# Patient Record
Sex: FEMALE | Race: WHITE | NOT HISPANIC OR LATINO | Employment: OTHER | URBAN - METROPOLITAN AREA
[De-identification: names, ages, dates, MRNs, and addresses within clinical notes are randomized per-mention and may not be internally consistent; named-entity substitution may affect disease eponyms.]

---

## 2017-06-23 ENCOUNTER — HOSPITAL ENCOUNTER (OUTPATIENT)
Facility: MEDICAL CENTER | Age: 57
End: 2017-06-23
Attending: INTERNAL MEDICINE | Admitting: INTERNAL MEDICINE
Payer: MEDICAID

## 2017-06-29 ENCOUNTER — APPOINTMENT (OUTPATIENT)
Dept: ADMISSIONS | Facility: MEDICAL CENTER | Age: 57
End: 2017-06-29
Payer: MEDICAID

## 2017-08-18 ENCOUNTER — TELEPHONE (OUTPATIENT)
Dept: INTERNAL MEDICINE | Facility: MEDICAL CENTER | Age: 57
End: 2017-08-18

## 2017-08-18 ENCOUNTER — APPOINTMENT (OUTPATIENT)
Dept: ADMISSIONS | Facility: MEDICAL CENTER | Age: 57
End: 2017-08-18
Attending: INTERNAL MEDICINE
Payer: MEDICAID

## 2017-08-18 DIAGNOSIS — Z79.4 TYPE 2 DIABETES MELLITUS WITH DIABETIC NEUROPATHY, WITH LONG-TERM CURRENT USE OF INSULIN (HCC): ICD-10-CM

## 2017-08-18 DIAGNOSIS — D64.9 ANEMIA, UNSPECIFIED TYPE: ICD-10-CM

## 2017-08-18 DIAGNOSIS — E55.9 VITAMIN D DEFICIENCY: ICD-10-CM

## 2017-08-18 DIAGNOSIS — E11.40 TYPE 2 DIABETES MELLITUS WITH DIABETIC NEUROPATHY, WITH LONG-TERM CURRENT USE OF INSULIN (HCC): ICD-10-CM

## 2017-08-18 NOTE — TELEPHONE ENCOUNTER
Pt called stating that she has an appt with Dr Rhoades on 9/6/17.  She would like to know if labs are needed before her appt and if so could we order them for her.

## 2017-08-21 ENCOUNTER — HOSPITAL ENCOUNTER (OUTPATIENT)
Facility: MEDICAL CENTER | Age: 57
End: 2017-08-21
Attending: INTERNAL MEDICINE | Admitting: INTERNAL MEDICINE
Payer: MEDICAID

## 2017-08-21 VITALS
WEIGHT: 164.9 LBS | BODY MASS INDEX: 27.47 KG/M2 | HEART RATE: 95 BPM | OXYGEN SATURATION: 92 % | HEIGHT: 65 IN | TEMPERATURE: 97.8 F | RESPIRATION RATE: 16 BRPM

## 2017-08-21 PROBLEM — I85.00 ESOPHAGEAL VARICES (HCC): Status: ACTIVE | Noted: 2017-08-21

## 2017-08-21 LAB — GLUCOSE BLD-MCNC: 283 MG/DL (ref 65–99)

## 2017-08-21 PROCEDURE — 502240 HCHG MISC OR SUPPLY RC 0272: Performed by: INTERNAL MEDICINE

## 2017-08-21 PROCEDURE — 700111 HCHG RX REV CODE 636 W/ 250 OVERRIDE (IP)

## 2017-08-21 PROCEDURE — 160203 HCHG ENDO MINUTES - 1ST 30 MINS LEVEL 4: Performed by: INTERNAL MEDICINE

## 2017-08-21 PROCEDURE — 160002 HCHG RECOVERY MINUTES (STAT): Performed by: INTERNAL MEDICINE

## 2017-08-21 PROCEDURE — 160009 HCHG ANES TIME/MIN: Performed by: INTERNAL MEDICINE

## 2017-08-21 PROCEDURE — 700102 HCHG RX REV CODE 250 W/ 637 OVERRIDE(OP)

## 2017-08-21 PROCEDURE — 160036 HCHG PACU - EA ADDL 30 MINS PHASE I: Performed by: INTERNAL MEDICINE

## 2017-08-21 PROCEDURE — 160048 HCHG OR STATISTICAL LEVEL 1-5: Performed by: INTERNAL MEDICINE

## 2017-08-21 PROCEDURE — 82962 GLUCOSE BLOOD TEST: CPT

## 2017-08-21 PROCEDURE — 500066 HCHG BITE BLOCK, ECT: Performed by: INTERNAL MEDICINE

## 2017-08-21 PROCEDURE — A9270 NON-COVERED ITEM OR SERVICE: HCPCS

## 2017-08-21 PROCEDURE — 160035 HCHG PACU - 1ST 60 MINS PHASE I: Performed by: INTERNAL MEDICINE

## 2017-08-21 RX ORDER — OXYCODONE HCL 5 MG/5 ML
SOLUTION, ORAL ORAL
Status: COMPLETED
Start: 2017-08-21 | End: 2017-08-21

## 2017-08-21 RX ORDER — SODIUM CHLORIDE, SODIUM LACTATE, POTASSIUM CHLORIDE, CALCIUM CHLORIDE 600; 310; 30; 20 MG/100ML; MG/100ML; MG/100ML; MG/100ML
INJECTION, SOLUTION INTRAVENOUS CONTINUOUS
Status: DISCONTINUED | OUTPATIENT
Start: 2017-08-21 | End: 2017-08-21 | Stop reason: HOSPADM

## 2017-08-21 RX ORDER — OMEPRAZOLE 20 MG/1
20 CAPSULE, DELAYED RELEASE ORAL 2 TIMES DAILY
Qty: 28 CAP | Refills: 0 | Status: SHIPPED | OUTPATIENT
Start: 2017-08-21 | End: 2017-12-16

## 2017-08-21 RX ORDER — ONDANSETRON 2 MG/ML
4 INJECTION INTRAMUSCULAR; INTRAVENOUS EVERY 4 HOURS PRN
Status: DISCONTINUED | OUTPATIENT
Start: 2017-08-21 | End: 2017-08-21 | Stop reason: HOSPADM

## 2017-08-21 RX ADMIN — OXYCODONE HYDROCHLORIDE 10 MG: 5 SOLUTION ORAL at 14:35

## 2017-08-21 RX ADMIN — SODIUM CHLORIDE, SODIUM LACTATE, POTASSIUM CHLORIDE, CALCIUM CHLORIDE: 600; 310; 30; 20 INJECTION, SOLUTION INTRAVENOUS at 13:00

## 2017-08-21 RX ADMIN — HEPARIN 500 UNITS: 100 SYRINGE at 16:45

## 2017-08-21 ASSESSMENT — PAIN SCALES - GENERAL
PAINLEVEL_OUTOF10: 3
PAINLEVEL_OUTOF10: 5
PAINLEVEL_OUTOF10: 3
PAINLEVEL_OUTOF10: 5
PAINLEVEL_OUTOF10: 3
PAINLEVEL_OUTOF10: 3
PAINLEVEL_OUTOF10: 4

## 2017-08-21 NOTE — PROCEDURES
DATE OF SERVICE:  08/21/2017    PROCEDURE:  Upper endoscopy with variceal band ligation.    :  Anthony Dent MD    INDICATION:  The patient is a 57-year-old with history of hepatitis C related   cirrhosis and esophageal varices with previous bleeding that presents for   surveillance of esophageal varices.    INFORMED CONSENT:  Written informed consent was obtained from the patient   after thorough explanation of indications, benefits and risks of the   procedure, which include but was not limited to bleeding, infection,   perforation, adverse reaction to medications, and missed lesions.    MEDICATIONS GIVEN:  Monitored anesthesia care with propofol.    Continuous telemetry, BP, pulse oximetry, and capnography were performed by   the anesthesiologist throughout the procedure.    A midsize upper endoscope was used for the procedure.    FINDINGS:  Patient was placed in the left lateral decubitus position.  After   sedation was achieved, the endoscope was passed into the posterior pharynx and   under direct visualization, advanced to second portion of the duodenum   without difficulty.  The patient tolerated the procedure well with following   findings:  1.  Esophagus:  She did have 3 columns of esophageal varices extending from   her GE junction up to her mid esophagus.  One column of medium size with red   garth sign in the distal esophagus.  There was also evidence of fibrosis from   prior banding.  The other 2 columns were small with significant fibrosis from   prior banding.  Two bands were applied on the larger varix with 1 band placed   over the red garth signs in the distal esophagus.  2.  Stomach:  She did have changes of mild portal hypertensive gastropathy.    No gastric varices.  3.  Duodenum:  Normal.    IMPRESSION:  1.  Esophageal varices, small-to-medium with red garth sign, status post band   ligation x2.  2.  Portal hypertensive gastropathy.    PLAN:  1.  Omeprazole 20 mg twice daily for 2  weeks to prevent post-banding ulcers.  2.  Repeat upper endoscopy in 6 months for surveillance of varices.  3.  Needs to follow up with primary care physician to resume her regular   medications, which she has not been on for quite sometime.       ____________________________________     MD DANIKA KHAN / JULI    DD:  08/21/2017 14:25:17  DT:  08/21/2017 15:32:41    D#:  [id number]  Job#:  [id number]

## 2017-08-21 NOTE — OR NURSING
1215: [id number], pt states isn't on any meds because she hasn't been to her pcp for treatment or check up.Will notify Dr. Medley, anesthesia  1220: no orders rec'd from Dr. Medley.

## 2017-08-21 NOTE — IP AVS SNAPSHOT
8/21/2017    Demetrice Jaime  [address]   [place] [address]    Dear Demetrice:    [facility] wants to ensure your discharge home is safe and you or your loved ones have had all of your questions answered regarding your care after you leave the hospital.    Below is a list of resources and contact information should you have any questions regarding your hospital stay, follow-up instructions, or active medical symptoms.    Questions or Concerns Regarding… Contact   Medical Questions Related to Your Discharge  (7 days a week, 8am-5pm) Contact a Nurse Care Coordinator   [phone]   Medical Questions Not Related to Your Discharge  (24 hours a day / 7 days a week)  Contact the Nurse Health Line   [phone]    Medications or Discharge Instructions Refer to your discharge packet   or contact your [facility] Primary Care Provider   [phone]   Follow-up Appointment(s) Schedule your appointment via [facility]   or contact Scheduling [phone]   Billing Review your statement via [facility]  or contact Billing [phone]   Medical Records Review your records via [facility]   or contact Medical Records [phone]     You may receive a telephone call within two days of discharge. This call is to make certain you understand your discharge instructions and have the opportunity to have any questions answered. You can also easily access your medical information, test results and upcoming appointments via the [facility] free online health management tool. You can learn more and sign up at [employer]/[facility]. For assistance setting up your [facility] account, please call [phone].    Once again, we want to ensure your discharge home is safe and that you have a clear understanding of any next steps in your care. If you have any questions or concerns, please do not hesitate to contact us, we are here for you. Thank you for choosing [facility] for your healthcare needs.    Sincerely,    Your [facility] Healthcare Team

## 2017-08-21 NOTE — OR SURGEON
Operative Report    PreOp Diagnosis: Esophageal varices    PostOp Diagnosis: Esophageal varices    Procedure(s):  GASTROSCOPY WITH BANDING   - Wound Class: Clean    Surgeon(s):  Anthony Dent M.D.    Anesthesiologist/Type of Anesthesia:  Anesthesiologist: Celestine Medley M.D./AZRA    Surgical Staff:  Circulator: Verónica Johns R.N.  Endoscopy Technician: Delbert Severino    Specimens:  None    Estimated Blood Loss: None    Findings:   1. 3 columns of esophageal varices.  One column was medium sized with red garth in distal esophagus.  Other 2 were small with fibrosis from prior banding.  2 bands applied to larger varix with red garth sign  2. Portal gastropathy  3. No gastric varices    Complications: None        8/21/2017 2:18 PM Anthony Dent

## 2017-08-21 NOTE — IP AVS SNAPSHOT
" Home Care Instructions                                                                                                                Name:Demetrice Jaime  Medical Record Number:[id number]  CSN: [id number]    YOB: 1960   Age: 57 y.o.  Sex: female  HT:1.651 m (5' 5\") WT: 74.8 kg (164 lb 14.5 oz)          Admit Date: 8/21/2017     Discharge Date:   Today's Date: 8/21/2017  Attending Doctor:  Anthony Dent M.D.                  Allergies:  Nkda                Discharge Instructions         ACTIVITY: Rest and take it easy for the first 24 hours.  A responsible adult is recommended to remain with you during that time.  It is normal to feel sleepy.  We encourage you to not do anything that requires balance, judgment or coordination.    MILD FLU-LIKE SYMPTOMS ARE NORMAL. YOU MAY EXPERIENCE GENERALIZED MUSCLE ACHES, THROAT IRRITATION, HEADACHE AND/OR SOME NAUSEA.    FOR 24 HOURS DO NOT:  Drive, operate machinery or run household appliances.  Drink beer or alcoholic beverages.   Make important decisions or sign legal documents.    SPECIAL INSTRUCTIONS: Gastroscopy, Care After  Refer to this sheet in the next few weeks. These instructions provide you with information on caring for yourself after your procedure. Your caregiver may also give you more specific instructions. Your treatment has been planned according to current medical practices, but problems sometimes occur. Call your caregiver if you have any problems or questions after your procedure.  HOME CARE INSTRUCTIONS  · Avoid hot and warm beverages for the first 24 hours after the procedure.  · You may return to your normal diet and activities on the day after your procedure, or as directed by your caregiver.  · Only take over-the-counter or prescription medicines for pain, discomfort, or fever as directed by your caregiver.  · If you were given medicine to help you relax (sedative), do not drive or operate machinery for 24 hours.  SEEK IMMEDIATE " MEDICAL CARE IF:  · You vomit blood or material that looks like coffee grounds.  · You have bloody, black, or tarry stools.  · You have shortness of breath.  · You have a fever.  · You have increasing abdominal pain that is not relieved with medicine.  · You have severe throat pain.  MAKE SURE YOU:  · Understand these instructions.  · Will watch your condition.  · Will get help right away if you are not doing well or get worse.     This information is not intended to replace advice given to you by your health care provider. Make sure you discuss any questions you have with your health care provider.     Document Released: 06/18/2013 Document Revised: 01/08/2016 Document Reviewed: 06/18/2013  [employer] Interactive Patient Education ©2016 [employer].      DIET: To avoid nausea, slowly advance diet as tolerated, avoiding spicy or greasy foods for the first day.  Add more substantial food to your diet according to your physician's instructions.  Babies can be fed formula or breast milk as soon as they are hungry.  INCREASE FLUIDS AND FIBER TO AVOID CONSTIPATION.        FOLLOW-UP APPOINTMENT:  A follow-up appointment should be arranged with your doctor; call to schedule.    You should CALL YOUR PHYSICIAN if you develop:  Fever greater than 101 degrees F.  Pain not relieved by medication, or persistent nausea or vomiting.  Excessive bleeding (blood soaking through dressing) or unexpected drainage from the wound.  Extreme redness or swelling around the incision site, drainage of pus or foul smelling drainage.  Inability to urinate or empty your bladder within 8 hours.  Problems with breathing or chest pain.    You should call 911 if you develop problems with breathing or chest pain.  If you are unable to contact your doctor or surgical center, you should go to the nearest emergency room or urgent care center.  Physician's telephone #: [phone]    If any questions arise, call your doctor.  If your doctor is not available,  please feel free to call the Surgical Center at [phone].  The Center is open Monday through Friday from 7AM to 7PM.  You can also call the HEALTH HOTLINE open 24 hours/day, 7 days/week and speak to a nurse at [phone], or toll free at [phone].    A registered nurse may call you a few days after your surgery to see how you are doing after your procedure.    MEDICATIONS: Resume taking daily medication.  Take prescribed pain medication with food.  If no medication is prescribed, you may take non-aspirin pain medication if needed.  PAIN MEDICATION CAN BE VERY CONSTIPATING.  Take a stool softener or laxative such as senokot, pericolace, or milk of magnesia if needed.    Prescription given for Prilosec.  Last pain medication given at 2:35pm.    Your physician has prescribed pain medication that includes Acetaminophen (Tylenol), do not take additional Acetaminophen (Tylenol) while taking the prescribed medication.    Depression / Suicide Risk    As you are discharged from this [facility] facility, it is important to learn how to keep safe from harming yourself.    Recognize the warning signs:  · Abrupt changes in personality, positive or negative- including increase in energy   · Giving away possessions  · Change in eating patterns- significant weight changes-  positive or negative  · Change in sleeping patterns- unable to sleep or sleeping all the time   · Unwillingness or inability to communicate  · Depression  · Unusual sadness, discouragement and loneliness  · Talk of wanting to die  · Neglect of personal appearance   · Rebelliousness- reckless behavior  · Withdrawal from people/activities they love  · Confusion- inability to concentrate     If you or a loved one observes any of these behaviors or has concerns about self-harm, here's what you can do:  · Talk about it- your feelings and reasons for harming yourself  · Remove any means that you might use to hurt yourself (examples: pills, rope,  extension cords, firearm)  · Get professional help from the community (Mental Health, Substance Abuse, psychological counseling)  · Do not be alone:Call your Safe Contact- someone whom you trust who will be there for you.  · Call your local CRISIS HOTLINE 110-3704 or 416-838-0193  · Call your local Children's Mobile Crisis Response Team [place] (133) 310-0958 or www.Collect.it  · Call the toll free National Suicide Prevention Hotlines   · National Suicide Prevention Lifeline 244-370-NYRP (7502)  · [facility] 800-SUICIDE (022-3254)       Medication List      START taking these medications        Instructions    Morning Afternoon Evening Bedtime    omeprazole 20 MG delayed-release capsule   Commonly known as:  PRILOSEC        Take 1 Cap by mouth 2 times a day. For 2 weeks   Dose:  20 mg                             Where to Get Your Medications      Information about where to get these medications is not yet available     ! Ask your nurse or doctor about these medications    - omeprazole 20 MG delayed-release capsule            Medication Information     Above and/or attached are the medications your physician expects you to take upon discharge. Review all of your home medications and newly ordered medications with your doctor and/or pharmacist. Follow medication instructions as directed by your doctor and/or pharmacist. Please keep your medication list with you and share with your physician. Update the information when medications are discontinued, doses are changed, or new medications (including over-the-counter products) are added; and carry medication information at all times in the event of emergency situations.        Resources     Quit Smoking / Tobacco Use:    I understand the use of any tobacco products increases my chance of suffering from future heart disease or stroke and could cause other illnesses which may shorten my life. Quitting the use of tobacco products is the single  most important thing I can do to improve my health. For further information on smoking / tobacco cessation call a Toll Free Quit Line at 1-547.566.3408 (*[employer]) or 1-343.808.5027 (American Lung Association) or you can access the web based program at www.lungusa.org.    [place] Tobacco Users Help Line:  (600) 828-8945       Toll Free: 1-956.915.4519  Quit Tobacco Program [facility] (763)311-8493    Crisis Hotline:    [place] Crisis Hotline:  9-577-XBMCNSW or 1-207.843.2268    [place] Crisis Hotline:    1-799.831.3222 or 378-393-0723    Discharge Survey:   Thank you for choosing [facility]. We hope we did everything we could to make your hospital stay a pleasant one. You may be receiving a survey and we would appreciate your time and participation in answering the questions. Your input is very valuable to us in our efforts to improve our service to our patients and their families.            Signatures     My signature on this form indicates that:    1. I acknowledge receipt and understanding of these Home Care Instruction.  2. My questions regarding this information have been answered to my satisfaction.  3. I have formulated a plan with my discharge nurse to obtain my prescribed medications for home.    __________________________________      __________________________________                   Patient Signature                                 Guardian/Responsible Adult Signature      __________________________________                 __________       ________                       Nurse Signature                                               Date                 Time

## 2017-08-21 NOTE — DISCHARGE INSTRUCTIONS
ACTIVITY: Rest and take it easy for the first 24 hours.  A responsible adult is recommended to remain with you during that time.  It is normal to feel sleepy.  We encourage you to not do anything that requires balance, judgment or coordination.    MILD FLU-LIKE SYMPTOMS ARE NORMAL. YOU MAY EXPERIENCE GENERALIZED MUSCLE ACHES, THROAT IRRITATION, HEADACHE AND/OR SOME NAUSEA.    FOR 24 HOURS DO NOT:  Drive, operate machinery or run household appliances.  Drink beer or alcoholic beverages.   Make important decisions or sign legal documents.    SPECIAL INSTRUCTIONS: Gastroscopy, Care After  Refer to this sheet in the next few weeks. These instructions provide you with information on caring for yourself after your procedure. Your caregiver may also give you more specific instructions. Your treatment has been planned according to current medical practices, but problems sometimes occur. Call your caregiver if you have any problems or questions after your procedure.  HOME CARE INSTRUCTIONS  · Avoid hot and warm beverages for the first 24 hours after the procedure.  · You may return to your normal diet and activities on the day after your procedure, or as directed by your caregiver.  · Only take over-the-counter or prescription medicines for pain, discomfort, or fever as directed by your caregiver.  · If you were given medicine to help you relax (sedative), do not drive or operate machinery for 24 hours.  SEEK IMMEDIATE MEDICAL CARE IF:  · You vomit blood or material that looks like coffee grounds.  · You have bloody, black, or tarry stools.  · You have shortness of breath.  · You have a fever.  · You have increasing abdominal pain that is not relieved with medicine.  · You have severe throat pain.  MAKE SURE YOU:  · Understand these instructions.  · Will watch your condition.  · Will get help right away if you are not doing well or get worse.     This information is not intended to replace advice given to you by your  health care provider. Make sure you discuss any questions you have with your health care provider.     Document Released: 06/18/2013 Document Revised: 01/08/2016 Document Reviewed: 06/18/2013  [employer] Interactive Patient Education ©2016 [employer]      DIET: To avoid nausea, slowly advance diet as tolerated, avoiding spicy or greasy foods for the first day.  Add more substantial food to your diet according to your physician's instructions.  Babies can be fed formula or breast milk as soon as they are hungry.  INCREASE FLUIDS AND FIBER TO AVOID CONSTIPATION.        FOLLOW-UP APPOINTMENT:  A follow-up appointment should be arranged with your doctor; call to schedule.    You should CALL YOUR PHYSICIAN if you develop:  Fever greater than 101 degrees F.  Pain not relieved by medication, or persistent nausea or vomiting.  Excessive bleeding (blood soaking through dressing) or unexpected drainage from the wound.  Extreme redness or swelling around the incision site, drainage of pus or foul smelling drainage.  Inability to urinate or empty your bladder within 8 hours.  Problems with breathing or chest pain.    You should call 911 if you develop problems with breathing or chest pain.  If you are unable to contact your doctor or surgical center, you should go to the nearest emergency room or urgent care center.  Physician's telephone #: [phone]    If any questions arise, call your doctor.  If your doctor is not available, please feel free to call the Surgical Center at [phone].  The Center is open Monday through Friday from 7AM to 7PM.  You can also call the HEALTH HOTLINE open 24 hours/day, 7 days/week and speak to a nurse at [phone], or toll free at [phone].    A registered nurse may call you a few days after your surgery to see how you are doing after your procedure.    MEDICATIONS: Resume taking daily medication.  Take prescribed pain medication with food.  If no medication is prescribed, you may  take non-aspirin pain medication if needed.  PAIN MEDICATION CAN BE VERY CONSTIPATING.  Take a stool softener or laxative such as senokot, pericolace, or milk of magnesia if needed.    Prescription given for Prilosec.  Last pain medication given at 2:35pm.    Your physician has prescribed pain medication that includes Acetaminophen (Tylenol), do not take additional Acetaminophen (Tylenol) while taking the prescribed medication.    Depression / Suicide Risk    As you are discharged from this [facility] facility, it is important to learn how to keep safe from harming yourself.    Recognize the warning signs:  · Abrupt changes in personality, positive or negative- including increase in energy   · Giving away possessions  · Change in eating patterns- significant weight changes-  positive or negative  · Change in sleeping patterns- unable to sleep or sleeping all the time   · Unwillingness or inability to communicate  · Depression  · Unusual sadness, discouragement and loneliness  · Talk of wanting to die  · Neglect of personal appearance   · Rebelliousness- reckless behavior  · Withdrawal from people/activities they love  · Confusion- inability to concentrate     If you or a loved one observes any of these behaviors or has concerns about self-harm, here's what you can do:  · Talk about it- your feelings and reasons for harming yourself  · Remove any means that you might use to hurt yourself (examples: pills, rope, extension cords, firearm)  · Get professional help from the community (Mental Health, Substance Abuse, psychological counseling)  · Do not be alone:Call your Safe Contact- someone whom you trust who will be there for you.  · Call your local CRISIS HOTLINE 320-3138 or 253-521-2707  · Call your local Children's Mobile Crisis Response Team [place] (325) 594-3113 or www.UpDown  · Call the toll free National Suicide Prevention Hotlines   · National Suicide Prevention Lifeline 852-452-IBBS  (9249)  · [facility] 800-SUICIDE (489-0342)

## 2017-08-21 NOTE — OR NURSING
1425 received pt from OR with Dr. Medley, VSS breathing even and unlabored.  1435 PO pain medication given for 5/10 pan - see MAR  1500 pt sleeping, VS remain stable  1645  at bedside, discharge instructions given- all questions and concerns addressed. Port de- accessed.  1700 pt discharged out to car in ,  at side.

## 2017-09-13 ENCOUNTER — OFFICE VISIT (OUTPATIENT)
Dept: INTERNAL MEDICINE | Facility: MEDICAL CENTER | Age: 57
End: 2017-09-13
Payer: MEDICAID

## 2017-09-13 VITALS
WEIGHT: 162 LBS | RESPIRATION RATE: 16 BRPM | TEMPERATURE: 98.4 F | SYSTOLIC BLOOD PRESSURE: 160 MMHG | BODY MASS INDEX: 26.99 KG/M2 | HEIGHT: 65 IN | DIASTOLIC BLOOD PRESSURE: 82 MMHG | OXYGEN SATURATION: 97 % | HEART RATE: 100 BPM

## 2017-09-13 DIAGNOSIS — E55.9 VITAMIN D DEFICIENCY: ICD-10-CM

## 2017-09-13 DIAGNOSIS — I85.10 SECONDARY ESOPHAGEAL VARICES WITHOUT BLEEDING (HCC): ICD-10-CM

## 2017-09-13 DIAGNOSIS — M54.40 CHRONIC MIDLINE LOW BACK PAIN WITH SCIATICA, SCIATICA LATERALITY UNSPECIFIED: ICD-10-CM

## 2017-09-13 DIAGNOSIS — K74.69 OTHER CIRRHOSIS OF LIVER (HCC): ICD-10-CM

## 2017-09-13 DIAGNOSIS — G47.33 OSA (OBSTRUCTIVE SLEEP APNEA): ICD-10-CM

## 2017-09-13 DIAGNOSIS — G89.29 CHRONIC MIDLINE LOW BACK PAIN WITH SCIATICA, SCIATICA LATERALITY UNSPECIFIED: ICD-10-CM

## 2017-09-13 DIAGNOSIS — G25.81 RESTLESS LEG: ICD-10-CM

## 2017-09-13 DIAGNOSIS — E11.40 TYPE 2 DIABETES MELLITUS WITH DIABETIC NEUROPATHY, WITH LONG-TERM CURRENT USE OF INSULIN (HCC): ICD-10-CM

## 2017-09-13 DIAGNOSIS — B18.2 CHRONIC HEPATITIS C WITHOUT HEPATIC COMA (HCC): ICD-10-CM

## 2017-09-13 DIAGNOSIS — Z79.4 TYPE 2 DIABETES MELLITUS WITH DIABETIC NEUROPATHY, WITH LONG-TERM CURRENT USE OF INSULIN (HCC): ICD-10-CM

## 2017-09-13 DIAGNOSIS — I10 ESSENTIAL HYPERTENSION: ICD-10-CM

## 2017-09-13 DIAGNOSIS — G62.9 NEUROPATHY: ICD-10-CM

## 2017-09-13 LAB
HBA1C MFR BLD: 13.7 % (ref ?–5.8)
INT CON NEG: NEGATIVE
INT CON POS: POSITIVE

## 2017-09-13 PROCEDURE — 99213 OFFICE O/P EST LOW 20 MIN: CPT | Mod: GE | Performed by: HOSPITALIST

## 2017-09-13 PROCEDURE — 83036 HEMOGLOBIN GLYCOSYLATED A1C: CPT | Performed by: HOSPITALIST

## 2017-09-13 RX ORDER — ENALAPRIL MALEATE 20 MG/1
20 TABLET ORAL DAILY
Qty: 30 TAB | Refills: 11 | Status: ON HOLD | OUTPATIENT
Start: 2017-09-13 | End: 2018-02-14

## 2017-09-13 RX ORDER — PROPRANOLOL HYDROCHLORIDE 20 MG/1
20 TABLET ORAL 3 TIMES DAILY
Qty: 90 TAB | Refills: 11 | Status: SHIPPED | OUTPATIENT
Start: 2017-09-13 | End: 2017-12-16

## 2017-09-13 RX ORDER — INSULIN GLARGINE 100 [IU]/ML
25 INJECTION, SOLUTION SUBCUTANEOUS EVERY EVENING
Qty: 10 ML | Refills: 5 | Status: ON HOLD | OUTPATIENT
Start: 2017-09-13 | End: 2017-12-20

## 2017-09-13 RX ORDER — BLOOD-GLUCOSE METER
1 EACH MISCELLANEOUS 3 TIMES DAILY
Qty: 1 KIT | Refills: 0 | Status: ON HOLD | OUTPATIENT
Start: 2017-09-13 | End: 2017-12-20

## 2017-09-13 RX ORDER — PRAMIPEXOLE DIHYDROCHLORIDE 0.12 MG/1
0.12 TABLET ORAL
Qty: 30 TAB | Refills: 11 | Status: SHIPPED | OUTPATIENT
Start: 2017-09-13 | End: 2017-12-16

## 2017-09-13 RX ORDER — SUCRALFATE 1 G/10ML
SUSPENSION ORAL
COMMUNITY
Start: 2017-08-23 | End: 2017-12-16

## 2017-09-13 RX ORDER — NAPROXEN SODIUM 220 MG
1 TABLET ORAL EVERY EVENING
Qty: 100 EACH | Refills: 5 | Status: ON HOLD | OUTPATIENT
Start: 2017-09-13 | End: 2017-12-20

## 2017-09-13 RX ORDER — NORTRIPTYLINE HYDROCHLORIDE 25 MG/1
25 CAPSULE ORAL 3 TIMES DAILY
Qty: 90 CAP | Refills: 11 | Status: SHIPPED | OUTPATIENT
Start: 2017-09-13 | End: 2017-12-16

## 2017-09-13 RX ORDER — GABAPENTIN 300 MG/1
300 CAPSULE ORAL 3 TIMES DAILY
Qty: 90 CAP | Refills: 11 | Status: ON HOLD | OUTPATIENT
Start: 2017-09-13 | End: 2017-12-20

## 2017-09-13 ASSESSMENT — PAIN SCALES - GENERAL: PAINLEVEL: 8=MODERATE-SEVERE PAIN

## 2017-09-13 ASSESSMENT — PATIENT HEALTH QUESTIONNAIRE - PHQ9
SUM OF ALL RESPONSES TO PHQ QUESTIONS 1-9: 24
5. POOR APPETITE OR OVEREATING: 3 - NEARLY EVERY DAY
CLINICAL INTERPRETATION OF PHQ2 SCORE: 6

## 2017-09-14 NOTE — PROGRESS NOTES
Established Patient    Demetrice presents today with the following:    CC: Management of chronic conditions    HPI: 56-year-old female with a past medical history of insulin-dependent diabetes type II, hypertension, chronic neck and lower back pain, liver cirrhosis due to hepatitis C, anxiety, depression, vitamin D deficiency, obstructive sleep apnea presents to the clinic for management of her medical conditions and medication refills. Patient was seen 1 year ago by Dr. Rhoades and left prior to receiving proper medical care because she was upset about losing her chronic narcotics and benzo's after violating the controlled medication drug policy here at [facility]. She has been completely out of all of her chronic medications for the past 8 months and would like to get back on them.    Chronic neck and back pain  Previously on oxycodone for this, but lost her privilege of receiving controlled substances from this clinic as she previously violated the drug policy. At today's appt she asked about re-initiating these medications and was informed that is not possible at this point. She tried a short course of physical therapy previously, but stopped going and states that it di dnot help. Gabapentin and nortriptyline will be prescribed at todays visit.     Depression/Anxiety  Patient states that she has been more depressed than usual because her son recently passed away and relapsed on alcohol and methamphetamines. Her last use was 1 month ago. Patient previously had an rx for xanax and as described previously we could not fill her controlled substances. She has been on multiple antidepressives and antipsychotics in the past and was previously managed by [facility]. She states that she would like to follow up with a new psychiatrist and would let us know at her next visit if she wants a new referral or would like to return to her old psychiatris. Denies SI/HI.     Essential hypertension  Previously on amlodipine 10  mg daily and enalapril 20 mg daily, but has been out of these meds for 8 months. BP at today's visit is 160/82.     Type 2 diabetes mellitus with complication  A POC HbA1c at today's visit was 13.7. She was previously on lantus 35-40 units qPM, but has not taken any insulin for 8 months. Her DMII is complicated with peripheral neuropathy. Patient wasalso previously on metformin, but she refused re-initiation of this medication at today's visit. She is also requesting a new rx for glucometer, test strips, lancets. She states that insurance will not cover the injector pens.     Chronic hepatitis C/liver cirrhosis  Secondary to hep C. s/p sofobuvir and ribavirin for hepatitis C treatment in 2015. Last Hep C viral titer not detectable. She underwent an EGD several weeks prior and was told that they banded esophageal varices. She was prescribed a short course of PPI, carafate and viscous lidocaine. Patient was previously on propranolol and this medications was re-initiated at todays visit.     Restless leg  Previously on pramipexole 0.125mg every bedtime. Re-initiated today.     Vitamin D deficiency  Previously on vitamin D replacement.     Obstructive sleep apnea    Previous polysomnogram indicated AHI 58.8 and minimum saturation 80%. The patient had 68% central apnea. Patient was previously using BiPAP 19/15 cm with small simplicity mask and heated humidification. She did not tolerate this and states that she has also been prescribed supplemental 02 by nasal cannula as well. She is interested in re-initiating 02 by nasal cannula is refusing bipap       Health care maintenance  Previous colonoscopy in 2011 with repeat in 5 year. She is due for this and states that she will call her GI doc to schedule this. She has not had a dilated diabetic eye exam in several years and has not had basic labs in a year.      Patient Active Problem List    Diagnosis Date Noted   • Pancytopenia (CMS-HCC) 01/01/2015     Priority: High   •  "Coagulopathy (CMS-HCC) 01/01/2015     Priority: Medium   • HTN (hypertension) 01/01/2015     Priority: Medium   • DM2 (diabetes mellitus, type 2) (CMS-HCC) 01/01/2015     Priority: Medium   • Hepatitis C 01/01/2015     Priority: Medium   • Cirrhosis (CMS-HCC) 01/01/2015     Priority: Medium   • Esophageal varices (CMS-HCC) 08/21/2017   • Anxiety 09/19/2016   • Restless leg 06/22/2016   • Insomnia 06/22/2016   • Hiatal hernia 06/22/2016   • Depression 06/22/2016   • Neuropathy (CMS-[facility]) 06/22/2016   • Back pain 06/22/2016   • Fibromyalgia 06/22/2016   • Bilateral cataracts 06/22/2016   • FLORINDA (obstructive sleep apnea) 06/22/2016   • Vein, varicose 06/22/2016   • Nicotine dependence 06/22/2016   • Colonic polyp 06/22/2016   • Muscle cramps 06/22/2016   • Microcytic anemia 06/22/2016   • Visit for preventive health examination 06/22/2016   • Chronic neck and back pain 06/22/2016   • Vertigo 06/22/2016   • Chronic hepatitis C virus infection (CMS-[facility]) 01/16/2015   • Disorder of liver 11/14/2014   • Chest pain 06/03/2014       Current Outpatient Prescriptions   Medication Sig Dispense Refill   • enalapril (VASOTEC) 20 MG tablet Take 1 Tab by mouth every day. 30 Tab 11   • gabapentin (NEURONTIN) 300 MG Cap Take 1 Cap by mouth 3 times a day. 90 Cap 11   • pramipexole (MIRAPEX) 0.125 MG Tab Take 1 Tab by mouth every bedtime. 30 Tab 11   • propranolol (INDERAL) 20 MG Tab Take 1 Tab by mouth 3 times a day. 90 Tab 11   • nortriptyline (PAMELOR) 25 MG Cap Take 1 Cap by mouth 3 times a day. 90 Cap 11   • insulin glargine (LANTUS) 100 UNIT/ML Solution Inject 25 Units as instructed every evening. 10 mL 5   • Insulin Syringe-Needle U-100 (INSULIN SYRINGE .5CC/31GX5/16\") 31G X 5/16\" 0.5 ML Misc 1 Syringe by Does not apply route every evening. 100 Each 5   • ONETOUCH DELICA LANCETS FINE Misc 1 Syringe by Does not apply route 3 times a day. 100 Each 5   • Blood Glucose Monitoring Suppl (ONE TOUCH ULTRA 2) w/Device Kit 1 Device by Does " "not apply route 3 times a day. 1 Kit 0   • glucose blood (ONE TOUCH TEST STRIPS) strip 1 Strip by Other route as needed. 100 Strip 5   • lidocaine viscous 2% (XYLOCAINE) 2 % Solution      • CARAFATE 1 GM/10ML Suspension      • omeprazole (PRILOSEC) 20 MG delayed-release capsule Take 1 Cap by mouth 2 times a day. For 2 weeks 28 Cap 0     No current facility-administered medications for this visit.        Social History     Social History   • Marital status:      Spouse name: N/A   • Number of children: N/A   • Years of education: N/A     Occupational History   • Not on file.     Social History Main Topics   • Smoking status: Current Every Day Smoker     Packs/day: 1.00     Years: 42.00     Types: Cigarettes   • Smokeless tobacco: Never Used   • Alcohol use 0.0 - 3.6 oz/week      Comment: drinks 0-6 during the week and drinks all weekend   • Drug use: No   • Sexual activity: Not on file     Other Topics Concern   • Not on file     Social History Narrative   • No narrative on file       Family History   Problem Relation Age of Onset   • Diabetes Mother    • Hypertension Mother        ROS: As per HPI. Additional pertinent symptoms as noted below.    Constitutional: Denies fevers or chills  Eyes: Denies changes in vision  Ears/Nose/Throat/Mouth: Denies nasal congestion or sore throat    Cardiovascular: Denies chest pain or palpitations    Respiratory: Denies shortness of breath , Denies cough  Gastrointestinal/Hepatic: Denies abd pain, nausea, vomiting    Genitourinary: Denies dysuria or frequency  Musculoskeletal/Rheum: Chronic neck and lower back pain  Neurological: Peripheral neuropathy. Denies headache  Psychiatric: Anxiety  Endocrine: History of type 2 diabetes  Heme/Oncology/Lymph Nodes: Denies weight changes or enlarged LNs.   Allergic/Immunologic: Allergies    /82   Pulse 100   Temp 36.9 °C (98.4 °F)   Resp 16   Ht 1.651 m (5' 5\")   Wt 73.5 kg (162 lb)   SpO2 97%   BMI 26.96 kg/m²     Physical " Exam  General:  Alert and oriented, No apparent distress.  Eyes: Pupils equal and reactive. No scleral icterus.  Throat: Clear no erythema or exudates noted.  Neck: Supple. No lymphadenopathy noted. Thyroid not enlarged.  Lungs: Clear to auscultation and percussion bilaterally.  Cardiovascular: Regular rate and rhythm. No murmurs, rubs or gallops.  Abdomen:  Benign. No rebound or guarding noted.  Extremities: No clubbing, cyanosis, edema.  Skin: Clear. No rash or suspicious skin lesions noted.          Assessment and Plan    Anxiety/depression  - Previously on xanax  - Previously seen by [facility] and is interested in a visit with psych. She will let us know at next visit if she wants a new referral or will follow up with her previous psych  - Check TSH/T4  - Prescribe nortriptyline for depression and neuropathy  - Follow up at next visit      Chronic neck and back pain  - Previously on oxycodone  - Re-initiated gabapentin today at 300mg TID. Can be titrated up based on response  - Nortriptyline will help with neuropathy as well  - Failed PT  - Tylenol PRN     Essential hypertension  - /82 today  - Restart enalapril today  - Hold on amlodipine for now, but will likely need to Rx in future  - Has BP cuff at home, asked to log pressures and bring log in     Type 2 diabetes mellitus with complication  - Hba1c 13.7% today  - Restart lantus qPM at 25 units   - Follow up at next visit  - Asked to log sugars and bring them in  - Opthal referral for diabetic eye exam  - Refused metformin  - Gabapentin/nortriptyline for neuropathy  - Urine microalbumin ordered     Chronic hepatitis C/liver cirrhosis  - 2/2 hep C s/p sofobuvir and ribavirin for hepatitis C treatment in 2015   history of hepatitis C. 3/14/2016 HCV RNA by PCR less than 30, HCV PCR qunt log <1.5. HCV RNA PCR Qnt int: not detected.  - Recent EGD with banding of esophageal varices  - Follows with GI  - On pantoprazole, carafate  - Re-initiate  propranolol     Restless leg  - Re-initiate pramipexole on 0.125 every bedtime     Vitamin D deficiency  - Recheck vitamin D  - Initiate therapy based on level    Obstructive sleep apnea    - Polysomnogram indicates AHI 58.8 and minimum saturation 80%. The patient had 68% central apnea.  - Did not tolerate BiPap  - Requesting 02 by NC  - Pulmonology referral for managment    Health care maintenance  - Refused mammogram  - Refused influenza vaccine  - Refused pnumovax  - Refused PAP  - Instructed patient to follow up with GI for repeat colonoscopy. Last one in 2011 with recommended re-study in 5 years       PLAN: CBC, CMP, lipid panel, Vit D, B12, urine microalbumin, referral to pulmonology and opthalmology         Signed by: Ignacio Friedman M.D.     At the time of the visit, I discussed that patient's medical history, physical examination, laboratory results/studies, and assessment and I agree with the plan documented.    Edin Granados M.D.

## 2017-10-11 ENCOUNTER — APPOINTMENT (OUTPATIENT)
Dept: INTERNAL MEDICINE | Facility: MEDICAL CENTER | Age: 57
End: 2017-10-11

## 2017-10-23 ENCOUNTER — OUTPATIENT INFUSION SERVICES (OUTPATIENT)
Dept: ONCOLOGY | Facility: MEDICAL CENTER | Age: 57
End: 2017-10-23
Attending: INTERNAL MEDICINE
Payer: MEDICAID

## 2017-10-23 VITALS
BODY MASS INDEX: 27.07 KG/M2 | OXYGEN SATURATION: 96 % | TEMPERATURE: 98 F | DIASTOLIC BLOOD PRESSURE: 75 MMHG | RESPIRATION RATE: 18 BRPM | HEART RATE: 108 BPM | WEIGHT: 162.48 LBS | SYSTOLIC BLOOD PRESSURE: 144 MMHG | HEIGHT: 65 IN

## 2017-10-23 PROCEDURE — 306780 HCHG STAT FOR TRANSFUSION PER CASE

## 2017-10-23 ASSESSMENT — PAIN SCALES - GENERAL: PAINLEVEL: NO PAIN

## 2017-10-24 ENCOUNTER — APPOINTMENT (OUTPATIENT)
Dept: INTERNAL MEDICINE | Facility: MEDICAL CENTER | Age: 57
End: 2017-10-24

## 2017-12-16 ENCOUNTER — HOSPITAL ENCOUNTER (EMERGENCY)
Facility: MEDICAL CENTER | Age: 57
End: 2017-12-16
Attending: EMERGENCY MEDICINE
Payer: MEDICAID

## 2017-12-16 ENCOUNTER — APPOINTMENT (OUTPATIENT)
Dept: RADIOLOGY | Facility: MEDICAL CENTER | Age: 57
End: 2017-12-16
Attending: EMERGENCY MEDICINE
Payer: MEDICAID

## 2017-12-16 VITALS
HEIGHT: 65 IN | TEMPERATURE: 98.7 F | OXYGEN SATURATION: 90 % | DIASTOLIC BLOOD PRESSURE: 97 MMHG | WEIGHT: 180.56 LBS | RESPIRATION RATE: 18 BRPM | HEART RATE: 108 BPM | SYSTOLIC BLOOD PRESSURE: 161 MMHG | BODY MASS INDEX: 30.08 KG/M2

## 2017-12-16 DIAGNOSIS — F19.10 IV DRUG ABUSE (HCC): ICD-10-CM

## 2017-12-16 LAB — GLUCOSE BLD-MCNC: 419 MG/DL (ref 65–99)

## 2017-12-16 PROCEDURE — 82962 GLUCOSE BLOOD TEST: CPT

## 2017-12-16 PROCEDURE — 99283 EMERGENCY DEPT VISIT LOW MDM: CPT

## 2017-12-16 PROCEDURE — 73090 X-RAY EXAM OF FOREARM: CPT | Mod: LT

## 2017-12-16 ASSESSMENT — PAIN SCALES - GENERAL
PAINLEVEL_OUTOF10: 0
PAINLEVEL_OUTOF10: 10

## 2017-12-16 NOTE — DISCHARGE INSTRUCTIONS
Please follow up with a primary physician for blood pressure management, diabetic screening, and all other preventive health concerns.      Drug Abuse, Frequently Asked Questions  Drug addiction is a complex brain disease. It is characterized by compulsive, at times uncontrollable, drug craving, seeking, and use that persists even in the face of extremely negative results. Drug seeking becomes compulsive, in large part as a result of the effects of prolonged drug use on brain functioning and, thus, on behavior. For many people, drug addiction becomes chronic, with relapses possible even after long periods of being off the drug.  HOW QUICKLY CAN I BECOME ADDICTED TO A DRUG?  There is no easy answer to this. If and how quickly you might become addicted to a drug depends on many factors including the biology of your body. All drugs are potentially harmful and may have life-threatening consequences associated with their use. There are also vast differences among individuals in sensitivity to various drugs. While one person may use a drug many times and suffer no ill effects, another person may be particularly vulnerable and overdose or developing a craving with the first use. There is no way of knowing in advance how someone may react.  HOW DO I KNOW IF SOMEONE IS ADDICTED TO DRUGS?  If a person is compulsively seeking and using a drug despite negative consequences (such as loss of job, debt, physical problems brought on by drug abuse, or family problems) then he or she is probably addicted. Those who screen for drug problems, such as physicians, have developed the CAGE questionnaire. These four simple questions can help detect substance abuse problems:  · Have you ever felt you ought to Cut down on your drinking/drug use?   · Have people ever Annoyed you by criticizing your drinking/drug use?   · Have you ever felt bad or Guilty about your drinking/drug use?   · Have you ever had a drink or taken a drug first thing in  "the morning to steady your nerves or get rid of a hangover (Eye-opener)?   WHAT ARE THE PHYSICAL SIGNS OF ABUSE OR ADDICTION?  The physical signs of abuse or addiction can vary depending on the person and the drug being abused. For example, someone who abuses marijuana may have a chronic cough or worsening of asthmatic conditions. THC, the chemical in marijuana responsible for producing its effects, is associated with weakening the immune system which makes the user more vulnerable to infections, such as pneumonia. Each drug has short-term and long-term physical effects. Stimulants like cocaine increase heart rate and blood pressure, whereas opioids like heroin may slow the heart rate and reduce breathing (respiration).   ARE THERE EFFECTIVE TREATMENTS FOR DRUG ADDICTION?  Drug addiction can be effectively treated with behavioral-based therapies and, for addiction to some drugs such as heroin or nicotine, medications may be used. Treatment may vary for each person depending on the type of drug(s) being used and multiple courses of treatment may be needed to achieve success. Research has revealed 13 basic principles that underlie effective drug addiction treatment. These are discussed in JS's Principles of Drug Addiction Treatment: A Research-Based Guide.  WHERE CAN I FIND INFORMATION ABOUT DRUG TREATMENT PROGRAMS?  · For referrals to treatment programs, visit the Substance Abuse and Mental Health Services Administration online at http://findtreatment.samhsa.gov/.   · JS publishes an expanding series of treatment manuals, the \"clinical toolbox,\" that gives drug treatment providers research-based information for creating effective treatment programs.   WHAT IS DETOXIFICATION, OR \"DETOX\"?  Detoxification is the process of allowing the body to rid itself of a drug while managing the symptoms of withdrawal. It is often the first step in a drug treatment program and should be followed by treatment with a " behavioral-based therapy and/or a medication, if available. Detox alone with no follow-up is not treatment.   WHAT IS WITHDRAWAL? HOW LONG DOES IT LAST?  Withdrawal is the variety of symptoms that occur after use of some addictive drugs is reduced or stopped. Length of withdrawal and symptoms vary with the type of drug. For example, physical symptoms of heroin withdrawal may include restlessness, muscle and bone pain, insomnia, diarrhea, vomiting, and cold flashes. These physical symptoms may last for several days, but the general depression, or dysphoria (opposite of euphoria), that often accompanies heroin withdrawal, may last for weeks. In many cases withdrawal can be easily treated with medications to ease the symptoms. But treating withdrawal is not the same as treating addiction.   WHAT ARE THE COSTS OF DRUG ABUSE TO SOCIETY?  Beyond the raw numbers are other costs to society:  · Spread of infectious diseases such as HIV/AIDS and hepatitis C either through sharing of drug paraphernalia or unprotected sex.   · Deaths due to overdose or other complications from drug use.   · Effects on unborn children of pregnant drug users.   · Other effects such as crime and homelessness.   IF A PREGNANT WOMAN ABUSES DRUGS, DOES IT AFFECT THE FETUS?  · Many substances including alcohol, nicotine, and drugs of abuse can have negative effects on the developing fetus because they are transferred to the fetus across the placenta. For example, nicotine has been connected with premature birth and low birth weight, as has the use of cocaine. Scientific studies have shown that babies born to marijuana users were shorter, weighed less, and had smaller head sizes than those born to mothers who did not use the drug. Smaller babies are more likely to develop health problems.   · Whether a baby's health problems, if caused by a drug, will continue as the child grows, is not always known. Research does show that children born to mothers who  used marijuana regularly during pregnancy may have trouble concentrating, when older. Our research continues to produce insights on the negative effects of drug use on the fetus.   Document Released: 12/20/2004 Document Revised: 03/11/2013 Document Reviewed: 03/19/2010  ExitCare® Patient Information ©2013 [employer].  Substance Abuse  Your exam indicates that you have a problem with substance abuse. Substance abuse is the misuse of alcohol or drugs that causes problems in family life, friendships, and work relationships. Substance abuse is the most important cause of premature illness, disability, and death in our society. It is also the greatest threat to a person's mental and spiritual well being.  Substance abuse can start out in an innocent way, such as social drinking or taking a little extra medication prescribed by your doctor. No one starts out with the intention of becoming an alcoholic or an addict. Substance abuse victims cannot control their use of alcohol or drugs. They may become intoxicated daily or go on weekend binges. Often there is a strong desire to quit, but attempts to stop using often fail. Encounters with law enforcement or conflicts with family members, friends, and work associates are signs of a potential problem.  Recovery is always possible, although the craving for some drugs makes it difficult to quit without assistance. Many treatment programs are available to help people stop abusing alcohol or drugs. The first step in treatment is to admit you have a problem. This is a major ronel because denial is a powerful force with substance abuse.  Alcoholics Anonymous, Narcotics Anonymous, Cocaine Anonymous, and other recovery groups and programs can be very useful in helping people to quit. If you do not feel okay about your drug or alcohol use and if it is causing you trouble, we want to encourage you to talk about it with your doctor or with someone from a recovery group who can help  "you. You could also call the National [employer] at 6-355-043-SALK. It is up to you to take the first step.  AL-ANON and ALA-TEEN are support groups for friends and family members of an alcohol or drug dependent person. The people who love and care for the alcoholic or addicted person often need help, too. For information about these organizations, check your phone directory or call a local alcohol or drug treatment center.  Document Released: 01/25/2006 Document Revised: 03/11/2013 Document Reviewed: 12/19/2009  ExitCare® Patient Information ©2014 [employer].  Foreign Body  When the skin is cut or punctured and some object is left in the tissue under the skin, that object is called a \"foreign body\". A foreign body could be a wood splinter, a thorn, a sliver of metal, a shard of glass, a cactus needle or the tip of a pencil. In most instances, your caregiver will recommend that the foreign body be removed. If it is not removed, infection, abscess formation, an allergic reaction, chronic pain and disability can occur over time.  Sometimes, foreign bodies (particularly very small ones) can be difficult to locate. Your caregiver may recommend x-rays or ultrasound imaging to help find them. If removal is not successful, there may be a need to see a surgeon who might suggest further exploration in the operating room. Occasionally, tiny bits of metallic foreign material (such as shrapnel) are not removed, if it is felt that there would be no harm in leaving them untouched.  HOME CARE INSTRUCTIONS  · Rest the injured area and keep it elevated until all the pain and swelling are gone.   · You will need a tetanus vaccination if you have not had one in the last 5 years.   · Return to this facility, see your caregiver or follow-up as instructed in 2 days.   SEEK IMMEDIATE MEDICAL CARE IF:   · You develop increasing redness or swelling of the skin near the wound.   · You develop drainage of pus from the wound. "   · You have persistent pain or loss of motion.   · You have red streaks extending above or below the wound location.   MAKE SURE YOU:   · Understand these instructions.   · Will watch your condition.   · Will get help right away if you are not doing well or get worse.   Document Released: 12/18/2006 Document Revised: 03/11/2013 Document Reviewed: 11/19/2010  [employer]® Patient Information ©2013 [employer].

## 2017-12-16 NOTE — ED NOTES
Pt ambulatory to triage, states she has been very depressed so she found some crank  & while she was using, the needle broke off in her left arm.  Pt tearful in triage.   Denies SI.

## 2017-12-16 NOTE — ED NOTES
Agree with triage note, pt ambulatory to room, on dawit RODO pain in right arm where she injected illicit drugs and says needle broke off in arm. Pt appears anxious.  Pt up for ERP eval.

## 2017-12-16 NOTE — ED NOTES
Pt given d/c instructions, f/u info.  VSS at discharge.  Pt ambulatory from the ED w/ steady gait.  All belongings in possession on discharge.  I escorted pt to lobby.

## 2017-12-16 NOTE — ED PROVIDER NOTES
ED Provider Note    Scribed for Rogelio Galeas M.D. by Sanjana Lancaster. 12/16/2017  10:27 AM    Primary Care Provider: Giovanny Rhoades M.D.  Means of arrival: Walk in  History limited by: None    CHIEF COMPLAINT  Chief Complaint   Patient presents with   • Foreign Body to Skin       HPI  Demetrice Jaime is a 57 y.o. female who presents to the ED complaining of a foreign body to the skin with an onset of today. The patient states she was injecting methamphetamines into her left arm approximately 30 minutes ago. She believes she may have broken the needle in her arm while injecting as she is experiencing constant soreness to left antecubital.  She reports drinking soda approximately 30 minutes ago.  Negative for fever, cough, chest pain, vomiting or rash.      REVIEW OF SYSTEMS  CONSTITUTIONAL:  Denies fever, chills,  or weakness.  CARDIOVASCULAR:  Denies chest pain, palpitations, or swelling.  RESPIRATORY:  Denies cough, shortness of breath, difficulty breathing.  GI:  Denies abdominal pain  MUSCULOSKELETAL:  Positive foreign body to skin with pain to left antecubital.  Denies weakness, joint swelling, or back pain.  SKIN:  No rash or bruising.There is tenderness in her antecubital area where she shot the needle.  NEUROLOGIC:  Denies headache, focal weakness, or numbness.  PSYCHIATRIC:  Denies depression.    See HPI for further details. E.      PAST MEDICAL HISTORY  Past Medical History:   Diagnosis Date   • Anemia    • Anesthesia     pt's mother has hard time waking up = states it took a week or two   • Arthritis     generalized   • Breath shortness    • Cirrhosis (CMS-HCC)    • Dental disorder     dentures   • Depression    • Depression 6/22/2016   • Diabetes     insulin   • Fibromyalgia    • Hepatitis C    • Hiatus hernia syndrome    • Hypertension    • Indigestion    • Obesity    • Other specified disorder of intestines     constipation/diarrhea   • Pain 12-19-14    generalized; 4/10   • Pneumonia     • Psychiatric problem     depression and anxiety   • Restless leg syndrome    • Sleep apnea     has had sleep study, no cpap   • Snoring    • Stroke (CMS-[facility]) 2011   • Unspecified hemorrhagic conditions     reports nose bleeds   • Urinary bladder disorder     frequency/urgency       FAMILY HISTORY  Family History   Problem Relation Age of Onset   • Diabetes Mother    • Hypertension Mother        SOCIAL HISTORY  Patient reports that she has been smoking Cigarettes.  She has a 42.00 pack-year smoking history. She has never used smokeless tobacco. She reports that she drinks alcohol. She reports that she does use drugs including methamphetamines.       SURGICAL HISTORY  Past Surgical History:   Procedure Laterality Date   • GASTROSCOPY N/A 8/21/2017    Procedure: GASTROSCOPY WITH BANDING  ;  Surgeon: Anthony Dent M.D.;  Location: SURGERY SAME DAY [facility];  Service:    • CATH PLACEMENT  12/22/2014    Performed by Helga Santiago M.D. at SURGERY SAME DAY [facility]   • CATH REMOVAL  11/14/2014    Performed by Helga Santiago M.D. at SURGERY [facility]   • GASTROSCOPY WITH BANDING  7/8/2014    Performed by Davonte Mauricio M.D. at [facility]OSCOPY [facility]   • GASTROSCOPY WITH CLIPPING  7/6/2014    Performed by Joshua Vigil M.D. at ENDOSCOPY [facility]   • ANTERIOR AND POSTERIOR REPAIR  12/6/2013    Performed by Isaías Lambert M.D. at SURGERY SAME DAY [facility]   • ENTEROCELE REPAIR  12/6/2013    Performed by Isaías Lambert M.D. at SURGERY SAME DAY [facility]   • BLADDER SLING FEMALE  12/6/2013    Performed by Isaías Lambert M.D. at SURGERY SAME DAY [facility]   • GYN SURGERY  2010    hysterectomy   • CARPAL TUNNEL RELEASE  2010    right hand   • SHOULDER SURGERY  2010    left shoulder   • OTHER  2010    port placement   • CATARACT EXTRACTION WITH IOL     • GYN SURGERY      tubal ligation   • OTHER      oral surgery   • TONSILLECTOMY         CURRENT MEDICATIONS  Home  "Medications     Reviewed by Robin Cote, Student (Student Nurse) on 12/16/17 at 1009  Med List Status: Partial   Medication Last Dose Status   Blood Glucose Monitoring Suppl (ONE TOUCH ULTRA 2) w/Device Kit  Active   enalapril (VASOTEC) 20 MG tablet  Active   gabapentin (NEURONTIN) 300 MG Cap  Active   glucose blood (ONE TOUCH TEST STRIPS) strip  Active   insulin glargine (LANTUS) 100 UNIT/ML Solution  Active   Insulin Syringe-Needle U-100 (INSULIN SYRINGE .5CC/31GX5/16\") 31G X 5/16\" 0.5 ML Misc  Active   ONETOUCH DELICA LANCETS FINE Misc  Active                ALLERGIES  Allergies   Allergen Reactions   • None        PHYSICAL EXAM  VITAL SIGNS: BP (!) 202/91   Pulse (!) 127   Temp 37.1 °C (98.7 °F) (Temporal)   Resp (!) 24   Ht 1.651 m (5' 5\")   Wt 81.9 kg (180 lb 8.9 oz)   SpO2 98%   BMI 30.05 kg/m²      Constitutional: Patient is awake and alert. No acute respiratory distress. Well developed, Well nourished, Non-toxic appearance.  HENT: Normocephalic, Atraumatic  Cardiovascular: Heart is regular rate and rhythm no murmur  Thorax & Lungs: Chest is symmetrical, with good breath sounds. No wheezing or crackles.  Skin: Warm, Dry, no petechia, purpura, or rash. IV track marks to left and right arm. Tenderness in the left antecubital area but no signs of abscess or bleeding or swelling. This is where she states she injected.  Musculoskeletal: Tender left antecubital but no foreign body palpated. Good range of motion to her hand and wrist. She is able to flex her arm.   Neurologic: Alert & oriented to person, time, and place.  Sensory is intact light touch her left arm. Pulses are 2+ radial and 1+ ulnar.   Psychiatric anxious.        LABS  Results for orders placed or performed during the hospital encounter of 12/16/17   ACCU-CHEK GLUCOSE   Result Value Ref Range    Glucose - Accu-Ck 419 (HH) 65 - 99 mg/dL      All labs reviewed by me.      RADIOLOGY/PROCEDURES  DX-FOREARM LEFT   Final Result      No " "evidence of fracture or dislocation.   Needle fragment noted in proximal forearm.        The radiologist's interpretations of all radiological studies have been reviewed by me.       COURSE & MEDICAL DECISION MAKING  Pertinent Labs & Imaging studies reviewed. (See chart for details)    10:20 AM Obtained and reviewed patient's electronic medical record which indicates an office visit with Dr. Friedman for management of diabetes type II.    10:28 AM Patient seen and examined at bedside. Patient presents for a foreign body to the skin.  Exam indicates no palpable foreign body to left antecubital.      Initial orders in the Emergency Department included XR left forearm and laboratory testing: accu-chek glucose.     11:45 AM On repeat evaluation, patient has her arm flexed at a 90 degree angle. She is asking when she will be able to go home.    1:02 PM On repeat evaluation, patient is ready to go home. ED testing reveals a needle fragment to proximal arm.      Discharge plan was discussed with the patient and includes following up with Dr. Contreras in 3 days.  Patient was asked to follow up with her PCP to discuss her glucose levels and management of her diabetes.    The patient will return for new or persisting symptoms including increased pain, erythema, swelling or any additional concerns.  The patient verbalizes understanding and will comply.  Patient is stable at the time of discharge.  Vital signs were reviewed: BP (!) 202/91   Pulse (!) 127   Temp 37.1 °C (98.7 °F) (Temporal)   Resp (!) 24   Ht 1.651 m (5' 5\")   Wt 81.9 kg (180 lb 8.9 oz)   SpO2 98%   BMI 30.05 kg/m²      Patient is an IV amphetamine abuser. She broke a needle off in the left antecubital area today. There is no signs of infection or abscess. I cannot feel the needle. X-ray does show some there. She is able to flex and extend her arm. I discussed the case with Dr. Contreras at this time he will follow her up as an outpatient. I certainly " do not believe that I can remove this in the emergency room today. She understands she will need close follow-up.  She is also diabetic and states that her sugars today although in my finger elevated or actually pretty good for her.    DISPOSITION  Patient will be discharged home in stable condition.      FOLLOW UP  Raji Contreras M.D.  [address] [place] [address]  [phone]    In 3 days        The patient is referred to a primary physician for blood pressure management, diabetic screening, and for all other preventative health concerns.      DIAGNOSIS  1. Needle stick injury    2. IV drug abuse     3. Diabetic    PLAN  1. Follow-up Dr. Contreras within 3 days  2. Follow up with her primary care doctor or the H&P S clinic within 3 days  3. Return to the emergency department for increased pains, fevers, vomiting or change in condition.        The note accurately reflects work and decisions made by me.  Rogelio Galeas  12/16/2017  3:58 PM     ISanjana (Scribe), am scribing for, and in the presence of, Rogelio Galeas M.D.    Electronically signed by: Sanjana Lancaster (Rashi), 12/16/2017    Rogelio KUMAR M.D. personally performed the services described in this documentation, as scribed by Sanjana Lancaster in my presence, and it is both accurate and complete.

## 2017-12-17 ENCOUNTER — PATIENT OUTREACH (OUTPATIENT)
Dept: HEALTH INFORMATION MANAGEMENT | Facility: OTHER | Age: 57
End: 2017-12-17

## 2017-12-17 NOTE — PROGRESS NOTES
Placed discharge outreach phone call to patient s/p ER discharge 12/16/17.  Attempted both phone numbers listed in Epic record, both voice mailboxes are full.  Discharge outreach letter mailed to patient.

## 2017-12-17 NOTE — LETTER
Demetrice Jaime  [address] [place] [address]    December 17, 2017      Dear Demetrice Jaime,    [facility] wants to ensure your discharge home is safe and you or your loved ones have had all of your questions answered regarding your care after you leave the hospital.    Our discharge team was unsuccessful in our attempts to contact you telephonically and we wanted to be sure that you had a list of resources and contact information should you have any questions regarding your hospital stay, follow-up instructions, or active medical symptoms.    Questions or Concerns Regarding… Contact   Medical Questions Related to Your Discharge  (7 days a week, 8am-5pm) Contact a Nurse Care Coordinator   [phone]   Medical Questions Not Related to Your Discharge  (24 hours a day / 7 days a week)  Contact the Nurse Health Line   [phone]    Medications or Discharge Instructions Refer to your discharge packet   or contact your -[phone]   Follow-up Appointment(s) Schedule your appointment via [employer]   or contact Scheduling [phone]   Billing Review your statement via [employer]  or contact Billing [phone]   Medical Records Review your records via [employer]   or contact Medical Records [phone]     You can also easily access your medical information, test results and upcoming appointments via the [employer] free online health management tool. You can learn more and sign up at [employer]. For assistance setting up your [employer] account, please call [phone].    Once again, we want to ensure your discharge home is safe and that you have a clear understanding of any next steps in your care. If you have any questions or concerns, please do not hesitate to contact us, we are here for you. Thank you for choosing [facility] for your healthcare needs.    Sincerely,      Your [facility] Healthcare Team

## 2017-12-19 LAB
ALBUMIN SERPL BCP-MCNC: 3.1 G/DL (ref 3.2–4.9)
ALBUMIN/GLOB SERPL: 0.9 G/DL
ALP SERPL-CCNC: 96 U/L (ref 30–99)
ALT SERPL-CCNC: 10 U/L (ref 2–50)
ANION GAP SERPL CALC-SCNC: 8 MMOL/L (ref 0–11.9)
APPEARANCE UR: CLEAR
AST SERPL-CCNC: 14 U/L (ref 12–45)
BASOPHILS # BLD AUTO: 1.2 % (ref 0–1.8)
BASOPHILS # BLD: 0.06 K/UL (ref 0–0.12)
BILIRUB SERPL-MCNC: 0.8 MG/DL (ref 0.1–1.5)
BNP SERPL-MCNC: 61 PG/ML (ref 0–100)
BUN SERPL-MCNC: 14 MG/DL (ref 8–22)
CALCIUM SERPL-MCNC: 8.3 MG/DL (ref 8.5–10.5)
CHLORIDE SERPL-SCNC: 101 MMOL/L (ref 96–112)
CO2 SERPL-SCNC: 24 MMOL/L (ref 20–33)
COLOR UR AUTO: YELLOW
CREAT SERPL-MCNC: 0.67 MG/DL (ref 0.5–1.4)
EOSINOPHIL # BLD AUTO: 0.08 K/UL (ref 0–0.51)
EOSINOPHIL NFR BLD: 1.5 % (ref 0–6.9)
ERYTHROCYTE [DISTWIDTH] IN BLOOD BY AUTOMATED COUNT: 38.6 FL (ref 35.9–50)
GFR SERPL CREATININE-BSD FRML MDRD: >60 ML/MIN/1.73 M 2
GLOBULIN SER CALC-MCNC: 3.6 G/DL (ref 1.9–3.5)
GLUCOSE SERPL-MCNC: 436 MG/DL (ref 65–99)
GLUCOSE UR QL STRIP.AUTO: 500 MG/DL
HCT VFR BLD AUTO: 33.5 % (ref 37–47)
HGB BLD-MCNC: 10.5 G/DL (ref 12–16)
IMM GRANULOCYTES # BLD AUTO: 0.01 K/UL (ref 0–0.11)
IMM GRANULOCYTES NFR BLD AUTO: 0.2 % (ref 0–0.9)
KETONES UR QL STRIP.AUTO: NEGATIVE MG/DL
LEUKOCYTE ESTERASE UR QL STRIP.AUTO: NEGATIVE
LIPASE SERPL-CCNC: 56 U/L (ref 11–82)
LYMPHOCYTES # BLD AUTO: 0.91 K/UL (ref 1–4.8)
LYMPHOCYTES NFR BLD: 17.5 % (ref 22–41)
MCH RBC QN AUTO: 23.9 PG (ref 27–33)
MCHC RBC AUTO-ENTMCNC: 31.3 G/DL (ref 33.6–35)
MCV RBC AUTO: 76.1 FL (ref 81.4–97.8)
MONOCYTES # BLD AUTO: 0.44 K/UL (ref 0–0.85)
MONOCYTES NFR BLD AUTO: 8.5 % (ref 0–13.4)
NEUTROPHILS # BLD AUTO: 3.69 K/UL (ref 2–7.15)
NEUTROPHILS NFR BLD: 71.1 % (ref 44–72)
NITRITE UR QL STRIP.AUTO: NEGATIVE
NRBC # BLD AUTO: 0 K/UL
NRBC BLD-RTO: 0 /100 WBC
PH UR STRIP.AUTO: 5.5 [PH]
PLATELET # BLD AUTO: 92 K/UL (ref 164–446)
PMV BLD AUTO: 11.5 FL (ref 9–12.9)
POTASSIUM SERPL-SCNC: 3.9 MMOL/L (ref 3.6–5.5)
PROT SERPL-MCNC: 6.7 G/DL (ref 6–8.2)
PROT UR QL STRIP: NEGATIVE MG/DL
RBC # BLD AUTO: 4.4 M/UL (ref 4.2–5.4)
RBC UR QL AUTO: NEGATIVE
SODIUM SERPL-SCNC: 133 MMOL/L (ref 135–145)
SP GR UR: <=1.005
WBC # BLD AUTO: 5.2 K/UL (ref 4.8–10.8)

## 2017-12-19 PROCEDURE — 87086 URINE CULTURE/COLONY COUNT: CPT

## 2017-12-19 PROCEDURE — 81002 URINALYSIS NONAUTO W/O SCOPE: CPT | Mod: 59

## 2017-12-19 PROCEDURE — 80053 COMPREHEN METABOLIC PANEL: CPT

## 2017-12-19 PROCEDURE — 81003 URINALYSIS AUTO W/O SCOPE: CPT

## 2017-12-19 PROCEDURE — 83690 ASSAY OF LIPASE: CPT

## 2017-12-19 PROCEDURE — 99285 EMERGENCY DEPT VISIT HI MDM: CPT

## 2017-12-19 PROCEDURE — 85025 COMPLETE CBC W/AUTO DIFF WBC: CPT

## 2017-12-19 PROCEDURE — 83880 ASSAY OF NATRIURETIC PEPTIDE: CPT

## 2017-12-19 ASSESSMENT — PAIN SCALES - GENERAL: PAINLEVEL_OUTOF10: 8

## 2017-12-20 ENCOUNTER — RESOLUTE PROFESSIONAL BILLING HOSPITAL PROF FEE (OUTPATIENT)
Dept: HOSPITALIST | Facility: MEDICAL CENTER | Age: 57
End: 2017-12-20
Payer: MEDICAID

## 2017-12-20 ENCOUNTER — PATIENT OUTREACH (OUTPATIENT)
Dept: HEALTH INFORMATION MANAGEMENT | Facility: OTHER | Age: 57
End: 2017-12-20

## 2017-12-20 ENCOUNTER — HOSPITAL ENCOUNTER (OUTPATIENT)
Facility: MEDICAL CENTER | Age: 57
End: 2017-12-20
Attending: EMERGENCY MEDICINE | Admitting: INTERNAL MEDICINE
Payer: MEDICAID

## 2017-12-20 VITALS
BODY MASS INDEX: 27.99 KG/M2 | DIASTOLIC BLOOD PRESSURE: 78 MMHG | HEART RATE: 107 BPM | HEIGHT: 65 IN | TEMPERATURE: 97.5 F | WEIGHT: 167.99 LBS | SYSTOLIC BLOOD PRESSURE: 136 MMHG | RESPIRATION RATE: 18 BRPM | OXYGEN SATURATION: 96 %

## 2017-12-20 PROBLEM — F17.200 TOBACCO DEPENDENCE: Status: ACTIVE | Noted: 2017-12-20

## 2017-12-20 PROBLEM — K92.2 GI BLEED: Status: RESOLVED | Noted: 2017-12-20 | Resolved: 2017-12-20

## 2017-12-20 PROBLEM — R18.8 ASCITES: Status: ACTIVE | Noted: 2017-12-20

## 2017-12-20 PROBLEM — K92.2 GI BLEED: Status: ACTIVE | Noted: 2017-12-20

## 2017-12-20 LAB
APPEARANCE FLD: NORMAL
APPEARANCE UR: CLEAR
BASOPHILS # BLD AUTO: 1.3 % (ref 0–1.8)
BASOPHILS # BLD: 0.04 K/UL (ref 0–0.12)
BILIRUB UR QL STRIP.AUTO: NEGATIVE
BODY FLD TYPE: NORMAL
BODY FLD TYPE: NORMAL
COLOR FLD: YELLOW
COLOR UR: YELLOW
EOSINOPHIL # BLD AUTO: 0.06 K/UL (ref 0–0.51)
EOSINOPHIL NFR BLD: 2 % (ref 0–6.9)
EOSINOPHIL NFR FLD: 1 %
ERYTHROCYTE [DISTWIDTH] IN BLOOD BY AUTOMATED COUNT: 39.7 FL (ref 35.9–50)
ERYTHROCYTE [DISTWIDTH] IN BLOOD BY AUTOMATED COUNT: 39.8 FL (ref 35.9–50)
GLUCOSE BLD-MCNC: 104 MG/DL (ref 65–99)
GLUCOSE BLD-MCNC: 345 MG/DL (ref 65–99)
GLUCOSE FLD-MCNC: 459 MG/DL
GLUCOSE UR STRIP.AUTO-MCNC: >=1000 MG/DL
GRAM STN SPEC: NORMAL
HCT VFR BLD AUTO: 30.2 % (ref 37–47)
HCT VFR BLD AUTO: 31.4 % (ref 37–47)
HGB BLD-MCNC: 9.1 G/DL (ref 12–16)
HGB BLD-MCNC: 9.8 G/DL (ref 12–16)
HISTIOCYTES NFR FLD: 9 %
IMM GRANULOCYTES # BLD AUTO: 0.01 K/UL (ref 0–0.11)
IMM GRANULOCYTES NFR BLD AUTO: 0.3 % (ref 0–0.9)
INR PPP: 1.26 (ref 0.87–1.13)
KETONES UR STRIP.AUTO-MCNC: NEGATIVE MG/DL
LDH FLD L TO P-CCNC: 48 U/L
LEUKOCYTE ESTERASE UR QL STRIP.AUTO: NEGATIVE
LYMPHOCYTES # BLD AUTO: 0.63 K/UL (ref 1–4.8)
LYMPHOCYTES NFR BLD: 20.7 % (ref 22–41)
LYMPHOCYTES NFR FLD: 20 %
MAGNESIUM SERPL-MCNC: 1.7 MG/DL (ref 1.5–2.5)
MCH RBC QN AUTO: 23.2 PG (ref 27–33)
MCH RBC QN AUTO: 24 PG (ref 27–33)
MCHC RBC AUTO-ENTMCNC: 30.1 G/DL (ref 33.6–35)
MCHC RBC AUTO-ENTMCNC: 31.2 G/DL (ref 33.6–35)
MCV RBC AUTO: 76.8 FL (ref 81.4–97.8)
MCV RBC AUTO: 77 FL (ref 81.4–97.8)
MICRO URNS: ABNORMAL
MONOCYTES # BLD AUTO: 0.28 K/UL (ref 0–0.85)
MONOCYTES NFR BLD AUTO: 9.2 % (ref 0–13.4)
MONONUC CELLS NFR FLD: 45 %
NEUTROPHILS # BLD AUTO: 2.03 K/UL (ref 2–7.15)
NEUTROPHILS NFR BLD: 66.5 % (ref 44–72)
NEUTROPHILS NFR FLD: 25 %
NITRITE UR QL STRIP.AUTO: NEGATIVE
NRBC # BLD AUTO: 0 K/UL
NRBC BLD-RTO: 0 /100 WBC
PH UR STRIP.AUTO: 5 [PH]
PLATELET # BLD AUTO: 73 K/UL (ref 164–446)
PLATELET # BLD AUTO: 78 K/UL (ref 164–446)
PMV BLD AUTO: 12.7 FL (ref 9–12.9)
PROT UR QL STRIP: NEGATIVE MG/DL
PROTHROMBIN TIME: 15.5 SEC (ref 12–14.6)
RBC # BLD AUTO: 3.92 M/UL (ref 4.2–5.4)
RBC # BLD AUTO: 4.09 M/UL (ref 4.2–5.4)
RBC # FLD: 301 CELLS/UL
RBC UR QL AUTO: NEGATIVE
SIGNIFICANT IND 70042: NORMAL
SITE SITE: NORMAL
SOURCE SOURCE: NORMAL
SP GR UR STRIP.AUTO: 1.02
UROBILINOGEN UR STRIP.AUTO-MCNC: 1 MG/DL
WBC # BLD AUTO: 3.1 K/UL (ref 4.8–10.8)
WBC # BLD AUTO: 3.2 K/UL (ref 4.8–10.8)
WBC # FLD: 168 CELLS/UL

## 2017-12-20 PROCEDURE — 303485 HCHG DRESSING MEDIUM

## 2017-12-20 PROCEDURE — 96376 TX/PRO/DX INJ SAME DRUG ADON: CPT | Mod: XU

## 2017-12-20 PROCEDURE — 700102 HCHG RX REV CODE 250 W/ 637 OVERRIDE(OP): Performed by: INTERNAL MEDICINE

## 2017-12-20 PROCEDURE — 700102 HCHG RX REV CODE 250 W/ 637 OVERRIDE(OP): Performed by: EMERGENCY MEDICINE

## 2017-12-20 PROCEDURE — G0378 HOSPITAL OBSERVATION PER HR: HCPCS

## 2017-12-20 PROCEDURE — 83735 ASSAY OF MAGNESIUM: CPT

## 2017-12-20 PROCEDURE — A9270 NON-COVERED ITEM OR SERVICE: HCPCS | Performed by: EMERGENCY MEDICINE

## 2017-12-20 PROCEDURE — 96374 THER/PROPH/DIAG INJ IV PUSH: CPT | Mod: XU

## 2017-12-20 PROCEDURE — 700111 HCHG RX REV CODE 636 W/ 250 OVERRIDE (IP): Performed by: INTERNAL MEDICINE

## 2017-12-20 PROCEDURE — 36415 COLL VENOUS BLD VENIPUNCTURE: CPT

## 2017-12-20 PROCEDURE — 87070 CULTURE OTHR SPECIMN AEROBIC: CPT

## 2017-12-20 PROCEDURE — 82272 OCCULT BLD FECES 1-3 TESTS: CPT

## 2017-12-20 PROCEDURE — 87040 BLOOD CULTURE FOR BACTERIA: CPT

## 2017-12-20 PROCEDURE — 83615 LACTATE (LD) (LDH) ENZYME: CPT

## 2017-12-20 PROCEDURE — 89051 BODY FLUID CELL COUNT: CPT

## 2017-12-20 PROCEDURE — A9270 NON-COVERED ITEM OR SERVICE: HCPCS | Performed by: NURSE PRACTITIONER

## 2017-12-20 PROCEDURE — C9113 INJ PANTOPRAZOLE SODIUM, VIA: HCPCS | Performed by: INTERNAL MEDICINE

## 2017-12-20 PROCEDURE — 82945 GLUCOSE OTHER FLUID: CPT

## 2017-12-20 PROCEDURE — 87205 SMEAR GRAM STAIN: CPT

## 2017-12-20 PROCEDURE — 85027 COMPLETE CBC AUTOMATED: CPT

## 2017-12-20 PROCEDURE — A9270 NON-COVERED ITEM OR SERVICE: HCPCS | Performed by: INTERNAL MEDICINE

## 2017-12-20 PROCEDURE — 99236 HOSP IP/OBS SAME DATE HI 85: CPT | Mod: GC | Performed by: INTERNAL MEDICINE

## 2017-12-20 PROCEDURE — 700102 HCHG RX REV CODE 250 W/ 637 OVERRIDE(OP): Performed by: NURSE PRACTITIONER

## 2017-12-20 PROCEDURE — 85025 COMPLETE CBC W/AUTO DIFF WBC: CPT

## 2017-12-20 PROCEDURE — 700111 HCHG RX REV CODE 636 W/ 250 OVERRIDE (IP): Performed by: EMERGENCY MEDICINE

## 2017-12-20 PROCEDURE — 85610 PROTHROMBIN TIME: CPT

## 2017-12-20 PROCEDURE — 96375 TX/PRO/DX INJ NEW DRUG ADDON: CPT | Mod: XU

## 2017-12-20 PROCEDURE — 82962 GLUCOSE BLOOD TEST: CPT

## 2017-12-20 PROCEDURE — 49082 ABD PARACENTESIS: CPT

## 2017-12-20 RX ORDER — ONDANSETRON 2 MG/ML
4 INJECTION INTRAMUSCULAR; INTRAVENOUS EVERY 4 HOURS PRN
Status: DISCONTINUED | OUTPATIENT
Start: 2017-12-20 | End: 2017-12-20 | Stop reason: HOSPADM

## 2017-12-20 RX ORDER — IBUPROFEN 600 MG/1
600 TABLET ORAL ONCE
Status: COMPLETED | OUTPATIENT
Start: 2017-12-20 | End: 2017-12-20

## 2017-12-20 RX ORDER — POLYETHYLENE GLYCOL 3350 17 G/17G
1 POWDER, FOR SOLUTION ORAL
Status: DISCONTINUED | OUTPATIENT
Start: 2017-12-20 | End: 2017-12-20 | Stop reason: HOSPADM

## 2017-12-20 RX ORDER — PROMETHAZINE HYDROCHLORIDE 12.5 MG/1
12.5-25 SUPPOSITORY RECTAL EVERY 4 HOURS PRN
Status: DISCONTINUED | OUTPATIENT
Start: 2017-12-20 | End: 2017-12-20 | Stop reason: HOSPADM

## 2017-12-20 RX ORDER — PROMETHAZINE HYDROCHLORIDE 25 MG/1
12.5-25 TABLET ORAL EVERY 4 HOURS PRN
Status: DISCONTINUED | OUTPATIENT
Start: 2017-12-20 | End: 2017-12-20 | Stop reason: HOSPADM

## 2017-12-20 RX ORDER — AMOXICILLIN 250 MG
2 CAPSULE ORAL 2 TIMES DAILY
Status: DISCONTINUED | OUTPATIENT
Start: 2017-12-20 | End: 2017-12-20 | Stop reason: HOSPADM

## 2017-12-20 RX ORDER — GABAPENTIN 300 MG/1
300 CAPSULE ORAL 3 TIMES DAILY
Status: DISCONTINUED | OUTPATIENT
Start: 2017-12-20 | End: 2017-12-20 | Stop reason: HOSPADM

## 2017-12-20 RX ORDER — BISACODYL 10 MG
10 SUPPOSITORY, RECTAL RECTAL
Status: DISCONTINUED | OUTPATIENT
Start: 2017-12-20 | End: 2017-12-20 | Stop reason: HOSPADM

## 2017-12-20 RX ORDER — INSULIN GLARGINE 100 [IU]/ML
25 INJECTION, SOLUTION SUBCUTANEOUS EVERY EVENING
Status: DISCONTINUED | OUTPATIENT
Start: 2017-12-20 | End: 2017-12-20

## 2017-12-20 RX ORDER — FUROSEMIDE 20 MG/1
20 TABLET ORAL DAILY
Qty: 60 TAB | Refills: 0 | Status: SHIPPED | OUTPATIENT
Start: 2017-12-21 | End: 2020-07-14

## 2017-12-20 RX ORDER — CEFTRIAXONE 1 G/1
1 INJECTION, POWDER, FOR SOLUTION INTRAMUSCULAR; INTRAVENOUS ONCE
Status: COMPLETED | OUTPATIENT
Start: 2017-12-20 | End: 2017-12-20

## 2017-12-20 RX ORDER — GABAPENTIN 300 MG/1
600 CAPSULE ORAL 2 TIMES DAILY
Status: ON HOLD | COMMUNITY
End: 2020-09-30

## 2017-12-20 RX ORDER — SPIRONOLACTONE 50 MG/1
50 TABLET, FILM COATED ORAL
Status: DISCONTINUED | OUTPATIENT
Start: 2017-12-20 | End: 2017-12-20 | Stop reason: HOSPADM

## 2017-12-20 RX ORDER — INSULIN GLARGINE 100 [IU]/ML
35 INJECTION, SOLUTION SUBCUTANEOUS EVERY EVENING
Status: DISCONTINUED | OUTPATIENT
Start: 2017-12-20 | End: 2017-12-20 | Stop reason: HOSPADM

## 2017-12-20 RX ORDER — ONDANSETRON 4 MG/1
4 TABLET, ORALLY DISINTEGRATING ORAL EVERY 4 HOURS PRN
Status: DISCONTINUED | OUTPATIENT
Start: 2017-12-20 | End: 2017-12-20 | Stop reason: HOSPADM

## 2017-12-20 RX ORDER — SPIRONOLACTONE 50 MG/1
50 TABLET, FILM COATED ORAL DAILY
Qty: 30 TAB | Refills: 3 | Status: SHIPPED | OUTPATIENT
Start: 2017-12-21 | End: 2020-09-25

## 2017-12-20 RX ORDER — FUROSEMIDE 20 MG/1
20 TABLET ORAL
Status: DISCONTINUED | OUTPATIENT
Start: 2017-12-20 | End: 2017-12-20 | Stop reason: HOSPADM

## 2017-12-20 RX ORDER — SODIUM CHLORIDE 9 MG/ML
INJECTION, SOLUTION INTRAVENOUS CONTINUOUS
Status: DISCONTINUED | OUTPATIENT
Start: 2017-12-20 | End: 2017-12-20

## 2017-12-20 RX ORDER — NICOTINE 21 MG/24HR
PATCH, TRANSDERMAL 24 HOURS TRANSDERMAL
Qty: 30 PATCH | COMMUNITY
Start: 2017-12-21 | End: 2018-02-07

## 2017-12-20 RX ORDER — NICOTINE 21 MG/24HR
14 PATCH, TRANSDERMAL 24 HOURS TRANSDERMAL
Status: DISCONTINUED | OUTPATIENT
Start: 2017-12-20 | End: 2017-12-20 | Stop reason: HOSPADM

## 2017-12-20 RX ORDER — DEXTROSE MONOHYDRATE 25 G/50ML
25 INJECTION, SOLUTION INTRAVENOUS
Status: DISCONTINUED | OUTPATIENT
Start: 2017-12-20 | End: 2017-12-20 | Stop reason: HOSPADM

## 2017-12-20 RX ORDER — ENALAPRIL MALEATE 10 MG/1
20 TABLET ORAL DAILY
Status: DISCONTINUED | OUTPATIENT
Start: 2017-12-20 | End: 2017-12-20

## 2017-12-20 RX ORDER — PANTOPRAZOLE SODIUM 40 MG/10ML
40 INJECTION, POWDER, LYOPHILIZED, FOR SOLUTION INTRAVENOUS DAILY
Status: DISCONTINUED | OUTPATIENT
Start: 2017-12-20 | End: 2017-12-20 | Stop reason: HOSPADM

## 2017-12-20 RX ADMIN — HEPARIN 300 UNITS: 100 SYRINGE at 18:12

## 2017-12-20 RX ADMIN — PANTOPRAZOLE SODIUM 40 MG: 40 INJECTION, POWDER, FOR SOLUTION INTRAVENOUS at 09:19

## 2017-12-20 RX ADMIN — MAGNESIUM CITRATE 296 ML: 1.75 LIQUID ORAL at 13:32

## 2017-12-20 RX ADMIN — CEFTRIAXONE 2 G: 2 INJECTION, POWDER, FOR SOLUTION INTRAMUSCULAR; INTRAVENOUS at 06:33

## 2017-12-20 RX ADMIN — NICOTINE 14 MG: 14 PATCH, EXTENDED RELEASE TRANSDERMAL at 06:02

## 2017-12-20 RX ADMIN — IBUPROFEN 600 MG: 600 TABLET, FILM COATED ORAL at 01:05

## 2017-12-20 RX ADMIN — CEFTRIAXONE SODIUM 1 G: 1 INJECTION, POWDER, FOR SOLUTION INTRAMUSCULAR; INTRAVENOUS at 02:56

## 2017-12-20 ASSESSMENT — PATIENT HEALTH QUESTIONNAIRE - PHQ9
2. FEELING DOWN, DEPRESSED, IRRITABLE, OR HOPELESS: NOT AT ALL
1. LITTLE INTEREST OR PLEASURE IN DOING THINGS: NOT AT ALL
SUM OF ALL RESPONSES TO PHQ QUESTIONS 1-9: 0
SUM OF ALL RESPONSES TO PHQ9 QUESTIONS 1 AND 2: 0

## 2017-12-20 ASSESSMENT — LIFESTYLE VARIABLES
ALCOHOL_USE: NO
EVER_SMOKED: YES

## 2017-12-20 ASSESSMENT — PAIN SCALES - GENERAL: PAINLEVEL_OUTOF10: 6

## 2017-12-20 NOTE — DISCHARGE PLANNING
Care Transition Team Assessment    Information Source  Orientation : Oriented x 4  Information Given By: Patient  Who is responsible for making decisions for patient? : Patient    Readmission Evaluation  Is this a readmission?: No    Elopement Risk  Legal Hold: No  Ambulatory or Self Mobile in Wheelchair: No-Not an Elopement Risk  Elopement Risk: Not at Risk for Elopement    Interdisciplinary Discharge Planning  Does Admitting Nurse Feel This Could be a Complex Discharge?: No  Primary Care Physician:  ([facility] clinic)  Lives with - Patient's Self Care Capacity: Alone and Able to Care For Self  Support Systems: Family Member(s)  Housing / Facility: [address]  Do You Take your Prescribed Medications Regularly: Yes  Able to Return to Previous ADL's: Yes  Mobility Issues: No  Patient Expects to be Discharged to::  (Home)  Assistance Needed: Unknown at this Time  Durable Medical Equipment: Not Applicable    Discharge Preparedness  What is your plan after discharge?:  (Home)  What are your discharge supports?: Parent  Prior Functional Level: Ambulatory  Difficulity with ADLs: None  Difficulity with IADLs: None    Functional Assesment  Prior Functional Level: Ambulatory    Finances  Financial Barriers to Discharge: No  Prescription Coverage: Yes    Vision / Hearing Impairment  Vision Impairment : No  Hearing Impairment : No         Advance Directive  Advance Directive?: None  Advance Directive offered?: AD Booklet refused    Domestic Abuse  Have you ever been the victim of abuse or violence?: No    Psychological Assessment  History of Substance Abuse: Alcohol, Methamphetamine  Date Last Used - Methamphetamine:  (12/18/2017)  History of Psychiatric Problems: Yes (Depression & Anxiety)  Non-compliant with Treatment: No  Newly Diagnosed Illness: No    Discharge Risks or Barriers  Discharge risks or barriers?: Substance abuse  Patient risk factors: Substance abuse    Anticipated Discharge Information  Anticipated discharge  disposition: Home  Discharge Address:  ([address] [place], [place] [address])  Discharge Contact Phone Number:  (Luisa Lucero  ( mom)  [phone])

## 2017-12-20 NOTE — PROGRESS NOTES
Med rec complete per pt and strengths were confirmed with [facility] pharmacy. Pharmacist aware. NKDA. per pt, she has not been using insulin for awhile.

## 2017-12-20 NOTE — ED NOTES
Patient to ED with complaints of significant abdominal swelling. Hx of criohsis but denies previous ascites. No swelling of feet or ankles. Abdomen firm and distended. Last BM today, making urine without issue. Mild nausea present.

## 2017-12-20 NOTE — ASSESSMENT & PLAN NOTE
Poorly controlled: A1C 13.7 in 9/17, questionable compliance   Continue home regimen and insulin sliding scale

## 2017-12-20 NOTE — ASSESSMENT & PLAN NOTE
hep C s/p sofobuvir and ribavirin for hepatitis C treatment in 2015   history of hepatitis C. 3/14/2016 HCV RNA by PCR less than 30, HCV PCR qunt log <1.5. HCV RNA PCR Qnt int: not detected.  - Recent EGD with banding of esophageal varices  - Follows with GI

## 2017-12-20 NOTE — ASSESSMENT & PLAN NOTE
new onset ascites, probably secondary to liver cirrhosis  Cell count is noninfectious  Will start on spironolactone and Lasix, salt restriction

## 2017-12-20 NOTE — DISCHARGE SUMMARY
Hospital Medicine Discharge Note     Patient ID:  Demetrice Jaime  [id number]  57 y.o.female  1960    Admit Date:  12/20/2017       Discharge Date:   12/20/2017    Primary Care Provider: Pcp Pt States None    Admitting Physician: Dheeraj Matos M.D.  Discharging Physician: Raj Meeks M.D.    Chief Complaint: abdominal pain    Discharge Diagnoses:   Active Problems:    GI bleed-resolved will follow-up with GI outpatient, has no further episodes of the hospital, hemoglobin remained stable    DM2 (diabetes mellitus, type 2) (CMS-HCC)- diabetic education ordered A1c is 13.7    Chronic hepatitis C virus infection (CMS-HCC)- history of sees outpatient GI Dr. Dent    FLORINDA (obstructive sleep apnea)- Will follow-up with PCP    Microcytic anemia- likely chronic secondary to comorbid conditions    Anxiety- continue outpatient medications and follow-up with PCP    Ascites- will be discharged on Aldactone and Lasix and follow-up with GI patient  Underwent paracentesis for 2 L removal of fluid    Tobacco dependence- counseled on cessation education given      Chronic Medical Problems:  Past Medical History:   Diagnosis Date   • Anemia    • Anesthesia     pt's mother has hard time waking up = states it took a week or two   • Arthritis     generalized   • Breath shortness    • Cirrhosis (CMS-HCC)    • Dental disorder     dentures   • Depression    • Depression 6/22/2016   • Diabetes     insulin   • Fibromyalgia    • Hepatitis C    • Hiatus hernia syndrome    • Hypertension    • Indigestion    • Obesity    • Other specified disorder of intestines     constipation/diarrhea   • Pain 12-19-14    generalized; 4/10   • Pneumonia    • Psychiatric problem     depression and anxiety   • Restless leg syndrome    • Sleep apnea     has had sleep study, no cpap   • Snoring    • Stroke (CMS-HCC) 2011   • Unspecified hemorrhagic conditions     reports nose bleeds   • Urinary bladder disorder     frequency/urgency        Code Status: Full Code    Hospital Summary:       Please refer to H&P by Dheeraj Matos M.D. on 12/20/2017 for full details.      In brief, Demetrice Jaime is a 57 y.o. female who was admitted 12/20/2017 for abdominal pain.  Patient has extensive history of hepatitis C, EtOH abuse, liver cirrhosis,  Diabetes insulin-dependent, depression, stroke, chronic pain, fibromyalgia, and varices.   Patient states her abdominal distention started about a week ago and has been worsening since. She noted moderate amount of dark blood in her stool 3 weeks ago that resolved approximately one week ago.  Patient presented to the ER due to her increased abdominal size and pain associated with it.   Patient underwent paracentesis in the ER with removal of 2 L of noninfectious fluid. FOBT test was positive in the emergency room. Patient was therefore admitted to CDU under observation status for further workup and monitoring.   Patient's lab work remained stable at this time, no significant drops in H&H. Patient's stomach remains slightly distended despite 2 L removal of fluid. Patient states she feels pressure in her abdomen. Hemoglobin A1c was checked which resulted in 13.7, diabetic education was ordered. Patient placed on Lasix and Aldactone for ascites, she will continue these medications on an outpatient basis and follow-up with Gi. Blood cultures ×2 remain negative to date. Urinalysis obtained results are negative. Paracentesis fluid sent for pathology and lab eval. Patient has had no stools during her stay.   On exam this am patient states no pain just some pressure in her abdomen from ascites. Discussed follow up with GI outpatient, and its importance. Lungs clear bilaterally on auscultation, RRR, slight distention to abdomen. Patient is able to ambulate around halls and adequately hydrate herself.      Therefore, she is discharged in good and stable condition with close outpatient follow-up.    Consultants:     "  none    Studies:    Imaging/ Testing:      No orders to display         Laboratory:   Recent Labs      12/19/17 2008   WBC  5.2   RBC  4.40   HEMOGLOBIN  10.5*   HEMATOCRIT  33.5*   MCV  76.1*   MCH  23.9*   MCHC  31.3*   RDW  38.6   PLATELETCT  92*   MPV  11.5       Recent Labs      12/19/17 2008   SODIUM  133*   POTASSIUM  3.9   CHLORIDE  101   CO2  24   GLUCOSE  436*   BUN  14   CREATININE  0.67   CALCIUM  8.3*       Recent Labs      12/19/17 2008   ALTSGPT  10   ASTSGOT  14   ALKPHOSPHAT  96   TBILIRUBIN  0.8   LIPASE  56   GLUCOSE  436*        Recent Labs      12/19/17 2008   BNPBTYPENAT  61         Results     Procedure Component Value Units Date/Time    GRAM STAIN [[id number]] Collected:  12/20/17 0125    Order Status:  Completed Specimen:  Body Fluid Updated:  12/20/17 0743     Significant Indicator .     Source BF     Site ASCITIES FLUID     Gram Stain Result --     Few WBCs.  No organisms seen.      BLOOD CULTURE x2 [[id number]] Collected:  12/20/17 0144    Order Status:  Completed Specimen:  Blood from Peripheral Updated:  12/20/17 0148    Narrative:       Per Hospital Policy: Only change Specimen Src: to \"Line\" if  specified by physician order.    BLOOD CULTURE x2 [[id number]] Collected:  12/20/17 0144    Order Status:  Completed Specimen:  Blood from Peripheral Updated:  12/20/17 0148    Narrative:       Per Hospital Policy: Only change Specimen Src: to \"Line\" if  specified by physician order.    FLUID CULTURE W/GRAM STAIN [[id number]] Collected:  12/20/17 0125    Order Status:  Completed Specimen:  Blood from Ascities Fluid Updated:  12/20/17 0141    URINALYSIS (UA) [[id number]]  (Abnormal) Collected:  12/19/17 2150    Order Status:  Completed Specimen:  Urine from Urine, Clean Catch Updated:  12/20/17 0055     Color Yellow     Character Clear     Specific Gravity 1.024     Ph 5.0     Glucose >=1000 (A) mg/dL      Ketones Negative mg/dL      Protein Negative mg/dL      Bilirubin Negative     " Urobilinogen, Urine 1.0     Nitrite Negative     Leukocyte Esterase Negative     Occult Blood Negative     Micro Urine Req see below     Comment: Microscopic examination not performed when specimen is clear  and chemically negative for protein, blood, leukocyte esterase  and nitrite.         Narrative:       Indication for culture:->Temp Equal to,or Greater Than 101    URINE CULTURE [[id number]] Collected:  12/19/17 2150    Order Status:  Completed Specimen:  Urine from Urine, Clean Catch Updated:  12/20/17 0052    Narrative:       Indication for culture:->Temp Equal to,or Greater Than 101            Procedures/Surgeries:        Paracentesis    Disposition:    Discharge home    Condition:  Stable    Instructions:   Activity: As tolerated.  Diet: Clear liquids advance as tolerated   Followup: With GI and PCP   Instructions:  -Please have your PCP refer you to Endocrinology- Stricter control of diabetes  -Given instructions to return to the ER if any new or worsening symptoms, worsening condition, or failure to improve  -Call PCP for followup  -No smoking, no alcohol, no caffeine  -Encourage risk factor reduction with tobacco and alcohol abstinence, diet changes, weight loss, and exercise.     Follow-Up  Daniella Martinez, P.A.  [address] [facility] [place] [address] [phone]    Go on 1/3/2018  please check in at 9am for your appointment at 930 am. Please bring photo ID, proof of income, proof of residence. thank you     [facility]  [place] [address] [phone]      The office will call patient to schedule follow up once the Doctor report is received    No future appointments.    Discharge Medications:           Medication List      START taking these medications      Instructions   furosemide 20 MG Tabs  Start taking on:  12/21/2017  Commonly known as:  LASIX   Take 1 Tab by mouth every day.  Dose:  20 mg     nicotine 14 MG/24HR Pt24  Start taking on:  12/21/2017  Commonly known as:  NICODERM   Apply   to skin as directed.     nicotine polacrilex 2 MG Gum  Commonly known as:  NICORETTE   Take 1 Each by mouth every 1 hour as needed for Smoking Cessation (For nicotine urge).  Dose:  2 mg     spironolactone 50 MG Tabs  Start taking on:  12/21/2017  Commonly known as:  ALDACTONE   Take 1 Tab by mouth every day.  Dose:  50 mg        CONTINUE taking these medications      Instructions   enalapril 20 MG tablet  Commonly known as:  VASOTEC   Take 1 Tab by mouth every day.  Dose:  20 mg     gabapentin 300 MG Caps  Commonly known as:  NEURONTIN   Take 300 mg by mouth 2 Times a Day.  Dose:  300 mg            Please CC the above physicians    DC Dill  12/20/2017  10:33 AM

## 2017-12-20 NOTE — PROGRESS NOTES
Report received, assumed patient care.  Pt A&OX4.  Assessment completed.  Call light within reach, personal belongings available, bed in lowest position, pt calling for assistance.  Pt reports no pain.  NPO pending GI consult.  Communication board updated, POC discussed.  VSS.  No additional needs at this time.

## 2017-12-20 NOTE — PROGRESS NOTES
A/o, assessment completed, complaints of  abdominal pain,per pt. Pressure pain 6/10, refused pain and n/v meds, vital signs stable, updated communication board,  poc discussed and understood, verbalized understanding, pt. Aware NPO diet,  all questions answered at this time , fall precautions in place, call button within reach, will continue to monitor

## 2017-12-20 NOTE — ASSESSMENT & PLAN NOTE
This is low-grade bleeding, unclear if upper or lower, and patient is a poor historian  We'll give IV Protonix in 1 time dose of ceftriaxone IV for an infection prophylaxis,  Consider re starting on propranolol, patient was on before  Gastroenterology to see the patient, but patient probably could be managed conservatively

## 2017-12-20 NOTE — ED PROVIDER NOTES
ED Provider Note    Scribed for Robyn Sol M.D. by Gregorio Villagran. 12/20/2017, 12:47 AM.    Primary care provider: Pcp Pt States None  Means of arrival: Walk in  History obtained from: Patient  History limited by: None    CHIEF COMPLAINT  Chief Complaint   Patient presents with   • Abdominal Pain       HPI  Demetrice Jaime is a 57 y.o. female who presents to the Emergency Department complaining of abdominal swelling onset 1 week ago with increased abdominal pain onset today. Patient has a history of cirrhosis from hepatitis and alcohol. She no longer consumes alcohol regularly. Patient reports intermittent throbbing to the abdominal pain. She denies any recent fevers prior to ED arrival but has a fever at the time of exam. Patient reports having an associated cough. She denies any dysuria or hematuria. Patient reports having blood in her stools two weeks ago which were initially dark red and then became black over a course of 5-6 days. This has resolved. Patient also mentions having a GI bleed two weeks ago for which she had emergency surgery. She mentions getting varices testing lately. The first test showed 12 varices. In August 2017, she was found to have two large varices. Patient denies having a paracentesis in the past. Patient has a history of IV drug use.      REVIEW OF SYSTEMS  See HPI for further details. All other systems are negative.   C    PAST MEDICAL HISTORY   has a past medical history of Anemia; Anesthesia; Arthritis; Breath shortness; Cirrhosis (CMS-HCC); Dental disorder; Depression; Depression (6/22/2016); Diabetes; Fibromyalgia; Hepatitis C; Hiatus hernia syndrome; Hypertension; Indigestion; Obesity; Other specified disorder of intestines; Pain (12-19-14); Pneumonia; Psychiatric problem; Restless leg syndrome; Sleep apnea; Snoring; Stroke (CMS-HCC) (2011); Unspecified hemorrhagic conditions; and Urinary bladder disorder.    SURGICAL HISTORY   has a past surgical history that  "includes gyn surgery; gyn surgery (2010); carpal tunnel release (2010); shoulder surgery (2010); other (2010); other; anterior and posterior repair (12/6/2013); enterocele repair (12/6/2013); bladder sling female (12/6/2013); gastroscopy with clipping (7/6/2014); gastroscopy with banding (7/8/2014); cath removal (11/14/2014); cath placement (12/22/2014); cataract extraction with iol; tonsillectomy; and gastroscopy (N/A, 8/21/2017).    SOCIAL HISTORY  Social History   Substance Use Topics   • Smoking status: Current Every Day Smoker     Packs/day: 1.00     Years: 42.00     Types: Cigarettes   • Smokeless tobacco: Never Used   • Alcohol use 0.0 - 3.6 oz/week      Comment: drinks 0-6 during the week and drinks all weekend      History   Drug Use No       FAMILY HISTORY  Family History   Problem Relation Age of Onset   • Diabetes Mother    • Hypertension Mother        CURRENT MEDICATIONS  Reviewed. See Encounter Summary.     ALLERGIES  Allergies   Allergen Reactions   • Nkda [No Known Drug Allergy]        PHYSICAL EXAM  VITAL SIGNS: /79   Pulse (!) 112   Temp (!) 38.3 °C (100.9 °F) (Temporal)   Resp 17   Ht 1.651 m (5' 5\")   Wt 50.1 kg (110 lb 7.2 oz)   SpO2 95%   BMI 18.38 kg/m²    Pulse ox interpretation: I interpret this pulse ox as normal.  Constitutional: Alert in no apparent distress.  HENT: No signs of trauma, Bilateral external ears normal, Nose normal.   Eyes: PERR, Conjunctiva normal, Non-icteric.   Neck: Normal range of motion, No tenderness, Supple, No stridor.   Lymphatic: No lymphadenopathy noted.   Cardiovascular: Regular rate and rhythm, no murmurs.   Thorax & Lungs: Normal breath sounds, No respiratory distress, No wheezing, No chest tenderness. Port in right chest, nontender.  Abdomen: Bowel sounds normal, Distended abdomen with fluid wave and tenderness, No pulsatile masses. No peritoneal signs.  Rectal: Brown stool, trace GUAIAC positive.  Skin: Warm, Dry, No erythema, No rash.   Back: " No bony tenderness, No CVA tenderness.   Extremities: Thin emaciated extremities. Intact distal pulses, No edema, No tenderness, No cyanosis  Musculoskeletal: Good range of motion in all major joints. No tenderness to palpation or major deformities noted.   Neurologic: Alert and oriented, No focal deficits noted.     DIFFERENTIAL DIAGNOSIS AND WORK UP PLAN    12:47 AM Patient seen and examined at bedside. The patient presents with abdominal swelling and the differential diagnosis includes but is not limited to UTI, viral syndrome, SBP from recent GI bleed. Discussed plan of care which includes paracentesis. Patient understands and agrees. Ordered for blood culture, fluid glucose, fluid cell count, fluid culture w/ gram stain, fluid LDH, urine culture, UA, PT/INR, POC UA, estimated GFR, CBC, CMP, lipase, BNP to evaluate. Patient will be treated with motrin 600 mg for her symptoms.         DIAGNOSTIC STUDIES / PROCEDURES     LABS  Labs Reviewed   CBC WITH DIFFERENTIAL - Abnormal; Notable for the following:        Result Value    Hemoglobin 10.5 (*)     Hematocrit 33.5 (*)     MCV 76.1 (*)     MCH 23.9 (*)     MCHC 31.3 (*)     Platelet Count 92 (*)     Lymphocytes 17.50 (*)     Lymphs (Absolute) 0.91 (*)     All other components within normal limits   COMP METABOLIC PANEL - Abnormal; Notable for the following:     Sodium 133 (*)     Glucose 436 (*)     Calcium 8.3 (*)     Albumin 3.1 (*)     Globulin 3.6 (*)     All other components within normal limits   URINALYSIS - Abnormal; Notable for the following:     Glucose >=1000 (*)     All other components within normal limits    Narrative:     Indication for culture:->Temp Equal to,or Greater Than 101   PROTHROMBIN TIME - Abnormal; Notable for the following:     PT 15.5 (*)     INR 1.26 (*)     All other components within normal limits    Narrative:     Indicate which anticoagulants the patient is on:->NONE   POC UA - Abnormal; Notable for the following:     POC Glucose  "500 (*)     POC Specific Gravity <=1.005 (*)     All other components within normal limits   ACCU-CHEK GLUCOSE - Abnormal; Notable for the following:     Glucose - Accu-Ck 345 (*)     All other components within normal limits   LIPASE   BTYPE NATRIURETIC PEPTIDE   ESTIMATED GFR   URINE CULTURE(NEW)    Narrative:     Indication for culture:->Temp Equal to,or Greater Than 101   FLUID CELL COUNT    Narrative:     Indication for culture:->Temp Equal to,or Greater Than 101   FLUID CULTURE W/GRAM STAIN   FLUID LDH   FLUID GLUCOSE   BLOOD CULTURE    Narrative:     Per Hospital Policy: Only change Specimen Src: to \"Line\" if  specified by physician order.   BLOOD CULTURE    Narrative:     Per Hospital Policy: Only change Specimen Src: to \"Line\" if  specified by physician order.   MAGNESIUM     Cell count 11 blood cells only 168 without evidence of SBP at this time patient's fluid was however sent for culture and Gram stain which is still pending with normal glucose and body LDH  All labs were reviewed by me.      Paracentesis Procedure Note    Indication: Ascites    Consent: The patient was counseled regarding the procedure, it's indications, risks, potential complications and alternatives and any questions were answered. Consent was obtained.    Procedure: The patient was placed in the left lateral decubitus position and the appropriate landmarks were identified. The skin over the puncture site in the left lower quadrant region was prepped with betadine and draped in a sterile fashion. Local anesthesia was obtained by infiltration using 1% Lidocaine without epinephrine. A paracentesis catheter was then advanced into the abdominal cavity over a needle and the needle was withdrawn. Fluid was returned which was serous.  A total volume of 2000 cc was withdrawn which was sent to the lab for cell count and differential, total protein, albumin, glucose, LDH and gram stain and culture. The catheter was then withdrawn and a sterile " dressing was placed over the site.     The patient tolerated the procedure well.    Complications: None         COURSE & MEDICAL DECISION MAKING  Pertinent Labs & Imaging studies reviewed. (See chart for details)    1:06 AM Patient reevaluated at bedside. Performed paracentesis procedure, as outlined below.     1:47 AM Patient reevaluated at bedside.  Completed paracentesis procedure. Performed rectal exam, see physical exam.    1:55 AM Obtained and reviewed past medical records which show patient previously had a rahul palacios tear as well as esophageal varices.     3:22 AM - I discussed the patient's case and the above findings with Dr. Briggs (hospitalist) who will admit the patient.     3:29 AM - I discussed the patient's case and the above findings with Dr. Gonzalez (GI) who advised no octreotide or protonix. Will call back for worsening signs or symptoms.    This is a 57 y.o. year old female who presents with fever tachycardia and no evidence of SBP at this time however she does have a GI bleed or at least had a worsening 1 last week she still guaiac positive setting of a fever and worsening and new onset ascites and the 1st time she's had a paracentesis she'll be admitted to the hospital for further evaluation and management. She was treated with 1 g of Rocephin here in the ED.    DISPOSITION:  Patient will be admitted to Dr. Briggs in guarded condition.     FINAL IMPRESSION  1. Abdominal pain  2. Abdominal distension   3. Fever  4. Cirrhosis   5. Guaiac positive    Paracentesis procedure, see above.     Gregorio KUMAR (Rashi), am scribing for, and in the presence of, Robyn Sol M.D..    Electronically signed by: Gregorio Villagran (Scribe), 12/20/2017    Robyn KUMAR M.D. personally performed the services described in this documentation, as scribed by Gregorio Villagran in my presence, and it is both accurate and complete.    The note accurately reflects work and decisions  made by me.  Robyn Sol  12/20/2017  6:25 AM    This dictation has been created using voice recognition software and/or scribes. The accuracy of the dictation is limited by the abilities of the software and the expertise of the scribes. I expect there may be some errors of grammar and possibly content. I made every attempt to manually correct the errors within my dictation. However, errors related to voice recognition software and/or scribes may still exist and should be interpreted within the appropriate context.

## 2017-12-20 NOTE — H&P
Hospital Medicine History and Physical    Date of Service  12/20/2017    Chief Complaint  Chief Complaint   Patient presents with   • Abdominal Pain       History of Presenting Illness  57 y.o. female With history of hepatitis C, etoh abuse,  liver cirrhosis, diabetes insulin-dependent, depression, stroke, chronic pain, followed fibromyalgia, , who presented 12/20/2017 with abdominal distention and blood in stool.   Abdominal distention started and was getting worse in the last 1 week.  She noted moderate amount of dark blood in the stool 3 weeks ago, that stopped one week ago or so, and her stool become black. She denies any nausea, vomiting, abdominal pain, except abdominal pressure and distention; no chills or fever. She was getting cirrhosis care from Dr Dent, including US q6 mo and EGD for varices. In August 2017, she was found to have two large varices. ERP performed paracentesis with removal of 2 L of non infectious fluid  FOBT test was positive in emergency room. ERP discussed this with gastroenterologist Dr Ahuja, who advised against PPI drip or octreotide IV        Primary Care Physician  Pcp Pt States None    Consultants  Gastroenterology    Code Status  Full code    Review of Systems  Review of Systems   All other systems reviewed and are negative.    12 systems has been reviewed . For pertinent positive and negative  symptoms, otherwise negative    Past Medical History  Past Medical History:   Diagnosis Date   • Depression 6/22/2016   • Pain 12-19-14    generalized; 4/10   • Stroke (CMS-HCC) 2011   • Anemia    • Anesthesia     pt's mother has hard time waking up = states it took a week or two   • Arthritis     generalized   • Breath shortness    • Cirrhosis (CMS-HCC)    • Dental disorder     dentures   • Depression    • Diabetes     insulin   • Fibromyalgia    • Hepatitis C    • Hiatus hernia syndrome    • Hypertension    • Indigestion    • Obesity    • Other specified disorder of intestines      constipation/diarrhea   • Pneumonia    • Psychiatric problem     depression and anxiety   • Restless leg syndrome    • Sleep apnea     has had sleep study, no cpap   • Snoring    • Unspecified hemorrhagic conditions     reports nose bleeds   • Urinary bladder disorder     frequency/urgency       Surgical History  Past Surgical History:   Procedure Laterality Date   • GASTROSCOPY N/A 8/21/2017    Procedure: GASTROSCOPY WITH BANDING  ;  Surgeon: Anthony Dent M.D.;  Location: SURGERY SAME DAY [facility];  Service:    • CATH PLACEMENT  12/22/2014    Performed by Helga Santiago M.D. at SURGERY SAME DAY [facility] ORS   • CATH REMOVAL  11/14/2014    Performed by Helga Santiago M.D. at SURGERY [facility]   • GASTROSCOPY WITH BANDING  7/8/2014    Performed by Davonte Mauricio M.D. at ENDOSCOPY [facility] ORS   • GASTROSCOPY WITH CLIPPING  7/6/2014    Performed by Joshua Vigil M.D. at ENDOSCOPY [facility] ORS   • ANTERIOR AND POSTERIOR REPAIR  12/6/2013    Performed by Isaías Lambert M.D. at SURGERY SAME DAY [facility] ORS   • ENTEROCELE REPAIR  12/6/2013    Performed by Isaías Lambert M.D. at SURGERY SAME DAY [facility] ORS   • BLADDER SLING FEMALE  12/6/2013    Performed by Isaías Lambert M.D. at SURGERY SAME DAY [facility] ORS   • GYN SURGERY  2010    hysterectomy   • CARPAL TUNNEL RELEASE  2010    right hand   • SHOULDER SURGERY  2010    left shoulder   • OTHER  2010    port placement   • CATARACT EXTRACTION WITH IOL     • GYN SURGERY      tubal ligation   • OTHER      oral surgery   • TONSILLECTOMY         Medications  No current facility-administered medications on file prior to encounter.      Current Outpatient Prescriptions on File Prior to Encounter   Medication Sig Dispense Refill   • enalapril (VASOTEC) 20 MG tablet Take 1 Tab by mouth every day. 30 Tab 11   • gabapentin (NEURONTIN) 300 MG Cap Take 1 Cap by mouth 3 times a day. 90 Cap 11   • insulin glargine (LANTUS) 100 UNIT/ML  "Solution Inject 25 Units as instructed every evening. 10 mL 5   • Insulin Syringe-Needle U-100 (INSULIN SYRINGE .5CC/31GX5/16\") 31G X 5/16\" 0.5 ML Misc 1 Syringe by Does not apply route every evening. 100 Each 5   • ONETOUCH DELICA LANCETS FINE Misc 1 Syringe by Does not apply route 3 times a day. 100 Each 5   • Blood Glucose Monitoring Suppl (ONE TOUCH ULTRA 2) w/Device Kit 1 Device by Does not apply route 3 times a day. 1 Kit 0   • glucose blood (ONE TOUCH TEST STRIPS) strip 1 Strip by Other route as needed. 100 Strip 5       Family History  Family History   Problem Relation Age of Onset   • Diabetes Mother    • Hypertension Mother        Social History  Social History   Substance Use Topics   • Smoking status: Current Every Day Smoker     Packs/day: 1.00     Years: 42.00     Types: Cigarettes   • Smokeless tobacco: Never Used   • Alcohol use 0.0 - 3.6 oz/week      Comment: drinks 0-6 during the week and drinks all weekend       Allergies  Allergies   Allergen Reactions   • Nkda [No Known Drug Allergy]         Physical Exam  Laboratory   Hemodynamics  Temp (24hrs), Av.1 °C (98.8 °F), Min:36.2 °C (97.1 °F), Max:38.3 °C (100.9 °F)   Temperature: 36.2 °C (97.1 °F)  Pulse  Av.4  Min: 103  Max: 112 Heart Rate (Monitored): (!) 103  Blood Pressure: 128/82, NIBP: 128/53      Respiratory      Respiration: 16, Pulse Oximetry: 95 %             Physical Exam   Constitutional: She is oriented to person, place, and time. Vital signs are normal. She appears ill.   HENT:   Head: Normocephalic and atraumatic.   Eyes: EOM are normal. Pupils are equal, round, and reactive to light.   Neck: Normal range of motion. Neck supple.   Cardiovascular: Tachycardia present.    Pulmonary/Chest: Effort normal and breath sounds normal. No respiratory distress.   Abdominal: Bowel sounds are normal. She exhibits distension. There is no tenderness. There is no rigidity and no guarding.   Musculoskeletal: Normal range of motion. "   Neurological: She is alert and oriented to person, place, and time.   Skin: Skin is warm and dry.   Psychiatric: Cognition and memory are impaired.   Nursing note and vitals reviewed.      Recent Labs      12/19/17 2008   WBC  5.2   RBC  4.40   HEMOGLOBIN  10.5*   HEMATOCRIT  33.5*   MCV  76.1*   MCH  23.9*   MCHC  31.3*   RDW  38.6   PLATELETCT  92*   MPV  11.5     Recent Labs      12/19/17 2008   SODIUM  133*   POTASSIUM  3.9   CHLORIDE  101   CO2  24   GLUCOSE  436*   BUN  14   CREATININE  0.67   CALCIUM  8.3*     Recent Labs      12/19/17 2008   ALTSGPT  10   ASTSGOT  14   ALKPHOSPHAT  96   TBILIRUBIN  0.8   LIPASE  56   GLUCOSE  436*     Recent Labs      12/20/17   0049   INR  1.26*     Recent Labs      12/19/17 2008   BNPBTYPENAT  61         Lab Results   Component Value Date    TROPONINI <0.01 12/31/2014     Urinalysis:    Lab Results  Component Value Date/Time   SPECGRAVITY 1.024 12/19/2017 2150   GLUCOSEUR >=1000 (A) 12/19/2017 2150   KETONES Negative 12/19/2017 2150   NITRITE Negative 12/19/2017 2150        Imaging  No orders to display        Assessment/Plan     I anticipate this patient is appropriate for observation status at this time.    GI bleed   Assessment & Plan    This is low-grade bleeding, unclear if upper or lower, and patient is a poor historian  We'll give IV Protonix in 1 time dose of ceftriaxone IV for an infection prophylaxis,  Consider re starting on propranolol, patient was on before  Gastroenterology to see the patient, but patient probably could be managed conservatively          DM2 (diabetes mellitus, type 2) (CMS-HCC)- (present on admission)   Assessment & Plan    Poorly controlled: A1C 13.7 in 9/17, questionable compliance   Continue home regimen and insulin sliding scale        Ascites   Assessment & Plan    new onset ascites, probably secondary to liver cirrhosis  Cell count is noninfectious  Will start on spironolactone and Lasix, salt restriction          Anxiety-  (present on admission)   Assessment & Plan    Stable. Continue home medication        FLORINDA (obstructive sleep apnea)- (present on admission)   Assessment & Plan    Continue home BiPAP at night        Chronic hepatitis C virus infection (CMS-HCC)- (present on admission)   Assessment & Plan     hep C s/p sofobuvir and ribavirin for hepatitis C treatment in 2015   history of hepatitis C. 3/14/2016 HCV RNA by PCR less than 30, HCV PCR qunt log <1.5. HCV RNA PCR Qnt int: not detected.  - Recent EGD with banding of esophageal varices  - Follows with GI              VTE prophylaxis: SCD .

## 2017-12-21 ENCOUNTER — PATIENT OUTREACH (OUTPATIENT)
Dept: HEALTH INFORMATION MANAGEMENT | Facility: OTHER | Age: 57
End: 2017-12-21

## 2017-12-21 NOTE — LETTER
Demetrice Jaime  [address] [place] [address]    December 21, 2017      Dear Demetrice Jaime,    [facility] wants to ensure your discharge home is safe and you or your loved ones have had all of your questions answered regarding your care after you leave the hospital.    Our discharge team was unsuccessful in our attempts to contact you telephonically and we wanted to be sure that you had a list of resources and contact information should you have any questions regarding your hospital stay, follow-up instructions, or active medical symptoms.    Questions or Concerns Regarding… Contact   Medical Questions Related to Your Discharge  (7 days a week, 8am-5pm) Contact a Nurse Care Coordinator   [phone]   Medical Questions Not Related to Your Discharge  (24 hours a day / 7 days a week)  Contact the Nurse Health Line   [phone]    Medications or Discharge Instructions Refer to your discharge packet   or contact your -[phone]   Follow-up Appointment(s) Schedule your appointment via [employer]   or contact Scheduling [phone]   Billing Review your statement via [employer]  or contact Billing [phone]   Medical Records Review your records via [employer]   or contact Medical Records [phone]     You can also easily access your medical information, test results and upcoming appointments via the [employer] free online health management tool. You can learn more and sign up at [employer]. For assistance setting up your [employer] account, please call [phone].    Once again, we want to ensure your discharge home is safe and that you have a clear understanding of any next steps in your care. If you have any questions or concerns, please do not hesitate to contact us, we are here for you. Thank you for choosing [facility] for your healthcare needs.    Sincerely,      Your [facility] Healthcare Team

## 2017-12-21 NOTE — CONSULTS
"Diabetes education: Met with Demetrice regarding diabetes management. Pt has a hx of diabetes on Lantus 25 to 30 units at HS. Pt states she was supposed to be on fast acting insulin but it was \"never ordered\". Pt states she a vial of Lantus at home (openend less than 30 days) and when she takes her insulin she uses her legs . Discussed site rotation and why.  Pt states she has not taken her insulin for a few days prior to admit. Pt states when she was testing her blood sugars  They were as high as \"700\". Pt came in with blood sugar of 436. Last HgA1c was 13.7 % done on  9/13/17.  Discussed going home and pt states she did want to go home as it is depressing.    Plan: Spoke with nursing (Tova BOLAÑOS) and Hospitalist regarding pt stating she was depressed as well as insulin orders. Pt may benefit in having some fast acting insulin ordered with meals only. Set dose may be easier for the patient to use. Please call [phone] if needs change.  "

## 2017-12-21 NOTE — DISCHARGE INSTRUCTIONS
Discharge Instructions    Discharged to home by car with relative. Discharged via walking, hospital escort: Refused.  Special equipment needed: Not Applicable    Be sure to schedule a follow-up appointment with your primary care doctor or any specialists as instructed.     Discharge Plan:   Diet Plan: Discussed (Renal, hepatic, fluid restriction)  Activity Level: Discussed (As tolerated)  Smoking Cessation Offered: Patient Refused  Confirmed Follow up Appointment: Patient to Call and Schedule Appointment  Confirmed Symptoms Management: Discussed  Influenza Vaccine Indication: Patient Refuses    I understand that a diet low in cholesterol, fat, and sodium is recommended for good health. Unless I have been given specific instructions below for another diet, I accept this instruction as my diet prescription.   Other diet: Renal, hepatic, fluid restriction    Special Instructions: None    · Is patient discharged on Warfarin / Coumadin?   No     · Is patient Post Blood Transfusion?  No    Depression / Suicide Risk    As you are discharged from this [facility] facility, it is important to learn how to keep safe from harming yourself.    Recognize the warning signs:  · Abrupt changes in personality, positive or negative- including increase in energy   · Giving away possessions  · Change in eating patterns- significant weight changes-  positive or negative  · Change in sleeping patterns- unable to sleep or sleeping all the time   · Unwillingness or inability to communicate  · Depression  · Unusual sadness, discouragement and loneliness  · Talk of wanting to die  · Neglect of personal appearance   · Rebelliousness- reckless behavior  · Withdrawal from people/activities they love  · Confusion- inability to concentrate     If you or a loved one observes any of these behaviors or has concerns about self-harm, here's what you can do:  · Talk about it- your feelings and reasons for harming yourself  · Remove any means that you  might use to hurt yourself (examples: pills, rope, extension cords, firearm)  · Get professional help from the community (Mental Health, Substance Abuse, psychological counseling)  · Do not be alone:Call your Safe Contact- someone whom you trust who will be there for you.  · Call your local CRISIS HOTLINE 313-5769 or 845-434-7047  · Call your local Children's Mobile Crisis Response Team [place] (882) 638-0352 or www.HelloFax  · Call the toll free National Suicide Prevention Hotlines   · National Suicide Prevention Lifeline 982-366-MLUL (7175)  · [employer] Line Network 800-SUICIDE (175-4981)    Instructions:   Activity: As tolerated.   Diet: Clear liquids advance as tolerated   Followup: With GI and PCP   Instructions:   -Please have your PCP refer you to Endocrinology- Stricter control of diabetes   -Given instructions to return to the ER if any new or worsening symptoms, worsening condition, or failure to improve   -Call PCP for followup   -No smoking, no alcohol, no caffeine   -Encourage risk factor reduction with tobacco and alcohol abstinence, diet changes, weight loss, and exercise.

## 2017-12-21 NOTE — PROGRESS NOTES
Pt discharged to home. IV d/c'd, pt armband removed. Pt and family understand written and verbal d/c instructions.  Prescriptions given and sent electronically to pharmacy.  Renal, hepatic, and low salt diet, activity as tolerated.  Pt to follow up with PCP (appt scheduled).   Pt verbalized understanding.

## 2017-12-21 NOTE — PROGRESS NOTES
Diabetes education: Met with patient this afternoon. Please see consult note.  Plan: Spoke with nursing (Tova BOLAÑOS) and Hospitalist regarding pt stating she was depressed as well as insulin orders. Pt may benefit in having some fast acting insulin ordered with meals only. Set dose may be easier for the patient to use. Please call [phone] if needs change.

## 2017-12-21 NOTE — ASSESSMENT & PLAN NOTE
Initiate sertraline 25mg po daily can increase dose gradually every month, do not exceed 100mg/day

## 2017-12-22 LAB
BACTERIA UR CULT: NORMAL
SIGNIFICANT IND 70042: NORMAL
SITE SITE: NORMAL
SOURCE SOURCE: NORMAL

## 2017-12-23 LAB
BACTERIA FLD AEROBE CULT: NORMAL
GRAM STN SPEC: NORMAL
SIGNIFICANT IND 70042: NORMAL
SITE SITE: NORMAL
SOURCE SOURCE: NORMAL

## 2017-12-25 LAB
BACTERIA BLD CULT: NORMAL
BACTERIA BLD CULT: NORMAL
SIGNIFICANT IND 70042: NORMAL
SIGNIFICANT IND 70042: NORMAL
SITE SITE: NORMAL
SITE SITE: NORMAL
SOURCE SOURCE: NORMAL
SOURCE SOURCE: NORMAL

## 2018-01-09 ENCOUNTER — HOSPITAL ENCOUNTER (OUTPATIENT)
Facility: MEDICAL CENTER | Age: 58
End: 2018-01-09
Attending: INTERNAL MEDICINE | Admitting: INTERNAL MEDICINE
Payer: MEDICAID

## 2018-01-11 ENCOUNTER — APPOINTMENT (OUTPATIENT)
Dept: RADIOLOGY | Facility: MEDICAL CENTER | Age: 58
End: 2018-01-11
Attending: INTERNAL MEDICINE
Payer: MEDICAID

## 2018-02-07 ENCOUNTER — RESOLUTE PROFESSIONAL BILLING HOSPITAL PROF FEE (OUTPATIENT)
Dept: HOSPITALIST | Facility: MEDICAL CENTER | Age: 58
End: 2018-02-07
Payer: MEDICAID

## 2018-02-07 ENCOUNTER — HOSPITAL ENCOUNTER (INPATIENT)
Facility: MEDICAL CENTER | Age: 58
LOS: 7 days | DRG: 368 | End: 2018-02-14
Attending: EMERGENCY MEDICINE | Admitting: HOSPITALIST
Payer: MEDICAID

## 2018-02-07 ENCOUNTER — APPOINTMENT (OUTPATIENT)
Dept: RADIOLOGY | Facility: MEDICAL CENTER | Age: 58
DRG: 368 | End: 2018-02-07
Attending: EMERGENCY MEDICINE
Payer: MEDICAID

## 2018-02-07 PROBLEM — D72.829 LEUKOCYTOSIS: Status: ACTIVE | Noted: 2018-02-07

## 2018-02-07 PROBLEM — Z87.19 HISTORY OF CIRRHOSIS OF LIVER: Status: ACTIVE | Noted: 2018-02-07

## 2018-02-07 PROBLEM — Z86.19 HISTORY OF HEPATITIS C: Status: ACTIVE | Noted: 2018-02-07

## 2018-02-07 PROBLEM — F15.10 METHAMPHETAMINE ABUSE (HCC): Status: ACTIVE | Noted: 2018-02-07

## 2018-02-07 PROBLEM — K92.0 HEMATEMESIS: Status: ACTIVE | Noted: 2018-02-07

## 2018-02-07 PROBLEM — K92.1 HEMATOCHEZIA: Status: ACTIVE | Noted: 2018-02-07

## 2018-02-07 PROBLEM — R74.8 ELEVATED LIVER ENZYMES: Status: ACTIVE | Noted: 2018-02-07

## 2018-02-07 PROBLEM — K92.2 ACUTE UPPER GI BLEEDING: Status: ACTIVE | Noted: 2018-02-07

## 2018-02-07 PROBLEM — K76.82 HEPATIC ENCEPHALOPATHY (HCC): Status: ACTIVE | Noted: 2018-02-07

## 2018-02-07 LAB
ABO GROUP BLD: NORMAL
ALBUMIN SERPL BCP-MCNC: 2.8 G/DL (ref 3.2–4.9)
ALBUMIN/GLOB SERPL: 1 G/DL
ALP SERPL-CCNC: 70 U/L (ref 30–99)
ALT SERPL-CCNC: 112 U/L (ref 2–50)
AMMONIA PLAS-SCNC: 103 UMOL/L (ref 11–45)
ANION GAP SERPL CALC-SCNC: 13 MMOL/L (ref 0–11.9)
ANISOCYTOSIS BLD QL SMEAR: ABNORMAL
APTT PPP: 35.5 SEC (ref 24.7–36)
AST SERPL-CCNC: 163 U/L (ref 12–45)
BARCODED ABORH UBTYP: 5100
BARCODED PRD CODE UBPRD: NORMAL
BARCODED UNIT NUM UBUNT: NORMAL
BASOPHILS # BLD AUTO: 0.8 % (ref 0–1.8)
BASOPHILS # BLD: 0.14 K/UL (ref 0–0.12)
BILIRUB SERPL-MCNC: 1.1 MG/DL (ref 0.1–1.5)
BLD GP AB SCN SERPL QL: NORMAL
BUN SERPL-MCNC: 35 MG/DL (ref 8–22)
CALCIUM SERPL-MCNC: 8.3 MG/DL (ref 8.5–10.5)
CHLORIDE SERPL-SCNC: 103 MMOL/L (ref 96–112)
CO2 SERPL-SCNC: 23 MMOL/L (ref 20–33)
COMMENT 1642: NORMAL
COMPONENT R 8504R: NORMAL
CREAT SERPL-MCNC: 0.92 MG/DL (ref 0.5–1.4)
EOSINOPHIL # BLD AUTO: 0.09 K/UL (ref 0–0.51)
EOSINOPHIL NFR BLD: 0.5 % (ref 0–6.9)
ERYTHROCYTE [DISTWIDTH] IN BLOOD BY AUTOMATED COUNT: 44.4 FL (ref 35.9–50)
ETHANOL BLD-MCNC: 0 G/DL
GLOBULIN SER CALC-MCNC: 2.8 G/DL (ref 1.9–3.5)
GLUCOSE SERPL-MCNC: 414 MG/DL (ref 65–99)
HCT VFR BLD AUTO: 28.7 % (ref 37–47)
HGB BLD-MCNC: 7.9 G/DL (ref 12–16)
HYPOCHROMIA BLD QL SMEAR: ABNORMAL
IMM GRANULOCYTES # BLD AUTO: 0.09 K/UL (ref 0–0.11)
IMM GRANULOCYTES NFR BLD AUTO: 0.5 % (ref 0–0.9)
INR PPP: 1.68 (ref 0.87–1.13)
LIPASE SERPL-CCNC: 30 U/L (ref 11–82)
LYMPHOCYTES # BLD AUTO: 1.54 K/UL (ref 1–4.8)
LYMPHOCYTES NFR BLD: 9.2 % (ref 22–41)
MAGNESIUM SERPL-MCNC: 1.8 MG/DL (ref 1.5–2.5)
MCH RBC QN AUTO: 20.2 PG (ref 27–33)
MCHC RBC AUTO-ENTMCNC: 27.5 G/DL (ref 33.6–35)
MCV RBC AUTO: 73.4 FL (ref 81.4–97.8)
MICROCYTES BLD QL SMEAR: ABNORMAL
MONOCYTES # BLD AUTO: 0.81 K/UL (ref 0–0.85)
MONOCYTES NFR BLD AUTO: 4.9 % (ref 0–13.4)
MORPHOLOGY BLD-IMP: NORMAL
NEUTROPHILS # BLD AUTO: 13.99 K/UL (ref 2–7.15)
NEUTROPHILS NFR BLD: 84.1 % (ref 44–72)
NRBC # BLD AUTO: 0 K/UL
NRBC BLD-RTO: 0 /100 WBC
OVALOCYTES BLD QL SMEAR: NORMAL
PLATELET # BLD AUTO: 180 K/UL (ref 164–446)
PLATELET BLD QL SMEAR: NORMAL
PMV BLD AUTO: 11.1 FL (ref 9–12.9)
POIKILOCYTOSIS BLD QL SMEAR: NORMAL
POTASSIUM SERPL-SCNC: 5.2 MMOL/L (ref 3.6–5.5)
PRODUCT TYPE UPROD: NORMAL
PROT SERPL-MCNC: 5.6 G/DL (ref 6–8.2)
PROTHROMBIN TIME: 19.5 SEC (ref 12–14.6)
RBC # BLD AUTO: 3.91 M/UL (ref 4.2–5.4)
RBC BLD AUTO: PRESENT
RH BLD: NORMAL
SODIUM SERPL-SCNC: 139 MMOL/L (ref 135–145)
UNIT STATUS USTAT: NORMAL
WBC # BLD AUTO: 16.7 K/UL (ref 4.8–10.8)

## 2018-02-07 PROCEDURE — 700102 HCHG RX REV CODE 250 W/ 637 OVERRIDE(OP): Performed by: HOSPITALIST

## 2018-02-07 PROCEDURE — 86901 BLOOD TYPING SEROLOGIC RH(D): CPT

## 2018-02-07 PROCEDURE — C9113 INJ PANTOPRAZOLE SODIUM, VIA: HCPCS | Performed by: EMERGENCY MEDICINE

## 2018-02-07 PROCEDURE — 36415 COLL VENOUS BLD VENIPUNCTURE: CPT

## 2018-02-07 PROCEDURE — 96375 TX/PRO/DX INJ NEW DRUG ADDON: CPT

## 2018-02-07 PROCEDURE — 85025 COMPLETE CBC W/AUTO DIFF WBC: CPT

## 2018-02-07 PROCEDURE — 85610 PROTHROMBIN TIME: CPT

## 2018-02-07 PROCEDURE — 700105 HCHG RX REV CODE 258: Performed by: EMERGENCY MEDICINE

## 2018-02-07 PROCEDURE — 86850 RBC ANTIBODY SCREEN: CPT

## 2018-02-07 PROCEDURE — 96366 THER/PROPH/DIAG IV INF ADDON: CPT

## 2018-02-07 PROCEDURE — 85730 THROMBOPLASTIN TIME PARTIAL: CPT

## 2018-02-07 PROCEDURE — 700111 HCHG RX REV CODE 636 W/ 250 OVERRIDE (IP): Performed by: HOSPITALIST

## 2018-02-07 PROCEDURE — C9113 INJ PANTOPRAZOLE SODIUM, VIA: HCPCS | Performed by: HOSPITALIST

## 2018-02-07 PROCEDURE — 82962 GLUCOSE BLOOD TEST: CPT

## 2018-02-07 PROCEDURE — 86900 BLOOD TYPING SEROLOGIC ABO: CPT

## 2018-02-07 PROCEDURE — 80053 COMPREHEN METABOLIC PANEL: CPT

## 2018-02-07 PROCEDURE — 99223 1ST HOSP IP/OBS HIGH 75: CPT | Performed by: HOSPITALIST

## 2018-02-07 PROCEDURE — 82140 ASSAY OF AMMONIA: CPT

## 2018-02-07 PROCEDURE — 94760 N-INVAS EAR/PLS OXIMETRY 1: CPT

## 2018-02-07 PROCEDURE — 80307 DRUG TEST PRSMV CHEM ANLYZR: CPT

## 2018-02-07 PROCEDURE — 99285 EMERGENCY DEPT VISIT HI MDM: CPT

## 2018-02-07 PROCEDURE — 96365 THER/PROPH/DIAG IV INF INIT: CPT

## 2018-02-07 PROCEDURE — 700111 HCHG RX REV CODE 636 W/ 250 OVERRIDE (IP): Performed by: EMERGENCY MEDICINE

## 2018-02-07 PROCEDURE — A9270 NON-COVERED ITEM OR SERVICE: HCPCS | Performed by: HOSPITALIST

## 2018-02-07 PROCEDURE — 700105 HCHG RX REV CODE 258: Performed by: HOSPITALIST

## 2018-02-07 PROCEDURE — 71045 X-RAY EXAM CHEST 1 VIEW: CPT

## 2018-02-07 PROCEDURE — 770020 HCHG ROOM/CARE - TELE (206)

## 2018-02-07 PROCEDURE — 83735 ASSAY OF MAGNESIUM: CPT

## 2018-02-07 PROCEDURE — 83690 ASSAY OF LIPASE: CPT

## 2018-02-07 RX ORDER — LACTULOSE 20 G/30ML
30 SOLUTION ORAL 3 TIMES DAILY
Status: DISCONTINUED | OUTPATIENT
Start: 2018-02-07 | End: 2018-02-14

## 2018-02-07 RX ORDER — ONDANSETRON 4 MG/1
4 TABLET, ORALLY DISINTEGRATING ORAL EVERY 4 HOURS PRN
Status: DISCONTINUED | OUTPATIENT
Start: 2018-02-07 | End: 2018-02-14 | Stop reason: HOSPADM

## 2018-02-07 RX ORDER — GABAPENTIN 300 MG/1
300 CAPSULE ORAL 2 TIMES DAILY
Status: DISCONTINUED | OUTPATIENT
Start: 2018-02-07 | End: 2018-02-14 | Stop reason: HOSPADM

## 2018-02-07 RX ORDER — PROMETHAZINE HYDROCHLORIDE 25 MG/1
12.5-25 SUPPOSITORY RECTAL EVERY 4 HOURS PRN
Status: DISCONTINUED | OUTPATIENT
Start: 2018-02-07 | End: 2018-02-14 | Stop reason: HOSPADM

## 2018-02-07 RX ORDER — SODIUM CHLORIDE 9 MG/ML
INJECTION, SOLUTION INTRAVENOUS CONTINUOUS
Status: DISCONTINUED | OUTPATIENT
Start: 2018-02-07 | End: 2018-02-12

## 2018-02-07 RX ORDER — PROMETHAZINE HYDROCHLORIDE 25 MG/1
12.5-25 TABLET ORAL EVERY 4 HOURS PRN
Status: DISCONTINUED | OUTPATIENT
Start: 2018-02-07 | End: 2018-02-14 | Stop reason: HOSPADM

## 2018-02-07 RX ORDER — ONDANSETRON 2 MG/ML
4 INJECTION INTRAMUSCULAR; INTRAVENOUS ONCE
Status: COMPLETED | OUTPATIENT
Start: 2018-02-07 | End: 2018-02-07

## 2018-02-07 RX ORDER — SODIUM CHLORIDE 9 MG/ML
1000 INJECTION, SOLUTION INTRAVENOUS ONCE
Status: COMPLETED | OUTPATIENT
Start: 2018-02-07 | End: 2018-02-07

## 2018-02-07 RX ORDER — SPIRONOLACTONE 25 MG/1
50 TABLET ORAL DAILY
Status: DISCONTINUED | OUTPATIENT
Start: 2018-02-08 | End: 2018-02-14 | Stop reason: HOSPADM

## 2018-02-07 RX ORDER — ONDANSETRON 2 MG/ML
4 INJECTION INTRAMUSCULAR; INTRAVENOUS EVERY 4 HOURS PRN
Status: DISCONTINUED | OUTPATIENT
Start: 2018-02-07 | End: 2018-02-14 | Stop reason: HOSPADM

## 2018-02-07 RX ORDER — DEXTROSE MONOHYDRATE 25 G/50ML
25 INJECTION, SOLUTION INTRAVENOUS
Status: DISCONTINUED | OUTPATIENT
Start: 2018-02-07 | End: 2018-02-14 | Stop reason: HOSPADM

## 2018-02-07 RX ORDER — OCTREOTIDE ACETATE 50 UG/ML
50 INJECTION, SOLUTION INTRAVENOUS; SUBCUTANEOUS ONCE
Status: DISCONTINUED | OUTPATIENT
Start: 2018-02-07 | End: 2018-02-07

## 2018-02-07 RX ORDER — OCTREOTIDE ACETATE 100 UG/ML
50 INJECTION, SOLUTION INTRAVENOUS; SUBCUTANEOUS ONCE
Status: COMPLETED | OUTPATIENT
Start: 2018-02-07 | End: 2018-02-07

## 2018-02-07 RX ADMIN — SODIUM CHLORIDE: 9 INJECTION, SOLUTION INTRAVENOUS at 22:11

## 2018-02-07 RX ADMIN — OCTREOTIDE ACETATE 50 MCG: 100 INJECTION, SOLUTION INTRAVENOUS; SUBCUTANEOUS at 18:44

## 2018-02-07 RX ADMIN — SODIUM CHLORIDE 80 MG: 9 INJECTION, SOLUTION INTRAVENOUS at 18:15

## 2018-02-07 RX ADMIN — RIFAXIMIN 550 MG: 550 TABLET ORAL at 22:11

## 2018-02-07 RX ADMIN — GABAPENTIN 300 MG: 300 CAPSULE ORAL at 22:09

## 2018-02-07 RX ADMIN — LACTULOSE 30 ML: 20 SOLUTION ORAL at 22:09

## 2018-02-07 RX ADMIN — CEFTRIAXONE 2 G: 2 INJECTION, POWDER, FOR SOLUTION INTRAMUSCULAR; INTRAVENOUS at 22:09

## 2018-02-07 RX ADMIN — ONDANSETRON 4 MG: 2 INJECTION INTRAMUSCULAR; INTRAVENOUS at 18:15

## 2018-02-07 RX ADMIN — SODIUM CHLORIDE 8 MG/HR: 9 INJECTION, SOLUTION INTRAVENOUS at 22:11

## 2018-02-07 RX ADMIN — SODIUM CHLORIDE 1000 ML: 9 INJECTION, SOLUTION INTRAVENOUS at 18:15

## 2018-02-07 RX ADMIN — OCTREOTIDE ACETATE 50 MCG/HR: 200 INJECTION, SOLUTION INTRAVENOUS; SUBCUTANEOUS at 22:11

## 2018-02-07 RX ADMIN — INSULIN HUMAN 5 UNITS: 100 INJECTION, SOLUTION PARENTERAL at 22:27

## 2018-02-07 RX ADMIN — SODIUM CHLORIDE 8 MG/HR: 9 INJECTION, SOLUTION INTRAVENOUS at 18:15

## 2018-02-07 ASSESSMENT — COGNITIVE AND FUNCTIONAL STATUS - GENERAL
DAILY ACTIVITIY SCORE: 24
SUGGESTED CMS G CODE MODIFIER MOBILITY: CH
MOBILITY SCORE: 24
DAILY ACTIVITIY SCORE: 24
SUGGESTED CMS G CODE MODIFIER DAILY ACTIVITY: CH
SUGGESTED CMS G CODE MODIFIER DAILY ACTIVITY: CH
SUGGESTED CMS G CODE MODIFIER MOBILITY: CH
MOBILITY SCORE: 24

## 2018-02-07 ASSESSMENT — ENCOUNTER SYMPTOMS
PALPITATIONS: 0
MEMORY LOSS: 0
TINGLING: 0
CHILLS: 0
EYE PAIN: 0
TREMORS: 0
COUGH: 0
WEAKNESS: 1
SENSORY CHANGE: 0
SPEECH CHANGE: 0
ORTHOPNEA: 0
PHOTOPHOBIA: 0
NAUSEA: 1
SPUTUM PRODUCTION: 0
CONSTIPATION: 0
SORE THROAT: 0
BACK PAIN: 0
STRIDOR: 0
VOMITING: 1
CLAUDICATION: 0
BLURRED VISION: 0
DOUBLE VISION: 0
DEPRESSION: 0
HEARTBURN: 0
SHORTNESS OF BREATH: 0
NECK PAIN: 0
PND: 0
HEADACHES: 0
FEVER: 0
MYALGIAS: 0
DIZZINESS: 0
BLOOD IN STOOL: 1
HEMOPTYSIS: 0
NERVOUS/ANXIOUS: 0

## 2018-02-07 ASSESSMENT — COPD QUESTIONNAIRES
COPD SCREENING SCORE: 3
HAVE YOU SMOKED AT LEAST 100 CIGARETTES IN YOUR ENTIRE LIFE: YES
DURING THE PAST 4 WEEKS HOW MUCH DID YOU FEEL SHORT OF BREATH: NONE/LITTLE OF THE TIME
HAVE YOU SMOKED AT LEAST 100 CIGARETTES IN YOUR ENTIRE LIFE: YES
DO YOU EVER COUGH UP ANY MUCUS OR PHLEGM?: NO/ONLY WITH OCCASIONAL COLDS OR INFECTIONS
DURING THE PAST 4 WEEKS HOW MUCH DID YOU FEEL SHORT OF BREATH: NONE/LITTLE OF THE TIME
COPD SCREENING SCORE: 3
DO YOU EVER COUGH UP ANY MUCUS OR PHLEGM?: NO/ONLY WITH OCCASIONAL COLDS OR INFECTIONS

## 2018-02-07 ASSESSMENT — LIFESTYLE VARIABLES
EVER_SMOKED: YES
TOTAL SCORE: 0
EVER_SMOKED: YES
TOTAL SCORE: 0
HAVE PEOPLE ANNOYED YOU BY CRITICIZING YOUR DRINKING: NO
SUBSTANCE_ABUSE: 1
TOTAL SCORE: 0
ON A TYPICAL DAY WHEN YOU DRINK ALCOHOL HOW MANY DRINKS DO YOU HAVE: 1
DO YOU DRINK ALCOHOL: YES
HOW MANY TIMES IN THE PAST YEAR HAVE YOU HAD 5 OR MORE DRINKS IN A DAY: 30
HOW MANY TIMES IN THE PAST YEAR HAVE YOU HAD 5 OR MORE DRINKS IN A DAY: 30
AVERAGE NUMBER OF DAYS PER WEEK YOU HAVE A DRINK CONTAINING ALCOHOL: 7
TOTAL SCORE: 0
CONSUMPTION TOTAL: POSITIVE
EVER HAD A DRINK FIRST THING IN THE MORNING TO STEADY YOUR NERVES TO GET RID OF A HANGOVER: NO
EVER_SMOKED: YES
AVERAGE NUMBER OF DAYS PER WEEK YOU HAVE A DRINK CONTAINING ALCOHOL: 7
EVER FELT BAD OR GUILTY ABOUT YOUR DRINKING: NO
HAVE YOU EVER FELT YOU SHOULD CUT DOWN ON YOUR DRINKING: NO
TOTAL SCORE: 0
HAVE PEOPLE ANNOYED YOU BY CRITICIZING YOUR DRINKING: NO
EVER FELT BAD OR GUILTY ABOUT YOUR DRINKING: NO
HAVE YOU EVER FELT YOU SHOULD CUT DOWN ON YOUR DRINKING: NO
TOTAL SCORE: 0
EVER HAD A DRINK FIRST THING IN THE MORNING TO STEADY YOUR NERVES TO GET RID OF A HANGOVER: NO
ON A TYPICAL DAY WHEN YOU DRINK ALCOHOL HOW MANY DRINKS DO YOU HAVE: 1
ALCOHOL_USE: YES
CONSUMPTION TOTAL: POSITIVE

## 2018-02-07 ASSESSMENT — PATIENT HEALTH QUESTIONNAIRE - PHQ9
1. LITTLE INTEREST OR PLEASURE IN DOING THINGS: NOT AT ALL
SUM OF ALL RESPONSES TO PHQ QUESTIONS 1-9: 0
SUM OF ALL RESPONSES TO PHQ9 QUESTIONS 1 AND 2: 0
1. LITTLE INTEREST OR PLEASURE IN DOING THINGS: NOT AT ALL
SUM OF ALL RESPONSES TO PHQ QUESTIONS 1-9: 0
SUM OF ALL RESPONSES TO PHQ9 QUESTIONS 1 AND 2: 0

## 2018-02-07 ASSESSMENT — PAIN SCALES - GENERAL: PAINLEVEL_OUTOF10: 3

## 2018-02-08 ENCOUNTER — APPOINTMENT (OUTPATIENT)
Dept: RADIOLOGY | Facility: MEDICAL CENTER | Age: 58
DRG: 368 | End: 2018-02-08
Attending: HOSPITALIST
Payer: MEDICAID

## 2018-02-08 PROBLEM — R00.0 TACHYCARDIA: Status: ACTIVE | Noted: 2018-02-08

## 2018-02-08 PROBLEM — D62 ACUTE BLOOD LOSS ANEMIA: Status: ACTIVE | Noted: 2018-02-08

## 2018-02-08 LAB
ALBUMIN SERPL BCP-MCNC: 2.6 G/DL (ref 3.2–4.9)
ALBUMIN/GLOB SERPL: 0.9 G/DL
ALP SERPL-CCNC: 65 U/L (ref 30–99)
ALT SERPL-CCNC: 731 U/L (ref 2–50)
AMPHET UR QL SCN: POSITIVE
ANION GAP SERPL CALC-SCNC: 4 MMOL/L (ref 0–11.9)
AST SERPL-CCNC: 1396 U/L (ref 12–45)
BARBITURATES UR QL SCN: NEGATIVE
BASOPHILS # BLD AUTO: 0.8 % (ref 0–1.8)
BASOPHILS # BLD: 0.1 K/UL (ref 0–0.12)
BENZODIAZ UR QL SCN: NEGATIVE
BILIRUB SERPL-MCNC: 0.4 MG/DL (ref 0.1–1.5)
BUN SERPL-MCNC: 42 MG/DL (ref 8–22)
BZE UR QL SCN: NEGATIVE
CALCIUM SERPL-MCNC: 8.4 MG/DL (ref 8.5–10.5)
CANNABINOIDS UR QL SCN: NEGATIVE
CFT BLD TEG: 4.2 MIN (ref 5–10)
CHLORIDE SERPL-SCNC: 113 MMOL/L (ref 96–112)
CLOT ANGLE BLD TEG: 64.9 DEGREES (ref 53–72)
CLOT LYSIS 30M P MA LENFR BLD TEG: 0.2 % (ref 0–8)
CO2 SERPL-SCNC: 27 MMOL/L (ref 20–33)
CREAT SERPL-MCNC: 0.74 MG/DL (ref 0.5–1.4)
CT.EXTRINSIC BLD ROTEM: 1.9 MIN (ref 1–3)
EKG IMPRESSION: NORMAL
EOSINOPHIL # BLD AUTO: 0.2 K/UL (ref 0–0.51)
EOSINOPHIL NFR BLD: 1.5 % (ref 0–6.9)
ERYTHROCYTE [DISTWIDTH] IN BLOOD BY AUTOMATED COUNT: 43.5 FL (ref 35.9–50)
GLOBULIN SER CALC-MCNC: 2.8 G/DL (ref 1.9–3.5)
GLUCOSE BLD-MCNC: 303 MG/DL (ref 65–99)
GLUCOSE BLD-MCNC: 310 MG/DL (ref 65–99)
GLUCOSE BLD-MCNC: 329 MG/DL (ref 65–99)
GLUCOSE BLD-MCNC: 331 MG/DL (ref 65–99)
GLUCOSE BLD-MCNC: 334 MG/DL (ref 65–99)
GLUCOSE BLD-MCNC: 365 MG/DL (ref 65–99)
GLUCOSE SERPL-MCNC: 309 MG/DL (ref 65–99)
HCT VFR BLD AUTO: 24.9 % (ref 37–47)
HCT VFR BLD AUTO: 26.3 % (ref 37–47)
HGB BLD-MCNC: 6.3 G/DL (ref 12–16)
HGB BLD-MCNC: 7.3 G/DL (ref 12–16)
HGB BLD-MCNC: 7.7 G/DL (ref 12–16)
IMM GRANULOCYTES # BLD AUTO: 0.06 K/UL (ref 0–0.11)
IMM GRANULOCYTES NFR BLD AUTO: 0.5 % (ref 0–0.9)
INR PPP: 2.03 (ref 0.87–1.13)
LYMPHOCYTES # BLD AUTO: 1.56 K/UL (ref 1–4.8)
LYMPHOCYTES NFR BLD: 12.1 % (ref 22–41)
MCF BLD TEG: 61.3 MM (ref 50–70)
MCH RBC QN AUTO: 21.3 PG (ref 27–33)
MCHC RBC AUTO-ENTMCNC: 29.3 G/DL (ref 33.6–35)
MCV RBC AUTO: 72.8 FL (ref 81.4–97.8)
METHADONE UR QL SCN: NEGATIVE
MONOCYTES # BLD AUTO: 1.15 K/UL (ref 0–0.85)
MONOCYTES NFR BLD AUTO: 8.9 % (ref 0–13.4)
NEUTROPHILS # BLD AUTO: 9.84 K/UL (ref 2–7.15)
NEUTROPHILS NFR BLD: 76.2 % (ref 44–72)
NRBC # BLD AUTO: 0 K/UL
NRBC BLD-RTO: 0 /100 WBC
OPIATES UR QL SCN: NEGATIVE
OXYCODONE UR QL SCN: NEGATIVE
PA AA BLD-ACNC: 39 %
PA ADP BLD-ACNC: 62.4 %
PCP UR QL SCN: NEGATIVE
PLATELET # BLD AUTO: 126 K/UL (ref 164–446)
PMV BLD AUTO: 11.7 FL (ref 9–12.9)
POTASSIUM SERPL-SCNC: 4.4 MMOL/L (ref 3.6–5.5)
PROPOXYPH UR QL SCN: NEGATIVE
PROT SERPL-MCNC: 5.4 G/DL (ref 6–8.2)
PROTHROMBIN TIME: 22.6 SEC (ref 12–14.6)
RBC # BLD AUTO: 3.42 M/UL (ref 4.2–5.4)
SODIUM SERPL-SCNC: 144 MMOL/L (ref 135–145)
TEG ALGORITHM TGALG: ABNORMAL
WBC # BLD AUTO: 12.9 K/UL (ref 4.8–10.8)

## 2018-02-08 PROCEDURE — 85610 PROTHROMBIN TIME: CPT

## 2018-02-08 PROCEDURE — 93005 ELECTROCARDIOGRAM TRACING: CPT | Performed by: HOSPITALIST

## 2018-02-08 PROCEDURE — 770022 HCHG ROOM/CARE - ICU (200)

## 2018-02-08 PROCEDURE — 93010 ELECTROCARDIOGRAM REPORT: CPT | Performed by: INTERNAL MEDICINE

## 2018-02-08 PROCEDURE — C9113 INJ PANTOPRAZOLE SODIUM, VIA: HCPCS | Performed by: HOSPITALIST

## 2018-02-08 PROCEDURE — 86923 COMPATIBILITY TEST ELECTRIC: CPT

## 2018-02-08 PROCEDURE — 02H633Z INSERTION OF INFUSION DEVICE INTO RIGHT ATRIUM, PERCUTANEOUS APPROACH: ICD-10-PCS | Performed by: HOSPITALIST

## 2018-02-08 PROCEDURE — 160009 HCHG ANES TIME/MIN: Performed by: INTERNAL MEDICINE

## 2018-02-08 PROCEDURE — 85014 HEMATOCRIT: CPT

## 2018-02-08 PROCEDURE — 700111 HCHG RX REV CODE 636 W/ 250 OVERRIDE (IP)

## 2018-02-08 PROCEDURE — 85025 COMPLETE CBC W/AUTO DIFF WBC: CPT

## 2018-02-08 PROCEDURE — 80307 DRUG TEST PRSMV CHEM ANLYZR: CPT

## 2018-02-08 PROCEDURE — 160203 HCHG ENDO MINUTES - 1ST 30 MINS LEVEL 4: Performed by: INTERNAL MEDICINE

## 2018-02-08 PROCEDURE — 82962 GLUCOSE BLOOD TEST: CPT | Mod: 91

## 2018-02-08 PROCEDURE — 85576 BLOOD PLATELET AGGREGATION: CPT

## 2018-02-08 PROCEDURE — 160048 HCHG OR STATISTICAL LEVEL 1-5: Performed by: INTERNAL MEDICINE

## 2018-02-08 PROCEDURE — 700105 HCHG RX REV CODE 258: Performed by: HOSPITALIST

## 2018-02-08 PROCEDURE — 85018 HEMOGLOBIN: CPT | Mod: 91

## 2018-02-08 PROCEDURE — 71045 X-RAY EXAM CHEST 1 VIEW: CPT

## 2018-02-08 PROCEDURE — 500066 HCHG BITE BLOCK, ECT: Performed by: INTERNAL MEDICINE

## 2018-02-08 PROCEDURE — 36556 INSERT NON-TUNNEL CV CATH: CPT

## 2018-02-08 PROCEDURE — 80053 COMPREHEN METABOLIC PANEL: CPT

## 2018-02-08 PROCEDURE — C1751 CATH, INF, PER/CENT/MIDLINE: HCPCS

## 2018-02-08 PROCEDURE — 85347 COAGULATION TIME ACTIVATED: CPT

## 2018-02-08 PROCEDURE — 700105 HCHG RX REV CODE 258

## 2018-02-08 PROCEDURE — 85384 FIBRINOGEN ACTIVITY: CPT

## 2018-02-08 PROCEDURE — 99291 CRITICAL CARE FIRST HOUR: CPT | Mod: 25 | Performed by: HOSPITALIST

## 2018-02-08 PROCEDURE — 30243N1 TRANSFUSION OF NONAUTOLOGOUS RED BLOOD CELLS INTO CENTRAL VEIN, PERCUTANEOUS APPROACH: ICD-10-PCS | Performed by: HOSPITALIST

## 2018-02-08 PROCEDURE — 160035 HCHG PACU - 1ST 60 MINS PHASE I: Performed by: INTERNAL MEDICINE

## 2018-02-08 PROCEDURE — 160036 HCHG PACU - EA ADDL 30 MINS PHASE I: Performed by: INTERNAL MEDICINE

## 2018-02-08 PROCEDURE — 700111 HCHG RX REV CODE 636 W/ 250 OVERRIDE (IP): Performed by: HOSPITALIST

## 2018-02-08 PROCEDURE — P9016 RBC LEUKOCYTES REDUCED: HCPCS

## 2018-02-08 PROCEDURE — 0W3P8ZZ CONTROL BLEEDING IN GASTROINTESTINAL TRACT, VIA NATURAL OR ARTIFICIAL OPENING ENDOSCOPIC: ICD-10-PCS | Performed by: INTERNAL MEDICINE

## 2018-02-08 PROCEDURE — 36556 INSERT NON-TUNNEL CV CATH: CPT | Performed by: HOSPITALIST

## 2018-02-08 PROCEDURE — 160002 HCHG RECOVERY MINUTES (STAT): Performed by: INTERNAL MEDICINE

## 2018-02-08 PROCEDURE — 36430 TRANSFUSION BLD/BLD COMPNT: CPT

## 2018-02-08 RX ORDER — SODIUM CHLORIDE 9 MG/ML
INJECTION, SOLUTION INTRAVENOUS
Status: COMPLETED
Start: 2018-02-08 | End: 2018-02-08

## 2018-02-08 RX ADMIN — INSULIN HUMAN 4 UNITS: 100 INJECTION, SOLUTION PARENTERAL at 00:50

## 2018-02-08 RX ADMIN — OCTREOTIDE ACETATE 50 MCG/HR: 200 INJECTION, SOLUTION INTRAVENOUS; SUBCUTANEOUS at 15:52

## 2018-02-08 RX ADMIN — SODIUM CHLORIDE: 9 INJECTION, SOLUTION INTRAVENOUS at 08:16

## 2018-02-08 RX ADMIN — INSULIN HUMAN 4 UNITS: 100 INJECTION, SOLUTION PARENTERAL at 13:54

## 2018-02-08 RX ADMIN — SODIUM CHLORIDE 8 MG/HR: 9 INJECTION, SOLUTION INTRAVENOUS at 05:21

## 2018-02-08 RX ADMIN — SODIUM CHLORIDE: 9 INJECTION, SOLUTION INTRAVENOUS at 21:06

## 2018-02-08 RX ADMIN — SODIUM CHLORIDE: 9 INJECTION, SOLUTION INTRAVENOUS at 14:01

## 2018-02-08 RX ADMIN — FENTANYL CITRATE 50 MCG: 50 INJECTION, SOLUTION INTRAMUSCULAR; INTRAVENOUS at 15:10

## 2018-02-08 RX ADMIN — INSULIN HUMAN 4 UNITS: 100 INJECTION, SOLUTION PARENTERAL at 05:26

## 2018-02-08 RX ADMIN — INSULIN HUMAN 4 UNITS: 100 INJECTION, SOLUTION PARENTERAL at 18:11

## 2018-02-08 RX ADMIN — INSULIN HUMAN 3 UNITS: 100 INJECTION, SOLUTION PARENTERAL at 23:33

## 2018-02-08 RX ADMIN — SODIUM CHLORIDE 250 ML: 9 INJECTION, SOLUTION INTRAVENOUS at 17:16

## 2018-02-08 RX ADMIN — SODIUM CHLORIDE 8 MG/HR: 9 INJECTION, SOLUTION INTRAVENOUS at 13:22

## 2018-02-08 RX ADMIN — CEFTRIAXONE 2 G: 2 INJECTION, POWDER, FOR SOLUTION INTRAMUSCULAR; INTRAVENOUS at 20:36

## 2018-02-08 ASSESSMENT — LIFESTYLE VARIABLES
CONSUMPTION TOTAL: POSITIVE
HAVE YOU EVER FELT YOU SHOULD CUT DOWN ON YOUR DRINKING: NO
HAVE PEOPLE ANNOYED YOU BY CRITICIZING YOUR DRINKING: NO
DO YOU DRINK ALCOHOL: YES
TOTAL SCORE: 0
EVER HAD A DRINK FIRST THING IN THE MORNING TO STEADY YOUR NERVES TO GET RID OF A HANGOVER: NO
ON A TYPICAL DAY WHEN YOU DRINK ALCOHOL HOW MANY DRINKS DO YOU HAVE: 1
TOTAL SCORE: 0
TOTAL SCORE: 0
HOW MANY TIMES IN THE PAST YEAR HAVE YOU HAD 5 OR MORE DRINKS IN A DAY: 30
AVERAGE NUMBER OF DAYS PER WEEK YOU HAVE A DRINK CONTAINING ALCOHOL: 7
EVER FELT BAD OR GUILTY ABOUT YOUR DRINKING: NO

## 2018-02-08 ASSESSMENT — COPD QUESTIONNAIRES
COPD SCREENING SCORE: 3
DURING THE PAST 4 WEEKS HOW MUCH DID YOU FEEL SHORT OF BREATH: NONE/LITTLE OF THE TIME
DO YOU EVER COUGH UP ANY MUCUS OR PHLEGM?: NO/ONLY WITH OCCASIONAL COLDS OR INFECTIONS
DO YOU EVER COUGH UP ANY MUCUS OR PHLEGM?: NO/ONLY WITH OCCASIONAL COLDS OR INFECTIONS
HAVE YOU SMOKED AT LEAST 100 CIGARETTES IN YOUR ENTIRE LIFE: YES
HAVE YOU SMOKED AT LEAST 100 CIGARETTES IN YOUR ENTIRE LIFE: YES
DURING THE PAST 4 WEEKS HOW MUCH DID YOU FEEL SHORT OF BREATH: NONE/LITTLE OF THE TIME
COPD SCREENING SCORE: 3

## 2018-02-08 ASSESSMENT — PAIN SCALES - GENERAL
PAINLEVEL_OUTOF10: 3
PAINLEVEL_OUTOF10: 0
PAINLEVEL_OUTOF10: 0
PAINLEVEL_OUTOF10: 2
PAINLEVEL_OUTOF10: 0

## 2018-02-08 ASSESSMENT — PATIENT HEALTH QUESTIONNAIRE - PHQ9
SUM OF ALL RESPONSES TO PHQ9 QUESTIONS 1 AND 2: 0
1. LITTLE INTEREST OR PLEASURE IN DOING THINGS: NOT AT ALL
SUM OF ALL RESPONSES TO PHQ QUESTIONS 1-9: 0

## 2018-02-08 NOTE — ASSESSMENT & PLAN NOTE
Patient does alter between AAOx2-AAox3.  Ammonia sitll elevated but improving  Continue on xifaxin and lactulose

## 2018-02-08 NOTE — ASSESSMENT & PLAN NOTE
Multifactorial including reactive, IV drugs, question her compliance with alcohol cessation, he reports her last drink was 2 months ago

## 2018-02-08 NOTE — OR NURSING
1119 Received from OR, report from Dr. Mauricio. Equal breath sound noted.      1220 Report called to Gurjit BOLAÑOS    1222 Transported to [facility] T 629 with all critical care equipment.

## 2018-02-08 NOTE — PROGRESS NOTES
Gave phone report to Briana, pre-op Rn. Per Briana, sandostantin and omeprazole gtts stopped, all PO meds held.  Pt to be transported via bed to pre-op for ENDO procedure.  Consent printed & on chart, pt has not yet signed this.  Transporter and flex RN escort pt to pre-op.

## 2018-02-08 NOTE — OP REPORT
DATE OF SERVICE:  02/08/2018    REFERRING PHYSICIAN:  Blanca Sanon MD    PROCEDURE PERFORMED:  Gastroscopy with banding of esophageal varices.    INDICATION:  A 57-year-old woman with cirrhosis and upper GI bleeding,   admitted last night.    ASA:  Class IV.    SEDATION:  Monitored anesthesia care per Dr. Miramontes.    FINDINGS:  1.  Actively bleeding varix at the gastroesophageal junction.  2.  Distal esophageal varices banded x6.  3.  No obvious gastric varices.  4.  Stomach full of clots.  5.  Normal duodenum.    DESCRIPTION OF PROCEDURE:  After informed consent and a time-out, she was   given IV propofol sedation.  Once she was quiet, the Olympus flexible video   gastroscope was passed gently into the esophagus.  The proximal and mid   esophagus were normal.  In the distal esophagus, there were some varices and   as the GE junction was distended with air, there was an actively bleeding   varix at the GE junction.  The stomach was entered, insufflated with air.  The   stomach was completely full of blood and clots, which could not be suctioned   away.  The scope was passed along the lesser curve into the antrum, which   contained some old blood, but was normal.  The pylorus was patent.  Duodenal   bulb and second portion were normal.  Retroflex view of the gastric cardia   showed by that time the bleeding at the GE junction, it stopped and there were   no obvious gastric varices.    The scope was then withdrawn from the patient and loaded with a banding   device.  The scope was then advanced again into the esophagus under direct   vision and at the GE junction.  Varix at the area where the bleeding had been   seen previously was banded.  Three more bands were placed at varices at the GE   junction and 2 more bands on varices just proximal to the GE junction for a   total of 6 bands.  At the end of the procedure, there was no active bleeding.    She tolerated the procedure well and went to recovery room in  stable   condition.    IMPRESSION:  1.  Actively bleeding varix at the gastroesophageal junction.  2.  Distal esophageal varices banded x6.    PLAN:  1.  She will need to go to ICU.  2.  Resume octreotide and pantoprazole drips.  3.  Serial H and H.  She will likely need transfusions.  4.  Followup chem panel daily.  5.  She has a guarded prognosis.       ____________________________________     MD MAGDALENE URIAS / NTS    DD:  02/08/2018 11:17:24  DT:  02/08/2018 11:34:53    D#:  [id number]  Job#:  [id number]

## 2018-02-08 NOTE — OR NURSING
1030 pt to preop.  VS monitored.  .  See VS record for the rest. Pt very lethargic.  Wakes up and verbalizes name,  and procedure to be done.  Signs consent but falls asleep mid signature.  Wakes easily and finishes.  Wakes for OR RN and answers questions, but falls asleep again.  Blood sugar 303 at 1030.  Anesthesia and Surgical MD aware and ok.  No new orders.  Port to right chest, power port, IV patent.  To OR in stable condition. 1055

## 2018-02-08 NOTE — OR SURGEON
Immediate Post OP Note    PreOp Diagnosis: UGI bleed, cirrhosis.    PostOp Diagnosis:   1. Actively bleeding varix at GE junction.  2. Distal esophageal varices banded x 6.   3. No obvious gastric varices.  4. Stomach full of clot  5. NL duodenum    Procedure(s):  GASTROSCOPY WITH BANDING    Surgeon(s):  Davonte Mauricio M.D.    Anesthesiologist/Type of Anesthesia:  Anesthesiologist: Lesia Miramontes M.D./MAC    Surgical Staff:  Circulator: Zarina Alvarado R.N.  Endoscopy Technician: Radha Bahena    Specimens: none    Estimated Blood Loss: 20cc    Findings: see above    Complications: None immediate.  Plan:  1. ICU  2. Resume octreotide and pantoprazole drips.   3. Serial H/H. Will likely need transfusions.  4. Follow CMP daily.  5. Guarded prognosis.  6. Clear liquids ok.         2/8/2018 11:10 AM Davonte Mauricio

## 2018-02-08 NOTE — ASSESSMENT & PLAN NOTE
Liver cirrhosis 2/2 alcohol, she states last drink was about 2 months ago  Has had paracentesis in the past, has recurrent ascites  Paracentesis in AM (therapeutic)

## 2018-02-08 NOTE — PROGRESS NOTES
2 RN skin check completed with Cinthya BOLAÑOS . Skin intact, generalized bruising noted, and small scabs noted to posterior legs.

## 2018-02-08 NOTE — ASSESSMENT & PLAN NOTE
2/2 esophageal varices with active bleeding as per EGD/ Dr. Mauricio 2/8/18  History of esophageal varices 2/2 alcoholic cirrhosis of the liver  H/O of multiple variceal banding and ligations in the past by Dr. Dent   2nd look EGD on 2/12 revealed significant esophageal lesions, but no active bleeding  Continue BID PPI and carafate

## 2018-02-08 NOTE — ED NOTES
Assist RN note: Peripheral IV attempt x1, unsuccessful. Pt refusing further sticks. But agreeable to port access. Port accessed using sterile technique. Good blood return. Blood to lab.

## 2018-02-08 NOTE — PROGRESS NOTES
Received bedside report from night shift nurse.  Pt a/o x 4, no complaints.  Bed is in low position, wheels are locked, call light at pt's side.

## 2018-02-08 NOTE — PROGRESS NOTES
Contacted pharmacy to discuss drug compatibility and patient having only one line  Pharmacy stated it was all compatible. meds updated at this time. See MAR

## 2018-02-08 NOTE — PROGRESS NOTES
I ordered an octreotide and protonix infusion refills at approx. 1228 hrs. Pt arrived from PACU with iv fluids only infusing. Notified the Charge nurse who spoke with Pharmacy and the mixing room.

## 2018-02-08 NOTE — PROGRESS NOTES
[facility] Hospitalist Progress Note    Date of Service: 2018    Chief Complaint  57 y.o. female admitted 2018 with h/o Hep C, meth abuse, variceal bleeding  Admitted with concerns of upper GIB    Interval Problem Update  Patient seen and examined today.    Patient tolerating treatment and therapies.  All Data, Medication data reviewed.  Case discussed with nursing as available.  Plan of Care reviewed with patient and notified of changes.   Pt with EGD planned for am, epigastric discomfort  Later called to move Pt to ICU post procedure with a stomach full of blood and active bleeding  Multiple banding performed    Consultants/Specialty  GI    Disposition  ICU        Review of Systems   Unable to perform ROS: Medical condition      Physical Exam  Laboratory/Imaging   Hemodynamics  Temp (24hrs), Av.8 °C (98.3 °F), Min:36.3 °C (97.3 °F), Max:37.3 °C (99.1 °F)   Temperature: 36.9 °C (98.4 °F)  Pulse  Av.5  Min: 115  Max: 130 Heart Rate (Monitored): (!) 130  Blood Pressure: (!) 94/57, NIBP: (!) 94/57      Respiratory      Respiration: 16, Pulse Oximetry: 100 %, O2 Daily Delivery Respiratory : Room Air with O2 Available     Work Of Breathing / Effort: Mild  RUL Breath Sounds: Clear, RML Breath Sounds: Clear, RLL Breath Sounds: Diminished, DARNELL Breath Sounds: Clear, LLL Breath Sounds: Diminished    Fluids    Intake/Output Summary (Last 24 hours) at 18 1521  Last data filed at 18 0400   Gross per 24 hour   Intake                0 ml   Output              850 ml   Net             -850 ml       Nutrition  Orders Placed This Encounter   Procedures   • DIET NPO     Standing Status:   Standing     Number of Occurrences:   1     Order Specific Question:   Restrict to:     Answer:   Ice Chips [2]     Physical Exam   Constitutional: She is oriented to person, place, and time. She appears well-developed and well-nourished. She appears lethargic. Nasal cannula in place.   HENT:   Head: Normocephalic and  atraumatic.   Nose: Nose normal.   Mouth/Throat: Oropharynx is clear and moist.   Eyes: Conjunctivae and EOM are normal. Pupils are equal, round, and reactive to light.   Neck: Normal range of motion. Neck supple. No JVD present. No thyromegaly present.   Cardiovascular: Normal rate, regular rhythm and normal heart sounds.  Exam reveals no gallop and no friction rub.    Pulmonary/Chest: Effort normal and breath sounds normal. She has no wheezes. She has no rales.   Abdominal: Soft. Bowel sounds are normal. She exhibits fluid wave. She exhibits no distension and no mass. There is tenderness. There is no rebound and no guarding.   Musculoskeletal: Normal range of motion. She exhibits no edema or tenderness.   Lymphadenopathy:     She has no cervical adenopathy.   Neurological: She is oriented to person, place, and time. She appears lethargic. No cranial nerve deficit.   Skin: Skin is warm and dry. She is not diaphoretic. There is pallor.   Psychiatric: She has a normal mood and affect. Her behavior is normal.   Nursing note and vitals reviewed.      Recent Labs      02/07/18   1740  02/08/18   0420  02/08/18   1340   WBC  16.7*  12.9*   --    RBC  3.91*  3.42*   --    HEMOGLOBIN  7.9*  7.3*  6.3*   HEMATOCRIT  28.7*  24.9*   --    MCV  73.4*  72.8*   --    MCH  20.2*  21.3*   --    MCHC  27.5*  29.3*   --    RDW  44.4  43.5   --    PLATELETCT  180  126*   --    MPV  11.1  11.7   --      Recent Labs      02/07/18   1804  02/08/18   0420   SODIUM  139  144   POTASSIUM  5.2  4.4   CHLORIDE  103  113*   CO2  23  27   GLUCOSE  414*  309*   BUN  35*  42*   CREATININE  0.92  0.74   CALCIUM  8.3*  8.4*     Recent Labs      02/07/18   1740   APTT  35.5   INR  1.68*                  Assessment/Plan     * Acute upper GI bleeding- (present on admission)   Assessment & Plan    2nd to Varices with active bleeding as per EGD/ DR. Mauricio  History of esophageal varices secondary to alcoholic cirrhosis of the liver  Tree of multiple  variceal banding and ligations in the past by Dr. Dent  IV octreotide and IV protonic strip have been started  monitor serial hemoglobins every 6-8 hours, we will transfuse if hemoglobin less than 7 or has a massive hematemesis episode.          Tachycardia   Assessment & Plan    ? Of hypovolemia vs other  Fluids  ICU monitoring        Acute blood loss anemia   Assessment & Plan    Transfuse as per guidelines        Elevated liver enzymes   Assessment & Plan    Multifactorial including reactive, IV drugs, question her compliance with alcohol cessation, he reports her last drink was 2 months ago        Leukocytosis   Assessment & Plan    Suspect reactive due to acute GI bleeding  However we have started the patient on IV ceftriaxone  Blood cultures pending        Hepatic encephalopathy (CMS-HCC)   Assessment & Plan    Patient does alter between AAOx2-AAox3.  Ammonia elevated >100  Suspected component of her encephalopathy is also due to drugs  We have started the patient on lactulose and rifaximin        Methamphetamine abuse- (present on admission)   Assessment & Plan    Patient reports last using methamphetamine via intravenous route 2 days ago  Wrongly recommended cessation        History of cirrhosis of liver- (present on admission)   Assessment & Plan    Liver cirrhosis secondary to alcohol,   Patient said her last drink was about 2 months ago        Insulin dependent diabetes mellitus (CMS-HCC)- (present on admission)   Assessment & Plan    Uncontrolled  Continue Insulin-sliding scale, accu-checks and hypoglycemia protocol.            History of hepatitis C- (present on admission)   Assessment & Plan    As a result of IV drug abuse patient still continues to use IV methamphetamine        Hematochezia- (present on admission)   Assessment & Plan    As a result of upper GI bleeding        Hematemesis- (present on admission)   Assessment & Plan    As above            Plan  To ICU  Octreotide and PPI  gtt  Central line needed  D/w CC hospitalist  F/u h/h closely  tranfuse for HB <7  GI f/u see orders  Pt is critically ill in ICU  Time spent 35 min, no overlap  Quality-Core Measures

## 2018-02-08 NOTE — ASSESSMENT & PLAN NOTE
Needed transfusion of 1U on 2/10, etiology is due to variceal GI bleeding  Hgb stable at 8.0--8.6  Transfuse as per guidelines <7 or symptoms

## 2018-02-08 NOTE — H&P
Hospital Medicine History and Physical    Date of Service  2/7/2018    Chief Complaint  Chief Complaint   Patient presents with   • Lower GI Bleed     biba for dark tarry stool and bright red emesis. per EMS patients mother states she recently had banding done. pt currently a&ox2       History of Presenting Illness  57 y.o. female with a past medical history of hepatitis C, insulin-dependent diabetes mellitus, alcoholic cirrhosis of the liver, history of multiple previous histories of hematemesis and variceal band ligations, presented 2/7/2018 for an evaluation of hematemesis and hematochezia. Patient reports having hematemesis which started about 2 days ago described that his bright red in addition to having dark tarry stools followed by these episodes. Her last episode of hematemesis was about 1 hour prior to her ER presentation. Patient says that she is currently a methamphetamine user and last injected about 2 days ago. [place] and feeling nauseous denies having any fevers or chills denies having any abdominal pains at this time. At this present time her hemoglobin is noted to be 7.9. We have placed the patient on IV Protonix drip as well as IV octreotide. She consulted Dr.Doyle BARBER for possible endoscopy. At this point patient is clinically stable and appropriate for the telemetry unit.                 Primary Care Physician  Daniella Martinez PKATIA.    Consultants  Dr.Doyle BARBER    Code Status  Code: Full code    Review of Systems  Review of Systems   Constitutional: Positive for malaise/fatigue. Negative for chills and fever.   HENT: Negative for congestion, hearing loss, sore throat and tinnitus.    Eyes: Negative for blurred vision, double vision, photophobia and pain.   Respiratory: Negative for cough, hemoptysis, sputum production, shortness of breath and stridor.    Cardiovascular: Negative for chest pain, palpitations, orthopnea, claudication and PND.   Gastrointestinal: Positive for blood in stool, nausea  and vomiting. Negative for constipation, heartburn and melena.   Genitourinary: Negative for dysuria, frequency and urgency.   Musculoskeletal: Negative for back pain, myalgias and neck pain.   Neurological: Positive for weakness. Negative for dizziness, tingling, tremors, sensory change, speech change and headaches.   Psychiatric/Behavioral: Positive for substance abuse. Negative for depression, memory loss and suicidal ideas. The patient is not nervous/anxious.           Past Medical History  Past Medical History:   Diagnosis Date   • Depression 6/22/2016   • Pain 12-19-14    generalized; 4/10   • Stroke (CMS-[facility]) 2011   • Anemia    • Anesthesia     pt's mother has hard time waking up = states it took a week or two   • Arthritis     generalized   • Breath shortness    • Cirrhosis (CMS-[facility])    • Dental disorder     dentures   • Depression    • Diabetes     insulin   • Fibromyalgia    • Hepatitis C    • Hiatus hernia syndrome    • Hypertension    • Indigestion    • Obesity    • Other specified disorder of intestines     constipation/diarrhea   • Pneumonia    • Psychiatric problem     depression and anxiety   • Restless leg syndrome    • Sleep apnea     has had sleep study, no cpap   • Snoring    • Unspecified hemorrhagic conditions     reports nose bleeds   • Urinary bladder disorder     frequency/urgency       Surgical History  Past Surgical History:   Procedure Laterality Date   • GASTROSCOPY N/A 8/21/2017    Procedure: GASTROSCOPY WITH BANDING  ;  Surgeon: Anthony Dent M.D.;  Location: SURGERY SAME DAY [facility];  Service:    • CATH PLACEMENT  12/22/2014    Performed by Helga Santiago M.D. at SURGERY SAME DAY [facility]   • CATH REMOVAL  11/14/2014    Performed by Helga Santiago M.D. at SURGERY [facility]   • GASTROSCOPY WITH BANDING  7/8/2014    Performed by Davonte Mauricio M.D. at ENDOSCOPY [facility]   • GASTROSCOPY WITH CLIPPING  7/6/2014    Performed by Joshua Vigil M.D. at  ENDOSCOPY [facility]   • ANTERIOR AND POSTERIOR REPAIR  2013    Performed by Isaías Lambert M.D. at SURGERY SAME DAY [facility]S   • ENTEROCELE REPAIR  2013    Performed by Isaías Lambert M.D. at SURGERY SAME DAY [facility] ORS   • BLADDER SLING FEMALE  2013    Performed by Isaías Lambert M.D. at SURGERY SAME DAY [facility] ORS   • GYN SURGERY      hysterectomy   • CARPAL TUNNEL RELEASE      right hand   • SHOULDER SURGERY      left shoulder   • OTHER      port placement   • CATARACT EXTRACTION WITH IOL     • GYN SURGERY      tubal ligation   • OTHER      oral surgery   • TONSILLECTOMY         Medications  No current facility-administered medications on file prior to encounter.      Current Outpatient Prescriptions on File Prior to Encounter   Medication Sig Dispense Refill   • gabapentin (NEURONTIN) 300 MG Cap Take 300 mg by mouth 2 Times a Day.     • furosemide (LASIX) 20 MG Tab Take 1 Tab by mouth every day. 60 Tab 0   • spironolactone (ALDACTONE) 50 MG Tab Take 1 Tab by mouth every day. 30 Tab 3   • enalapril (VASOTEC) 20 MG tablet Take 1 Tab by mouth every day. 30 Tab 11       Family History  Family History   Problem Relation Age of Onset   • Diabetes Mother    • Hypertension Mother        Social History  Social History   Substance Use Topics   • Smoking status: Current Every Day Smoker     Packs/day: 1.00     Years: 42.00     Types: Cigarettes   • Smokeless tobacco: Never Used   • Alcohol use 0.0 - 3.6 oz/week      Comment: drinks 0-6 during the week and drinks all weekend       Allergies  Allergies   Allergen Reactions   • Nkda [No Known Drug Allergy]         Physical Exam  Laboratory   Hemodynamics  Temp (24hrs), Av.9 °C (98.4 °F), Min:36.9 °C (98.4 °F), Max:36.9 °C (98.4 °F)   Temperature: 36.9 °C (98.4 °F)  Pulse  Av  Min: 115  Max: 128 Heart Rate (Monitored): (!) 128  Blood Pressure: 110/65, NIBP: 134/66      Respiratory      Respiration: 20, Pulse  Oximetry: 99 %, O2 Daily Delivery Respiratory : Room Air with O2 Available     Work Of Breathing / Effort: Mild  RUL Breath Sounds: Clear, RML Breath Sounds: Clear, RLL Breath Sounds: Diminished, DARNELL Breath Sounds: Clear, LLL Breath Sounds: Diminished    Physical Exam   Constitutional: She is oriented to person, place, and time. She appears distressed.   Ill-appearing   HENT:   Head: Normocephalic and atraumatic.   Mouth/Throat: No oropharyngeal exudate.   Eyes: Conjunctivae are normal. Pupils are equal, round, and reactive to light. Right eye exhibits no discharge. No scleral icterus.   Neck: Neck supple. No JVD present. No thyromegaly present.   Cardiovascular: Normal rate and intact distal pulses.    No murmur heard.  Pulmonary/Chest: Effort normal and breath sounds normal. No stridor. No respiratory distress. She has no wheezes. She has no rales.   Abdominal: Soft. Bowel sounds are normal. She exhibits no distension. There is no tenderness. There is no rebound.   Musculoskeletal: Normal range of motion. She exhibits no edema.   Neurological: She is alert and oriented to person, place, and time.   Skin: Skin is warm. She is not diaphoretic. No erythema.         Assessment/Plan  * Acute upper GI bleeding- (present on admission)   Assessment & Plan    History of esophageal varices secondary to alcoholic cirrhosis of the liver  Tree of multiple variceal banding and ligations in the past by Dr. Dent  Currently not having any further hematemesis episodes, but is having melena.  IV octreotide and IV protonic strip have been started  We will monitor serial hemoglobins every 6-8 hours, we will transfuse if hemoglobin less than 7 or has a massive hematemesis episode.  Dr. Fam BARBER has been consulted for possible endoscopy        Elevated liver enzymes   Assessment & Plan    Multifactorial including reactive, IV drugs, question her compliance with alcohol cessation, he reports her last drink was 2 months ago         Leukocytosis   Assessment & Plan    Suspect reactive due to acute GI bleeding  However we have started the patient on IV ceftriaxone  Blood cultures pending        Hepatic encephalopathy (CMS-HCC)   Assessment & Plan    Patient does alter between AAOx2-AAox3.  Ammonia elevated >100  Suspected component of her encephalopathy is also due to drugs  We have started the patient on lactulose and rifaximin        Methamphetamine abuse- (present on admission)   Assessment & Plan    Patient reports last using methamphetamine via intravenous route 2 days ago  Wrongly recommended cessation        History of cirrhosis of liver- (present on admission)   Assessment & Plan    Liver cirrhosis secondary to alcohol,   Patient said her last drink was about 2 months ago        Insulin dependent diabetes mellitus (CMS-[facility])- (present on admission)   Assessment & Plan    Uncontrolled  Continue Insulin-sliding scale, accu-checks and hypoglycemia protocol.            History of hepatitis C- (present on admission)   Assessment & Plan    As a result of IV drug abuse patient still continues to use IV methamphetamine        Hematochezia- (present on admission)   Assessment & Plan    As a result of upper GI bleeding        Hematemesis- (present on admission)   Assessment & Plan    As above            I anticipate this patient will require at least two midnights for appropriate medical management, necessitating inpatient admission.    Prophylaxis: SCDs    Recent Labs      02/07/18   1740   WBC  16.7*   RBC  3.91*   HEMOGLOBIN  7.9*   HEMATOCRIT  28.7*   MCV  73.4*   MCH  20.2*   MCHC  27.5*   RDW  44.4   PLATELETCT  180   MPV  11.1     Recent Labs      02/07/18   1804   SODIUM  139   POTASSIUM  5.2   CHLORIDE  103   CO2  23   GLUCOSE  414*   BUN  35*   CREATININE  0.92   CALCIUM  8.3*     Recent Labs      02/07/18   1740  02/07/18   1804   ALTSGPT   --   112*   ASTSGOT   --   163*   ALKPHOSPHAT   --   70   TBILIRUBIN   --   1.1   LIPASE  30    --    GLUCOSE   --   414*     Recent Labs      02/07/18   1740   APTT  35.5   INR  1.68*             Lab Results   Component Value Date    TROPONINI <0.01 12/31/2014       Imaging  DX-CHEST-PORTABLE (1 VIEW)   Final Result      No acute cardiac or pulmonary abnormality is noted.

## 2018-02-08 NOTE — ED TRIAGE NOTES
Chief Complaint   Patient presents with   • Lower GI Bleed     biba for dark tarry stool and bright red emesis. per EMS patients mother states she recently had banding done. pt currently a&ox2

## 2018-02-08 NOTE — ASSESSMENT & PLAN NOTE
Likely reactive 2/2 acute GI bleeding  Covered with C3, cx pending? Don't see any collected  Resolving

## 2018-02-08 NOTE — ED TRIAGE NOTES
Chief Complaint   Patient presents with   • Lower GI Bleed     biba for dark tarry stool and bright red emesis. per EMS patients mother states she recently had banding done. pt currently a&ox2     Recent meth use 2 days ago. Pt denies alcohol use.

## 2018-02-08 NOTE — CONSULTS
DATE OF SERVICE:  02/07/2018    REFERRING PHYSICIAN:  Daniel Deal DO    CHIEF COMPLAINT:  Hematemesis and melena.    HISTORY OF PRESENT ILLNESS:  This patient is a 57-year-old white female with a   prior history of esophageal variceal bleed and has been banded on multiple   occasions.  She noted the onset 2 days ago of hematemesis as well as   intermittent episodes of melanotic bowel movements.  She has noted some   bloating abdominal discomfort.  She denies use of nonsteroidals or aspirin.    She has a problem with substance abuse and was injecting methamphetamines 2   days ago.  She does drink alcohol and is known to have hepatitis C induced   cirrhosis.  She has had ascites, for which she has been treated with low doses   of diuretics.  The patient states she has had black tarry bowel movements,   and prior to the bleeding, denies any significant change in her bowel habits.    The patient does have some intermittent episodes of burning substernal chest   pain, not related to exertion that worsens after she eats and can be relieved   with the use of antacids.  She felt dizzy and lightheaded and sought attention   in the emergency room with the bleeding.    ALLERGIES:  No known allergies.    MEDICATIONS:  As an outpatient, she was on:  1.  Neurontin.  2.  Vasotec.  3.  Lasix.  4.  Aldactone.    PAST MEDICAL HISTORY:  1.  Hepatitis C.  2.  History of esophageal variceal bleed with multiple bandings done, the most   recent one was several months ago.  3.  Diabetes mellitus.  4.  Sleep apnea.  5.  Anxiety.  6.  History of ascites.  7.  Fibromyalgia.  8.  Hypertension.  9.  GERD.  10.  Questionable history of CVA.  11.  Alcoholism.  12.  Polysubstance abuse.  13.  Hysterectomy.  14.  Carpal tunnel surgery.  15.  Tonsillectomy.    SOCIAL HISTORY:  The patient does smoke.  She uses methamphetamines and does   drink alcohol.    FAMILY HISTORY:  Noncontributory.    REVIEW OF SYSTEMS:  GENERAL:  She has malaise and  fatigue and other than the GI symptoms mentioned   in the HPI, a 13-point review of systems is negative.    PHYSICAL EXAMINATION:  VITAL SIGNS:  Blood pressure is 110/70, pulse is 122, respiratory rate is 20,   temperature is afebrile.  SKIN:  Occasional spiders on the chest.  HEENT:  Head normocephalic.  Eyes are nonicteric.  NECK:  Supple.  LYMPH NODES:  Negative supraclavicular, cervical, and axillary.  HEART:  Regular rate and rhythm.  LUNGS:  Clear to auscultation and percussion.  ABDOMEN:  Normoactive bowel sounds.  Mildly distended without gross fluid   wave.  She is diffusely tender, but no rebound tenderness.  EXTREMITIES:  Some bruising and mild degenerative changes.  NEUROLOGIC:  She is somewhat slow in performing her speech, otherwise   unremarkable.  PSYCHIATRIC:  Normal.    LABORATORY DATA:  Hematocrit 28.7, white blood cell count 16.7, platelet count   180,000.  Sodium 139, potassium 5.2, creatinine 0.92, BUN is 35, AST is 163,   ALT is 112, alkaline phosphatase is 70, bilirubin 1.1, albumin is 2.8.    Ammonia 103.  INR is 1.68, PTT is 35.5.  Chest x-ray is unremarkable.    IMPRESSIONS AND RECOMMENDATIONS:  1.  Upper gastrointestinal bleed:  This could well be a recurrent variceal   bleed.  The patient needs to be hydrated and stabilized.  She will be placed   on a PPI drip as well as on octreotide.  To sedate this lady, we will require   anesthesiology involvement given her polysubstance abuse and will set up an   upper endoscopy once she is stabilized.  2.  Anemia as a result of an acute gastrointestinal bleed.  3.  Abnormal liver function test:  The pattern does fit alcohol abuse.  We   will follow the trend.  Fortunately, her bilirubin is not elevated.  4.  Encephalopathy:  We will start her on ammonia.  5.  Hepatitis C, a cause of her cirrhosis.  6.  Alcoholism, another cause of her cirrhosis.  7.  Leukocytosis of questionable etiology:  She will be covered with   antibiotics given the fact she  is a cirrhotic with an upper gastrointestinal   bleed.  8.  Coagulopathy, may respond to vitamin K.       ____________________________________     MD DOMINIQUE Paula / JULI    DD:  02/07/2018 22:32:07  DT:  02/07/2018 23:00:41    D#:  [id number]  Job#:  [id number]

## 2018-02-08 NOTE — ED PROVIDER NOTES
ED Provider Note    Scribed for Daniel Deal D.O. by Zeina Lane. 2/7/2018  5:32 PM    Primary care provider: KIM Stringer  Means of arrival: EMS  History obtained from: Patient   History limited by: None     CHIEF COMPLAINT  Chief Complaint   Patient presents with   • Lower GI Bleed     biba for dark tarry stool and bright red emesis. per EMS patients mother states she recently had banding done. pt currently a&ox2     HPI  Demetrice Jaime is a 57 y.o. female who presents to the Emergency Department for evaluation of hematemesis and hematochezia. Patient reports hematemesis and hematochezia episodes began two days ago. She states both bright red emesis and dark tarry stools. Her last episode of hematemesis was approximately 1 hour prior to ED arrival. Patient reports she is a current methamphetamine user and the last time she injected was two days ago. Patient reports neck pain at this time. Denies fever or abdominal pain. Deneis further acute medical complaints. Patient has history of hepatitis C, cirrhosis, and hematemesis and melena episodes.  Dr Dent is her GI specialist.     REVIEW OF SYSTEMS  Pertinent positives include hematemesis, hematochezia, neck pain. Pertinent negatives include no fever or abdominal pain, chest pain, loss of sensation or strength in arms or legs fever.  All other systems reviewed and negative.    PAST MEDICAL HISTORY  Past Medical History:   Diagnosis Date   • Anemia    • Anesthesia     pt's mother has hard time waking up = states it took a week or two   • Arthritis     generalized   • Breath shortness    • Cirrhosis (CMS-HCC)    • Dental disorder     dentures   • Depression    • Depression 6/22/2016   • Diabetes     insulin   • Fibromyalgia    • Hepatitis C    • Hiatus hernia syndrome    • Hypertension    • Indigestion    • Obesity    • Other specified disorder of intestines     constipation/diarrhea   • Pain 12-19-14    generalized; 4/10   •  Pneumonia    • Psychiatric problem     depression and anxiety   • Restless leg syndrome    • Sleep apnea     has had sleep study, no cpap   • Snoring    • Stroke (CMS-HCC) 2011   • Unspecified hemorrhagic conditions     reports nose bleeds   • Urinary bladder disorder     frequency/urgency       SURGICAL HISTORY  Past Surgical History:   Procedure Laterality Date   • GASTROSCOPY N/A 8/21/2017    Procedure: GASTROSCOPY WITH BANDING  ;  Surgeon: Anthony Dent M.D.;  Location: SURGERY SAME DAY [facility];  Service:    • CATH PLACEMENT  12/22/2014    Performed by Helga Santiago M.D. at SURGERY SAME DAY [facility]   • CATH REMOVAL  11/14/2014    Performed by Helga Santiago M.D. at SURGERY [facility]   • GASTROSCOPY WITH BANDING  7/8/2014    Performed by Davonte Mauricio M.D. at ENDOSCOPY [facility]   • GASTROSCOPY WITH CLIPPING  7/6/2014    Performed by Joshua Vigil M.D. at ENDOSCOPY [facility]   • ANTERIOR AND POSTERIOR REPAIR  12/6/2013    Performed by Isaías Lambert M.D. at SURGERY SAME DAY [facility]   • ENTEROCELE REPAIR  12/6/2013    Performed by Isaías Lambert M.D. at SURGERY SAME DAY [facility]   • BLADDER SLING FEMALE  12/6/2013    Performed by Isaías Lambert M.D. at SURGERY SAME DAY [facility]   • GYN SURGERY  2010    hysterectomy   • CARPAL TUNNEL RELEASE  2010    right hand   • SHOULDER SURGERY  2010    left shoulder   • OTHER  2010    port placement   • CATARACT EXTRACTION WITH IOL     • GYN SURGERY      tubal ligation   • OTHER      oral surgery   • TONSILLECTOMY        SOCIAL HISTORY  Social History   Substance Use Topics   • Smoking status: Current Every Day Smoker     Packs/day: 1.00     Years: 42.00     Types: Cigarettes   • Smokeless tobacco: Never Used   • Alcohol use 0.0 - 3.6 oz/week      Comment: drinks 0-6 during the week and drinks all weekend      History   Drug Use     Comment: Hx IV meth use, states she used 12/18/17 for first time in yrs   "    FAMILY HISTORY  Family History   Problem Relation Age of Onset   • Diabetes Mother    • Hypertension Mother      CURRENT MEDICATIONS  No current facility-administered medications on file prior to encounter.      Current Outpatient Prescriptions on File Prior to Encounter   Medication Sig Dispense Refill   • gabapentin (NEURONTIN) 300 MG Cap Take 300 mg by mouth 2 Times a Day.     • furosemide (LASIX) 20 MG Tab Take 1 Tab by mouth every day. 60 Tab 0   • spironolactone (ALDACTONE) 50 MG Tab Take 1 Tab by mouth every day. 30 Tab 3   • enalapril (VASOTEC) 20 MG tablet Take 1 Tab by mouth every day. 30 Tab 11     ALLERGIES  Allergies   Allergen Reactions   • Nkda [No Known Drug Allergy]      PHYSICAL EXAM  VITAL SIGNS: /65   Pulse (!) 115   Temp 36.9 °C (98.4 °F)   Resp (!) 22   Ht 1.676 m (5' 6\")   Wt 76.2 kg (168 lb)   SpO2 100%   BMI 27.12 kg/m²     Nursing notes and vitals reviewed.  Constitutional: Well developed, Well nourished, slight distress, Non-toxic appearance.   Eyes: PERRLA, EOMI, Pale conjunctiva, No discharge.   Cardiovascular: Tachycardic, port in left chest wall, Normal rhythm, No murmurs, No rubs, No gallops.   Thorax & Lungs: No respiratory distress, No rales, No rhonchi, No wheezing, No chest tenderness.   Abdomen: Bowel sounds normal, Soft, No tenderness, No guarding, No rebound, No masses, No pulsatile masses.   Rectal: dark trace stool  Skin: Pallor, dry blood present to lower extremities, as well as her face, abdomen, Warm, Dry  Musculoskeletal: Intact distal pulses, No edema, No cyanosis, No clubbing. Good range of motion in all major joints. No tenderness to palpation or major deformities noted, no CVA tenderness, no midline back tenderness.   Neurologic: Alert & oriented x 3, Normal motor function, Normal sensory function, No focal deficits noted.  Psychiatric: Anxious affect acetaminophen narcotics    DIAGNOSTIC STUDIES/PROCEDURES    LABS  Results for orders placed or " performed during the hospital encounter of 02/07/18   CBC WITH DIFFERENTIAL   Result Value Ref Range    WBC 16.7 (H) 4.8 - 10.8 K/uL    RBC 3.91 (L) 4.20 - 5.40 M/uL    Hemoglobin 7.9 (L) 12.0 - 16.0 g/dL    Hematocrit 28.7 (L) 37.0 - 47.0 %    MCV 73.4 (L) 81.4 - 97.8 fL    MCH 20.2 (L) 27.0 - 33.0 pg    MCHC 27.5 (L) 33.6 - 35.0 g/dL    RDW 44.4 35.9 - 50.0 fL    Platelet Count 180 164 - 446 K/uL    MPV 11.1 9.0 - 12.9 fL    Neutrophils-Polys 84.10 (H) 44.00 - 72.00 %    Lymphocytes 9.20 (L) 22.00 - 41.00 %    Monocytes 4.90 0.00 - 13.40 %    Eosinophils 0.50 0.00 - 6.90 %    Basophils 0.80 0.00 - 1.80 %    Immature Granulocytes 0.50 0.00 - 0.90 %    Nucleated RBC 0.00 /100 WBC    Neutrophils (Absolute) 13.99 (H) 2.00 - 7.15 K/uL    Lymphs (Absolute) 1.54 1.00 - 4.80 K/uL    Monos (Absolute) 0.81 0.00 - 0.85 K/uL    Eos (Absolute) 0.09 0.00 - 0.51 K/uL    Baso (Absolute) 0.14 (H) 0.00 - 0.12 K/uL    Immature Granulocytes (abs) 0.09 0.00 - 0.11 K/uL    NRBC (Absolute) 0.00 K/uL    Hypochromia 1+     Anisocytosis 1+     Microcytosis 1+    LIPASE   Result Value Ref Range    Lipase 30 11 - 82 U/L   APTT   Result Value Ref Range    APTT 35.5 24.7 - 36.0 sec   PROTHROMBIN TIME (INR)   Result Value Ref Range    PT 19.5 (H) 12.0 - 14.6 sec    INR 1.68 (H) 0.87 - 1.13   COD (ADULT)   Result Value Ref Range    ABO Grouping Only O     Rh Grouping Only POS     Antibody Screen-Cod NEG    DIAGNOSTIC ALCOHOL   Result Value Ref Range    Diagnostic Alcohol 0.00 0.00 g/dL   AMMONIA   Result Value Ref Range    Ammonia 103 (H) 11 - 45 umol/L   PERIPHERAL SMEAR REVIEW   Result Value Ref Range    Peripheral Smear Review see below    PLATELET ESTIMATE   Result Value Ref Range    Plt Estimation Normal    MORPHOLOGY   Result Value Ref Range    RBC Morphology Present     Poikilocytosis 1+     Ovalocytes 1+    DIFFERENTIAL COMMENT   Result Value Ref Range    Comments-Diff see below    Magnesium   Result Value Ref Range    Magnesium 1.8 1.5  - 2.5 mg/dL   CMP   Result Value Ref Range    Sodium 139 135 - 145 mmol/L    Potassium 5.2 3.6 - 5.5 mmol/L    Chloride 103 96 - 112 mmol/L    Co2 23 20 - 33 mmol/L    Anion Gap 13.0 (H) 0.0 - 11.9    Glucose 414 (H) 65 - 99 mg/dL    Bun 35 (H) 8 - 22 mg/dL    Creatinine 0.92 0.50 - 1.40 mg/dL    Calcium 8.3 (L) 8.5 - 10.5 mg/dL    AST(SGOT) 163 (H) 12 - 45 U/L    ALT(SGPT) 112 (H) 2 - 50 U/L    Alkaline Phosphatase 70 30 - 99 U/L    Total Bilirubin 1.1 0.1 - 1.5 mg/dL    Albumin 2.8 (L) 3.2 - 4.9 g/dL    Total Protein 5.6 (L) 6.0 - 8.2 g/dL    Globulin 2.8 1.9 - 3.5 g/dL    A-G Ratio 1.0 g/dL   ESTIMATED GFR   Result Value Ref Range    GFR If African American >60 >60 mL/min/1.73 m 2    GFR If Non African American >60 >60 mL/min/1.73 m 2     All labs reviewed by me.    RADIOLOGY  DX-CHEST-PORTABLE (1 VIEW)   Final Result      No acute cardiac or pulmonary abnormality is noted.        The radiologist's interpretation of all radiological studies have been reviewed by me.    COURSE & MEDICAL DECISION MAKING  Pertinent Labs & Imaging studies reviewed. (See chart for details)    5:32 PM - Patient seen and examined at bedside. Rectal exam performed with female chaperone  in the room.    Patient will be treated with Protonix 80 mg, Sandostatin 50 mcg, Zofran 4 mg. She will also be treated with NS infusion 1,250 mL for GI bleed. Ordered DX-chest, Diagnostic Alcohol, Urine Drug Screen, Ammonia, CBC with differential, Lipase to evaluate her symptoms.     6:14 PM I discussed the patient's case and the above findings with Dr. Otto (GI) who agrees to see patient.     6:22 PM I discussed the patient's case and the above findings with Dr. Sanon (hospitalist) who agrees to admit patient.     This is a 57-year-old female presents with GI bleed. The patient does have a history of variceal bleeds and here in the emergency department she has evidence of tachycardia, anemia hemoglobin 7.95 suggestive of early shock. The patient  received IV fluids, she'll receive blood products as well, discussed the patient GI Dr. Otto evaluated the patient for endoscopy. I started the patient on Protonix as well as acute tried, ordered CBC, this point she has no evidence of coagulopathy requiring emergent reversal. In addition, the patient has an elevated ammonia level C she has had a headache encephalopathy. The patient responded appropriately IV fluids are administered secondary to the fact that she is tachycardic presence of hypovolemia. Reevaluation she is a significant decrease of her heart rate and was given responded appropriately IV fluid administration. I have admitted the patient to Dr. Jose Carlos Walker for further evaluation and management.    CRITICAL CARE  The very real possibilty of a deterioration of this patient's condition required the highest level of my preparedness for sudden, emergent intervention.  I provided critical care services, which included medication orders, frequent reevaluations of the patient's condition and response to treatment, ordering and reviewing test results, and discussing the case with various consultants.  The critical care time associated with the care of the patient was 35 minutes. Review chart for interventions. This time is exclusive of any other billable procedures.     DISPOSITION:  Patient will be admitted to Dr Sanon in critical condition.    FINAL IMPRESSION  GI bleed  Anemia  Hepatic encephalopathy  Critical care time 35 minutes     IZeina (Elvinibe), am scribing for, and in the presence of, Daniel Deal D.O    Electronically signed by: Zeina Lane (Scribe), 2/7/2018    IDaniel D.O. personally performed the services described in this documentation, as scribed by Zeina Lane in my presence, and it is both accurate and complete.    The note accurately reflects work and decisions made by me.  Daniel Deal  2/7/2018  8:31 PM

## 2018-02-08 NOTE — PROGRESS NOTES
Transported patient to unit via cart and zoll. Patient ambulated a short distance from the cart to the bed. Admit profile completed, tele box placed,  sinus tach noted.  2l oxygen continued. Call light in reach. Will continue to monitor.

## 2018-02-09 ENCOUNTER — APPOINTMENT (OUTPATIENT)
Dept: RADIOLOGY | Facility: MEDICAL CENTER | Age: 58
DRG: 368 | End: 2018-02-09
Attending: INTERNAL MEDICINE
Payer: MEDICAID

## 2018-02-09 LAB
ALBUMIN SERPL BCP-MCNC: 2.5 G/DL (ref 3.2–4.9)
ALBUMIN/GLOB SERPL: 1 G/DL
ALP SERPL-CCNC: 54 U/L (ref 30–99)
ALT SERPL-CCNC: 1024 U/L (ref 2–50)
AMMONIA PLAS-SCNC: 108 UMOL/L (ref 11–45)
ANION GAP SERPL CALC-SCNC: 3 MMOL/L (ref 0–11.9)
AST SERPL-CCNC: 1122 U/L (ref 12–45)
BILIRUB SERPL-MCNC: 0.6 MG/DL (ref 0.1–1.5)
BUN SERPL-MCNC: 47 MG/DL (ref 8–22)
CALCIUM SERPL-MCNC: 8.4 MG/DL (ref 8.5–10.5)
CHLORIDE SERPL-SCNC: 119 MMOL/L (ref 96–112)
CO2 SERPL-SCNC: 25 MMOL/L (ref 20–33)
CREAT SERPL-MCNC: 0.75 MG/DL (ref 0.5–1.4)
GLOBULIN SER CALC-MCNC: 2.5 G/DL (ref 1.9–3.5)
GLUCOSE BLD-MCNC: 271 MG/DL (ref 65–99)
GLUCOSE BLD-MCNC: 298 MG/DL (ref 65–99)
GLUCOSE BLD-MCNC: 308 MG/DL (ref 65–99)
GLUCOSE BLD-MCNC: 356 MG/DL (ref 65–99)
GLUCOSE SERPL-MCNC: 298 MG/DL (ref 65–99)
HCT VFR BLD AUTO: 24.8 % (ref 37–47)
HCT VFR BLD AUTO: 25 % (ref 37–47)
HGB BLD-MCNC: 6.4 G/DL (ref 12–16)
HGB BLD-MCNC: 7 G/DL (ref 12–16)
HGB BLD-MCNC: 7.3 G/DL (ref 12–16)
HGB BLD-MCNC: 7.7 G/DL (ref 12–16)
INR PPP: 1.95 (ref 0.87–1.13)
MAGNESIUM SERPL-MCNC: 2 MG/DL (ref 1.5–2.5)
PHOSPHATE SERPL-MCNC: 2.2 MG/DL (ref 2.5–4.5)
POTASSIUM SERPL-SCNC: 4.6 MMOL/L (ref 3.6–5.5)
PROT SERPL-MCNC: 5 G/DL (ref 6–8.2)
PROTHROMBIN TIME: 21.9 SEC (ref 12–14.6)
SODIUM SERPL-SCNC: 147 MMOL/L (ref 135–145)

## 2018-02-09 PROCEDURE — A9270 NON-COVERED ITEM OR SERVICE: HCPCS | Performed by: HOSPITALIST

## 2018-02-09 PROCEDURE — 700105 HCHG RX REV CODE 258: Performed by: HOSPITALIST

## 2018-02-09 PROCEDURE — 83735 ASSAY OF MAGNESIUM: CPT

## 2018-02-09 PROCEDURE — C9113 INJ PANTOPRAZOLE SODIUM, VIA: HCPCS | Performed by: HOSPITALIST

## 2018-02-09 PROCEDURE — 85018 HEMOGLOBIN: CPT

## 2018-02-09 PROCEDURE — 85014 HEMATOCRIT: CPT | Mod: 91

## 2018-02-09 PROCEDURE — 770020 HCHG ROOM/CARE - TELE (206)

## 2018-02-09 PROCEDURE — 84100 ASSAY OF PHOSPHORUS: CPT

## 2018-02-09 PROCEDURE — P9016 RBC LEUKOCYTES REDUCED: HCPCS

## 2018-02-09 PROCEDURE — 700102 HCHG RX REV CODE 250 W/ 637 OVERRIDE(OP): Performed by: HOSPITALIST

## 2018-02-09 PROCEDURE — 36430 TRANSFUSION BLD/BLD COMPNT: CPT

## 2018-02-09 PROCEDURE — 80053 COMPREHEN METABOLIC PANEL: CPT

## 2018-02-09 PROCEDURE — 700111 HCHG RX REV CODE 636 W/ 250 OVERRIDE (IP): Performed by: HOSPITALIST

## 2018-02-09 PROCEDURE — 86923 COMPATIBILITY TEST ELECTRIC: CPT

## 2018-02-09 PROCEDURE — 82962 GLUCOSE BLOOD TEST: CPT

## 2018-02-09 PROCEDURE — 82140 ASSAY OF AMMONIA: CPT

## 2018-02-09 PROCEDURE — 76705 ECHO EXAM OF ABDOMEN: CPT

## 2018-02-09 PROCEDURE — A9270 NON-COVERED ITEM OR SERVICE: HCPCS | Performed by: INTERNAL MEDICINE

## 2018-02-09 PROCEDURE — 99233 SBSQ HOSP IP/OBS HIGH 50: CPT | Performed by: HOSPITALIST

## 2018-02-09 PROCEDURE — 85610 PROTHROMBIN TIME: CPT

## 2018-02-09 PROCEDURE — 700102 HCHG RX REV CODE 250 W/ 637 OVERRIDE(OP): Performed by: INTERNAL MEDICINE

## 2018-02-09 PROCEDURE — 700105 HCHG RX REV CODE 258

## 2018-02-09 RX ORDER — OMEPRAZOLE 20 MG/1
20 CAPSULE, DELAYED RELEASE ORAL 2 TIMES DAILY
Status: DISCONTINUED | OUTPATIENT
Start: 2018-02-09 | End: 2018-02-14 | Stop reason: HOSPADM

## 2018-02-09 RX ORDER — SODIUM CHLORIDE 9 MG/ML
INJECTION, SOLUTION INTRAVENOUS
Status: COMPLETED
Start: 2018-02-09 | End: 2018-02-09

## 2018-02-09 RX ADMIN — SODIUM CHLORIDE: 9 INJECTION, SOLUTION INTRAVENOUS at 12:24

## 2018-02-09 RX ADMIN — LACTULOSE 30 ML: 20 SOLUTION ORAL at 21:07

## 2018-02-09 RX ADMIN — SODIUM CHLORIDE: 9 INJECTION, SOLUTION INTRAVENOUS at 14:15

## 2018-02-09 RX ADMIN — LACTULOSE 30 ML: 20 SOLUTION ORAL at 11:07

## 2018-02-09 RX ADMIN — SODIUM CHLORIDE: 9 INJECTION, SOLUTION INTRAVENOUS at 05:30

## 2018-02-09 RX ADMIN — INSULIN HUMAN 5 UNITS: 100 INJECTION, SOLUTION PARENTERAL at 12:26

## 2018-02-09 RX ADMIN — INSULIN HUMAN 4 UNITS: 100 INJECTION, SOLUTION PARENTERAL at 05:29

## 2018-02-09 RX ADMIN — RIFAXIMIN 550 MG: 550 TABLET ORAL at 11:08

## 2018-02-09 RX ADMIN — GABAPENTIN 300 MG: 300 CAPSULE ORAL at 11:08

## 2018-02-09 RX ADMIN — OCTREOTIDE ACETATE 50 MCG/HR: 200 INJECTION, SOLUTION INTRAVENOUS; SUBCUTANEOUS at 16:59

## 2018-02-09 RX ADMIN — SODIUM CHLORIDE 8 MG/HR: 9 INJECTION, SOLUTION INTRAVENOUS at 00:58

## 2018-02-09 RX ADMIN — RIFAXIMIN 550 MG: 550 TABLET ORAL at 21:07

## 2018-02-09 RX ADMIN — INSULIN HUMAN 3 UNITS: 100 INJECTION, SOLUTION PARENTERAL at 16:59

## 2018-02-09 RX ADMIN — OMEPRAZOLE 20 MG: 20 CAPSULE, DELAYED RELEASE ORAL at 21:07

## 2018-02-09 RX ADMIN — GABAPENTIN 300 MG: 300 CAPSULE ORAL at 21:07

## 2018-02-09 RX ADMIN — OMEPRAZOLE 20 MG: 20 CAPSULE, DELAYED RELEASE ORAL at 11:08

## 2018-02-09 RX ADMIN — LACTULOSE 30 ML: 20 SOLUTION ORAL at 15:00

## 2018-02-09 RX ADMIN — SODIUM CHLORIDE: 9 INJECTION, SOLUTION INTRAVENOUS at 19:14

## 2018-02-09 RX ADMIN — SPIRONOLACTONE 50 MG: 25 TABLET, FILM COATED ORAL at 11:08

## 2018-02-09 RX ADMIN — CEFTRIAXONE 2 G: 2 INJECTION, POWDER, FOR SOLUTION INTRAMUSCULAR; INTRAVENOUS at 21:49

## 2018-02-09 ASSESSMENT — LIFESTYLE VARIABLES
DO YOU DRINK ALCOHOL: YES
TOTAL SCORE: 0
HOW MANY TIMES IN THE PAST YEAR HAVE YOU HAD 5 OR MORE DRINKS IN A DAY: 30
EVER FELT BAD OR GUILTY ABOUT YOUR DRINKING: NO
EVER HAD A DRINK FIRST THING IN THE MORNING TO STEADY YOUR NERVES TO GET RID OF A HANGOVER: NO
TOTAL SCORE: 0
HAVE YOU EVER FELT YOU SHOULD CUT DOWN ON YOUR DRINKING: NO
AVERAGE NUMBER OF DAYS PER WEEK YOU HAVE A DRINK CONTAINING ALCOHOL: 7
CONSUMPTION TOTAL: POSITIVE
HAVE PEOPLE ANNOYED YOU BY CRITICIZING YOUR DRINKING: NO
TOTAL SCORE: 0
ON A TYPICAL DAY WHEN YOU DRINK ALCOHOL HOW MANY DRINKS DO YOU HAVE: 1

## 2018-02-09 ASSESSMENT — PAIN SCALES - GENERAL
PAINLEVEL_OUTOF10: 0

## 2018-02-09 ASSESSMENT — COPD QUESTIONNAIRES
DO YOU EVER COUGH UP ANY MUCUS OR PHLEGM?: NO/ONLY WITH OCCASIONAL COLDS OR INFECTIONS
HAVE YOU SMOKED AT LEAST 100 CIGARETTES IN YOUR ENTIRE LIFE: YES

## 2018-02-09 ASSESSMENT — ENCOUNTER SYMPTOMS
CARDIOVASCULAR NEGATIVE: 1
ABDOMINAL PAIN: 1
RESPIRATORY NEGATIVE: 1

## 2018-02-09 NOTE — PROGRESS NOTES
Called Dr Calloway to relay Hgb results of 6.3. Physician stated she would place orders for 1 unit of PRBC to be infused.

## 2018-02-09 NOTE — PROGRESS NOTES
[facility] Hospitalist Progress Note    Date of Service: 2018    Chief Complaint  57 y.o. female admitted 2018 with h/o Hep C, meth abuse, variceal bleeding  Admitted with concerns of upper GIB  S/p 6 bands by GI , sent to ICU on protonix and octreotide drips    Interval Problem Update  Blood pressure stable, following h/h, trending down  Later in day was <7, transfused  Can go to floor per Dr. Mauricio      Consultants/Specialty  GI    Disposition  Transfer to tele      D/W tx team on rounds, Dr. Mauricio       Review of Systems   Unable to perform ROS: Mental acuity      Physical Exam  Laboratory/Imaging   Hemodynamics  Temp (24hrs), Av.8 °C (98.3 °F), Min:36.3 °C (97.3 °F), Max:37.1 °C (98.8 °F)   Temperature: 36.6 °C (97.9 °F)  Pulse  Av  Min: 115  Max: 132 Heart Rate (Monitored): (!) 119  Blood Pressure: 129/62, NIBP: 150/68      Respiratory      Respiration: (!) 10, Pulse Oximetry: 92 %     Work Of Breathing / Effort: Mild;Shallow  RUL Breath Sounds: Clear, RML Breath Sounds: Diminished, RLL Breath Sounds: Diminished, DARNELL Breath Sounds: Clear, LLL Breath Sounds: Diminished    Fluids    Intake/Output Summary (Last 24 hours) at 18 1036  Last data filed at 18 0600   Gross per 24 hour   Intake             3261 ml   Output              650 ml   Net             2611 ml       Nutrition  Orders Placed This Encounter   Procedures   • DIET ORDER     Standing Status:   Standing     Number of Occurrences:   1     Order Specific Question:   Diet:     Answer:   Full Liquid [11]     Order Specific Question:   Diet:     Answer:   2 Gram Sodium [7]     Physical Exam   Cardiovascular: Normal rate, regular rhythm and normal heart sounds.    No murmur heard.  Tachy 110s   Pulmonary/Chest: Effort normal and breath sounds normal. No respiratory distress. She has no wheezes. She has no rales.   Abdominal: Soft. Bowel sounds are normal. She exhibits no distension. There is no tenderness.    Musculoskeletal: She exhibits no edema.   Neurological:   lethargic   Skin: Skin is warm and dry. No erythema. There is pallor.   Nursing note and vitals reviewed.      Recent Labs      02/07/18 1740 02/08/18   0420 02/08/18 2006 02/09/18   0230  02/09/18   0524   WBC  16.7*  12.9*   --    --    --    --    RBC  3.91*  3.42*   --    --    --    --    HEMOGLOBIN  7.9*  7.3*   < >  7.7*  7.3*  7.0*   HEMATOCRIT  28.7*  24.9*   --   26.3*  25.0*  24.8*   MCV  73.4*  72.8*   --    --    --    --    MCH  20.2*  21.3*   --    --    --    --    MCHC  27.5*  29.3*   --    --    --    --    RDW  44.4  43.5   --    --    --    --    PLATELETCT  180  126*   --    --    --    --    MPV  11.1  11.7   --    --    --    --     < > = values in this interval not displayed.     Recent Labs      02/07/18 1804 02/08/18 0420 02/09/18   0524   SODIUM  139  144  147*   POTASSIUM  5.2  4.4  4.6   CHLORIDE  103  113*  119*   CO2  23  27  25   GLUCOSE  414*  309*  298*   BUN  35*  42*  47*   CREATININE  0.92  0.74  0.75   CALCIUM  8.3*  8.4*  8.4*     Recent Labs      02/07/18 1740 02/08/18   1924  02/09/18   0524   APTT  35.5   --    --    INR  1.68*  2.03*  1.95*                  Assessment/Plan     * Acute upper GI bleeding- (present on admission)   Assessment & Plan    2/2 esophageal varices with active bleeding as per EGD/ Dr. Mauricio  History of esophageal varices 2/2 alcoholic cirrhosis of the liver  H/O of multiple variceal banding and ligations in the past by Dr. Dent  IV octreotide and IV protonix  Monitoring q6 hour h/h, transfuse if below 7          Tachycardia- (present on admission)   Assessment & Plan    2/2 anemia  Transfusing for <7, improved from yesterday        Acute blood loss anemia- (present on admission)   Assessment & Plan    Transfuse as per guidelines        Elevated liver enzymes- (present on admission)   Assessment & Plan    Multifactorial including reactive, IV drugs, question her  compliance with alcohol cessation, he reports her last drink was 2 months ago        Leukocytosis- (present on admission)   Assessment & Plan    Likely reactive 2/2 acute GI bleeding  Covered with C3, cx pending? Don't see any collected  Resolving        Hepatic encephalopathy (CMS-HCC)- (present on admission)   Assessment & Plan    Patient does alter between AAOx2-AAox3.  Ammonia sitll elevated >100  Continues on xifaxin and lactulose        Methamphetamine abuse- (present on admission)   Assessment & Plan    Patient reports last using methamphetamine via intravenous route 3 days ago          History of cirrhosis of liver- (present on admission)   Assessment & Plan    Liver cirrhosis 2/2 alcohol,   Last drink was about 2 months ago        Insulin dependent diabetes mellitus (CMS-HCC)- (present on admission)   Assessment & Plan    Uncontrolled  Continue Insulin-sliding scale, accu-checks and hypoglycemia protocol.            History of hepatitis C- (present on admission)   Assessment & Plan    2/2 IV drug abuse; still continues to use IV methamphetamine                Reviewed items::  Labs reviewed and Medications reviewed  Berry catheter::  No Berry  Central line in place:  Need for access  DVT prophylaxis pharmacological::  Contraindicated - Anemia requiring blood transfusion  DVT prophylaxis - mechanical:  SCDs  Ulcer Prophylaxis::  Yes  Antibiotics:  Treating active infection/contamination beyond 24 hours perioperative coverage

## 2018-02-09 NOTE — PROGRESS NOTES
Patient arrived to floor from T629. Placed on monitor, monitor room notified. Vitals obtained, blood pressure elevated, otherwise WNL. Personal belongings and call light within reach, bed alarm on. Will continue to monitor for safety and comfort.

## 2018-02-09 NOTE — PROGRESS NOTES
2 RN skin check with Stew BOLAÑOS. Generalized bruising throughout. Bottom red, blanching. Bilateral lower extremities dry, flaky, calloused on feet. Skin is otherwise intact.

## 2018-02-09 NOTE — PROCEDURES
Procedure: central line    Indication: lack of access for blood products, octreotide, protonix, etc.    Consent: obtained by mother at patient's request    Description: a time-out was called. The left neck was prepped and draped in a sterile fashion.  Using sterile ultrasound guidance, the right internal jugular vein was identified. 2 mL of 1% lidocaine was used for anesthesia.  A large bore needle was used to aspirate dark, red, non-pulsating blood. Seldinger technique was used to place the central venous catheter. All three ports were aspirated and flushed with sterile saline.  The central line was adhered to the skin with sutures.     Blood loss: <5  mL    A stat chest x-ray confirmed placement.

## 2018-02-09 NOTE — CARE PLAN
Problem: Bowel/Gastric:  Goal: Will not experience complications related to bowel motility    Intervention: Assess baseline bowel pattern  Pt having black stool. Last BM 2/9      Problem: Respiratory:  Goal: Respiratory status will improve    Intervention: Administer and titrate oxygen therapy   02/09/18 0400   OTHER   O2 (LPM) 4

## 2018-02-09 NOTE — PROGRESS NOTES
Gastroenterology Progress Note     Author: Davonte Mauricio   Date & Time Created: 2/9/2018 8:18 AM    Interval History:  Problem: Active variceal bleed banded yesterday and ischemic liver injury.   S: 2 black stools overnight. Denies vomiting any blood. Still has some diffuse abdominal pain which is not new but she can't give me a time frame on it.     Review of Systems:  Review of Systems   Respiratory: Negative.    Cardiovascular: Negative.    Gastrointestinal: Positive for abdominal pain and melena.       Physical Exam:  Physical Exam   Constitutional: She is oriented to person, place, and time. She appears well-developed and well-nourished.   Cardiovascular: Regular rhythm.    tachy   Pulmonary/Chest: Effort normal and breath sounds normal.   Abdominal: Soft. Bowel sounds are normal. She exhibits distension. She exhibits no mass. There is tenderness. There is guarding. There is no rebound.   Mild diffuse tenderness.    Musculoskeletal: She exhibits no edema.   Neurological: She is alert and oriented to person, place, and time.   Mild asterixis.   Skin: Skin is warm and dry.   Psychiatric: She has a normal mood and affect.       Labs:        Invalid input(s): [id number]      Recent Labs      02/07/18   1804 02/08/18   0420 02/09/18   0524   SODIUM  139  144  147*   POTASSIUM  5.2  4.4  4.6   CHLORIDE  103  113*  119*   CO2  23  27  25   BUN  35*  42*  47*   CREATININE  0.92  0.74  0.75   MAGNESIUM  1.8   --   2.0   PHOSPHORUS   --    --   2.2*   CALCIUM  8.3*  8.4*  8.4*     Recent Labs      02/07/18   1740 02/07/18   1804  02/08/18   0420  02/09/18   0524   ALTSGPT   --   112*  731*  1024*   ASTSGOT   --   163*  1396*  1122*   ALKPHOSPHAT   --   70  65  54   TBILIRUBIN   --   1.1  0.4  0.6   LIPASE  30   --    --    --    GLUCOSE   --   414*  309*  298*     Recent Labs      02/07/18   1740  02/08/18   0420   02/08/18   1924  02/08/18 2006 02/09/18   0230  02/09/18   0524   RBC  3.91*  3.42*   --     --    --    --    --    HEMOGLOBIN  7.9*  7.3*   < >   --   7.7*  7.3*  7.0*   HEMATOCRIT  28.7*  24.9*   --    --   26.3*  25.0*  24.8*   PLATELETCT  180  126*   --    --    --    --    --    PROTHROMBTM  19.5*   --    --   22.6*   --    --   21.9*   APTT  35.5   --    --    --    --    --    --    INR  1.68*   --    --   2.03*   --    --   1.95*    < > = values in this interval not displayed.     Recent Labs      18   1740  18   1804  18   0420  18   0524   WBC  16.7*   --   12.9*   --    NEUTSPOLYS  84.10*   --   76.20*   --    LYMPHOCYTES  9.20*   --   12.10*   --    MONOCYTES  4.90   --   8.90   --    EOSINOPHILS  0.50   --   1.50   --    BASOPHILS  0.80   --   0.80   --    ASTSGOT   --   163*  1396*  1122*   ALTSGPT   --   112*  731*  1024*   ALKPHOSPHAT   --   70  65  54   TBILIRUBIN   --   1.1  0.4  0.6     Hemodynamics:  Temp (24hrs), Av.8 °C (98.3 °F), Min:36.3 °C (97.3 °F), Max:37.1 °C (98.8 °F)  Temperature: 36.6 °C (97.9 °F)  Pulse  Av  Min: 115  Max: 132Heart Rate (Monitored): (!) 119  Blood Pressure: 129/62, NIBP: 150/68     Respiratory:    Respiration: (!) 10, Pulse Oximetry: 92 %     Work Of Breathing / Effort: Mild;Shallow  RUL Breath Sounds: Clear, RML Breath Sounds: Diminished, RLL Breath Sounds: Diminished, DARNELL Breath Sounds: Clear, LLL Breath Sounds: Diminished  Fluids:    Intake/Output Summary (Last 24 hours) at 18 0818  Last data filed at 18 0600   Gross per 24 hour   Intake             3261 ml   Output              650 ml   Net             2611 ml     Weight: 79.6 kg (175 lb 7.8 oz)  GI/Nutrition:  Orders Placed This Encounter   Procedures   • DIET NPO     Standing Status:   Standing     Number of Occurrences:   1     Order Specific Question:   Restrict to:     Answer:   Ice Chips [2]     Medical Decision Making, by Problem:  Active Hospital Problems    Diagnosis   • *Acute upper GI bleeding [K92.2]   • Acute blood loss anemia [D62]   •  Tachycardia [R00.0]   • Hematemesis [K92.0]   • Hematochezia [K92.1]   • History of hepatitis C [Z86.19]   • Insulin dependent diabetes mellitus (CMS-HCC) [E11.9, Z79.4]   • History of cirrhosis of liver [Z87.19]   • Methamphetamine abuse [F15.10]   • Hepatic encephalopathy (CMS-HCC) [K72.90]   • Leukocytosis [D72.829]   • Elevated liver enzymes [R74.8]   Impression:  1. Active variceal bleed banded yesterday.  2. Anemia of acute bleed.  3. Diffuse abd pain  4. Ischemic liver injury  5. Coagulopathy  6. HCV  7. Cirrhosis  8. Meth abuse    Plan:  1. Full liquids ok today.  2. Can transfer out of ICU.  3 . Continue IV octreotide. Can go to oral PPI.  4. US liver with Doppler. Need to rule out HCC as source of decompensation  Complex medical decision making. Guarded prognosis.   Quality-Core Measures

## 2018-02-10 LAB
ALBUMIN SERPL BCP-MCNC: 1.9 G/DL (ref 3.2–4.9)
ALBUMIN/GLOB SERPL: 0.9 G/DL
ALP SERPL-CCNC: 51 U/L (ref 30–99)
ALT SERPL-CCNC: 622 U/L (ref 2–50)
ANION GAP SERPL CALC-SCNC: 5 MMOL/L (ref 0–11.9)
AST SERPL-CCNC: 372 U/L (ref 12–45)
BASOPHILS # BLD AUTO: 0.9 % (ref 0–1.8)
BASOPHILS # BLD: 0.1 K/UL (ref 0–0.12)
BILIRUB SERPL-MCNC: 0.8 MG/DL (ref 0.1–1.5)
BUN SERPL-MCNC: 27 MG/DL (ref 8–22)
CALCIUM SERPL-MCNC: 6 MG/DL (ref 8.5–10.5)
CHLORIDE SERPL-SCNC: 123 MMOL/L (ref 96–112)
CO2 SERPL-SCNC: 16 MMOL/L (ref 20–33)
CREAT SERPL-MCNC: 0.47 MG/DL (ref 0.5–1.4)
EOSINOPHIL # BLD AUTO: 0.41 K/UL (ref 0–0.51)
EOSINOPHIL NFR BLD: 3.7 % (ref 0–6.9)
ERYTHROCYTE [DISTWIDTH] IN BLOOD BY AUTOMATED COUNT: 51.6 FL (ref 35.9–50)
GLOBULIN SER CALC-MCNC: 2.1 G/DL (ref 1.9–3.5)
GLUCOSE BLD-MCNC: 172 MG/DL (ref 65–99)
GLUCOSE BLD-MCNC: 236 MG/DL (ref 65–99)
GLUCOSE BLD-MCNC: 243 MG/DL (ref 65–99)
GLUCOSE BLD-MCNC: 319 MG/DL (ref 65–99)
GLUCOSE SERPL-MCNC: 241 MG/DL (ref 65–99)
HCT VFR BLD AUTO: 26.3 % (ref 37–47)
HGB BLD-MCNC: 6.4 G/DL (ref 12–16)
HGB BLD-MCNC: 7.2 G/DL (ref 12–16)
HGB BLD-MCNC: 7.5 G/DL (ref 12–16)
HGB BLD-MCNC: 8.1 G/DL (ref 12–16)
IMM GRANULOCYTES # BLD AUTO: 0.07 K/UL (ref 0–0.11)
IMM GRANULOCYTES NFR BLD AUTO: 0.6 % (ref 0–0.9)
LYMPHOCYTES # BLD AUTO: 1.59 K/UL (ref 1–4.8)
LYMPHOCYTES NFR BLD: 14.2 % (ref 22–41)
MCH RBC QN AUTO: 22.4 PG (ref 27–33)
MCHC RBC AUTO-ENTMCNC: 28.5 G/DL (ref 33.6–35)
MCV RBC AUTO: 78.5 FL (ref 81.4–97.8)
MONOCYTES # BLD AUTO: 0.95 K/UL (ref 0–0.85)
MONOCYTES NFR BLD AUTO: 8.5 % (ref 0–13.4)
NEUTROPHILS # BLD AUTO: 8.1 K/UL (ref 2–7.15)
NEUTROPHILS NFR BLD: 72.1 % (ref 44–72)
NRBC # BLD AUTO: 0.02 K/UL
NRBC BLD-RTO: 0.2 /100 WBC
PLATELET # BLD AUTO: 99 K/UL (ref 164–446)
PMV BLD AUTO: 11.5 FL (ref 9–12.9)
POTASSIUM SERPL-SCNC: 3.1 MMOL/L (ref 3.6–5.5)
PROT SERPL-MCNC: 4 G/DL (ref 6–8.2)
RBC # BLD AUTO: 3.35 M/UL (ref 4.2–5.4)
SODIUM SERPL-SCNC: 144 MMOL/L (ref 135–145)
WBC # BLD AUTO: 11.2 K/UL (ref 4.8–10.8)

## 2018-02-10 PROCEDURE — 700111 HCHG RX REV CODE 636 W/ 250 OVERRIDE (IP): Performed by: HOSPITALIST

## 2018-02-10 PROCEDURE — A9270 NON-COVERED ITEM OR SERVICE: HCPCS | Performed by: INTERNAL MEDICINE

## 2018-02-10 PROCEDURE — 700102 HCHG RX REV CODE 250 W/ 637 OVERRIDE(OP): Performed by: INTERNAL MEDICINE

## 2018-02-10 PROCEDURE — 770020 HCHG ROOM/CARE - TELE (206)

## 2018-02-10 PROCEDURE — 86923 COMPATIBILITY TEST ELECTRIC: CPT

## 2018-02-10 PROCEDURE — 700102 HCHG RX REV CODE 250 W/ 637 OVERRIDE(OP): Performed by: HOSPITALIST

## 2018-02-10 PROCEDURE — 87040 BLOOD CULTURE FOR BACTERIA: CPT

## 2018-02-10 PROCEDURE — 82962 GLUCOSE BLOOD TEST: CPT | Mod: 91

## 2018-02-10 PROCEDURE — P9016 RBC LEUKOCYTES REDUCED: HCPCS

## 2018-02-10 PROCEDURE — 700105 HCHG RX REV CODE 258: Performed by: HOSPITALIST

## 2018-02-10 PROCEDURE — 36430 TRANSFUSION BLD/BLD COMPNT: CPT

## 2018-02-10 PROCEDURE — 36415 COLL VENOUS BLD VENIPUNCTURE: CPT

## 2018-02-10 PROCEDURE — 80053 COMPREHEN METABOLIC PANEL: CPT

## 2018-02-10 PROCEDURE — A9270 NON-COVERED ITEM OR SERVICE: HCPCS | Performed by: HOSPITALIST

## 2018-02-10 PROCEDURE — 85018 HEMOGLOBIN: CPT

## 2018-02-10 PROCEDURE — 99233 SBSQ HOSP IP/OBS HIGH 50: CPT | Performed by: HOSPITALIST

## 2018-02-10 PROCEDURE — 85025 COMPLETE CBC W/AUTO DIFF WBC: CPT

## 2018-02-10 RX ORDER — ZINC SULFATE 50(220)MG
220 CAPSULE ORAL DAILY
Status: DISCONTINUED | OUTPATIENT
Start: 2018-02-10 | End: 2018-02-14 | Stop reason: HOSPADM

## 2018-02-10 RX ORDER — PROPRANOLOL HYDROCHLORIDE 10 MG/1
20 TABLET ORAL TWICE DAILY
Status: DISCONTINUED | OUTPATIENT
Start: 2018-02-10 | End: 2018-02-10

## 2018-02-10 RX ORDER — PROPRANOLOL HYDROCHLORIDE 10 MG/1
10 TABLET ORAL TWICE DAILY
Status: DISCONTINUED | OUTPATIENT
Start: 2018-02-10 | End: 2018-02-14 | Stop reason: HOSPADM

## 2018-02-10 RX ADMIN — SODIUM CHLORIDE: 9 INJECTION, SOLUTION INTRAVENOUS at 06:29

## 2018-02-10 RX ADMIN — RIFAXIMIN 550 MG: 550 TABLET ORAL at 10:08

## 2018-02-10 RX ADMIN — INSULIN HUMAN 1 UNITS: 100 INJECTION, SOLUTION PARENTERAL at 23:19

## 2018-02-10 RX ADMIN — SPIRONOLACTONE 50 MG: 25 TABLET, FILM COATED ORAL at 10:08

## 2018-02-10 RX ADMIN — PROPRANOLOL HYDROCHLORIDE 20 MG: 10 TABLET ORAL at 10:08

## 2018-02-10 RX ADMIN — SODIUM CHLORIDE: 9 INJECTION, SOLUTION INTRAVENOUS at 12:16

## 2018-02-10 RX ADMIN — INSULIN HUMAN 2 UNITS: 100 INJECTION, SOLUTION PARENTERAL at 06:23

## 2018-02-10 RX ADMIN — GABAPENTIN 300 MG: 300 CAPSULE ORAL at 23:10

## 2018-02-10 RX ADMIN — INSULIN HUMAN 4 UNITS: 100 INJECTION, SOLUTION PARENTERAL at 00:42

## 2018-02-10 RX ADMIN — INSULIN HUMAN 1 UNITS: 100 INJECTION, SOLUTION PARENTERAL at 17:18

## 2018-02-10 RX ADMIN — Medication 220 MG: at 10:08

## 2018-02-10 RX ADMIN — CEFTRIAXONE 2 G: 2 INJECTION, POWDER, FOR SOLUTION INTRAMUSCULAR; INTRAVENOUS at 23:09

## 2018-02-10 RX ADMIN — RIFAXIMIN 550 MG: 550 TABLET ORAL at 23:11

## 2018-02-10 RX ADMIN — GABAPENTIN 300 MG: 300 CAPSULE ORAL at 10:08

## 2018-02-10 RX ADMIN — OMEPRAZOLE 20 MG: 20 CAPSULE, DELAYED RELEASE ORAL at 10:08

## 2018-02-10 RX ADMIN — OMEPRAZOLE 20 MG: 20 CAPSULE, DELAYED RELEASE ORAL at 23:10

## 2018-02-10 RX ADMIN — INSULIN HUMAN 2 UNITS: 100 INJECTION, SOLUTION PARENTERAL at 11:21

## 2018-02-10 RX ADMIN — PROPRANOLOL HYDROCHLORIDE 10 MG: 10 TABLET ORAL at 23:10

## 2018-02-10 RX ADMIN — SODIUM CHLORIDE: 9 INJECTION, SOLUTION INTRAVENOUS at 23:25

## 2018-02-10 ASSESSMENT — ENCOUNTER SYMPTOMS
DEPRESSION: 0
WEAKNESS: 1
BLOOD IN STOOL: 1
NECK PAIN: 0
HEARTBURN: 0
VOMITING: 0
EYES NEGATIVE: 1
PALPITATIONS: 0
POLYDIPSIA: 0
RESPIRATORY NEGATIVE: 1
HEADACHES: 0
CARDIOVASCULAR NEGATIVE: 1
BRUISES/BLEEDS EASILY: 0
FOCAL WEAKNESS: 0
COUGH: 0
NERVOUS/ANXIOUS: 1
MUSCULOSKELETAL NEGATIVE: 1
BACK PAIN: 0
CHILLS: 0
DIZZINESS: 0
NAUSEA: 0
ABDOMINAL PAIN: 1
FEVER: 0

## 2018-02-10 ASSESSMENT — PAIN SCALES - GENERAL
PAINLEVEL_OUTOF10: 0

## 2018-02-10 NOTE — CARE PLAN
Problem: Safety  Goal: Will remain free from injury  Patient educated on importance of using the call light if in need of assistance. Patient verbalizes understanding. Bed locked in lowest position, non skid socks on, personal belongings and call light within reach, appropriate sign placed on door.

## 2018-02-10 NOTE — PROGRESS NOTES
Dr. Mauricio paged at Dr. Rice's request. Patient hemoglobin down to 6.4, with continued dark, bloody stool. Per MD, will remain on case.

## 2018-02-10 NOTE — PROGRESS NOTES
Assumed care of pt. at 1900    Bedside report received from Day Shift RN   POC discussed with pt. At bedside and Pt. verbalizes understanding.  Pt. Is Awake and AOx 4 , running  on the monitor  Call light within reach  with appropriate instructions to call for assistance  Bed locked and in lowest position.

## 2018-02-10 NOTE — PROGRESS NOTES
Gastroenterology Progress Note     Author: Davonte JONES Hang   Date & Time Created: 2/10/2018 9:08 AM    Interval History:  Problem: Variceal bleed, decompensated cirrhosis, shock liver.   S: Very groggy but no recurrent bleeding. US shows gallstones but no mass in liver. She lives independently but outpatient followup is going to be a challenge       Review of Systems:  ROS    Physical Exam:  Physical Exam   Constitutional: She appears well-developed and well-nourished.   Cardiovascular: Normal rate, regular rhythm and normal heart sounds.    Pulmonary/Chest: Effort normal and breath sounds normal.   Abdominal: Soft. Bowel sounds are normal. She exhibits distension. There is tenderness. There is no rebound and no guarding.   Mild diffuse tenderness.   Musculoskeletal: Normal range of motion. She exhibits edema.   Neurological:   Groggy.    Skin: Skin is warm and dry.   Psychiatric:   Groggy        Labs:        Invalid input(s): [id number]      Recent Labs      02/07/18   1804  02/08/18   0420  02/09/18   0524  02/10/18   0045   SODIUM  139  144  147*  144   POTASSIUM  5.2  4.4  4.6  3.1*   CHLORIDE  103  113*  119*  123*   CO2  23  27  25  16*   BUN  35*  42*  47*  27*   CREATININE  0.92  0.74  0.75  0.47*   MAGNESIUM  1.8   --   2.0   --    PHOSPHORUS   --    --   2.2*   --    CALCIUM  8.3*  8.4*  8.4*  6.0*     Recent Labs      02/07/18   1740   02/08/18   0420  02/09/18   0524  02/10/18   0045   ALTSGPT   --    < >  731*  1024*  622*   ASTSGOT   --    < >  1396*  1122*  372*   ALKPHOSPHAT   --    < >  65  54  51   TBILIRUBIN   --    < >  0.4  0.6  0.8   LIPASE  30   --    --    --    --    GLUCOSE   --    < >  309*  298*  241*    < > = values in this interval not displayed.     Recent Labs      02/07/18   1740  02/08/18   0420   02/08/18   1924   02/09/18   0230  02/09/18   0524   02/09/18   1813  02/10/18   0045  02/10/18   0650   RBC  3.91*  3.42*   --    --    --    --    --    --    --   3.35*   --     HEMOGLOBIN  7.9*  7.3*   < >   --    < >  7.3*  7.0*   < >  7.7*  7.5*  7.2*   HEMATOCRIT  28.7*  24.9*   --    --    < >  25.0*  24.8*   --    --   26.3*   --    PLATELETCT  180  126*   --    --    --    --    --    --    --   99*   --    PROTHROMBTM  19.5*   --    --   22.6*   --    --   21.9*   --    --    --    --    APTT  35.5   --    --    --    --    --    --    --    --    --    --    INR  1.68*   --    --   2.03*   --    --   1.95*   --    --    --    --     < > = values in this interval not displayed.     Recent Labs      18   1740   18   0420  18   0524  02/10/18   0045   WBC  16.7*   --   12.9*   --   11.2*   NEUTSPOLYS  84.10*   --   76.20*   --   72.10*   LYMPHOCYTES  9.20*   --   12.10*   --   14.20*   MONOCYTES  4.90   --   8.90   --   8.50   EOSINOPHILS  0.50   --   1.50   --   3.70   BASOPHILS  0.80   --   0.80   --   0.90   ASTSGOT   --    < >  1396*  1122*  372*   ALTSGPT   --    < >  731*  1024*  622*   ALKPHOSPHAT   --    < >  65  54  51   TBILIRUBIN   --    < >  0.4  0.6  0.8    < > = values in this interval not displayed.     Hemodynamics:  Temp (24hrs), Av.8 °C (98.2 °F), Min:36.6 °C (97.9 °F), Max:36.9 °C (98.4 °F)  Temperature: 36.7 °C (98.1 °F)  Pulse  Av.5  Min: 106  Max: 132Heart Rate (Monitored): (!) 109  Blood Pressure: (!) 163/83 (rn notified), NIBP: 150/82     Respiratory:    Respiration: 16, Pulse Oximetry: 93 %     Work Of Breathing / Effort: Mild  RUL Breath Sounds: Clear, RML Breath Sounds: Diminished, RLL Breath Sounds: Diminished, DARNELL Breath Sounds: Clear, LLL Breath Sounds: Diminished  Fluids:    Intake/Output Summary (Last 24 hours) at 02/10/18 0908  Last data filed at 02/10/18 0400   Gross per 24 hour   Intake             1605 ml   Output                0 ml   Net             1605 ml     Weight: 79 kg (174 lb 2.6 oz)  GI/Nutrition:  Orders Placed This Encounter   Procedures   • DIET ORDER     Standing Status:   Standing     Number of  Occurrences:   1     Order Specific Question:   Diet:     Answer:   Full Liquid [11]     Order Specific Question:   Diet:     Answer:   2 Gram Sodium [7]     Medical Decision Making, by Problem:  Active Hospital Problems    Diagnosis   • *Acute upper GI bleeding [K92.2]   • Acute blood loss anemia [D62]   • Tachycardia [R00.0]   • History of hepatitis C [Z86.19]   • Insulin dependent diabetes mellitus (CMS-HCC) [E11.9, Z79.4]   • History of cirrhosis of liver [Z87.19]   • Methamphetamine abuse [F15.10]   • Hepatic encephalopathy (CMS-HCC) [K72.90]   • Leukocytosis [D72.829]   • Elevated liver enzymes [R74.8]   Impression:  1 Decompensated cirrhosis  2 HCV  3. Esoph variceal bleed.   4 Anemia of acute bleed  5 Encephalopathy  6. Ascites  7. Shock liver probably accounts for some of her decompensation. Enzymes are coming down.  8. Gallstones.  9. Meth abuse  Plan:  Plan:    1. DC octreotide. Start bid propranolol.  2. Add zinc to encephalopathy cocktail.  3 GI signing off. I'll arrange outpatient followup.         Reviewed items::  Labs reviewed, Medications reviewed and Radiology images reviewed

## 2018-02-11 LAB
ALBUMIN SERPL BCP-MCNC: 2.3 G/DL (ref 3.2–4.9)
ALBUMIN/GLOB SERPL: 0.9 G/DL
ALP SERPL-CCNC: 62 U/L (ref 30–99)
ALT SERPL-CCNC: 519 U/L (ref 2–50)
AMMONIA PLAS-SCNC: 88 UMOL/L (ref 11–45)
ANION GAP SERPL CALC-SCNC: 3 MMOL/L (ref 0–11.9)
AST SERPL-CCNC: 176 U/L (ref 12–45)
BILIRUB SERPL-MCNC: 1.1 MG/DL (ref 0.1–1.5)
BNP SERPL-MCNC: 143 PG/ML (ref 0–100)
BUN SERPL-MCNC: 22 MG/DL (ref 8–22)
CALCIUM SERPL-MCNC: 7.9 MG/DL (ref 8.5–10.5)
CHLORIDE SERPL-SCNC: 117 MMOL/L (ref 96–112)
CO2 SERPL-SCNC: 24 MMOL/L (ref 20–33)
CREAT SERPL-MCNC: 0.47 MG/DL (ref 0.5–1.4)
ERYTHROCYTE [DISTWIDTH] IN BLOOD BY AUTOMATED COUNT: 52.2 FL (ref 35.9–50)
GLOBULIN SER CALC-MCNC: 2.6 G/DL (ref 1.9–3.5)
GLUCOSE BLD-MCNC: 162 MG/DL (ref 65–99)
GLUCOSE BLD-MCNC: 192 MG/DL (ref 65–99)
GLUCOSE BLD-MCNC: 209 MG/DL (ref 65–99)
GLUCOSE BLD-MCNC: 212 MG/DL (ref 65–99)
GLUCOSE SERPL-MCNC: 133 MG/DL (ref 65–99)
HCT VFR BLD AUTO: 27 % (ref 37–47)
HGB BLD-MCNC: 7.6 G/DL (ref 12–16)
HGB BLD-MCNC: 8.3 G/DL (ref 12–16)
MAGNESIUM SERPL-MCNC: 1.9 MG/DL (ref 1.5–2.5)
MCH RBC QN AUTO: 24.2 PG (ref 27–33)
MCHC RBC AUTO-ENTMCNC: 30.7 G/DL (ref 33.6–35)
MCV RBC AUTO: 78.7 FL (ref 81.4–97.8)
PHOSPHATE SERPL-MCNC: 1.8 MG/DL (ref 2.5–4.5)
PLATELET # BLD AUTO: 78 K/UL (ref 164–446)
PMV BLD AUTO: 11.7 FL (ref 9–12.9)
POTASSIUM SERPL-SCNC: 3.8 MMOL/L (ref 3.6–5.5)
PROT SERPL-MCNC: 4.9 G/DL (ref 6–8.2)
RBC # BLD AUTO: 3.43 M/UL (ref 4.2–5.4)
SODIUM SERPL-SCNC: 144 MMOL/L (ref 135–145)
WBC # BLD AUTO: 6 K/UL (ref 4.8–10.8)

## 2018-02-11 PROCEDURE — 84100 ASSAY OF PHOSPHORUS: CPT

## 2018-02-11 PROCEDURE — 700105 HCHG RX REV CODE 258: Performed by: HOSPITALIST

## 2018-02-11 PROCEDURE — 700102 HCHG RX REV CODE 250 W/ 637 OVERRIDE(OP): Performed by: INTERNAL MEDICINE

## 2018-02-11 PROCEDURE — 99232 SBSQ HOSP IP/OBS MODERATE 35: CPT | Performed by: HOSPITALIST

## 2018-02-11 PROCEDURE — 700111 HCHG RX REV CODE 636 W/ 250 OVERRIDE (IP): Performed by: HOSPITALIST

## 2018-02-11 PROCEDURE — 85027 COMPLETE CBC AUTOMATED: CPT

## 2018-02-11 PROCEDURE — 770020 HCHG ROOM/CARE - TELE (206)

## 2018-02-11 PROCEDURE — 85018 HEMOGLOBIN: CPT

## 2018-02-11 PROCEDURE — A9270 NON-COVERED ITEM OR SERVICE: HCPCS | Performed by: HOSPITALIST

## 2018-02-11 PROCEDURE — 700102 HCHG RX REV CODE 250 W/ 637 OVERRIDE(OP): Performed by: HOSPITALIST

## 2018-02-11 PROCEDURE — 81001 URINALYSIS AUTO W/SCOPE: CPT

## 2018-02-11 PROCEDURE — A9270 NON-COVERED ITEM OR SERVICE: HCPCS | Performed by: INTERNAL MEDICINE

## 2018-02-11 PROCEDURE — 82962 GLUCOSE BLOOD TEST: CPT | Mod: 91

## 2018-02-11 PROCEDURE — 83735 ASSAY OF MAGNESIUM: CPT

## 2018-02-11 PROCEDURE — 83880 ASSAY OF NATRIURETIC PEPTIDE: CPT

## 2018-02-11 PROCEDURE — 82140 ASSAY OF AMMONIA: CPT

## 2018-02-11 PROCEDURE — 80053 COMPREHEN METABOLIC PANEL: CPT

## 2018-02-11 RX ORDER — SUCRALFATE ORAL 1 G/10ML
1 SUSPENSION ORAL EVERY 6 HOURS
Status: DISCONTINUED | OUTPATIENT
Start: 2018-02-11 | End: 2018-02-14 | Stop reason: HOSPADM

## 2018-02-11 RX ADMIN — SPIRONOLACTONE 50 MG: 25 TABLET, FILM COATED ORAL at 10:40

## 2018-02-11 RX ADMIN — RIFAXIMIN 550 MG: 550 TABLET ORAL at 21:53

## 2018-02-11 RX ADMIN — RIFAXIMIN 550 MG: 550 TABLET ORAL at 10:40

## 2018-02-11 RX ADMIN — INSULIN HUMAN 2 UNITS: 100 INJECTION, SOLUTION PARENTERAL at 06:04

## 2018-02-11 RX ADMIN — SUCRALFATE 1 G: 1 SUSPENSION ORAL at 10:40

## 2018-02-11 RX ADMIN — SODIUM CHLORIDE: 9 INJECTION, SOLUTION INTRAVENOUS at 11:39

## 2018-02-11 RX ADMIN — GABAPENTIN 300 MG: 300 CAPSULE ORAL at 21:52

## 2018-02-11 RX ADMIN — SUCRALFATE 1 G: 1 SUSPENSION ORAL at 16:56

## 2018-02-11 RX ADMIN — GABAPENTIN 300 MG: 300 CAPSULE ORAL at 10:40

## 2018-02-11 RX ADMIN — OMEPRAZOLE 20 MG: 20 CAPSULE, DELAYED RELEASE ORAL at 10:40

## 2018-02-11 RX ADMIN — OMEPRAZOLE 20 MG: 20 CAPSULE, DELAYED RELEASE ORAL at 21:52

## 2018-02-11 RX ADMIN — ONDANSETRON 4 MG: 2 INJECTION INTRAMUSCULAR; INTRAVENOUS at 22:02

## 2018-02-11 RX ADMIN — INSULIN HUMAN 1 UNITS: 100 INJECTION, SOLUTION PARENTERAL at 17:00

## 2018-02-11 RX ADMIN — CEFTRIAXONE 2 G: 2 INJECTION, POWDER, FOR SOLUTION INTRAMUSCULAR; INTRAVENOUS at 21:52

## 2018-02-11 RX ADMIN — Medication 220 MG: at 10:40

## 2018-02-11 RX ADMIN — INSULIN HUMAN 2 UNITS: 100 INJECTION, SOLUTION PARENTERAL at 11:36

## 2018-02-11 RX ADMIN — HEPARIN 400 UNITS: 100 SYRINGE at 07:47

## 2018-02-11 RX ADMIN — PROPRANOLOL HYDROCHLORIDE 10 MG: 10 TABLET ORAL at 10:40

## 2018-02-11 ASSESSMENT — ENCOUNTER SYMPTOMS
ABDOMINAL PAIN: 1
BLOOD IN STOOL: 1
CARDIOVASCULAR NEGATIVE: 1
RESPIRATORY NEGATIVE: 1

## 2018-02-11 ASSESSMENT — PAIN SCALES - GENERAL
PAINLEVEL_OUTOF10: 0
PAINLEVEL_OUTOF10: 0
PAINLEVEL_OUTOF10: 3
PAINLEVEL_OUTOF10: 0

## 2018-02-11 NOTE — CARE PLAN
Problem: Safety  Goal: Will remain free from falls    Intervention: Implement fall precautions  Bed locked and low. Alarm on. Call light and belongings in reach. Hourly rounds in place.       Problem: Knowledge Deficit  Goal: Knowledge of disease process/condition, treatment plan, diagnostic tests, and medications will improve    Intervention: Assess knowledge level of disease process/condition, treatment plan, diagnostic tests, and medications  POC discussed with pt. Pt verbalizes understanding for upcoming procedure tomorrow. All questions answered at this time.

## 2018-02-11 NOTE — PROGRESS NOTES
[facility] Hospitalist Progress Note    Date of Service: 2/10/2018    Chief Complaint  57 y.o. female admitted 2018 with h/o Hep C, meth abuse, variceal bleeding  Admitted with concerns of upper GIB  S/p 6 bands by GI , sent to ICU on protonix and octreotide drips  Transferred to Tele   Interval Problem Update  Patient seen and examined today.    Patient tolerating treatment and therapies.  All Data, Medication data reviewed.  Case discussed with nursing as available.  Plan of Care reviewed with patient and notified of changes.  2/10 Pt c/o mid abdominal pain, still dropping H/H, c/w octreo/ppi, Gi notified, more transfusion needed    Consultants/Specialty  GI    Disposition  Transfer to tele          Review of Systems   Constitutional: Negative for chills and fever.   HENT: Negative.    Eyes: Negative.    Respiratory: Negative.  Negative for cough.    Cardiovascular: Negative.  Negative for chest pain and palpitations.   Gastrointestinal: Positive for abdominal pain and blood in stool. Negative for heartburn, nausea and vomiting.   Genitourinary: Negative.  Negative for dysuria and frequency.   Musculoskeletal: Negative.  Negative for back pain and neck pain.   Skin: Negative.  Negative for itching and rash.   Neurological: Positive for weakness. Negative for dizziness, focal weakness and headaches.   Endo/Heme/Allergies: Negative.  Negative for polydipsia. Does not bruise/bleed easily.   Psychiatric/Behavioral: Negative for depression. The patient is nervous/anxious.       Physical Exam  Laboratory/Imaging   Hemodynamics  Temp (24hrs), Av.7 °C (98 °F), Min:36.4 °C (97.6 °F), Max:37.1 °C (98.7 °F)   Temperature: 37.1 °C (98.7 °F)  Pulse  Av.4  Min: 84  Max: 132   Blood Pressure: 102/50      Respiratory      Respiration: 14, Pulse Oximetry: 93 %     Work Of Breathing / Effort: Mild  RUL Breath Sounds: Clear, RML Breath Sounds: Diminished, RLL Breath Sounds: Diminished, DARNELL Breath Sounds: Clear, LLL  Breath Sounds: Diminished    Fluids    Intake/Output Summary (Last 24 hours) at 02/10/18 1636  Last data filed at 02/10/18 0400   Gross per 24 hour   Intake             1605 ml   Output                0 ml   Net             1605 ml       Nutrition  Orders Placed This Encounter   Procedures   • DIET ORDER     Standing Status:   Standing     Number of Occurrences:   1     Order Specific Question:   Diet:     Answer:   2 Gram Sodium [7]     Order Specific Question:   Diet:     Answer:   Regular [1]     Physical Exam   Constitutional: She is oriented to person, place, and time. She appears well-developed and well-nourished.   HENT:   Head: Normocephalic and atraumatic.   Nose: Nose normal.   Mouth/Throat: Oropharynx is clear and moist.   Eyes: Conjunctivae and EOM are normal. Pupils are equal, round, and reactive to light.   Neck: Normal range of motion. Neck supple. No JVD present. No thyromegaly present.   Cardiovascular: Normal rate, regular rhythm and normal heart sounds.  Exam reveals no gallop and no friction rub.    No murmur heard.  Pulmonary/Chest: Effort normal and breath sounds normal. No respiratory distress. She has no wheezes. She has no rales.   Abdominal: Soft. Bowel sounds are normal. She exhibits no distension and no mass. There is tenderness. There is no rebound and no guarding.   Musculoskeletal: Normal range of motion. She exhibits no edema or tenderness.   Lymphadenopathy:     She has no cervical adenopathy.   Neurological: She is alert and oriented to person, place, and time. No cranial nerve deficit.   lethargic   Skin: Skin is warm and dry. She is not diaphoretic. No erythema. There is pallor.   Psychiatric: She has a normal mood and affect. Her behavior is normal.   Nursing note and vitals reviewed.      Recent Labs      02/07/18   1740  02/08/18   0420   02/09/18   0230  02/09/18   0524   02/10/18   0045  02/10/18   0650  02/10/18   1123   WBC  16.7*  12.9*   --    --    --    --   11.2*   --     --    RBC  3.91*  3.42*   --    --    --    --   3.35*   --    --    HEMOGLOBIN  7.9*  7.3*   < >  7.3*  7.0*   < >  7.5*  7.2*  6.4*   HEMATOCRIT  28.7*  24.9*   < >  25.0*  24.8*   --   26.3*   --    --    MCV  73.4*  72.8*   --    --    --    --   78.5*   --    --    MCH  20.2*  21.3*   --    --    --    --   22.4*   --    --    MCHC  27.5*  29.3*   --    --    --    --   28.5*   --    --    RDW  44.4  43.5   --    --    --    --   51.6*   --    --    PLATELETCT  180  126*   --    --    --    --   99*   --    --    MPV  11.1  11.7   --    --    --    --   11.5   --    --     < > = values in this interval not displayed.     Recent Labs      02/08/18   0420  02/09/18   0524  02/10/18   0045   SODIUM  144  147*  144   POTASSIUM  4.4  4.6  3.1*   CHLORIDE  113*  119*  123*   CO2  27  25  16*   GLUCOSE  309*  298*  241*   BUN  42*  47*  27*   CREATININE  0.74  0.75  0.47*   CALCIUM  8.4*  8.4*  6.0*     Recent Labs      02/07/18   1740  02/08/18   1924  02/09/18   0524   APTT  35.5   --    --    INR  1.68*  2.03*  1.95*                  Assessment/Plan     * Acute upper GI bleeding- (present on admission)   Assessment & Plan    2/2 esophageal varices with active bleeding as per EGD/ Dr. Mauricio  History of esophageal varices 2/2 alcoholic cirrhosis of the liver  H/O of multiple variceal banding and ligations in the past by Dr. Dent  IV octreotide and IV protonix  Monitoring q6 hour h/h, transfuse if below 7          Tachycardia- (present on admission)   Assessment & Plan    2/2 anemia  Transfusing for <7, improved from yesterday        Acute blood loss anemia- (present on admission)   Assessment & Plan    Transfuse as per guidelines        Elevated liver enzymes- (present on admission)   Assessment & Plan    Multifactorial including reactive, IV drugs, question her compliance with alcohol cessation, he reports her last drink was 2 months ago        Leukocytosis- (present on admission)   Assessment & Plan     Likely reactive 2/2 acute GI bleeding  Covered with C3, cx pending? Don't see any collected  Resolving        Hepatic encephalopathy (CMS-HCC)- (present on admission)   Assessment & Plan    Patient does alter between AAOx2-AAox3.  Ammonia sitll elevated >100  Continues on xifaxin and lactulose        Methamphetamine abuse- (present on admission)   Assessment & Plan    Patient reports last using methamphetamine via intravenous route 3 days ago          History of cirrhosis of liver- (present on admission)   Assessment & Plan    Liver cirrhosis 2/2 alcohol,   Last drink was about 2 months ago        Insulin dependent diabetes mellitus (CMS-HCC)- (present on admission)   Assessment & Plan    Uncontrolled  Continue Insulin-sliding scale, accu-checks and hypoglycemia protocol.            History of hepatitis C- (present on admission)   Assessment & Plan    2/2 IV drug abuse; still continues to use IV methamphetamine          Plan  Transfuse  C/w octreo/ppi gtt  Close watch  May need addl. Endo  D/w GI via RN  Am labs  See orders      Reviewed items::  Labs reviewed and Medications reviewed  Berry catheter::  No Berry  Central line in place:  Need for access  DVT prophylaxis pharmacological::  Contraindicated - Anemia requiring blood transfusion  DVT prophylaxis - mechanical:  SCDs  Ulcer Prophylaxis::  Yes  Antibiotics:  Treating active infection/contamination beyond 24 hours perioperative coverage

## 2018-02-11 NOTE — PROGRESS NOTES
Gastroenterology Progress Note     Author: Davonte Mauricio   Date & Time Created: 2/11/2018 11:43 AM    Interval History:  Problem: decompensated cirrhosis with variceal bleeding banded 2/8.  S: Black stools last night. C/O some epigastric pain and some dysphagia after recent banding. Hct drifted down and she needed a transfusion yesterday. Repeat EGD discussed with her today.       Review of Systems:  Review of Systems   Respiratory: Negative.    Cardiovascular: Negative.    Gastrointestinal: Positive for abdominal pain and blood in stool.       Physical Exam:  Physical Exam   Constitutional: She is oriented to person, place, and time. She appears well-developed and well-nourished.   Cardiovascular: Normal rate and regular rhythm.    Pulmonary/Chest: Effort normal and breath sounds normal.   Abdominal: Soft. Bowel sounds are normal. There is tenderness.   Epigastric tenderness.    Musculoskeletal: She exhibits no edema.   Neurological: She is alert and oriented to person, place, and time.   Skin: Skin is warm and dry.   Psychiatric: She has a normal mood and affect.       Labs:        Invalid input(s): [id number]  Recent Labs      02/11/18   0240   BNPBTYPENAT  143*     Recent Labs      02/09/18   0524  02/10/18   0045  02/11/18   0240   SODIUM  147*  144  144   POTASSIUM  4.6  3.1*  3.8   CHLORIDE  119*  123*  117*   CO2  25  16*  24   BUN  47*  27*  22   CREATININE  0.75  0.47*  0.47*   MAGNESIUM  2.0   --   1.9   PHOSPHORUS  2.2*   --   1.8*   CALCIUM  8.4*  6.0*  7.9*     Recent Labs      02/09/18   0524  02/10/18   0045  02/11/18   0240   ALTSGPT  1024*  622*  519*   ASTSGOT  1122*  372*  176*   ALKPHOSPHAT  54  51  62   TBILIRUBIN  0.6  0.8  1.1   GLUCOSE  298*  241*  133*     Recent Labs      02/08/18   1924   02/09/18   0524   02/10/18   0045   02/10/18   1730  02/11/18   0240  02/11/18   0712   RBC   --    --    --    --   3.35*   --    --   3.43*   --    HEMOGLOBIN   --    < >  7.0*   < >  7.5*   <  >  8.1*  8.3*  7.6*   HEMATOCRIT   --    < >  24.8*   --   26.3*   --    --   27.0*   --    PLATELETCT   --    --    --    --   99*   --    --   78*   --    PROTHROMBTM  22.6*   --   21.9*   --    --    --    --    --    --    INR  2.03*   --   1.95*   --    --    --    --    --    --     < > = values in this interval not displayed.     Recent Labs      18   0524  02/10/18   0045  18   0240   WBC   --   11.2*  6.0   NEUTSPOLYS   --   72.10*   --    LYMPHOCYTES   --   14.20*   --    MONOCYTES   --   8.50   --    EOSINOPHILS   --   3.70   --    BASOPHILS   --   0.90   --    ASTSGOT  1122*  372*  176*   ALTSGPT  1024*  622*  519*   ALKPHOSPHAT  54  51  62   TBILIRUBIN  0.6  0.8  1.1     Hemodynamics:  Temp (24hrs), Av.5 °C (97.7 °F), Min:36.2 °C (97.1 °F), Max:37.1 °C (98.7 °F)  Temperature: 36.2 °C (97.1 °F)  Pulse  Av.3  Min: 70  Max: 132  Blood Pressure: 130/75     Respiratory:    Respiration: 18, Pulse Oximetry: 96 %     Work Of Breathing / Effort: Mild  RUL Breath Sounds: Clear, RML Breath Sounds: Diminished, RLL Breath Sounds: Diminished, DARNELL Breath Sounds: Clear, LLL Breath Sounds: Diminished  Fluids:    Intake/Output Summary (Last 24 hours) at 18 1143  Last data filed at 02/10/18 1815   Gross per 24 hour   Intake              350 ml   Output                0 ml   Net              350 ml     Weight: 85 kg (187 lb 6.3 oz)  GI/Nutrition:  Orders Placed This Encounter   Procedures   • DIET ORDER     Standing Status:   Standing     Number of Occurrences:   1     Order Specific Question:   Diet:     Answer:   2 Gram Sodium [7]     Order Specific Question:   Diet:     Answer:   Regular [1]     Medical Decision Making, by Problem:  Active Hospital Problems    Diagnosis   • *Acute upper GI bleeding [K92.2]   • Acute blood loss anemia [D62]   • Tachycardia [R00.0]   • History of hepatitis C [Z86.19]   • Insulin dependent diabetes mellitus (CMS-HCC) [E11.9, Z79.4]   • History of cirrhosis of  liver [Z87.19]   • Methamphetamine abuse [F15.10]   • Hepatic encephalopathy (CMS-HCC) [K72.90]   • Leukocytosis [D72.829]   • Elevated liver enzymes [R74.8]   Impression:  1. Decompensated cirrhosis.  2. Recent variceal bleed.  3. Ongoing melena.  4. Dysphagia.  5. Anemia of acute bleed.     Plan:  1. NPO at MN.  2. EGD in am with repeat banding if indicated. Dr. Bustamante.     Quality-Core Measures

## 2018-02-11 NOTE — CARE PLAN
Problem: Safety  Goal: Will remain free from injury  Outcome: PROGRESSING AS EXPECTED  Pt remains free from falls or injuries. Bed alarm set and pt calls for assistance appropriately.     Problem: Pain Management  Goal: Pain level will decrease to patient's comfort goal  Outcome: PROGRESSING AS EXPECTED  Pt free from pain at this time. Pt resting comfortably in bed.

## 2018-02-11 NOTE — PROGRESS NOTES
Pt care assumed. Pt lying comfortably in bed. A&O x 4. No distress present; no pain. Call light, phone, and bedside table within reach. White board updated and plan of care discussed with patient. Bed alarm and strip alarm set, and pt calls for assistance appropriately. No concerns present at this time. Will continue to monitor.

## 2018-02-11 NOTE — PROGRESS NOTES
"Report received. Assessment complete. Pt lethargic, oriented x 4. C/O \"stomach\" pain, declines pain meds at this time. POC discussed. Call light & belongings in reach. Bed locked and low. Alarm on & fall precautions in place.   "

## 2018-02-12 ENCOUNTER — APPOINTMENT (OUTPATIENT)
Dept: RADIOLOGY | Facility: MEDICAL CENTER | Age: 58
DRG: 368 | End: 2018-02-12
Attending: HOSPITALIST
Payer: MEDICAID

## 2018-02-12 ENCOUNTER — PATIENT OUTREACH (OUTPATIENT)
Dept: HEALTH INFORMATION MANAGEMENT | Facility: OTHER | Age: 58
End: 2018-02-12

## 2018-02-12 LAB
AMMONIA PLAS-SCNC: 67 UMOL/L (ref 11–45)
APPEARANCE UR: CLEAR
BACTERIA #/AREA URNS HPF: NEGATIVE /HPF
BILIRUB UR QL STRIP.AUTO: NEGATIVE
COLOR UR: YELLOW
EPI CELLS #/AREA URNS HPF: ABNORMAL /HPF
ERYTHROCYTE [DISTWIDTH] IN BLOOD BY AUTOMATED COUNT: 52.7 FL (ref 35.9–50)
GLUCOSE BLD-MCNC: 129 MG/DL (ref 65–99)
GLUCOSE BLD-MCNC: 131 MG/DL (ref 65–99)
GLUCOSE BLD-MCNC: 176 MG/DL (ref 65–99)
GLUCOSE BLD-MCNC: 228 MG/DL (ref 65–99)
GLUCOSE UR STRIP.AUTO-MCNC: NEGATIVE MG/DL
HCT VFR BLD AUTO: 26.5 % (ref 37–47)
HGB BLD-MCNC: 8 G/DL (ref 12–16)
KETONES UR STRIP.AUTO-MCNC: NEGATIVE MG/DL
LEUKOCYTE ESTERASE UR QL STRIP.AUTO: ABNORMAL
MCH RBC QN AUTO: 23.7 PG (ref 27–33)
MCHC RBC AUTO-ENTMCNC: 30.2 G/DL (ref 33.6–35)
MCV RBC AUTO: 78.4 FL (ref 81.4–97.8)
MICRO URNS: ABNORMAL
NITRITE UR QL STRIP.AUTO: NEGATIVE
PH UR STRIP.AUTO: 5.5 [PH]
PLATELET # BLD AUTO: 65 K/UL (ref 164–446)
PROT UR QL STRIP: NEGATIVE MG/DL
RBC # BLD AUTO: 3.38 M/UL (ref 4.2–5.4)
RBC # URNS HPF: ABNORMAL /HPF
RBC UR QL AUTO: ABNORMAL
SP GR UR STRIP.AUTO: 1.02
UROBILINOGEN UR STRIP.AUTO-MCNC: 0.2 MG/DL
WBC # BLD AUTO: 5.8 K/UL (ref 4.8–10.8)
WBC #/AREA URNS HPF: ABNORMAL /HPF

## 2018-02-12 PROCEDURE — 82962 GLUCOSE BLOOD TEST: CPT | Mod: 91

## 2018-02-12 PROCEDURE — 0DJ08ZZ INSPECTION OF UPPER INTESTINAL TRACT, VIA NATURAL OR ARTIFICIAL OPENING ENDOSCOPIC: ICD-10-PCS | Performed by: INTERNAL MEDICINE

## 2018-02-12 PROCEDURE — 85027 COMPLETE CBC AUTOMATED: CPT

## 2018-02-12 PROCEDURE — 700102 HCHG RX REV CODE 250 W/ 637 OVERRIDE(OP): Performed by: HOSPITALIST

## 2018-02-12 PROCEDURE — 700102 HCHG RX REV CODE 250 W/ 637 OVERRIDE(OP)

## 2018-02-12 PROCEDURE — 70450 CT HEAD/BRAIN W/O DYE: CPT

## 2018-02-12 PROCEDURE — A9270 NON-COVERED ITEM OR SERVICE: HCPCS | Performed by: INTERNAL MEDICINE

## 2018-02-12 PROCEDURE — A9270 NON-COVERED ITEM OR SERVICE: HCPCS | Performed by: HOSPITALIST

## 2018-02-12 PROCEDURE — A9270 NON-COVERED ITEM OR SERVICE: HCPCS

## 2018-02-12 PROCEDURE — 160048 HCHG OR STATISTICAL LEVEL 1-5: Performed by: INTERNAL MEDICINE

## 2018-02-12 PROCEDURE — 160035 HCHG PACU - 1ST 60 MINS PHASE I: Performed by: INTERNAL MEDICINE

## 2018-02-12 PROCEDURE — 700102 HCHG RX REV CODE 250 W/ 637 OVERRIDE(OP): Performed by: INTERNAL MEDICINE

## 2018-02-12 PROCEDURE — 82140 ASSAY OF AMMONIA: CPT

## 2018-02-12 PROCEDURE — 700111 HCHG RX REV CODE 636 W/ 250 OVERRIDE (IP)

## 2018-02-12 PROCEDURE — 770020 HCHG ROOM/CARE - TELE (206)

## 2018-02-12 PROCEDURE — 160002 HCHG RECOVERY MINUTES (STAT): Performed by: INTERNAL MEDICINE

## 2018-02-12 PROCEDURE — 99232 SBSQ HOSP IP/OBS MODERATE 35: CPT | Performed by: HOSPITALIST

## 2018-02-12 PROCEDURE — 700111 HCHG RX REV CODE 636 W/ 250 OVERRIDE (IP): Performed by: HOSPITALIST

## 2018-02-12 PROCEDURE — 160036 HCHG PACU - EA ADDL 30 MINS PHASE I: Performed by: INTERNAL MEDICINE

## 2018-02-12 PROCEDURE — 160203 HCHG ENDO MINUTES - 1ST 30 MINS LEVEL 4: Performed by: INTERNAL MEDICINE

## 2018-02-12 PROCEDURE — 700105 HCHG RX REV CODE 258: Performed by: HOSPITALIST

## 2018-02-12 PROCEDURE — 500066 HCHG BITE BLOCK, ECT: Performed by: INTERNAL MEDICINE

## 2018-02-12 PROCEDURE — 160009 HCHG ANES TIME/MIN: Performed by: INTERNAL MEDICINE

## 2018-02-12 RX ORDER — MORPHINE SULFATE 4 MG/ML
2-4 INJECTION, SOLUTION INTRAMUSCULAR; INTRAVENOUS EVERY 4 HOURS PRN
Status: DISCONTINUED | OUTPATIENT
Start: 2018-02-12 | End: 2018-02-14 | Stop reason: HOSPADM

## 2018-02-12 RX ORDER — TRAMADOL HYDROCHLORIDE 50 MG/1
50 TABLET ORAL EVERY 6 HOURS PRN
Status: DISCONTINUED | OUTPATIENT
Start: 2018-02-12 | End: 2018-02-14 | Stop reason: HOSPADM

## 2018-02-12 RX ORDER — OXYCODONE HCL 5 MG/5 ML
SOLUTION, ORAL ORAL
Status: COMPLETED
Start: 2018-02-12 | End: 2018-02-12

## 2018-02-12 RX ADMIN — RIFAXIMIN 550 MG: 550 TABLET ORAL at 20:54

## 2018-02-12 RX ADMIN — SUCRALFATE 1 G: 1 SUSPENSION ORAL at 18:57

## 2018-02-12 RX ADMIN — INSULIN HUMAN 1 UNITS: 100 INJECTION, SOLUTION PARENTERAL at 01:03

## 2018-02-12 RX ADMIN — GABAPENTIN 300 MG: 300 CAPSULE ORAL at 20:54

## 2018-02-12 RX ADMIN — LACTULOSE 30 ML: 20 SOLUTION ORAL at 16:16

## 2018-02-12 RX ADMIN — LACTULOSE 30 ML: 20 SOLUTION ORAL at 20:55

## 2018-02-12 RX ADMIN — CEFTRIAXONE 2 G: 2 INJECTION, POWDER, FOR SOLUTION INTRAMUSCULAR; INTRAVENOUS at 18:57

## 2018-02-12 RX ADMIN — INSULIN HUMAN 2 UNITS: 100 INJECTION, SOLUTION PARENTERAL at 17:17

## 2018-02-12 RX ADMIN — SODIUM CHLORIDE: 9 INJECTION, SOLUTION INTRAVENOUS at 02:04

## 2018-02-12 RX ADMIN — OXYCODONE HYDROCHLORIDE 5 MG: 5 SOLUTION ORAL at 11:40

## 2018-02-12 RX ADMIN — INSULIN HUMAN 1 UNITS: 100 INJECTION, SOLUTION PARENTERAL at 23:55

## 2018-02-12 RX ADMIN — PROPRANOLOL HYDROCHLORIDE 10 MG: 10 TABLET ORAL at 20:54

## 2018-02-12 RX ADMIN — SUCRALFATE 1 G: 1 SUSPENSION ORAL at 23:49

## 2018-02-12 RX ADMIN — SUCRALFATE 1 G: 1 SUSPENSION ORAL at 13:26

## 2018-02-12 RX ADMIN — MORPHINE SULFATE 2 MG: 4 INJECTION INTRAVENOUS at 09:38

## 2018-02-12 RX ADMIN — OMEPRAZOLE 20 MG: 20 CAPSULE, DELAYED RELEASE ORAL at 20:54

## 2018-02-12 ASSESSMENT — PAIN SCALES - GENERAL
PAINLEVEL_OUTOF10: 5
PAINLEVEL_OUTOF10: 0
PAINLEVEL_OUTOF10: 5
PAINLEVEL_OUTOF10: 0

## 2018-02-12 ASSESSMENT — ENCOUNTER SYMPTOMS
ABDOMINAL PAIN: 1
NECK PAIN: 0
DIZZINESS: 0
EYES NEGATIVE: 1
MUSCULOSKELETAL NEGATIVE: 1
DEPRESSION: 0
WEAKNESS: 1
FEVER: 0
BACK PAIN: 0
BLOOD IN STOOL: 1
NERVOUS/ANXIOUS: 1
HEADACHES: 0
HEARTBURN: 0
VOMITING: 0
CHILLS: 0
RESPIRATORY NEGATIVE: 1
POLYDIPSIA: 0
FOCAL WEAKNESS: 0
BRUISES/BLEEDS EASILY: 0
COUGH: 0
CARDIOVASCULAR NEGATIVE: 1
PALPITATIONS: 0
NAUSEA: 0

## 2018-02-12 NOTE — PROGRESS NOTES
[facility] Hospitalist Progress Note    Date of Service: 2018    Chief Complaint  57 y.o. female admitted 2018 with h/o Hep C, meth abuse, variceal bleeding  Admitted with concerns of upper GIB  S/p 6 bands by GI , sent to ICU on protonix and octreotide drips  Transferred to Tele   Interval Problem Update  Patient seen and examined today.    Patient tolerating treatment and therapies.  All Data, Medication data reviewed.  Case discussed with nursing as available.  Plan of Care reviewed with patient and notified of changes.  2/10 Pt c/o mid abdominal pain, still dropping H/H, c/w octreo/ppi, Gi notified, more transfusion needed   patient feels some better, but still has epigastric pain and low H&H, plan was to have a number look by EGD in a.m.   the patient complains of significant posterior headache, is scheduled for EGD again today, still has epigastric discomfort  Consultants/Specialty  GI    Disposition   tele          Review of Systems   Constitutional: Negative for chills and fever.   HENT: Negative.    Eyes: Negative.    Respiratory: Negative.  Negative for cough.    Cardiovascular: Negative.  Negative for chest pain and palpitations.   Gastrointestinal: Positive for abdominal pain and blood in stool. Negative for heartburn, nausea and vomiting.   Genitourinary: Negative.  Negative for dysuria and frequency.   Musculoskeletal: Negative.  Negative for back pain and neck pain.   Skin: Negative.  Negative for itching and rash.   Neurological: Positive for weakness. Negative for dizziness, focal weakness and headaches.   Endo/Heme/Allergies: Negative.  Negative for polydipsia. Does not bruise/bleed easily.   Psychiatric/Behavioral: Negative for depression. The patient is nervous/anxious.       Physical Exam  Laboratory/Imaging   Hemodynamics  Temp (24hrs), Av.5 °C (97.7 °F), Min:36 °C (96.8 °F), Max:36.8 °C (98.2 °F)   Temperature: 36.8 °C (98.2 °F)  Pulse  Av.7  Min: 60  Max: 132  Heart Rate (Monitored): 88  Blood Pressure: 152/82, NIBP: 159/69      Respiratory      Respiration: 14, Pulse Oximetry: 94 %     Work Of Breathing / Effort: Mild  RUL Breath Sounds: Clear, RML Breath Sounds: Diminished, RLL Breath Sounds: Diminished, DARNELL Breath Sounds: Clear, LLL Breath Sounds: Diminished    Fluids    Intake/Output Summary (Last 24 hours) at 02/12/18 1455  Last data filed at 02/12/18 1045   Gross per 24 hour   Intake              200 ml   Output                0 ml   Net              200 ml       Nutrition  Orders Placed This Encounter   Procedures   • DIET ORDER     Standing Status:   Standing     Number of Occurrences:   1     Order Specific Question:   Diet:     Answer:   Diabetic [3]     Physical Exam   Constitutional: She is oriented to person, place, and time. She appears well-developed and well-nourished.   HENT:   Head: Normocephalic and atraumatic.   Nose: Nose normal.   Mouth/Throat: Oropharynx is clear and moist.   Eyes: Conjunctivae and EOM are normal. Pupils are equal, round, and reactive to light.   Neck: Normal range of motion. Neck supple. No JVD present. No thyromegaly present.   Cardiovascular: Normal rate, regular rhythm and normal heart sounds.  Exam reveals no gallop and no friction rub.    No murmur heard.  Pulmonary/Chest: Effort normal and breath sounds normal. No respiratory distress. She has no wheezes. She has no rales.   Abdominal: Soft. Bowel sounds are normal. She exhibits no distension and no mass. There is tenderness. There is no rebound and no guarding.   Musculoskeletal: Normal range of motion. She exhibits no edema or tenderness.   Lymphadenopathy:     She has no cervical adenopathy.   Neurological: She is alert and oriented to person, place, and time. No cranial nerve deficit.   lethargic   Skin: Skin is warm and dry. She is not diaphoretic. No erythema. There is pallor.   Psychiatric: She has a normal mood and affect. Her behavior is normal.   Nursing note and  vitals reviewed.      Recent Labs      02/10/18   0045   02/11/18   0240  02/11/18   0712  02/12/18   0334   WBC  11.2*   --   6.0   --   5.8   RBC  3.35*   --   3.43*   --   3.38*   HEMOGLOBIN  7.5*   < >  8.3*  7.6*  8.0*   HEMATOCRIT  26.3*   --   27.0*   --   26.5*   MCV  78.5*   --   78.7*   --   78.4*   MCH  22.4*   --   24.2*   --   23.7*   MCHC  28.5*   --   30.7*   --   30.2*   RDW  51.6*   --   52.2*   --   52.7*   PLATELETCT  99*   --   78*   --   65*   MPV  11.5   --   11.7   --    --     < > = values in this interval not displayed.     Recent Labs      02/10/18   0045  02/11/18   0240   SODIUM  144  144   POTASSIUM  3.1*  3.8   CHLORIDE  123*  117*   CO2  16*  24   GLUCOSE  241*  133*   BUN  27*  22   CREATININE  0.47*  0.47*   CALCIUM  6.0*  7.9*         Recent Labs      02/11/18   0240   BNPBTYPENAT  143*              Assessment/Plan     * Acute upper GI bleeding- (present on admission)   Assessment & Plan    2/2 esophageal varices with active bleeding as per EGD/ Dr. Mauricio  History of esophageal varices 2/2 alcoholic cirrhosis of the liver  H/O of multiple variceal banding and ligations in the past by Dr. Dent   2nd look EGD revealed significant esophageal lesions, but no active bleeding          Tachycardia- (present on admission)   Assessment & Plan    2/2 anemia  Transfusing for <7, improved from yesterday        Acute blood loss anemia- (present on admission)   Assessment & Plan    Transfuse as per guidelines        Elevated liver enzymes- (present on admission)   Assessment & Plan    Multifactorial including reactive, IV drugs, question her compliance with alcohol cessation, he reports her last drink was 2 months ago        Leukocytosis- (present on admission)   Assessment & Plan    Likely reactive 2/2 acute GI bleeding  Covered with C3, cx pending? Don't see any collected  Resolving        Hepatic encephalopathy (CMS-HCC)- (present on admission)   Assessment & Plan    Patient does alter  between AAOx2-AAox3.  Ammonia sitll elevated >100  Continues on xifaxin and lactulose        Methamphetamine abuse- (present on admission)   Assessment & Plan    Patient reports last using methamphetamine via intravenous route 3 days ago          History of cirrhosis of liver- (present on admission)   Assessment & Plan    Liver cirrhosis 2/2 alcohol,   Last drink was about 2 months ago        Insulin dependent diabetes mellitus (CMS-HCC)- (present on admission)   Assessment & Plan    Uncontrolled  Continue Insulin-sliding scale, accu-checks and hypoglycemia protocol.            History of hepatitis C- (present on admission)   Assessment & Plan    2/2 IV drug abuse; still continues to use IV methamphetamine          Plan  Check CT head with severe headache  Continually GI remedies  Continue with lactulose to reduce ammonia  Close watch  If patient is improved she might be able to go soon   Am labs  See orders    Quality-Core Measures   Reviewed items::  Labs reviewed and Medications reviewed  Berry catheter::  No Berry  Central line in place:  Need for access  DVT prophylaxis pharmacological::  Contraindicated - Anemia requiring blood transfusion  DVT prophylaxis - mechanical:  SCDs  Ulcer Prophylaxis::  Yes  Antibiotics:  Treating active infection/contamination beyond 24 hours perioperative coverage

## 2018-02-12 NOTE — CARE PLAN
Problem: Communication  Goal: The ability to communicate needs accurately and effectively will improve  Outcome: PROGRESSING AS EXPECTED  Pt verbalized understanding of need to stay in hospital, and pt now resting in bed. Plan for EGD tomorrow and pt agrees to plan.     Problem: Safety  Goal: Will remain free from injury  Outcome: PROGRESSING AS EXPECTED  Pt remains free from falls or injuries. Bed alarm set and pt calls for assistance appropriately. Pt up standby assist to bathroom.

## 2018-02-12 NOTE — PROGRESS NOTES
Pt back from OR for EGD. Pt is somnolent but easy to arouse. Pt placed back onto the tele box, monitor room called to verify tele status: SR 89. Call light with in reach and all needs met at this time.

## 2018-02-12 NOTE — PROGRESS NOTES
[facility] Hospitalist Progress Note    Date of Service: 2018    Chief Complaint  57 y.o. female admitted 2018 with h/o Hep C, meth abuse, variceal bleeding  Admitted with concerns of upper GIB  S/p 6 bands by GI , sent to ICU on protonix and octreotide drips  Transferred to Tele   Interval Problem Update  Patient seen and examined today.    Patient tolerating treatment and therapies.  All Data, Medication data reviewed.  Case discussed with nursing as available.  Plan of Care reviewed with patient and notified of changes.  2/10 Pt c/o mid abdominal pain, still dropping H/H, c/w octreo/ppi, Gi notified, more transfusion needed   patient feels some better, but still has epigastric pain and low H&H, plan was to have a number look by EGD in a.m.  Consultants/Specialty  GI    Disposition  Transfer to tele          Review of Systems   Constitutional: Negative for chills and fever.   HENT: Negative.    Eyes: Negative.    Respiratory: Negative.  Negative for cough.    Cardiovascular: Negative.  Negative for chest pain and palpitations.   Gastrointestinal: Positive for abdominal pain and blood in stool. Negative for heartburn, nausea and vomiting.   Genitourinary: Negative.  Negative for dysuria and frequency.   Musculoskeletal: Negative.  Negative for back pain and neck pain.   Skin: Negative.  Negative for itching and rash.   Neurological: Positive for weakness. Negative for dizziness, focal weakness and headaches.   Endo/Heme/Allergies: Negative.  Negative for polydipsia. Does not bruise/bleed easily.   Psychiatric/Behavioral: Negative for depression. The patient is nervous/anxious.       Physical Exam  Laboratory/Imaging   Hemodynamics  Temp (24hrs), Av.5 °C (97.7 °F), Min:36 °C (96.8 °F), Max:36.8 °C (98.2 °F)   Temperature: 36.8 °C (98.2 °F)  Pulse  Av.7  Min: 60  Max: 132 Heart Rate (Monitored): 88  Blood Pressure: 152/82, NIBP: 159/69      Respiratory      Respiration: 14, Pulse Oximetry: 94  %     Work Of Breathing / Effort: Mild  RUL Breath Sounds: Clear, RML Breath Sounds: Diminished, RLL Breath Sounds: Diminished, DARNELL Breath Sounds: Clear, LLL Breath Sounds: Diminished    Fluids    Intake/Output Summary (Last 24 hours) at 02/12/18 1454  Last data filed at 02/12/18 1045   Gross per 24 hour   Intake              200 ml   Output                0 ml   Net              200 ml       Nutrition  Orders Placed This Encounter   Procedures   • DIET ORDER     Standing Status:   Standing     Number of Occurrences:   1     Order Specific Question:   Diet:     Answer:   Diabetic [3]     Physical Exam   Constitutional: She is oriented to person, place, and time. She appears well-developed and well-nourished.   HENT:   Head: Normocephalic and atraumatic.   Nose: Nose normal.   Mouth/Throat: Oropharynx is clear and moist.   Eyes: Conjunctivae and EOM are normal. Pupils are equal, round, and reactive to light.   Neck: Normal range of motion. Neck supple. No JVD present. No thyromegaly present.   Cardiovascular: Normal rate, regular rhythm and normal heart sounds.  Exam reveals no gallop and no friction rub.    No murmur heard.  Pulmonary/Chest: Effort normal and breath sounds normal. No respiratory distress. She has no wheezes. She has no rales.   Abdominal: Soft. Bowel sounds are normal. She exhibits no distension and no mass. There is tenderness. There is no rebound and no guarding.   Musculoskeletal: Normal range of motion. She exhibits no edema or tenderness.   Lymphadenopathy:     She has no cervical adenopathy.   Neurological: She is alert and oriented to person, place, and time. No cranial nerve deficit.   lethargic   Skin: Skin is warm and dry. She is not diaphoretic. No erythema. There is pallor.   Psychiatric: She has a normal mood and affect. Her behavior is normal.   Nursing note and vitals reviewed.      Recent Labs      02/10/18   0045   02/11/18   0240  02/11/18   0712  02/12/18   0334   WBC  11.2*   --    6.0   --   5.8   RBC  3.35*   --   3.43*   --   3.38*   HEMOGLOBIN  7.5*   < >  8.3*  7.6*  8.0*   HEMATOCRIT  26.3*   --   27.0*   --   26.5*   MCV  78.5*   --   78.7*   --   78.4*   MCH  22.4*   --   24.2*   --   23.7*   MCHC  28.5*   --   30.7*   --   30.2*   RDW  51.6*   --   52.2*   --   52.7*   PLATELETCT  99*   --   78*   --   65*   MPV  11.5   --   11.7   --    --     < > = values in this interval not displayed.     Recent Labs      02/10/18   0045  02/11/18   0240   SODIUM  144  144   POTASSIUM  3.1*  3.8   CHLORIDE  123*  117*   CO2  16*  24   GLUCOSE  241*  133*   BUN  27*  22   CREATININE  0.47*  0.47*   CALCIUM  6.0*  7.9*         Recent Labs      02/11/18   0240   BNPBTYPENAT  143*              Assessment/Plan     * Acute upper GI bleeding- (present on admission)   Assessment & Plan    2/2 esophageal varices with active bleeding as per EGD/ Dr. Mauricio  History of esophageal varices 2/2 alcoholic cirrhosis of the liver  H/O of multiple variceal banding and ligations in the past by Dr. Dent  IV octreotide and IV protonix  Monitoring q6 hour h/h, transfuse if below 7          Tachycardia- (present on admission)   Assessment & Plan    2/2 anemia  Transfusing for <7, improved from yesterday        Acute blood loss anemia- (present on admission)   Assessment & Plan    Transfuse as per guidelines        Elevated liver enzymes- (present on admission)   Assessment & Plan    Multifactorial including reactive, IV drugs, question her compliance with alcohol cessation, he reports her last drink was 2 months ago        Leukocytosis- (present on admission)   Assessment & Plan    Likely reactive 2/2 acute GI bleeding  Covered with C3, cx pending? Don't see any collected  Resolving        Hepatic encephalopathy (CMS-HCC)- (present on admission)   Assessment & Plan    Patient does alter between AAOx2-AAox3.  Ammonia sitll elevated >100  Continues on xifaxin and lactulose        Methamphetamine abuse- (present on  admission)   Assessment & Plan    Patient reports last using methamphetamine via intravenous route 3 days ago          History of cirrhosis of liver- (present on admission)   Assessment & Plan    Liver cirrhosis 2/2 alcohol,   Last drink was about 2 months ago        Insulin dependent diabetes mellitus (CMS-HCC)- (present on admission)   Assessment & Plan    Uncontrolled  Continue Insulin-sliding scale, accu-checks and hypoglycemia protocol.            History of hepatitis C- (present on admission)   Assessment & Plan    2/2 IV drug abuse; still continues to use IV methamphetamine          Plan  Transfused  C/w octreo/ppi gtt  Close watch  A.m. addl. Endo    Am labs  See orders    Quality-Core Measures   Reviewed items::  Labs reviewed and Medications reviewed  Berry catheter::  No Berry  Central line in place:  Need for access  DVT prophylaxis pharmacological::  Contraindicated - Anemia requiring blood transfusion  DVT prophylaxis - mechanical:  SCDs  Ulcer Prophylaxis::  Yes  Antibiotics:  Treating active infection/contamination beyond 24 hours perioperative coverage

## 2018-02-12 NOTE — PROGRESS NOTES
"Pt wanting to leave AMA. Pt A&O x 4, but unsure of exact day of month. Pt appears forgetful, saying \"you're not going to move me tonight are you?\" Reassured pt that there are no plans for pt to move rooms. Pt attempted to call her mother, but unable to reach her. Pt just had dark black/brown BM. Explained to pt why it is important for pt to stay in hospital as she still has GI bleed and is planned to have EGD tomorrow. Pt stated, \"I just want to leave.\" Was able to calm pt down and reassure her, and pt sleeping in bed. Called and updated hospitalist. No new orders received at this time. VSS. Will continue to monitor pt closely. Bed alarm set and in place at this time.   "

## 2018-02-12 NOTE — PROCEDURES
DATE OF SERVICE:  02/12/2018    PROCEDURE PERFORMED:  Esophagogastroduodenoscopy.    INDICATION:  History of acute gastrointestinal bleed, anemia with melena and   recent variceal bleeding along with variceal banding now with continued   melena.    FINAL IMPRESSION:  1.  Proximal esophagus, unremarkable mucosa.  2.  Distal esophagus, ulcers throughout the distal one-third of the esophagus.    Multiple clean based ulcers with some purplish discoloration, but no   stigmata of active recent or impending bleeding.  Two variceal bands still   seen in place.  No evidence of recurrent bleeding.  3.  Stomach, portal hypertensive gastropathy.  4.  Duodenum, unremarkable mucosa.    RECOMMENDATIONS:  1.  Continue sucralfate suspension 1 g q.i.d. immediately after meals and at   bedtime.  2.  Twice daily PPI therapy.  3.  Soft diet.  4.  Alcohol abstinence.  5.  Follow CBC.  6.  Transfuse as needed to keep hemoglobin greater than or equal to 7.  7.  GI to sign off/standby.  Please call if any concerns arise.    DESCRIPTION OF PROCEDURE:  Prior to the procedure, physical exam was stable.    During procedure, vital signs remained within normal limits.  Prior to   sedation, informed consent was obtained.  Risks, benefits, alternatives   including but not limited to risk of bleeding, infection, perforation, adverse   reaction to medication, failure to identify pathology and death explained to   the patient at length.  She accepted all risks.  The patient prepped in the   left lateral position.  IV sedation given in the form of propofol by   anesthesia.  Scope tip of the Olympus flexible gastroscope passed in the   proximal esophagus.  Proximal and middle esophagus were well visualized and   were grossly unremarkable.  There was a slight purplish discoloration,   possibly representing grade I esophageal varices.  At the distal esophagus,   multiple clean based ulcers were seen with granular appearing tissue with mild   purplish  discoloration, but no evidence of recent active or impending   bleeding.  The stomach was entered.  Gastric pool suctioned, air insufflated,   scope retroflexed to view the body, fundus, and cardia, and then straightened   to view the antrum and incisura.  There was a mosaic pattern throughout along   with erythema and edema consistent with portal hypertensive gastropathy.    Pylorus was patent.  Duodenal bulb sweep second and third portion were well   visualized and were unremarkable.  Scope was withdrawn.  Air and liquid were   suctioned.  Esophagus was once again visualized with no evidence of bleeding.    Procedure was deemed complete.  Scope was withdrawn.  The patient tolerated   the procedure well and was sent to recovery without immediate complications.       ____________________________________     MD CASH FRAZIER / JULI    DD:  02/12/2018 10:40:38  DT:  02/12/2018 10:50:44    D#:  [id number]  Job#:  [id number]

## 2018-02-12 NOTE — DISCHARGE PLANNING
Upon utilization review, patient noted to be on the following medications that could potentially require prior authorization if prescribed at discharge: xifaxan.  If it is anticipated that patient will require these medications at discharge, beginning the prescription prior auth process in advance to anticipated discharge could assist in preventing delays when patient is medically cleared to be discharged from the hospital.

## 2018-02-12 NOTE — OR SURGEON
Immediate Post OP Note    PreOp Diagnosis: Acute GI bleed anemia, melena, recent variceal banding    PostOp Diagnosis: Same    Procedure(s):  GASTROSCOPY    Surgeon(s):  Willard Bustamante M.D.    Anesthesiologist/Type of Anesthesia:  Anesthesiologist: Ney Hirsch M.D./* No anesthesia type entered *    Surgical Staff:  Circulator: Sid Saucedo R.N.  Endoscopy Technician: Radha Severino; Radha Bahena    Specimens:  * No specimens in log *    Dr. Bustamante  [facility]  [facility]  [phone]    EGD    Indication:  Acute GI bleed anemia, melena, recent variceal banding    Sedation:  MAC (Dr. Hirsch)    Findings:   EGD    Esophagus     Proximal esophagus unremarkable     Ulcers      Distal 1/3 of esophagus      Multiple clean based ulcers with some purple discoloration      2 bands still in place      No evidence of recurrent bleeding    Stomach     Portal hypertensive gastropathy    Duodenum     Unremarkable    Plan:   Continue sucralfate suspension     Twice daily PPI     Soft diet     Follow CBC     Transfuse as needed to keep Hb >7     ** GI TO SIGN OFF / STAND BY - PLEASE CALL IF ANY CONCERNS  **      2/12/2018 10:33 AM Willard Bustamante

## 2018-02-12 NOTE — PROGRESS NOTES
"Pt refused morning medications and also refused nursing assessment. Pt was requesting a sleeping pill or \"a shot for pain\". Physician in currently rounding and RN will follow up for pain medication orders.  "

## 2018-02-12 NOTE — PROGRESS NOTES
Report received at 7:00 AM from NOC MAMI Singh, pt is resting in bed, awake and alert with pain in her neck from the IJ. Access sight is clean, dry and intact. Pt currently has no pain meds, RN to follow up with physician.  Bed is locked in lowest position with call light, belongings within reach, white board updated, and POC discussed. All needs met at this time.

## 2018-02-13 PROBLEM — R00.0 TACHYCARDIA: Status: RESOLVED | Noted: 2018-02-08 | Resolved: 2018-02-13

## 2018-02-13 PROBLEM — D72.829 LEUKOCYTOSIS: Status: RESOLVED | Noted: 2018-02-07 | Resolved: 2018-02-13

## 2018-02-13 LAB
AMMONIA PLAS-SCNC: 61 UMOL/L (ref 11–45)
ANION GAP SERPL CALC-SCNC: 4 MMOL/L (ref 0–11.9)
BUN SERPL-MCNC: 11 MG/DL (ref 8–22)
CALCIUM SERPL-MCNC: 8 MG/DL (ref 8.5–10.5)
CHLORIDE SERPL-SCNC: 112 MMOL/L (ref 96–112)
CO2 SERPL-SCNC: 23 MMOL/L (ref 20–33)
CREAT SERPL-MCNC: 0.42 MG/DL (ref 0.5–1.4)
ERYTHROCYTE [DISTWIDTH] IN BLOOD BY AUTOMATED COUNT: 54.6 FL (ref 35.9–50)
GLUCOSE BLD-MCNC: 173 MG/DL (ref 65–99)
GLUCOSE BLD-MCNC: 194 MG/DL (ref 65–99)
GLUCOSE BLD-MCNC: 206 MG/DL (ref 65–99)
GLUCOSE BLD-MCNC: 239 MG/DL (ref 65–99)
GLUCOSE SERPL-MCNC: 186 MG/DL (ref 65–99)
HCT VFR BLD AUTO: 28.1 % (ref 37–47)
HGB BLD-MCNC: 8.6 G/DL (ref 12–16)
MAGNESIUM SERPL-MCNC: 1.6 MG/DL (ref 1.5–2.5)
MCH RBC QN AUTO: 24 PG (ref 27–33)
MCHC RBC AUTO-ENTMCNC: 30.6 G/DL (ref 33.6–35)
MCV RBC AUTO: 78.5 FL (ref 81.4–97.8)
PHOSPHATE SERPL-MCNC: 2 MG/DL (ref 2.5–4.5)
PLATELET # BLD AUTO: 86 K/UL (ref 164–446)
PMV BLD AUTO: 11 FL (ref 9–12.9)
POTASSIUM SERPL-SCNC: 3.2 MMOL/L (ref 3.6–5.5)
RBC # BLD AUTO: 3.58 M/UL (ref 4.2–5.4)
SODIUM SERPL-SCNC: 139 MMOL/L (ref 135–145)
WBC # BLD AUTO: 7.1 K/UL (ref 4.8–10.8)

## 2018-02-13 PROCEDURE — A9270 NON-COVERED ITEM OR SERVICE: HCPCS | Performed by: HOSPITALIST

## 2018-02-13 PROCEDURE — A9270 NON-COVERED ITEM OR SERVICE: HCPCS | Performed by: INTERNAL MEDICINE

## 2018-02-13 PROCEDURE — 84100 ASSAY OF PHOSPHORUS: CPT

## 2018-02-13 PROCEDURE — 700102 HCHG RX REV CODE 250 W/ 637 OVERRIDE(OP): Performed by: INTERNAL MEDICINE

## 2018-02-13 PROCEDURE — 700102 HCHG RX REV CODE 250 W/ 637 OVERRIDE(OP): Performed by: HOSPITALIST

## 2018-02-13 PROCEDURE — 82962 GLUCOSE BLOOD TEST: CPT | Mod: 91

## 2018-02-13 PROCEDURE — 85027 COMPLETE CBC AUTOMATED: CPT

## 2018-02-13 PROCEDURE — 82140 ASSAY OF AMMONIA: CPT

## 2018-02-13 PROCEDURE — 80048 BASIC METABOLIC PNL TOTAL CA: CPT

## 2018-02-13 PROCEDURE — 700111 HCHG RX REV CODE 636 W/ 250 OVERRIDE (IP): Performed by: HOSPITALIST

## 2018-02-13 PROCEDURE — 770020 HCHG ROOM/CARE - TELE (206)

## 2018-02-13 PROCEDURE — 99233 SBSQ HOSP IP/OBS HIGH 50: CPT | Performed by: INTERNAL MEDICINE

## 2018-02-13 PROCEDURE — 83735 ASSAY OF MAGNESIUM: CPT

## 2018-02-13 RX ORDER — ZOLPIDEM TARTRATE 5 MG/1
2.5 TABLET ORAL NIGHTLY PRN
Status: DISCONTINUED | OUTPATIENT
Start: 2018-02-13 | End: 2018-02-14 | Stop reason: HOSPADM

## 2018-02-13 RX ORDER — POTASSIUM CHLORIDE 20 MEQ/1
40 TABLET, EXTENDED RELEASE ORAL ONCE
Status: COMPLETED | OUTPATIENT
Start: 2018-02-13 | End: 2018-02-13

## 2018-02-13 RX ADMIN — RIFAXIMIN 550 MG: 550 TABLET ORAL at 08:18

## 2018-02-13 RX ADMIN — GABAPENTIN 300 MG: 300 CAPSULE ORAL at 20:29

## 2018-02-13 RX ADMIN — POTASSIUM CHLORIDE 40 MEQ: 1500 TABLET, EXTENDED RELEASE ORAL at 11:12

## 2018-02-13 RX ADMIN — PROPRANOLOL HYDROCHLORIDE 10 MG: 10 TABLET ORAL at 08:18

## 2018-02-13 RX ADMIN — SUCRALFATE 1 G: 1 SUSPENSION ORAL at 23:32

## 2018-02-13 RX ADMIN — ONDANSETRON 4 MG: 2 INJECTION INTRAMUSCULAR; INTRAVENOUS at 04:31

## 2018-02-13 RX ADMIN — OMEPRAZOLE 20 MG: 20 CAPSULE, DELAYED RELEASE ORAL at 20:29

## 2018-02-13 RX ADMIN — Medication 220 MG: at 08:18

## 2018-02-13 RX ADMIN — GABAPENTIN 300 MG: 300 CAPSULE ORAL at 08:18

## 2018-02-13 RX ADMIN — CEFTRIAXONE 2 G: 2 INJECTION, POWDER, FOR SOLUTION INTRAMUSCULAR; INTRAVENOUS at 20:29

## 2018-02-13 RX ADMIN — INSULIN HUMAN 2 UNITS: 100 INJECTION, SOLUTION PARENTERAL at 11:14

## 2018-02-13 RX ADMIN — SUCRALFATE 1 G: 1 SUSPENSION ORAL at 17:05

## 2018-02-13 RX ADMIN — INSULIN HUMAN 2 UNITS: 100 INJECTION, SOLUTION PARENTERAL at 05:25

## 2018-02-13 RX ADMIN — INSULIN HUMAN 1 UNITS: 100 INJECTION, SOLUTION PARENTERAL at 23:33

## 2018-02-13 RX ADMIN — SUCRALFATE 1 G: 1 SUSPENSION ORAL at 11:12

## 2018-02-13 RX ADMIN — LACTULOSE 30 ML: 20 SOLUTION ORAL at 20:29

## 2018-02-13 RX ADMIN — DIBASIC SODIUM PHOSPHATE, MONOBASIC POTASSIUM PHOSPHATE AND MONOBASIC SODIUM PHOSPHATE 2 TABLET: 852; 155; 130 TABLET ORAL at 12:29

## 2018-02-13 RX ADMIN — RIFAXIMIN 550 MG: 550 TABLET ORAL at 20:29

## 2018-02-13 RX ADMIN — ONDANSETRON 4 MG: 2 INJECTION INTRAMUSCULAR; INTRAVENOUS at 08:19

## 2018-02-13 RX ADMIN — DIBASIC SODIUM PHOSPHATE, MONOBASIC POTASSIUM PHOSPHATE AND MONOBASIC SODIUM PHOSPHATE 2 TABLET: 852; 155; 130 TABLET ORAL at 20:29

## 2018-02-13 RX ADMIN — INSULIN HUMAN 1 UNITS: 100 INJECTION, SOLUTION PARENTERAL at 17:07

## 2018-02-13 RX ADMIN — PROPRANOLOL HYDROCHLORIDE 10 MG: 10 TABLET ORAL at 20:29

## 2018-02-13 RX ADMIN — SUCRALFATE 1 G: 1 SUSPENSION ORAL at 04:31

## 2018-02-13 RX ADMIN — OMEPRAZOLE 20 MG: 20 CAPSULE, DELAYED RELEASE ORAL at 08:18

## 2018-02-13 RX ADMIN — LACTULOSE 30 ML: 20 SOLUTION ORAL at 15:32

## 2018-02-13 RX ADMIN — LACTULOSE 30 ML: 20 SOLUTION ORAL at 08:18

## 2018-02-13 RX ADMIN — SPIRONOLACTONE 50 MG: 25 TABLET, FILM COATED ORAL at 08:18

## 2018-02-13 ASSESSMENT — ENCOUNTER SYMPTOMS
ABDOMINAL PAIN: 1
HEADACHES: 0
COUGH: 0
FOCAL WEAKNESS: 0
RESPIRATORY NEGATIVE: 1
POLYDIPSIA: 0
EYES NEGATIVE: 1
HEARTBURN: 0
BRUISES/BLEEDS EASILY: 0
NERVOUS/ANXIOUS: 1
VOMITING: 0
PALPITATIONS: 0
MUSCULOSKELETAL NEGATIVE: 1
DEPRESSION: 0
WEAKNESS: 1
BLOOD IN STOOL: 0
DIZZINESS: 0
NAUSEA: 0
FEVER: 0
CARDIOVASCULAR NEGATIVE: 1
BACK PAIN: 0
NECK PAIN: 0
CHILLS: 0

## 2018-02-13 ASSESSMENT — PAIN SCALES - GENERAL
PAINLEVEL_OUTOF10: 7
PAINLEVEL_OUTOF10: 3
PAINLEVEL_OUTOF10: 0

## 2018-02-13 NOTE — CARE PLAN
Problem: Safety  Goal: Will remain free from falls  Outcome: PROGRESSING AS EXPECTED  Discussed safety precautions with pt, pt verbalized understanding    Problem: Knowledge Deficit  Goal: Knowledge of disease process/condition, treatment plan, diagnostic tests, and medications will improve  Outcome: PROGRESSING AS EXPECTED  Discussed POC with pt, pt verbalized understanding.

## 2018-02-13 NOTE — PROGRESS NOTES
Received report and care assumed. Patient A&Ox 4. Pt. Reports no pain. Work of breathing even on 1L. Discussed POC. Call light within reach and pt. instructed to call when in need of assistance.  Denies any other needs at this time.     Patient moved from 808 to 815-2, monitor box changed and monitor room notified.

## 2018-02-13 NOTE — CARE PLAN
Problem: Infection  Goal: Will remain free from infection    Intervention: Assess signs and symptoms of infection  Patient will verbalize signs and symptoms of infection, interventions that can prevent infection, and when to report symptoms to a healthcare provider.         Problem: Knowledge Deficit  Goal: Knowledge of disease process/condition, treatment plan, diagnostic tests, and medications will improve    Intervention: Assess knowledge level of disease process/condition, treatment plan, diagnostic tests, and medications  Knowledge of disease process, current condition, plan of care, medications, and diagnostics will improve.

## 2018-02-13 NOTE — PROGRESS NOTES
Pt lying in bed, watching tv. Bedside report completed, POC discussed with day shift RN. Pt verbalizes understanding of POC. Bed low and locked, call light in reach. No needs at this time.

## 2018-02-14 ENCOUNTER — APPOINTMENT (OUTPATIENT)
Dept: RADIOLOGY | Facility: MEDICAL CENTER | Age: 58
DRG: 368 | End: 2018-02-14
Attending: INTERNAL MEDICINE
Payer: MEDICAID

## 2018-02-14 VITALS
TEMPERATURE: 97.3 F | SYSTOLIC BLOOD PRESSURE: 139 MMHG | RESPIRATION RATE: 16 BRPM | BODY MASS INDEX: 31.89 KG/M2 | DIASTOLIC BLOOD PRESSURE: 63 MMHG | HEIGHT: 66 IN | OXYGEN SATURATION: 95 % | HEART RATE: 103 BPM | WEIGHT: 198.41 LBS

## 2018-02-14 LAB
ALBUMIN SERPL BCP-MCNC: 2.3 G/DL (ref 3.2–4.9)
ALBUMIN/GLOB SERPL: 0.9 G/DL
ALP SERPL-CCNC: 76 U/L (ref 30–99)
ALT SERPL-CCNC: 176 U/L (ref 2–50)
AMMONIA PLAS-SCNC: 51 UMOL/L (ref 11–45)
ANION GAP SERPL CALC-SCNC: 4 MMOL/L (ref 0–11.9)
AST SERPL-CCNC: 29 U/L (ref 12–45)
BASOPHILS # BLD AUTO: 0.6 % (ref 0–1.8)
BASOPHILS # BLD: 0.04 K/UL (ref 0–0.12)
BILIRUB SERPL-MCNC: 1 MG/DL (ref 0.1–1.5)
BUN SERPL-MCNC: 7 MG/DL (ref 8–22)
CALCIUM SERPL-MCNC: 7.8 MG/DL (ref 8.5–10.5)
CHLORIDE SERPL-SCNC: 110 MMOL/L (ref 96–112)
CO2 SERPL-SCNC: 24 MMOL/L (ref 20–33)
CREAT SERPL-MCNC: 0.43 MG/DL (ref 0.5–1.4)
EOSINOPHIL # BLD AUTO: 0.15 K/UL (ref 0–0.51)
EOSINOPHIL NFR BLD: 2.3 % (ref 0–6.9)
ERYTHROCYTE [DISTWIDTH] IN BLOOD BY AUTOMATED COUNT: 54.2 FL (ref 35.9–50)
GLOBULIN SER CALC-MCNC: 2.7 G/DL (ref 1.9–3.5)
GLUCOSE BLD-MCNC: 131 MG/DL (ref 65–99)
GLUCOSE BLD-MCNC: 161 MG/DL (ref 65–99)
GLUCOSE BLD-MCNC: 204 MG/DL (ref 65–99)
GLUCOSE SERPL-MCNC: 138 MG/DL (ref 65–99)
HCT VFR BLD AUTO: 27.3 % (ref 37–47)
HGB BLD-MCNC: 8.4 G/DL (ref 12–16)
IMM GRANULOCYTES # BLD AUTO: 0.02 K/UL (ref 0–0.11)
IMM GRANULOCYTES NFR BLD AUTO: 0.3 % (ref 0–0.9)
LYMPHOCYTES # BLD AUTO: 1.56 K/UL (ref 1–4.8)
LYMPHOCYTES NFR BLD: 24.4 % (ref 22–41)
MCH RBC QN AUTO: 23.9 PG (ref 27–33)
MCHC RBC AUTO-ENTMCNC: 30.8 G/DL (ref 33.6–35)
MCV RBC AUTO: 77.6 FL (ref 81.4–97.8)
MONOCYTES # BLD AUTO: 0.67 K/UL (ref 0–0.85)
MONOCYTES NFR BLD AUTO: 10.5 % (ref 0–13.4)
NEUTROPHILS # BLD AUTO: 3.95 K/UL (ref 2–7.15)
NEUTROPHILS NFR BLD: 61.9 % (ref 44–72)
NRBC # BLD AUTO: 0 K/UL
NRBC BLD-RTO: 0 /100 WBC
PLATELET # BLD AUTO: 90 K/UL (ref 164–446)
PMV BLD AUTO: 11.5 FL (ref 9–12.9)
POTASSIUM SERPL-SCNC: 3.3 MMOL/L (ref 3.6–5.5)
PROT SERPL-MCNC: 5 G/DL (ref 6–8.2)
RBC # BLD AUTO: 3.52 M/UL (ref 4.2–5.4)
SODIUM SERPL-SCNC: 138 MMOL/L (ref 135–145)
WBC # BLD AUTO: 6.4 K/UL (ref 4.8–10.8)

## 2018-02-14 PROCEDURE — 82962 GLUCOSE BLOOD TEST: CPT

## 2018-02-14 PROCEDURE — 85025 COMPLETE CBC W/AUTO DIFF WBC: CPT

## 2018-02-14 PROCEDURE — 0W9G3ZZ DRAINAGE OF PERITONEAL CAVITY, PERCUTANEOUS APPROACH: ICD-10-PCS | Performed by: RADIOLOGY

## 2018-02-14 PROCEDURE — 99239 HOSP IP/OBS DSCHRG MGMT >30: CPT | Performed by: INTERNAL MEDICINE

## 2018-02-14 PROCEDURE — 700102 HCHG RX REV CODE 250 W/ 637 OVERRIDE(OP): Performed by: INTERNAL MEDICINE

## 2018-02-14 PROCEDURE — 49083 ABD PARACENTESIS W/IMAGING: CPT

## 2018-02-14 PROCEDURE — 80053 COMPREHEN METABOLIC PANEL: CPT

## 2018-02-14 PROCEDURE — A9270 NON-COVERED ITEM OR SERVICE: HCPCS | Performed by: INTERNAL MEDICINE

## 2018-02-14 PROCEDURE — A9270 NON-COVERED ITEM OR SERVICE: HCPCS | Performed by: HOSPITALIST

## 2018-02-14 PROCEDURE — 700102 HCHG RX REV CODE 250 W/ 637 OVERRIDE(OP): Performed by: HOSPITALIST

## 2018-02-14 PROCEDURE — 82140 ASSAY OF AMMONIA: CPT

## 2018-02-14 RX ORDER — LACTULOSE 20 G/30ML
30 SOLUTION ORAL 2 TIMES DAILY
Qty: 60 EACH | Refills: 2 | Status: SHIPPED | OUTPATIENT
Start: 2018-02-14 | End: 2020-09-25

## 2018-02-14 RX ORDER — FUROSEMIDE 20 MG/1
20 TABLET ORAL
Status: DISCONTINUED | OUTPATIENT
Start: 2018-02-14 | End: 2018-02-14 | Stop reason: HOSPADM

## 2018-02-14 RX ORDER — PROPRANOLOL HYDROCHLORIDE 10 MG/1
10 TABLET ORAL 2 TIMES DAILY
Qty: 90 TAB | Refills: 11 | Status: ON HOLD | OUTPATIENT
Start: 2018-02-14 | End: 2019-02-14

## 2018-02-14 RX ORDER — OMEPRAZOLE 20 MG/1
20 CAPSULE, DELAYED RELEASE ORAL 2 TIMES DAILY
Qty: 30 CAP | Refills: 2 | Status: ON HOLD | OUTPATIENT
Start: 2018-02-14 | End: 2019-02-14

## 2018-02-14 RX ORDER — ZINC SULFATE 50(220)MG
220 CAPSULE ORAL DAILY
Qty: 30 CAP | Refills: 3 | Status: ON HOLD | OUTPATIENT
Start: 2018-02-15 | End: 2019-02-14

## 2018-02-14 RX ORDER — POTASSIUM CHLORIDE 20 MEQ/1
40 TABLET, EXTENDED RELEASE ORAL ONCE
Status: COMPLETED | OUTPATIENT
Start: 2018-02-14 | End: 2018-02-14

## 2018-02-14 RX ORDER — SUCRALFATE ORAL 1 G/10ML
1 SUSPENSION ORAL EVERY 6 HOURS
Qty: 250 ML | Refills: 3 | Status: ON HOLD | OUTPATIENT
Start: 2018-02-14 | End: 2019-02-14

## 2018-02-14 RX ORDER — LACTULOSE 20 G/30ML
30 SOLUTION ORAL 2 TIMES DAILY
Status: DISCONTINUED | OUTPATIENT
Start: 2018-02-14 | End: 2018-02-14 | Stop reason: HOSPADM

## 2018-02-14 RX ADMIN — RIFAXIMIN 550 MG: 550 TABLET ORAL at 08:23

## 2018-02-14 RX ADMIN — DIBASIC SODIUM PHOSPHATE, MONOBASIC POTASSIUM PHOSPHATE AND MONOBASIC SODIUM PHOSPHATE 2 TABLET: 852; 155; 130 TABLET ORAL at 08:22

## 2018-02-14 RX ADMIN — GABAPENTIN 300 MG: 300 CAPSULE ORAL at 08:23

## 2018-02-14 RX ADMIN — PROPRANOLOL HYDROCHLORIDE 10 MG: 10 TABLET ORAL at 08:23

## 2018-02-14 RX ADMIN — OMEPRAZOLE 20 MG: 20 CAPSULE, DELAYED RELEASE ORAL at 08:22

## 2018-02-14 RX ADMIN — LACTULOSE 30 ML: 20 SOLUTION ORAL at 08:22

## 2018-02-14 RX ADMIN — FUROSEMIDE 20 MG: 20 TABLET ORAL at 15:50

## 2018-02-14 RX ADMIN — SUCRALFATE 1 G: 1 SUSPENSION ORAL at 06:00

## 2018-02-14 RX ADMIN — SUCRALFATE 1 G: 1 SUSPENSION ORAL at 15:50

## 2018-02-14 RX ADMIN — POTASSIUM CHLORIDE 40 MEQ: 1500 TABLET, EXTENDED RELEASE ORAL at 15:50

## 2018-02-14 RX ADMIN — SPIRONOLACTONE 50 MG: 25 TABLET, FILM COATED ORAL at 08:22

## 2018-02-14 RX ADMIN — Medication 220 MG: at 08:23

## 2018-02-14 ASSESSMENT — PAIN SCALES - GENERAL: PAINLEVEL_OUTOF10: 0

## 2018-02-14 NOTE — OR SURGEON
Immediate Post- Operative Note        PostOp Diagnosis: ascites      Procedure(s): US guided paracentesis      Estimated Blood Loss: Less than 5 ml        Complications: None            2/14/2018     2:51 PM     Gaudencio Huber

## 2018-02-14 NOTE — PROGRESS NOTES
ASCITES. THERAPEUTIC ONLY PARACENTESIS REQUESTED    RADIOLOGIST:   SONOGRAPHER: HANSA    US FINDINGS:  MODERATE  ASCITES SEEN WITH ULTRASOUND AND SAFE SITE MARKED RLQ, USING ULTRASOUND IMAGING GUIDANCE. TIME OUT TAKEN AT 1334. -4350 CC CLEAR YELLOW COLORED PERITONEAL FLUID REMOVED AND DISCARDED. SMALL BAND-AID NOW IN PLACE OVER PT RLQ. PT TOLERATED PROCEDURE WELL, WITH MINIMAL PAIN/DISCOMFORT. VITALS MONITORED AND RECORDED IN EPIC. REPORT GIVEN TO RN AT 14:07. PT RETURNING TO FLOOR VIA TRANSPORT; CURRENTLY IN STABLE CONDITION.

## 2018-02-14 NOTE — DISCHARGE PLANNING
Medical Social Worker    [facility] faxed over prescription to pt's preferred [facility] on [address].      Plan:  Sw will f/u on co-pay on medication.  SW will continue to assist with pt's d/c needs.

## 2018-02-14 NOTE — PROGRESS NOTES
[facility] Hospitalist Progress Note    Date of Service: 2018    Chief Complaint  57 y.o. female admitted 2018 with h/o Hep C, meth abuse, variceal bleeding  Admitted with concerns of upper GIB  S/p 6 bands by GI , sent to ICU on protonix and octreotide drips  Transferred to Tele   Interval Problem Update  2/10 Pt c/o mid abdominal pain, still dropping H/H, c/w octreo/ppi, Gi notified, more transfusion needed   patient feels some better, but still has epigastric pain and low H&H, plan was to have a number look by EGD in a.m.   the patient complains of significant posterior headache, repeat EGD w no e/o bleeding, PHG and esophageal ulcer  : Ordered paracentesis for AM, recurrent ascites.  Hgb stable.  Sugars are in low 200s    Consultants/Specialty  GI    Disposition  Paracentesis in AM  Pt/OT ordered          Review of Systems   Constitutional: Negative for chills and fever.   HENT: Negative.    Eyes: Negative.    Respiratory: Negative.  Negative for cough.    Cardiovascular: Negative.  Negative for chest pain and palpitations.   Gastrointestinal: Positive for abdominal pain. Negative for blood in stool, heartburn, melena (Resolved), nausea and vomiting.        Distention, tight abdomen   Genitourinary: Negative.  Negative for dysuria and frequency.   Musculoskeletal: Negative.  Negative for back pain and neck pain.   Skin: Negative.  Negative for itching and rash.   Neurological: Positive for weakness. Negative for dizziness, focal weakness and headaches.   Endo/Heme/Allergies: Negative.  Negative for polydipsia. Does not bruise/bleed easily.   Psychiatric/Behavioral: Negative for depression. The patient is nervous/anxious.       Physical Exam  Laboratory/Imaging   Hemodynamics  Temp (24hrs), Av °C (98.6 °F), Min:36.1 °C (96.9 °F), Max:37.4 °C (99.4 °F)   Temperature: 37.4 °C (99.4 °F)  Pulse  Av.4  Min: 60  Max: 132    Blood Pressure: 148/76      Respiratory      Respiration: 16,  Pulse Oximetry: 92 %     Work Of Breathing / Effort: Mild  RUL Breath Sounds: Clear, RML Breath Sounds: Diminished, RLL Breath Sounds: Diminished, DARNELL Breath Sounds: Clear, LLL Breath Sounds: Diminished    Fluids  No intake or output data in the 24 hours ending 02/13/18 1838    Nutrition  Orders Placed This Encounter   Procedures   • DIET ORDER     Standing Status:   Standing     Number of Occurrences:   1     Order Specific Question:   Diet:     Answer:   Diabetic [3]     Physical Exam   Constitutional: She is oriented to person, place, and time. She appears well-developed and well-nourished.   HENT:   Head: Normocephalic and atraumatic.   Nose: Nose normal.   Mouth/Throat: Oropharynx is clear and moist.   Eyes: Conjunctivae and EOM are normal. Pupils are equal, round, and reactive to light.   Neck: Normal range of motion. Neck supple. No JVD present. No thyromegaly present.   Cardiovascular: Normal rate, regular rhythm and normal heart sounds.  Exam reveals no gallop and no friction rub.    No murmur heard.  Pulmonary/Chest: Effort normal and breath sounds normal. No respiratory distress. She has no wheezes. She has no rales.   Abdominal: Soft. Bowel sounds are normal. She exhibits distension. She exhibits no mass. There is no tenderness. There is no rebound and no guarding.   Tense ascites   Musculoskeletal: Normal range of motion. She exhibits no edema or tenderness.   Lymphadenopathy:     She has no cervical adenopathy.   Neurological: She is alert and oriented to person, place, and time. No cranial nerve deficit.   lethargic   Skin: Skin is warm and dry. She is not diaphoretic. No erythema. There is pallor.   Psychiatric: She has a normal mood and affect. Her behavior is normal.   Nursing note and vitals reviewed.      Recent Labs      02/11/18   0240  02/11/18   0712  02/12/18   0334  02/13/18   0440   WBC  6.0   --   5.8  7.1   RBC  3.43*   --   3.38*  3.58*   HEMOGLOBIN  8.3*  7.6*  8.0*  8.6*   HEMATOCRIT   27.0*   --   26.5*  28.1*   MCV  78.7*   --   78.4*  78.5*   MCH  24.2*   --   23.7*  24.0*   MCHC  30.7*   --   30.2*  30.6*   RDW  52.2*   --   52.7*  54.6*   PLATELETCT  78*   --   65*  86*   MPV  11.7   --    --   11.0     Recent Labs      02/11/18   0240  02/13/18   0440   SODIUM  144  139   POTASSIUM  3.8  3.2*   CHLORIDE  117*  112   CO2  24  23   GLUCOSE  133*  186*   BUN  22  11   CREATININE  0.47*  0.42*   CALCIUM  7.9*  8.0*         Recent Labs      02/11/18   0240   BNPBTYPENAT  143*              Assessment/Plan     * Acute upper GI bleeding- (present on admission)   Assessment & Plan    2/2 esophageal varices with active bleeding as per EGD/ Dr. Mauricio 2/8/18  History of esophageal varices 2/2 alcoholic cirrhosis of the liver  H/O of multiple variceal banding and ligations in the past by Dr. Dent   2nd look EGD on 2/12 revealed significant esophageal lesions, but no active bleeding  Continue BID PPI and carafate          Acute blood loss anemia- (present on admission)   Assessment & Plan    Needed transfusion of 1U on 2/10, etiology is due to variceal GI bleeding  Hgb stable at 8.0--8.6  Transfuse as per guidelines <7 or symptoms        Elevated liver enzymes- (present on admission)   Assessment & Plan    Multifactorial including reactive, IV drugs, question her compliance with alcohol cessation, he reports her last drink was 2 months ago        Hepatic encephalopathy (CMS-HCC)- (present on admission)   Assessment & Plan    Patient does alter between AAOx2-AAox3.  Ammonia sitll elevated but improving  Continue on xifaxin and lactulose        Methamphetamine abuse- (present on admission)   Assessment & Plan    Patient reports last using methamphetamine via intravenous route 3 days ago          History of cirrhosis of liver- (present on admission)   Assessment & Plan    Liver cirrhosis 2/2 alcohol, she states last drink was about 2 months ago  Has had paracentesis in the past, has recurrent  ascites  Paracentesis in AM (therapeutic)        Insulin dependent diabetes mellitus (CMS-HCC)- (present on admission)   Assessment & Plan    Uncontrolled  Continue Insulin-sliding scale, accu-checks and hypoglycemia protocol.            History of hepatitis C- (present on admission)   Assessment & Plan    2/2 IV drug abuse; still continues to use IV methamphetamine            Quality-Core Measures   Reviewed items::  Labs reviewed and Medications reviewed  Berry catheter::  No Berry  Central line in place:  Need for access  DVT prophylaxis pharmacological::  Contraindicated - Anemia requiring blood transfusion  DVT prophylaxis - mechanical:  SCDs  Ulcer Prophylaxis::  Yes  Antibiotics:  Treating active infection/contamination beyond 24 hours perioperative coverage

## 2018-02-15 ENCOUNTER — PATIENT OUTREACH (OUTPATIENT)
Dept: HEALTH INFORMATION MANAGEMENT | Facility: OTHER | Age: 58
End: 2018-02-15

## 2018-02-15 NOTE — DISCHARGE PLANNING
Medical Social Worker     Lindsey reports the need for authorization for Xifaxan. [phone].  [facility] called and fax Amanda, bed day nurse, face sheet, prescription and contact number.     Plan:  [facility] will continue to assist with pt's d/c needs.

## 2018-02-15 NOTE — DISCHARGE INSTRUCTIONS
Discharge Instructions    Discharged to home by car with relative. Discharged via wheelchair, hospital escort: Yes.  Special equipment needed: Not Applicable    Be sure to schedule a follow-up appointment with your primary care doctor or any specialists as instructed.     Discharge Plan:   Diet Plan: Discussed  Activity Level: Discussed  Smoking Cessation Offered: Patient Counseled  Confirmed Follow up Appointment: Appointment Scheduled  Confirmed Symptoms Management: Discussed  Medication Reconciliation Updated: Yes  Influenza Vaccine Indication: Patient Refuses    I understand that a diet low in cholesterol, fat, and sodium is recommended for good health. Unless I have been given specific instructions below for another diet, I accept this instruction as my diet prescription.   Other diet: Regular    Special Instructions: None    · Is patient discharged on Warfarin / Coumadin?   No     Depression / Suicide Risk    As you are discharged from this [facility] facility, it is important to learn how to keep safe from harming yourself.    Recognize the warning signs:  · Abrupt changes in personality, positive or negative- including increase in energy   · Giving away possessions  · Change in eating patterns- significant weight changes-  positive or negative  · Change in sleeping patterns- unable to sleep or sleeping all the time   · Unwillingness or inability to communicate  · Depression  · Unusual sadness, discouragement and loneliness  · Talk of wanting to die  · Neglect of personal appearance   · Rebelliousness- reckless behavior  · Withdrawal from people/activities they love  · Confusion- inability to concentrate     If you or a loved one observes any of these behaviors or has concerns about self-harm, here's what you can do:  · Talk about it- your feelings and reasons for harming yourself  · Remove any means that you might use to hurt yourself (examples: pills, rope, extension cords, firearm)  · Get professional  help from the community (Mental Health, Substance Abuse, psychological counseling)  · Do not be alone:Call your Safe Contact- someone whom you trust who will be there for you.  · Call your local CRISIS HOTLINE 735-5655 or 034-612-6764  · Call your local Children's Mobile Crisis Response Team [place] (294) 984-7295 or www.[employer]  · Call the toll free National Suicide Prevention Hotlines   · National Suicide Prevention Lifeline 597-500-UPVL (0221)  · [employer] Hope Line Network 800-SUICIDE (448-3605)        Gastrointestinal Bleeding  Gastrointestinal bleeding is bleeding somewhere along the path that food travels through the body (digestive tract). This path is anywhere between the mouth and the opening of the butt (anus). You may have blood in your throw up (vomit) or in your poop (stools). If there is a lot of bleeding, you may need to stay in the hospital.  HOME CARE  · Only take medicine as told by your doctor.  · Eat foods with fiber such as whole grains, fruits, and vegetables. You can also try eating 1 to 3 prunes a day.  · Drink enough fluids to keep your pee (urine) clear or pale yellow.  GET HELP RIGHT AWAY IF:   · Your bleeding gets worse.  · You feel dizzy, weak, or you pass out (faint).  · You have bad cramps in your back or belly (abdomen).  · You have large blood clumps (clots) in your poop.  · Your problems are getting worse.  MAKE SURE YOU:   · Understand these instructions.  · Will watch your condition.  · Will get help right away if you are not doing well or get worse.     This information is not intended to replace advice given to you by your health care provider. Make sure you discuss any questions you have with your health care provider.     Document Released: 09/26/2009 Document Revised: 12/04/2013 Document Reviewed: 11/26/2012  Elsevier Interactive Patient Education ©2016 [employer]      Ascites  Ascites is a collection of excess fluid in the abdomen. Ascites can range from  mild to severe. It can get worse without treatment.  CAUSES  Possible causes include:  · Cirrhosis. This is the most common cause of ascites.  · Infection or inflammation in the abdomen.  · Cancer in the abdomen.  · Heart failure.  · Kidney disease.  · Inflammation of the pancreas.  · Clots in the veins of the liver.  SIGNS AND SYMPTOMS  Signs and symptoms may include:  · A feeling of fullness in your abdomen. This is common.  · An increase in the size of your abdomen or your waist.  · Swelling in your legs.  · Swelling of the scrotum in men.  · Difficulty breathing.  · Abdominal pain.  · Sudden weight gain.  If the condition is mild, you may not have symptoms.  DIAGNOSIS  To make a diagnosis, your health care provider will:  · Ask about your medical history.  · Perform a physical exam.  · Order imaging tests, such as an ultrasound or CT scan of your abdomen.  TREATMENT  Treatment depends on the cause of the ascites. It may include:  · Taking a pill to make you urinate. This is called a water pill (diuretic pill).  · Strictly reducing your salt (sodium) intake. Salt can cause extra fluid to be kept in the body, and this makes ascites worse.  · Having a procedure to remove fluid from your abdomen (paracentesis).  · Having a procedure to transfer fluid from your abdomen into a vein.  · Having a procedure that connects two of the major veins within your liver and relieves pressure on your liver (TIPS procedure).  Ascites may go away or improve with treatment of the condition that caused it.   HOME CARE INSTRUCTIONS  · Keep track of your weight. To do this, weigh yourself at the same time every day and record your weight.  · Keep track of how much you drink and any changes in the amount you urinate.  · Follow any instructions that your health care provider gives you about how much to drink.  · Try not to eat salty (high-sodium) foods.  · Take medicines only as directed by your health care provider.  · Keep all follow-up  visits as directed by your health care provider. This is important.  · Report any changes in your health to your health care provider, especially if you develop new symptoms or your symptoms get worse.  SEEK MEDICAL CARE IF:  · Your gain more than 3 pounds in 3 days.  · Your abdominal size or your waist size increases.  · You have new swelling in your legs.  · The swelling in your legs gets worse.  SEEK IMMEDIATE MEDICAL CARE IF:  · You develop a fever.  · You develop confusion.  · You develop new or worsening difficulty breathing.  · You develop new or worsening abdominal pain.  · You develop new or worsening swelling in the scrotum (in men).     This information is not intended to replace advice given to you by your health care provider. Make sure you discuss any questions you have with your health care provider.     Document Released: 12/18/2006 Document Revised: 01/08/2016 Document Reviewed: 07/17/2015  [employer]'s what's in [employer] Interactive Patient Education ©2016 Elsevier Inc.

## 2018-02-15 NOTE — DISCHARGE PLANNING
Medical Social Worker    Amanda, bed day nurse reports incorrect number.  SW called pharmacy again and they provided Medicaid number [id number].  SW provided this number to Amanda.     Plan:  [facility] will continue to assist with pt's d/c plans.

## 2018-02-15 NOTE — DISCHARGE SUMMARY
Received call from [facility] King in regards to Xifaxan prior auth.  Called Medicaid and spoke with Toan and was able to obtain authorization with PA # [id number].  Called and updated Christine.

## 2018-02-15 NOTE — LETTER
Demetrice Jaime  [address] [place] [address]    February 15, 2018      Dear Demetrice Jaime,    Replaced by [facility] wants to ensure your discharge home is safe and you or your loved ones have had all of your questions answered regarding your care after you leave the hospital.    Our discharge team was unsuccessful in our attempts to contact you telephonically and we wanted to be sure that you had a list of resources and contact information should you have any questions regarding your hospital stay, follow-up instructions, or active medical symptoms.    Questions or Concerns Regarding… Contact   Medical Questions Related to Your Discharge  (7 days a week, 8am-5pm) Contact a Nurse Care Coordinator   [phone]   Medical Questions Not Related to Your Discharge  (24 hours a day / 7 days a week)  Contact the Nurse Health Line   [phone]    Medications or Discharge Instructions Refer to your discharge packet   or contact your -[phone]   Follow-up Appointment(s) Schedule your appointment via [employer]   or contact Scheduling [phone]   Billing Review your statement via [employer]  or contact Billing [phone]   Medical Records Review your records via [employer]   or contact Medical Records [phone]     You can also easily access your medical information, test results and upcoming appointments via the [employer] free online health management tool. You can learn more and sign up at [employer]. For assistance setting up your [employer] account, please call [phone].    Once again, we want to ensure your discharge home is safe and that you have a clear understanding of any next steps in your care. If you have any questions or concerns, please do not hesitate to contact us, we are here for you. Thank you for choosing [facility] for your healthcare needs.    Sincerely,      Your [facility] Healthcare Team

## 2018-02-15 NOTE — PROGRESS NOTES
Pt. discharged to home via car with family. All lines and monitors have been discontinued. Discharge education and follow-up appointments discussed with pt. By previous day shift RN

## 2018-02-15 NOTE — DISCHARGE PLANNING
Medical Social Worker    MAC received call from Yimi Patel nurse and reports medication has been approved.  MAC called [facility] Portillo and they report pt will owe nothing for Xifaxan. Mac communicated above with bedside nurse and pt.  SW handed prescription to pt.      Plan:  SW will continue to assist with pt's d/c needs.

## 2018-02-16 NOTE — DISCHARGE SUMMARY
CHIEF COMPLAINT ON ADMISSION  Chief Complaint   Patient presents with   • Lower GI Bleed     biba for dark tarry stool and bright red emesis. per EMS patients mother states she recently had banding done. pt currently a&ox2       CODE STATUS  Full    HPI & HOSPITAL COURSE  This is a 57 y.o. female admitted 2/7/2018 with h/o Alcohol abuse, cirrhosis, Hep C, meth abuse, variceal bleeding admitted with black tarry stools and vomiting blood.  She has had a recent outpatient banding procedure therefore, GI was consulted immediately. She was taken for EGD which revealed actively bleeding varix at the GE junction. Also distal esophageal varices that were banded ×6, there was no obvious gastric varices, stomach was full of blood clots.  She was admitted to the ICU and did require blood transfusion.  She was hypotensive and therefore required central line placement.    She was noted to have ongoing melanoma therefore did undergo a repeat EGD procedure on 2/12/2018 which showed unremarkable proximal esophagus, ulcers throughout the distal one third of the esophagus that were clean-based and showed no stigmata of active or impending bleeding. 2 variceal bands were still seen in place. Stomach showed portal hypertensive gastropathy and the duodenum was normal.    She was transitioned to twice a day PPI therapy as well as Carafate 4 times a day. Her diet was slowly advanced and well tolerated to a GI soft diet. She was transferred to the telemetry floor her hemoglobin remains slightly low however she did not require any further blood transfusions and her hemoglobin slowly began to rise.    She complains of increasing ascites. She has had a paracentesis in the past therefore repeat paracentesis was not 2/14/2018. Roughly 4.8 L were drained. She is doing well postprocedure and requesting to go home.    She was provided with all of her new medications including rifaximin in addition to the lactulose that she had previously taken.   She was given Lasix and spironolactone as appropriate in the setting of cirrhosis with ascites.    She will continue to follow-up with GI as an outpatient for ongoing banding as needed.    The patient met 2-midnight criteria for an inpatient stay at the time of discharge.    Therefore, she is discharged in good and stable condition with close outpatient follow-up.    SPECIFIC OUTPATIENT FOLLOW-UP  Follow-up with GI  Follow-up with PCP within one week    DISCHARGE PROBLEM LIST  Principal Problem:    Acute upper GI bleeding POA: Yes  Active Problems:    Acute blood loss anemia POA: Yes    History of hepatitis C POA: Yes    Insulin dependent diabetes mellitus (CMS-HCC) POA: Yes    History of cirrhosis of liver POA: Yes    Methamphetamine abuse POA: Yes    Hepatic encephalopathy (CMS-HCC) POA: Yes    Elevated liver enzymes POA: Yes  Resolved Problems:    Tachycardia POA: Yes    Leukocytosis POA: Yes      FOLLOW UP  No future appointments.  Daniella Martinez, P.A.  [address] [facility] [address]  [phone]    Go on 2/22/2018  Please arrive at 1:15 pm for your appointment. Please bring photo ID and Insurance card. If you are unable to make appointment plese call to cancel or reschedule. Thank you     Davonte Mauricio M.D.  [address] [address]  [phone]    Call on 2/14/2018  [facility]  called office and they state that they will contact patient to schedule. If you do not hear from them please call to cancel or reschedule. Thank you       MEDICATIONS ON DISCHARGE   Demetrice Jaime   Home Medication Instructions NICKY:[id number]    Printed on:02/15/18 3390   Medication Information                      furosemide (LASIX) 20 MG Tab  Take 1 Tab by mouth every day.             gabapentin (NEURONTIN) 300 MG Cap  Take 300 mg by mouth 2 Times a Day.             lactulose 20 GM/30ML Solution  Take 30 mL by mouth 2 Times a Day.             omeprazole (PRILOSEC) 20 MG delayed-release capsule  Take 1 Cap  by mouth 2 Times a Day.             phosphorus (K-PHOS-NEUTRAL, PHOSPHA 250 NEUTRAL) 155-852-130 MG tablet  Take 2 Tabs by mouth 2 Times a Day.             propranolol (INDERAL) 10 MG Tab  Take 1 Tab by mouth 2 Times a Day.             riFAXIMin (XIFAXAN) 550 MG Tab tablet  Take 1 Tab by mouth 2 Times a Day.             spironolactone (ALDACTONE) 50 MG Tab  Take 1 Tab by mouth every day.             sucralfate (CARAFATE) 1 GM/10ML Suspension  Take 10 mL by mouth every 6 hours.             zinc sulfate (ZINCATE) 220 (50 Zn) MG Cap  Take 1 Cap by mouth every day.                 DIET  No orders of the defined types were placed in this encounter.      ACTIVITY  As tolerated.  Weight bearing as tolerated      CONSULTATIONS  Dr. Bustamante - GI    PROCEDURES  2/8/18: EGD- Actively bleeding varix at the gastroesophageal junction.  2.  Distal esophageal varices banded x6.  3.  No obvious gastric varices.  4.  Stomach full of clots.  5.  Normal duodenum.    2/12/18: EGD - Proximal esophagus, unremarkable mucosa.  2.  Distal esophagus, ulcers throughout the distal one-third of the esophagus.    Multiple clean based ulcers with some purplish discoloration, but no   stigmata of active recent or impending bleeding.  Two variceal bands still   seen in place.  No evidence of recurrent bleeding.  3.  Stomach, portal hypertensive gastropathy.  4.  Duodenum, unremarkable mucosa    2/14/18: Paracentesis: 4350mL removed    LABORATORY  Lab Results   Component Value Date/Time    SODIUM 138 02/14/2018 04:00 AM    POTASSIUM 3.3 (L) 02/14/2018 04:00 AM    CHLORIDE 110 02/14/2018 04:00 AM    CO2 24 02/14/2018 04:00 AM    GLUCOSE 138 (H) 02/14/2018 04:00 AM    BUN 7 (L) 02/14/2018 04:00 AM    CREATININE 0.43 (L) 02/14/2018 04:00 AM    CREATININE 0.6 02/03/2008 09:21 PM        Lab Results   Component Value Date/Time    WBC 6.4 02/14/2018 04:00 AM    HEMOGLOBIN 8.4 (L) 02/14/2018 04:00 AM    HEMATOCRIT 27.3 (L) 02/14/2018 04:00 AM    PLATELETCT  90 (L) 02/14/2018 04:00 AM        Total time of the discharge process exceeds 40 minutes

## 2018-02-24 PROCEDURE — 49082 ABD PARACENTESIS: CPT

## 2018-02-24 PROCEDURE — 99284 EMERGENCY DEPT VISIT MOD MDM: CPT

## 2018-02-25 ENCOUNTER — HOSPITAL ENCOUNTER (EMERGENCY)
Facility: MEDICAL CENTER | Age: 58
End: 2018-02-25
Attending: EMERGENCY MEDICINE
Payer: MEDICAID

## 2018-02-25 ENCOUNTER — APPOINTMENT (OUTPATIENT)
Dept: RADIOLOGY | Facility: MEDICAL CENTER | Age: 58
End: 2018-02-25
Attending: EMERGENCY MEDICINE
Payer: MEDICAID

## 2018-02-25 VITALS
TEMPERATURE: 97.8 F | OXYGEN SATURATION: 95 % | DIASTOLIC BLOOD PRESSURE: 94 MMHG | SYSTOLIC BLOOD PRESSURE: 158 MMHG | WEIGHT: 192.68 LBS | HEART RATE: 122 BPM | RESPIRATION RATE: 18 BRPM | HEIGHT: 65 IN | BODY MASS INDEX: 32.1 KG/M2

## 2018-02-25 DIAGNOSIS — F15.11 HISTORY OF METHAMPHETAMINE ABUSE (HCC): ICD-10-CM

## 2018-02-25 DIAGNOSIS — R45.1 AGITATION: ICD-10-CM

## 2018-02-25 DIAGNOSIS — R00.0 TACHYCARDIA: ICD-10-CM

## 2018-02-25 DIAGNOSIS — F17.200 TOBACCO DEPENDENCE: ICD-10-CM

## 2018-02-25 DIAGNOSIS — R18.8 ASCITES OF LIVER: ICD-10-CM

## 2018-02-25 LAB
ABO GROUP BLD: NORMAL
ALBUMIN SERPL BCP-MCNC: 2.6 G/DL (ref 3.2–4.9)
ALBUMIN/GLOB SERPL: 0.9 G/DL
ALP SERPL-CCNC: 75 U/L (ref 30–99)
ALT SERPL-CCNC: 22 U/L (ref 2–50)
ANION GAP SERPL CALC-SCNC: 5 MMOL/L (ref 0–11.9)
ANISOCYTOSIS BLD QL SMEAR: ABNORMAL
APPEARANCE FLD: CLEAR
APTT PPP: >240 SEC (ref 24.7–36)
AST SERPL-CCNC: 17 U/L (ref 12–45)
BASOPHILS # BLD AUTO: 0.9 % (ref 0–1.8)
BASOPHILS # BLD: 0.04 K/UL (ref 0–0.12)
BASOPHILS NFR FLD: 1 %
BILIRUB SERPL-MCNC: 0.7 MG/DL (ref 0.1–1.5)
BLD GP AB SCN SERPL QL: NORMAL
BODY FLD TYPE: NORMAL
BUN SERPL-MCNC: 7 MG/DL (ref 8–22)
CALCIUM SERPL-MCNC: 8.2 MG/DL (ref 8.5–10.5)
CHLORIDE SERPL-SCNC: 108 MMOL/L (ref 96–112)
CO2 SERPL-SCNC: 25 MMOL/L (ref 20–33)
COLOR FLD: NORMAL
CREAT SERPL-MCNC: 0.52 MG/DL (ref 0.5–1.4)
CSF COMMENTS 1658: NORMAL
EOSINOPHIL # BLD AUTO: 0.03 K/UL (ref 0–0.51)
EOSINOPHIL NFR BLD: 0.8 % (ref 0–6.9)
ERYTHROCYTE [DISTWIDTH] IN BLOOD BY AUTOMATED COUNT: 52.9 FL (ref 35.9–50)
GLOBULIN SER CALC-MCNC: 3 G/DL (ref 1.9–3.5)
GLUCOSE SERPL-MCNC: 240 MG/DL (ref 65–99)
GRAM STN SPEC: NORMAL
HCT VFR BLD AUTO: 27.4 % (ref 37–47)
HGB BLD-MCNC: 7.8 G/DL (ref 12–16)
HISTIOCYTES NFR FLD: 28 %
HYPOCHROMIA BLD QL SMEAR: ABNORMAL
INR PPP: 1.52 (ref 0.87–1.13)
LIPASE SERPL-CCNC: 40 U/L (ref 11–82)
LYMPHOCYTES # BLD AUTO: 0.93 K/UL (ref 1–4.8)
LYMPHOCYTES NFR BLD: 22.6 % (ref 22–41)
LYMPHOCYTES NFR FLD: 36 %
MANUAL DIFF BLD: NORMAL
MCH RBC QN AUTO: 22 PG (ref 27–33)
MCHC RBC AUTO-ENTMCNC: 28.5 G/DL (ref 33.6–35)
MCV RBC AUTO: 77.2 FL (ref 81.4–97.8)
MESOTHL CELL NFR FLD: 10 %
MICROCYTES BLD QL SMEAR: ABNORMAL
MONOCYTES # BLD AUTO: 0.04 K/UL (ref 0–0.85)
MONOCYTES NFR BLD AUTO: 0.9 % (ref 0–13.4)
MONONUC CELLS NFR FLD: 22 %
MORPHOLOGY BLD-IMP: NORMAL
NEUTROPHILS # BLD AUTO: 3.07 K/UL (ref 2–7.15)
NEUTROPHILS NFR BLD: 74.8 % (ref 44–72)
NEUTROPHILS NFR FLD: 3 %
NRBC # BLD AUTO: 0 K/UL
NRBC BLD-RTO: 0 /100 WBC
OVALOCYTES BLD QL SMEAR: NORMAL
PLATELET # BLD AUTO: 88 K/UL (ref 164–446)
PLATELET BLD QL SMEAR: NORMAL
POIKILOCYTOSIS BLD QL SMEAR: NORMAL
POLYCHROMASIA BLD QL SMEAR: NORMAL
POTASSIUM SERPL-SCNC: 3 MMOL/L (ref 3.6–5.5)
PROT SERPL-MCNC: 5.6 G/DL (ref 6–8.2)
PROTHROMBIN TIME: 18 SEC (ref 12–14.6)
RBC # BLD AUTO: 3.55 M/UL (ref 4.2–5.4)
RBC # FLD: <2000 CELLS/UL
RBC BLD AUTO: PRESENT
RH BLD: NORMAL
SIGNIFICANT IND 70042: NORMAL
SITE SITE: NORMAL
SODIUM SERPL-SCNC: 138 MMOL/L (ref 135–145)
SOURCE SOURCE: NORMAL
WBC # BLD AUTO: 4.1 K/UL (ref 4.8–10.8)
WBC # FLD: 72 CELLS/UL

## 2018-02-25 PROCEDURE — 85027 COMPLETE CBC AUTOMATED: CPT

## 2018-02-25 PROCEDURE — 86850 RBC ANTIBODY SCREEN: CPT

## 2018-02-25 PROCEDURE — 85610 PROTHROMBIN TIME: CPT

## 2018-02-25 PROCEDURE — 80053 COMPREHEN METABOLIC PANEL: CPT

## 2018-02-25 PROCEDURE — 86900 BLOOD TYPING SEROLOGIC ABO: CPT

## 2018-02-25 PROCEDURE — 86901 BLOOD TYPING SEROLOGIC RH(D): CPT

## 2018-02-25 PROCEDURE — 71045 X-RAY EXAM CHEST 1 VIEW: CPT

## 2018-02-25 PROCEDURE — A9270 NON-COVERED ITEM OR SERVICE: HCPCS | Performed by: EMERGENCY MEDICINE

## 2018-02-25 PROCEDURE — 87070 CULTURE OTHR SPECIMN AEROBIC: CPT

## 2018-02-25 PROCEDURE — 85730 THROMBOPLASTIN TIME PARTIAL: CPT

## 2018-02-25 PROCEDURE — 85007 BL SMEAR W/DIFF WBC COUNT: CPT

## 2018-02-25 PROCEDURE — 83690 ASSAY OF LIPASE: CPT

## 2018-02-25 PROCEDURE — 87205 SMEAR GRAM STAIN: CPT

## 2018-02-25 PROCEDURE — 700102 HCHG RX REV CODE 250 W/ 637 OVERRIDE(OP): Performed by: EMERGENCY MEDICINE

## 2018-02-25 PROCEDURE — 89051 BODY FLUID CELL COUNT: CPT

## 2018-02-25 RX ORDER — POTASSIUM CHLORIDE 20 MEQ/1
40 TABLET, EXTENDED RELEASE ORAL ONCE
Status: COMPLETED | OUTPATIENT
Start: 2018-02-25 | End: 2018-02-25

## 2018-02-25 RX ADMIN — POTASSIUM CHLORIDE 40 MEQ: 1500 TABLET, EXTENDED RELEASE ORAL at 03:11

## 2018-02-25 NOTE — ED NOTES
Pt still draining. Pt reports much easier time breathing. NO complaints at this time, will continue to monitor.

## 2018-02-25 NOTE — ED TRIAGE NOTES
"Chief Complaint   Patient presents with   • Abdominal Swelling     Pt recently admitted for UGI bleed and varices, abdomen semi-firm to firm in triage, increasing in size over 2 weeks   • Shortness of Breath     Pt ambulatory to triage w/ some difficulty. Pt appears pale, states she is cold, has obviously increased WOB. Pt's abdomen is markedly swollen, semi-firm to firm in all quadrants. Pt states she has not vomited, nor has she noticed blood in stool. Pt states the swelling \"basically started the day after I was discharged\". Pt denies drinking or illicit drug use. Charge RN notified of Pt. GI bleed protocol placed.    Blood pressure 158/94, pulse (!) 125, temperature 36.6 °C (97.8 °F), temperature source Temporal, resp. rate 18, height 1.651 m (5' 5\"), weight 87.4 kg (192 lb 10.9 oz), SpO2 97 %.      "

## 2018-02-25 NOTE — ED NOTES
Pt removed the buckner needle from her port prior without assistance, and refused to have port flushed with Heparin per Protocol. Pt Given discharge instructions/ prescriptions/ home care instructions, Pt verbalized understanding of instructions given, pt ambulatory to [facility] marinelli.

## 2018-02-25 NOTE — DISCHARGE INSTRUCTIONS
Continue your home medications. Call Monday to schedule follow-up with gastroenterology and your primary care doctor. Return to the emergency department for severe symptoms that cannot be managed by your regular doctors.     Ascites  Ascites is a collection of excess fluid in the abdomen. Ascites can range from mild to severe. It can get worse without treatment.  CAUSES  Possible causes include:  · Cirrhosis. This is the most common cause of ascites.  · Infection or inflammation in the abdomen.  · Cancer in the abdomen.  · Heart failure.  · Kidney disease.  · Inflammation of the pancreas.  · Clots in the veins of the liver.  SIGNS AND SYMPTOMS  Signs and symptoms may include:  · A feeling of fullness in your abdomen. This is common.  · An increase in the size of your abdomen or your waist.  · Swelling in your legs.  · Swelling of the scrotum in men.  · Difficulty breathing.  · Abdominal pain.  · Sudden weight gain.  If the condition is mild, you may not have symptoms.  DIAGNOSIS  To make a diagnosis, your health care provider will:  · Ask about your medical history.  · Perform a physical exam.  · Order imaging tests, such as an ultrasound or CT scan of your abdomen.  TREATMENT  Treatment depends on the cause of the ascites. It may include:  · Taking a pill to make you urinate. This is called a water pill (diuretic pill).  · Strictly reducing your salt (sodium) intake. Salt can cause extra fluid to be kept in the body, and this makes ascites worse.  · Having a procedure to remove fluid from your abdomen (paracentesis).  · Having a procedure to transfer fluid from your abdomen into a vein.  · Having a procedure that connects two of the major veins within your liver and relieves pressure on your liver (TIPS procedure).  Ascites may go away or improve with treatment of the condition that caused it.   HOME CARE INSTRUCTIONS  · Keep track of your weight. To do this, weigh yourself at the same time every day and record  your weight.  · Keep track of how much you drink and any changes in the amount you urinate.  · Follow any instructions that your health care provider gives you about how much to drink.  · Try not to eat salty (high-sodium) foods.  · Take medicines only as directed by your health care provider.  · Keep all follow-up visits as directed by your health care provider. This is important.  · Report any changes in your health to your health care provider, especially if you develop new symptoms or your symptoms get worse.  SEEK MEDICAL CARE IF:  · Your gain more than 3 pounds in 3 days.  · Your abdominal size or your waist size increases.  · You have new swelling in your legs.  · The swelling in your legs gets worse.  SEEK IMMEDIATE MEDICAL CARE IF:  · You develop a fever.  · You develop confusion.  · You develop new or worsening difficulty breathing.  · You develop new or worsening abdominal pain.  · You develop new or worsening swelling in the scrotum (in men).     This information is not intended to replace advice given to you by your health care provider. Make sure you discuss any questions you have with your health care provider.     Document Released: 12/18/2006 Document Revised: 01/08/2016 Document Reviewed: 07/17/2015  [employer] Patient Education ©2016 [employer]

## 2018-02-25 NOTE — ED PROVIDER NOTES
ED Provider Note    Scribed for Kenji Chavez M.D. by Kenji Chavez. 2/25/2018,  12:55 AM.    CHIEF COMPLAINT  Chief Complaint   Patient presents with   • Abdominal Swelling     Pt recently admitted for UGI bleed and varices, abdomen semi-firm to firm in triage, increasing in size over 2 weeks   • Shortness of Breath       HPI  Demetrice Jaime is a 57 y.o. female with a history of cirrhosis, hepatitis C, alcohol abuse, methamphetamine abuse, and a recent admission for variceal bleeding who presents to the emergency department for abdominal swelling which she reports is causing her increased work of breathing. She reports that she's had this swelling progressively since being discharged from the hospital on Carlson's day. She denies fevers or chills or nausea or vomiting, or bloody stools. She has some noted increased work of breathing, and tachycardia at rest.    Her records show that she was admitted on February 7 with GI bleeding, perhaps secondary to rebleeding of recently banded varices. EGD did show a bleeding varix. She was admitted to the ICU and required a blood transfusion. She did have a complaint of increased ascites before discharge, and underwent a 4.8 L paracentesis prior to discharge, and went home on Lasix, spironolactone.     REVIEW OF SYSTEMS  See HPI for further details. All other systems are negative.     PAST MEDICAL HISTORY   has a past medical history of Anemia; Anesthesia; Arthritis; Breath shortness; Cirrhosis (CMS-[facility]); Dental disorder; Depression; Depression (6/22/2016); Diabetes; Fibromyalgia; Hepatitis C; Hiatus hernia syndrome; Hypertension; Indigestion; Obesity; Other specified disorder of intestines; Pain (12-19-14); Pneumonia; Psychiatric problem; Restless leg syndrome; Sleep apnea; Snoring; Stroke (CMS-[facility]) (2011); Unspecified hemorrhagic conditions; and Urinary bladder disorder.    SOCIAL HISTORY  Social History     Social History Main Topics   • Smoking status:  "Current Every Day Smoker     Packs/day: 1.00     Years: 42.00     Types: Cigarettes   • Smokeless tobacco: Never Used   • Alcohol use 0.0 - 3.6 oz/week      Comment: drinks 0-6 during the week and drinks all weekend   • Drug use: Yes      Comment: Hx IV meth use, states she used 12/18/17 for first time in yrs    • Sexual activity: Not on file     History   Drug Use     Comment: Hx IV meth use, states she used 12/18/17 for first time in yrs        SURGICAL HISTORY   has a past surgical history that includes gyn surgery; gyn surgery (2010); carpal tunnel release (2010); shoulder surgery (2010); other (2010); other; anterior and posterior repair (12/6/2013); enterocele repair (12/6/2013); bladder sling female (12/6/2013); gastroscopy with clipping (7/6/2014); gastroscopy with banding (7/8/2014); cath removal (11/14/2014); cath placement (12/22/2014); cataract extraction with iol; tonsillectomy; gastroscopy (N/A, 8/21/2017); gastroscopy with banding (N/A, 2/8/2018); and gastroscopy (N/A, 2/12/2018).    CURRENT MEDICATIONS  Home Medications    **Home medications have not yet been reviewed for this encounter**         ALLERGIES  Allergies   Allergen Reactions   • Nkda [No Known Drug Allergy]        PHYSICAL EXAM  VITAL SIGNS: /94   Pulse (!) 122 Comment: during ambulation  Temp 36.6 °C (97.8 °F) (Temporal)   Resp 18   Ht 1.651 m (5' 5\")   Wt 87.4 kg (192 lb 10.9 oz)   SpO2 95%   BMI 32.06 kg/m²   Pulse ox interpretation: I interpret this pulse ox as normal.  Constitutional: Alert in mild distress.  HENT: No signs of trauma, Bilateral external ears normal, Nose normal.   Eyes: Conjunctiva normal, Non-icteric.   Neck: Normal range of motion, Supple, No stridor.   Lymphatic: No lymphadenopathy noted.   Cardiovascular: Regular tachycardic rate and rhythm, no murmurs.   Thorax & Lungs: Normal breath sounds, No respiratory distress, No wheezing, No chest tenderness.   Abdomen: Bowel sounds normal, firm, distended, " positive fluid wave.  Skin: Warm, Dry, No erythema, No rash.   Extremities: Intact distal pulses, No edema, No cyanosis.  Musculoskeletal: Good range of motion in all major joints. No or major deformities noted.   Neurologic: Alert , Normal motor function, Normal sensory function, No focal deficits noted.   Psychiatric: Affect normal, Judgment normal, Mood normal.     DIAGNOSTIC STUDIES / PROCEDURES      LABS  Labs Reviewed   CBC WITH DIFFERENTIAL - Abnormal; Notable for the following:        Result Value    WBC 4.1 (*)     RBC 3.55 (*)     Hemoglobin 7.8 (*)     Hematocrit 27.4 (*)     MCV 77.2 (*)     MCH 22.0 (*)     MCHC 28.5 (*)     RDW 52.9 (*)     Platelet Count 88 (*)     Neutrophils-Polys 74.80 (*)     Lymphs (Absolute) 0.93 (*)     All other components within normal limits    Narrative:     Indicate which anticoagulants the patient is on:->NONE   COMP METABOLIC PANEL - Abnormal; Notable for the following:     Potassium 3.0 (*)     Glucose 240 (*)     Bun 7 (*)     Calcium 8.2 (*)     Albumin 2.6 (*)     Total Protein 5.6 (*)     All other components within normal limits    Narrative:     Indicate which anticoagulants the patient is on:->NONE   PROTHROMBIN TIME - Abnormal; Notable for the following:     PT 18.0 (*)     INR 1.52 (*)     All other components within normal limits    Narrative:     Indicate which anticoagulants the patient is on:->NONE   APTT - Abnormal; Notable for the following:     APTT >240.0 (*)     All other components within normal limits    Narrative:     Indicate which anticoagulants the patient is on:->NONE   COD (ADULT)   LIPASE    Narrative:     Indicate which anticoagulants the patient is on:->NONE   FLUID CELL COUNT    Narrative:     Paracentesis   ESTIMATED GFR    Narrative:     Indicate which anticoagulants the patient is on:->NONE   FLUID CULTURE W/GRAM STAIN    Narrative:     Paracentesis   DIFFERENTIAL MANUAL    Narrative:     Indicate which anticoagulants the patient is  on:->NONE   PERIPHERAL SMEAR REVIEW    Narrative:     Indicate which anticoagulants the patient is on:->NONE   PLATELET ESTIMATE    Narrative:     Indicate which anticoagulants the patient is on:->NONE   MORPHOLOGY    Narrative:     Indicate which anticoagulants the patient is on:->NONE     All labs reviewed by me.    RADIOLOGY  DX-CHEST-LIMITED (1 VIEW)   Final Result      No acute cardiopulmonary disease.        The radiologist's interpretation of all radiological studies have been reviewed by me.    Paracentesis Procedure Note    Indication: Ascites and shortness of breath.    Consent: The patient was counseled regarding the procedure, it's indications, risks, potential complications and alternatives and any questions were answered. Consent was obtained.    Procedure: The patient was placed in the supine position with the head of the bed slightly elevated and the appropriate landmarks were identified. The skin over the puncture site in the right lower quadrant region was prepped with betadine and draped in a sterile fashion. Local anesthesia was obtained by infiltration using 1% Lidocaine without epinephrine. A paracentesis catheter was then advanced into the abdominal cavity over a needle and the needle was withdrawn. Fluid was returned which was serous and cloudy.  A total volume of 5000cc was withdrawn which was sent to the lab for cell count and differential and gram stain and culture. The catheter was then withdrawn and a sterile dressing was placed over the site.     The patient tolerated the procedure well.    Complications: None        COURSE & MEDICAL DECISION MAKING  Nursing notes, VS, PMSFHx reviewed in chart.     12:55 AM Patient seen and examined at bedside. Differential diagnosis includes but is not limited to ascites, shortness of breath secondary to ascites and abdominal distention versus symptomatic anemia, less likely pneumonia, less likely spontaneous bacterial peritonitis. Ordered for chest  "x-ray and laboratory testing including ascites fluid cell count and culture to evaluate. Patient will be treated with paracentesis for her symptoms.     2:11 AM this patient reports feeling improved, stating \"I can breathe again.\" We have just started her 4th liter Vacutainer. I have ordered 40 mEq of oral potassium repletion for her.    3:20 AM this patient completed a 5 L paracentesis. She remains somewhat tachycardic at 113, though her heart rate does not very much between rest and ambulating and she does not become hypoxic. She has been somewhat hesitant to discuss her drug and alcohol habits, and has a history of methamphetamine abuse, and with this slight tachycardia but doesn't seem to vary between rest and ambulation, I wonder if there may be some component of methamphetamine abuse. In any case, the patient feels much better, and when asked how she feels states \"ready to go home.\" I reinforced to her the importance of taking her medication, avoiding drugs and alcohol, and calling Monday to schedule follow-up with her*urologist and primary care doctor, and she admits she's been avoiding making these follow-up appointments. Her labs do show some slight decrease in her hemoglobin, though decreased from 8.4-7.8 is minimal, and likely within lab error. She does have a very high PTT, but no evidence of active bleeding, and her coagulopathy is likely related to her chronic liver disease. She understands she is to follow-up with her outpatient physicians, and should return to the emergency department for severe or worsening symptoms.    3:56 AM of note, this patient came some agitated waiting for her discharge paperwork, de-accessed her own port, and declined bedside nursing's offered to heparin flush the port.      The patient will return for new or worsening symptoms and is stable at the time of discharge.    The patient is referred to a primary physician for blood pressure management, diabetic screening, and for " all other preventative health concerns.    DISPOSITION:  Patient will be discharged home in stable condition.    FOLLOW UP:  Daniella Martinez, P.A.  [address] [address] [phone]    Call in 1 day      Gastroenterology    Call in 1 day      [facility], Emergency Dept  [address] [address] [phone]    If symptoms worsen      OUTPATIENT MEDICATIONS:  New Prescriptions    No medications on file         FINAL IMPRESSION  1. Ascites of liver    2. Agitation    3. History of methamphetamine abuse    4. Tobacco dependence    5. Tachycardia

## 2018-02-26 ENCOUNTER — PATIENT OUTREACH (OUTPATIENT)
Dept: HEALTH INFORMATION MANAGEMENT | Facility: OTHER | Age: 58
End: 2018-02-26

## 2018-02-26 NOTE — PROGRESS NOTES
Placed discharge outreach phone call to patient s/p ER discharge 2/25/18.  Left voicemail providing my contact information and instructions to call with any questions or concerns.

## 2018-03-22 ENCOUNTER — HOSPITAL ENCOUNTER (OUTPATIENT)
Dept: RADIOLOGY | Facility: MEDICAL CENTER | Age: 58
End: 2018-03-22
Attending: INTERNAL MEDICINE
Payer: MEDICAID

## 2018-03-22 DIAGNOSIS — R18.8 OTHER ASCITES: ICD-10-CM

## 2018-03-22 PROCEDURE — 76705 ECHO EXAM OF ABDOMEN: CPT

## 2018-03-22 NOTE — PROGRESS NOTES
Upon ultrasound examination, per Dr Elizabeth, patient did not have enough fluid to perform paracentesis.

## 2018-04-05 ENCOUNTER — HOSPITAL ENCOUNTER (OUTPATIENT)
Dept: RADIOLOGY | Facility: MEDICAL CENTER | Age: 58
End: 2018-04-05
Attending: INTERNAL MEDICINE
Payer: MEDICAID

## 2018-10-15 ENCOUNTER — APPOINTMENT (OUTPATIENT)
Dept: RADIOLOGY | Facility: MEDICAL CENTER | Age: 58
End: 2018-10-15
Attending: NURSE PRACTITIONER
Payer: MEDICAID

## 2018-11-14 ENCOUNTER — HOSPITAL ENCOUNTER (OUTPATIENT)
Dept: RADIOLOGY | Facility: MEDICAL CENTER | Age: 58
End: 2018-11-14
Attending: NURSE PRACTITIONER
Payer: MEDICAID

## 2018-11-14 DIAGNOSIS — K74.60 HEPATIC CIRRHOSIS, UNSPECIFIED HEPATIC CIRRHOSIS TYPE, UNSPECIFIED WHETHER ASCITES PRESENT (HCC): ICD-10-CM

## 2018-11-14 PROCEDURE — 76705 ECHO EXAM OF ABDOMEN: CPT

## 2018-12-11 ENCOUNTER — HOSPITAL ENCOUNTER (OUTPATIENT)
Dept: LAB | Facility: MEDICAL CENTER | Age: 58
End: 2018-12-11
Attending: NURSE PRACTITIONER
Payer: MEDICAID

## 2018-12-11 LAB
ALBUMIN SERPL BCP-MCNC: 3.6 G/DL (ref 3.2–4.9)
ALBUMIN/GLOB SERPL: 1 G/DL
ALP SERPL-CCNC: 84 U/L (ref 30–99)
ALT SERPL-CCNC: 15 U/L (ref 2–50)
ANION GAP SERPL CALC-SCNC: 6 MMOL/L (ref 0–11.9)
AST SERPL-CCNC: 20 U/L (ref 12–45)
BASOPHILS # BLD AUTO: 1.1 % (ref 0–1.8)
BASOPHILS # BLD: 0.04 K/UL (ref 0–0.12)
BILIRUB SERPL-MCNC: 0.9 MG/DL (ref 0.1–1.5)
BUN SERPL-MCNC: 13 MG/DL (ref 8–22)
CALCIUM SERPL-MCNC: 9.6 MG/DL (ref 8.5–10.5)
CHLORIDE SERPL-SCNC: 98 MMOL/L (ref 96–112)
CO2 SERPL-SCNC: 25 MMOL/L (ref 20–33)
CREAT SERPL-MCNC: 0.75 MG/DL (ref 0.5–1.4)
EOSINOPHIL # BLD AUTO: 0.07 K/UL (ref 0–0.51)
EOSINOPHIL NFR BLD: 1.9 % (ref 0–6.9)
ERYTHROCYTE [DISTWIDTH] IN BLOOD BY AUTOMATED COUNT: 43.9 FL (ref 35.9–50)
GLOBULIN SER CALC-MCNC: 3.6 G/DL (ref 1.9–3.5)
GLUCOSE SERPL-MCNC: 583 MG/DL (ref 65–99)
HCT VFR BLD AUTO: 38 % (ref 37–47)
HGB BLD-MCNC: 12.4 G/DL (ref 12–16)
IMM GRANULOCYTES # BLD AUTO: 0 K/UL (ref 0–0.11)
IMM GRANULOCYTES NFR BLD AUTO: 0 % (ref 0–0.9)
INR PPP: 1.29 (ref 0.87–1.13)
LYMPHOCYTES # BLD AUTO: 0.69 K/UL (ref 1–4.8)
LYMPHOCYTES NFR BLD: 18.5 % (ref 22–41)
MCH RBC QN AUTO: 25 PG (ref 27–33)
MCHC RBC AUTO-ENTMCNC: 32.6 G/DL (ref 33.6–35)
MCV RBC AUTO: 76.6 FL (ref 81.4–97.8)
MONOCYTES # BLD AUTO: 0.3 K/UL (ref 0–0.85)
MONOCYTES NFR BLD AUTO: 8.1 % (ref 0–13.4)
NEUTROPHILS # BLD AUTO: 2.62 K/UL (ref 2–7.15)
NEUTROPHILS NFR BLD: 70.4 % (ref 44–72)
NRBC # BLD AUTO: 0 K/UL
NRBC BLD-RTO: 0 /100 WBC
PLATELET # BLD AUTO: 63 K/UL (ref 164–446)
PMV BLD AUTO: 11.6 FL (ref 9–12.9)
POTASSIUM SERPL-SCNC: 4.5 MMOL/L (ref 3.6–5.5)
PROT SERPL-MCNC: 7.2 G/DL (ref 6–8.2)
PROTHROMBIN TIME: 16.2 SEC (ref 12–14.6)
RBC # BLD AUTO: 4.96 M/UL (ref 4.2–5.4)
SODIUM SERPL-SCNC: 129 MMOL/L (ref 135–145)
WBC # BLD AUTO: 3.7 K/UL (ref 4.8–10.8)

## 2018-12-11 PROCEDURE — 87522 HEPATITIS C REVRS TRNSCRPJ: CPT

## 2018-12-11 PROCEDURE — 80053 COMPREHEN METABOLIC PANEL: CPT

## 2018-12-11 PROCEDURE — 82105 ALPHA-FETOPROTEIN SERUM: CPT

## 2018-12-11 PROCEDURE — 85610 PROTHROMBIN TIME: CPT

## 2018-12-11 PROCEDURE — 85025 COMPLETE CBC W/AUTO DIFF WBC: CPT

## 2018-12-11 PROCEDURE — 36415 COLL VENOUS BLD VENIPUNCTURE: CPT

## 2018-12-13 LAB — AFP-TM SERPL-MCNC: 3 NG/ML (ref 0–9)

## 2018-12-14 LAB
HCV RNA SERPL NAA+PROBE-ACNC: NORMAL IU/ML
TEST INFO  93644: NORMAL

## 2019-01-07 NOTE — PROGRESS NOTES
"Patient arrived ambulatory to the \[facility]\"" for port flush/labs. Reviewed orders, orders  in April. Patient \"decided to stop following up with medical apts, last port flush aug 2017 while hospitalized, prior to that Oct of 2016\". Reviewed importance of maintaining port flushing every 4-6 week, patient verbalized understanding. Contact made with [facility] at [facility] requesting new port flush orders, reports patient has a follow up with MD on 17, will discuss with provider and determine who will cover port flush orders in the future. Patient reports having a follow up apt in am with PCP Dr Friedman, will determine who will cover orders for port flushing, provided patient card with phone and fax number of \[facility]\"". Reminded patient to call \[facility]\"" and make apt for port flush once orders are obtained, patient verbalize understanding of this teaching. Patient left the \[facility]\""C ambulatory in no signs of distress.   " June Ying

## 2019-02-13 ENCOUNTER — APPOINTMENT (OUTPATIENT)
Dept: RADIOLOGY | Facility: MEDICAL CENTER | Age: 59
DRG: 439 | End: 2019-02-13
Attending: EMERGENCY MEDICINE
Payer: MEDICAID

## 2019-02-13 ENCOUNTER — HOSPITAL ENCOUNTER (INPATIENT)
Facility: MEDICAL CENTER | Age: 59
LOS: 2 days | DRG: 439 | End: 2019-02-15
Attending: EMERGENCY MEDICINE | Admitting: HOSPITALIST
Payer: MEDICAID

## 2019-02-13 DIAGNOSIS — K85.90 ACUTE PANCREATITIS, UNSPECIFIED COMPLICATION STATUS, UNSPECIFIED PANCREATITIS TYPE: ICD-10-CM

## 2019-02-13 PROBLEM — F10.10 ALCOHOL ABUSE: Status: ACTIVE | Noted: 2019-02-13

## 2019-02-13 PROBLEM — D69.6 THROMBOCYTOPENIA (HCC): Status: ACTIVE | Noted: 2019-02-13

## 2019-02-13 LAB
ALBUMIN SERPL BCP-MCNC: 3.6 G/DL (ref 3.2–4.9)
ALBUMIN/GLOB SERPL: 1 G/DL
ALP SERPL-CCNC: 78 U/L (ref 30–99)
ALT SERPL-CCNC: 13 U/L (ref 2–50)
ANION GAP SERPL CALC-SCNC: 5 MMOL/L (ref 0–11.9)
AST SERPL-CCNC: 41 U/L (ref 12–45)
BASOPHILS # BLD AUTO: 1 % (ref 0–1.8)
BASOPHILS # BLD: 0.05 K/UL (ref 0–0.12)
BILIRUB SERPL-MCNC: 0.8 MG/DL (ref 0.1–1.5)
BUN SERPL-MCNC: 17 MG/DL (ref 8–22)
CALCIUM SERPL-MCNC: 8.9 MG/DL (ref 8.5–10.5)
CHLORIDE SERPL-SCNC: 96 MMOL/L (ref 96–112)
CO2 SERPL-SCNC: 25 MMOL/L (ref 20–33)
CREAT SERPL-MCNC: 0.77 MG/DL (ref 0.5–1.4)
EKG IMPRESSION: NORMAL
EOSINOPHIL # BLD AUTO: 0.06 K/UL (ref 0–0.51)
EOSINOPHIL NFR BLD: 1.2 % (ref 0–6.9)
ERYTHROCYTE [DISTWIDTH] IN BLOOD BY AUTOMATED COUNT: 39.6 FL (ref 35.9–50)
GLOBULIN SER CALC-MCNC: 3.7 G/DL (ref 1.9–3.5)
GLUCOSE SERPL-MCNC: 559 MG/DL (ref 65–99)
HCG SERPL QL: NEGATIVE
HCT VFR BLD AUTO: 39.1 % (ref 37–47)
HGB BLD-MCNC: 13.1 G/DL (ref 12–16)
IMM GRANULOCYTES # BLD AUTO: 0.02 K/UL (ref 0–0.11)
IMM GRANULOCYTES NFR BLD AUTO: 0.4 % (ref 0–0.9)
LIPASE SERPL-CCNC: 2544 U/L (ref 11–82)
LYMPHOCYTES # BLD AUTO: 0.59 K/UL (ref 1–4.8)
LYMPHOCYTES NFR BLD: 12.1 % (ref 22–41)
MCH RBC QN AUTO: 26.7 PG (ref 27–33)
MCHC RBC AUTO-ENTMCNC: 33.5 G/DL (ref 33.6–35)
MCV RBC AUTO: 79.6 FL (ref 81.4–97.8)
MONOCYTES # BLD AUTO: 0.32 K/UL (ref 0–0.85)
MONOCYTES NFR BLD AUTO: 6.6 % (ref 0–13.4)
NEUTROPHILS # BLD AUTO: 3.83 K/UL (ref 2–7.15)
NEUTROPHILS NFR BLD: 78.7 % (ref 44–72)
NRBC # BLD AUTO: 0 K/UL
NRBC BLD-RTO: 0 /100 WBC
PLATELET # BLD AUTO: 56 K/UL (ref 164–446)
POTASSIUM SERPL-SCNC: 5.2 MMOL/L (ref 3.6–5.5)
PROT SERPL-MCNC: 7.3 G/DL (ref 6–8.2)
RBC # BLD AUTO: 4.91 M/UL (ref 4.2–5.4)
SODIUM SERPL-SCNC: 126 MMOL/L (ref 135–145)
WBC # BLD AUTO: 4.9 K/UL (ref 4.8–10.8)

## 2019-02-13 PROCEDURE — 80053 COMPREHEN METABOLIC PANEL: CPT

## 2019-02-13 PROCEDURE — 84443 ASSAY THYROID STIM HORMONE: CPT

## 2019-02-13 PROCEDURE — 85025 COMPLETE CBC W/AUTO DIFF WBC: CPT

## 2019-02-13 PROCEDURE — 83036 HEMOGLOBIN GLYCOSYLATED A1C: CPT

## 2019-02-13 PROCEDURE — 99285 EMERGENCY DEPT VISIT HI MDM: CPT

## 2019-02-13 PROCEDURE — 99223 1ST HOSP IP/OBS HIGH 75: CPT | Mod: 25 | Performed by: HOSPITALIST

## 2019-02-13 PROCEDURE — 700105 HCHG RX REV CODE 258: Performed by: EMERGENCY MEDICINE

## 2019-02-13 PROCEDURE — 84703 CHORIONIC GONADOTROPIN ASSAY: CPT

## 2019-02-13 PROCEDURE — 83690 ASSAY OF LIPASE: CPT

## 2019-02-13 PROCEDURE — 36415 COLL VENOUS BLD VENIPUNCTURE: CPT

## 2019-02-13 PROCEDURE — 770004 HCHG ROOM/CARE - ONCOLOGY PRIVATE *

## 2019-02-13 PROCEDURE — 76705 ECHO EXAM OF ABDOMEN: CPT

## 2019-02-13 PROCEDURE — 99407 BEHAV CHNG SMOKING > 10 MIN: CPT | Performed by: HOSPITALIST

## 2019-02-13 PROCEDURE — 93005 ELECTROCARDIOGRAM TRACING: CPT | Performed by: EMERGENCY MEDICINE

## 2019-02-13 PROCEDURE — 700102 HCHG RX REV CODE 250 W/ 637 OVERRIDE(OP): Performed by: HOSPITALIST

## 2019-02-13 RX ORDER — DEXTROSE MONOHYDRATE 25 G/50ML
25 INJECTION, SOLUTION INTRAVENOUS
Status: DISCONTINUED | OUTPATIENT
Start: 2019-02-13 | End: 2019-02-15 | Stop reason: HOSPADM

## 2019-02-13 RX ORDER — SODIUM CHLORIDE 9 MG/ML
INJECTION, SOLUTION INTRAVENOUS CONTINUOUS
Status: DISCONTINUED | OUTPATIENT
Start: 2019-02-14 | End: 2019-02-15 | Stop reason: HOSPADM

## 2019-02-13 RX ORDER — ONDANSETRON 4 MG/1
4 TABLET, ORALLY DISINTEGRATING ORAL EVERY 4 HOURS PRN
Status: DISCONTINUED | OUTPATIENT
Start: 2019-02-13 | End: 2019-02-15 | Stop reason: HOSPADM

## 2019-02-13 RX ORDER — PROMETHAZINE HYDROCHLORIDE 25 MG/1
12.5-25 SUPPOSITORY RECTAL EVERY 4 HOURS PRN
Status: DISCONTINUED | OUTPATIENT
Start: 2019-02-13 | End: 2019-02-15 | Stop reason: HOSPADM

## 2019-02-13 RX ORDER — HYDROMORPHONE HYDROCHLORIDE 1 MG/ML
0.5 INJECTION, SOLUTION INTRAMUSCULAR; INTRAVENOUS; SUBCUTANEOUS
Status: DISCONTINUED | OUTPATIENT
Start: 2019-02-13 | End: 2019-02-14

## 2019-02-13 RX ORDER — PROMETHAZINE HYDROCHLORIDE 25 MG/1
12.5-25 TABLET ORAL EVERY 4 HOURS PRN
Status: DISCONTINUED | OUTPATIENT
Start: 2019-02-13 | End: 2019-02-15 | Stop reason: HOSPADM

## 2019-02-13 RX ORDER — GABAPENTIN 300 MG/1
300 CAPSULE ORAL 2 TIMES DAILY
Status: DISCONTINUED | OUTPATIENT
Start: 2019-02-14 | End: 2019-02-15 | Stop reason: HOSPADM

## 2019-02-13 RX ORDER — LACTULOSE 20 G/30ML
30 SOLUTION ORAL 2 TIMES DAILY
Status: DISCONTINUED | OUTPATIENT
Start: 2019-02-14 | End: 2019-02-13

## 2019-02-13 RX ORDER — BISACODYL 10 MG
10 SUPPOSITORY, RECTAL RECTAL
Status: DISCONTINUED | OUTPATIENT
Start: 2019-02-13 | End: 2019-02-15 | Stop reason: HOSPADM

## 2019-02-13 RX ORDER — OMEPRAZOLE 20 MG/1
20 CAPSULE, DELAYED RELEASE ORAL 2 TIMES DAILY PRN
Status: DISCONTINUED | OUTPATIENT
Start: 2019-02-14 | End: 2019-02-15 | Stop reason: HOSPADM

## 2019-02-13 RX ORDER — AMOXICILLIN 250 MG
2 CAPSULE ORAL 2 TIMES DAILY
Status: DISCONTINUED | OUTPATIENT
Start: 2019-02-14 | End: 2019-02-15 | Stop reason: HOSPADM

## 2019-02-13 RX ORDER — OXYCODONE HYDROCHLORIDE 5 MG/1
5 TABLET ORAL
Status: DISCONTINUED | OUTPATIENT
Start: 2019-02-13 | End: 2019-02-14

## 2019-02-13 RX ORDER — FUROSEMIDE 40 MG/1
20 TABLET ORAL DAILY
Status: DISCONTINUED | OUTPATIENT
Start: 2019-02-14 | End: 2019-02-13

## 2019-02-13 RX ORDER — OXYCODONE HYDROCHLORIDE 10 MG/1
10 TABLET ORAL
Status: DISCONTINUED | OUTPATIENT
Start: 2019-02-13 | End: 2019-02-14

## 2019-02-13 RX ORDER — PROPRANOLOL HYDROCHLORIDE 10 MG/1
10 TABLET ORAL 2 TIMES DAILY
Status: DISCONTINUED | OUTPATIENT
Start: 2019-02-14 | End: 2019-02-15 | Stop reason: HOSPADM

## 2019-02-13 RX ORDER — SPIRONOLACTONE 25 MG/1
50 TABLET ORAL DAILY
Status: DISCONTINUED | OUTPATIENT
Start: 2019-02-14 | End: 2019-02-13

## 2019-02-13 RX ORDER — POLYETHYLENE GLYCOL 3350 17 G/17G
1 POWDER, FOR SOLUTION ORAL
Status: DISCONTINUED | OUTPATIENT
Start: 2019-02-13 | End: 2019-02-15 | Stop reason: HOSPADM

## 2019-02-13 RX ORDER — HEPARIN SODIUM 5000 [USP'U]/ML
5000 INJECTION, SOLUTION INTRAVENOUS; SUBCUTANEOUS EVERY 8 HOURS
Status: DISCONTINUED | OUTPATIENT
Start: 2019-02-14 | End: 2019-02-13

## 2019-02-13 RX ORDER — ONDANSETRON 2 MG/ML
4 INJECTION INTRAMUSCULAR; INTRAVENOUS EVERY 4 HOURS PRN
Status: DISCONTINUED | OUTPATIENT
Start: 2019-02-13 | End: 2019-02-15 | Stop reason: HOSPADM

## 2019-02-13 RX ORDER — SODIUM CHLORIDE 9 MG/ML
1000 INJECTION, SOLUTION INTRAVENOUS ONCE
Status: COMPLETED | OUTPATIENT
Start: 2019-02-13 | End: 2019-02-13

## 2019-02-13 RX ADMIN — SODIUM CHLORIDE 1000 ML: 9 INJECTION, SOLUTION INTRAVENOUS at 22:44

## 2019-02-13 ASSESSMENT — ENCOUNTER SYMPTOMS
HALLUCINATIONS: 0
SPEECH CHANGE: 0
BRUISES/BLEEDS EASILY: 0
FLANK PAIN: 0
NAUSEA: 1
MYALGIAS: 0
CHILLS: 0
COUGH: 0
HEARTBURN: 0
EYE DISCHARGE: 0
DIZZINESS: 0
DEPRESSION: 0
BLURRED VISION: 0
FOCAL WEAKNESS: 0
VOMITING: 0
HEMOPTYSIS: 0
FEVER: 0
SENSORY CHANGE: 0
PALPITATIONS: 0
WEAKNESS: 0
ABDOMINAL PAIN: 1
SHORTNESS OF BREATH: 0
DOUBLE VISION: 0

## 2019-02-13 ASSESSMENT — LIFESTYLE VARIABLES: SUBSTANCE_ABUSE: 0

## 2019-02-14 ENCOUNTER — APPOINTMENT (OUTPATIENT)
Dept: RADIOLOGY | Facility: MEDICAL CENTER | Age: 59
DRG: 439 | End: 2019-02-14
Attending: HOSPITALIST
Payer: MEDICAID

## 2019-02-14 LAB
AMPHET UR QL SCN: POSITIVE
APPEARANCE UR: CLEAR
BARBITURATES UR QL SCN: NEGATIVE
BENZODIAZ UR QL SCN: NEGATIVE
BILIRUB UR QL STRIP.AUTO: NEGATIVE
BZE UR QL SCN: NEGATIVE
CANNABINOIDS UR QL SCN: NEGATIVE
COLOR UR: YELLOW
EST. AVERAGE GLUCOSE BLD GHB EST-MCNC: 435 MG/DL
GLUCOSE BLD-MCNC: 288 MG/DL (ref 65–99)
GLUCOSE BLD-MCNC: 370 MG/DL (ref 65–99)
GLUCOSE BLD-MCNC: 438 MG/DL (ref 65–99)
GLUCOSE UR STRIP.AUTO-MCNC: >=1000 MG/DL
HBA1C MFR BLD: 16.8 % (ref 0–5.6)
KETONES UR STRIP.AUTO-MCNC: NEGATIVE MG/DL
LEUKOCYTE ESTERASE UR QL STRIP.AUTO: NEGATIVE
LIPASE SERPL-CCNC: 73 U/L (ref 11–82)
METHADONE UR QL SCN: NEGATIVE
MICRO URNS: ABNORMAL
NITRITE UR QL STRIP.AUTO: NEGATIVE
OPIATES UR QL SCN: NEGATIVE
OXYCODONE UR QL SCN: NEGATIVE
PCP UR QL SCN: NEGATIVE
PH UR STRIP.AUTO: 6.5 [PH]
PROPOXYPH UR QL SCN: NEGATIVE
PROT UR QL STRIP: NEGATIVE MG/DL
RBC UR QL AUTO: NEGATIVE
SP GR UR STRIP.AUTO: 1.03
TSH SERPL DL<=0.005 MIU/L-ACNC: 1.85 UIU/ML (ref 0.38–5.33)
UROBILINOGEN UR STRIP.AUTO-MCNC: 0.2 MG/DL

## 2019-02-14 PROCEDURE — 97165 OT EVAL LOW COMPLEX 30 MIN: CPT

## 2019-02-14 PROCEDURE — 82962 GLUCOSE BLOOD TEST: CPT

## 2019-02-14 PROCEDURE — 700102 HCHG RX REV CODE 250 W/ 637 OVERRIDE(OP): Performed by: INTERNAL MEDICINE

## 2019-02-14 PROCEDURE — A9270 NON-COVERED ITEM OR SERVICE: HCPCS | Performed by: INTERNAL MEDICINE

## 2019-02-14 PROCEDURE — 700105 HCHG RX REV CODE 258: Performed by: HOSPITALIST

## 2019-02-14 PROCEDURE — 83690 ASSAY OF LIPASE: CPT

## 2019-02-14 PROCEDURE — A9270 NON-COVERED ITEM OR SERVICE: HCPCS | Performed by: HOSPITALIST

## 2019-02-14 PROCEDURE — 96375 TX/PRO/DX INJ NEW DRUG ADDON: CPT

## 2019-02-14 PROCEDURE — 99232 SBSQ HOSP IP/OBS MODERATE 35: CPT | Performed by: INTERNAL MEDICINE

## 2019-02-14 PROCEDURE — 80307 DRUG TEST PRSMV CHEM ANLYZR: CPT

## 2019-02-14 PROCEDURE — 770004 HCHG ROOM/CARE - ONCOLOGY PRIVATE *

## 2019-02-14 PROCEDURE — 96372 THER/PROPH/DIAG INJ SC/IM: CPT

## 2019-02-14 PROCEDURE — 700102 HCHG RX REV CODE 250 W/ 637 OVERRIDE(OP): Performed by: HOSPITALIST

## 2019-02-14 PROCEDURE — 81003 URINALYSIS AUTO W/O SCOPE: CPT

## 2019-02-14 PROCEDURE — 96374 THER/PROPH/DIAG INJ IV PUSH: CPT

## 2019-02-14 PROCEDURE — 36415 COLL VENOUS BLD VENIPUNCTURE: CPT

## 2019-02-14 PROCEDURE — 700111 HCHG RX REV CODE 636 W/ 250 OVERRIDE (IP): Performed by: HOSPITALIST

## 2019-02-14 PROCEDURE — 74181 MRI ABDOMEN W/O CONTRAST: CPT

## 2019-02-14 RX ORDER — INSULIN GLARGINE 100 [IU]/ML
30-35 INJECTION, SOLUTION SUBCUTANEOUS NIGHTLY
Status: ON HOLD | COMMUNITY
End: 2019-02-15

## 2019-02-14 RX ORDER — OXYCODONE HYDROCHLORIDE 10 MG/1
10 TABLET ORAL
Status: DISCONTINUED | OUTPATIENT
Start: 2019-02-14 | End: 2019-02-15 | Stop reason: HOSPADM

## 2019-02-14 RX ORDER — HYDROMORPHONE HYDROCHLORIDE 2 MG/ML
1 INJECTION, SOLUTION INTRAMUSCULAR; INTRAVENOUS; SUBCUTANEOUS
Status: DISCONTINUED | OUTPATIENT
Start: 2019-02-14 | End: 2019-02-15 | Stop reason: HOSPADM

## 2019-02-14 RX ORDER — PROPRANOLOL HYDROCHLORIDE 10 MG/1
10 TABLET ORAL ONCE
Status: DISPENSED | OUTPATIENT
Start: 2019-02-14 | End: 2019-02-15

## 2019-02-14 RX ORDER — LACTULOSE 20 G/30ML
30 SOLUTION ORAL 2 TIMES DAILY
Status: DISCONTINUED | OUTPATIENT
Start: 2019-02-14 | End: 2019-02-15 | Stop reason: HOSPADM

## 2019-02-14 RX ORDER — SPIRONOLACTONE 25 MG/1
50 TABLET ORAL DAILY
Status: DISCONTINUED | OUTPATIENT
Start: 2019-02-14 | End: 2019-02-15 | Stop reason: HOSPADM

## 2019-02-14 RX ORDER — OXYCODONE HYDROCHLORIDE 5 MG/1
5 TABLET ORAL
Status: DISCONTINUED | OUTPATIENT
Start: 2019-02-14 | End: 2019-02-15 | Stop reason: HOSPADM

## 2019-02-14 RX ORDER — INSULIN GLARGINE 100 [IU]/ML
30 INJECTION, SOLUTION SUBCUTANEOUS NIGHTLY
Status: DISCONTINUED | OUTPATIENT
Start: 2019-02-14 | End: 2019-02-15 | Stop reason: HOSPADM

## 2019-02-14 RX ADMIN — GABAPENTIN 300 MG: 300 CAPSULE ORAL at 15:47

## 2019-02-14 RX ADMIN — INSULIN GLARGINE 30 UNITS: 100 INJECTION, SOLUTION SUBCUTANEOUS at 21:05

## 2019-02-14 RX ADMIN — GABAPENTIN 300 MG: 300 CAPSULE ORAL at 06:17

## 2019-02-14 RX ADMIN — SODIUM CHLORIDE: 9 INJECTION, SOLUTION INTRAVENOUS at 01:49

## 2019-02-14 RX ADMIN — PROPRANOLOL HYDROCHLORIDE 10 MG: 10 TABLET ORAL at 15:53

## 2019-02-14 RX ADMIN — SODIUM CHLORIDE: 9 INJECTION, SOLUTION INTRAVENOUS at 22:49

## 2019-02-14 RX ADMIN — LACTULOSE 30 ML: 20 SOLUTION ORAL at 17:51

## 2019-02-14 RX ADMIN — HYDROMORPHONE HYDROCHLORIDE 0.5 MG: 2 INJECTION INTRAMUSCULAR; INTRAVENOUS; SUBCUTANEOUS at 01:49

## 2019-02-14 RX ADMIN — INSULIN HUMAN 6 UNITS: 100 INJECTION, SOLUTION PARENTERAL at 21:07

## 2019-02-14 RX ADMIN — SENNOSIDES AND DOCUSATE SODIUM 2 TABLET: 8.6; 5 TABLET ORAL at 15:48

## 2019-02-14 RX ADMIN — PROPRANOLOL HYDROCHLORIDE 10 MG: 10 TABLET ORAL at 06:17

## 2019-02-14 RX ADMIN — OXYCODONE HYDROCHLORIDE 5 MG: 5 TABLET ORAL at 20:09

## 2019-02-14 RX ADMIN — HYDROMORPHONE HYDROCHLORIDE 0.5 MG: 1 INJECTION, SOLUTION INTRAMUSCULAR; INTRAVENOUS; SUBCUTANEOUS at 00:05

## 2019-02-14 RX ADMIN — SENNOSIDES AND DOCUSATE SODIUM 2 TABLET: 8.6; 5 TABLET ORAL at 06:17

## 2019-02-14 RX ADMIN — ONDANSETRON 4 MG: 2 INJECTION INTRAMUSCULAR; INTRAVENOUS at 00:05

## 2019-02-14 RX ADMIN — SODIUM CHLORIDE: 9 INJECTION, SOLUTION INTRAVENOUS at 15:50

## 2019-02-14 RX ADMIN — INSULIN HUMAN 3 UNITS: 100 INJECTION, SOLUTION PARENTERAL at 08:47

## 2019-02-14 RX ADMIN — INSULIN HUMAN 6 UNITS: 100 INJECTION, SOLUTION PARENTERAL at 00:00

## 2019-02-14 RX ADMIN — SPIRONOLACTONE 50 MG: 25 TABLET ORAL at 17:51

## 2019-02-14 RX ADMIN — INSULIN HUMAN 5 UNITS: 100 INJECTION, SOLUTION PARENTERAL at 15:44

## 2019-02-14 ASSESSMENT — COGNITIVE AND FUNCTIONAL STATUS - GENERAL
DAILY ACTIVITIY SCORE: 23
SUGGESTED CMS G CODE MODIFIER DAILY ACTIVITY: CH
SUGGESTED CMS G CODE MODIFIER MOBILITY: CH
HELP NEEDED FOR BATHING: A LITTLE
DAILY ACTIVITIY SCORE: 24
SUGGESTED CMS G CODE MODIFIER DAILY ACTIVITY: CI
MOBILITY SCORE: 24

## 2019-02-14 ASSESSMENT — ENCOUNTER SYMPTOMS
SPEECH CHANGE: 0
DIZZINESS: 0
VOMITING: 1
SHORTNESS OF BREATH: 0
FEVER: 0
COUGH: 0
DIARRHEA: 0
ABDOMINAL PAIN: 1
CONSTIPATION: 0
PHOTOPHOBIA: 0
BLURRED VISION: 0
DEPRESSION: 0
SENSORY CHANGE: 0
WEAKNESS: 0
HEADACHES: 0
CLAUDICATION: 0
INSOMNIA: 0
CHILLS: 0
SORE THROAT: 0
HEARTBURN: 0
NERVOUS/ANXIOUS: 0
NAUSEA: 1
MYALGIAS: 0

## 2019-02-14 ASSESSMENT — PATIENT HEALTH QUESTIONNAIRE - PHQ9
1. LITTLE INTEREST OR PLEASURE IN DOING THINGS: NOT AT ALL
2. FEELING DOWN, DEPRESSED, IRRITABLE, OR HOPELESS: NOT AT ALL
SUM OF ALL RESPONSES TO PHQ9 QUESTIONS 1 AND 2: 0

## 2019-02-14 ASSESSMENT — LIFESTYLE VARIABLES: EVER_SMOKED: YES

## 2019-02-14 ASSESSMENT — ACTIVITIES OF DAILY LIVING (ADL): TOILETING: INDEPENDENT

## 2019-02-14 NOTE — PROGRESS NOTES
Med rec complete per pt.  Per pt she ran out of medications because she needs a new doctor.  Allergies reviewed

## 2019-02-14 NOTE — H&P
Hospital Medicine History & Physical Note    Date of Service  2/13/2019    Primary Care Physician  KIM Stringer    Consultants  none    Code Status  full    Chief Complaint  abd pain     History of Presenting Illness  58 y.o. female who presented 2/13/2019 with history of alcoholic cirrhosis diabetes hepatitis C who presents with epgiastric abd pain.  She was eating ice cream earlier today and developed acute onset abdominal pain radiating to the back.  She is also had some nausea no vomiting.  She has no known alleviating or exacerbating factors otherwise to her symptoms.  She has never had pancreatitis in the past per reports.  She does have a history of alcohol abuse states that she had a 'few drinks this week'..  She also used methamphetamines about 3-4 days ago.  She smokes daily.  In the emergency department she is found to have profoundly elevated lipase consistent with pancreatitis will be admitted for further management.    Review of Systems  Review of Systems   Constitutional: Negative for chills and fever.   HENT: Negative for congestion, hearing loss and tinnitus.    Eyes: Negative for blurred vision, double vision and discharge.   Respiratory: Negative for cough, hemoptysis and shortness of breath.    Cardiovascular: Negative for chest pain, palpitations and leg swelling.   Gastrointestinal: Positive for abdominal pain and nausea. Negative for heartburn and vomiting.   Genitourinary: Negative for dysuria and flank pain.   Musculoskeletal: Negative for joint pain and myalgias.   Skin: Negative for rash.   Neurological: Negative for dizziness, sensory change, speech change, focal weakness and weakness.   Endo/Heme/Allergies: Negative for environmental allergies. Does not bruise/bleed easily.   Psychiatric/Behavioral: Negative for depression, hallucinations and substance abuse.       Past Medical History   has a past medical history of Anemia; Anesthesia; Arthritis; Breath shortness; Cirrhosis  (Former[facility]); Dental disorder; Depression; Depression (6/22/2016); Diabetes; Fibromyalgia; Hepatitis C; Hiatus hernia syndrome; Hypertension; Indigestion; Obesity; Other specified disorder of intestines; Pain (12-19-14); Pneumonia; Psychiatric problem; Restless leg syndrome; Sleep apnea; Snoring; Stroke ([facility]) (2011); Unspecified hemorrhagic conditions; and Urinary bladder disorder. She also has no past medical history of CAD (coronary artery disease) or COPD.    Surgical History   has a past surgical history that includes gyn surgery; gyn surgery (2010); carpal tunnel release (2010); shoulder surgery (2010); other (2010); other; anterior and posterior repair (12/6/2013); enterocele repair (12/6/2013); bladder sling female (12/6/2013); gastroscopy with clipping (7/6/2014); gastroscopy with banding (7/8/2014); cath removal (11/14/2014); cath placement (12/22/2014); cataract extraction with iol; tonsillectomy; gastroscopy (N/A, 8/21/2017); gastroscopy with banding (N/A, 2/8/2018); and gastroscopy (N/A, 2/12/2018).     Family History  family history includes Diabetes in her mother; Hypertension in her mother.     Social History   reports that she has been smoking Cigarettes.  She has a 42.00 pack-year smoking history. She has never used smokeless tobacco. She reports that she drinks alcohol. She reports that she uses drugs.    Allergies  Allergies   Allergen Reactions   • Nkda [No Known Drug Allergy]        Medications  Prior to Admission Medications   Prescriptions Last Dose Informant Patient Reported? Taking?   furosemide (LASIX) 20 MG Tab  Patient No No   Sig: Take 1 Tab by mouth every day.   gabapentin (NEURONTIN) 300 MG Cap  Patient Yes No   Sig: Take 300 mg by mouth 2 Times a Day.   lactulose 20 GM/30ML Solution   No No   Sig: Take 30 mL by mouth 2 Times a Day.   omeprazole (PRILOSEC) 20 MG delayed-release capsule   No No   Sig: Take 1 Cap by mouth 2 Times a Day.   phosphorus (K-PHOS-NEUTRAL, PHOSPHA 250 NEUTRAL)  155-852-130 MG tablet   No No   Sig: Take 2 Tabs by mouth 2 Times a Day.   propranolol (INDERAL) 10 MG Tab   No No   Sig: Take 1 Tab by mouth 2 Times a Day.   riFAXIMin (XIFAXAN) 550 MG Tab tablet   No No   Sig: Take 1 Tab by mouth 2 Times a Day.   spironolactone (ALDACTONE) 50 MG Tab  Patient No No   Sig: Take 1 Tab by mouth every day.   sucralfate (CARAFATE) 1 GM/10ML Suspension   No No   Sig: Take 10 mL by mouth every 6 hours.   zinc sulfate (ZINCATE) 220 (50 Zn) MG Cap   No No   Sig: Take 1 Cap by mouth every day.      Facility-Administered Medications: None       Physical Exam  Temp:  [36.5 °C (97.7 °F)] 36.5 °C (97.7 °F)  Resp:  [8-17] 17  SpO2:  [92 %-94 %] 94 %    Physical Exam   Constitutional: She is oriented to person, place, and time. She appears well-developed and well-nourished. She appears distressed.   HENT:   Head: Normocephalic and atraumatic.   Eyes: Pupils are equal, round, and reactive to light. Conjunctivae and EOM are normal.   Neck: Normal range of motion. Neck supple. No JVD present.   Cardiovascular: Normal rate, regular rhythm, normal heart sounds and intact distal pulses.    No murmur heard.  Pulmonary/Chest: Effort normal and breath sounds normal. No respiratory distress. She has no wheezes.   Abdominal: Soft. Bowel sounds are normal. She exhibits no distension. There is tenderness.   Epigastric ttp   Musculoskeletal: Normal range of motion. She exhibits no edema.   Neurological: She is alert and oriented to person, place, and time. She exhibits normal muscle tone.   Skin: Skin is warm and dry.   Multiple scabs and excorations over skin   Psychiatric: She has a normal mood and affect. Her behavior is normal. Judgment and thought content normal.   Nursing note and vitals reviewed.      Laboratory:  Recent Labs      02/13/19   2040   WBC  4.9   RBC  4.91   HEMOGLOBIN  13.1   HEMATOCRIT  39.1   MCV  79.6*   MCH  26.7*   MCHC  33.5*   RDW  39.6   PLATELETCT  56*     Recent Labs       02/13/19 2040   SODIUM  126*   POTASSIUM  5.2   CHLORIDE  96   CO2  25   GLUCOSE  559*   BUN  17   CREATININE  0.77   CALCIUM  8.9     Recent Labs      02/13/19 2040   ALTSGPT  13   ASTSGOT  41   ALKPHOSPHAT  78   TBILIRUBIN  0.8   LIPASE  2544*   GLUCOSE  559*                 No results for input(s): TROPONINI in the last 72 hours.    Urinalysis:    No results found     Imaging:  US-RUQ   Final Result         1. Cirrhotic liver. No hepatic mass lesions.   2. Patent portal vein with hepatopetal portal venous flow.   3. Gallbladder sludge versus cholelithiasis. No cholecystitis.            Assessment/Plan:  I anticipate this patient will require at least two midnights for appropriate medical management, necessitating inpatient admission.    * Pancreatitis   Assessment & Plan    Elevated lipase, epgiastric ttp consistent with pancreatitis   High likely beverly of alcoholic induced pancreatitis   US RUQ with cholelithasis, CBD of .52cm. Will check MRCP consider GI/Gen surg eval if appropriate  Cont symptomatic management   IVF, Npo advance as tolerated, IV pain medications, anti emetics     Alcohol abuse   Assessment & Plan    Occasional use at home  Monitor for alcohol withdrawals     Thrombocytopenia (HCC)   Assessment & Plan    Due to alcohol abuse and known cirrhosis   Cont to trend   No evidence of bleeding     Methamphetamine abuse (HCC)- (present on admission)   Assessment & Plan    Last use 4 days ago     History of cirrhosis of liver- (present on admission)   Assessment & Plan    Per reports only takes lasix and sprinolactone  Will restart when clinically appropriate  Needs GI follow up        Insulin dependent diabetes mellitus (HCC)- (present on admission)   Assessment & Plan    Uncontrolled,Not on insulin at home, not on oral medications   Check a1c  SSI ordered  accu checks     History of hepatitis C- (present on admission)   Assessment & Plan    Treated per patient?     Tobacco dependence- (present on  admission)   Assessment & Plan    Greater than 10 minutes spent with patient smoking cessation counseling, discussed cardiovascular risk factors of smoking.     Esophageal varices (HCC)- (present on admission)   Assessment & Plan    Hx of due to cirrhosis   Suppose to be on BB, has been non compliant   Will restart bb     FLORINDA (obstructive sleep apnea)- (present on admission)   Assessment & Plan    Not on cpap     Neuropathy (HCC)- (present on admission)   Assessment & Plan    Restart gabapentin         VTE prophylaxis: scd

## 2019-02-14 NOTE — ED PROVIDER NOTES
"ER Provider Note     Scribed for Cooper Warner M.D. by Lesly Dover. 2/13/2019, 9:57 PM.    Primary Care Provider: KIM Stringer  Means of Arrival: Ambulance   History obtained from: Patient  History limited by: None     CHIEF COMPLAINT  Chief Complaint   Patient presents with   • Abdominal Pain       HPI  Demetrice Jaime is a 58 y.o. female with a history of cirrhosis, diabetes, and hypertension who presents to the Emergency Department complaining of abdominal pain onset earlier tonight after eating ice cream. Patient states she administered \"fentanyl intranasally.\" Denies past abdominal surgical history. Denies chest pain.      REVIEW OF SYSTEMS  See HPI for further details. All other systems are negative.     PAST MEDICAL HISTORY   has a past medical history of Anemia; Anesthesia; Arthritis; Breath shortness; Cirrhosis (HCC); Dental disorder; Depression; Depression (6/22/2016); Diabetes; Fibromyalgia; Hepatitis C; Hiatus hernia syndrome; Hypertension; Indigestion; Obesity; Other specified disorder of intestines; Pain (12-19-14); Pneumonia; Psychiatric problem; Restless leg syndrome; Sleep apnea; Snoring; Stroke (HCC) (2011); Unspecified hemorrhagic conditions; and Urinary bladder disorder.    SURGICAL HISTORY   has a past surgical history that includes gyn surgery; gyn surgery (2010); carpal tunnel release (2010); shoulder surgery (2010); other (2010); other; anterior and posterior repair (12/6/2013); enterocele repair (12/6/2013); bladder sling female (12/6/2013); gastroscopy with clipping (7/6/2014); gastroscopy with banding (7/8/2014); cath removal (11/14/2014); cath placement (12/22/2014); cataract extraction with iol; tonsillectomy; gastroscopy (N/A, 8/21/2017); gastroscopy with banding (N/A, 2/8/2018); and gastroscopy (N/A, 2/12/2018).    SOCIAL HISTORY  Social History   Substance Use Topics   • Smoking status: Current Every Day Smoker     Packs/day: 1.00     Years: 42.00     Types: " "Cigarettes   • Smokeless tobacco: Never Used   • Alcohol use 0.0 - 3.6 oz/week      Comment: drinks 0-6 during the week and drinks all weekend      History   Drug Use     Comment: Hx IV meth use, states she used 12/18/17 for first time in yrs        FAMILY HISTORY  Family History   Problem Relation Age of Onset   • Diabetes Mother    • Hypertension Mother        CURRENT MEDICATIONS  No current facility-administered medications on file prior to encounter.      Current Outpatient Prescriptions on File Prior to Encounter   Medication Sig Dispense Refill   • propranolol (INDERAL) 10 MG Tab Take 1 Tab by mouth 2 Times a Day. 90 Tab 11   • lactulose 20 GM/30ML Solution Take 30 mL by mouth 2 Times a Day. 60 Each 2   • phosphorus (K-PHOS-NEUTRAL, PHOSPHA 250 NEUTRAL) 155-852-130 MG tablet Take 2 Tabs by mouth 2 Times a Day. 60 Tab 2   • zinc sulfate (ZINCATE) 220 (50 Zn) MG Cap Take 1 Cap by mouth every day. 30 Cap 3   • omeprazole (PRILOSEC) 20 MG delayed-release capsule Take 1 Cap by mouth 2 Times a Day. 30 Cap 2   • sucralfate (CARAFATE) 1 GM/10ML Suspension Take 10 mL by mouth every 6 hours. 250 mL 3   • riFAXIMin (XIFAXAN) 550 MG Tab tablet Take 1 Tab by mouth 2 Times a Day. 42 Tab 2   • gabapentin (NEURONTIN) 300 MG Cap Take 300 mg by mouth 2 Times a Day.     • furosemide (LASIX) 20 MG Tab Take 1 Tab by mouth every day. 60 Tab 0   • spironolactone (ALDACTONE) 50 MG Tab Take 1 Tab by mouth every day. 30 Tab 3     ALLERGIES  Allergies   Allergen Reactions   • Nkda [No Known Drug Allergy]        PHYSICAL EXAM  VITAL SIGNS: Temp 36.5 °C (97.7 °F) (Tympanic)   Resp 17   Ht 1.676 m (5' 6\")   Wt 81.6 kg (180 lb)   SpO2 94%   BMI 29.05 kg/m²      Constitutional: Alert in no apparent distress.  HENT: No signs of trauma, Bilateral external ears normal, Nose normal.   Eyes: Pupils are equal and reactive, Conjunctiva normal, Non-icteric.   Neck: Normal range of motion, No tenderness, Supple, No stridor.   Cardiovascular: " Regular rate and rhythm, no murmurs.   Thorax & Lungs: Normal breath sounds, No respiratory distress, No wheezing, No chest tenderness.   Abdomen: Bowel sounds normal, Soft, No tenderness, No masses, No pulsatile masses. No peritoneal signs.  Skin: Warm, Dry, No erythema, No rash.   Back: No bony tenderness, No CVA tenderness.   Extremities: Intact distal pulses, No edema, No tenderness, No cyanosis.  Musculoskeletal: Good range of motion in all major joints. No tenderness to palpation or major deformities noted.   Neurologic: Alert , Normal motor function, Normal sensory function, No focal deficits noted.   Psychiatric: Affect normal, Judgment normal, Mood normal.     DIAGNOSTIC STUDIES / PROCEDURES    EKG Interpretation:  Interpreted by me    12 Lead EKG interpreted by me to show:  Normal sinus rhythm  Rate 74  Axis: Normal  Intervals: Normal  Normal T waves  Normal ST segments  My impression of this EKG: No ischemia.     LABS  Labs Reviewed   CBC WITH DIFFERENTIAL - Abnormal; Notable for the following:        Result Value    MCV 79.6 (*)     MCH 26.7 (*)     MCHC 33.5 (*)     Platelet Count 56 (*)     Neutrophils-Polys 78.70 (*)     Lymphocytes 12.10 (*)     Lymphs (Absolute) 0.59 (*)     All other components within normal limits   COMP METABOLIC PANEL - Abnormal; Notable for the following:     Sodium 126 (*)     Glucose 559 (*)     Globulin 3.7 (*)     All other components within normal limits   LIPASE - Abnormal; Notable for the following:     Lipase 2544 (*)     All other components within normal limits   URINALYSIS,CULTURE IF INDICATED   HCG QUAL SERUM   ESTIMATED GFR   TSH   HEMOGLOBIN A1C   URINE DRUG SCREEN   CBC WITH DIFFERENTIAL   COMP METABOLIC PANEL   LIPID PROFILE   LIPASE     All labs reviewed by me.    RADIOLOGY  US-RUQ   Final Result         1. Cirrhotic liver. No hepatic mass lesions.   2. Patent portal vein with hepatopetal portal venous flow.   3. Gallbladder sludge versus cholelithiasis. No  cholecystitis.      [id number]    (Results Pending)      The radiologist's interpretation of all radiological studies have been reviewed by me.    COURSE & MEDICAL DECISION MAKING  Pertinent Labs & Imaging studies reviewed. (See chart for details)    This is a 58 y.o. female that presents with abdominal pain.  Her EKG shows no ischemia.  This could represent pancreatitis versus cholecystitis.  I will get an ultrasound of the patient's right upper quadrant to evaluate for cholecystitis.  In addition I will get a CMP and lipase to evaluate for pancreatitis and biliary as well as liver pathologies.  I will get a urinalysis to evaluate for urinary tract infection as another cause..     9:57 PM Patient seen and examined at bedside. Ordered US RUQ, EKG, UA culture, HCG qual serum, CBC, CMP, and Lipase.      EDP discussed case with general surgery (patient was found to have no leukocytosis or anemia.  She has a sodium is low at 126.  In addition her glucose is significantly elevated at 599.  IV fluids were given for this significantly elevated glucose.  I am not trying oral intake because I do not believe it will lower the glucose level quickly enough.    The patient had a positive response to fluids and appears less dehydrated.  Her lipase is 2544.  Her ultrasound shows some gallbladder sludge with no dilation of the biliary tree.  We will admit her for pancreatitis and strict return precautions given.      FINAL IMPRESSION  1. Acute pancreatitis, unspecified complication status, unspecified pancreatitis type          I, Lesly Dover (Rashi), am scribing for, and in the presence of, Cooper Warner M.D..  Electronically signed by: Lesly Dover (Rashi), 2/13/2019  ICooper M.D. personally performed the services described in this documentation, as scribed by Lesly Dover in my presence, and it is both accurate and complete. C.     The note accurately reflects work and decisions made by me.  Cooper Warner   2/14/2019  1:21 AM

## 2019-02-14 NOTE — ASSESSMENT & PLAN NOTE
Per reports only takes lasix and sprinolactone  Will restart when clinically appropriate  Needs GI follow up

## 2019-02-14 NOTE — PROGRESS NOTES
"Hospital Medicine Daily Progress Note    Date of Service  2/14/2019    Chief Complaint  58 y.o. female admitted 2/13/2019 with acute abdominal pain consistent with pancreatitis.    Hospital Course    Patient admitted and started on supportive care for acute pancreatitis with high suspicion of alcohol induced.  She has history of substance abuse and last used amphetamine 4 days prior to admission.  She has history of alcohol abuse and had a \"few drinks this week\".  Patient has uncontrolled diabetes with A1C 16.8 and takes no medications for this.  MRCP was done given concern of biliary sludge on US.  No evidence of acute gallbladder nor obstructive CBD.        Interval Problem Update  Pain has improved, patient no longer nauseated and requesting to eat.  Lipase pending.  MRCP reviewed, no obstructive pathology.  As pain has resolved, clear liquid diet ordered.  Given significant A1C elevation, patient will require initiation of Lantus and SSI for meals along with diabetic education.      Consultants/Specialty  none    Code Status  full    Disposition  Home when appropriate.    Review of Systems  Review of Systems   Constitutional: Negative for chills and fever.   HENT: Negative for congestion and sore throat.    Eyes: Negative for blurred vision and photophobia.   Respiratory: Negative for cough and shortness of breath.    Cardiovascular: Negative for chest pain, claudication and leg swelling.   Gastrointestinal: Positive for abdominal pain, nausea and vomiting. Negative for constipation, diarrhea and heartburn.   Genitourinary: Negative for dysuria and hematuria.   Musculoskeletal: Negative for joint pain and myalgias.   Skin: Negative for itching and rash.   Neurological: Negative for dizziness, sensory change, speech change, weakness and headaches.   Psychiatric/Behavioral: Negative for depression. The patient is not nervous/anxious and does not have insomnia.         Physical Exam  Temp:  [36.5 °C (97.7 °F)-36.6 " °C (97.9 °F)] 36.6 °C (97.8 °F)  Pulse:  [] 91  Resp:  [8-18] 18  BP: (134-145)/(73-84) 142/83  SpO2:  [91 %-94 %] 93 %    Physical Exam   Constitutional: She is oriented to person, place, and time. She appears well-developed and well-nourished. No distress.   HENT:   Head: Normocephalic and atraumatic.   Eyes: Conjunctivae are normal. No scleral icterus.   Neck: Neck supple. No JVD present.   Cardiovascular: Normal rate, regular rhythm and normal heart sounds.  Exam reveals no gallop and no friction rub.    No murmur heard.  Pulmonary/Chest: Effort normal and breath sounds normal. No respiratory distress. She exhibits no tenderness.   Abdominal: Soft. Bowel sounds are normal. She exhibits no distension. There is no guarding.   Musculoskeletal: She exhibits no edema or tenderness.   Lymphadenopathy:     She has no cervical adenopathy.   Neurological: She is alert and oriented to person, place, and time. No cranial nerve deficit.   Skin: Skin is warm and dry. She is not diaphoretic. No erythema. No pallor.   Psychiatric: She has a normal mood and affect. Her behavior is normal.   Nursing note and vitals reviewed.      Fluids  No intake or output data in the 24 hours ending 02/14/19 1447    Laboratory  Recent Labs      02/13/19 2040   WBC  4.9   RBC  4.91   HEMOGLOBIN  13.1   HEMATOCRIT  39.1   MCV  79.6*   MCH  26.7*   MCHC  33.5*   RDW  39.6   PLATELETCT  56*     Recent Labs      02/13/19 2040   SODIUM  126*   POTASSIUM  5.2   CHLORIDE  96   CO2  25   GLUCOSE  559*   BUN  17   CREATININE  0.77   CALCIUM  8.9                   Imaging  [id number]   Final Result      1.  Limited exam secondary to patient motion.      2.  Mildly dilated common bile duct without choledocholithiasis identified.      3.  Nonspecific gallbladder distention. No cholelithiasis appreciated.      4.  Morphologic characteristics of cirrhosis with evidence of portal hypertension including splenomegaly, ascites, and esophageal  varices. High T2 signal in the anterior right hepatic with capsular retraction lobe is likely related to confluent fibrosis      5.  Diverticulosis.      US-RUQ   Final Result         1. Cirrhotic liver. No hepatic mass lesions.   2. Patent portal vein with hepatopetal portal venous flow.   3. Gallbladder sludge versus cholelithiasis. No cholecystitis.           Assessment/Plan  * Pancreatitis   Assessment & Plan    Elevated lipase, epigastric pain consistent with pancreatitis   High likely beverly of alcoholic induced pancreatitis  US RUQ with cholelithasis, CBD of .52cm., MRCP shows no CBD stone and no evidence acute cholecystitis  IVF, clear liquid diet       Alcohol abuse   Assessment & Plan    Occasional use at home  Monitor for alcohol withdrawals     Thrombocytopenia (HCC)   Assessment & Plan    Due to alcohol abuse and known cirrhosis   Cont to trend   No evidence of bleeding     Methamphetamine abuse (HCC)- (present on admission)   Assessment & Plan    Last use 4 days ago  Amphetamine on UTox       History of cirrhosis of liver- (present on admission)   Assessment & Plan    Per reports only takes lasix and sprinolactone  Will restart when clinically appropriate  Needs GI follow up        Insulin dependent diabetes mellitus (HCC)- (present on admission)   Assessment & Plan    Uncontrolled,Not on insulin at home, not on oral medications   A1c: 16.8  SSI ordered  accu checks  As able to take PO, will need to start Lantus and meal time insulin     History of hepatitis C- (present on admission)   Assessment & Plan    Treated per patient     Tobacco dependence- (present on admission)   Assessment & Plan    Cessation counseling       Esophageal varices (HCC)- (present on admission)   Assessment & Plan    Hx of due to cirrhosis   Suppose to be on BB, has been non compliant   Will restart bb     FLORINDA (obstructive sleep apnea)- (present on admission)   Assessment & Plan    Not on cpap     Neuropathy (HCC)- (present on  admission)   Assessment & Plan    gabapentin          VTE prophylaxis: scds

## 2019-02-14 NOTE — ASSESSMENT & PLAN NOTE
Elevated lipase, epigastric pain consistent with pancreatitis   High likely beverly of alcoholic induced pancreatitis  US RUQ with cholelithasis, CBD of .52cm., MRCP shows no CBD stone and no evidence acute cholecystitis  IVF, clear liquid diet

## 2019-02-14 NOTE — ED TRIAGE NOTES
Pt to rm 24 per ems c/o abd pain after eating ice cream, aox4, resp even/unlabored, fentanyl admin intranasally pta, denies trauma/fever/vomiting

## 2019-02-14 NOTE — ASSESSMENT & PLAN NOTE
Uncontrolled,Not on insulin at home, not on oral medications   A1c: 16.8  SSI ordered  accu checks  As able to take PO, will need to start Lantus and meal time insulin

## 2019-02-14 NOTE — CARE PLAN
Problem: Medication  Goal: Compliance with prescribed medication will improve  Outcome: PROGRESSING SLOWER THAN EXPECTED  Pt. Has poor understanding of medication regimen at this time    Problem: Pain Management  Goal: Pain level will decrease to patient's comfort goal  Outcome: PROGRESSING AS EXPECTED  Pt. Medicated per MAR, verbalizes satisfaction with pain control

## 2019-02-14 NOTE — THERAPY
PT orders received. Per RN, pt has been up self mobilizing without issue. Eval not warranted at this time. Orders DC'yumiko Julien, PT, DPT Pager: [phone]

## 2019-02-14 NOTE — DISCHARGE PLANNING
Anticipated Discharge Disposition: Home when she is able to eat.    Action: In rounds today the doctor said that once the patient is able to eat something she can discharge home.  She lives in [place] with her mother. The patient has a history of alcohol abuse in the past and admits to drinking some alcohol this week.  She has a diagnosis of pancreatitis.    Barriers to Discharge: Medical clearance.    Plan: Home when medically cleared.

## 2019-02-14 NOTE — PROGRESS NOTES
Received report from ER RN, pt. Arrived on unit. Pt. Aox4, SB assist, experiencing abdominal pain and leg pain. Medicated per MAR. Pt. NPO, education provided, mouth swabs provided. Pt. Has no other complaints or requests at this time. Bed in lowest position with wheels locked. Call light within reach.

## 2019-02-14 NOTE — THERAPY
"Occupational Therapy Evaluation completed.   Functional Status: SPV level BADLs in this setting, CGA initially after functional mobility, progressed to SBA throughout session  Plan of Care: will remain available for DC needs only  Discharge Recommendations:  Equipment: No Equipment Needed. Post-acute therapy Recommend home health  transitional care services for continued occupational therapy services as pt reports she has a hx of falls at home and would benefit from a home safety eval and higher level balance training.     See \"Rehab Therapy-Acute\" Patient Summary Report for complete documentation.    Pt is a 57 y/o female who presents to acute 2/2 abdominal pain. PMH includes alcoholic cirrhosis, diabetes, hep C. Reports she used methamphetamines 3-4 days ago. Pt found to have acute pancreatitis. Pt demo'd donned socks seated EOB, performed functional mobility to bathroom, demo'd toileting and stood about ~5 min at sink to perform oral care, wash face, brush and style hair. No further acute OT needs. Will remain available for DC needs only.     "

## 2019-02-14 NOTE — PROGRESS NOTES
Pt alert and oriented x 4. Denies pain or nausea. Up with SBA and tolerates well. PIV to R wrist flushing well. Fluids infusing. Pt up w SBA to the bathroom and tolerates well. Worked with OT and tolerated well. NPO for scheduled MRI. Pt resting comfortably. Call light within reach. Hourly rounding in place.

## 2019-02-15 VITALS
SYSTOLIC BLOOD PRESSURE: 125 MMHG | DIASTOLIC BLOOD PRESSURE: 69 MMHG | OXYGEN SATURATION: 95 % | WEIGHT: 180 LBS | TEMPERATURE: 98.4 F | BODY MASS INDEX: 28.93 KG/M2 | RESPIRATION RATE: 18 BRPM | HEART RATE: 75 BPM | HEIGHT: 66 IN

## 2019-02-15 LAB
ALBUMIN SERPL BCP-MCNC: 3 G/DL (ref 3.2–4.9)
ALBUMIN/GLOB SERPL: 1 G/DL
ALP SERPL-CCNC: 67 U/L (ref 30–99)
ALT SERPL-CCNC: 11 U/L (ref 2–50)
ANION GAP SERPL CALC-SCNC: 5 MMOL/L (ref 0–11.9)
AST SERPL-CCNC: 18 U/L (ref 12–45)
BASOPHILS # BLD AUTO: 0.9 % (ref 0–1.8)
BASOPHILS # BLD: 0.04 K/UL (ref 0–0.12)
BILIRUB SERPL-MCNC: 0.6 MG/DL (ref 0.1–1.5)
BUN SERPL-MCNC: 13 MG/DL (ref 8–22)
CALCIUM SERPL-MCNC: 8.2 MG/DL (ref 8.5–10.5)
CHLORIDE SERPL-SCNC: 108 MMOL/L (ref 96–112)
CHOLEST SERPL-MCNC: 121 MG/DL (ref 100–199)
CO2 SERPL-SCNC: 24 MMOL/L (ref 20–33)
CREAT SERPL-MCNC: 0.58 MG/DL (ref 0.5–1.4)
EOSINOPHIL # BLD AUTO: 0.1 K/UL (ref 0–0.51)
EOSINOPHIL NFR BLD: 2.2 % (ref 0–6.9)
ERYTHROCYTE [DISTWIDTH] IN BLOOD BY AUTOMATED COUNT: 41.9 FL (ref 35.9–50)
GLOBULIN SER CALC-MCNC: 2.9 G/DL (ref 1.9–3.5)
GLUCOSE BLD-MCNC: 153 MG/DL (ref 65–99)
GLUCOSE SERPL-MCNC: 311 MG/DL (ref 65–99)
HCT VFR BLD AUTO: 37.8 % (ref 37–47)
HDLC SERPL-MCNC: 32 MG/DL
HGB BLD-MCNC: 11.9 G/DL (ref 12–16)
IMM GRANULOCYTES # BLD AUTO: 0.02 K/UL (ref 0–0.11)
IMM GRANULOCYTES NFR BLD AUTO: 0.4 % (ref 0–0.9)
LDLC SERPL CALC-MCNC: 73 MG/DL
LIPASE SERPL-CCNC: 107 U/L (ref 11–82)
LYMPHOCYTES # BLD AUTO: 0.99 K/UL (ref 1–4.8)
LYMPHOCYTES NFR BLD: 21.5 % (ref 22–41)
MCH RBC QN AUTO: 25.9 PG (ref 27–33)
MCHC RBC AUTO-ENTMCNC: 31.5 G/DL (ref 33.6–35)
MCV RBC AUTO: 82.2 FL (ref 81.4–97.8)
MONOCYTES # BLD AUTO: 0.35 K/UL (ref 0–0.85)
MONOCYTES NFR BLD AUTO: 7.6 % (ref 0–13.4)
NEUTROPHILS # BLD AUTO: 3.1 K/UL (ref 2–7.15)
NEUTROPHILS NFR BLD: 67.4 % (ref 44–72)
NRBC # BLD AUTO: 0 K/UL
NRBC BLD-RTO: 0 /100 WBC
PLATELET # BLD AUTO: 58 K/UL (ref 164–446)
PMV BLD AUTO: 11.4 FL (ref 9–12.9)
POTASSIUM SERPL-SCNC: 3.8 MMOL/L (ref 3.6–5.5)
PROT SERPL-MCNC: 5.9 G/DL (ref 6–8.2)
RBC # BLD AUTO: 4.6 M/UL (ref 4.2–5.4)
SODIUM SERPL-SCNC: 137 MMOL/L (ref 135–145)
TRIGL SERPL-MCNC: 81 MG/DL (ref 0–149)
WBC # BLD AUTO: 4.6 K/UL (ref 4.8–10.8)

## 2019-02-15 PROCEDURE — 700105 HCHG RX REV CODE 258: Performed by: HOSPITALIST

## 2019-02-15 PROCEDURE — 83690 ASSAY OF LIPASE: CPT

## 2019-02-15 PROCEDURE — 80061 LIPID PANEL: CPT

## 2019-02-15 PROCEDURE — A9270 NON-COVERED ITEM OR SERVICE: HCPCS | Performed by: INTERNAL MEDICINE

## 2019-02-15 PROCEDURE — 700102 HCHG RX REV CODE 250 W/ 637 OVERRIDE(OP): Performed by: HOSPITALIST

## 2019-02-15 PROCEDURE — 80053 COMPREHEN METABOLIC PANEL: CPT

## 2019-02-15 PROCEDURE — 36415 COLL VENOUS BLD VENIPUNCTURE: CPT

## 2019-02-15 PROCEDURE — 82962 GLUCOSE BLOOD TEST: CPT

## 2019-02-15 PROCEDURE — A9270 NON-COVERED ITEM OR SERVICE: HCPCS | Performed by: HOSPITALIST

## 2019-02-15 PROCEDURE — 85025 COMPLETE CBC W/AUTO DIFF WBC: CPT

## 2019-02-15 PROCEDURE — 700102 HCHG RX REV CODE 250 W/ 637 OVERRIDE(OP): Performed by: INTERNAL MEDICINE

## 2019-02-15 PROCEDURE — 99239 HOSP IP/OBS DSCHRG MGMT >30: CPT | Performed by: INTERNAL MEDICINE

## 2019-02-15 RX ORDER — INSULIN GLARGINE 100 [IU]/ML
35 INJECTION, SOLUTION SUBCUTANEOUS
Qty: 10 ML | Refills: 1 | Status: ON HOLD | OUTPATIENT
Start: 2019-02-15 | End: 2020-07-01 | Stop reason: SDUPTHER

## 2019-02-15 RX ADMIN — PROPRANOLOL HYDROCHLORIDE 10 MG: 10 TABLET ORAL at 05:19

## 2019-02-15 RX ADMIN — GABAPENTIN 300 MG: 300 CAPSULE ORAL at 05:19

## 2019-02-15 RX ADMIN — SPIRONOLACTONE 50 MG: 25 TABLET ORAL at 05:19

## 2019-02-15 RX ADMIN — SODIUM CHLORIDE: 9 INJECTION, SOLUTION INTRAVENOUS at 05:20

## 2019-02-15 NOTE — CARE PLAN
Problem: Communication  Goal: The ability to communicate needs accurately and effectively will improve  Outcome: PROGRESSING AS EXPECTED  Pt communicates needs appropriately, wants to eat real food, explained how food aggravates the pancreas when it is in the state it is currently in and pt didn't seem to quite understand. Will continue to reinforce education on disease process.     Problem: Infection  Goal: Will remain free from infection  Outcome: PROGRESSING AS EXPECTED  VSS, no s/s of infection at this time.

## 2019-02-15 NOTE — PROGRESS NOTES
Pt alert and oriented x 4. Denies pain or nausea. Up self with steady gait. PIV to R FA flushing well with fluids infusing.

## 2019-02-15 NOTE — PROGRESS NOTES
"Received report from Day RN.  Pt A&Ox4.  /77   Pulse 83   Temp 37.2 °C (98.9 °F) (Temporal)   Resp 18   Ht 1.676 m (5' 6\")   Wt 81.6 kg (180 lb)   SpO2 92%   Breastfeeding? No   BMI 29.05 kg/m²     Had episode of incontinence w/bowel after lactulose and prune juice given. Pt showered by herself w/no issues.   Blood sugar this evening 438. Pt educated on CLD tray being full of sugar.     Pt did not seem to understand her condition so re-educated on what can cause pancreatitis and make it worse. Pt seemed to understand a little better after talking with this RN.    +void, LBM 2/14.     Pain 7/10 to R flank, medicated per MAR. Pt responded well to intervention.    Call light within reach, hourly rounding in place.   "

## 2019-02-15 NOTE — DISCHARGE SUMMARY
Discharge Summary    CHIEF COMPLAINT ON ADMISSION  Chief Complaint   Patient presents with   • Abdominal Pain       Reason for Admission  EMS     Admission Date  2/13/2019    CODE STATUS  Prior    HPI & HOSPITAL COURSE  This is a 58 y.o. female here with abdominal pain.  She has a prior history of alcohol abuse and had a few drinks prior to admission.  Examination as well as laboratory studies were cute pancreatitis with a lipase over 2000.  She was started on supportive care with high flow IV fluids as well as n.p.o. with resolution of abdominal pain and normalization of her lipase levels.  She also admitted to methamphetamine use a few days prior to presenting to the emergency room.  Ultrasound showed gallbladder sludge therefore MRCP was done without evidence of obstruction nor acute gallbladder.  Patient's abdominal pain resolved and she had no further nausea vomiting.  She was started on a clear liquid diet tolerated this well and was advanced.  Also of note she had significantly elevated glucose and had been on Lantus as well as regular insulin with meals however had run out a week prior.  She has been given a prescription for 2 months of insulin on discharge as she is in the process of finding herself a new primary care practitioner.  Complete cessation of alcohol as well as methamphetamine were explained to the patient as well as the side effects should she continue to use both of these substances.    Therefore, she is discharged in good and stable condition to home with close outpatient follow-up.    The patient met 2-midnight criteria for an inpatient stay at the time of discharge.    Discharge Date  2/15/2019    FOLLOW UP ITEMS POST DISCHARGE  Establish with PCP    DISCHARGE DIAGNOSES  Principal Problem:    Pancreatitis POA: Unknown  Active Problems:    Neuropathy (HCC) POA: Yes    FLORINDA (obstructive sleep apnea) POA: Yes    Esophageal varices (HCC) POA: Yes    Tobacco dependence POA: Yes    History of  hepatitis C POA: Yes    Insulin dependent diabetes mellitus (HCC) POA: Yes    History of cirrhosis of liver POA: Yes    Methamphetamine abuse (HCC) POA: Yes    Thrombocytopenia (HCC) POA: Unknown    Alcohol abuse POA: Unknown  Resolved Problems:    * No resolved hospital problems. *      FOLLOW UP  No future appointments.  Establish with a new primary care physician      as soon as possible      MEDICATIONS ON DISCHARGE     Medication List      CHANGE how you take these medications      Instructions   insulin glargine 100 UNIT/ML Soln  What changed:  · how much to take  · when to take this  Commonly known as:  LANTUS   Inject 35 Units as instructed every bedtime.  Dose:  35 Units        CONTINUE taking these medications      Instructions   furosemide 20 MG Tabs  Commonly known as:  LASIX   Take 1 Tab by mouth every day.  Dose:  20 mg     gabapentin 300 MG Caps  Commonly known as:  NEURONTIN   Take 300 mg by mouth 2 Times a Day.  Dose:  300 mg     insulin lispro 100 UNIT/ML Soln  Commonly known as:  HUMALOG   Inject 25-30 Units as instructed 3 times a day before meals.  Dose:  25-30 Units     lactulose 20 GM/30ML Soln   Take 30 mL by mouth 2 Times a Day.  Dose:  30 mL     spironolactone 50 MG Tabs  Commonly known as:  ALDACTONE   Take 1 Tab by mouth every day.  Dose:  50 mg            Allergies  Allergies   Allergen Reactions   • Nkda [No Known Drug Allergy]        DIET  No orders of the defined types were placed in this encounter.      ACTIVITY  As tolerated.  Weight bearing as tolerated    CONSULTATIONS  None    PROCEDURES  None    LABORATORY  Lab Results   Component Value Date    SODIUM 137 02/15/2019    POTASSIUM 3.8 02/15/2019    CHLORIDE 108 02/15/2019    CO2 24 02/15/2019    GLUCOSE 311 (H) 02/15/2019    BUN 13 02/15/2019    CREATININE 0.58 02/15/2019    CREATININE 0.6 02/03/2008        Lab Results   Component Value Date    WBC 4.6 (L) 02/15/2019    HEMOGLOBIN 11.9 (L) 02/15/2019    HEMATOCRIT 37.8 02/15/2019     PLATELETCT 58 (L) 02/15/2019        Total time of the discharge process exceeds 34 minutes.

## 2019-02-15 NOTE — DISCHARGE INSTRUCTIONS
Acute Pancreatitis  Acute pancreatitis is a condition in which the pancreas suddenly becomes irritated and swollen (has inflammation). The pancreas is a gland that is located behind the stomach. It produces enzymes that help to digest food. The pancreas also releases the hormones glucagon and insulin, which help to regulate blood sugar. Damage to the pancreas occurs when the digestive enzymes from the pancreas are activated before they are released into the intestine.  Most acute attacks last a couple of days and can cause serious problems. Some people become dehydrated and develop low blood pressure. In severe cases, bleeding into the pancreas can lead to shock and can be life-threatening. The lungs, heart, and kidneys may fail.  What are the causes?  The most common causes of this condition are:  · Alcohol abuse.  · Gallstones.  Other causes include:  · Certain medicines.  · Exposure to certain chemicals.  · Infection.  · Damage caused by an accident (trauma).  · Abdominal surgery.  In some cases, the cause may not be known.  What are the signs or symptoms?  Symptoms of this condition include:  · Pain in the upper abdomen that may radiate to the back.  · Tenderness and swelling of the abdomen.  · Nausea and vomiting.  How is this diagnosed?  This condition may be diagnosed based on:  · A physical exam.  · Blood tests.  · Imaging tests, such as X-rays, CT scans, or an ultrasound of the abdomen.  How is this treated?  Treatment for this condition usually requires a stay in the hospital. Treatment may include:  · Pain medicine.  · Fluid replacement through an IV tube.  · Placing a tube in the stomach to remove stomach contents and to control vomiting (NG tube, or nasogastric tube).  · Not eating for 3-4 days. This gives the pancreas a rest, because enzymes are not being produced that can cause further damage.  · Antibiotic medicines, if your condition is caused by an infection.  · Surgery on the pancreas or  gallbladder.  Follow these instructions at home:  Eating and drinking  · Follow instructions from your health care provider about diet. This may involve avoiding alcohol and decreasing the amount of fat in your diet.  · Eat smaller, more frequent meals. This reduces the amount of digestive fluids that the pancreas produces.  · Drink enough fluid to keep your urine clear or pale yellow.  · Do not drink alcohol if it caused your condition.  General instructions  · Take over-the-counter and prescription medicines only as told by your health care provider.  · Do not use any tobacco products, such as cigarettes, chewing tobacco, and e-cigarettes. If you need help quitting, ask your health care provider.  · Get plenty of rest.  · If directed, check your blood sugar at home as told by your health care provider.  · Keep all follow-up visits as told by your health care provider. This is important.  Contact a health care provider if:  · You do not recover as quickly as expected.  · You develop new or worsening symptoms.  · You have persistent pain, weakness, or nausea.  · You recover and then have another episode of pain.  · You have a fever.  Get help right away if:  · You cannot eat or keep fluids down.  · Your pain becomes severe.  · Your skin or the white part of your eyes turns yellow (jaundice).  · You vomit.  · You feel dizzy or you faint.  · Your blood sugar is high (over 300 mg/dL).  This information is not intended to replace advice given to you by your health care provider. Make sure you discuss any questions you have with your health care provider.  Document Released: 12/18/2006 Document Revised: 04/26/2017 Document Reviewed: 09/20/2016  [employer] Interactive Patient Education © 2017 [employer]    Cirrhosis  Cirrhosis is long-term (chronic) liver injury. The liver is your largest internal organ, and it performs many functions. The liver converts food into energy, removes toxic material from your blood, makes  important proteins, and absorbs necessary vitamins from your diet.  If you have cirrhosis, it means many of your healthy liver cells have been replaced by scar tissue. This prevents blood from flowing through your liver, which makes it difficult for your liver to function. This scarring is not reversible, but treatment can prevent it from getting worse.  What are the causes?  Hepatitis C and long-term alcohol abuse are the most common causes of cirrhosis. Other causes include:  · Nonalcoholic fatty liver disease.  · Hepatitis B infection.  · Autoimmune hepatitis.  · Diseases that cause blockage of ducts inside the liver.  · Inherited liver diseases.  · Reactions to certain long-term medicines.  · Parasitic infections.  · Long-term exposure to certain toxins.  What increases the risk?  You may have a higher risk of cirrhosis if you:  · Have certain hepatitis viruses.  · Abuse alcohol, especially if you are female.  · Are overweight.  · Share needles.  · Have unprotected sex with someone who has hepatitis.  What are the signs or symptoms?  You may not have any signs and symptoms at first. Symptoms may not develop until the damage to your liver starts to get worse. Signs and symptoms of cirrhosis may include:  · Tenderness in the right-upper part of your abdomen.  · Weakness and tiredness (fatigue).  · Loss of appetite.  · Nausea.  · Weight loss and muscle loss.  · Itchiness.  · Yellow skin and eyes (jaundice).  · Buildup of fluid in the abdomen (ascites).  · Swelling of the feet and ankles (edema).  · Appearance of tiny blood vessels under the skin.  · Mental confusion.  · Easy bruising and bleeding.  How is this diagnosed?  Your health care provider may suspect cirrhosis based on your symptoms and medical history, especially if you have other medical conditions or a history of alcohol abuse. Your health care provider will do a physical exam to feel your liver and check for signs of cirrhosis. Your health care  provider may perform other tests, including:  · Blood tests to check:  ¨ Whether you have hepatitis B or C.  ¨ Kidney function.  ¨ Liver function.  · Imaging tests such as:  ¨ MRI or CT scan to look for changes seen in advanced cirrhosis.  ¨ Ultrasound to see if normal liver tissue is being replaced by scar tissue.  · A procedure using a long needle to take a sample of liver tissue (biopsy) for examination under a microscope. Liver biopsy can confirm the diagnosis of cirrhosis.  How is this treated?  Treatment depends on how damaged your liver is and what caused the damage. Treatment may include treating cirrhosis symptoms or treating the underlying causes of the condition to try to slow the progression of the damage. Treatment may include:  · Making lifestyle changes, such as:  ¨ Eating a healthy diet.  ¨ Restricting salt intake.  ¨ Maintaining a healthy weight.  ¨ Not abusing drugs or alcohol.  · Taking medicines to:  ¨ Treat liver infections or other infections.  ¨ Control itching.  ¨ Reduce fluid buildup.  ¨ Reduce certain blood toxins.  ¨ Reduce risk of bleeding from enlarged blood vessels in the stomach or esophagus (varices).  · If varices are causing bleeding problems, you may need treatment with a procedure that ties up the vessels causing them to fall off (band ligation).  · If cirrhosis is causing your liver to fail, your health care provider may recommend a liver transplant.  · Other treatments may be recommended depending on any complications of cirrhosis, such as liver-related kidney failure (hepatorenal syndrome).  Follow these instructions at home:  · Take medicines only as directed by your health care provider. Do not use drugs that are toxic to your liver. Ask your health care provider before taking any new medicines, including over-the-counter medicines.  · Rest as needed.  · Eat a well-balanced diet. Ask your health care provider or dietitian for more information.  · You may have to follow a  low-salt diet or restrict your water intake as directed.  · Do not drink alcohol. This is especially important if you are taking acetaminophen.  · Keep all follow-up visits as directed by your health care provider. This is important.  Contact a health care provider if:  · You have fatigue or weakness that is getting worse.  · You develop swelling of the hands, feet, legs, or face.  · You have a fever.  · You develop loss of appetite.  · You have nausea or vomiting.  · You develop jaundice.  · You develop easy bruising or bleeding.  Get help right away if:  · You vomit bright red blood or a material that looks like coffee grounds.  · You have blood in your stools.  · Your stools appear black and tarry.  · You become confused.  · You have chest pain or trouble breathing.  This information is not intended to replace advice given to you by your health care provider. Make sure you discuss any questions you have with your health care provider.  Document Released: 12/18/2006 Document Revised: 04/27/2017 Document Reviewed: 08/26/2015  [employer] Interactive Patient Education © 2017 [employer]    Discharge Instructions    Discharged to home by car with friend. Discharged via wheelchair, hospital escort: Yes.  Special equipment needed: Not Applicable    Be sure to schedule a follow-up appointment with your primary care doctor or any specialists as instructed.     Discharge Plan:   Diet Plan: Discussed  Activity Level: Discussed  Smoking Cessation Offered: Patient Refused  Confirmed Follow up Appointment: Patient to Call and Schedule Appointment  Confirmed Symptoms Management: Discussed  Medication Reconciliation Updated: Yes  Pneumococcal Vaccine Administered/Refused: Not given - Patient refused pneumococcal vaccine  Influenza Vaccine Indication: Patient Refuses    I understand that a diet low in cholesterol, fat, and sodium is recommended for good health. Unless I have been given specific instructions below for another  diet, I accept this instruction as my diet prescription.   Other diet: As tolerated- low fat    Special Instructions: No alcohol consumption- no exceptions                                    No meth use    · Is patient discharged on Warfarin / Coumadin?   No     Depression / Suicide Risk    As you are discharged from this [facility] facility, it is important to learn how to keep safe from harming yourself.    Recognize the warning signs:  · Abrupt changes in personality, positive or negative- including increase in energy   · Giving away possessions  · Change in eating patterns- significant weight changes-  positive or negative  · Change in sleeping patterns- unable to sleep or sleeping all the time   · Unwillingness or inability to communicate  · Depression  · Unusual sadness, discouragement and loneliness  · Talk of wanting to die  · Neglect of personal appearance   · Rebelliousness- reckless behavior  · Withdrawal from people/activities they love  · Confusion- inability to concentrate     If you or a loved one observes any of these behaviors or has concerns about self-harm, here's what you can do:  · Talk about it- your feelings and reasons for harming yourself  · Remove any means that you might use to hurt yourself (examples: pills, rope, extension cords, firearm)  · Get professional help from the community (Mental Health, Substance Abuse, psychological counseling)  · Do not be alone:Call your Safe Contact- someone whom you trust who will be there for you.  · Call your local CRISIS HOTLINE 375-5925 or 576-711-4445  · Call your local Children's Mobile Crisis Response Team [place] (633) 451-8600 or www.popexpert  · Call the toll free National Suicide Prevention Hotlines   · National Suicide Prevention Lifeline 403-977-SYMK (3872)  · National Hope Line Network 800-SUICIDE (892-4912)

## 2019-02-26 NOTE — ADDENDUM NOTE
Encounter addended by: Amanda Clifford R.N. on: 2/25/2019  5:13 PM<BR>    Actions taken: Flowsheet accepted

## 2019-03-07 ENCOUNTER — HOSPITAL ENCOUNTER (OUTPATIENT)
Dept: RADIOLOGY | Facility: MEDICAL CENTER | Age: 59
End: 2019-03-07
Attending: INTERNAL MEDICINE
Payer: MEDICAID

## 2019-03-07 DIAGNOSIS — K74.60 HEPATIC CIRRHOSIS, UNSPECIFIED HEPATIC CIRRHOSIS TYPE, UNSPECIFIED WHETHER ASCITES PRESENT (HCC): ICD-10-CM

## 2019-03-07 DIAGNOSIS — R93.3 ABNORMAL FINDING ON GI TRACT IMAGING: ICD-10-CM

## 2019-03-07 PROCEDURE — 700117 HCHG RX CONTRAST REV CODE 255: Performed by: INTERNAL MEDICINE

## 2019-03-07 PROCEDURE — 74177 CT ABD & PELVIS W/CONTRAST: CPT

## 2019-03-07 RX ADMIN — IOHEXOL 100 ML: 350 INJECTION, SOLUTION INTRAVENOUS at 15:12

## 2019-03-25 ENCOUNTER — HOSPITAL ENCOUNTER (OUTPATIENT)
Dept: CARDIOLOGY | Facility: MEDICAL CENTER | Age: 59
End: 2019-03-25
Attending: STUDENT IN AN ORGANIZED HEALTH CARE EDUCATION/TRAINING PROGRAM
Payer: MEDICAID

## 2019-03-25 ENCOUNTER — HOSPITAL ENCOUNTER (OUTPATIENT)
Dept: RADIOLOGY | Facility: MEDICAL CENTER | Age: 59
End: 2019-03-25
Attending: INTERNAL MEDICINE
Payer: MEDICAID

## 2019-03-25 DIAGNOSIS — R29.810 FACIAL WEAKNESS: ICD-10-CM

## 2019-03-25 DIAGNOSIS — I63.9 CEREBROVASCULAR ACCIDENT (CVA), UNSPECIFIED MECHANISM (HCC): ICD-10-CM

## 2019-03-25 LAB — EKG IMPRESSION: NORMAL

## 2019-03-25 PROCEDURE — 93010 ELECTROCARDIOGRAM REPORT: CPT | Performed by: INTERNAL MEDICINE

## 2019-03-25 PROCEDURE — 93005 ELECTROCARDIOGRAM TRACING: CPT | Performed by: STUDENT IN AN ORGANIZED HEALTH CARE EDUCATION/TRAINING PROGRAM

## 2019-03-25 PROCEDURE — 70450 CT HEAD/BRAIN W/O DYE: CPT

## 2019-08-08 ENCOUNTER — TELEPHONE (OUTPATIENT)
Dept: SCHEDULING | Facility: IMAGING CENTER | Age: 59
End: 2019-08-08

## 2019-09-20 ENCOUNTER — TELEPHONE (OUTPATIENT)
Dept: MEDICAL GROUP | Facility: MEDICAL CENTER | Age: 59
End: 2019-09-20

## 2019-09-20 NOTE — TELEPHONE ENCOUNTER
Left message with patient about no show to appointment today 9/20/19.  Explained this was her first no show and the no show policy.

## 2020-01-31 ENCOUNTER — HOSPITAL ENCOUNTER (OUTPATIENT)
Dept: RADIOLOGY | Facility: MEDICAL CENTER | Age: 60
End: 2020-01-31
Attending: NURSE PRACTITIONER
Payer: MEDICAID

## 2020-01-31 ENCOUNTER — HOSPITAL ENCOUNTER (OUTPATIENT)
Dept: LAB | Facility: MEDICAL CENTER | Age: 60
End: 2020-01-31
Attending: NURSE PRACTITIONER
Payer: MEDICAID

## 2020-01-31 ENCOUNTER — APPOINTMENT (OUTPATIENT)
Dept: RADIOLOGY | Facility: MEDICAL CENTER | Age: 60
End: 2020-01-31
Attending: INTERNAL MEDICINE
Payer: MEDICAID

## 2020-01-31 DIAGNOSIS — I85.00 ESOPHAGEAL VARICES WITHOUT BLEEDING, UNSPECIFIED ESOPHAGEAL VARICES TYPE (HCC): ICD-10-CM

## 2020-01-31 DIAGNOSIS — K74.60 CIRRHOSIS OF LIVER WITHOUT ASCITES, UNSPECIFIED HEPATIC CIRRHOSIS TYPE (HCC): ICD-10-CM

## 2020-01-31 DIAGNOSIS — K21.9 GASTROESOPHAGEAL REFLUX DISEASE, ESOPHAGITIS PRESENCE NOT SPECIFIED: ICD-10-CM

## 2020-01-31 DIAGNOSIS — E11.649 UNCONTROLLED TYPE 2 DIABETES MELLITUS WITH HYPOGLYCEMIA, UNSPECIFIED HYPOGLYCEMIA COMA STATUS (HCC): ICD-10-CM

## 2020-01-31 LAB
ALBUMIN SERPL BCP-MCNC: 3.6 G/DL (ref 3.2–4.9)
ALBUMIN/GLOB SERPL: 0.9 G/DL
ALP SERPL-CCNC: 72 U/L (ref 30–99)
ALT SERPL-CCNC: 16 U/L (ref 2–50)
ANION GAP SERPL CALC-SCNC: 9 MMOL/L (ref 0–11.9)
AST SERPL-CCNC: 28 U/L (ref 12–45)
BASOPHILS # BLD AUTO: 2.1 % (ref 0–1.8)
BASOPHILS # BLD: 0.12 K/UL (ref 0–0.12)
BILIRUB SERPL-MCNC: 1 MG/DL (ref 0.1–1.5)
BUN SERPL-MCNC: 17 MG/DL (ref 8–22)
CALCIUM SERPL-MCNC: 9.6 MG/DL (ref 8.5–10.5)
CHLORIDE SERPL-SCNC: 103 MMOL/L (ref 96–112)
CO2 SERPL-SCNC: 20 MMOL/L (ref 20–33)
COMMENT 1642: NORMAL
CREAT SERPL-MCNC: 0.79 MG/DL (ref 0.5–1.4)
EOSINOPHIL # BLD AUTO: 0.1 K/UL (ref 0–0.51)
EOSINOPHIL NFR BLD: 1.7 % (ref 0–6.9)
ERYTHROCYTE [DISTWIDTH] IN BLOOD BY AUTOMATED COUNT: 42.8 FL (ref 35.9–50)
GLOBULIN SER CALC-MCNC: 3.8 G/DL (ref 1.9–3.5)
GLUCOSE SERPL-MCNC: 368 MG/DL (ref 65–99)
HCT VFR BLD AUTO: 42 % (ref 37–47)
HGB BLD-MCNC: 13.5 G/DL (ref 12–16)
IMM GRANULOCYTES # BLD AUTO: 0.03 K/UL (ref 0–0.11)
IMM GRANULOCYTES NFR BLD AUTO: 0.5 % (ref 0–0.9)
INR PPP: 1.19 (ref 0.87–1.13)
LYMPHOCYTES # BLD AUTO: 1.09 K/UL (ref 1–4.8)
LYMPHOCYTES NFR BLD: 18.9 % (ref 22–41)
MCH RBC QN AUTO: 26.8 PG (ref 27–33)
MCHC RBC AUTO-ENTMCNC: 32.1 G/DL (ref 33.6–35)
MCV RBC AUTO: 83.5 FL (ref 81.4–97.8)
MONOCYTES # BLD AUTO: 0.38 K/UL (ref 0–0.85)
MONOCYTES NFR BLD AUTO: 6.6 % (ref 0–13.4)
MORPHOLOGY BLD-IMP: NORMAL
NEUTROPHILS # BLD AUTO: 4.05 K/UL (ref 2–7.15)
NEUTROPHILS NFR BLD: 70.2 % (ref 44–72)
NRBC # BLD AUTO: 0 K/UL
NRBC BLD-RTO: 0 /100 WBC
PLATELET # BLD AUTO: 60 K/UL (ref 164–446)
PMV BLD AUTO: 11.6 FL (ref 9–12.9)
POTASSIUM SERPL-SCNC: 4.4 MMOL/L (ref 3.6–5.5)
PROT SERPL-MCNC: 7.4 G/DL (ref 6–8.2)
PROTHROMBIN TIME: 15.4 SEC (ref 12–14.6)
RBC # BLD AUTO: 5.03 M/UL (ref 4.2–5.4)
SODIUM SERPL-SCNC: 132 MMOL/L (ref 135–145)
WBC # BLD AUTO: 5.8 K/UL (ref 4.8–10.8)

## 2020-01-31 PROCEDURE — 85025 COMPLETE CBC W/AUTO DIFF WBC: CPT

## 2020-01-31 PROCEDURE — 36415 COLL VENOUS BLD VENIPUNCTURE: CPT

## 2020-01-31 PROCEDURE — 85610 PROTHROMBIN TIME: CPT

## 2020-01-31 PROCEDURE — 82105 ALPHA-FETOPROTEIN SERUM: CPT

## 2020-01-31 PROCEDURE — 80053 COMPREHEN METABOLIC PANEL: CPT

## 2020-01-31 PROCEDURE — 93975 VASCULAR STUDY: CPT

## 2020-02-02 LAB — AFP-TM SERPL-MCNC: 4 NG/ML (ref 0–9)

## 2020-06-22 ENCOUNTER — APPOINTMENT (OUTPATIENT)
Dept: RADIOLOGY | Facility: IMAGING CENTER | Age: 60
End: 2020-06-22
Attending: NURSE PRACTITIONER
Payer: MEDICAID

## 2020-06-22 ENCOUNTER — HOSPITAL ENCOUNTER (OUTPATIENT)
Facility: MEDICAL CENTER | Age: 60
End: 2020-06-22
Attending: NURSE PRACTITIONER
Payer: MEDICAID

## 2020-06-22 ENCOUNTER — OFFICE VISIT (OUTPATIENT)
Dept: URGENT CARE | Facility: CLINIC | Age: 60
End: 2020-06-22
Payer: MEDICAID

## 2020-06-22 VITALS
DIASTOLIC BLOOD PRESSURE: 38 MMHG | WEIGHT: 160 LBS | OXYGEN SATURATION: 94 % | SYSTOLIC BLOOD PRESSURE: 142 MMHG | HEIGHT: 66 IN | RESPIRATION RATE: 12 BRPM | BODY MASS INDEX: 25.71 KG/M2 | HEART RATE: 106 BPM | TEMPERATURE: 98.7 F

## 2020-06-22 DIAGNOSIS — R05.9 COUGH: ICD-10-CM

## 2020-06-22 DIAGNOSIS — Z20.822 SUSPECTED COVID-19 VIRUS INFECTION: ICD-10-CM

## 2020-06-22 DIAGNOSIS — R73.9 HYPERGLYCEMIA: ICD-10-CM

## 2020-06-22 DIAGNOSIS — R06.02 SHORTNESS OF BREATH: ICD-10-CM

## 2020-06-22 DIAGNOSIS — R00.0 TACHYCARDIA: ICD-10-CM

## 2020-06-22 DIAGNOSIS — E11.69 TYPE 2 DIABETES MELLITUS WITH OTHER SPECIFIED COMPLICATION, UNSPECIFIED WHETHER LONG TERM INSULIN USE (HCC): ICD-10-CM

## 2020-06-22 DIAGNOSIS — R11.0 NAUSEA: ICD-10-CM

## 2020-06-22 LAB — COVID ORDER STATUS COVID19: NORMAL

## 2020-06-22 PROCEDURE — 99204 OFFICE O/P NEW MOD 45 MIN: CPT | Performed by: NURSE PRACTITIONER

## 2020-06-22 PROCEDURE — U0003 INFECTIOUS AGENT DETECTION BY NUCLEIC ACID (DNA OR RNA); SEVERE ACUTE RESPIRATORY SYNDROME CORONAVIRUS 2 (SARS-COV-2) (CORONAVIRUS DISEASE [COVID-19]), AMPLIFIED PROBE TECHNIQUE, MAKING USE OF HIGH THROUGHPUT TECHNOLOGIES AS DESCRIBED BY CMS-2020-01-R: HCPCS

## 2020-06-22 PROCEDURE — 71046 X-RAY EXAM CHEST 2 VIEWS: CPT | Mod: TC | Performed by: EMERGENCY MEDICINE

## 2020-06-22 RX ORDER — ONDANSETRON 4 MG/1
4 TABLET, ORALLY DISINTEGRATING ORAL EVERY 8 HOURS PRN
Qty: 10 TAB | Refills: 0 | Status: SHIPPED | OUTPATIENT
Start: 2020-06-22 | End: 2020-09-25

## 2020-06-22 ASSESSMENT — ENCOUNTER SYMPTOMS
MYALGIAS: 0
COUGH: 1
DIARRHEA: 0
FEVER: 0
SWOLLEN GLANDS: 0
CHILLS: 1
ABDOMINAL PAIN: 1
SHORTNESS OF BREATH: 1
ORTHOPNEA: 0
DIZZINESS: 0
VOMITING: 0
BLOOD IN STOOL: 0
SORE THROAT: 0
EYE PAIN: 0
NAUSEA: 1
HEADACHES: 0
SPUTUM PRODUCTION: 0
CLAUDICATION: 0

## 2020-06-22 ASSESSMENT — COPD QUESTIONNAIRES: COPD: 0

## 2020-06-22 ASSESSMENT — FIBROSIS 4 INDEX: FIB4 SCORE: 6.77

## 2020-06-23 ENCOUNTER — HOSPITAL ENCOUNTER (OUTPATIENT)
Dept: LAB | Facility: MEDICAL CENTER | Age: 60
End: 2020-06-23
Attending: NURSE PRACTITIONER
Payer: MEDICAID

## 2020-06-23 DIAGNOSIS — R73.9 HYPERGLYCEMIA: ICD-10-CM

## 2020-06-23 DIAGNOSIS — E11.69 TYPE 2 DIABETES MELLITUS WITH OTHER SPECIFIED COMPLICATION, UNSPECIFIED WHETHER LONG TERM INSULIN USE (HCC): ICD-10-CM

## 2020-06-23 LAB
ALBUMIN SERPL BCP-MCNC: 3.5 G/DL (ref 3.2–4.9)
ALBUMIN/GLOB SERPL: 1 G/DL
ALP SERPL-CCNC: 91 U/L (ref 30–99)
ALT SERPL-CCNC: 18 U/L (ref 2–50)
ANION GAP SERPL CALC-SCNC: 10 MMOL/L (ref 7–16)
ANISOCYTOSIS BLD QL SMEAR: ABNORMAL
AST SERPL-CCNC: 19 U/L (ref 12–45)
BASOPHILS # BLD AUTO: 1.1 % (ref 0–1.8)
BASOPHILS # BLD: 0.04 K/UL (ref 0–0.12)
BILIRUB SERPL-MCNC: 0.4 MG/DL (ref 0.1–1.5)
BUN SERPL-MCNC: 32 MG/DL (ref 8–22)
CALCIUM SERPL-MCNC: 9.3 MG/DL (ref 8.5–10.5)
CHLORIDE SERPL-SCNC: 97 MMOL/L (ref 96–112)
CO2 SERPL-SCNC: 23 MMOL/L (ref 20–33)
COMMENT 1642: NORMAL
CREAT SERPL-MCNC: 0.81 MG/DL (ref 0.5–1.4)
EOSINOPHIL # BLD AUTO: 0.09 K/UL (ref 0–0.51)
EOSINOPHIL NFR BLD: 2.5 % (ref 0–6.9)
ERYTHROCYTE [DISTWIDTH] IN BLOOD BY AUTOMATED COUNT: 36.1 FL (ref 35.9–50)
EST. AVERAGE GLUCOSE BLD GHB EST-MCNC: 341 MG/DL
FASTING STATUS PATIENT QL REPORTED: NORMAL
GLOBULIN SER CALC-MCNC: 3.6 G/DL (ref 1.9–3.5)
GLUCOSE SERPL-MCNC: 469 MG/DL (ref 65–99)
HBA1C MFR BLD: 13.5 % (ref 0–5.6)
HCT VFR BLD AUTO: 34.1 % (ref 37–47)
HGB BLD-MCNC: 10.2 G/DL (ref 12–16)
IMM GRANULOCYTES # BLD AUTO: 0.01 K/UL (ref 0–0.11)
IMM GRANULOCYTES NFR BLD AUTO: 0.3 % (ref 0–0.9)
LYMPHOCYTES # BLD AUTO: 0.77 K/UL (ref 1–4.8)
LYMPHOCYTES NFR BLD: 21.8 % (ref 22–41)
MCH RBC QN AUTO: 21.7 PG (ref 27–33)
MCHC RBC AUTO-ENTMCNC: 29.9 G/DL (ref 33.6–35)
MCV RBC AUTO: 72.7 FL (ref 81.4–97.8)
MICROCYTES BLD QL SMEAR: ABNORMAL
MONOCYTES # BLD AUTO: 0.34 K/UL (ref 0–0.85)
MONOCYTES NFR BLD AUTO: 9.6 % (ref 0–13.4)
MORPHOLOGY BLD-IMP: NORMAL
NEUTROPHILS # BLD AUTO: 2.28 K/UL (ref 2–7.15)
NEUTROPHILS NFR BLD: 64.7 % (ref 44–72)
NRBC # BLD AUTO: 0 K/UL
NRBC BLD-RTO: 0 /100 WBC
OVALOCYTES BLD QL SMEAR: NORMAL
PLATELET # BLD AUTO: 81 K/UL (ref 164–446)
PLATELET BLD QL SMEAR: NORMAL
POIKILOCYTOSIS BLD QL SMEAR: NORMAL
POLYCHROMASIA BLD QL SMEAR: NORMAL
POTASSIUM SERPL-SCNC: 4.3 MMOL/L (ref 3.6–5.5)
PROT SERPL-MCNC: 7.1 G/DL (ref 6–8.2)
RBC # BLD AUTO: 4.69 M/UL (ref 4.2–5.4)
RBC BLD AUTO: PRESENT
SARS-COV-2 RNA RESP QL NAA+PROBE: NOTDETECTED
SODIUM SERPL-SCNC: 130 MMOL/L (ref 135–145)
SPECIMEN SOURCE: NORMAL
WBC # BLD AUTO: 3.5 K/UL (ref 4.8–10.8)

## 2020-06-23 PROCEDURE — 80053 COMPREHEN METABOLIC PANEL: CPT

## 2020-06-23 PROCEDURE — 83036 HEMOGLOBIN GLYCOSYLATED A1C: CPT

## 2020-06-23 PROCEDURE — 85025 COMPLETE CBC W/AUTO DIFF WBC: CPT

## 2020-06-23 PROCEDURE — 36415 COLL VENOUS BLD VENIPUNCTURE: CPT

## 2020-06-23 NOTE — PROGRESS NOTES
"Subjective:   Demetrice Jaime  is a 59 y.o. female who presents for Shortness of Breath (x couple days, fever, cough, runny nose)        Shortness of Breath   This is a new problem. Episode onset: 2-3 days. The problem occurs intermittently. The problem has been waxing and waning. Associated symptoms include abdominal pain and chest pain (x1 resolved ). Pertinent negatives include no claudication, ear pain, fever, headaches, leg swelling, orthopnea, rash, sore throat, sputum production, swollen glands or vomiting. Associated symptoms comments: Nausea  . She has tried nothing for the symptoms. The treatment provided no relief. There is no history of allergies, COPD, PE or pneumonia.     Review of Systems   Constitutional: Positive for chills and malaise/fatigue. Negative for fever.        Body aches     HENT: Negative for ear pain and sore throat.    Eyes: Negative for pain.   Respiratory: Positive for cough and shortness of breath. Negative for sputum production.    Cardiovascular: Positive for chest pain (x1 resolved ). Negative for orthopnea, claudication and leg swelling.   Gastrointestinal: Positive for abdominal pain and nausea. Negative for blood in stool, diarrhea, melena and vomiting.   Genitourinary: Negative for hematuria.   Musculoskeletal: Negative for myalgias.   Skin: Negative for rash.   Neurological: Negative for dizziness and headaches.     Allergies   Allergen Reactions   • Nkda [No Known Drug Allergy]       Objective:   BP (!) 142/38 (BP Location: Left arm, Patient Position: Sitting, BP Cuff Size: Adult)   Pulse (!) 106   Temp 37.1 °C (98.7 °F) (Temporal)   Resp 12   Ht 1.676 m (5' 6\")   Wt 72.6 kg (160 lb)   SpO2 94%   BMI 25.82 kg/m²   Physical Exam  Vitals signs and nursing note reviewed.   Constitutional:       General: She is not in acute distress.     Appearance: She is well-developed.   HENT:      Head: Normocephalic and atraumatic.      Right Ear: Tympanic membrane and " external ear normal.      Left Ear: Tympanic membrane and external ear normal.      Nose: Nose normal.      Right Sinus: No maxillary sinus tenderness or frontal sinus tenderness.      Left Sinus: No maxillary sinus tenderness or frontal sinus tenderness.      Mouth/Throat:      Mouth: Mucous membranes are moist.      Pharynx: Uvula midline. No posterior oropharyngeal erythema.      Tonsils: No tonsillar exudate or tonsillar abscesses.   Eyes:      General:         Right eye: No discharge.         Left eye: No discharge.      Conjunctiva/sclera: Conjunctivae normal.   Cardiovascular:      Rate and Rhythm: Normal rate.   Pulmonary:      Effort: Pulmonary effort is normal. No respiratory distress.      Breath sounds: Normal breath sounds.   Abdominal:      General: Bowel sounds are normal. There is no distension.      Palpations: Abdomen is soft.      Tenderness: There is generalized abdominal tenderness. There is no guarding or rebound. Negative signs include Jolly's sign, Rovsing's sign, McBurney's sign, psoas sign and obturator sign.   Musculoskeletal: Normal range of motion.   Skin:     General: Skin is warm and dry.   Neurological:      General: No focal deficit present.      Mental Status: She is alert and oriented to person, place, and time. Mental status is at baseline.      Gait: Gait (gait at baseline) normal.   Psychiatric:         Judgment: Judgment normal.           Assessment/Plan:     1. Shortness of breath  DX-CHEST-2 VIEWS    EKG - Clinic Performed    COVID/SARS COV-2 PCR    REFERRAL TO FOLLOW-UP WITH PRIMARY CARE   2. Suspected COVID-19 virus infection  COVID/SARS COV-2 PCR    REFERRAL TO FOLLOW-UP WITH PRIMARY CARE   3. Cough  COVID/SARS COV-2 PCR    REFERRAL TO FOLLOW-UP WITH PRIMARY CARE   4. Nausea  ondansetron (ZOFRAN ODT) 4 MG TABLET DISPERSIBLE    REFERRAL TO FOLLOW-UP WITH PRIMARY CARE   5. Tachycardia  REFERRAL TO CARDIOLOGY    REFERRAL TO FOLLOW-UP WITH PRIMARY CARE   6. Hyperglycemia  CBC  WITH DIFFERENTIAL    Comp Metabolic Panel    HEMOGLOBIN A1C    REFERRAL TO FOLLOW-UP WITH PRIMARY CARE   7. Type 2 diabetes mellitus with other specified complication, unspecified whether long term insulin use (HCC)  CBC WITH DIFFERENTIAL    Comp Metabolic Panel    HEMOGLOBIN A1C    REFERRAL TO FOLLOW-UP WITH PRIMARY CARE     Xray results  No acute cardiopulmonary disease.    EKG: Sinus Tachycardia 103, normal axis, normal intervals, LVH    Patient is a nontoxic-appearing 59-year-old female present with the stated above, patient is vital signs stable, EKG showing no signs of ischemia, x-ray negative.  Patient does have uncontrolled type 2 diabetes for which she states has been many years.  Will obtain diagnostic labs to check electrolytes, infection we will follow-up with results and treat as indicated.  Patient will be tested for COVID.  Encouraged to self isolate until labs are confirmed.  Patient and/or guardian given precautionary s/sx that mandate immediate follow up and evaluation in the ED. Advised of risks of not doing so.  Side effects of the above medications discussed.   DDX, Supportive care, and indications for immediate follow-up discussed with patient.    Instructed to return to clinic or nearest emergency department if we are not available for any change in condition, further concerns, or worsening of symptoms.    The patient and/or guardian demonstrated a good understanding and agreed with the treatment plan.    Please note that this dictation was created using voice recognition software. I have made every reasonable attempt to correct obvious errors, but I expect that there are errors of grammar and possibly content that I did not discover before finalizing the note.               Detail Level: Detailed Plan: Discussed to use Differin 0.1% Gel at night on face ( OTC)\\n\\nirregular menstrual cycle

## 2020-06-24 ENCOUNTER — TELEPHONE (OUTPATIENT)
Dept: URGENT CARE | Facility: CLINIC | Age: 60
End: 2020-06-24

## 2020-06-29 ENCOUNTER — APPOINTMENT (OUTPATIENT)
Dept: RADIOLOGY | Facility: MEDICAL CENTER | Age: 60
End: 2020-06-29
Attending: EMERGENCY MEDICINE
Payer: MEDICAID

## 2020-06-29 ENCOUNTER — HOSPITAL ENCOUNTER (OUTPATIENT)
Facility: MEDICAL CENTER | Age: 60
End: 2020-07-01
Attending: EMERGENCY MEDICINE | Admitting: INTERNAL MEDICINE
Payer: MEDICAID

## 2020-06-29 ENCOUNTER — TELEPHONE (OUTPATIENT)
Dept: URGENT CARE | Facility: CLINIC | Age: 60
End: 2020-06-29

## 2020-06-29 DIAGNOSIS — L03.90 WOUND CELLULITIS: ICD-10-CM

## 2020-06-29 DIAGNOSIS — R73.9 HYPERGLYCEMIA: ICD-10-CM

## 2020-06-29 DIAGNOSIS — R07.9 CHEST PAIN, UNSPECIFIED TYPE: ICD-10-CM

## 2020-06-29 DIAGNOSIS — S71.101A OPEN THIGH WOUND, RIGHT, INITIAL ENCOUNTER: ICD-10-CM

## 2020-06-29 PROBLEM — S71.109A OPEN THIGH WOUND: Status: ACTIVE | Noted: 2020-06-29

## 2020-06-29 PROBLEM — N17.9 ARF (ACUTE RENAL FAILURE) (HCC): Status: ACTIVE | Noted: 2020-06-29

## 2020-06-29 LAB
ALBUMIN SERPL BCP-MCNC: 3.7 G/DL (ref 3.2–4.9)
ALBUMIN/GLOB SERPL: 1 G/DL
ALP SERPL-CCNC: 109 U/L (ref 30–99)
ALT SERPL-CCNC: 20 U/L (ref 2–50)
ANION GAP SERPL CALC-SCNC: 12 MMOL/L (ref 7–16)
ANISOCYTOSIS BLD QL SMEAR: ABNORMAL
APPEARANCE UR: CLEAR
AST SERPL-CCNC: 26 U/L (ref 12–45)
BASOPHILS # BLD AUTO: 0 % (ref 0–1.8)
BASOPHILS # BLD: 0 K/UL (ref 0–0.12)
BILIRUB SERPL-MCNC: 0.4 MG/DL (ref 0.1–1.5)
BILIRUB UR QL STRIP.AUTO: NEGATIVE
BUN SERPL-MCNC: 19 MG/DL (ref 8–22)
CALCIUM SERPL-MCNC: 9.1 MG/DL (ref 8.5–10.5)
CHLORIDE SERPL-SCNC: 95 MMOL/L (ref 96–112)
CO2 SERPL-SCNC: 23 MMOL/L (ref 20–33)
COLOR UR: YELLOW
CREAT SERPL-MCNC: 1.19 MG/DL (ref 0.5–1.4)
D DIMER PPP IA.FEU-MCNC: 1.32 UG/ML (FEU) (ref 0–0.5)
EKG IMPRESSION: NORMAL
EOSINOPHIL # BLD AUTO: 0.07 K/UL (ref 0–0.51)
EOSINOPHIL NFR BLD: 2.5 % (ref 0–6.9)
ERYTHROCYTE [DISTWIDTH] IN BLOOD BY AUTOMATED COUNT: 41 FL (ref 35.9–50)
GLOBULIN SER CALC-MCNC: 3.6 G/DL (ref 1.9–3.5)
GLUCOSE BLD-MCNC: 380 MG/DL (ref 65–99)
GLUCOSE SERPL-MCNC: 703 MG/DL (ref 65–99)
GLUCOSE UR STRIP.AUTO-MCNC: >=1000 MG/DL
HCT VFR BLD AUTO: 34.5 % (ref 37–47)
HGB BLD-MCNC: 9.8 G/DL (ref 12–16)
INR PPP: 1.23 (ref 0.87–1.13)
KETONES UR STRIP.AUTO-MCNC: NEGATIVE MG/DL
LACTATE BLD-SCNC: 2.8 MMOL/L (ref 0.5–2)
LACTATE BLD-SCNC: 3.2 MMOL/L (ref 0.5–2)
LEUKOCYTE ESTERASE UR QL STRIP.AUTO: NEGATIVE
LYMPHOCYTES # BLD AUTO: 0.45 K/UL (ref 1–4.8)
LYMPHOCYTES NFR BLD: 16.1 % (ref 22–41)
MANUAL DIFF BLD: NORMAL
MCH RBC QN AUTO: 21.6 PG (ref 27–33)
MCHC RBC AUTO-ENTMCNC: 28.4 G/DL (ref 33.6–35)
MCV RBC AUTO: 76.2 FL (ref 81.4–97.8)
MICRO URNS: ABNORMAL
MICROCYTES BLD QL SMEAR: ABNORMAL
MONOCYTES # BLD AUTO: 0.14 K/UL (ref 0–0.85)
MONOCYTES NFR BLD AUTO: 5.1 % (ref 0–13.4)
MORPHOLOGY BLD-IMP: NORMAL
NEUTROPHILS # BLD AUTO: 2.14 K/UL (ref 2–7.15)
NEUTROPHILS NFR BLD: 76.3 % (ref 44–72)
NITRITE UR QL STRIP.AUTO: NEGATIVE
NRBC # BLD AUTO: 0 K/UL
NRBC BLD-RTO: 0 /100 WBC
OVALOCYTES BLD QL SMEAR: NORMAL
PH UR STRIP.AUTO: 5.5 [PH] (ref 5–8)
PLATELET # BLD AUTO: 70 K/UL (ref 164–446)
PLATELET BLD QL SMEAR: NORMAL
POIKILOCYTOSIS BLD QL SMEAR: NORMAL
POLYCHROMASIA BLD QL SMEAR: NORMAL
POTASSIUM SERPL-SCNC: 4.5 MMOL/L (ref 3.6–5.5)
PROT SERPL-MCNC: 7.3 G/DL (ref 6–8.2)
PROT UR QL STRIP: NEGATIVE MG/DL
PROTHROMBIN TIME: 15.9 SEC (ref 12–14.6)
RBC # BLD AUTO: 4.53 M/UL (ref 4.2–5.4)
RBC BLD AUTO: PRESENT
RBC UR QL AUTO: NEGATIVE
ROULEAUX BLD QL SMEAR: SLIGHT
SODIUM SERPL-SCNC: 130 MMOL/L (ref 135–145)
SP GR UR STRIP.AUTO: 1.02
T4 FREE SERPL-MCNC: 0.98 NG/DL (ref 0.93–1.7)
TROPONIN T SERPL-MCNC: 10 NG/L (ref 6–19)
TROPONIN T SERPL-MCNC: 11 NG/L (ref 6–19)
TSH SERPL DL<=0.005 MIU/L-ACNC: 2.35 UIU/ML (ref 0.38–5.33)
UROBILINOGEN UR STRIP.AUTO-MCNC: 1 MG/DL
WBC # BLD AUTO: 2.8 K/UL (ref 4.8–10.8)

## 2020-06-29 PROCEDURE — A9270 NON-COVERED ITEM OR SERVICE: HCPCS | Performed by: INTERNAL MEDICINE

## 2020-06-29 PROCEDURE — 700102 HCHG RX REV CODE 250 W/ 637 OVERRIDE(OP): Performed by: INTERNAL MEDICINE

## 2020-06-29 PROCEDURE — 700105 HCHG RX REV CODE 258: Performed by: EMERGENCY MEDICINE

## 2020-06-29 PROCEDURE — 99285 EMERGENCY DEPT VISIT HI MDM: CPT

## 2020-06-29 PROCEDURE — 85027 COMPLETE CBC AUTOMATED: CPT

## 2020-06-29 PROCEDURE — 96372 THER/PROPH/DIAG INJ SC/IM: CPT | Mod: XU

## 2020-06-29 PROCEDURE — 700102 HCHG RX REV CODE 250 W/ 637 OVERRIDE(OP): Performed by: EMERGENCY MEDICINE

## 2020-06-29 PROCEDURE — 81003 URINALYSIS AUTO W/O SCOPE: CPT

## 2020-06-29 PROCEDURE — 71275 CT ANGIOGRAPHY CHEST: CPT

## 2020-06-29 PROCEDURE — 36415 COLL VENOUS BLD VENIPUNCTURE: CPT

## 2020-06-29 PROCEDURE — 96372 THER/PROPH/DIAG INJ SC/IM: CPT

## 2020-06-29 PROCEDURE — 87040 BLOOD CULTURE FOR BACTERIA: CPT

## 2020-06-29 PROCEDURE — 82962 GLUCOSE BLOOD TEST: CPT

## 2020-06-29 PROCEDURE — 80053 COMPREHEN METABOLIC PANEL: CPT

## 2020-06-29 PROCEDURE — 84443 ASSAY THYROID STIM HORMONE: CPT

## 2020-06-29 PROCEDURE — G0378 HOSPITAL OBSERVATION PER HR: HCPCS

## 2020-06-29 PROCEDURE — 83605 ASSAY OF LACTIC ACID: CPT

## 2020-06-29 PROCEDURE — 85610 PROTHROMBIN TIME: CPT

## 2020-06-29 PROCEDURE — 96365 THER/PROPH/DIAG IV INF INIT: CPT

## 2020-06-29 PROCEDURE — 84439 ASSAY OF FREE THYROXINE: CPT

## 2020-06-29 PROCEDURE — 84484 ASSAY OF TROPONIN QUANT: CPT | Mod: 91

## 2020-06-29 PROCEDURE — 71045 X-RAY EXAM CHEST 1 VIEW: CPT

## 2020-06-29 PROCEDURE — 99407 BEHAV CHNG SMOKING > 10 MIN: CPT | Performed by: INTERNAL MEDICINE

## 2020-06-29 PROCEDURE — 85379 FIBRIN DEGRADATION QUANT: CPT

## 2020-06-29 PROCEDURE — 700111 HCHG RX REV CODE 636 W/ 250 OVERRIDE (IP): Performed by: EMERGENCY MEDICINE

## 2020-06-29 PROCEDURE — 93005 ELECTROCARDIOGRAM TRACING: CPT | Performed by: EMERGENCY MEDICINE

## 2020-06-29 PROCEDURE — 85007 BL SMEAR W/DIFF WBC COUNT: CPT

## 2020-06-29 PROCEDURE — 93005 ELECTROCARDIOGRAM TRACING: CPT

## 2020-06-29 PROCEDURE — 99220 PR INITIAL OBSERVATION CARE,LEVL III: CPT | Mod: 25 | Performed by: INTERNAL MEDICINE

## 2020-06-29 PROCEDURE — 700117 HCHG RX CONTRAST REV CODE 255: Performed by: EMERGENCY MEDICINE

## 2020-06-29 RX ORDER — PROMETHAZINE HYDROCHLORIDE 25 MG/1
12.5-25 TABLET ORAL EVERY 4 HOURS PRN
Status: DISCONTINUED | OUTPATIENT
Start: 2020-06-29 | End: 2020-06-29

## 2020-06-29 RX ORDER — HYDROCODONE BITARTRATE AND ACETAMINOPHEN 5; 325 MG/1; MG/1
1-2 TABLET ORAL EVERY 6 HOURS PRN
Status: DISCONTINUED | OUTPATIENT
Start: 2020-06-29 | End: 2020-07-01 | Stop reason: HOSPADM

## 2020-06-29 RX ORDER — ASPIRIN 325 MG
325 TABLET ORAL DAILY
Status: DISCONTINUED | OUTPATIENT
Start: 2020-06-30 | End: 2020-07-01 | Stop reason: HOSPADM

## 2020-06-29 RX ORDER — INSULIN GLARGINE 100 [IU]/ML
35 INJECTION, SOLUTION SUBCUTANEOUS
Status: DISCONTINUED | OUTPATIENT
Start: 2020-06-29 | End: 2020-07-01 | Stop reason: HOSPADM

## 2020-06-29 RX ORDER — ENALAPRILAT 1.25 MG/ML
1.25 INJECTION INTRAVENOUS EVERY 6 HOURS PRN
Status: DISCONTINUED | OUTPATIENT
Start: 2020-06-29 | End: 2020-07-01 | Stop reason: HOSPADM

## 2020-06-29 RX ORDER — ONDANSETRON 2 MG/ML
4 INJECTION INTRAMUSCULAR; INTRAVENOUS EVERY 4 HOURS PRN
Status: DISCONTINUED | OUTPATIENT
Start: 2020-06-29 | End: 2020-06-29

## 2020-06-29 RX ORDER — POLYETHYLENE GLYCOL 3350 17 G/17G
1 POWDER, FOR SOLUTION ORAL
Status: DISCONTINUED | OUTPATIENT
Start: 2020-06-29 | End: 2020-07-01 | Stop reason: HOSPADM

## 2020-06-29 RX ORDER — PROMETHAZINE HYDROCHLORIDE 12.5 MG/1
12.5-25 SUPPOSITORY RECTAL EVERY 4 HOURS PRN
Status: DISCONTINUED | OUTPATIENT
Start: 2020-06-29 | End: 2020-06-29

## 2020-06-29 RX ORDER — ASPIRIN 81 MG/1
324 TABLET, CHEWABLE ORAL DAILY
Status: DISCONTINUED | OUTPATIENT
Start: 2020-06-30 | End: 2020-07-01 | Stop reason: HOSPADM

## 2020-06-29 RX ORDER — GABAPENTIN 300 MG/1
300 CAPSULE ORAL 2 TIMES DAILY
Status: DISCONTINUED | OUTPATIENT
Start: 2020-06-30 | End: 2020-07-01 | Stop reason: HOSPADM

## 2020-06-29 RX ORDER — ONDANSETRON 4 MG/1
4 TABLET, ORALLY DISINTEGRATING ORAL EVERY 4 HOURS PRN
Status: DISCONTINUED | OUTPATIENT
Start: 2020-06-29 | End: 2020-06-29

## 2020-06-29 RX ORDER — LORAZEPAM 2 MG/ML
0.5 INJECTION INTRAMUSCULAR
Status: DISCONTINUED | OUTPATIENT
Start: 2020-06-29 | End: 2020-07-01 | Stop reason: HOSPADM

## 2020-06-29 RX ORDER — SODIUM CHLORIDE 9 MG/ML
1000 INJECTION, SOLUTION INTRAVENOUS ONCE
Status: COMPLETED | OUTPATIENT
Start: 2020-06-29 | End: 2020-06-30

## 2020-06-29 RX ORDER — PROCHLORPERAZINE EDISYLATE 5 MG/ML
5-10 INJECTION INTRAMUSCULAR; INTRAVENOUS EVERY 4 HOURS PRN
Status: DISCONTINUED | OUTPATIENT
Start: 2020-06-29 | End: 2020-06-29

## 2020-06-29 RX ORDER — NICOTINE 21 MG/24HR
14 PATCH, TRANSDERMAL 24 HOURS TRANSDERMAL
Status: DISCONTINUED | OUTPATIENT
Start: 2020-06-30 | End: 2020-07-01 | Stop reason: HOSPADM

## 2020-06-29 RX ORDER — ASPIRIN 300 MG/1
300 SUPPOSITORY RECTAL DAILY
Status: DISCONTINUED | OUTPATIENT
Start: 2020-06-30 | End: 2020-07-01 | Stop reason: HOSPADM

## 2020-06-29 RX ORDER — ACETAMINOPHEN 325 MG/1
650 TABLET ORAL EVERY 6 HOURS PRN
Status: DISCONTINUED | OUTPATIENT
Start: 2020-06-29 | End: 2020-07-01 | Stop reason: HOSPADM

## 2020-06-29 RX ORDER — BISACODYL 10 MG
10 SUPPOSITORY, RECTAL RECTAL
Status: DISCONTINUED | OUTPATIENT
Start: 2020-06-29 | End: 2020-07-01 | Stop reason: HOSPADM

## 2020-06-29 RX ORDER — AMOXICILLIN 250 MG
2 CAPSULE ORAL 2 TIMES DAILY
Status: DISCONTINUED | OUTPATIENT
Start: 2020-06-29 | End: 2020-07-01 | Stop reason: HOSPADM

## 2020-06-29 RX ADMIN — INSULIN LISPRO 8 UNITS: 100 INJECTION, SOLUTION INTRAVENOUS; SUBCUTANEOUS at 22:04

## 2020-06-29 RX ADMIN — SODIUM CHLORIDE 3 G: 900 INJECTION INTRAVENOUS at 23:08

## 2020-06-29 RX ADMIN — INSULIN GLARGINE 35 UNITS: 100 INJECTION, SOLUTION SUBCUTANEOUS at 23:09

## 2020-06-29 RX ADMIN — IOHEXOL 40 ML: 350 INJECTION, SOLUTION INTRAVENOUS at 21:31

## 2020-06-29 RX ADMIN — ACETAMINOPHEN 650 MG: 325 TABLET, FILM COATED ORAL at 23:41

## 2020-06-29 RX ADMIN — SODIUM CHLORIDE 1000 ML: 9 INJECTION, SOLUTION INTRAVENOUS at 22:07

## 2020-06-29 ASSESSMENT — ENCOUNTER SYMPTOMS
TINGLING: 0
LOSS OF CONSCIOUSNESS: 0
SPUTUM PRODUCTION: 0
STRIDOR: 0
MYALGIAS: 0
DIZZINESS: 0
HEADACHES: 0
DEPRESSION: 0
COUGH: 0
VOMITING: 0
CONSTIPATION: 0
FALLS: 0
SHORTNESS OF BREATH: 1
PALPITATIONS: 0
NAUSEA: 0
ABDOMINAL PAIN: 0
WEAKNESS: 0
CHILLS: 0
DIARRHEA: 0
FEVER: 0

## 2020-06-29 ASSESSMENT — FIBROSIS 4 INDEX
FIB4 SCORE: 4.82
FIB4 SCORE: 3.21

## 2020-06-29 NOTE — TELEPHONE ENCOUNTER
Patient is returning your call regarding. Patient does state symptoms are getting worse, she is currently having SOB, Chest pain, heart beat is rapid, fatigue, a boil busted on leg, - cant lay down and rest right, urinating every 15-20. Patient is advised if she is having respiratory distress to go to the ER.

## 2020-06-29 NOTE — ED TRIAGE NOTES
"Chief Complaint   Patient presents with   • Palpitations     \"like my heart is jumping\"    pt states recently seen at  and sent to ED pt has multiple compliant in ED. She is aware of triage process and wearing mask. EKG in process.   "

## 2020-06-30 ENCOUNTER — APPOINTMENT (OUTPATIENT)
Dept: CARDIOLOGY | Facility: MEDICAL CENTER | Age: 60
End: 2020-06-30
Attending: INTERNAL MEDICINE
Payer: MEDICAID

## 2020-06-30 ENCOUNTER — APPOINTMENT (OUTPATIENT)
Dept: RADIOLOGY | Facility: MEDICAL CENTER | Age: 60
End: 2020-06-30
Attending: INTERNAL MEDICINE
Payer: MEDICAID

## 2020-06-30 LAB
AMPHET UR QL SCN: NEGATIVE
ANION GAP SERPL CALC-SCNC: 14 MMOL/L (ref 7–16)
APPEARANCE UR: CLEAR
BARBITURATES UR QL SCN: NEGATIVE
BENZODIAZ UR QL SCN: NEGATIVE
BILIRUB UR QL STRIP.AUTO: NEGATIVE
BUN SERPL-MCNC: 16 MG/DL (ref 8–22)
BZE UR QL SCN: NEGATIVE
CALCIUM SERPL-MCNC: 9.1 MG/DL (ref 8.5–10.5)
CANNABINOIDS UR QL SCN: NEGATIVE
CHLORIDE SERPL-SCNC: 96 MMOL/L (ref 96–112)
CO2 SERPL-SCNC: 22 MMOL/L (ref 20–33)
COLOR UR: YELLOW
CREAT SERPL-MCNC: 1.07 MG/DL (ref 0.5–1.4)
EKG IMPRESSION: NORMAL
ERYTHROCYTE [DISTWIDTH] IN BLOOD BY AUTOMATED COUNT: 38.7 FL (ref 35.9–50)
GLUCOSE BLD-MCNC: 218 MG/DL (ref 65–99)
GLUCOSE BLD-MCNC: 267 MG/DL (ref 65–99)
GLUCOSE BLD-MCNC: 278 MG/DL (ref 65–99)
GLUCOSE BLD-MCNC: 324 MG/DL (ref 65–99)
GLUCOSE SERPL-MCNC: 374 MG/DL (ref 65–99)
GLUCOSE UR STRIP.AUTO-MCNC: >=1000 MG/DL
HAV IGM SERPL QL IA: ABNORMAL
HBV CORE IGM SER QL: ABNORMAL
HBV SURFACE AG SER QL: ABNORMAL
HCT VFR BLD AUTO: 32.6 % (ref 37–47)
HCV AB SER QL: REACTIVE
HGB BLD-MCNC: 9.8 G/DL (ref 12–16)
HIV 1+2 AB+HIV1 P24 AG SERPL QL IA: NORMAL
KETONES UR STRIP.AUTO-MCNC: NEGATIVE MG/DL
LACTATE BLD-SCNC: 1.3 MMOL/L (ref 0.5–2)
LACTATE BLD-SCNC: 3.4 MMOL/L (ref 0.5–2)
LEUKOCYTE ESTERASE UR QL STRIP.AUTO: NEGATIVE
LV EJECT FRACT  99904: 75
LV EJECT FRACT MOD 2C 99903: 74.88
LV EJECT FRACT MOD 4C 99902: 77.54
LV EJECT FRACT MOD BP 99901: 75.42
MCH RBC QN AUTO: 22.3 PG (ref 27–33)
MCHC RBC AUTO-ENTMCNC: 30.1 G/DL (ref 33.6–35)
MCV RBC AUTO: 74.1 FL (ref 81.4–97.8)
METHADONE UR QL SCN: NEGATIVE
MICRO URNS: ABNORMAL
NITRITE UR QL STRIP.AUTO: NEGATIVE
OPIATES UR QL SCN: NEGATIVE
OXYCODONE UR QL SCN: NEGATIVE
PCP UR QL SCN: NEGATIVE
PH UR STRIP.AUTO: 5.5 [PH] (ref 5–8)
PLATELET # BLD AUTO: 79 K/UL (ref 164–446)
POTASSIUM SERPL-SCNC: 3.9 MMOL/L (ref 3.6–5.5)
PROPOXYPH UR QL SCN: NEGATIVE
PROT UR QL STRIP: NEGATIVE MG/DL
RBC # BLD AUTO: 4.4 M/UL (ref 4.2–5.4)
RBC UR QL AUTO: NEGATIVE
SODIUM SERPL-SCNC: 132 MMOL/L (ref 135–145)
SP GR UR STRIP.AUTO: 1.03
UROBILINOGEN UR STRIP.AUTO-MCNC: 1 MG/DL
WBC # BLD AUTO: 3.1 K/UL (ref 4.8–10.8)

## 2020-06-30 PROCEDURE — 96372 THER/PROPH/DIAG INJ SC/IM: CPT

## 2020-06-30 PROCEDURE — 93306 TTE W/DOPPLER COMPLETE: CPT

## 2020-06-30 PROCEDURE — A9270 NON-COVERED ITEM OR SERVICE: HCPCS | Performed by: INTERNAL MEDICINE

## 2020-06-30 PROCEDURE — 700102 HCHG RX REV CODE 250 W/ 637 OVERRIDE(OP): Performed by: INTERNAL MEDICINE

## 2020-06-30 PROCEDURE — 99225 PR SUBSEQUENT OBSERVATION CARE,LEVEL II: CPT | Performed by: INTERNAL MEDICINE

## 2020-06-30 PROCEDURE — 76882 US LMTD JT/FCL EVL NVASC XTR: CPT | Mod: RT

## 2020-06-30 PROCEDURE — 96375 TX/PRO/DX INJ NEW DRUG ADDON: CPT

## 2020-06-30 PROCEDURE — 700105 HCHG RX REV CODE 258: Performed by: INTERNAL MEDICINE

## 2020-06-30 PROCEDURE — 83605 ASSAY OF LACTIC ACID: CPT

## 2020-06-30 PROCEDURE — 85027 COMPLETE CBC AUTOMATED: CPT

## 2020-06-30 PROCEDURE — 93005 ELECTROCARDIOGRAM TRACING: CPT | Mod: XE | Performed by: INTERNAL MEDICINE

## 2020-06-30 PROCEDURE — A9502 TC99M TETROFOSMIN: HCPCS

## 2020-06-30 PROCEDURE — 81003 URINALYSIS AUTO W/O SCOPE: CPT | Mod: XU

## 2020-06-30 PROCEDURE — 82962 GLUCOSE BLOOD TEST: CPT | Mod: 91

## 2020-06-30 PROCEDURE — 80074 ACUTE HEPATITIS PANEL: CPT

## 2020-06-30 PROCEDURE — 80307 DRUG TEST PRSMV CHEM ANLYZR: CPT

## 2020-06-30 PROCEDURE — 80048 BASIC METABOLIC PNL TOTAL CA: CPT

## 2020-06-30 PROCEDURE — 93306 TTE W/DOPPLER COMPLETE: CPT | Mod: 26 | Performed by: INTERNAL MEDICINE

## 2020-06-30 PROCEDURE — 96366 THER/PROPH/DIAG IV INF ADDON: CPT

## 2020-06-30 PROCEDURE — 700111 HCHG RX REV CODE 636 W/ 250 OVERRIDE (IP)

## 2020-06-30 PROCEDURE — 93010 ELECTROCARDIOGRAM REPORT: CPT | Performed by: INTERNAL MEDICINE

## 2020-06-30 PROCEDURE — 87040 BLOOD CULTURE FOR BACTERIA: CPT

## 2020-06-30 PROCEDURE — G0475 HIV COMBINATION ASSAY: HCPCS

## 2020-06-30 PROCEDURE — 700111 HCHG RX REV CODE 636 W/ 250 OVERRIDE (IP): Performed by: INTERNAL MEDICINE

## 2020-06-30 PROCEDURE — 87522 HEPATITIS C REVRS TRNSCRPJ: CPT

## 2020-06-30 PROCEDURE — G0378 HOSPITAL OBSERVATION PER HR: HCPCS

## 2020-06-30 RX ORDER — SODIUM CHLORIDE 9 MG/ML
INJECTION, SOLUTION INTRAVENOUS CONTINUOUS
Status: DISCONTINUED | OUTPATIENT
Start: 2020-06-30 | End: 2020-07-01 | Stop reason: HOSPADM

## 2020-06-30 RX ORDER — DOXYCYCLINE 100 MG/1
100 TABLET ORAL EVERY 12 HOURS
Status: DISCONTINUED | OUTPATIENT
Start: 2020-06-30 | End: 2020-07-01 | Stop reason: HOSPADM

## 2020-06-30 RX ORDER — RISPERIDONE 4 MG/1
4 TABLET ORAL 2 TIMES DAILY
COMMUNITY
End: 2020-09-25

## 2020-06-30 RX ORDER — REGADENOSON 0.08 MG/ML
INJECTION, SOLUTION INTRAVENOUS
Status: COMPLETED
Start: 2020-06-30 | End: 2020-06-30

## 2020-06-30 RX ADMIN — LORAZEPAM 0.5 MG: 2 INJECTION INTRAMUSCULAR; INTRAVENOUS at 00:56

## 2020-06-30 RX ADMIN — DOXYCYCLINE 100 MG: 100 TABLET, FILM COATED ORAL at 17:08

## 2020-06-30 RX ADMIN — INSULIN HUMAN 9 UNITS: 100 INJECTION, SOLUTION PARENTERAL at 20:03

## 2020-06-30 RX ADMIN — GABAPENTIN 300 MG: 300 CAPSULE ORAL at 05:01

## 2020-06-30 RX ADMIN — SODIUM CHLORIDE: 9 INJECTION, SOLUTION INTRAVENOUS at 19:51

## 2020-06-30 RX ADMIN — HYDROCODONE BITARTRATE AND ACETAMINOPHEN 1 TABLET: 5; 325 TABLET ORAL at 20:00

## 2020-06-30 RX ADMIN — INSULIN HUMAN 9 UNITS: 100 INJECTION, SOLUTION PARENTERAL at 18:56

## 2020-06-30 RX ADMIN — INSULIN HUMAN 9 UNITS: 100 INJECTION, SOLUTION PARENTERAL at 06:00

## 2020-06-30 RX ADMIN — DOCUSATE SODIUM 50 MG AND SENNOSIDES 8.6 MG 2 TABLET: 8.6; 5 TABLET, FILM COATED ORAL at 17:08

## 2020-06-30 RX ADMIN — ASPIRIN 325 MG: 325 TABLET, FILM COATED ORAL at 05:01

## 2020-06-30 RX ADMIN — AMPICILLIN SODIUM AND SULBACTAM SODIUM 3 G: 2; 1 INJECTION, POWDER, FOR SOLUTION INTRAMUSCULAR; INTRAVENOUS at 03:24

## 2020-06-30 RX ADMIN — AMPICILLIN SODIUM AND SULBACTAM SODIUM 3 G: 2; 1 INJECTION, POWDER, FOR SOLUTION INTRAMUSCULAR; INTRAVENOUS at 12:37

## 2020-06-30 RX ADMIN — INSULIN GLARGINE 35 UNITS: 100 INJECTION, SOLUTION SUBCUTANEOUS at 20:02

## 2020-06-30 RX ADMIN — GABAPENTIN 300 MG: 300 CAPSULE ORAL at 17:08

## 2020-06-30 RX ADMIN — INSULIN HUMAN 12 UNITS: 100 INJECTION, SOLUTION PARENTERAL at 13:51

## 2020-06-30 RX ADMIN — ENALAPRILAT 1.25 MG: 1.25 INJECTION INTRAVENOUS at 00:56

## 2020-06-30 RX ADMIN — NICOTINE 14 MG: 14 PATCH TRANSDERMAL at 05:01

## 2020-06-30 RX ADMIN — AMPICILLIN SODIUM AND SULBACTAM SODIUM 3 G: 2; 1 INJECTION, POWDER, FOR SOLUTION INTRAMUSCULAR; INTRAVENOUS at 17:07

## 2020-06-30 RX ADMIN — REGADENOSON 0.4 MG: 0.08 INJECTION, SOLUTION INTRAVENOUS at 11:15

## 2020-06-30 ASSESSMENT — ENCOUNTER SYMPTOMS
HEARTBURN: 0
VOMITING: 0
DOUBLE VISION: 0
FLANK PAIN: 0
NAUSEA: 0
PALPITATIONS: 0
POLYDIPSIA: 0
HEMOPTYSIS: 0
BLURRED VISION: 0
NECK PAIN: 0
COUGH: 0
NERVOUS/ANXIOUS: 0
ORTHOPNEA: 0
WEIGHT LOSS: 0
PHOTOPHOBIA: 0
CHILLS: 0
BACK PAIN: 0
FEVER: 0
SPUTUM PRODUCTION: 0
HALLUCINATIONS: 0
FOCAL WEAKNESS: 0
HEADACHES: 0
BRUISES/BLEEDS EASILY: 0
SPEECH CHANGE: 0
TREMORS: 0

## 2020-06-30 ASSESSMENT — PATIENT HEALTH QUESTIONNAIRE - PHQ9
2. FEELING DOWN, DEPRESSED, IRRITABLE, OR HOPELESS: NOT AT ALL
SUM OF ALL RESPONSES TO PHQ9 QUESTIONS 1 AND 2: 0
1. LITTLE INTEREST OR PLEASURE IN DOING THINGS: NOT AT ALL

## 2020-06-30 ASSESSMENT — LIFESTYLE VARIABLES
ALCOHOL_USE: YES
HAVE YOU EVER FELT YOU SHOULD CUT DOWN ON YOUR DRINKING: NO
ON A TYPICAL DAY WHEN YOU DRINK ALCOHOL HOW MANY DRINKS DO YOU HAVE: 0
EVER_SMOKED: NEVER
HAVE PEOPLE ANNOYED YOU BY CRITICIZING YOUR DRINKING: NO
DOES PATIENT WANT TO STOP DRINKING: NO
TOTAL SCORE: 0
TOTAL SCORE: 0
EVER FELT BAD OR GUILTY ABOUT YOUR DRINKING: NO
CONSUMPTION TOTAL: NEGATIVE
SUBSTANCE_ABUSE: 0
TOTAL SCORE: 0
AVERAGE NUMBER OF DAYS PER WEEK YOU HAVE A DRINK CONTAINING ALCOHOL: 0
HOW MANY TIMES IN THE PAST YEAR HAVE YOU HAD 5 OR MORE DRINKS IN A DAY: 0
EVER HAD A DRINK FIRST THING IN THE MORNING TO STEADY YOUR NERVES TO GET RID OF A HANGOVER: NO

## 2020-06-30 NOTE — PROGRESS NOTES
2 RN skin checked   Erythema, redness, no drainage and warm to touch wound noted on R thigh.  Pictures taken and uploaded.

## 2020-06-30 NOTE — ED NOTES
x2 pokes, unable to draw blood at this time.  was called  Patient was walked to er [facility] area room 64, ready to be seen.   Placed her into gown, rn aware

## 2020-06-30 NOTE — ASSESSMENT & PLAN NOTE
-Noncompliant, with profound hyperglycemia  Continue Lantus and sliding scale  We will prescribed Lantus  Diabetes education

## 2020-06-30 NOTE — PROGRESS NOTES
Patient presents to Nuclear Medicine for treadmill stress test with MPI. Nursing goals identified: knowledge deficit, potential for anxiety r/t stress test, potential for compromised cardiac output. Care plan includes patient education and reassurance, and access to ACLS cart/team. Risks and benefits of treadmill stress test explained to patient and patient agreed to proceed. Resting images attained. Patient c/o lightheadedness. Juice provided with resolution of lightheadedness. Patient prepped for treadmill stress test. The patient was exercised using the Nate protocol for a total of 4 mins, peak heart rate below 85% when patient unable to continue due to fatigue in legs. Converted to a walking Lexiscan stress test. Observed/reported signs/symptoms: SOB, dizziness. Symptoms resolved.  Caffeinated beverage provided.

## 2020-06-30 NOTE — ASSESSMENT & PLAN NOTE
-Located on her right thigh after injecting methamphetamine  -Mild erythema, significant wound with serosanguineous drainage  Ultrasound of right thigh cannot rule out abscess versus fat necrosis  Continue IV Unasyn, add doxycycline  Consulted with orthopedic surgery, awaiting recommendations.

## 2020-06-30 NOTE — PROGRESS NOTES
Hospital Medicine Daily Progress Note    Date of Service  6/30/2020    Chief Complaint/  Hospital Course  59 y.o. female admitted 6/29/2020 with chest pain.      Interval Problem Update  Chest pain nearly resolved.  Stress test done, result is pending  Ordered ultrasound of right thigh to evaluate for possible right thigh abscess.  The doxycycline, continue Unasyn    Consultants/Specialty  None    Code Status  Full code    Disposition  To be discussed.  Home when medically cleared    Review of Systems  Review of Systems   Constitutional: Negative for chills, fever and weight loss.   HENT: Negative for ear pain, hearing loss and tinnitus.    Eyes: Negative for blurred vision, double vision and photophobia.   Respiratory: Negative for cough, hemoptysis and sputum production.    Cardiovascular: Positive for chest pain. Negative for palpitations and orthopnea.   Gastrointestinal: Negative for heartburn, nausea and vomiting.   Genitourinary: Negative for dysuria, flank pain, frequency and hematuria.   Musculoskeletal: Positive for joint pain. Negative for back pain and neck pain.   Skin: Negative for itching and rash.   Neurological: Negative for tremors, speech change, focal weakness and headaches.   Endo/Heme/Allergies: Negative for environmental allergies and polydipsia. Does not bruise/bleed easily.   Psychiatric/Behavioral: Negative for hallucinations and substance abuse. The patient is not nervous/anxious.         Physical Exam  Temp:  [35.9 °C (96.7 °F)-36.8 °C (98.2 °F)] 36.2 °C (97.1 °F)  Pulse:  [] 90  Resp:  [16-18] 16  BP: (101-178)/() 119/58  SpO2:  [95 %-99 %] 99 %    Physical Exam  Vitals signs and nursing note reviewed.   Constitutional:       General: She is not in acute distress.     Appearance: Normal appearance.   HENT:      Head: Normocephalic and atraumatic.      Nose: Nose normal.      Mouth/Throat:      Mouth: Mucous membranes are moist.   Eyes:      Extraocular Movements: Extraocular  movements intact.      Pupils: Pupils are equal, round, and reactive to light.   Neck:      Musculoskeletal: Normal range of motion and neck supple.   Cardiovascular:      Rate and Rhythm: Normal rate and regular rhythm.   Pulmonary:      Effort: Pulmonary effort is normal.      Breath sounds: Normal breath sounds.   Abdominal:      General: Abdomen is flat. There is no distension.      Tenderness: There is no abdominal tenderness.   Musculoskeletal: Normal range of motion.         General: No swelling or deformity.      Comments: Swelling and mild tenderness to palpation in the right distal thigh with shallow wound without drainage.  No fluctuance appreciated   Skin:     General: Skin is warm and dry.      Comments: scabs over Bilateral Lower Extremities   Neurological:      General: No focal deficit present.      Mental Status: She is alert and oriented to person, place, and time.   Psychiatric:         Mood and Affect: Mood normal.         Behavior: Behavior normal.         Fluids  No intake or output data in the 24 hours ending 06/30/20 1427    Laboratory  Recent Labs     06/29/20  1850 06/30/20  0011   WBC 2.8* 3.1*   RBC 4.53 4.40   HEMOGLOBIN 9.8* 9.8*   HEMATOCRIT 34.5* 32.6*   MCV 76.2* 74.1*   MCH 21.6* 22.3*   MCHC 28.4* 30.1*   RDW 41.0 38.7   PLATELETCT 70* 79*     Recent Labs     06/29/20  1850 06/30/20  0011   SODIUM 130* 132*   POTASSIUM 4.5 3.9   CHLORIDE 95* 96   CO2 23 22   GLUCOSE 703* 374*   BUN 19 16   CREATININE 1.19 1.07   CALCIUM 9.1 9.1     Recent Labs     06/29/20  1850   INR 1.23*               Imaging  NM-CARDIAC STRESS TEST   Final Result      EC-ECHOCARDIOGRAM COMPLETE W/O CONT   Final Result      CT-CTA CHEST PULMONARY ARTERY W/ RECONS   Final Result      1.  No pulmonary embolus is identified.      2.  Atherosclerosis.      3.  Morphologic characteristics of cirrhosis with evidence of portal hypertension including esophageal varices and splenomegaly            DX-CHEST-PORTABLE (1  VIEW)   Final Result         1. No acute cardiopulmonary abnormalities are identified.      US-EXTREMITY NON VASCULAR UNILATERAL RIGHT    (Results Pending)        Assessment/Plan  * Chest pain- (present on admission)  Assessment & Plan  Troponin,  EKG unremarkable  -Stress test is done, result pending  - CTA of the chest negative for PE      Cirrhosis (HCC)- (present on admission)  Assessment & Plan  Appears to be compensated.    Follow-up with PCP as outpatient for monitoring/treatment of hepatitis C      DM2 (diabetes mellitus, type 2) (HCC)- (present on admission)  Assessment & Plan  -Noncompliant, with profound hyperglycemia  Continue Lantus and sliding scale  We will prescribed Lantus  Diabetes education    HTN (hypertension)- (present on admission)  Assessment & Plan  -Start PRN enalapril  -Adjust as needed    Pancytopenia (HCC)- (present on admission)  Assessment & Plan  -Likely secondary to   cirrhosis from hepatitis C      ARF (acute renal failure) (HCC)- (present on admission)  Assessment & Plan  -Likely due to dehydration  -Diuretics on hold  Improving    Wound cellulitis- (present on admission)  Assessment & Plan  -Located on her right thigh after injecting methamphetamine  -Mild erythema, significant wound that is currently not draining  Ordered ultrasound to evaluate for fluid collection  Continue IV Unasyn, add doxycycline    Methamphetamine abuse (HCC)- (present on admission)  Assessment & Plan  -Patient states she last used 2 weeks ago, this did cause a wound with cellulitis    Tobacco dependence- (present on admission)  Assessment & Plan  -Tobacco cessation counseling and education provided for more than 11 minutes. Nicotine replacement options provided including patch, and further medical treatments including Wellbutrin and chantix.  As well as over the counter options of lozenges and gum  -She did request a patch    Anxiety- (present on admission)  Assessment & Plan   Ativan as needed       VTE  prophylaxis: Heparin

## 2020-06-30 NOTE — ASSESSMENT & PLAN NOTE
-Tobacco cessation counseling and education provided for more than 11 minutes. Nicotine replacement options provided including patch, and further medical treatments including Wellbutrin and chantix.  As well as over the counter options of lozenges and gum  -She did request a patch

## 2020-06-30 NOTE — PROGRESS NOTES
Assumed care @ 0715. Bedside report from MAMI Patel. Resting in bed and in no distress. Bed in low position, call light w/in reach.

## 2020-06-30 NOTE — ASSESSMENT & PLAN NOTE
Troponin,  EKG unremarkable  -Stress test is done, result pending  - CTA of the chest negative for PE

## 2020-06-30 NOTE — PROGRESS NOTES
A/o,respirations are even and unlabored on room air ,assessment completed, vital signs stable, , 35 units lantus given,  SR on the monitor, IV fluids running per orders, updated communication board,  poc discussed and understood, verbalized understanding, pt aware NPO for stress test, all questions answered at this time , fall precautions in place, call button within reach, will continue to monitor

## 2020-06-30 NOTE — ASSESSMENT & PLAN NOTE
Appears to be compensated.    Follow-up with PCP as outpatient for monitoring/treatment of hepatitis C

## 2020-06-30 NOTE — ED PROVIDER NOTES
"ED Provider Note    CHIEF COMPLAINT  Chief Complaint   Patient presents with   • Palpitations     \"like my heart is jumping\"        HPI  Demetrice Jaime is a 59 y.o. female who presents planing of palpitations, pain in her chest weakness shortness of breath and a wound on her right lower thigh that burst and drained pus a few days ago.  She is a diabetic that does not have a primary care physician at this time.  She states she has not been able to take insulin because of this.  She is supposed to get  established with the [facility] but they have not set up an appointment with her yet.  States she went to the clinic last week because she was feeling ill and they tested her for COVID and this came back negative.  She has been staying home and resting.  Her appetite is decreased and she feels generally weak.  She states that the chest pain is a left-sided pain that is constant pressure.  She has a boil on her right leg that burst a few days ago and drained pus and now is just red and swollen.  She has not noticed a fever.  She does have chills.  She has chronic shortness of breath and smokes daily.  She is not on oxygen at home.      REVIEW OF SYSTEMS  HEENT:  No ear pain, congestion or sore throat   EYES: no discharge redness or vision changes  CARDIAC: See history of present illness  PULMONARY: mild dyspnea, no cough or congestion   GI: no vomiting diarrhea or abdominal pain   : no dysuria, back pain or hematuria   Neuro: no weakness, numbness aphasia or headache  Musculoskeletal: no swelling deformity or pain no joint swelling  Endocrine: no fevers, sweating, weight loss   SKIN: no rash, erythema or contusions     See history of present illness all other systems are negative        PAST MEDICAL HISTORY  Past Medical History:   Diagnosis Date   • Depression 6/22/2016   • Pain 12-19-14    generalized; 4/10   • Stroke (HCC) 2011   • Anemia    • Anesthesia     pt's mother has hard time waking up " = states it took a week or two   • Arthritis     generalized   • Breath shortness    • Cirrhosis (HCC)    • Dental disorder     dentures   • Depression    • Diabetes     insulin   • Fibromyalgia    • Hepatitis C    • Hiatus hernia syndrome    • Hypertension    • Indigestion    • Obesity    • Other specified disorder of intestines     constipation/diarrhea   • Pneumonia    • Psychiatric problem     depression and anxiety   • Restless leg syndrome    • Sleep apnea     has had sleep study, no cpap   • Snoring    • Unspecified hemorrhagic conditions     reports nose bleeds   • Urinary bladder disorder     frequency/urgency       FAMILY HISTORY  Family History   Problem Relation Age of Onset   • Diabetes Mother    • Hypertension Mother        SOCIAL HISTORY  Social History     Socioeconomic History   • Marital status:      Spouse name: Not on file   • Number of children: Not on file   • Years of education: Not on file   • Highest education level: Not on file   Occupational History   • Not on file   Social Needs   • Financial resource strain: Not on file   • Food insecurity     Worry: Not on file     Inability: Not on file   • Transportation needs     Medical: Not on file     Non-medical: Not on file   Tobacco Use   • Smoking status: Current Some Day Smoker     Packs/day: 1.00     Years: 42.00     Pack years: 42.00     Types: Cigarettes   • Smokeless tobacco: Never Used   Substance and Sexual Activity   • Alcohol use: Not Currently     Alcohol/week: 0.0 - 3.6 oz     Comment: drinks 0-6 during the week and drinks all weekend   • Drug use: Not Currently     Comment: Hx IV meth use, states she used 12/18/17 for first time in yrs    • Sexual activity: Not on file   Lifestyle   • Physical activity     Days per week: Not on file     Minutes per session: Not on file   • Stress: Not on file   Relationships   • Social connections     Talks on phone: Not on file     Gets together: Not on file     Attends Sabianist service:  Not on file     Active member of club or organization: Not on file     Attends meetings of clubs or organizations: Not on file     Relationship status: Not on file   • Intimate partner violence     Fear of current or ex partner: Not on file     Emotionally abused: Not on file     Physically abused: Not on file     Forced sexual activity: Not on file   Other Topics Concern   • Not on file   Social History Narrative   • Not on file       SURGICAL HISTORY  Past Surgical History:   Procedure Laterality Date   • GASTROSCOPY N/A 2/12/2018    Procedure: GASTROSCOPY;  Surgeon: Willard Bustamante M.D.;  Location: SURGERY [facility];  Service: Gastroenterology   • GASTROSCOPY WITH BANDING N/A 2/8/2018    Procedure: GASTROSCOPY WITH BANDING;  Surgeon: Davonte Mauricio M.D.;  Location: SURGERY SAME DAY [facility];  Service: Gastroenterology   • GASTROSCOPY N/A 8/21/2017    Procedure: GASTROSCOPY WITH BANDING  ;  Surgeon: Anthony Dent M.D.;  Location: SURGERY SAME DAY [facility];  Service:    • CATH PLACEMENT  12/22/2014    Performed by Helga Santiago M.D. at SURGERY SAME DAY [facility]   • CATH REMOVAL  11/14/2014    Performed by Helga Satniago M.D. at SURGERY [facility]   • GASTROSCOPY WITH BANDING  7/8/2014    Performed by Davonte Mauricio M.D. at [facility]   • GASTROSCOPY WITH CLIPPING  7/6/2014    Performed by Joshua Vigil M.D. at [facility]   • ANTERIOR AND POSTERIOR REPAIR  12/6/2013    Performed by Isaías Lambert M.D. at SURGERY SAME DAY [facility]   • ENTEROCELE REPAIR  12/6/2013    Performed by Isaías Lambert M.D. at SURGERY SAME DAY [facility]   • BLADDER SLING FEMALE  12/6/2013    Performed by Isaías Lambert M.D. at SURGERY SAME DAY [facility]   • GYN SURGERY  2010    hysterectomy   • CARPAL TUNNEL RELEASE  2010    right hand   • SHOULDER SURGERY  2010    left shoulder   • OTHER  2010    port placement   • CATARACT EXTRACTION WITH IOL    "  • GYN SURGERY      tubal ligation   • OTHER      oral surgery   • TONSILLECTOMY         CURRENT MEDICATIONS  Home Medications     Reviewed by Melissa Byrne R.N. (Registered Nurse) on 06/29/20 at 1636  Med List Status: Partial   Medication Last Dose Status   furosemide (LASIX) 20 MG Tab  Active   gabapentin (NEURONTIN) 300 MG Cap  Active   insulin glargine (LANTUS) 100 UNIT/ML Solution  Active   insulin lispro (HUMALOG) 100 UNIT/ML Solution  Active   lactulose 20 GM/30ML Solution  Active   ondansetron (ZOFRAN ODT) 4 MG TABLET DISPERSIBLE  Active   spironolactone (ALDACTONE) 50 MG Tab  Active                ALLERGIES  Allergies   Allergen Reactions   • Nkda [No Known Drug Allergy]        PHYSICAL EXAM  VITAL SIGNS: BP (!) 163/103   Pulse (!) 105   Temp 35.9 °C (96.7 °F) (Temporal)   Resp 16   Ht 1.676 m (5' 6\")   Wt 71.9 kg (158 lb 8.2 oz)   SpO2 98%   BMI 25.58 kg/m²    Constitutional: Well developed, Well nourished, No acute respiratory distress, Non-toxic appearance.   HENT: Normocephalic, Atraumatic, Bilateral external ears normal, Oropharynx clear, mucous membranes are moist.  Eyes: Conjunctiva normal, No discharge. No icterus.  Neck: Normal range of motion. Supple.  Lymphatic: No cervical lymphadenopathy noted.   Cardiovascular: tachycardic normal heart rate, Normal rhythm, No murmurs, No rubs, No gallops.   Thorax & Lungs: clear to auscultation bilaterally, No respiratory distress, No wheezing.  Abdomen: Soft mild diffusely tender normal bowel sounds no rebound masses or peritoneal signs  Skin: Warm, Dry, No erythema, No rash.  On the patient's right lateral lower thigh she has a wound that is erythematous with some surrounding erythema but not draining at this time.  Extremities: Intact distal pulses, No edema, No tenderness  Musculoskeletal: Good range of motion in all major joints. Normal gait.  Neurologic: Alert & oriented x 3. No focal deficits noted.        RADIOLOGY/PROCEDURES  CT-CTA CHEST " PULMONARY ARTERY W/ RECONS   Final Result      1.  No pulmonary embolus is identified.      2.  Atherosclerosis.      3.  Morphologic characteristics of cirrhosis with evidence of portal hypertension including esophageal varices and splenomegaly            DX-CHEST-PORTABLE (1 VIEW)   Final Result         1. No acute cardiopulmonary abnormalities are identified.      NM-CARDIAC STRESS TEST    (Results Pending)   EC-ECHOCARDIOGRAM COMPLETE W/O CONT    (Results Pending)         COURSE & MEDICAL DECISION MAKING  Pertinent Labs & Imaging studies reviewed. (See chart for details)  Differential diagnosis: cardiac ischemia, pneumonia, wound infection, DKA, sepsis    Results for orders placed or performed during the hospital encounter of 06/29/20   CBC with Differential   Result Value Ref Range    WBC 2.8 (L) 4.8 - 10.8 K/uL    RBC 4.53 4.20 - 5.40 M/uL    Hemoglobin 9.8 (L) 12.0 - 16.0 g/dL    Hematocrit 34.5 (L) 37.0 - 47.0 %    MCV 76.2 (L) 81.4 - 97.8 fL    MCH 21.6 (L) 27.0 - 33.0 pg    MCHC 28.4 (L) 33.6 - 35.0 g/dL    RDW 41.0 35.9 - 50.0 fL    Platelet Count 70 (L) 164 - 446 K/uL    Neutrophils-Polys 76.30 (H) 44.00 - 72.00 %    Lymphocytes 16.10 (L) 22.00 - 41.00 %    Monocytes 5.10 0.00 - 13.40 %    Eosinophils 2.50 0.00 - 6.90 %    Basophils 0.00 0.00 - 1.80 %    Nucleated RBC 0.00 /100 WBC    Neutrophils (Absolute) 2.14 2.00 - 7.15 K/uL    Lymphs (Absolute) 0.45 (L) 1.00 - 4.80 K/uL    Monos (Absolute) 0.14 0.00 - 0.85 K/uL    Eos (Absolute) 0.07 0.00 - 0.51 K/uL    Baso (Absolute) 0.00 0.00 - 0.12 K/uL    NRBC (Absolute) 0.00 K/uL    Anisocytosis 1+     Microcytosis 1+    Complete Metabolic Panel (CMP)   Result Value Ref Range    Sodium 130 (L) 135 - 145 mmol/L    Potassium 4.5 3.6 - 5.5 mmol/L    Chloride 95 (L) 96 - 112 mmol/L    Co2 23 20 - 33 mmol/L    Anion Gap 12.0 7.0 - 16.0    Glucose 703 (HH) 65 - 99 mg/dL    Bun 19 8 - 22 mg/dL    Creatinine 1.19 0.50 - 1.40 mg/dL    Calcium 9.1 8.5 - 10.5 mg/dL     AST(SGOT) 26 12 - 45 U/L    ALT(SGPT) 20 2 - 50 U/L    Alkaline Phosphatase 109 (H) 30 - 99 U/L    Total Bilirubin 0.4 0.1 - 1.5 mg/dL    Albumin 3.7 3.2 - 4.9 g/dL    Total Protein 7.3 6.0 - 8.2 g/dL    Globulin 3.6 (H) 1.9 - 3.5 g/dL    A-G Ratio 1.0 g/dL   Troponin   Result Value Ref Range    Troponin T 10 6 - 19 ng/L   URINALYSIS,CULTURE IF INDICATED    Specimen: Urine, Clean Catch   Result Value Ref Range    Color Yellow     Character Clear     Specific Gravity 1.018 <1.035    Ph 5.5 5.0 - 8.0    Glucose >=1000 (A) Negative mg/dL    Ketones Negative Negative mg/dL    Protein Negative Negative mg/dL    Bilirubin Negative Negative    Urobilinogen, Urine 1.0 Negative    Nitrite Negative Negative    Leukocyte Esterase Negative Negative    Occult Blood Negative Negative    Micro Urine Req see below    D-DIMER   Result Value Ref Range    D-Dimer Screen 1.32 (H) 0.00 - 0.50 ug/mL (FEU)   TSH   Result Value Ref Range    TSH 2.350 0.380 - 5.330 uIU/mL   Lactic Acid -STAT Once   Result Value Ref Range    Lactic Acid 3.2 (H) 0.5 - 2.0 mmol/L   FREE THYROXINE   Result Value Ref Range    Free T-4 0.98 0.93 - 1.70 ng/dL   Prothrombin Time   Result Value Ref Range    PT 15.9 (H) 12.0 - 14.6 sec    INR 1.23 (H) 0.87 - 1.13   ESTIMATED GFR   Result Value Ref Range    GFR If  56 (A) >60 mL/min/1.73 m 2    GFR If Non  46 (A) >60 mL/min/1.73 m 2   MORPHOLOGY   Result Value Ref Range    RBC Morphology Present     Polychromia 2+     Poikilocytosis 1+     Ovalocytes 1+     Rouleaux Slight    PERIPHERAL SMEAR REVIEW   Result Value Ref Range    Peripheral Smear Review see below    DIFFERENTIAL MANUAL   Result Value Ref Range    Manual Diff Status PERFORMED    PLATELET ESTIMATE   Result Value Ref Range    Plt Estimation Decreased    EKG   Result Value Ref Range    Report       [facility] Emergency Dept.    Test Date:  2020-06-29  Pt Name:    PADMAJA FLETCHER            Department:  ER  MRN:        [id number]                      Room:  Gender:     Female                       Technician: [id number]  :        1960                   Requested By:ER TRIAGE PROTOCOL  Order #:    [id number]                    Reading MD: ADA BILLY MD    Measurements  Intervals                                Axis  Rate:       99                           P:          69  AK:         168                          QRS:        40  QRSD:       88                           T:          62  QT:         396  QTc:        509    Interpretive Statements  SINUS RHYTHM  LOW VOLTAGE IN FRONTAL LEADS  BORDERLINE PROLONGED QT INTERVAL  Compared to ECG 2019 12:54:49  No significant changes  Electronically Signed On 2020 18:33:33 PDT by ADA BILLY MD         His blood sugar is elevated at 700 however her CO2 is 23 so she is not in DKA.  I ordered IV insulin as well as a liter of normal saline.  I gave her IV Unasyn for the wound on her thigh.  She will be admitted to the hospitalist service for further care while his work-up is her chest pain.    Critical Care  Due to the real possibility of a deterioration of this patient's condition required the highest level of my preparedness for sudden emergent intervention. I provided critical care services which included medication orders, frequent reevaluations of the patient's condition and response to treatment, ordering and reviewing test results and discussing the case with various consultants. The critical care time associated with the care of the patient was 40 minutes. Review chart for interventions. This time is exclusive of any other billable procedures.        FINAL IMPRESSION  1. Hyperglycemia    2. Chest pain, unspecified type    3. Open thigh wound, right, initial encounter             Electronically signed by: Ada Billy M.D., 2020 6:34 PM

## 2020-06-30 NOTE — H&P
"Hospital Medicine History & Physical Note    Date of Service  6/29/2020    Primary Care Physician  Pcp Pt States None    Code Status  Full Code    Chief Complaint  Chief Complaint   Patient presents with   •  Chest pain     \"like my heart is jumping\"        History of Presenting Illness  59 y.o. female who presented 6/29/2020 with chest pain.  She states her chest pain started approximately 2 weeks ago.  Initially she thought it was anxiety.  She describes it as left-sided chest pain, heaviness, 8/10 at its worse.  She denies any provoking or alleviating factors, states it is been intermittent in nature.  She also states she has shortness of breath with exertion associated with the chest pain.  She did admit that 1 month ago she used methamphetamine, injected into her right thigh.  Patient states 1 week ago she noted a wound where she had injected, it began draining last night.  She does have a history of diabetes but does not take any medication because she does not have a primary care provider, she is supposed to be on insulin.  This is been going on for some time.  Patient did have a negative COVID test a week ago and has been home since.  I did discuss the case including labs and imaging with the ER physician.    Review of Systems  Review of Systems   Constitutional: Negative for chills, fever and malaise/fatigue.   HENT: Negative for congestion.    Respiratory: Positive for shortness of breath. Negative for cough, sputum production and stridor.    Cardiovascular: Positive for chest pain. Negative for palpitations and leg swelling.   Gastrointestinal: Negative for abdominal pain, constipation, diarrhea, nausea and vomiting.   Genitourinary: Negative for dysuria and urgency.   Musculoskeletal: Negative for falls and myalgias.   Skin:        Right lower thigh wound   Neurological: Negative for dizziness, tingling, loss of consciousness, weakness and headaches.   Psychiatric/Behavioral: Negative for depression and " suicidal ideas.   All other systems reviewed and are negative.      Past Medical History   has a past medical history of Anemia, Anesthesia, Arthritis, Breath shortness, Cirrhosis (HCC), Dental disorder, Depression, Depression (6/22/2016), Diabetes, Fibromyalgia, Hepatitis C, Hiatus hernia syndrome, Hypertension, Indigestion, Obesity, Other specified disorder of intestines, Pain (12-19-14), Pneumonia, Psychiatric problem, Restless leg syndrome, Sleep apnea, Snoring, Stroke (HCC) (2011), Unspecified hemorrhagic conditions, and Urinary bladder disorder.    Surgical History   has a past surgical history that includes gyn surgery; gyn surgery (2010); carpal tunnel release (2010); shoulder surgery (2010); other (2010); other; anterior and posterior repair (12/6/2013); enterocele repair (12/6/2013); bladder sling female (12/6/2013); gastroscopy with clipping (7/6/2014); gastroscopy with banding (7/8/2014); cath removal (11/14/2014); cath placement (12/22/2014); cataract extraction with iol; tonsillectomy; gastroscopy (N/A, 8/21/2017); gastroscopy with banding (N/A, 2/8/2018); and gastroscopy (N/A, 2/12/2018).     Family History  family history includes Diabetes in her mother; Hypertension in her mother.     Social History   reports that she has been smoking cigarettes. She has a 42.00 pack-year smoking history. She has never used smokeless tobacco. She reports previous alcohol use. She reports previous drug use.    Allergies  Allergies   Allergen Reactions   • Nkda [No Known Drug Allergy]        Medications  Prior to Admission Medications   Prescriptions Last Dose Informant Patient Reported? Taking?   furosemide (LASIX) 20 MG Tab  Patient No No   Sig: Take 1 Tab by mouth every day.   gabapentin (NEURONTIN) 300 MG Cap  Patient Yes No   Sig: Take 300 mg by mouth 2 Times a Day.   insulin glargine (LANTUS) 100 UNIT/ML Solution   No No   Sig: Inject 35 Units as instructed every bedtime.   insulin lispro (HUMALOG) 100 UNIT/ML  Solution   No No   Sig: Inject 25-30 Units as instructed 3 times a day before meals.   lactulose 20 GM/30ML Solution  Patient No No   Sig: Take 30 mL by mouth 2 Times a Day.   ondansetron (ZOFRAN ODT) 4 MG TABLET DISPERSIBLE   No No   Sig: Take 1 Tab by mouth every 8 hours as needed.   spironolactone (ALDACTONE) 50 MG Tab  Patient No No   Sig: Take 1 Tab by mouth every day.      Facility-Administered Medications: None       Physical Exam  Temp:  [35.9 °C (96.7 °F)] 35.9 °C (96.7 °F)  Pulse:  [] 105  Resp:  [16] 16  BP: (130-163)/() 163/103  SpO2:  [96 %-98 %] 98 %    Physical Exam  Vitals signs and nursing note reviewed.   Constitutional:       General: She is not in acute distress.     Appearance: She is well-developed. She is not diaphoretic.   HENT:      Head: Normocephalic and atraumatic.      Right Ear: External ear normal.      Left Ear: External ear normal.      Nose: Nose normal. No congestion or rhinorrhea.      Mouth/Throat:      Mouth: Mucous membranes are dry.      Pharynx: No oropharyngeal exudate.   Eyes:      General:         Right eye: No discharge.         Left eye: No discharge.      Extraocular Movements: Extraocular movements intact.   Neck:      Musculoskeletal: Normal range of motion and neck supple.      Trachea: No tracheal deviation.   Cardiovascular:      Rate and Rhythm: Regular rhythm. Tachycardia present.      Heart sounds: No murmur. No friction rub. No gallop.    Pulmonary:      Effort: Pulmonary effort is normal. No respiratory distress.      Breath sounds: Normal breath sounds. No stridor. No wheezing or rales.   Chest:      Chest wall: No tenderness.   Abdominal:      General: Bowel sounds are normal. There is no distension.      Palpations: Abdomen is soft.      Tenderness: There is no abdominal tenderness.   Musculoskeletal:      Right lower leg: No edema.      Left lower leg: No edema.      Comments: Right lateral thigh just above the knee wound with mild erythema  associated    Lymphadenopathy:      Cervical: No cervical adenopathy.   Skin:     General: Skin is warm and dry.      Coloration: Skin is not jaundiced.      Findings: No erythema or rash.   Neurological:      General: No focal deficit present.      Mental Status: She is alert and oriented to person, place, and time.      Cranial Nerves: No cranial nerve deficit.   Psychiatric:         Mood and Affect: Mood is anxious.         Behavior: Behavior normal.         Thought Content: Thought content normal.         Judgment: Judgment normal.         Laboratory:  Recent Labs     06/29/20  1850   WBC 2.8*   RBC 4.53   HEMOGLOBIN 9.8*   HEMATOCRIT 34.5*   MCV 76.2*   MCH 21.6*   MCHC 28.4*   RDW 41.0   PLATELETCT 70*     Recent Labs     06/29/20  1850   SODIUM 130*   POTASSIUM 4.5   CHLORIDE 95*   CO2 23   GLUCOSE 703*   BUN 19   CREATININE 1.19   CALCIUM 9.1     Recent Labs     06/29/20  1850   ALTSGPT 20   ASTSGOT 26   ALKPHOSPHAT 109*   TBILIRUBIN 0.4   GLUCOSE 703*     Recent Labs     06/29/20  1850   INR 1.23*     No results for input(s): NTPROBNP in the last 72 hours.      Recent Labs     06/29/20  1850   TROPONINT 10       Imaging:  DX-CHEST-PORTABLE (1 VIEW)   Final Result         1. No acute cardiopulmonary abnormalities are identified.      CT-CTA CHEST PULMONARY ARTERY W/ RECONS    (Results Pending)   NM-CARDIAC STRESS TEST    (Results Pending)   EC-ECHOCARDIOGRAM COMPLETE W/O CONT    (Results Pending)         Assessment/Plan:    * Chest pain- (present on admission)  Assessment & Plan  -Trend troponin, monitor patient on telemetry and repeat EKG  -If no worsening, obtain a stress test in the morning  -D-dimer was elevated, CTA of the chest is currently pending  -I did personally review her EKG, noted sinus rhythm with no ST segment changes    DM2 (diabetes mellitus, type 2) ([facility])- (present on admission)  Assessment & Plan  -Noncompliant, with profound hyperglycemia  -No DKA  -Start Lantus that she should be on as  well as insulin sliding scale  -Adjust as needed    HTN (hypertension)- (present on admission)  Assessment & Plan  -Start PRN enalapril  -Adjust as needed    Pancytopenia (HCC)- (present on admission)  Assessment & Plan  -Chronic in patient who does have a history of cirrhosis from hepatitis C  -Repeat CBC in the morning  -She does have a low white blood cell count and was tachycardic initially, I do not think this is due to infection, I think her tachycardia is from her anxiety and her leukopenia is chronic, while she is on antibiotics for her wound infection, I do not feel she is septic at this time    ARF (acute renal failure) (HCC)- (present on admission)  Assessment & Plan  -Likely due to dehydration  -She is generally on diuretics, hold these  -I am going to avoid IV fluids at this time since patient does have a history of cirrhosis as well as meth use concerning that this could be causing CHF  -Repeat BMP in the morning    Wound cellulitis- (present on admission)  Assessment & Plan  -Located on her right thigh after injecting methamphetamine  -Mild erythema, significant wound that is currently not draining  -I do not feel this is causing sepsis however I will obtain a lactic acid and trend  -Repeat CBC in the morning  -Start IV Unasyn    Methamphetamine abuse (HCC)- (present on admission)  Assessment & Plan  -Patient states she last used 2 weeks ago, this did cause a wound with cellulitis    Tobacco dependence- (present on admission)  Assessment & Plan  -Tobacco cessation counseling and education provided for more than 11 minutes. Nicotine replacement options provided including patch, and further medical treatments including Wellbutrin and chantix.  As well as over the counter options of lozenges and gum  -She did request a patch    Anxiety- (present on admission)  Assessment & Plan  -Significant anxiety, start Ativan as needed

## 2020-06-30 NOTE — PROGRESS NOTES
Transported pt from ER to T210 via wheelchair, all belongings and charts with patient, hooked in the monitor.-SR/ST. Denies any chest pain at the moment

## 2020-07-01 VITALS
TEMPERATURE: 98 F | BODY MASS INDEX: 24.87 KG/M2 | HEART RATE: 98 BPM | WEIGHT: 154.76 LBS | HEIGHT: 66 IN | RESPIRATION RATE: 16 BRPM | DIASTOLIC BLOOD PRESSURE: 60 MMHG | SYSTOLIC BLOOD PRESSURE: 123 MMHG | OXYGEN SATURATION: 97 %

## 2020-07-01 LAB
GLUCOSE BLD-MCNC: 175 MG/DL (ref 65–99)
GLUCOSE BLD-MCNC: 300 MG/DL (ref 65–99)

## 2020-07-01 PROCEDURE — 82962 GLUCOSE BLOOD TEST: CPT | Mod: 91

## 2020-07-01 PROCEDURE — A9270 NON-COVERED ITEM OR SERVICE: HCPCS | Performed by: INTERNAL MEDICINE

## 2020-07-01 PROCEDURE — G0378 HOSPITAL OBSERVATION PER HR: HCPCS

## 2020-07-01 PROCEDURE — 87070 CULTURE OTHR SPECIMN AEROBIC: CPT

## 2020-07-01 PROCEDURE — 96366 THER/PROPH/DIAG IV INF ADDON: CPT

## 2020-07-01 PROCEDURE — 96376 TX/PRO/DX INJ SAME DRUG ADON: CPT

## 2020-07-01 PROCEDURE — 87186 SC STD MICRODIL/AGAR DIL: CPT

## 2020-07-01 PROCEDURE — 87205 SMEAR GRAM STAIN: CPT

## 2020-07-01 PROCEDURE — 96372 THER/PROPH/DIAG INJ SC/IM: CPT

## 2020-07-01 PROCEDURE — 87077 CULTURE AEROBIC IDENTIFY: CPT

## 2020-07-01 PROCEDURE — 99217 PR OBSERVATION CARE DISCHARGE: CPT | Performed by: INTERNAL MEDICINE

## 2020-07-01 PROCEDURE — 700101 HCHG RX REV CODE 250: Performed by: PHYSICIAN ASSISTANT

## 2020-07-01 PROCEDURE — 700111 HCHG RX REV CODE 636 W/ 250 OVERRIDE (IP): Performed by: INTERNAL MEDICINE

## 2020-07-01 PROCEDURE — 700105 HCHG RX REV CODE 258: Performed by: INTERNAL MEDICINE

## 2020-07-01 PROCEDURE — 700102 HCHG RX REV CODE 250 W/ 637 OVERRIDE(OP): Performed by: INTERNAL MEDICINE

## 2020-07-01 PROCEDURE — 87147 CULTURE TYPE IMMUNOLOGIC: CPT

## 2020-07-01 RX ORDER — INSULIN GLARGINE 100 [IU]/ML
40 INJECTION, SOLUTION SUBCUTANEOUS EVERY EVENING
Qty: 1 PEN | Refills: 2 | Status: ON HOLD | OUTPATIENT
Start: 2020-07-01 | End: 2021-01-01

## 2020-07-01 RX ORDER — INSULIN ASPART 100 [IU]/ML
3-18 INJECTION, SOLUTION INTRAVENOUS; SUBCUTANEOUS
Qty: 1 PEN | Refills: 2 | Status: ON HOLD | OUTPATIENT
Start: 2020-07-01 | End: 2021-01-01

## 2020-07-01 RX ORDER — LIDOCAINE HYDROCHLORIDE 20 MG/ML
20 INJECTION, SOLUTION INFILTRATION; PERINEURAL ONCE
Status: COMPLETED | OUTPATIENT
Start: 2020-07-01 | End: 2020-07-01

## 2020-07-01 RX ORDER — DOXYCYCLINE 100 MG/1
100 TABLET ORAL EVERY 12 HOURS
Qty: 6 TAB | Refills: 0 | Status: SHIPPED | OUTPATIENT
Start: 2020-07-01 | End: 2020-07-14

## 2020-07-01 RX ADMIN — ASPIRIN 325 MG: 325 TABLET, FILM COATED ORAL at 05:06

## 2020-07-01 RX ADMIN — AMPICILLIN SODIUM AND SULBACTAM SODIUM 3 G: 2; 1 INJECTION, POWDER, FOR SOLUTION INTRAMUSCULAR; INTRAVENOUS at 15:31

## 2020-07-01 RX ADMIN — LORAZEPAM 0.5 MG: 2 INJECTION INTRAMUSCULAR; INTRAVENOUS at 15:31

## 2020-07-01 RX ADMIN — DOXYCYCLINE 100 MG: 100 TABLET, FILM COATED ORAL at 17:41

## 2020-07-01 RX ADMIN — GABAPENTIN 300 MG: 300 CAPSULE ORAL at 05:06

## 2020-07-01 RX ADMIN — INSULIN HUMAN 3 UNITS: 100 INJECTION, SOLUTION PARENTERAL at 05:17

## 2020-07-01 RX ADMIN — AMPICILLIN SODIUM AND SULBACTAM SODIUM 3 G: 2; 1 INJECTION, POWDER, FOR SOLUTION INTRAMUSCULAR; INTRAVENOUS at 01:06

## 2020-07-01 RX ADMIN — LIDOCAINE HYDROCHLORIDE 20 ML: 20 INJECTION, SOLUTION INFILTRATION; PERINEURAL at 14:30

## 2020-07-01 RX ADMIN — NICOTINE 14 MG: 14 PATCH TRANSDERMAL at 05:05

## 2020-07-01 RX ADMIN — GABAPENTIN 300 MG: 300 CAPSULE ORAL at 17:42

## 2020-07-01 RX ADMIN — DOXYCYCLINE 100 MG: 100 TABLET, FILM COATED ORAL at 05:59

## 2020-07-01 RX ADMIN — AMPICILLIN SODIUM AND SULBACTAM SODIUM 3 G: 2; 1 INJECTION, POWDER, FOR SOLUTION INTRAMUSCULAR; INTRAVENOUS at 05:06

## 2020-07-01 RX ADMIN — INSULIN HUMAN 12 UNITS: 100 INJECTION, SOLUTION PARENTERAL at 13:34

## 2020-07-01 ASSESSMENT — ENCOUNTER SYMPTOMS
BRUISES/BLEEDS EASILY: 0
BLURRED VISION: 0
SPUTUM PRODUCTION: 0
BACK PAIN: 0
FLANK PAIN: 0
PALPITATIONS: 0
HEMOPTYSIS: 0
NAUSEA: 0
COUGH: 0
VOMITING: 0
HALLUCINATIONS: 0
WEIGHT LOSS: 0
DOUBLE VISION: 0
TREMORS: 0
HEARTBURN: 0
SPEECH CHANGE: 0
NERVOUS/ANXIOUS: 0
ORTHOPNEA: 0
POLYDIPSIA: 0
HEADACHES: 0
FOCAL WEAKNESS: 0
NECK PAIN: 0
CHILLS: 0
FEVER: 0
PHOTOPHOBIA: 0

## 2020-07-01 ASSESSMENT — LIFESTYLE VARIABLES: SUBSTANCE_ABUSE: 0

## 2020-07-01 NOTE — CONSULTS
DATE OF SERVICE:  07/01/2020    ORTHOPEDIC CONSULTATION    REQUESTING PHYSICIAN:  Dr. Matos, hospitalist service.    REASON FOR CONSULTATION:  Right thigh infection, concern for abscess.    CHIEF COMPLAINT:  Right thigh pain, swelling.    HISTORY OF PRESENT ILLNESS:  Patient is a 59-year-old female.  She has a   history of injectable methamphetamine use.  She injected her right lateral   thigh 1 month ago, states that she developed increased redness and swelling   proximal to that.  She presented to the emergency department with left-sided   chest pain, heaviness, and 8/10 pain.  She was incidentally noticed to have   this right thigh wound.  She states that about a week ago, the thigh wound   started to drain.  She describes having a history of diabetes as well.  I was   asked to consult to provide treatment recommendations for this area of   swelling in the thigh concerning for abscess.    PAST MEDICAL HISTORY:  ALLERGIES:  No known drug allergies.    OUTPATIENT MEDICATIONS:  Include Lasix, Neurontin, Lantus, Humalog, lactulose,   Zofran, and spironolactone.    PERTINENT ADDITIONAL INPATIENT MEDICATIONS:  Include Unasyn and doxycycline.    PAST MEDICAL DIAGNOSES:  Include anemia, shortness of breath, cirrhosis and   hepatitis C, depression, diabetes, fibromyalgia, hiatal hernia syndrome,   hypertension, indigestion, pneumonia, psychiatric disorder, restless leg   syndrome, sleep apnea, history of stroke, substance abuse, urinary bladder   disorder.    PAST SURGICAL HISTORY:  Includes gynecological surgery in 2010, carpal tunnel   release surgery in 2010, shoulder surgery in 2010, enterocele repair, bladder   sling surgery, gastroscopy with banding, cardiac catheterization, cataract   surgery, tonsillectomy.    FAMILY HISTORY:  Includes diabetes in her mother as well as hypertension in   her mother.    SOCIAL HISTORY:  Patient does smoke.  She is a 42-pack-year smoking.  She does   have a history of previous  alcohol use as well as recent methamphetamine   injectable use.    REVIEW OF SYSTEMS:  She denies obvious fevers, chills, nausea, vomiting,   shortness of breath, chest pain currently, but presented to the emergency   department with complaints of chest pain and anxiety and had tachycardia as   well.  Otherwise, normal per AMA criteria other than that already stated in   the HPI.    PHYSICAL EXAMINATION:  VITAL SIGNS:  Temperature is 98.4, heart rate 106, respiratory rate 17, blood   pressure 122/58, pulse oximetry 92% on room air.  GENERAL APPEARANCE:  Patient is alert, oriented, pleasant, cooperative, in no   acute distress.  She is just finishing eating lunch.  HEAD, EYES, EARS, NOSE AND THROAT:  Normocephalic, atraumatic.  Mucous   membranes are moist.  PULMONARY:  Symmetric, unlabored breathing.  CARDIOVASCULAR:  Extremities are well perfused.  ABDOMEN:  Not obviously distended.  MUSCULOSKELETAL:  Right lower extremity, her lateral thigh, there are areas of   healing eschar and superficial wounds without erythema.  There is a central   area of induration and swelling with erythema where I can palpably expressed a   little bit of seropurulent fluid from a small opening in the skin.  There is   some serosanguineous drainage on the dressing that was placed over this area.    She has good knee range of motion and ankle range of motion, and she is   mildly tender to palpation over this area.    PROCEDURES:  Sterile technique was used to infiltrate the area of the swelling   concerning for abscess with 1% lidocaine without epinephrine as a field   block, then sterilely prepped the skin with chlorhexidine, and used a 15 blade   scalpel to incise the area of induration concern for abscess longitudinally.    There was a hematoma present, but there was a little bit of purulent and   pus-like appearing material, which I then debrided with a hemostat and I   irrigated the wound with normal saline and then placed about 6  inches of   half-inch iodoform gauze and then applied 4x4s and a Kerlix dressing.  She   tolerated the procedure well.  I did obtain a wound swab that I sent to lab   for aerobic and anaerobic cultures.    ASSESSMENT:  A 59-year-old female with history of multiple medical   comorbidities including diabetes and a history of methamphetamine use recently   with the right thigh infection, concern for abscess.  Based on the incision   and drainage, it was more consistent with likely an infected hematoma in this   area.    RECOMMENDATIONS:  1.  I discussed these findings with the patient and I recommend that she be   treated empirically with antibiotic therapy until her wound cultures can come   back and I can precisely direct antibiotic therapy.  2.  Recommend wound care consult to help teach packing changes.  3.  From my standpoint, with regard to her infection, she can be discharged to   home when she has learned appropriate wound care techniques by the wound care   team.       ____________________________________     MD NEVILLE Zayas / JULI    DD:  07/01/2020 15:00:08  DT:  07/01/2020 15:15:09    D#:  [id number]  Job#:  [id number]

## 2020-07-01 NOTE — PROGRESS NOTES
Assumed pt care at 0700. Received report from May RN. A&O x4. Pt denies pain at this time. Respirations even and unlabored on RA. Cardiac monitor applied, SR/ST noted. Pt reports no chest pain, SOB, nausea, or palpitations.   Updated on POC, communication board updated. Bed locked and in lowest position. Call light and belongings within reach. Non-skid socks in place. Needs met, will continue to monitor.

## 2020-07-01 NOTE — PROGRESS NOTES
Pt a&o x4. On RA. Respirations equal and unlabored. Complaints of right thigh pain 5  on NPRS scale.  Ambulatory with one person assist. Repositions self in bed. SR 90's on tele monitor. Good po intake without any s/sx of aspiration noted. BUSTILLO.  Right anterior lateral thigh red, swollen, tender. Plan of care reviewed including: wound consult, iv antibiotics, iv fluids, diabetic consult, vitals frequency, pain management and labs. Verbalized understanding and agrees. White board updated. Instructed to use call light prior to getting oob to prevent falls. Able to make needs known.  Call light within reach.

## 2020-07-01 NOTE — WOUND TEAM
[facility]  [facility] Services  Initial Wound and Skin Care Evaluation    Admission Date: 6/29/2020     Last order of IP CONSULT TO WOUND CARE was found on 7/1/2020 from Hospital Encounter on 6/29/2020       HPI, PMH, SH: Reviewed    Unit where seen by Wound Team: T210/00     WOUND CONSULT/FOLLOW UP RELATED TO:  Right thigh    Self Report / Pain Level:  denies    OBJECTIVE:  Pt on pressure redistribution mattress, adhesive foam over wound.    WOUND TYPE, LOCATION, CHARACTERISTICS (Pressure Injuries: location, stage, POA or date identified)         Wound Partial Thickness Wound Thigh Lateral Right (Active)   Wound Image       Site Assessment Red;Drainage    Periwound Assessment Dry    Margins Attached edges    Closure None    Drainage Amount Scant    Drainage Description Serosanguineous    Treatments Cleansed;Site care    Wound Cleansing Normal Saline Irrigation    Periwound Protectant Not Applicable    Dressing Cleansing/Solutions Not Applicable    Dressing Options Hydrofiber Silver    Dressing Changed New    Dressing Status Intact    Dressing Change/Treatment Frequency Every 48 hrs, and As Needed    NEXT Dressing Change/Treatment Date 07/03/20    NEXT Weekly Photo (Inpatient Only) 07/08/20    Non-staged Wound Description Partial thickness    Wound Length (cm) 3 cm    Wound Width (cm) 1 cm    Wound Depth (cm) 0 cm    Wound Surface Area (cm^2) 3 cm^2    Wound Volume (cm^3) 0 cm^3    Tunneling (cm) 0 cm    Undermining (cm) 0 cm    Shape oval    Wound Odor None    Exposed Structures None        Vascular:    ELLIOT:   No results found.      Lab Values:    Lab Results   Component Value Date/Time    WBC 3.1 (L) 06/30/2020 12:11 AM    RBC 4.40 06/30/2020 12:11 AM    HEMOGLOBIN 9.8 (L) 06/30/2020 12:11 AM    HEMATOCRIT 32.6 (L) 06/30/2020 12:11 AM    HBA1C 13.5 (H) 06/23/2020 09:08 AM          Culture:  NA,   Culture Results show:  No results found for this or any previous visit (from the past 720  hour(s)).      INTERVENTIONS BY WOUND TEAM: Dressing removed. Wound cleansed with NS and gauze. Small segment of hydrofiber silver applied over wound and secured with adhesive foam.    Interdisciplinary consultation: Patient, Lisa BOLAÑOS, Dr. Matos    EVALUATION: Pt presenting with raised, partial thickness wound and surrounding induration on R thigh. obtained after methamphetamine injection. Ortho has already been consulted for possible I&D. Ultrasound results cannot rule out absces vs fat necrosis. Awaiting ortho assessment and recommendations. Pt educated on wound care. Will benefit from simple dressing changes. Pt states she is comfortable performing own wound care. Wound team will follow once more to reassess wound development and dressing needs.    Goals: Steady decrease in wound area and depth weekly.    NURSING PLAN OF CARE ORDERS (X):    Dressing changes: See Dressing Care orders: x  Skin care: See Skin Care orders:   Rectal tube care: See Rectal Tube Care orders:   Other orders:    RSKIN:   CURRENTLY IN PLACE (X), APPLIED THIS VISIT (A), ORDERED (O):   Q shift Morris:  x  Q shift pressure point assessments:  x  Pressure redistribution mattress    x        Low Airloss          Bariatric MICHAEL         Bariatric foam           Heel float boots     Heel Silicone dressing        Float Heels off Bed with Pillows               Barrier wipes         Barrier Cream         Barrier paste          Sacral silicone dressing         Silicone O2 tubing         Anchorfast         Cannula fixation Device (Tender )          Gray Foam Ear protectors           Trach with Optifoam split foam                 Waffle cushion        Waffle Overlay         Rectal tube or BMS    Purwick/Condom Cath          Antifungal tx      Interdry          Reposition q 2 hours    X pt repositions self    Up to chair        Ambulate  x    PT/OT        Dietician        Diabetes Education      PO   x  TF     TPN     NPO   # days   Other         WOUND TEAM PLAN OF CARE   Dressing changes by wound team:          Follow up 1-2 times weekly:   Dressing to perform dsg/skin care; WT to follow once more weekly            Follow up 3 times weekly:                NPWT change 3 times weekly:     Follow up as needed:       Other (explain):     Anticipated discharge plans:  LTACH:        SNF/Rehab:                  Home Care:      x     Outpatient Wound Center:            Self Care:

## 2020-07-01 NOTE — DISCHARGE SUMMARY
"Discharge Summary    CHIEF COMPLAINT ON ADMISSION  Chief Complaint   Patient presents with   • Palpitations     \"like my heart is jumping\"        Reason for Admission  WEAKNESS     Admission Date  6/29/2020    CODE STATUS  Full Code    HPI & HOSPITAL COURSE  This is a 59 y.o. female with history of diabetes type 2 insulin-dependent, hypertension, liver cirrhosis here with chest pain and right thigh infection after IV methamphetamine injection to the right thigh.  She was admitted with chest pain and a right thigh cellulitis.  She was evaluated for chest pain with stress test, which was reassuring.  He was treated with IV antibiotics for right thigh cellulitis.  Dr. Mims from orthopedic service consulted for right thigh infection, concern for abscess.  He undergone incision and drainage on 7/1.  There was a little purulent and puslike material that was irrigated.  Cultures obtained.  She received treatment with insulin for uncontrolled diabetes.  As prescribed on discharge.  Patient received counseling regarding compliance with medications and illicit drug use as well as smoking cessation.  Referral to wound care clinic placed       Therefore, she is discharged in fair and stable condition to home with close outpatient follow-up.        Discharge Date  7/1/2020    FOLLOW UP ITEMS POST DISCHARGE  PCP  Wound clinic    DISCHARGE DIAGNOSES  Principal Problem:    Chest pain POA: Yes  Active Problems:    Pancytopenia (HCC) POA: Yes    HTN (hypertension) POA: Yes    DM2 (diabetes mellitus, type 2) (HCC) POA: Yes    Cirrhosis (HCC) POA: Yes    Anxiety POA: Yes    Tobacco dependence POA: Yes    Methamphetamine abuse (HCC) POA: Yes    Wound cellulitis POA: Yes    ARF (acute renal failure) (HCC) POA: Yes  Resolved Problems:    * No resolved hospital problems. *      FOLLOW UP  Future Appointments   Date Time Provider Department Center   7/8/2020  4:00 PM [facility] [address]     No follow-up provider " specified.    MEDICATIONS ON DISCHARGE     Medication List      START taking these medications      Instructions   doxycycline monohydrate 100 MG tablet  Commonly known as:  ADOXA   Take 1 Tab by mouth every 12 hours.  Dose:  100 mg     Lantus SoloStar 100 UNIT/ML Sopn injection  Generic drug:  insulin glargine  Replaces:  insulin glargine 100 UNIT/ML Soln   Doctor's comments:  E 11.65  Inject 40 Units as instructed every evening.  Dose:  40 Units     NovoLOG FlexPen 100 UNIT/ML injection PEN  Generic drug:  NOVOLOG (insulin aspart)   Doctor's comments:  71 - 120 = 0 Units 121 - 199 = 3 Units 200 - 249 = 6 Units 250 - 299 = 9 Units 300 - 349 = 12 Units 350 - 399 = 15 Units 400 - 499 = 18 Units >500 = Call MD And call MD. E11.65  Inject 3-18 Units as instructed 3 times a day before meals.  Dose:  3-18 Units        CONTINUE taking these medications      Instructions   furosemide 20 MG Tabs  Commonly known as:  LASIX   Take 1 Tab by mouth every day.  Dose:  20 mg     gabapentin 300 MG Caps  Commonly known as:  NEURONTIN   Take 300 mg by mouth 2 Times a Day.  Dose:  300 mg     lactulose 20 GM/30ML Soln   Take 30 mL by mouth 2 Times a Day.  Dose:  30 mL     ondansetron 4 MG Tbdp  Commonly known as:  ZOFRAN ODT   Take 1 Tab by mouth every 8 hours as needed.  Dose:  4 mg     risperidone 4 MG tablet  Commonly known as:  RISPERDAL   Take 4 mg by mouth 2 times a day. Indications: depression  Dose:  4 mg     spironolactone 50 MG Tabs  Commonly known as:  ALDACTONE   Take 1 Tab by mouth every day.  Dose:  50 mg        STOP taking these medications    insulin glargine 100 UNIT/ML Soln  Commonly known as:  Lantus  Replaced by:  Lantus SoloStar 100 UNIT/ML Sopn injection     insulin lispro 100 UNIT/ML  Commonly known as:  HUMALOG            Allergies  Allergies   Allergen Reactions   • Nkda [No Known Drug Allergy]        DIET  Orders Placed This Encounter   Procedures   • Diet Order Cardiac     Standing Status:   Standing      Number of Occurrences:   1     Order Specific Question:   Diet:     Answer:   Cardiac [6]     Order Specific Question:   Miscellaneous modifications:     Answer:   No Decaf, No Caffeine(for test) [11]       ACTIVITY  As tolerated.  Weight bearing as tolerated    CONSULTATIONS  Orthopedic surgery    PROCEDURES  I&D of right thigh abscess    LABORATORY  Lab Results   Component Value Date    SODIUM 132 (L) 06/30/2020    POTASSIUM 3.9 06/30/2020    CHLORIDE 96 06/30/2020    CO2 22 06/30/2020    GLUCOSE 374 (H) 06/30/2020    BUN 16 06/30/2020    CREATININE 1.07 06/30/2020    CREATININE 0.6 02/03/2008        Lab Results   Component Value Date    WBC 3.1 (L) 06/30/2020    HEMOGLOBIN 9.8 (L) 06/30/2020    HEMATOCRIT 32.6 (L) 06/30/2020    PLATELETCT 79 (L) 06/30/2020      US-EXTREMITY NON VASCULAR UNILATERAL RIGHT   Final Result      1.  Hypoechoic area and disorganized fat in the lateral right thigh region below is open skin wound suspicious for an area of fat necrosis.      2.  Developing abscess is in the differential. No well formed abscess is identified.      NM-CARDIAC STRESS TEST   Final Result      EC-ECHOCARDIOGRAM COMPLETE W/O CONT   Final Result      CT-CTA CHEST PULMONARY ARTERY W/ RECONS   Final Result      1.  No pulmonary embolus is identified.      2.  Atherosclerosis.      3.  Morphologic characteristics of cirrhosis with evidence of portal hypertension including esophageal varices and splenomegaly            DX-CHEST-PORTABLE (1 VIEW)   Final Result         1. No acute cardiopulmonary abnormalities are identified.            Total time of the discharge process exceeds 37 minutes.

## 2020-07-01 NOTE — PROGRESS NOTES
Hospital Medicine Daily Progress Note    Date of Service  7/1/2020    Chief Complaint/  Hospital Course  59 y.o. female admitted 6/29/2020 with chest pain.      Interval Problem Update    No overnight events.  Vitals stable.  There is mild serosanguineous discharge from the right thigh wound.  I discussed case with orthopedics services, pending consult for possible I&D if appropriate.    Consultants/Specialty  None    Code Status  Full code    Disposition  To be discussed.  Home when medically cleared    Review of Systems  Review of Systems   Constitutional: Negative for chills, fever and weight loss.   HENT: Negative for ear pain, hearing loss and tinnitus.    Eyes: Negative for blurred vision, double vision and photophobia.   Respiratory: Negative for cough, hemoptysis and sputum production.    Cardiovascular: Positive for chest pain. Negative for palpitations and orthopnea.   Gastrointestinal: Negative for heartburn, nausea and vomiting.   Genitourinary: Negative for dysuria, flank pain, frequency and hematuria.   Musculoskeletal: Positive for joint pain. Negative for back pain and neck pain.   Skin: Negative for itching and rash.   Neurological: Negative for tremors, speech change, focal weakness and headaches.   Endo/Heme/Allergies: Negative for environmental allergies and polydipsia. Does not bruise/bleed easily.   Psychiatric/Behavioral: Negative for hallucinations and substance abuse. The patient is not nervous/anxious.         Physical Exam  Temp:  [36.2 °C (97.1 °F)-36.9 °C (98.4 °F)] 36.9 °C (98.4 °F)  Pulse:  [] 106  Resp:  [16-20] 17  BP: (119-136)/(58-79) 122/58  SpO2:  [90 %-99 %] 92 %    Physical Exam  Vitals signs and nursing note reviewed.   Constitutional:       General: She is not in acute distress.     Appearance: Normal appearance.   HENT:      Head: Normocephalic and atraumatic.      Nose: Nose normal.      Mouth/Throat:      Mouth: Mucous membranes are moist.   Eyes:      Extraocular  Movements: Extraocular movements intact.      Pupils: Pupils are equal, round, and reactive to light.   Neck:      Musculoskeletal: Normal range of motion and neck supple.   Cardiovascular:      Rate and Rhythm: Normal rate and regular rhythm.   Pulmonary:      Effort: Pulmonary effort is normal.      Breath sounds: Normal breath sounds.   Abdominal:      General: Abdomen is flat. There is no distension.      Tenderness: There is no abdominal tenderness.   Musculoskeletal: Normal range of motion.         General: No swelling or deformity.      Comments: Swelling and mild tenderness to palpation in the right distal thigh with shallow wound without drainage.  No fluctuance appreciated   Skin:     General: Skin is warm and dry.      Comments: scabs over Bilateral Lower Extremities   Neurological:      General: No focal deficit present.      Mental Status: She is alert and oriented to person, place, and time.   Psychiatric:         Mood and Affect: Mood normal.         Behavior: Behavior normal.         Fluids    Intake/Output Summary (Last 24 hours) at 7/1/2020 1244  Last data filed at 6/30/2020 2000  Gross per 24 hour   Intake 600 ml   Output --   Net 600 ml       Laboratory  Recent Labs     06/29/20  1850 06/30/20  0011   WBC 2.8* 3.1*   RBC 4.53 4.40   HEMOGLOBIN 9.8* 9.8*   HEMATOCRIT 34.5* 32.6*   MCV 76.2* 74.1*   MCH 21.6* 22.3*   MCHC 28.4* 30.1*   RDW 41.0 38.7   PLATELETCT 70* 79*     Recent Labs     06/29/20  1850 06/30/20  0011   SODIUM 130* 132*   POTASSIUM 4.5 3.9   CHLORIDE 95* 96   CO2 23 22   GLUCOSE 703* 374*   BUN 19 16   CREATININE 1.19 1.07   CALCIUM 9.1 9.1     Recent Labs     06/29/20  1850   INR 1.23*               Imaging  US-EXTREMITY NON VASCULAR UNILATERAL RIGHT   Final Result      1.  Hypoechoic area and disorganized fat in the lateral right thigh region below is open skin wound suspicious for an area of fat necrosis.      2.  Developing abscess is in the differential. No well formed  abscess is identified.      NM-CARDIAC STRESS TEST   Final Result      EC-ECHOCARDIOGRAM COMPLETE W/O CONT   Final Result      CT-CTA CHEST PULMONARY ARTERY W/ RECONS   Final Result      1.  No pulmonary embolus is identified.      2.  Atherosclerosis.      3.  Morphologic characteristics of cirrhosis with evidence of portal hypertension including esophageal varices and splenomegaly            DX-CHEST-PORTABLE (1 VIEW)   Final Result         1. No acute cardiopulmonary abnormalities are identified.           Assessment/Plan  * Chest pain- (present on admission)  Assessment & Plan  Troponin,  EKG unremarkable  -Stress test is done, result pending  - CTA of the chest negative for PE      Cirrhosis (HCC)- (present on admission)  Assessment & Plan  Appears to be compensated.    Follow-up with PCP as outpatient for monitoring/treatment of hepatitis C      DM2 (diabetes mellitus, type 2) (HCC)- (present on admission)  Assessment & Plan  -Noncompliant, with profound hyperglycemia  Continue Lantus and sliding scale  We will prescribed Lantus  Diabetes education    HTN (hypertension)- (present on admission)  Assessment & Plan  -Start PRN enalapril  -Adjust as needed    Pancytopenia (HCC)- (present on admission)  Assessment & Plan  -Likely secondary to   cirrhosis from hepatitis C      ARF (acute renal failure) (HCC)- (present on admission)  Assessment & Plan  -Likely due to dehydration  -Diuretics on hold  Improving    Wound cellulitis- (present on admission)  Assessment & Plan  -Located on her right thigh after injecting methamphetamine  -Mild erythema, significant wound with serosanguineous drainage  Ultrasound of right thigh cannot rule out abscess versus fat necrosis  Continue IV Unasyn, add doxycycline  Consulted with orthopedic surgery, awaiting recommendations.    Methamphetamine abuse (HCC)- (present on admission)  Assessment & Plan  -Patient states she last used 2 weeks ago, this did cause a wound with  cellulitis    Tobacco dependence- (present on admission)  Assessment & Plan  -Tobacco cessation counseling and education provided for more than 11 minutes. Nicotine replacement options provided including patch, and further medical treatments including Wellbutrin and chantix.  As well as over the counter options of lozenges and gum  -She did request a patch    Anxiety- (present on admission)  Assessment & Plan   Ativan as needed       VTE prophylaxis: Heparin

## 2020-07-01 NOTE — CARE PLAN
Problem: Safety  Goal: Will remain free from injury  Outcome: PROGRESSING AS EXPECTED  Note: Instructed call light use prior to getting oob to prevent fall.      Problem: Pain Management  Goal: Pain level will decrease to patient's comfort goal  Outcome: PROGRESSING AS EXPECTED  Note: Reports right thigh pain. Medicated with norco with good result. Resting and calm.

## 2020-07-02 ENCOUNTER — PATIENT OUTREACH (OUTPATIENT)
Dept: HEALTH INFORMATION MANAGEMENT | Facility: OTHER | Age: 60
End: 2020-07-02

## 2020-07-02 LAB
GRAM STN SPEC: NORMAL
SIGNIFICANT IND 70042: NORMAL
SITE SITE: NORMAL
SOURCE SOURCE: NORMAL

## 2020-07-02 NOTE — PROGRESS NOTES
Pt DC'd. IV removed, discharge instructions provided to patient, pt verbalizes understanding. Provided with [employer] info on diabetes and diet recommendations. Pt states all questions have been answered. Copy of discharge paperwork provided to pt, signed copy in chart. Pt states all belongings in possession. Pt escorted off unit by  without incident.

## 2020-07-02 NOTE — WOUND TEAM
Wound care packing instructions provided to patient. Infection prevention education also provided. Pt was receptive to education and asked appropriate questions. OP wound clinic referral placed by MD discussed and contact information given. Extra dressing supplies given to pt along with step by step instructions. Lisa BOLAÑOS notified. She will copy dressing instructions onto Pt's AVS.

## 2020-07-02 NOTE — DISCHARGE INSTRUCTIONS
Change dressing on right thigh DAILY or as needed if draining or dislodgement. Start Dressing changes 7/2/20 and as directed by your doctor and wound clinic     1) In a clean environment remove tape and packing inside the wound using the forceps provided in your supply bag.   2) clean wound using the wound cleanser spray provided in your supply bag. Use sterile gauze and sterile swab to clean inside the wound. Pat dry with a clean, sterile gauze.   3) using another clean, sterile swab gently insert iodoform packing into the wound bed. Once the wound is loosely packed, cut the packing string using the scissors provided. Leave a wick about 1-1.5 inches outside the wound.   4) cover the wound and the packing with an adhesive foam provided and reinforce with medical tape as needed.     NOTE: seek medical attention right away if you begin to develop signs and symptoms of an infection. These include:   Fever of 100.3 or higher   Yellow, purulent drainage   Foul smell   Red, swelling, inflamed skin on or around the wound   Extreme pain on or around the wound   Overall feeling of fatigue or malaise     The doctor has referred you to the wound clinic Located in: [facility]. Address: [address] [place] [address] [address], [place] [address]. Phone: [phone]. Call as soon as able to establish as a new patient.     Discharge Instructions    Discharged to home by car with self. Discharged via wheelchair, hospital escort: Yes.  Special equipment needed: Not Applicable    Be sure to schedule a follow-up appointment with your primary care doctor or any specialists as instructed.     Discharge Plan:   Diet Plan: Discussed  Activity Level: Discussed  Confirmed Follow up Appointment: Appointment Scheduled  Confirmed Symptoms Management: Discussed  Medication Reconciliation Updated: Yes    I understand that a diet low in cholesterol, fat, and sodium is recommended for good health. Unless I have been  given specific instructions below for another diet, I accept this instruction as my diet prescription.   Other diet: Diabetic friendly    Special Instructions: None    · Is patient discharged on Warfarin / Coumadin?   No     Depression / Suicide Risk    As you are discharged from this [facility] facility, it is important to learn how to keep safe from harming yourself.    Recognize the warning signs:  · Abrupt changes in personality, positive or negative- including increase in energy   · Giving away possessions  · Change in eating patterns- significant weight changes-  positive or negative  · Change in sleeping patterns- unable to sleep or sleeping all the time   · Unwillingness or inability to communicate  · Depression  · Unusual sadness, discouragement and loneliness  · Talk of wanting to die  · Neglect of personal appearance   · Rebelliousness- reckless behavior  · Withdrawal from people/activities they love  · Confusion- inability to concentrate     If you or a loved one observes any of these behaviors or has concerns about self-harm, here's what you can do:  · Talk about it- your feelings and reasons for harming yourself  · Remove any means that you might use to hurt yourself (examples: pills, rope, extension cords, firearm)  · Get professional help from the community (Mental Health, Substance Abuse, psychological counseling)  · Do not be alone:Call your Safe Contact- someone whom you trust who will be there for you.  · Call your local CRISIS HOTLINE 863-3799 or 867-446-4756  · Call your local Children's Mobile Crisis Response Team [place] (423) 829-5533 or www.[employer]  · Call the toll free National Suicide Prevention Hotlines   · National Suicide Prevention Lifeline 778-707-WSET (5608)  · National Hope Line Network 800-SUICIDE (697-8561)

## 2020-07-03 LAB
BACTERIA WND AEROBE CULT: ABNORMAL
BACTERIA WND AEROBE CULT: ABNORMAL
GRAM STN SPEC: ABNORMAL
SIGNIFICANT IND 70042: ABNORMAL
SITE SITE: ABNORMAL
SOURCE SOURCE: ABNORMAL

## 2020-07-04 LAB
BACTERIA BLD CULT: NORMAL
HCV RNA SERPL NAA+PROBE-ACNC: NOT DETECTED IU/ML
HCV RNA SERPL NAA+PROBE-LOG IU: NOT DETECTED LOG IU/ML
HCV RNA SERPL QL NAA+PROBE: NOT DETECTED
SIGNIFICANT IND 70042: NORMAL
SITE SITE: NORMAL
SOURCE SOURCE: NORMAL

## 2020-07-05 LAB
BACTERIA BLD CULT: NORMAL
SIGNIFICANT IND 70042: NORMAL
SITE SITE: NORMAL
SOURCE SOURCE: NORMAL

## 2020-07-08 ENCOUNTER — APPOINTMENT (OUTPATIENT)
Dept: WOUND CARE | Facility: MEDICAL CENTER | Age: 60
End: 2020-07-08
Attending: INTERNAL MEDICINE
Payer: MEDICAID

## 2020-07-14 ENCOUNTER — NON-PROVIDER VISIT (OUTPATIENT)
Dept: WOUND CARE | Facility: MEDICAL CENTER | Age: 60
End: 2020-07-14
Attending: INTERNAL MEDICINE
Payer: MEDICAID

## 2020-07-14 PROCEDURE — 99211 OFF/OP EST MAY X REQ PHY/QHP: CPT

## 2020-07-14 PROCEDURE — 97602 WOUND(S) CARE NON-SELECTIVE: CPT

## 2020-07-14 RX ORDER — HYDROXYZINE HYDROCHLORIDE 25 MG/1
TABLET, FILM COATED ORAL
COMMUNITY
Start: 2020-03-23 | End: 2020-09-25

## 2020-07-14 RX ORDER — BUPROPION HYDROCHLORIDE 75 MG/1
TABLET ORAL
COMMUNITY
Start: 2019-08-05 | End: 2020-09-25

## 2020-07-14 RX ORDER — ZIPRASIDONE HYDROCHLORIDE 20 MG/1
CAPSULE ORAL
COMMUNITY
Start: 2020-04-21 | End: 2020-09-25

## 2020-07-14 RX ORDER — BUSPIRONE HYDROCHLORIDE 10 MG/1
TABLET ORAL
COMMUNITY
Start: 2020-03-23 | End: 2020-09-25

## 2020-07-14 RX ORDER — ZIPRASIDONE HYDROCHLORIDE 40 MG/1
CAPSULE ORAL
COMMUNITY
Start: 2020-04-21 | End: 2020-09-25

## 2020-07-14 RX ORDER — LAMOTRIGINE 100 MG/1
TABLET ORAL
COMMUNITY
Start: 2019-04-11 | End: 2020-09-25

## 2020-07-14 RX ORDER — FUROSEMIDE 40 MG/1
TABLET ORAL DAILY
COMMUNITY
Start: 2020-06-08 | End: 2020-09-25

## 2020-07-14 NOTE — CERTIFICATION
Non Provider Encounter- Full Thickness wound    HISTORY OF PRESENT ILLNESS  Wound History:    START OF CARE IN CLINIC: 07/14/2020    REFERRING PROVIDER: Dheeraj Matos MD   WOUND- Full Thickness Wound   LOCATION: Right lateral thigh proximal and distal    HISTORY: Patient is a 59 year old female who presents today with two open wounds to her right upper lateral thigh from methamphetamine injection. She presented to [facility] ED on 6/29/2020 and received IV abx. On 7/1/2020, Dr. iMms performed I&D of wound. She was discharged on doxycycline which she has completed prior to today's appointment. Patient has history of type II diabetes, cirrhosis, neuropathy, and HTN. She has been changing the dressing daily per discharge instructions with aquacel silver and bandaids.     Pertinent Labs and Diagnostics:    Labs:     A1c:   Lab Results   Component Value Date/Time    HBA1C 13.5 (H) 06/23/2020 09:08 AM      IMAGING: None in EPIC    VASCULAR STUDIES: None in EPIC    LAST  WOUND CULTURE:  DATE : 7/1/20: (+)staph aureus           FALL RISK ASSESSMENT: Patient is a low fall risk.    65 years or older     Fall within the last 2 years   Uses ambulatory devices  xLoss of protective sensation in feet   Use of prostethic/orthotic    Presence of lower extremity/foot/toe amputation   Taking medication that increases risk (per facility policy)    PAST MEDICAL HISTORY:   Past Medical History:   Diagnosis Date   • Anemia    • Anesthesia     pt's mother has hard time waking up = states it took a week or two   • Arthritis     generalized   • Breath shortness    • Cirrhosis (HCC)    • Dental disorder     dentures   • Depression    • Depression 6/22/2016   • Diabetes     insulin   • Fibromyalgia    • Hepatitis C    • Hiatus hernia syndrome    • Hypertension    • Indigestion    • Obesity    • Other specified disorder of intestines     constipation/diarrhea   • Pain 12-19-14    generalized; 4/10   • Pneumonia    • Psychiatric problem      depression and anxiety   • Restless leg syndrome    • Sleep apnea     has had sleep study, no cpap   • Snoring    • Stroke (HCC) 2011   • Unspecified hemorrhagic conditions     reports nose bleeds   • Urinary bladder disorder     frequency/urgency     PAST SURGICAL HISTORY:   Past Surgical History:   Procedure Laterality Date   • GASTROSCOPY N/A 2/12/2018    Procedure: GASTROSCOPY;  Surgeon: Willard Bustamante M.D.;  Location: [facility];  Service: Gastroenterology   • GASTROSCOPY WITH BANDING N/A 2/8/2018    Procedure: GASTROSCOPY WITH BANDING;  Surgeon: Davonte Mauricio M.D.;  Location: [facility]ERY [facility];  Service: Gastroenterology   • GASTROSCOPY N/A 8/21/2017    Procedure: GASTROSCOPY WITH BANDING  ;  Surgeon: Anthony Dent M.D.;  Location: [facility]ERY [facility];  Service:    • CATH PLACEMENT  12/22/2014    Performed by Helga Santiago M.D. at SUR[facility]ERY [facility]   • CATH REMOVAL  11/14/2014    Performed by Helga Santiago M.D. at SURGERY [facility]   • GASTROSCOPY WITH BANDING  7/8/2014    Performed by Davonte Mauricio M.D. at ENDOSCOPY [facility]   • GASTROSCOPY WITH CLIPPING  7/6/2014    Performed by Joshua Vigil M.D. at END[facility]COPY [facility]   • ANTERIOR AND POSTERIOR REPAIR  12/6/2013    Performed by Isaías Lambert M.D. at SURGERY [facility]   • ENTEROCELE REPAIR  12/6/2013    Performed by Isaías Lambert M.D. at SURGERY [facility]   • BLADDER SLING FEMALE  12/6/2013    Performed by Isaías Lambert M.D. at SURGERY [facility]   • GYN SURGERY  2010    hysterectomy   • CARPAL TUNNEL RELEASE  2010    right hand   • SHOULDER SURGERY  2010    left shoulder   • OTHER  2010    port placement   • CATARACT EXTRACTION WITH IOL     • GYN SURGERY      tubal ligation   • OTHER      oral surgery   • TONSILLECTOMY        MEDICATIONS:     Current Outpatient Medications:   •  buPROPion (WELLBUTRIN) 75 MG Tab,  BUPROPION HCL 75 MG TABS, Disp: , Rfl:   •  busPIRone (BUSPAR) 10 MG Tab tablet, BUSPIRONE HCL 10 MG TABS, Disp: , Rfl:   •  glucose blood (ONE TOUCH ULTRA TEST) strip, ONETOUCH ULTRA BLUE STRP, Disp: , Rfl:   •  hydrOXYzine HCl (ATARAX) 25 MG Tab, HYDROXYZINE HCL 25 MG TABS, Disp: , Rfl:   •  lamoTRIgine (LAMICTAL) 100 MG Tab, LAMOTRIGINE 100 MG TABS, Disp: , Rfl:   •  OneTouch Delica Lancets Fine Misc, ONETOUCH DELICA LANCETS FINE, Disp: , Rfl:   •  ziprasidone (GEODON) 40 MG Cap, TK 1 C PO IN THE MORNING, Disp: , Rfl:   •  ziprasidone (GEODON) 20 MG Cap, TK 1 C PO IN THE GLEN, Disp: , Rfl:   •  furosemide (LASIX) 40 MG Tab, Take  by mouth every day. Take 1 tablet by mouth every day, Disp: , Rfl:   •  insulin glargine (LANTUS SOLOSTAR) 100 UNIT/ML Solution Pen-injector injection, Inject 40 Units as instructed every evening., Disp: 1 PEN, Rfl: 2  •  NOVOLOG, insulin aspart, (NOVOLOG FLEXPEN) 100 UNIT/ML injection PEN, Inject 3-18 Units as instructed 3 times a day before meals., Disp: 1 PEN, Rfl: 2  •  risperidone (RISPERDAL) 4 MG tablet, Take 4 mg by mouth 2 times a day. Indications: depression, Disp: , Rfl:   •  ondansetron (ZOFRAN ODT) 4 MG TABLET DISPERSIBLE, Take 1 Tab by mouth every 8 hours as needed., Disp: 10 Tab, Rfl: 0  •  lactulose 20 GM/30ML Solution, Take 30 mL by mouth 2 Times a Day., Disp: 60 Each, Rfl: 2  •  gabapentin (NEURONTIN) 300 MG Cap, Take 300 mg by mouth 2 Times a Day., Disp: , Rfl:   •  spironolactone (ALDACTONE) 50 MG Tab, Take 1 Tab by mouth every day., Disp: 30 Tab, Rfl: 3     ALLERGIES:    Allergies   Allergen Reactions   • Nkda [No Known Drug Allergy]      SOCIAL HISTORY:   Social History     Socioeconomic History   • Marital status:      Spouse name: Not on file   • Number of children: Not on file   • Years of education: Not on file   • Highest education level: Not on file   Occupational History   • Not on file   Social Needs   • Financial resource strain: Not on file   • Food  insecurity     Worry: Not on file     Inability: Not on file   • Transportation needs     Medical: Not on file     Non-medical: Not on file   Tobacco Use   • Smoking status: Current Some Day Smoker     Packs/day: 1.00     Years: 42.00     Pack years: 42.00     Types: Cigarettes   • Smokeless tobacco: Never Used   Substance and Sexual Activity   • Alcohol use: Not Currently     Alcohol/week: 0.0 - 3.6 oz     Comment: drinks 0-6 during the week and drinks all weekend   • Drug use: Not Currently     Comment: Hx IV meth use, states she used 12/18/17 for first time in yrs    • Sexual activity: Not on file   Lifestyle   • Physical activity     Days per week: Not on file     Minutes per session: Not on file   • Stress: Not on file   Relationships   • Social connections     Talks on phone: Not on file     Gets together: Not on file     Attends [employer] service: Not on file     Active member of club or organization: Not on file     Attends meetings of clubs or organizations: Not on file     Relationship status: Not on file   • Intimate partner violence     Fear of current or ex partner: Not on file     Emotionally abused: Not on file     Physically abused: Not on file     Forced sexual activity: Not on file   Other Topics Concern   • Not on file   Social History Narrative   • Not on file     Wound Assessment:  Wound 07/14/20 Thigh Right lateral distal thigh (Active)   Wound Image   07/14/20 0900   Site Assessment Yellow;Red 07/14/20 0900   Periwound Assessment Induration 07/14/20 0900   Margins Epibole (rolled edges) 07/14/20 0900   Closure Secondary intention 07/14/20 0900   Drainage Amount Moderate 07/14/20 0900   Drainage Description Serosanguineous 07/14/20 0900   Treatments Cleansed;Site care 07/14/20 0900   Wound Cleansing Normal Saline Irrigation 07/14/20 0900   Periwound Protectant Skin Protectant Wipes to Periwound;Barrier Paste 07/14/20 0900   Dressing Cleansing/Solutions Not Applicable 07/14/20 0900   Dressing  Options Hydrofiber Silver;Silicone Adhesive Foam 07/14/20 0900   Dressing Changed New 07/14/20 0900   Dressing Status Clean;Dry;Intact 07/14/20 0900   Dressing Change/Treatment Frequency Every 72 hrs, and As Needed 07/14/20 0900   Non-staged Wound Description Full thickness 07/14/20 0900   Post-Procedure Length (cm) 1.5 cm 07/14/20 0900   Post-Procedure Width (cm) 0.5 cm 07/14/20 0900   Post-Procedure Depth (cm) 0.5 cm 07/14/20 0900   Post-Procedure Surface Area (cm^2) 0.75 cm^2 07/14/20 0900   Post-Procedure Volume (cm^3) 0.38 cm^3 07/14/20 0900   Tunneling (cm) 0 cm 07/14/20 0900   Undermining (cm) 0 cm 07/14/20 0900   Wound Odor None 07/14/20 0900   Exposed Structures None 07/14/20 0900       Wound 07/14/20 Thigh Right lateral proximal thigh (Active)   Wound Image   07/14/20 0900   Site Assessment Pink;Yellow 07/14/20 0900   Periwound Assessment Induration 07/14/20 0900   Margins Attached edges 07/14/20 0900   Closure Secondary intention 07/14/20 0900   Drainage Amount Small 07/14/20 0900   Drainage Description Serosanguineous 07/14/20 0900   Treatments Cleansed;Site care 07/14/20 0900   Wound Cleansing Normal Saline Irrigation 07/14/20 0900   Periwound Protectant Skin Protectant Wipes to Periwound;Barrier Paste 07/14/20 0900   Dressing Cleansing/Solutions Not Applicable 07/14/20 0900   Dressing Options Hydrofiber Silver;Silicone Adhesive Foam 07/14/20 0900   Dressing Changed New 07/14/20 0900   Dressing Status Clean;Dry;Intact 07/14/20 0900   Dressing Change/Treatment Frequency Every 72 hrs, and As Needed 07/14/20 0900   Non-staged Wound Description Partial thickness 07/14/20 0900   Post-Procedure Length (cm) 0.6 cm 07/14/20 0900   Post-Procedure Width (cm) 0.5 cm 07/14/20 0900   Post-Procedure Depth (cm) 0.1 cm 07/14/20 0900   Post-Procedure Surface Area (cm^2) 0.3 cm^2 07/14/20 0900   Post-Procedure Volume (cm^3) 0.03 cm^3 07/14/20 0900   Tunneling (cm) 0 cm 07/14/20 0900   Undermining (cm) 0 cm 07/14/20 0900    Wound Odor None 07/14/20 0900   Exposed Structures None 07/14/20 0900     Procedures:    -2% viscous lidocaine applied topically to wound bed for approximately 5 minutes prior to debridement.  -Non selective debridement with NS, gauze and cotton tipped applicator.   -Refer to flowsheet for wound care details.     Post-debridement Photo          PATIENT EDUCATION  -Advised to go to ER for any increased redness, swelling, drainage or odor, or if patient develops fever, chills, nausea or vomiting.  -Importance of adequate nutrition for wound healing  -Increase protein intake (unless contraindicated by renal status)

## 2020-07-27 ENCOUNTER — NON-PROVIDER VISIT (OUTPATIENT)
Dept: WOUND CARE | Facility: MEDICAL CENTER | Age: 60
End: 2020-07-27
Attending: INTERNAL MEDICINE
Payer: MEDICAID

## 2020-07-27 PROCEDURE — 97597 DBRDMT OPN WND 1ST 20 CM/<: CPT

## 2020-07-27 NOTE — CERTIFICATION
Advanced Wound Care   [facility]   [address]   [address]   [place] [address]   [phone] Fax: [phone]    Discharge Note      Referring Physician: Dheeraj Matos MD - [facility] Hospitalist (pt does not have PCP)  Wound Etiology: Methamphetamine injection site  Wound location: Right lateral thigh proximal and distal   ICD-10: L03.90  Date of Discharge: 07/27/2020    Assessment:  Discharge patient at this time secondary to wound resolution. Patient has a pinpoint opening remaining on the distal wound. She feels confident that she can care for it herself at home so she will be discharged today. Pt instructed to keep area clean, it will be fragile for a few days, bathe and dry area gently, as scar tissue only ever regains a maximum of 80% of the tensile strength of the surrounding skin, remodeling of scar can continue for 6mo - a year. Contact PCP for a referral back here if any problems with area opening and draining again.    Wound 07/14/20 Thigh Right lateral distal thigh (Active)   Wound Image   07/27/20 1000   Site Assessment Brown 07/27/20 1000   Periwound Assessment Induration 07/27/20 1000   Margins Attached edges 07/27/20 1000   Closure Secondary intention 07/27/20 1000   Drainage Amount None 07/27/20 1000   Drainage Description Serosanguineous 07/14/20 0900   Treatments Cleansed;CSWD - Conservative Sharp Wound Debridement 07/27/20 1000   Wound Cleansing Normal Saline Irrigation 07/27/20 1000   Periwound Protectant Skin Protectant Wipes to Periwound;Barrier Paste 07/27/20 1000   Dressing Cleansing/Solutions Not Applicable 07/27/20 1000   Dressing Options Silicone Adhesive Foam 07/27/20 1000   Dressing Changed Changed 07/27/20 1000   Dressing Status Clean;Dry;Intact 07/27/20 1000   Dressing Change/Treatment Frequency Every 72 hrs, and As Needed 07/27/20 1000   Non-staged Wound Description Full thickness 07/27/20 1000   Post-Procedure Length (cm) 0.1 cm 07/27/20 1000    Post-Procedure Width (cm) 0.1 cm 07/27/20 1000   Post-Procedure Depth (cm) 0.1 cm 07/27/20 1000   Post-Procedure Surface Area (cm^2) 0.01 cm^2 07/27/20 1000   Post-Procedure Volume (cm^3) 0 cm^3 07/27/20 1000   Tunneling (cm) 0 cm 07/27/20 1000   Undermining (cm) 0 cm 07/27/20 1000   Wound Odor None 07/27/20 1000   Exposed Structures None 07/27/20 1000       Wound 07/14/20 Thigh Right lateral proximal thigh (Active)   Wound Image   07/27/20 1000   Site Assessment Brown;Epithelialization 07/27/20 1000   Periwound Assessment Induration 07/27/20 1000   Margins Attached edges 07/14/20 0900   Closure Secondary intention 07/14/20 0900   Drainage Amount None 07/27/20 1000   Drainage Description Serosanguineous 07/14/20 0900   Treatments Cleansed;CSWD - Conservative Sharp Wound Debridement 07/27/20 1000   Wound Cleansing Normal Saline Irrigation 07/27/20 1000   Periwound Protectant Skin Protectant Wipes to Periwound;Barrier Paste 07/14/20 0900   Dressing Cleansing/Solutions Not Applicable 07/27/20 1000   Dressing Options Open to Air 07/27/20 1000   Dressing Changed New 07/14/20 0900   Dressing Status Clean;Dry;Intact 07/14/20 0900   Dressing Change/Treatment Frequency Every 72 hrs, and As Needed 07/14/20 0900   Non-staged Wound Description Partial thickness 07/27/20 1000   Post-Procedure Length (cm) 0 cm 07/27/20 1000   Post-Procedure Width (cm) 0 cm 07/27/20 1000   Post-Procedure Depth (cm) 0 cm 07/27/20 1000   Post-Procedure Surface Area (cm^2) 0 cm^2 07/27/20 1000   Post-Procedure Volume (cm^3) 0 cm^3 07/27/20 1000   Tunneling (cm) 0 cm 07/27/20 1000   Undermining (cm) 0 cm 07/27/20 1000   Wound Odor None 07/27/20 1000   Exposed Structures None 07/27/20 1000             Thank you for the referral and the opportunity to treat your patient.      Clinician Signature: _____________________________ Date:_______________

## 2020-08-03 ENCOUNTER — APPOINTMENT (OUTPATIENT)
Dept: WOUND CARE | Facility: MEDICAL CENTER | Age: 60
End: 2020-08-03
Payer: MEDICAID

## 2020-08-10 ENCOUNTER — APPOINTMENT (OUTPATIENT)
Dept: WOUND CARE | Facility: MEDICAL CENTER | Age: 60
End: 2020-08-10
Payer: MEDICAID

## 2020-08-10 ENCOUNTER — HOSPITAL ENCOUNTER (OUTPATIENT)
Dept: LAB | Facility: MEDICAL CENTER | Age: 60
End: 2020-08-10
Attending: PSYCHIATRY & NEUROLOGY
Payer: MEDICAID

## 2020-08-10 LAB
ALBUMIN SERPL BCP-MCNC: 3.8 G/DL (ref 3.2–4.9)
ALBUMIN/GLOB SERPL: 1 G/DL
ALP SERPL-CCNC: 95 U/L (ref 30–99)
ALT SERPL-CCNC: 22 U/L (ref 2–50)
ANION GAP SERPL CALC-SCNC: 14 MMOL/L (ref 7–16)
AST SERPL-CCNC: 26 U/L (ref 12–45)
BASOPHILS # BLD AUTO: 1.2 % (ref 0–1.8)
BASOPHILS # BLD: 0.05 K/UL (ref 0–0.12)
BILIRUB SERPL-MCNC: 0.8 MG/DL (ref 0.1–1.5)
BUN SERPL-MCNC: 23 MG/DL (ref 8–22)
CALCIUM SERPL-MCNC: 9.6 MG/DL (ref 8.5–10.5)
CHLORIDE SERPL-SCNC: 102 MMOL/L (ref 96–112)
CO2 SERPL-SCNC: 22 MMOL/L (ref 20–33)
COMMENT 1642: NORMAL
CREAT SERPL-MCNC: 0.86 MG/DL (ref 0.5–1.4)
EOSINOPHIL # BLD AUTO: 0.08 K/UL (ref 0–0.51)
EOSINOPHIL NFR BLD: 1.9 % (ref 0–6.9)
ERYTHROCYTE [DISTWIDTH] IN BLOOD BY AUTOMATED COUNT: 45.4 FL (ref 35.9–50)
EST. AVERAGE GLUCOSE BLD GHB EST-MCNC: 315 MG/DL
GLOBULIN SER CALC-MCNC: 3.7 G/DL (ref 1.9–3.5)
GLUCOSE SERPL-MCNC: 308 MG/DL (ref 65–99)
HBA1C MFR BLD: 12.6 % (ref 0–5.6)
HCT VFR BLD AUTO: 38.2 % (ref 37–47)
HGB BLD-MCNC: 11.4 G/DL (ref 12–16)
IMM GRANULOCYTES # BLD AUTO: 0.01 K/UL (ref 0–0.11)
IMM GRANULOCYTES NFR BLD AUTO: 0.2 % (ref 0–0.9)
LYMPHOCYTES # BLD AUTO: 0.71 K/UL (ref 1–4.8)
LYMPHOCYTES NFR BLD: 16.5 % (ref 22–41)
MCH RBC QN AUTO: 22 PG (ref 27–33)
MCHC RBC AUTO-ENTMCNC: 29.8 G/DL (ref 33.6–35)
MCV RBC AUTO: 73.7 FL (ref 81.4–97.8)
MONOCYTES # BLD AUTO: 0.36 K/UL (ref 0–0.85)
MONOCYTES NFR BLD AUTO: 8.4 % (ref 0–13.4)
MORPHOLOGY BLD-IMP: NORMAL
NEUTROPHILS # BLD AUTO: 3.09 K/UL (ref 2–7.15)
NEUTROPHILS NFR BLD: 71.8 % (ref 44–72)
NRBC # BLD AUTO: 0 K/UL
NRBC BLD-RTO: 0 /100 WBC
OVALOCYTES BLD QL SMEAR: NORMAL
PLATELET # BLD AUTO: 93 K/UL (ref 164–446)
PLATELET BLD QL SMEAR: NORMAL
POIKILOCYTOSIS BLD QL SMEAR: NORMAL
POTASSIUM SERPL-SCNC: 4.8 MMOL/L (ref 3.6–5.5)
PROT SERPL-MCNC: 7.5 G/DL (ref 6–8.2)
RBC # BLD AUTO: 5.18 M/UL (ref 4.2–5.4)
RBC BLD AUTO: PRESENT
SODIUM SERPL-SCNC: 138 MMOL/L (ref 135–145)
WBC # BLD AUTO: 4.3 K/UL (ref 4.8–10.8)

## 2020-08-10 PROCEDURE — 83036 HEMOGLOBIN GLYCOSYLATED A1C: CPT

## 2020-08-10 PROCEDURE — 85025 COMPLETE CBC W/AUTO DIFF WBC: CPT

## 2020-08-10 PROCEDURE — 80053 COMPREHEN METABOLIC PANEL: CPT

## 2020-08-10 PROCEDURE — 36415 COLL VENOUS BLD VENIPUNCTURE: CPT

## 2020-09-25 ENCOUNTER — APPOINTMENT (OUTPATIENT)
Dept: RADIOLOGY | Facility: MEDICAL CENTER | Age: 60
DRG: 439 | End: 2020-09-25
Attending: STUDENT IN AN ORGANIZED HEALTH CARE EDUCATION/TRAINING PROGRAM
Payer: MEDICAID

## 2020-09-25 ENCOUNTER — APPOINTMENT (OUTPATIENT)
Dept: RADIOLOGY | Facility: MEDICAL CENTER | Age: 60
DRG: 439 | End: 2020-09-25
Attending: EMERGENCY MEDICINE
Payer: MEDICAID

## 2020-09-25 ENCOUNTER — HOSPITAL ENCOUNTER (INPATIENT)
Facility: MEDICAL CENTER | Age: 60
LOS: 5 days | DRG: 439 | End: 2020-09-30
Attending: EMERGENCY MEDICINE | Admitting: HOSPITALIST
Payer: MEDICAID

## 2020-09-25 LAB
ALBUMIN SERPL BCP-MCNC: 3.9 G/DL (ref 3.2–4.9)
ALBUMIN/GLOB SERPL: 1.3 G/DL
ALP SERPL-CCNC: 88 U/L (ref 30–99)
ALT SERPL-CCNC: 21 U/L (ref 2–50)
ANION GAP SERPL CALC-SCNC: 10 MMOL/L (ref 7–16)
APPEARANCE UR: CLEAR
AST SERPL-CCNC: 29 U/L (ref 12–45)
BASOPHILS # BLD AUTO: 1 % (ref 0–1.8)
BASOPHILS # BLD: 0.03 K/UL (ref 0–0.12)
BILIRUB SERPL-MCNC: 0.7 MG/DL (ref 0.1–1.5)
BILIRUB UR QL STRIP.AUTO: NEGATIVE
BUN SERPL-MCNC: 17 MG/DL (ref 8–22)
CALCIUM SERPL-MCNC: 9.2 MG/DL (ref 8.5–10.5)
CHLORIDE SERPL-SCNC: 101 MMOL/L (ref 96–112)
CO2 SERPL-SCNC: 23 MMOL/L (ref 20–33)
COLOR UR: YELLOW
COVID ORDER STATUS COVID19: NORMAL
CREAT SERPL-MCNC: 0.77 MG/DL (ref 0.5–1.4)
EKG IMPRESSION: NORMAL
EOSINOPHIL # BLD AUTO: 0.08 K/UL (ref 0–0.51)
EOSINOPHIL NFR BLD: 2.7 % (ref 0–6.9)
ERYTHROCYTE [DISTWIDTH] IN BLOOD BY AUTOMATED COUNT: 44.8 FL (ref 35.9–50)
GLOBULIN SER CALC-MCNC: 3.1 G/DL (ref 1.9–3.5)
GLUCOSE SERPL-MCNC: 326 MG/DL (ref 65–99)
GLUCOSE UR STRIP.AUTO-MCNC: 500 MG/DL
HCT VFR BLD AUTO: 32.3 % (ref 37–47)
HGB BLD-MCNC: 10.4 G/DL (ref 12–16)
IMM GRANULOCYTES # BLD AUTO: 0.01 K/UL (ref 0–0.11)
IMM GRANULOCYTES NFR BLD AUTO: 0.3 % (ref 0–0.9)
KETONES UR STRIP.AUTO-MCNC: NEGATIVE MG/DL
LEUKOCYTE ESTERASE UR QL STRIP.AUTO: NEGATIVE
LIPASE SERPL-CCNC: 348 U/L (ref 11–82)
LYMPHOCYTES # BLD AUTO: 0.52 K/UL (ref 1–4.8)
LYMPHOCYTES NFR BLD: 17.6 % (ref 22–41)
MCH RBC QN AUTO: 24.1 PG (ref 27–33)
MCHC RBC AUTO-ENTMCNC: 32.2 G/DL (ref 33.6–35)
MCV RBC AUTO: 74.8 FL (ref 81.4–97.8)
MICRO URNS: ABNORMAL
MONOCYTES # BLD AUTO: 0.26 K/UL (ref 0–0.85)
MONOCYTES NFR BLD AUTO: 8.8 % (ref 0–13.4)
NEUTROPHILS # BLD AUTO: 2.06 K/UL (ref 2–7.15)
NEUTROPHILS NFR BLD: 69.6 % (ref 44–72)
NITRITE UR QL STRIP.AUTO: NEGATIVE
NRBC # BLD AUTO: 0 K/UL
NRBC BLD-RTO: 0 /100 WBC
PH UR STRIP.AUTO: 6 [PH] (ref 5–8)
PLATELET # BLD AUTO: 73 K/UL (ref 164–446)
POTASSIUM SERPL-SCNC: 4.4 MMOL/L (ref 3.6–5.5)
PROT SERPL-MCNC: 7 G/DL (ref 6–8.2)
PROT UR QL STRIP: NEGATIVE MG/DL
RBC # BLD AUTO: 4.32 M/UL (ref 4.2–5.4)
RBC UR QL AUTO: NEGATIVE
SODIUM SERPL-SCNC: 134 MMOL/L (ref 135–145)
SP GR UR STRIP.AUTO: 1.01
UROBILINOGEN UR STRIP.AUTO-MCNC: 0.2 MG/DL
WBC # BLD AUTO: 3 K/UL (ref 4.8–10.8)

## 2020-09-25 PROCEDURE — 93010 ELECTROCARDIOGRAM REPORT: CPT | Performed by: INTERNAL MEDICINE

## 2020-09-25 PROCEDURE — 81003 URINALYSIS AUTO W/O SCOPE: CPT

## 2020-09-25 PROCEDURE — 700102 HCHG RX REV CODE 250 W/ 637 OVERRIDE(OP): Performed by: STUDENT IN AN ORGANIZED HEALTH CARE EDUCATION/TRAINING PROGRAM

## 2020-09-25 PROCEDURE — 700101 HCHG RX REV CODE 250: Performed by: STUDENT IN AN ORGANIZED HEALTH CARE EDUCATION/TRAINING PROGRAM

## 2020-09-25 PROCEDURE — 96376 TX/PRO/DX INJ SAME DRUG ADON: CPT

## 2020-09-25 PROCEDURE — 36415 COLL VENOUS BLD VENIPUNCTURE: CPT

## 2020-09-25 PROCEDURE — 700105 HCHG RX REV CODE 258: Performed by: STUDENT IN AN ORGANIZED HEALTH CARE EDUCATION/TRAINING PROGRAM

## 2020-09-25 PROCEDURE — U0003 INFECTIOUS AGENT DETECTION BY NUCLEIC ACID (DNA OR RNA); SEVERE ACUTE RESPIRATORY SYNDROME CORONAVIRUS 2 (SARS-COV-2) (CORONAVIRUS DISEASE [COVID-19]), AMPLIFIED PROBE TECHNIQUE, MAKING USE OF HIGH THROUGHPUT TECHNOLOGIES AS DESCRIBED BY CMS-2020-01-R: HCPCS

## 2020-09-25 PROCEDURE — 85025 COMPLETE CBC W/AUTO DIFF WBC: CPT

## 2020-09-25 PROCEDURE — 96374 THER/PROPH/DIAG INJ IV PUSH: CPT

## 2020-09-25 PROCEDURE — 82962 GLUCOSE BLOOD TEST: CPT

## 2020-09-25 PROCEDURE — 93005 ELECTROCARDIOGRAM TRACING: CPT | Performed by: STUDENT IN AN ORGANIZED HEALTH CARE EDUCATION/TRAINING PROGRAM

## 2020-09-25 PROCEDURE — C9803 HOPD COVID-19 SPEC COLLECT: HCPCS | Performed by: HOSPITALIST

## 2020-09-25 PROCEDURE — 83690 ASSAY OF LIPASE: CPT

## 2020-09-25 PROCEDURE — 99285 EMERGENCY DEPT VISIT HI MDM: CPT

## 2020-09-25 PROCEDURE — 76705 ECHO EXAM OF ABDOMEN: CPT

## 2020-09-25 PROCEDURE — 80053 COMPREHEN METABOLIC PANEL: CPT

## 2020-09-25 PROCEDURE — 96375 TX/PRO/DX INJ NEW DRUG ADDON: CPT

## 2020-09-25 PROCEDURE — 700111 HCHG RX REV CODE 636 W/ 250 OVERRIDE (IP): Performed by: EMERGENCY MEDICINE

## 2020-09-25 PROCEDURE — 700105 HCHG RX REV CODE 258: Performed by: EMERGENCY MEDICINE

## 2020-09-25 PROCEDURE — A9270 NON-COVERED ITEM OR SERVICE: HCPCS | Performed by: STUDENT IN AN ORGANIZED HEALTH CARE EDUCATION/TRAINING PROGRAM

## 2020-09-25 PROCEDURE — 700111 HCHG RX REV CODE 636 W/ 250 OVERRIDE (IP): Performed by: STUDENT IN AN ORGANIZED HEALTH CARE EDUCATION/TRAINING PROGRAM

## 2020-09-25 PROCEDURE — 71046 X-RAY EXAM CHEST 2 VIEWS: CPT

## 2020-09-25 PROCEDURE — 770020 HCHG ROOM/CARE - TELE (206)

## 2020-09-25 PROCEDURE — 99223 1ST HOSP IP/OBS HIGH 75: CPT | Mod: GC | Performed by: HOSPITALIST

## 2020-09-25 RX ORDER — SODIUM CHLORIDE 9 MG/ML
1000 INJECTION, SOLUTION INTRAVENOUS ONCE
Status: COMPLETED | OUTPATIENT
Start: 2020-09-25 | End: 2020-09-25

## 2020-09-25 RX ORDER — SODIUM CHLORIDE, SODIUM LACTATE, POTASSIUM CHLORIDE, CALCIUM CHLORIDE 600; 310; 30; 20 MG/100ML; MG/100ML; MG/100ML; MG/100ML
1000 INJECTION, SOLUTION INTRAVENOUS CONTINUOUS
Status: DISCONTINUED | OUTPATIENT
Start: 2020-09-25 | End: 2020-09-26

## 2020-09-25 RX ORDER — MORPHINE SULFATE 4 MG/ML
2 INJECTION, SOLUTION INTRAMUSCULAR; INTRAVENOUS EVERY 4 HOURS PRN
Status: DISCONTINUED | OUTPATIENT
Start: 2020-09-25 | End: 2020-09-30 | Stop reason: HOSPADM

## 2020-09-25 RX ORDER — SPIRONOLACTONE 50 MG/1
50 TABLET, FILM COATED ORAL 2 TIMES DAILY
Status: ON HOLD | COMMUNITY
End: 2020-09-30

## 2020-09-25 RX ORDER — AMOXICILLIN 250 MG
2 CAPSULE ORAL 2 TIMES DAILY
Status: DISCONTINUED | OUTPATIENT
Start: 2020-09-25 | End: 2020-09-30 | Stop reason: HOSPADM

## 2020-09-25 RX ORDER — ONDANSETRON 2 MG/ML
4 INJECTION INTRAMUSCULAR; INTRAVENOUS ONCE
Status: COMPLETED | OUTPATIENT
Start: 2020-09-25 | End: 2020-09-25

## 2020-09-25 RX ORDER — BUPROPION HYDROCHLORIDE 100 MG/1
100 TABLET ORAL EVERY MORNING
COMMUNITY
End: 2021-01-01

## 2020-09-25 RX ORDER — FUROSEMIDE 40 MG/1
40 TABLET ORAL EVERY MORNING
Status: ON HOLD | COMMUNITY
End: 2020-09-30

## 2020-09-25 RX ORDER — INSULIN GLARGINE 100 [IU]/ML
15 INJECTION, SOLUTION SUBCUTANEOUS ONCE
Status: DISCONTINUED | OUTPATIENT
Start: 2020-09-25 | End: 2020-09-25

## 2020-09-25 RX ORDER — BUPROPION HYDROCHLORIDE 75 MG/1
75 TABLET ORAL EVERY EVENING
Status: ON HOLD | COMMUNITY
End: 2020-09-30

## 2020-09-25 RX ORDER — DEXTROSE MONOHYDRATE 25 G/50ML
50 INJECTION, SOLUTION INTRAVENOUS
Status: DISCONTINUED | OUTPATIENT
Start: 2020-09-25 | End: 2020-09-26

## 2020-09-25 RX ORDER — SODIUM CHLORIDE, SODIUM LACTATE, POTASSIUM CHLORIDE, CALCIUM CHLORIDE 600; 310; 30; 20 MG/100ML; MG/100ML; MG/100ML; MG/100ML
1000 INJECTION, SOLUTION INTRAVENOUS ONCE
Status: COMPLETED | OUTPATIENT
Start: 2020-09-25 | End: 2020-09-26

## 2020-09-25 RX ORDER — BUPROPION HYDROCHLORIDE 100 MG/1
100 TABLET ORAL EVERY MORNING
Status: DISCONTINUED | OUTPATIENT
Start: 2020-09-26 | End: 2020-09-30 | Stop reason: HOSPADM

## 2020-09-25 RX ORDER — DEXTROSE MONOHYDRATE 25 G/50ML
50 INJECTION, SOLUTION INTRAVENOUS
Status: DISCONTINUED | OUTPATIENT
Start: 2020-09-25 | End: 2020-09-25

## 2020-09-25 RX ORDER — POLYETHYLENE GLYCOL 3350 17 G/17G
1 POWDER, FOR SOLUTION ORAL
Status: DISCONTINUED | OUTPATIENT
Start: 2020-09-25 | End: 2020-09-30 | Stop reason: HOSPADM

## 2020-09-25 RX ORDER — GABAPENTIN 300 MG/1
600 CAPSULE ORAL 2 TIMES DAILY
Status: DISCONTINUED | OUTPATIENT
Start: 2020-09-25 | End: 2020-09-30 | Stop reason: HOSPADM

## 2020-09-25 RX ORDER — NICOTINE 21 MG/24HR
14 PATCH, TRANSDERMAL 24 HOURS TRANSDERMAL
Status: DISCONTINUED | OUTPATIENT
Start: 2020-09-26 | End: 2020-09-30 | Stop reason: HOSPADM

## 2020-09-25 RX ORDER — ONDANSETRON 2 MG/ML
4 INJECTION INTRAMUSCULAR; INTRAVENOUS EVERY 6 HOURS PRN
Status: DISCONTINUED | OUTPATIENT
Start: 2020-09-25 | End: 2020-09-30 | Stop reason: HOSPADM

## 2020-09-25 RX ORDER — HYDROMORPHONE HYDROCHLORIDE 1 MG/ML
1 INJECTION, SOLUTION INTRAMUSCULAR; INTRAVENOUS; SUBCUTANEOUS ONCE
Status: COMPLETED | OUTPATIENT
Start: 2020-09-25 | End: 2020-09-25

## 2020-09-25 RX ORDER — INSULIN GLARGINE 100 [IU]/ML
0.2 INJECTION, SOLUTION SUBCUTANEOUS EVERY EVENING
Status: DISCONTINUED | OUTPATIENT
Start: 2020-09-25 | End: 2020-09-26

## 2020-09-25 RX ORDER — BISACODYL 10 MG
10 SUPPOSITORY, RECTAL RECTAL
Status: DISCONTINUED | OUTPATIENT
Start: 2020-09-25 | End: 2020-09-30 | Stop reason: HOSPADM

## 2020-09-25 RX ORDER — HYDROMORPHONE HYDROCHLORIDE 1 MG/ML
0.5 INJECTION, SOLUTION INTRAMUSCULAR; INTRAVENOUS; SUBCUTANEOUS ONCE
Status: COMPLETED | OUTPATIENT
Start: 2020-09-25 | End: 2020-09-25

## 2020-09-25 RX ORDER — LABETALOL HYDROCHLORIDE 5 MG/ML
10 INJECTION, SOLUTION INTRAVENOUS EVERY 4 HOURS PRN
Status: DISCONTINUED | OUTPATIENT
Start: 2020-09-25 | End: 2020-09-30 | Stop reason: HOSPADM

## 2020-09-25 RX ADMIN — NICOTINE POLACRILEX 2 MG: 2 GUM, CHEWING BUCCAL at 20:35

## 2020-09-25 RX ADMIN — SODIUM CHLORIDE, POTASSIUM CHLORIDE, SODIUM LACTATE AND CALCIUM CHLORIDE 1000 ML: 600; 310; 30; 20 INJECTION, SOLUTION INTRAVENOUS at 20:35

## 2020-09-25 RX ADMIN — MORPHINE SULFATE 2 MG: 4 INJECTION INTRAVENOUS at 20:35

## 2020-09-25 RX ADMIN — ONDANSETRON 4 MG: 2 INJECTION INTRAMUSCULAR; INTRAVENOUS at 14:15

## 2020-09-25 RX ADMIN — SODIUM CHLORIDE, POTASSIUM CHLORIDE, SODIUM LACTATE AND CALCIUM CHLORIDE 1000 ML: 600; 310; 30; 20 INJECTION, SOLUTION INTRAVENOUS at 18:01

## 2020-09-25 RX ADMIN — LABETALOL HYDROCHLORIDE 10 MG: 5 INJECTION, SOLUTION INTRAVENOUS at 20:07

## 2020-09-25 RX ADMIN — HYDROMORPHONE HYDROCHLORIDE 1 MG: 1 INJECTION, SOLUTION INTRAMUSCULAR; INTRAVENOUS; SUBCUTANEOUS at 18:01

## 2020-09-25 RX ADMIN — ONDANSETRON 4 MG: 2 INJECTION INTRAMUSCULAR; INTRAVENOUS at 20:36

## 2020-09-25 RX ADMIN — ONDANSETRON 4 MG: 2 INJECTION INTRAMUSCULAR; INTRAVENOUS at 18:01

## 2020-09-25 RX ADMIN — INSULIN GLARGINE 15 UNITS: 100 INJECTION, SOLUTION SUBCUTANEOUS at 20:47

## 2020-09-25 RX ADMIN — INSULIN LISPRO 2 UNITS: 100 INJECTION, SOLUTION INTRAVENOUS; SUBCUTANEOUS at 20:46

## 2020-09-25 RX ADMIN — GABAPENTIN 600 MG: 300 CAPSULE ORAL at 20:07

## 2020-09-25 RX ADMIN — SODIUM CHLORIDE 1000 ML: 9 INJECTION, SOLUTION INTRAVENOUS at 14:15

## 2020-09-25 RX ADMIN — HYDROMORPHONE HYDROCHLORIDE 0.5 MG: 1 INJECTION, SOLUTION INTRAMUSCULAR; INTRAVENOUS; SUBCUTANEOUS at 14:14

## 2020-09-25 ASSESSMENT — ENCOUNTER SYMPTOMS
DOUBLE VISION: 0
CHILLS: 0
HEMOPTYSIS: 0
FEVER: 0
WEAKNESS: 0
EYE DISCHARGE: 0
DIZZINESS: 1
BLOOD IN STOOL: 0
BLURRED VISION: 0
VOMITING: 0
BACK PAIN: 1
HEADACHES: 1
COUGH: 0
DIARRHEA: 0
NAUSEA: 1
CONSTIPATION: 0
FOCAL WEAKNESS: 0
PALPITATIONS: 0
SHORTNESS OF BREATH: 0

## 2020-09-25 ASSESSMENT — FIBROSIS 4 INDEX: FIB4 SCORE: 5.11

## 2020-09-25 ASSESSMENT — PAIN DESCRIPTION - PAIN TYPE: TYPE: ACUTE PAIN

## 2020-09-25 NOTE — ED TRIAGE NOTES
BIB [facility] to rm 11  Chief Complaint   Patient presents with   • Abdominal Pain     sudden onset mid abd pain radiating to her R side   • Nausea     Pt said the last time she had pain like this was a few years ago, hx of polyps. Denies any blood in her stool, not vomiting, medicated for nausea PTA which is better. BP elevated, hx of HTN, not on meds for this. Has not taken her insulin for 2 days, .

## 2020-09-25 NOTE — ED PROVIDER NOTES
ED Provider Note    CHIEF COMPLAINT  Chief Complaint   Patient presents with   • Abdominal Pain     sudden onset mid abd pain radiating to her R side   • Nausea       HPI  Demetrice Jaime is a 60 y.o. female who presents for evaluation of diffuse epigastric discomfort, nausea and vomiting without diarrhea.  The patient has a complex medical history as listed below.  She has known hepatitis C.  She denies any surgical history.  Pain is 7 out of 10 radiates to the back with associated nausea and vomiting.  No fevers chills cough or congestion.  Patient denies any black or bloody stools.  No hematemesis.  Nothing seems to make the pain better    REVIEW OF SYSTEMS  See HPI for further details.  No high fevers chills night sweats weight loss all other systems are negative.     PAST MEDICAL HISTORY  Past Medical History:   Diagnosis Date   • Depression 6/22/2016   • Pain 12-19-14    generalized; 4/10   • Stroke (HCC) 2011   • Anemia    • Anesthesia     pt's mother has hard time waking up = states it took a week or two   • Arthritis     generalized   • Breath shortness    • Cirrhosis (HCC)    • Dental disorder     dentures   • Depression    • Diabetes     insulin   • Fibromyalgia    • Hepatitis C    • Hiatus hernia syndrome    • Hypertension    • Indigestion    • Obesity    • Other specified disorder of intestines     constipation/diarrhea   • Pneumonia    • Psychiatric problem     depression and anxiety   • Restless leg syndrome    • Sleep apnea     has had sleep study, no cpap   • Snoring    • Unspecified hemorrhagic conditions     reports nose bleeds   • Urinary bladder disorder     frequency/urgency       FAMILY HISTORY  Noncontributory    SOCIAL HISTORY  Social History     Socioeconomic History   • Marital status:      Spouse name: Not on file   • Number of children: Not on file   • Years of education: Not on file   • Highest education level: Not on file   Occupational History   • Not on file   Social  Needs   • Financial resource strain: Not on file   • Food insecurity     Worry: Not on file     Inability: Not on file   • Transportation needs     Medical: Not on file     Non-medical: Not on file   Tobacco Use   • Smoking status: Current Some Day Smoker     Packs/day: 1.00     Years: 42.00     Pack years: 42.00     Types: Cigarettes   • Smokeless tobacco: Never Used   Substance and Sexual Activity   • Alcohol use: Yes     Alcohol/week: 0.0 - 3.6 oz     Comment: rare   • Drug use: Not Currently     Comment: Hx IV meth use, states she used 12/18/17 for first time in yrs    • Sexual activity: Not on file   Lifestyle   • Physical activity     Days per week: Not on file     Minutes per session: Not on file   • Stress: Not on file   Relationships   • Social connections     Talks on phone: Not on file     Gets together: Not on file     Attends Rastafari service: Not on file     Active member of club or organization: Not on file     Attends meetings of clubs or organizations: Not on file     Relationship status: Not on file   • Intimate partner violence     Fear of current or ex partner: Not on file     Emotionally abused: Not on file     Physically abused: Not on file     Forced sexual activity: Not on file   Other Topics Concern   • Not on file   Social History Narrative   • Not on file       SURGICAL HISTORY  Past Surgical History:   Procedure Laterality Date   • GASTROSCOPY N/A 2/12/2018    Procedure: GASTROSCOPY;  Surgeon: Willard Bustamante M.D.;  Location: SURGERY [facility];  Service: Gastroenterology   • GASTROSCOPY WITH BANDING N/A 2/8/2018    Procedure: GASTROSCOPY WITH BANDING;  Surgeon: Davonte Mauricio M.D.;  Location: SURGERY SAME DAY [facility] ORS;  Service: Gastroenterology   • GASTROSCOPY N/A 8/21/2017    Procedure: GASTROSCOPY WITH BANDING  ;  Surgeon: Anthony Dent M.D.;  Location: SURGERY SAME DAY [facility] ORS;  Service:    • CATH PLACEMENT  12/22/2014    Performed by Helga Santiago,  M.D. at SURGERY SAME DAY [facility] ORS   • CATH REMOVAL  11/14/2014    Performed by Helga Santiago M.D. at SURGERY [facility] ORS   • GASTROSCOPY WITH BANDING  7/8/2014    Performed by Davonte Mauricio M.D. at ENDOSCOPY [facility] ORS   • GASTROSCOPY WITH CLIPPING  7/6/2014    Performed by Joshua Vigil M.D. at ENDOSCOPY [facility] ORS   • ANTERIOR AND POSTERIOR REPAIR  12/6/2013    Performed by Isaías Lambert M.D. at SURGERY SAME DAY [facility] ORS   • ENTEROCELE REPAIR  12/6/2013    Performed by Isaías Lambert M.D. at SURGERY SAME DAY [facility] ORS   • BLADDER SLING FEMALE  12/6/2013    Performed by Isaías Lambert M.D. at SURGERY SAME DAY [facility] ORS   • GYN SURGERY  2010    hysterectomy   • CARPAL TUNNEL RELEASE  2010    right hand   • SHOULDER SURGERY  2010    left shoulder   • OTHER  2010    port placement   • CATARACT EXTRACTION WITH IOL     • GYN SURGERY      tubal ligation   • OTHER      oral surgery   • TONSILLECTOMY         CURRENT MEDICATIONS  Home Medications     Reviewed by Denise Croft R.N. (Registered Nurse) on 09/25/20 at 1309  Med List Status: Partial   Medication Last Dose Status   buPROPion (WELLBUTRIN) 100 MG Tab  Active   buPROPion (WELLBUTRIN) 75 MG Tab  Active   furosemide (LASIX) 40 MG Tab 9/23/2020 Active   gabapentin (NEURONTIN) 300 MG Cap  Active   glucose blood (ONE TOUCH ULTRA TEST) strip  Active   insulin glargine (LANTUS SOLOSTAR) 100 UNIT/ML Solution Pen-injector injection 9/23/2020 Active   NOVOLOG, insulin aspart, (NOVOLOG FLEXPEN) 100 UNIT/ML injection PEN 9/23/2020 Active   OneTouch Delica Lancets Fine Misc  Active   spironolactone (ALDACTONE) 50 MG Tab 9/24/2020 Active                ALLERGIES  Allergies   Allergen Reactions   • Nkda [No Known Drug Allergy]        PHYSICAL EXAM  VITAL SIGNS: BP (!) 214/98   Pulse 93   Temp 36.2 °C (97.2 °F) (Temporal)   Resp (!) 26   SpO2 99%       Constitutional: Disheveled  HENT: Normocephalic, Atraumatic, Bilateral  external ears normal, Oropharynx moist, No oral exudates, Nose normal.   Eyes: PERRLA, EOMI, Conjunctiva normal, No discharge.   Neck: Normal range of motion, No tenderness, Supple, No stridor.   Cardiovascular: Normal heart rate, Normal rhythm, No murmurs, No rubs, No gallops.   Thorax & Lungs: Normal breath sounds, No respiratory distress, No wheezing, No chest tenderness.   Abdomen: Bowel sounds normal, Soft, diffuse epigastric pain palpation no rebound guarding rigidity no hernias or masses.   Skin: Warm, Dry, No erythema, No rash.   Back: No tenderness, No CVA tenderness.   Extremities: Intact distal pulses, No edema, No tenderness, No cyanosis, No clubbing.   Neurologic: Alert & oriented x 3, Normal motor function, Normal sensory function, No focal deficits noted.   Psychiatric: Anxious        RADIOLOGY/PROCEDURES  US-RUQ   Final Result      Gallstones within a distended gallbladder.      Findings of cirrhosis.      Prominence of the common duct measuring 8 mm. Further evaluation can be obtained with MRCP as clinically indicated.      Mild right hydronephrosis.      Small amount of ascites.                    Results for orders placed or performed during the hospital encounter of 09/25/20   CBC WITH DIFFERENTIAL   Result Value Ref Range    WBC 3.0 (L) 4.8 - 10.8 K/uL    RBC 4.32 4.20 - 5.40 M/uL    Hemoglobin 10.4 (L) 12.0 - 16.0 g/dL    Hematocrit 32.3 (L) 37.0 - 47.0 %    MCV 74.8 (L) 81.4 - 97.8 fL    MCH 24.1 (L) 27.0 - 33.0 pg    MCHC 32.2 (L) 33.6 - 35.0 g/dL    RDW 44.8 35.9 - 50.0 fL    Platelet Count 73 (L) 164 - 446 K/uL    Neutrophils-Polys 69.60 44.00 - 72.00 %    Lymphocytes 17.60 (L) 22.00 - 41.00 %    Monocytes 8.80 0.00 - 13.40 %    Eosinophils 2.70 0.00 - 6.90 %    Basophils 1.00 0.00 - 1.80 %    Immature Granulocytes 0.30 0.00 - 0.90 %    Nucleated RBC 0.00 /100 WBC    Neutrophils (Absolute) 2.06 2.00 - 7.15 K/uL    Lymphs (Absolute) 0.52 (L) 1.00 - 4.80 K/uL    Monos (Absolute) 0.26 0.00 -  0.85 K/uL    Eos (Absolute) 0.08 0.00 - 0.51 K/uL    Baso (Absolute) 0.03 0.00 - 0.12 K/uL    Immature Granulocytes (abs) 0.01 0.00 - 0.11 K/uL    NRBC (Absolute) 0.00 K/uL   COMP METABOLIC PANEL   Result Value Ref Range    Sodium 134 (L) 135 - 145 mmol/L    Potassium 4.4 3.6 - 5.5 mmol/L    Chloride 101 96 - 112 mmol/L    Co2 23 20 - 33 mmol/L    Anion Gap 10.0 7.0 - 16.0    Glucose 326 (H) 65 - 99 mg/dL    Bun 17 8 - 22 mg/dL    Creatinine 0.77 0.50 - 1.40 mg/dL    Calcium 9.2 8.5 - 10.5 mg/dL    AST(SGOT) 29 12 - 45 U/L    ALT(SGPT) 21 2 - 50 U/L    Alkaline Phosphatase 88 30 - 99 U/L    Total Bilirubin 0.7 0.1 - 1.5 mg/dL    Albumin 3.9 3.2 - 4.9 g/dL    Total Protein 7.0 6.0 - 8.2 g/dL    Globulin 3.1 1.9 - 3.5 g/dL    A-G Ratio 1.3 g/dL   LIPASE   Result Value Ref Range    Lipase 348 (H) 11 - 82 U/L   URINALYSIS,CULTURE IF INDICATED    Specimen: Urine   Result Value Ref Range    Color Yellow     Character Clear     Specific Gravity 1.013 <1.035    Ph 6.0 5.0 - 8.0    Glucose 500 (A) Negative mg/dL    Ketones Negative Negative mg/dL    Protein Negative Negative mg/dL    Bilirubin Negative Negative    Urobilinogen, Urine 0.2 Negative    Nitrite Negative Negative    Leukocyte Esterase Negative Negative    Occult Blood Negative Negative    Micro Urine Req see below    ESTIMATED GFR   Result Value Ref Range    GFR If African American >60 >60 mL/min/1.73 m 2    GFR If Non African American >60 >60 mL/min/1.73 m 2         COURSE & MEDICAL DECISION MAKING  Pertinent Labs & Imaging studies reviewed. (See chart for details)  An IV was established.  The patient was given IV fluids nausea and pain medication.  She has clinical evidence of likely early gallstone pancreatitis.  Lipase is elevated there is no evidence of hyperbilirubinemia.  Ultrasound suggests gallstone disease with dilated common bile duct suggesting possible gallstone pancreatitis.  The patient will be admitted for MRCP and bowel rest    FINAL  IMPRESSION  1.  Gallstone pancreatitis    Admission         Electronically signed by: Abhijeet Meyer M.D., 9/25/2020 1:46 PM

## 2020-09-26 ENCOUNTER — APPOINTMENT (OUTPATIENT)
Dept: RADIOLOGY | Facility: MEDICAL CENTER | Age: 60
DRG: 439 | End: 2020-09-26
Attending: STUDENT IN AN ORGANIZED HEALTH CARE EDUCATION/TRAINING PROGRAM
Payer: MEDICAID

## 2020-09-26 PROBLEM — N13.30 HYDRONEPHROSIS: Status: ACTIVE | Noted: 2020-09-26

## 2020-09-26 PROBLEM — K80.20 GALLSTONES: Status: ACTIVE | Noted: 2020-09-26

## 2020-09-26 LAB
ALBUMIN SERPL BCP-MCNC: 3.4 G/DL (ref 3.2–4.9)
ALBUMIN/GLOB SERPL: 1.3 G/DL
ALP SERPL-CCNC: 72 U/L (ref 30–99)
ALT SERPL-CCNC: 20 U/L (ref 2–50)
AMPHET UR QL SCN: NEGATIVE
ANION GAP SERPL CALC-SCNC: 10 MMOL/L (ref 7–16)
AST SERPL-CCNC: 25 U/L (ref 12–45)
BARBITURATES UR QL SCN: NEGATIVE
BASOPHILS # BLD AUTO: 1 % (ref 0–1.8)
BASOPHILS # BLD: 0.03 K/UL (ref 0–0.12)
BENZODIAZ UR QL SCN: NEGATIVE
BILIRUB SERPL-MCNC: 0.7 MG/DL (ref 0.1–1.5)
BUN SERPL-MCNC: 10 MG/DL (ref 8–22)
BZE UR QL SCN: NEGATIVE
CALCIUM SERPL-MCNC: 8.6 MG/DL (ref 8.5–10.5)
CANNABINOIDS UR QL SCN: NEGATIVE
CHLORIDE SERPL-SCNC: 100 MMOL/L (ref 96–112)
CHOLEST SERPL-MCNC: 121 MG/DL (ref 100–199)
CO2 SERPL-SCNC: 24 MMOL/L (ref 20–33)
CREAT SERPL-MCNC: 0.68 MG/DL (ref 0.5–1.4)
DAT C3D-SP REAG RBC QL: NORMAL
DAT IGG-SP REAG RBC QL: NORMAL
EOSINOPHIL # BLD AUTO: 0.07 K/UL (ref 0–0.51)
EOSINOPHIL NFR BLD: 2.3 % (ref 0–6.9)
ERYTHROCYTE [DISTWIDTH] IN BLOOD BY AUTOMATED COUNT: 47.8 FL (ref 35.9–50)
ETHANOL BLD-MCNC: <10.1 MG/DL (ref 0–10.1)
FERRITIN SERPL-MCNC: 16.8 NG/ML (ref 10–291)
FOLATE SERPL-MCNC: 22.6 NG/ML
GLOBULIN SER CALC-MCNC: 2.7 G/DL (ref 1.9–3.5)
GLUCOSE BLD-MCNC: 137 MG/DL (ref 65–99)
GLUCOSE BLD-MCNC: 159 MG/DL (ref 65–99)
GLUCOSE BLD-MCNC: 196 MG/DL (ref 65–99)
GLUCOSE BLD-MCNC: 216 MG/DL (ref 65–99)
GLUCOSE BLD-MCNC: 258 MG/DL (ref 65–99)
GLUCOSE SERPL-MCNC: 176 MG/DL (ref 65–99)
HCT VFR BLD AUTO: 31.6 % (ref 37–47)
HDLC SERPL-MCNC: 39 MG/DL
HGB BLD-MCNC: 9.6 G/DL (ref 12–16)
HGB RETIC QN AUTO: 24.2 PG/CELL (ref 29–35)
HIV 1+2 AB+HIV1 P24 AG SERPL QL IA: NORMAL
IMM GRANULOCYTES # BLD AUTO: 0.01 K/UL (ref 0–0.11)
IMM GRANULOCYTES NFR BLD AUTO: 0.3 % (ref 0–0.9)
IMM RETICS NFR: 17.5 % (ref 9.3–17.4)
INR PPP: 1.31 (ref 0.87–1.13)
IRON SATN MFR SERPL: 12 % (ref 15–55)
IRON SERPL-MCNC: 38 UG/DL (ref 40–170)
LDH SERPL L TO P-CCNC: 202 U/L (ref 107–266)
LDLC SERPL CALC-MCNC: 60 MG/DL
LYMPHOCYTES # BLD AUTO: 0.62 K/UL (ref 1–4.8)
LYMPHOCYTES NFR BLD: 20.1 % (ref 22–41)
MAGNESIUM SERPL-MCNC: 1.6 MG/DL (ref 1.5–2.5)
MAGNESIUM SERPL-MCNC: 1.6 MG/DL (ref 1.5–2.5)
MCH RBC QN AUTO: 23.8 PG (ref 27–33)
MCHC RBC AUTO-ENTMCNC: 30.4 G/DL (ref 33.6–35)
MCV RBC AUTO: 78.2 FL (ref 81.4–97.8)
METHADONE UR QL SCN: NEGATIVE
MONOCYTES # BLD AUTO: 0.28 K/UL (ref 0–0.85)
MONOCYTES NFR BLD AUTO: 9.1 % (ref 0–13.4)
NEUTROPHILS # BLD AUTO: 2.07 K/UL (ref 2–7.15)
NEUTROPHILS NFR BLD: 67.2 % (ref 44–72)
NRBC # BLD AUTO: 0 K/UL
NRBC BLD-RTO: 0 /100 WBC
OPIATES UR QL SCN: POSITIVE
OXYCODONE UR QL SCN: NEGATIVE
PCP UR QL SCN: NEGATIVE
PHOSPHATE SERPL-MCNC: 2.6 MG/DL (ref 2.5–4.5)
PHOSPHATE SERPL-MCNC: 2.6 MG/DL (ref 2.5–4.5)
PLATELET # BLD AUTO: 67 K/UL (ref 164–446)
POTASSIUM SERPL-SCNC: 3.7 MMOL/L (ref 3.6–5.5)
PROPOXYPH UR QL SCN: NEGATIVE
PROT SERPL-MCNC: 6.1 G/DL (ref 6–8.2)
PROTHROMBIN TIME: 16.7 SEC (ref 12–14.6)
RBC # BLD AUTO: 4.04 M/UL (ref 4.2–5.4)
RETICS # AUTO: 0.07 M/UL (ref 0.04–0.06)
RETICS/RBC NFR: 1.7 % (ref 0.8–2.1)
SARS-COV-2 RNA RESP QL NAA+PROBE: NOTDETECTED
SODIUM SERPL-SCNC: 134 MMOL/L (ref 135–145)
SPECIMEN SOURCE: NORMAL
TIBC SERPL-MCNC: 326 UG/DL (ref 250–450)
TRANSFERRIN SERPL-MCNC: 270 MG/DL (ref 200–370)
TRIGL SERPL-MCNC: 111 MG/DL (ref 0–149)
TSH SERPL DL<=0.005 MIU/L-ACNC: 1.2 UIU/ML (ref 0.38–5.33)
UIBC SERPL-MCNC: 288 UG/DL (ref 110–370)
VIT B12 SERPL-MCNC: 915 PG/ML (ref 211–911)
WBC # BLD AUTO: 3.1 K/UL (ref 4.8–10.8)

## 2020-09-26 PROCEDURE — 84100 ASSAY OF PHOSPHORUS: CPT | Mod: 91

## 2020-09-26 PROCEDURE — 85046 RETICYTE/HGB CONCENTRATE: CPT

## 2020-09-26 PROCEDURE — 85025 COMPLETE CBC W/AUTO DIFF WBC: CPT

## 2020-09-26 PROCEDURE — 83550 IRON BINDING TEST: CPT

## 2020-09-26 PROCEDURE — 82746 ASSAY OF FOLIC ACID SERUM: CPT

## 2020-09-26 PROCEDURE — G0475 HIV COMBINATION ASSAY: HCPCS

## 2020-09-26 PROCEDURE — 700105 HCHG RX REV CODE 258: Performed by: STUDENT IN AN ORGANIZED HEALTH CARE EDUCATION/TRAINING PROGRAM

## 2020-09-26 PROCEDURE — 83010 ASSAY OF HAPTOGLOBIN QUANT: CPT

## 2020-09-26 PROCEDURE — 84443 ASSAY THYROID STIM HORMONE: CPT

## 2020-09-26 PROCEDURE — 83615 LACTATE (LD) (LDH) ENZYME: CPT

## 2020-09-26 PROCEDURE — 83540 ASSAY OF IRON: CPT

## 2020-09-26 PROCEDURE — 700102 HCHG RX REV CODE 250 W/ 637 OVERRIDE(OP): Performed by: STUDENT IN AN ORGANIZED HEALTH CARE EDUCATION/TRAINING PROGRAM

## 2020-09-26 PROCEDURE — A9270 NON-COVERED ITEM OR SERVICE: HCPCS | Performed by: STUDENT IN AN ORGANIZED HEALTH CARE EDUCATION/TRAINING PROGRAM

## 2020-09-26 PROCEDURE — 80053 COMPREHEN METABOLIC PANEL: CPT

## 2020-09-26 PROCEDURE — 86880 COOMBS TEST DIRECT: CPT

## 2020-09-26 PROCEDURE — 770020 HCHG ROOM/CARE - TELE (206)

## 2020-09-26 PROCEDURE — 82728 ASSAY OF FERRITIN: CPT

## 2020-09-26 PROCEDURE — 83735 ASSAY OF MAGNESIUM: CPT | Mod: 91

## 2020-09-26 PROCEDURE — 82607 VITAMIN B-12: CPT

## 2020-09-26 PROCEDURE — 700111 HCHG RX REV CODE 636 W/ 250 OVERRIDE (IP): Performed by: STUDENT IN AN ORGANIZED HEALTH CARE EDUCATION/TRAINING PROGRAM

## 2020-09-26 PROCEDURE — 80061 LIPID PANEL: CPT

## 2020-09-26 PROCEDURE — 76775 US EXAM ABDO BACK WALL LIM: CPT

## 2020-09-26 PROCEDURE — 80307 DRUG TEST PRSMV CHEM ANLYZR: CPT

## 2020-09-26 PROCEDURE — 82962 GLUCOSE BLOOD TEST: CPT | Mod: 91

## 2020-09-26 PROCEDURE — 85610 PROTHROMBIN TIME: CPT

## 2020-09-26 PROCEDURE — 84466 ASSAY OF TRANSFERRIN: CPT

## 2020-09-26 PROCEDURE — 36415 COLL VENOUS BLD VENIPUNCTURE: CPT

## 2020-09-26 PROCEDURE — 99233 SBSQ HOSP IP/OBS HIGH 50: CPT | Mod: GC | Performed by: HOSPITALIST

## 2020-09-26 RX ORDER — DIPHENHYDRAMINE HCL 25 MG
25 TABLET ORAL ONCE
Status: COMPLETED | OUTPATIENT
Start: 2020-09-26 | End: 2020-09-26

## 2020-09-26 RX ORDER — FOLIC ACID 1 MG/1
1 TABLET ORAL DAILY
Status: DISCONTINUED | OUTPATIENT
Start: 2020-09-26 | End: 2020-09-30 | Stop reason: HOSPADM

## 2020-09-26 RX ORDER — INSULIN GLARGINE 100 [IU]/ML
20 INJECTION, SOLUTION SUBCUTANEOUS EVERY EVENING
Status: DISCONTINUED | OUTPATIENT
Start: 2020-09-26 | End: 2020-09-30 | Stop reason: HOSPADM

## 2020-09-26 RX ORDER — DEXTROSE MONOHYDRATE 25 G/50ML
50 INJECTION, SOLUTION INTRAVENOUS
Status: DISCONTINUED | OUTPATIENT
Start: 2020-09-26 | End: 2020-09-30 | Stop reason: HOSPADM

## 2020-09-26 RX ORDER — THIAMINE MONONITRATE (VIT B1) 100 MG
100 TABLET ORAL DAILY
Status: DISCONTINUED | OUTPATIENT
Start: 2020-09-26 | End: 2020-09-30 | Stop reason: HOSPADM

## 2020-09-26 RX ORDER — SODIUM CHLORIDE, SODIUM LACTATE, POTASSIUM CHLORIDE, CALCIUM CHLORIDE 600; 310; 30; 20 MG/100ML; MG/100ML; MG/100ML; MG/100ML
INJECTION, SOLUTION INTRAVENOUS CONTINUOUS
Status: DISCONTINUED | OUTPATIENT
Start: 2020-09-26 | End: 2020-09-27

## 2020-09-26 RX ORDER — AMLODIPINE BESYLATE 5 MG/1
5 TABLET ORAL
Status: DISCONTINUED | OUTPATIENT
Start: 2020-09-26 | End: 2020-09-30 | Stop reason: HOSPADM

## 2020-09-26 RX ORDER — M-VIT,TX,IRON,MINS/CALC/FOLIC 27MG-0.4MG
1 TABLET ORAL DAILY
Status: DISCONTINUED | OUTPATIENT
Start: 2020-09-26 | End: 2020-09-30 | Stop reason: HOSPADM

## 2020-09-26 RX ORDER — DIPHENHYDRAMINE HYDROCHLORIDE 50 MG/ML
25 INJECTION INTRAMUSCULAR; INTRAVENOUS EVERY 4 HOURS PRN
Status: DISCONTINUED | OUTPATIENT
Start: 2020-09-26 | End: 2020-09-30 | Stop reason: HOSPADM

## 2020-09-26 RX ADMIN — MULTIPLE VITAMINS W/ MINERALS TAB 1 TABLET: TAB at 09:37

## 2020-09-26 RX ADMIN — MORPHINE SULFATE 2 MG: 4 INJECTION INTRAVENOUS at 04:51

## 2020-09-26 RX ADMIN — DIPHENHYDRAMINE HYDROCHLORIDE 25 MG: 50 INJECTION, SOLUTION INTRAMUSCULAR; INTRAVENOUS at 21:08

## 2020-09-26 RX ADMIN — DOCUSATE SODIUM 50 MG AND SENNOSIDES 8.6 MG 2 TABLET: 8.6; 5 TABLET, FILM COATED ORAL at 04:54

## 2020-09-26 RX ADMIN — Medication 100 MG: at 09:37

## 2020-09-26 RX ADMIN — DIPHENHYDRAMINE HYDROCHLORIDE 25 MG: 50 INJECTION, SOLUTION INTRAMUSCULAR; INTRAVENOUS at 16:13

## 2020-09-26 RX ADMIN — INSULIN LISPRO 1 UNITS: 100 INJECTION, SOLUTION INTRAVENOUS; SUBCUTANEOUS at 12:08

## 2020-09-26 RX ADMIN — GABAPENTIN 600 MG: 300 CAPSULE ORAL at 17:40

## 2020-09-26 RX ADMIN — DOCUSATE SODIUM 50 MG AND SENNOSIDES 8.6 MG 2 TABLET: 8.6; 5 TABLET, FILM COATED ORAL at 17:40

## 2020-09-26 RX ADMIN — SODIUM CHLORIDE, POTASSIUM CHLORIDE, SODIUM LACTATE AND CALCIUM CHLORIDE: 600; 310; 30; 20 INJECTION, SOLUTION INTRAVENOUS at 16:21

## 2020-09-26 RX ADMIN — BUPROPION HYDROCHLORIDE 100 MG: 100 TABLET, FILM COATED ORAL at 04:51

## 2020-09-26 RX ADMIN — FOLIC ACID 1 MG: 1 TABLET ORAL at 09:37

## 2020-09-26 RX ADMIN — DIPHENHYDRAMINE HYDROCHLORIDE 25 MG: 25 TABLET ORAL at 05:24

## 2020-09-26 RX ADMIN — SODIUM CHLORIDE, POTASSIUM CHLORIDE, SODIUM LACTATE AND CALCIUM CHLORIDE: 600; 310; 30; 20 INJECTION, SOLUTION INTRAVENOUS at 09:53

## 2020-09-26 RX ADMIN — MORPHINE SULFATE 2 MG: 4 INJECTION INTRAVENOUS at 16:07

## 2020-09-26 RX ADMIN — SODIUM CHLORIDE, POTASSIUM CHLORIDE, SODIUM LACTATE AND CALCIUM CHLORIDE: 600; 310; 30; 20 INJECTION, SOLUTION INTRAVENOUS at 22:51

## 2020-09-26 RX ADMIN — INSULIN LISPRO 3 UNITS: 100 INJECTION, SOLUTION INTRAVENOUS; SUBCUTANEOUS at 09:36

## 2020-09-26 RX ADMIN — SODIUM CHLORIDE, POTASSIUM CHLORIDE, SODIUM LACTATE AND CALCIUM CHLORIDE: 600; 310; 30; 20 INJECTION, SOLUTION INTRAVENOUS at 06:21

## 2020-09-26 RX ADMIN — GABAPENTIN 600 MG: 300 CAPSULE ORAL at 04:51

## 2020-09-26 RX ADMIN — AMLODIPINE BESYLATE 5 MG: 5 TABLET ORAL at 09:37

## 2020-09-26 RX ADMIN — MORPHINE SULFATE 2 MG: 4 INJECTION INTRAVENOUS at 21:01

## 2020-09-26 RX ADMIN — INSULIN GLARGINE 20 UNITS: 100 INJECTION, SOLUTION SUBCUTANEOUS at 17:36

## 2020-09-26 ASSESSMENT — ENCOUNTER SYMPTOMS
WEIGHT LOSS: 0
COUGH: 0
MEMORY LOSS: 0
FEVER: 0
SINUS PAIN: 0
TINGLING: 0
SORE THROAT: 0
VOMITING: 0
PALPITATIONS: 0
BACK PAIN: 1
HEADACHES: 1
TREMORS: 0
NAUSEA: 1
NERVOUS/ANXIOUS: 0
DEPRESSION: 0
ABDOMINAL PAIN: 1
BRUISES/BLEEDS EASILY: 0
WEAKNESS: 0
DIARRHEA: 0
CHILLS: 0
CONSTIPATION: 0
WHEEZING: 0
SHORTNESS OF BREATH: 0
NECK PAIN: 0
DIZZINESS: 0

## 2020-09-26 ASSESSMENT — COGNITIVE AND FUNCTIONAL STATUS - GENERAL
MOBILITY SCORE: 24
SUGGESTED CMS G CODE MODIFIER MOBILITY: CH
DAILY ACTIVITIY SCORE: 24
SUGGESTED CMS G CODE MODIFIER DAILY ACTIVITY: CH

## 2020-09-26 ASSESSMENT — LIFESTYLE VARIABLES
HAVE PEOPLE ANNOYED YOU BY CRITICIZING YOUR DRINKING: YES
ON A TYPICAL DAY WHEN YOU DRINK ALCOHOL HOW MANY DRINKS DO YOU HAVE: 5
EVER FELT BAD OR GUILTY ABOUT YOUR DRINKING: YES
DOES PATIENT WANT TO STOP DRINKING: YES
EVER HAD A DRINK FIRST THING IN THE MORNING TO STEADY YOUR NERVES TO GET RID OF A HANGOVER: YES
TOTAL SCORE: 4
TOTAL SCORE: 4
CONSUMPTION TOTAL: POSITIVE
HAVE YOU EVER FELT YOU SHOULD CUT DOWN ON YOUR DRINKING: YES
HOW MANY TIMES IN THE PAST YEAR HAVE YOU HAD 5 OR MORE DRINKS IN A DAY: 56
DOES PATIENT WANT TO TALK TO SOMEONE ABOUT QUITTING: YES
TOTAL SCORE: 4
ALCOHOL_USE: YES
AVERAGE NUMBER OF DAYS PER WEEK YOU HAVE A DRINK CONTAINING ALCOHOL: 2

## 2020-09-26 ASSESSMENT — FIBROSIS 4 INDEX: FIB4 SCORE: 4.92

## 2020-09-26 ASSESSMENT — PAIN DESCRIPTION - PAIN TYPE
TYPE: ACUTE PAIN

## 2020-09-26 ASSESSMENT — PATIENT HEALTH QUESTIONNAIRE - PHQ9
2. FEELING DOWN, DEPRESSED, IRRITABLE, OR HOPELESS: NOT AT ALL
SUM OF ALL RESPONSES TO PHQ9 QUESTIONS 1 AND 2: 0
SUM OF ALL RESPONSES TO PHQ9 QUESTIONS 1 AND 2: 0
1. LITTLE INTEREST OR PLEASURE IN DOING THINGS: NOT AT ALL
1. LITTLE INTEREST OR PLEASURE IN DOING THINGS: NOT AT ALL
2. FEELING DOWN, DEPRESSED, IRRITABLE, OR HOPELESS: NOT AT ALL

## 2020-09-26 NOTE — ASSESSMENT & PLAN NOTE
Gallstones noted on RUQ US. Patient reports epigastric pain after eating. Discussed with radiologist who read US - not concerned about cholecystitis - no signs of inflammation or gallbladder wall thickening.

## 2020-09-26 NOTE — PROGRESS NOTES
Daily Progress Note:     Date of Service: 9/26/2020  Primary Team: [facility] White Team   Attending: KRYSTA Brasher M.D.   Senior Resident: Dr. Larose  Intern: Dr. Adams  Contact:  [phone]    Chief Complaint:   1 day of severe epigastric pain radiating to back    Subjective:   Patient seen resting comfortably in bed.  No acute complaints overnight.  Patient also reports epigastric pain with eating. Confirmed that patient reports occasional alcohol use over the past month, but claims to be sober in June, July, August.  Clarify patient's diabetes regimen, uses Lantus 40 units at night.  NovoLog 10 units in the morning when she wakes up.  Blood sugars have been running in 400s to off the scale.  Patient reports current tobacco use of 3 cigarettes a day down from 2 packs/day, encouraged patient to quit smoking as this is a contributing factor to pancreatitis.    Consultants/Specialty:    Review of Systems:  Review of Systems   Constitutional: Negative for chills, fever, malaise/fatigue and weight loss.   HENT: Negative for congestion, ear discharge, ear pain, sinus pain and sore throat.    Respiratory: Negative for cough, shortness of breath and wheezing.    Cardiovascular: Negative for chest pain, palpitations and leg swelling.   Gastrointestinal: Positive for abdominal pain and nausea. Negative for constipation, diarrhea and vomiting.   Genitourinary: Negative for dysuria, frequency, hematuria and urgency.   Musculoskeletal: Positive for back pain. Negative for joint pain and neck pain.   Skin: Negative for itching and rash.   Neurological: Positive for headaches. Negative for dizziness, tingling, tremors and weakness.   Endo/Heme/Allergies: Does not bruise/bleed easily.   Psychiatric/Behavioral: Negative for depression and memory loss. The patient is not nervous/anxious.        Objective Data:   Physical Exam:   Vitals:   Temp:  [36.2 °C (97.2 °F)-36.8 °C (98.2 °F)] 36.7 °C (98 °F)  Pulse:  [] 103  Resp:   [11-26] 16  BP: (144-224)/() 159/84  SpO2:  [89 %-99 %] 90 %   CREATE MACRO BE SURE THAT THIS IS PERTINENT TO YOUR PATIENT!  Physical Exam  Constitutional:       Appearance: Normal appearance.   HENT:      Head: Normocephalic and atraumatic.      Right Ear: External ear normal.      Left Ear: External ear normal.      Nose: Nose normal.      Mouth/Throat:      Mouth: Mucous membranes are moist.   Eyes:      Extraocular Movements: Extraocular movements intact.      Pupils: Pupils are equal, round, and reactive to light.   Neck:      Musculoskeletal: Normal range of motion. No neck rigidity or muscular tenderness.   Cardiovascular:      Rate and Rhythm: Normal rate and regular rhythm.      Pulses: Normal pulses.      Heart sounds: Normal heart sounds.   Pulmonary:      Effort: Pulmonary effort is normal.      Breath sounds: Normal breath sounds.   Abdominal:      General: Abdomen is flat. Bowel sounds are normal.      Palpations: Abdomen is soft. There is no mass.      Tenderness: There is abdominal tenderness.      Hernia: No hernia is present.      Comments: Diffuse tenderness in abdomen, notable tenderness in RUQ   Musculoskeletal: Normal range of motion.         General: No swelling.      Right lower leg: No edema.      Left lower leg: No edema.   Skin:     General: Skin is warm and dry.      Capillary Refill: Capillary refill takes less than 2 seconds.   Neurological:      General: No focal deficit present.      Mental Status: She is alert and oriented to person, place, and time.   Psychiatric:         Mood and Affect: Mood normal.         Behavior: Behavior normal.           Labs:   Recent Results (from the past 24 hour(s))   CBC WITH DIFFERENTIAL    Collection Time: 09/25/20  1:26 PM   Result Value Ref Range    WBC 3.0 (L) 4.8 - 10.8 K/uL    RBC 4.32 4.20 - 5.40 M/uL    Hemoglobin 10.4 (L) 12.0 - 16.0 g/dL    Hematocrit 32.3 (L) 37.0 - 47.0 %    MCV 74.8 (L) 81.4 - 97.8 fL    MCH 24.1 (L) 27.0 - 33.0 pg     MCHC 32.2 (L) 33.6 - 35.0 g/dL    RDW 44.8 35.9 - 50.0 fL    Platelet Count 73 (L) 164 - 446 K/uL    Neutrophils-Polys 69.60 44.00 - 72.00 %    Lymphocytes 17.60 (L) 22.00 - 41.00 %    Monocytes 8.80 0.00 - 13.40 %    Eosinophils 2.70 0.00 - 6.90 %    Basophils 1.00 0.00 - 1.80 %    Immature Granulocytes 0.30 0.00 - 0.90 %    Nucleated RBC 0.00 /100 WBC    Neutrophils (Absolute) 2.06 2.00 - 7.15 K/uL    Lymphs (Absolute) 0.52 (L) 1.00 - 4.80 K/uL    Monos (Absolute) 0.26 0.00 - 0.85 K/uL    Eos (Absolute) 0.08 0.00 - 0.51 K/uL    Baso (Absolute) 0.03 0.00 - 0.12 K/uL    Immature Granulocytes (abs) 0.01 0.00 - 0.11 K/uL    NRBC (Absolute) 0.00 K/uL   COMP METABOLIC PANEL    Collection Time: 09/25/20  1:26 PM   Result Value Ref Range    Sodium 134 (L) 135 - 145 mmol/L    Potassium 4.4 3.6 - 5.5 mmol/L    Chloride 101 96 - 112 mmol/L    Co2 23 20 - 33 mmol/L    Anion Gap 10.0 7.0 - 16.0    Glucose 326 (H) 65 - 99 mg/dL    Bun 17 8 - 22 mg/dL    Creatinine 0.77 0.50 - 1.40 mg/dL    Calcium 9.2 8.5 - 10.5 mg/dL    AST(SGOT) 29 12 - 45 U/L    ALT(SGPT) 21 2 - 50 U/L    Alkaline Phosphatase 88 30 - 99 U/L    Total Bilirubin 0.7 0.1 - 1.5 mg/dL    Albumin 3.9 3.2 - 4.9 g/dL    Total Protein 7.0 6.0 - 8.2 g/dL    Globulin 3.1 1.9 - 3.5 g/dL    A-G Ratio 1.3 g/dL   LIPASE    Collection Time: 09/25/20  1:26 PM   Result Value Ref Range    Lipase 348 (H) 11 - 82 U/L   ESTIMATED GFR    Collection Time: 09/25/20  1:26 PM   Result Value Ref Range    GFR If African American >60 >60 mL/min/1.73 m 2    GFR If Non African American >60 >60 mL/min/1.73 m 2   URINALYSIS,CULTURE IF INDICATED    Collection Time: 09/25/20  2:27 PM    Specimen: Urine   Result Value Ref Range    Color Yellow     Character Clear     Specific Gravity 1.013 <1.035    Ph 6.0 5.0 - 8.0    Glucose 500 (A) Negative mg/dL    Ketones Negative Negative mg/dL    Protein Negative Negative mg/dL    Bilirubin Negative Negative    Urobilinogen, Urine 0.2 Negative     Nitrite Negative Negative    Leukocyte Esterase Negative Negative    Occult Blood Negative Negative    Micro Urine Req see below    ACCU-CHEK GLUCOSE    Collection Time: 20  8:45 PM   Result Value Ref Range    Glucose - Accu-Ck 216 (H) 65 - 99 mg/dL   EKG    Collection Time: 20  9:03 PM   Result Value Ref Range    Report       [facility]    Test Date:  2020  Pt Name:    PADMAJA FLETCHER            Department: ER  MRN:        [id number]                      Room:       T711  Gender:     Female                       Technician: LORENZA  :        1960                   Requested By:SULTAN JOSÉ BRISCOE  Order #:    [id number]                    Reading MD: Pablo Casanova MD    Measurements  Intervals                                Axis  Rate:       83                           P:          84  NH:         160                          QRS:        50  QRSD:       88                           T:          42  QT:         420  QTc:        494    Interpretive Statements  SINUS RHYTHM  LOW VOLTAGE IN FRONTAL LEADS  BORDERLINE PROLONGED QT INTERVAL  Compared to ECG 2020 05:09:05  No significant changes  Electronically Signed On 2020 21:24:42 PDT by Pablo Casanova MD     COVID/SARS CoV-2 PCR    Collection Time: 20 10:50 PM    Specimen: Nasopharyngeal; Respirate   Result Value Ref Range    COVID Order Status Received    SARS-CoV-2, PCR (In-House)    Collection Time: 20 10:50 PM   Result Value Ref Range    SARS-CoV-2 Source NP Swab    Magnesium    Collection Time: 20  7:18 AM   Result Value Ref Range    Magnesium 1.6 1.5 - 2.5 mg/dL   PHOSPHORUS    Collection Time: 20  7:18 AM   Result Value Ref Range    Phosphorus 2.6 2.5 - 4.5 mg/dL   DIAGNOSTIC ALCOHOL    Collection Time: 20  7:18 AM   Result Value Ref Range    Diagnostic Alcohol <10.1 0.0 - 10.1 mg/dL   CBC with Differential    Collection Time: 20  7:18 AM   Result Value Ref Range    WBC 3.1 (L) 4.8 -  10.8 K/uL    RBC 4.04 (L) 4.20 - 5.40 M/uL    Hemoglobin 9.6 (L) 12.0 - 16.0 g/dL    Hematocrit 31.6 (L) 37.0 - 47.0 %    MCV 78.2 (L) 81.4 - 97.8 fL    MCH 23.8 (L) 27.0 - 33.0 pg    MCHC 30.4 (L) 33.6 - 35.0 g/dL    RDW 47.8 35.9 - 50.0 fL    Platelet Count 67 (L) 164 - 446 K/uL    Neutrophils-Polys 67.20 44.00 - 72.00 %    Lymphocytes 20.10 (L) 22.00 - 41.00 %    Monocytes 9.10 0.00 - 13.40 %    Eosinophils 2.30 0.00 - 6.90 %    Basophils 1.00 0.00 - 1.80 %    Immature Granulocytes 0.30 0.00 - 0.90 %    Nucleated RBC 0.00 /100 WBC    Neutrophils (Absolute) 2.07 2.00 - 7.15 K/uL    Lymphs (Absolute) 0.62 (L) 1.00 - 4.80 K/uL    Monos (Absolute) 0.28 0.00 - 0.85 K/uL    Eos (Absolute) 0.07 0.00 - 0.51 K/uL    Baso (Absolute) 0.03 0.00 - 0.12 K/uL    Immature Granulocytes (abs) 0.01 0.00 - 0.11 K/uL    NRBC (Absolute) 0.00 K/uL   Comp Metabolic Panel (CMP)    Collection Time: 09/26/20  7:18 AM   Result Value Ref Range    Sodium 134 (L) 135 - 145 mmol/L    Potassium 3.7 3.6 - 5.5 mmol/L    Chloride 100 96 - 112 mmol/L    Co2 24 20 - 33 mmol/L    Anion Gap 10.0 7.0 - 16.0    Glucose 176 (H) 65 - 99 mg/dL    Bun 10 8 - 22 mg/dL    Creatinine 0.68 0.50 - 1.40 mg/dL    Calcium 8.6 8.5 - 10.5 mg/dL    AST(SGOT) 25 12 - 45 U/L    ALT(SGPT) 20 2 - 50 U/L    Alkaline Phosphatase 72 30 - 99 U/L    Total Bilirubin 0.7 0.1 - 1.5 mg/dL    Albumin 3.4 3.2 - 4.9 g/dL    Total Protein 6.1 6.0 - 8.2 g/dL    Globulin 2.7 1.9 - 3.5 g/dL    A-G Ratio 1.3 g/dL   MAGNESIUM    Collection Time: 09/26/20  7:18 AM   Result Value Ref Range    Magnesium 1.6 1.5 - 2.5 mg/dL   PHOSPHORUS    Collection Time: 09/26/20  7:18 AM   Result Value Ref Range    Phosphorus 2.6 2.5 - 4.5 mg/dL   HIV AG/AB COMBO ASSAY DIAGNOSTIC    Collection Time: 09/26/20  7:18 AM   Result Value Ref Range    HIV Ag/Ab Combo Assay Non-Reactive Non Reactive   Lipid Profile    Collection Time: 09/26/20  7:18 AM   Result Value Ref Range    Cholesterol,Tot 121 100 - 199 mg/dL     Triglycerides 111 0 - 149 mg/dL    HDL 39 (A) >=40 mg/dL    LDL 60 <100 mg/dL   ESTIMATED GFR    Collection Time: 09/26/20  7:18 AM   Result Value Ref Range    GFR If African American >60 >60 mL/min/1.73 m 2    GFR If Non African American >60 >60 mL/min/1.73 m 2       Imaging:   DX-CHEST-2 VIEWS   Final Result      No acute cardiopulmonary abnormality identified.      US-RUQ   Final Result      Gallstones within a distended gallbladder.      Findings of cirrhosis.      Prominence of the common duct measuring 8 mm. Further evaluation can be obtained with MRCP as clinically indicated.      Mild right hydronephrosis.      Small amount of ascites.                  [id number]    (Results Pending)       Problem Representation:  Patient is a 60-year-old female who presented with 1 day history of worsening epigastric pain radiating to the back. PMH notable for uncontrolled type 2 diabetes on insulin, previous admission for acute alcoholic pancreatitis 2/13/2019, hypertension. RUQ ultrasound notable for gallstones in gallbladder with dilated common bile duct at 8 mm.  She will be admitted for further work-up of epigastric pain likely recurrence of acute alcoholic pancreatitis.    * Pancreatitis- (present on admission)  Assessment & Plan  Patient presented to hospital with 1 day history of worsening epigastric pain radiating to back. Reports previous episode of alcoholic pancreatitis within the last year. Lipase 348/ Lipid panel WNL. AST/ALT WNL. Patient reports excessive alcohol use in past few weeks.  Likely related to alcohol use given absence of liver enzymes and mildly dilated common bile duct.    -Lipase found to be elevated at 348 on initial check.  -RUQ U/S showed gallstones in gallbladder with dilated common bile duct at 8mm in addition to signs of cirrhosis.  - ml/hr.  -Started patient on clear liquid diet. Will advance as tolerated.  -Started patient on IV morphine for pain control.  -Started on zofran  for n/v.  -Ordered daily weights and strict I/O's.  -MRCP ordered, plan to consult Dr. Belkis BARBER if MRCP results suggestive of blockage that needs ERCP    Gallstones  Assessment & Plan  Gallstones noted on RUQ US. Patient reports epigastric pain after eating.     Plan:  - If MRCP negative, will consider HIDA scan vs general surgery consult.    DM2 (diabetes mellitus, type 2) (HCC)- (present on admission)  Assessment & Plan  Patient with history of insulin dependent DM2. Poor control - BS in 400s at home, last Ha1C 12.6 8/20. Home insulin regimen lantus 40 in evening and novolog 10 in morning. Blood sugar found to be 326 on initial check.    -Insulin correctional scale and lantus 20 units in the evening.    HTN (hypertension)- (present on admission)  Assessment & Plan  Pt reports previous hx of HTN, but is unsure which medications she takes for it. Patient's blood pressure elevated at 223/101 on admission, stable during day.    -Likely worsened secondary to pain and pancreatitis.  -Held home furosemide and aldactone.   -Started patient on PRN IV labetalol for blood pressure control. Started on amlodipine 5 mg.    Pancytopenia (HCC)- (present on admission)  Assessment & Plan  Patient has history of anemia, low platelets secondary to history of alcohol use.  History of low platelets, low Hgb, low MCV, increased lymphocytes, neurophils    -Hgb stable at 10.4 and patient denies any episodes of bleeding.  -Held pharmacologic DVT prophylaxis with history of GI bleeding.    - obtain: LDH, reticulocyte count, haptoglobin, direct Garcia, iron panel    Tobacco dependence- (present on admission)  Assessment & Plan  Patient reports 40 year history of smoking. Says that she used to smoke ~2PPD, but is currently smoking 3 cigarettes at this time.    -Started on nicotine replacement therapy.  -Counseled patient on cessation.    Hydronephrosis  Assessment & Plan  RUQ U/S showed hydronephrosis.    -Ordered bladder scan for further  evaluation.  -UA unremarkable.    Alcohol abuse- (present on admission)  Assessment & Plan  Patient has history of heavy alcohol use. Says that she had 2 beers on Wednesday and had a few sips of a cocktail the weekend before. Suspect excessive alcohol use. Alcohol level negative.    -Educated patient on importance of quitting alcohol and mortality.    Chest pain- (present on admission)  Assessment & Plan  Patient reports episode of chest pain with exertion without radiation on Wednesday. Denies any previous history of CAD.    -Ordered EKG for initial evaluation, which showed no acute changes.

## 2020-09-26 NOTE — ASSESSMENT & PLAN NOTE
Patient has history of anemia, low platelets secondary to history of alcohol use.  History of low platelets, low Hgb, low MCV, increased lymphocytes, neurophils. Low platelets likely due to splenomegaly    -Hgb 8.4, history of chronic anemia. Anemia workup suggestive of iron deficiency anemia (low iron, % saturation,     Plan:  - On iron supplement.

## 2020-09-26 NOTE — ASSESSMENT & PLAN NOTE
Pt reports previous hx of HTN, but is unsure which medications she takes for it. Patient's blood pressure elevated at 223/101 on admission, stable during day.  Patient's blood pressure appears to have stabilized.  On 9/30/2020, it was 138/67.    Plan  Given that the blood pressure of the patient has stabilized, on discharge, she will be recommended to take metoprolol for blood pressure control and A. fib

## 2020-09-26 NOTE — ASSESSMENT & PLAN NOTE
Patient with history of insulin dependent DM2. Poor control - BS in 400s at home, last Ha1C 12.6 8/20. Home insulin regimen lantus 40 in evening and novolog 10 in morning. Blood sugar found to be 326 on initial check.    -Insulin correctional scale and lantus 20 units in the evening.

## 2020-09-26 NOTE — NON-PROVIDER
[facility] Interval Note    Name Demetrice Jaime     1960   Age/Sex 60 y.o. female   MRN [id number]   Code Status Full code     After 5PM or if no immediate response to page, please call for cross-coverage  Attending/Team: Dr. Brasher Call [phone] to page   1st Call - Day Intern (R1):   Dr. Adams 2nd Call - Day Sr. Resident (R2/R3):   Dr. Larose     Chief complaint/ reason for interval visit (Primary Diagnosis)     Acute on chronic pancreatitis w/ symptomatic cholelithiasis      Interval Problem Daily Status Update    Vitals stable O/N. Pt was initially hypertensive on presentation to the ED. With pain control, this has improved. UDS positive for opioids, negative for methamphetamine. Pt states that her epigastric pain has improved with pain medications, but not resolved. She denies nausea, vomiting, headache, diarrhea, constipation. She is tolerating a clear liquid diet well. MRCP scheduled for tomorrow.     RUQ US revealed gallstones and a distended gallbladder with a dilated CBD 8 mm. Pt reports intermittent post-prandial RUQ pain after consuming fatty meals. Upon further discussion with radiologist, there is less concern for acute cholecystitis. RUQ also revealed right sided hydronephrosis and small ascites with cirrhotic liver. Will assess this further with b/l renal ultrasound.         ROS  Constitutional: Negative for chills, fever, malaise/fatigue and weight loss.   HENT: Negative for congestion, ear discharge, ear pain, sinus pain and sore throat.    Respiratory: Negative for cough, shortness of breath and wheezing.    Cardiovascular: Negative for chest pain, palpitations and leg swelling.   Gastrointestinal: Negative for constipation, diarrhea and vomiting.   Genitourinary: Negative for dysuria, frequency, hematuria and urgency.   Musculoskeletal: Positive for mid to right sided lower back pain likely related to acute pancreatitis. Negative for  joint pain and neck pain.   Skin: Negative for itching and rash.   Neurological: Negative for dizziness, tingling, tremors and weakness.   Endo/Heme/Allergies: Does not bruise/bleed easily.   Psychiatric/Behavioral: Negative for depression and memory loss. The patient is not nervous/anxious  Consultants/Specialty  GI    Disposition  Inpatient    Quality Measures  Quality-Core Measures        Physical Exam       Vitals:    09/25/20 2339 09/26/20 0340 09/26/20 0700 09/26/20 1234   BP: 159/73 144/85 159/84 142/83   Pulse: 93 99 (!) 103 98   Resp: 19 17 16 18   Temp: 36.4 °C (97.6 °F) 36.8 °C (98.2 °F) 36.7 °C (98 °F) 36.9 °C (98.5 °F)   TempSrc: Temporal Temporal Temporal Temporal   SpO2: 91% 95% 90% 95%   Weight:         Body mass index is 25.99 kg/m².    Oxygen Therapy:  Pulse Oximetry: 95 %, O2 (LPM): 0, O2 Delivery Device: None - Room Air    Physical Exam  Constitutional:       Appearance: Pt is a white female who appears older than stated age. She is resting comfortably, lying flat in her hospital bed. Appears to be in no acute distress. She has one facial piecing and tattoos on her hands and forearms bilaterally.with evidence of former IV drug use. Her frame shows central adiposity with emaciated extremities.   HENT:      Head: Normocephalic and atraumatic.      Nose: Nose normal.      Mouth: Mucous membranes are moist.   Eyes:      Extraocular Movements: Extraocular movements intact.      Pupils: Pupils are equal, round, and reactive to light.   Neck:      Musculoskeletal: Normal range of motion. No neck rigidity or muscular tenderness.   Cardiovascular:      Rate and Rhythm: Normal rate and regular rhythm.      Pulses: Normal pulses.      Heart sounds: Normal heart sounds.   Pulmonary:      Effort: Pulmonary effort is normal.      Breath sounds: Normal breath sounds.   Abdominal:   Abdomen is soft with slight distension and ascites. Normal bowel sounds in all 4 quadrants. Pt has mild  abdominal tenderness in  the mid epigastric, RUQ and RLQ with voluntary guarding. There is no rebound. Negative Jolly's sign. Negative Aba sign. The liver is palpated approximately 3cm below the costal margin. There is palpable splenomegaly when the pt is in the left lateral decubitus position. No ventral or umbilical hernias present.  Musculoskeletal: Normal range of motion.         General: No swelling.      Right lower leg: No edema.      Left lower leg: No edema.   Skin:     General: Skin is warm and dry.      Capillary Refill: Capillary refill takes less than 2 seconds.   Neurological:      General: No focal deficit present.      Mental Status: She is alert and oriented to person, place, and time.   Psychiatric:         Mood and Affect: Mood normal.         Behavior: Behavior normal.        Lab Data Review:  CBC shows pancytopenia with reduced platelets and microcytic anemia. Suspicious for splenic sequestration with hemolysis vs secondary to ETOH.   CMP: Na 134, Glu 176,   Bilirubin, AST, ALT wnl  HIV negative    Hemolysis Panel  Negative direct heidy, LDH, pending haptoglobin  Iron studies  Decreased total iron, normal ferritin, low TS%    9/26/2020  2:26 PM     DX-CHEST-2 VIEWS   Final Result      No acute cardiopulmonary abnormality identified.      US-RUQ   Final Result      Gallstones within a distended gallbladder.      Findings of cirrhosis.      Prominence of the common duct measuring 8 mm. Further evaluation can be obtained with MRCP as clinically indicated.      Mild right hydronephrosis.      Small amount of ascites.                  [id number]    (Results Pending)   US-RENAL    (Results Pending)           Assessment/Plan     # Acute on chronic gallstone vs. acalculous vs. ETOH pancreatitis  #Possible cholecystitis   Pt has h/o chronic pancreatitis due to ETOH abuse, liver cirrhosis with esophageal varices s/p banding and current heavy drinking. Suspicious that acute pancreatitis could be due to ongoing ETOH use.  Lipase was found to be 348 on initial presentation. RUQ US revealed a dilated CBD and cholelithiasis. Pt is also symptomatic for intermittent post-prandial pain in RUQ with fatty meals. Thus, it remains unclear of the etiology of this acute on chronic pancreatitis. MRCP scheduled for tomorrow. If positive for gallstone pancreatitis, will consult with GI for ERCP. If negative, will consider HIDA scan. No evidence of acute cholecystitis per radiologist.  -MRCP tomorrow; appreciate GI recs  -LR at 150mL/h + IV Dilaudid + Benadryl for pain control  -Continue on clear liquid diet; advance to low fat diet as tolerated. Continue Zofran PRN for nausea  - Model for End-Stage Liver Disease (MELD) score is 9; which correlates to a 3.4 year median survival rate after surgery, if indicated     #Pancytopenia with microcytic anemia secondary to ETOH use  Pt has a h/o microcytic anemia, and low platelets likely secondary to ETOH use. However, this could also be due to a hemolytic anemia vs. Iron deficiency. Hemolysis is less likely given negative LDH, normal bilirubin, negative direct heidy. However, reticulocyte studies show elevated absolute count and immature reticulocytes. This would be expected in a hemolytic anemia. Thus, this must be further investigated with haptoglobin and iron studies  - Iron studies show low total iron, normal ferritin, decreased %saturation  -Replete iron and follow pending haptoglobin    #Right sided hydronephrosis  RUQ US showed evidence of right sided hydronephrosis. Pt is asymptomatic and is able to void urine, denies dysuria. UA on admission was unremarkable Pt also has a 40 pack year h/o tobacco use.  - Will obtain Renal US to assess both kidneys   -Post void bladder scan will be obtained     #Hyperglycemia   Pt has h/o poorly controlled insulin dependent T2DM. Pt is not compliant with outpatient insulin regimen. States that her current regimen is 40 Lantus in the evening and only 10 Novolog in  the morning. She states that at times her FSBG > 400. Last a1c was 12.5  -Diabetes education likely needed  - Insulin correction and 20 units of  Lantus QHS  -BG checks q6h

## 2020-09-26 NOTE — ASSESSMENT & PLAN NOTE
Patient presented to hospital with 1 day history of worsening epigastric pain radiating to back. Reports previous episode of alcoholic pancreatitis within the last year. Lipase 348/ Lipid panel WNL. AST/ALT WNL. Patient reports excessive alcohol use in past few weeks.  Likely related to alcohol use given absence of liver enzymes and mildly dilated common bile duct.    -Lipase found to be elevated at 348 on initial check.  -RUQ U/S showed gallstones in gallbladder with dilated common bile duct at 8mm in addition to signs of cirrhosis.  - MRCP as shown mildly distended common bile duct without intrahepatic biliary dilation or visualize intra-or extra duct obstructing lesion   - Slowly restarted on solid diet  -Started patient on IV morphine for pain control.  -Started on zofran for n/v.    Given the MRCP, it is possible that patient had passed a stone that was lodged in the common bile duct and that was finally passed in the stools after a period of time. It is also a possibility that it could have participated in the etiology of pancreatitis, but it is less likely given that on admission with acute sxs, patient's alk phos was within normal limit.    Plan  -Patient has continued to improve.  Her initial epigastric umbilical pain has now subsided.  This subsequent mid lateral abdominal pain that she had has also subsided.

## 2020-09-26 NOTE — ASSESSMENT & PLAN NOTE
Patient reports episode of chest pain with exertion without radiation on Wednesday. Denies any previous history of CAD.    -Ordered EKG for initial evaluation, which showed no acute changes.

## 2020-09-26 NOTE — H&P
History & Physical Note    Date of Admission: 9/26/2020  Admission Status: Inpatient  [facility] Team: [facility] White Team  Attending: No att. providers found   Senior Resident: Dr. Larose  Intern: Dr. Adams  Contact Number: [phone]    Chief Complaint: 1 day of severe epigastric pain radiating to back.     History of Present Illness (HPI): Pt is a 60 year old female with PMH of liver cirrhosis secondary to alcohol use, IV meth use, HTN, and IDDM2 who presented to the hospital with 1 day complaints of nausea and epigastric pain radiating to the back. Patient reports this morning she started having dull epigastric pain which was continuous and 8/10 on the pain scale. Says that pain was initially localized to epigastric region, but has since started radiating to her back. Says that there have been no alleviating factors for the pain besides pain medication. Patient denies recent heavy alcohol use. She says that her last drink was on Wednesday when she had 3 beers. Patient complains of nausea within the last day, but denies vomiting, melena or BRBPR but thinks that she had some blood in her sputum. Patient additionally denies starting any new medications or starting any new herbal supplements. She denies any fevers or chills recently.    Patient says this episode feels similar to when she had pancreatitis within this past year. Pt reports that she was told at that time that the pancreatitis was related to her alcohol use.    She also says that she had an episode of chest pain with exertion 2 days ago and it hasn't occurred since then. She denies any similar episodes in the past and denies any history of heart attacks.    She was found to have a lipase of 348 in the ED started on IVF and pain medications.    Review of Systems:   Review of Systems   Constitutional: Negative for chills and fever.   HENT: Positive for ear discharge and tinnitus. Negative for ear pain and hearing loss.    Eyes: Negative for blurred vision, double  vision and discharge.   Respiratory: Negative for cough, hemoptysis and shortness of breath.    Cardiovascular: Positive for chest pain. Negative for palpitations and leg swelling.   Gastrointestinal: Positive for nausea. Negative for blood in stool, constipation, diarrhea and vomiting.   Genitourinary: Negative for dysuria, frequency, hematuria and urgency.   Musculoskeletal: Positive for back pain.   Skin: Negative for itching and rash.   Neurological: Positive for dizziness and headaches. Negative for focal weakness and weakness.         Past Medical History:   Past Medical History was reviewed with patient.   has a past medical history of Anemia, Anesthesia, Arthritis, Breath shortness, Cirrhosis ([facility]), Dental disorder, Depression, Depression (6/22/2016), Diabetes, Fibromyalgia, Hepatitis C, Hiatus hernia syndrome, Hypertension, Indigestion, Obesity, Other specified disorder of intestines, Pain (12-19-14), Pneumonia, Psychiatric problem, Restless leg syndrome, Sleep apnea, Snoring, Stroke ([facility]) (2011), Unspecified hemorrhagic conditions, and Urinary bladder disorder. She also has no past medical history of CAD (coronary artery disease) or COPD.    Past Surgical History: Past Surgical History was reviewed with patient.   has a past surgical history that includes gyn surgery; gyn surgery (2010); carpal tunnel release (2010); shoulder surgery (2010); other (2010); other; anterior and posterior repair (12/6/2013); enterocele repair (12/6/2013); bladder sling female (12/6/2013); gastroscopy with clipping (7/6/2014); gastroscopy with banding (7/8/2014); cath removal (11/14/2014); cath placement (12/22/2014); cataract extraction with iol; tonsillectomy; gastroscopy (N/A, 8/21/2017); gastroscopy with banding (N/A, 2/8/2018); and gastroscopy (N/A, 2/12/2018).    Medications: Medications have been reviewed with patient.  Prior to Admission Medications   Prescriptions Last Dose Informant Patient Reported? Taking?    Chlorpheniramine Maleate (ALLERGY PO) 9/23/2020 at [facility] Patient Yes Yes   Sig: Take 1 Tab by mouth every morning.   NOVOLOG, insulin aspart, (NOVOLOG FLEXPEN) 100 UNIT/ML injection PEN 9/23/2020 at [facility] Patient No No   Sig: Inject 3-18 Units as instructed 3 times a day before meals.   buPROPion (WELLBUTRIN) 100 MG Tab 9/24/2020 at [facility] Patient Yes Yes   Sig: Take 100 mg by mouth every morning.   buPROPion (WELLBUTRIN) 75 MG Tab 9/23/2020 at [facility] Patient Yes Yes   Sig: Take 75 mg by mouth every evening. In the afternoon at 1600   furosemide (LASIX) 40 MG Tab 9/23/2020 at [facility] Patient Yes Yes   Sig: Take 40 mg by mouth every morning.   gabapentin (NEURONTIN) 300 MG Cap 9/23/2020 at [facility] Patient Yes No   Sig: Take 600 mg by mouth 2 Times a Day.   insulin glargine (LANTUS SOLOSTAR) 100 UNIT/ML Solution Pen-injector injection 9/23/2020 at [facility] Patient No No   Sig: Inject 40 Units as instructed every evening.   spironolactone (ALDACTONE) 50 MG Tab 9/23/2020 at [facility] Patient Yes Yes   Sig: Take 50 mg by mouth 2 Times a Day.      Facility-Administered Medications: None        Allergies: Allergies have been reviewed with patient.  No Known Allergies    Family History: Non-pertinent  family history includes Diabetes in her mother; Hypertension in her mother.     Social History:   Tobacco: Reports >40 year use with 2PPD on average. Says that she smokes 3 cigarettes a day right now.  Alcohol: Reports history of heavy use   Recreational drugs (illegal and prescription): Reports history of meth use, with last use 6 months ago. Reports remote history of other recreational drug use.   Activity Level: Reports independence with ADLs and IADLs.   Living situation:  Lives in an apartment with herself.  Primary Care Provider: reviewed Pcp Pt States None    Vitals:  Temp:  [36.2 °C (97.2 °F)-36.8 °C (98.2 °F)] 36.8 °C (98.2 °F)  Pulse:  [] 99  Resp:  [11-26] 17  BP: (144-224)/() 144/85  SpO2:  [89 %-99 %] 95 %    Physical  Exam  Constitutional:       General: She is not in acute distress.     Appearance: She is not ill-appearing.   HENT:      Mouth/Throat:      Mouth: Mucous membranes are moist.      Pharynx: Oropharynx is clear.   Eyes:      General: No scleral icterus.        Right eye: No discharge.         Left eye: No discharge.   Cardiovascular:      Rate and Rhythm: Normal rate and regular rhythm.      Pulses: Normal pulses.      Heart sounds: Normal heart sounds. No murmur. No friction rub. No gallop.    Pulmonary:      Effort: No respiratory distress.      Breath sounds: No wheezing or rales.   Abdominal:      General: Abdomen is flat. Bowel sounds are normal. There is no distension.      Palpations: Abdomen is soft. There is no mass.      Tenderness: There is abdominal tenderness. There is guarding.   Musculoskeletal:         General: No swelling, tenderness, deformity or signs of injury.      Right lower leg: No edema.      Left lower leg: No edema.   Skin:     Coloration: Skin is not jaundiced or pale.      Findings: No bruising or erythema.   Neurological:      Mental Status: She is alert.   Psychiatric:         Mood and Affect: Mood normal.         Behavior: Behavior normal.         Thought Content: Thought content normal.         Labs: Please see results section.    Imaging:   [id number] [id number]   Final Result      No acute cardiopulmonary abnormality identified.      US-RUQ   Final Result      Gallstones within a distended gallbladder.      Findings of cirrhosis.      Prominence of the common duct measuring 8 mm. Further evaluation can be obtained with MRCP as clinically indicated.      Mild right hydronephrosis.      Small amount of ascites.                  [id number]-I/[id number]    (Results Pending)       Previous Data Review: reviewed    Problem Representation:     * Pancreatitis- (present on admission)  Assessment & Plan  Patient presented to hospital with 1 day history of worsening epigastric pain radiating to back.  Reports previous episode of alcoholic pancreatitic within the last year.    -Lipase found to be elevated at 348 on initial check.  -RUQ U/S showed gallstones in gallbladder with dilated common bile duct at 8mm in addition to signs of cirrhosis.  -Started patient LR 125ml/hr.  -Started patient on clear liquid diet. Will advance as tolerated.  -Started patient on IV morphine for pain control.  -Started on zofran for n/v.  -Ordered lipid panel.  -Ordered daily weights and strict I/O's.  -Plan to consult surgery and obtain MRCP for further evaluation.    DM2 (diabetes mellitus, type 2) (HCC)- (present on admission)  Assessment & Plan  Patient with history of insulin dependent DM2.    -Currently on lantus 40 and novolog 3-18 premeal.  -Blood sugar found to be 326 on initial check.  -Started patient on SSI and lantus 15 units in the evening.    HTN (hypertension)- (present on admission)  Assessment & Plan  Pt reports previous hx of HTN, but is unsure which medications she takes for it. Patient's blood pressure elevated at 223/101.     -Likely worsened secondary to pain and pancreatitis.  -Held home furosemide and aldactone.   -Started patient on PRN IV labetalol for blood pressure control.    Pancytopenia (HCC)- (present on admission)  Assessment & Plan  Patient has history of pancytopenia secondary to history of alcohol use.    -Hgb stable at 10.4 and patient denies any episodes of bleeding.  -Held pharmacologic DVT prophylaxis with history of GI bleeding.    Tobacco dependence- (present on admission)  Assessment & Plan  Patient reports 40 year history of smoking. Says that she used to smoke ~2PPD, but is currently smoking 3 cigarettes at this time.    -Started on nicotine replacement therapy.  -Counseled patient on cessation.    Hydronephrosis  Assessment & Plan  RUQ U/S showed hydronephrosis.    -Ordered bladder scan for further evaluation.  -UA unremarkable.    Alcohol abuse- (present on admission)  Assessment &  Plan  Patient has history of heavy alcohol use. Says that she had 2 beers on Wednesday and had a few sips of a cocktail the weekend before.    -Ordered blood alcohol level for evaluation.    Chest pain- (present on admission)  Assessment & Plan  Patient reports episode of chest pain with exertion without radiation on Wednesday. Denies any previous history of CAD.    -Ordered EKG for initial evaluation, which showed no acute changes.

## 2020-09-26 NOTE — ASSESSMENT & PLAN NOTE
RUQ U/S showed hydronephrosis, repeat US with rental protocol noted right renal pyelectasis.  - CTM

## 2020-09-26 NOTE — SENIOR ADMIT NOTE
Senior Admit Note    Demetrice Jaime is a 60 y.o. female with a history of liver cirrhosis with varcies status post banding in February, mild mitral stenosis, insulin dependent diabetes, hypertension, liver cirrhosis, substance use who presented to the hospital with radiating epigastric pain and nausea that started this morning.      SHx: Has not taken medications for the past two days, denies hematemesis/melena, last drank 2 beers two days ago    Patient was found to be hemodynamically stable. She was found to have stable pancytopenia, elevated blood sugar 326 and lipase of 348. UA was negative. Ultrasound revealed small ascites, cirrhosis, gallstones within a distended gallbladder and dilated common bile duct of 8mm. Patient received 2 doses of Dilaudid,  LR bolus 2L and Zofran.      Pertinent physical exam:   General: Laying in bed in no acute distress. Smiling.   Abdomen: Soft, BS+, tender to palpation in epigastric region, no skin stigmata of chronic liver disease  Extremities: No lower extremity edema     Assessment and Plan:   # Mild Pancreatitis   # Dilated common bile duct   Patient presents with classic epigastric radiating abdominal pain, nausea and elevated lipase of 348. Patient had an episode of pancreatitis within the last year thought to be secondary to alcohol use. Etiology is likely multifactorial to sustained alcohol use and possibly gallstone related, although unlikely given absence of liver enzymes. Lasix is also known to cause pancreatitis but above two differentials seem more likely. Discussed case with Dr. Tamay[facility] who recommended that we obtain MRCP and if positive can officially consult for ERCP. Dr. Tamayo thinks that this is likely related to alcohol use given absence of liver enzymes and mildly dilated common bile duct.   - Admit to telemetry   - IV fluids, although cautious with history of hyperdynamic EF and hx of cirrhosis   - Hold home diuretics   - Clear liquid  diet, advance as tolerated   - Ordered MRCP   - Follow electrolytes   - Anti-emetics   - CXR   - Check Lipid panel   - Is/Os and Daily weights     # Hypertensive urgency   Likely secondary to off of Lasix, aldactone for past few days and underlying untreated hypertension.   - IV Labetalol   - Primary team to consider starting hypertensives     # Hyperglycemia  Hx of uncontrolled Diabetes   - Lantus and SSI   - Accuchecks     # Pancyctopenia   # Cirrhosis    Related to bone marrow suppression from cirrhosis.   Currently stable. No signs of hematemesis, SBP or decompensation.  - Restart Aldactone and Lasix when clinically appropriate   - Alcohol cessation education provided     Please see Dr. Aguiar's H&P for full details    Mukund Sinha D.O., [facility] Resident

## 2020-09-26 NOTE — ASSESSMENT & PLAN NOTE
Patient reports 40 year history of smoking. Says that she used to smoke ~2PPD, but is currently smoking 3 cigarettes at this time.    -Started on nicotine replacement therapy.  -Counseled patient on cessation.

## 2020-09-26 NOTE — ED NOTES
Pharmacy Medication Reconciliation      Medication reconciliation updated and complete per pt at bedside  Allergies have been verified   No oral ABX within the last 14 days  Pt home pharmacy:[facility]

## 2020-09-26 NOTE — ED NOTES
Pt transported up to T711/01 on cardiac monitor with ACLS RN. All personal belongings taken with patient up to assigned bed.

## 2020-09-26 NOTE — PROGRESS NOTES
Bedside report received from [facility] nurse. Assumed care of patient. Pt. resting comfortably without any sign of distress. A&Ox4, no complaints at this time. POC reviewed and white board updated, Tele box on, Monitor room notified, Call light in reach, Bed locked in lowest position.

## 2020-09-26 NOTE — PROGRESS NOTES
2 RN Skin Check    2 RN skin check completed with Justo BOLAÑOS   Devices in place: SCDs.  Skin assessed under devices: yes.  Confirmed pressure ulcers found on NA.  New potential pressure ulcers noted on NA. Wound consult placed No.  The following interventions in place Pillows and Lotion.    Small blister on second phalanx  Small healing scars from cellulitis on right outer thigh.    All other skin CDI

## 2020-09-26 NOTE — ASSESSMENT & PLAN NOTE
Patient has history of heavy alcohol use. Says that she had 2 beers on Wednesday and had a few sips of a cocktail the weekend before. Suspect excessive alcohol use. Alcohol level negative.    -Educated patient on importance of quitting alcohol and mortality.

## 2020-09-27 ENCOUNTER — APPOINTMENT (OUTPATIENT)
Dept: RADIOLOGY | Facility: MEDICAL CENTER | Age: 60
DRG: 439 | End: 2020-09-27
Attending: INTERNAL MEDICINE
Payer: MEDICAID

## 2020-09-27 ENCOUNTER — APPOINTMENT (OUTPATIENT)
Dept: RADIOLOGY | Facility: MEDICAL CENTER | Age: 60
DRG: 439 | End: 2020-09-27
Attending: STUDENT IN AN ORGANIZED HEALTH CARE EDUCATION/TRAINING PROGRAM
Payer: MEDICAID

## 2020-09-27 LAB
ALBUMIN SERPL BCP-MCNC: 2.8 G/DL (ref 3.2–4.9)
ALBUMIN/GLOB SERPL: 1.2 G/DL
ALP SERPL-CCNC: 62 U/L (ref 30–99)
ALT SERPL-CCNC: 18 U/L (ref 2–50)
ANION GAP SERPL CALC-SCNC: 7 MMOL/L (ref 7–16)
AST SERPL-CCNC: 27 U/L (ref 12–45)
BASOPHILS # BLD AUTO: 0.4 % (ref 0–1.8)
BASOPHILS # BLD AUTO: 0.7 % (ref 0–1.8)
BASOPHILS # BLD: 0.01 K/UL (ref 0–0.12)
BASOPHILS # BLD: 0.02 K/UL (ref 0–0.12)
BILIRUB SERPL-MCNC: 0.6 MG/DL (ref 0.1–1.5)
BUN SERPL-MCNC: 9 MG/DL (ref 8–22)
CALCIUM SERPL-MCNC: 8.4 MG/DL (ref 8.5–10.5)
CHLORIDE SERPL-SCNC: 105 MMOL/L (ref 96–112)
CO2 SERPL-SCNC: 25 MMOL/L (ref 20–33)
CREAT SERPL-MCNC: 0.83 MG/DL (ref 0.5–1.4)
EOSINOPHIL # BLD AUTO: 0.01 K/UL (ref 0–0.51)
EOSINOPHIL # BLD AUTO: 0.04 K/UL (ref 0–0.51)
EOSINOPHIL NFR BLD: 0.4 % (ref 0–6.9)
EOSINOPHIL NFR BLD: 1.3 % (ref 0–6.9)
ERYTHROCYTE [DISTWIDTH] IN BLOOD BY AUTOMATED COUNT: 46.5 FL (ref 35.9–50)
ERYTHROCYTE [DISTWIDTH] IN BLOOD BY AUTOMATED COUNT: 46.7 FL (ref 35.9–50)
GLOBULIN SER CALC-MCNC: 2.4 G/DL (ref 1.9–3.5)
GLUCOSE BLD-MCNC: 167 MG/DL (ref 65–99)
GLUCOSE BLD-MCNC: 191 MG/DL (ref 65–99)
GLUCOSE BLD-MCNC: 193 MG/DL (ref 65–99)
GLUCOSE SERPL-MCNC: 118 MG/DL (ref 65–99)
HCT VFR BLD AUTO: 26.9 % (ref 37–47)
HCT VFR BLD AUTO: 30.6 % (ref 37–47)
HGB BLD-MCNC: 8.4 G/DL (ref 12–16)
HGB BLD-MCNC: 9.4 G/DL (ref 12–16)
IMM GRANULOCYTES # BLD AUTO: 0.01 K/UL (ref 0–0.11)
IMM GRANULOCYTES # BLD AUTO: 0.01 K/UL (ref 0–0.11)
IMM GRANULOCYTES NFR BLD AUTO: 0.3 % (ref 0–0.9)
IMM GRANULOCYTES NFR BLD AUTO: 0.4 % (ref 0–0.9)
LYMPHOCYTES # BLD AUTO: 0.39 K/UL (ref 1–4.8)
LYMPHOCYTES # BLD AUTO: 0.66 K/UL (ref 1–4.8)
LYMPHOCYTES NFR BLD: 17.3 % (ref 22–41)
LYMPHOCYTES NFR BLD: 21.9 % (ref 22–41)
MAGNESIUM SERPL-MCNC: 1.6 MG/DL (ref 1.5–2.5)
MCH RBC QN AUTO: 23.6 PG (ref 27–33)
MCH RBC QN AUTO: 24.2 PG (ref 27–33)
MCHC RBC AUTO-ENTMCNC: 30.7 G/DL (ref 33.6–35)
MCHC RBC AUTO-ENTMCNC: 31.2 G/DL (ref 33.6–35)
MCV RBC AUTO: 76.9 FL (ref 81.4–97.8)
MCV RBC AUTO: 77.5 FL (ref 81.4–97.8)
MONOCYTES # BLD AUTO: 0.25 K/UL (ref 0–0.85)
MONOCYTES # BLD AUTO: 0.36 K/UL (ref 0–0.85)
MONOCYTES NFR BLD AUTO: 11.1 % (ref 0–13.4)
MONOCYTES NFR BLD AUTO: 11.9 % (ref 0–13.4)
NEUTROPHILS # BLD AUTO: 1.59 K/UL (ref 2–7.15)
NEUTROPHILS # BLD AUTO: 1.93 K/UL (ref 2–7.15)
NEUTROPHILS NFR BLD: 63.9 % (ref 44–72)
NEUTROPHILS NFR BLD: 70.4 % (ref 44–72)
NRBC # BLD AUTO: 0 K/UL
NRBC # BLD AUTO: 0 K/UL
NRBC BLD-RTO: 0 /100 WBC
NRBC BLD-RTO: 0 /100 WBC
PHOSPHATE SERPL-MCNC: 2.7 MG/DL (ref 2.5–4.5)
PLATELET # BLD AUTO: 51 K/UL (ref 164–446)
PLATELET # BLD AUTO: 59 K/UL (ref 164–446)
PMV BLD AUTO: 10.3 FL (ref 9–12.9)
PMV BLD AUTO: 10.8 FL (ref 9–12.9)
POTASSIUM SERPL-SCNC: 4.3 MMOL/L (ref 3.6–5.5)
PROT SERPL-MCNC: 5.2 G/DL (ref 6–8.2)
RBC # BLD AUTO: 3.47 M/UL (ref 4.2–5.4)
RBC # BLD AUTO: 3.98 M/UL (ref 4.2–5.4)
SODIUM SERPL-SCNC: 137 MMOL/L (ref 135–145)
WBC # BLD AUTO: 2.3 K/UL (ref 4.8–10.8)
WBC # BLD AUTO: 3 K/UL (ref 4.8–10.8)

## 2020-09-27 PROCEDURE — 700111 HCHG RX REV CODE 636 W/ 250 OVERRIDE (IP): Performed by: STUDENT IN AN ORGANIZED HEALTH CARE EDUCATION/TRAINING PROGRAM

## 2020-09-27 PROCEDURE — 85025 COMPLETE CBC W/AUTO DIFF WBC: CPT

## 2020-09-27 PROCEDURE — 700105 HCHG RX REV CODE 258: Performed by: STUDENT IN AN ORGANIZED HEALTH CARE EDUCATION/TRAINING PROGRAM

## 2020-09-27 PROCEDURE — 74181 MRI ABDOMEN W/O CONTRAST: CPT

## 2020-09-27 PROCEDURE — 84484 ASSAY OF TROPONIN QUANT: CPT

## 2020-09-27 PROCEDURE — 36415 COLL VENOUS BLD VENIPUNCTURE: CPT

## 2020-09-27 PROCEDURE — A9270 NON-COVERED ITEM OR SERVICE: HCPCS | Performed by: STUDENT IN AN ORGANIZED HEALTH CARE EDUCATION/TRAINING PROGRAM

## 2020-09-27 PROCEDURE — 700105 HCHG RX REV CODE 258: Performed by: HOSPITALIST

## 2020-09-27 PROCEDURE — 84100 ASSAY OF PHOSPHORUS: CPT

## 2020-09-27 PROCEDURE — 83735 ASSAY OF MAGNESIUM: CPT

## 2020-09-27 PROCEDURE — 99232 SBSQ HOSP IP/OBS MODERATE 35: CPT | Mod: GC | Performed by: HOSPITALIST

## 2020-09-27 PROCEDURE — 93005 ELECTROCARDIOGRAM TRACING: CPT

## 2020-09-27 PROCEDURE — 700101 HCHG RX REV CODE 250: Performed by: STUDENT IN AN ORGANIZED HEALTH CARE EDUCATION/TRAINING PROGRAM

## 2020-09-27 PROCEDURE — 700102 HCHG RX REV CODE 250 W/ 637 OVERRIDE(OP): Performed by: STUDENT IN AN ORGANIZED HEALTH CARE EDUCATION/TRAINING PROGRAM

## 2020-09-27 PROCEDURE — 700101 HCHG RX REV CODE 250: Performed by: INTERNAL MEDICINE

## 2020-09-27 PROCEDURE — 80053 COMPREHEN METABOLIC PANEL: CPT

## 2020-09-27 PROCEDURE — 82962 GLUCOSE BLOOD TEST: CPT | Mod: 91

## 2020-09-27 PROCEDURE — 700111 HCHG RX REV CODE 636 W/ 250 OVERRIDE (IP): Performed by: HOSPITALIST

## 2020-09-27 PROCEDURE — 770020 HCHG ROOM/CARE - TELE (206)

## 2020-09-27 PROCEDURE — 71045 X-RAY EXAM CHEST 1 VIEW: CPT

## 2020-09-27 RX ORDER — SODIUM CHLORIDE 9 MG/ML
500 INJECTION, SOLUTION INTRAVENOUS ONCE
Status: COMPLETED | OUTPATIENT
Start: 2020-09-27 | End: 2020-09-27

## 2020-09-27 RX ORDER — FERROUS SULFATE 325(65) MG
325 TABLET ORAL
Status: DISCONTINUED | OUTPATIENT
Start: 2020-09-27 | End: 2020-09-30 | Stop reason: HOSPADM

## 2020-09-27 RX ORDER — METOPROLOL TARTRATE 1 MG/ML
5 INJECTION, SOLUTION INTRAVENOUS
Status: DISCONTINUED | OUTPATIENT
Start: 2020-09-27 | End: 2020-09-30 | Stop reason: HOSPADM

## 2020-09-27 RX ADMIN — POTASSIUM CHLORIDE: 149 INJECTION, SOLUTION, CONCENTRATE INTRAVENOUS at 08:42

## 2020-09-27 RX ADMIN — POTASSIUM CHLORIDE: 149 INJECTION, SOLUTION, CONCENTRATE INTRAVENOUS at 17:20

## 2020-09-27 RX ADMIN — GABAPENTIN 600 MG: 300 CAPSULE ORAL at 04:52

## 2020-09-27 RX ADMIN — POLYETHYLENE GLYCOL 3350 1 PACKET: 17 POWDER, FOR SOLUTION ORAL at 13:28

## 2020-09-27 RX ADMIN — MORPHINE SULFATE 2 MG: 4 INJECTION INTRAVENOUS at 13:27

## 2020-09-27 RX ADMIN — METOPROLOL TARTRATE 5 MG: 5 INJECTION, SOLUTION INTRAVENOUS at 23:18

## 2020-09-27 RX ADMIN — Medication 100 MG: at 04:53

## 2020-09-27 RX ADMIN — DIPHENHYDRAMINE HYDROCHLORIDE 25 MG: 50 INJECTION, SOLUTION INTRAMUSCULAR; INTRAVENOUS at 20:57

## 2020-09-27 RX ADMIN — MORPHINE SULFATE 2 MG: 4 INJECTION INTRAVENOUS at 20:57

## 2020-09-27 RX ADMIN — FOLIC ACID 1 MG: 1 TABLET ORAL at 04:53

## 2020-09-27 RX ADMIN — INSULIN GLARGINE 20 UNITS: 100 INJECTION, SOLUTION SUBCUTANEOUS at 17:26

## 2020-09-27 RX ADMIN — SODIUM CHLORIDE, POTASSIUM CHLORIDE, SODIUM LACTATE AND CALCIUM CHLORIDE: 600; 310; 30; 20 INJECTION, SOLUTION INTRAVENOUS at 04:57

## 2020-09-27 RX ADMIN — MULTIPLE VITAMINS W/ MINERALS TAB 1 TABLET: TAB at 04:51

## 2020-09-27 RX ADMIN — AMLODIPINE BESYLATE 5 MG: 5 TABLET ORAL at 04:53

## 2020-09-27 RX ADMIN — DIPHENHYDRAMINE HYDROCHLORIDE 25 MG: 50 INJECTION, SOLUTION INTRAMUSCULAR; INTRAVENOUS at 13:27

## 2020-09-27 RX ADMIN — GABAPENTIN 600 MG: 300 CAPSULE ORAL at 17:21

## 2020-09-27 RX ADMIN — SODIUM CHLORIDE 500 ML: 9 INJECTION, SOLUTION INTRAVENOUS at 08:39

## 2020-09-27 RX ADMIN — DOCUSATE SODIUM 50 MG AND SENNOSIDES 8.6 MG 2 TABLET: 8.6; 5 TABLET, FILM COATED ORAL at 04:51

## 2020-09-27 ASSESSMENT — PAIN DESCRIPTION - PAIN TYPE
TYPE: ACUTE PAIN

## 2020-09-27 ASSESSMENT — ENCOUNTER SYMPTOMS
VOMITING: 0
NERVOUS/ANXIOUS: 0
DIARRHEA: 0
WHEEZING: 0
MEMORY LOSS: 0
SORE THROAT: 0
WEIGHT LOSS: 0
DEPRESSION: 0
ABDOMINAL PAIN: 1
CONSTIPATION: 0
NECK PAIN: 0
NAUSEA: 1
DIZZINESS: 0
BACK PAIN: 1
COUGH: 0
PALPITATIONS: 0
SHORTNESS OF BREATH: 0
TINGLING: 0
HEADACHES: 1
CHILLS: 0
SINUS PAIN: 0
FEVER: 0
BRUISES/BLEEDS EASILY: 0
TREMORS: 0
WEAKNESS: 0

## 2020-09-27 ASSESSMENT — PATIENT HEALTH QUESTIONNAIRE - PHQ9
2. FEELING DOWN, DEPRESSED, IRRITABLE, OR HOPELESS: NOT AT ALL
1. LITTLE INTEREST OR PLEASURE IN DOING THINGS: NOT AT ALL
SUM OF ALL RESPONSES TO PHQ9 QUESTIONS 1 AND 2: 0

## 2020-09-27 ASSESSMENT — FIBROSIS 4 INDEX: FIB4 SCORE: 7.36

## 2020-09-27 NOTE — CARE PLAN
Frequent episodes of diarrhea after miralax. Will CTM.     Problem: Safety  Goal: Will remain free from injury  Outcome: PROGRESSING AS EXPECTED     Problem: Infection  Goal: Will remain free from infection  Outcome: PROGRESSING SLOWER THAN EXPECTED   Low grade fever noted today. Will CTM. Activity encouraged.

## 2020-09-27 NOTE — PROGRESS NOTES
Daily Progress Note:     Date of Service: 9/27/2020  Primary Team: [facility] White Team   Attending: KRYSTA Brasher M.D.   Senior Resident: Dr. Larose  Intern: Dr. Adams  Contact:  [phone]    Chief Complaint:   1 day of severe epigastric pain radiating to back    Subjective:   Patient seen resting comfortably in bed.  No acute complaints overnight.  Patient also reports continued abdominal distention and RUQ pain. Pending MRCP this morning.     WBC 2.3 today, likely due to hemodilution from IV fluids.    Consultants/Specialty:    Review of Systems:  Review of Systems   Constitutional: Negative for chills, fever, malaise/fatigue and weight loss.   HENT: Negative for congestion, ear discharge, ear pain, sinus pain and sore throat.    Respiratory: Negative for cough, shortness of breath and wheezing.    Cardiovascular: Negative for chest pain, palpitations and leg swelling.   Gastrointestinal: Positive for abdominal pain and nausea. Negative for constipation, diarrhea and vomiting.   Genitourinary: Negative for dysuria, frequency, hematuria and urgency.   Musculoskeletal: Positive for back pain. Negative for joint pain and neck pain.   Skin: Negative for itching and rash.   Neurological: Positive for headaches. Negative for dizziness, tingling, tremors and weakness.   Endo/Heme/Allergies: Does not bruise/bleed easily.   Psychiatric/Behavioral: Negative for depression and memory loss. The patient is not nervous/anxious.        Objective Data:   Physical Exam:   Vitals:   Temp:  [36.9 °C (98.4 °F)-37.4 °C (99.4 °F)] 37.4 °C (99.4 °F)  Pulse:  [] 98  Resp:  [16-17] 16  BP: ()/(41-84) 127/67  SpO2:  [90 %-93 %] 93 %   CREATE MACRO BE SURE THAT THIS IS PERTINENT TO YOUR PATIENT!  Physical Exam  Constitutional:       Appearance: Normal appearance.   HENT:      Head: Normocephalic and atraumatic.      Right Ear: External ear normal.      Left Ear: External ear normal.      Nose: Nose normal.      Mouth/Throat:       Mouth: Mucous membranes are moist.   Eyes:      Extraocular Movements: Extraocular movements intact.      Pupils: Pupils are equal, round, and reactive to light.   Neck:      Musculoskeletal: Normal range of motion. No neck rigidity or muscular tenderness.   Cardiovascular:      Rate and Rhythm: Normal rate and regular rhythm.      Pulses: Normal pulses.      Heart sounds: Normal heart sounds.   Pulmonary:      Effort: Pulmonary effort is normal.      Breath sounds: Normal breath sounds.   Abdominal:      General: Abdomen is flat. Bowel sounds are normal.      Palpations: Abdomen is soft. There is no mass.      Tenderness: There is abdominal tenderness.      Hernia: No hernia is present.      Comments: Diffuse tenderness in abdomen, notable tenderness in RUQ   Musculoskeletal: Normal range of motion.         General: No swelling.      Right lower leg: No edema.      Left lower leg: No edema.   Skin:     General: Skin is warm and dry.      Capillary Refill: Capillary refill takes less than 2 seconds.   Neurological:      General: No focal deficit present.      Mental Status: She is alert and oriented to person, place, and time.   Psychiatric:         Mood and Affect: Mood normal.         Behavior: Behavior normal.           Labs:   Recent Results (from the past 24 hour(s))   URINE DRUG SCREEN    Collection Time: 09/26/20  1:00 PM   Result Value Ref Range    Amphetamines Urine Negative Negative    Barbiturates Negative Negative    Benzodiazepines Negative Negative    Cocaine Metabolite Negative Negative    Methadone Negative Negative    Opiates Positive (A) Negative    Oxycodone Negative Negative    Phencyclidine -Pcp Negative Negative    Propoxyphene Negative Negative    Cannabinoid Metab Negative Negative   LDH    Collection Time: 09/26/20  3:22 PM   Result Value Ref Range    LDH Total 202 107 - 266 U/L   RETICULOCYTES COUNT    Collection Time: 09/26/20  3:22 PM   Result Value Ref Range    Reticulocyte Count  1.7 0.8 - 2.1 %    Retic, Absolute 0.07 (H) 0.04 - 0.06 M/uL    Imm. Reticulocyte Fraction 17.5 (H) 9.3 - 17.4 %    Retic Hgb Equivalent 24.2 (L) 29.0 - 35.0 pg/cell   JEF (DIRECT GATO)    Collection Time: 09/26/20  3:22 PM   Result Value Ref Range    Jef With Anti-IgG Reagent NEG     Jef With Anti-C3D Reagent NEG    IRON/TOTAL IRON BIND    Collection Time: 09/26/20  3:22 PM   Result Value Ref Range    Iron 38 (L) 40 - 170 ug/dL    Total Iron Binding 326 250 - 450 ug/dL    Unsat Iron Binding 288 110 - 370 ug/dL    % Saturation 12 (L) 15 - 55 %   TRANSFERRIN    Collection Time: 09/26/20  3:22 PM   Result Value Ref Range    Transferrin 270 200 - 370 mg/dL   FOLATE    Collection Time: 09/26/20  3:22 PM   Result Value Ref Range    Folate -Folic Acid 22.6 >4.0 ng/mL   TSH WITH REFLEX TO FT4    Collection Time: 09/26/20  3:22 PM   Result Value Ref Range    TSH 1.200 0.380 - 5.330 uIU/mL   VITAMIN B12    Collection Time: 09/26/20  3:22 PM   Result Value Ref Range    Vitamin B12 -True Cobalamin 915 (H) 211 - 911 pg/mL   Prothrombin Time    Collection Time: 09/26/20  3:22 PM   Result Value Ref Range    PT 16.7 (H) 12.0 - 14.6 sec    INR 1.31 (H) 0.87 - 1.13   FERRITIN    Collection Time: 09/26/20  3:22 PM   Result Value Ref Range    Ferritin 16.8 10.0 - 291.0 ng/mL   ACCU-CHEK GLUCOSE    Collection Time: 09/26/20  5:34 PM   Result Value Ref Range    Glucose - Accu-Ck 137 (H) 65 - 99 mg/dL   ACCU-CHEK GLUCOSE    Collection Time: 09/26/20  9:00 PM   Result Value Ref Range    Glucose - Accu-Ck 196 (H) 65 - 99 mg/dL   CBC WITH DIFFERENTIAL    Collection Time: 09/27/20  8:24 AM   Result Value Ref Range    WBC 2.3 (LL) 4.8 - 10.8 K/uL    RBC 3.47 (L) 4.20 - 5.40 M/uL    Hemoglobin 8.4 (L) 12.0 - 16.0 g/dL    Hematocrit 26.9 (L) 37.0 - 47.0 %    MCV 77.5 (L) 81.4 - 97.8 fL    MCH 24.2 (L) 27.0 - 33.0 pg    MCHC 31.2 (L) 33.6 - 35.0 g/dL    RDW 46.7 35.9 - 50.0 fL    Platelet Count 51 (L) 164 - 446 K/uL    MPV 10.8 9.0 - 12.9 fL     Neutrophils-Polys 70.40 44.00 - 72.00 %    Lymphocytes 17.30 (L) 22.00 - 41.00 %    Monocytes 11.10 0.00 - 13.40 %    Eosinophils 0.40 0.00 - 6.90 %    Basophils 0.40 0.00 - 1.80 %    Immature Granulocytes 0.40 0.00 - 0.90 %    Nucleated RBC 0.00 /100 WBC    Neutrophils (Absolute) 1.59 (L) 2.00 - 7.15 K/uL    Lymphs (Absolute) 0.39 (L) 1.00 - 4.80 K/uL    Monos (Absolute) 0.25 0.00 - 0.85 K/uL    Eos (Absolute) 0.01 0.00 - 0.51 K/uL    Baso (Absolute) 0.01 0.00 - 0.12 K/uL    Immature Granulocytes (abs) 0.01 0.00 - 0.11 K/uL    NRBC (Absolute) 0.00 K/uL   Comp Metabolic Panel    Collection Time: 09/27/20  8:24 AM   Result Value Ref Range    Sodium 137 135 - 145 mmol/L    Potassium 4.3 3.6 - 5.5 mmol/L    Chloride 105 96 - 112 mmol/L    Co2 25 20 - 33 mmol/L    Anion Gap 7.0 7.0 - 16.0    Glucose 118 (H) 65 - 99 mg/dL    Bun 9 8 - 22 mg/dL    Creatinine 0.83 0.50 - 1.40 mg/dL    Calcium 8.4 (L) 8.5 - 10.5 mg/dL    AST(SGOT) 27 12 - 45 U/L    ALT(SGPT) 18 2 - 50 U/L    Alkaline Phosphatase 62 30 - 99 U/L    Total Bilirubin 0.6 0.1 - 1.5 mg/dL    Albumin 2.8 (L) 3.2 - 4.9 g/dL    Total Protein 5.2 (L) 6.0 - 8.2 g/dL    Globulin 2.4 1.9 - 3.5 g/dL    A-G Ratio 1.2 g/dL   MAGNESIUM    Collection Time: 09/27/20  8:24 AM   Result Value Ref Range    Magnesium 1.6 1.5 - 2.5 mg/dL   PHOSPHORUS    Collection Time: 09/27/20  8:24 AM   Result Value Ref Range    Phosphorus 2.7 2.5 - 4.5 mg/dL   ESTIMATED GFR    Collection Time: 09/27/20  8:24 AM   Result Value Ref Range    GFR If African American >60 >60 mL/min/1.73 m 2    GFR If Non African American >60 >60 mL/min/1.73 m 2       Imaging:   US-RENAL   Final Result      1.  Right renal pyelectasis. Probably no hydronephrosis      2.  Cirrhosis and portal hypertension      DX-CHEST-2 VIEWS   Final Result      No acute cardiopulmonary abnormality identified.      US-RUQ   Final Result      Gallstones within a distended gallbladder.      Findings of cirrhosis.      Prominence of  the common duct measuring 8 mm. Further evaluation can be obtained with MRCP as clinically indicated.      Mild right hydronephrosis.      Small amount of ascites.                  [id number]    (Results Pending)       Problem Representation:  Patient is a 60-year-old female who presented with 1 day history of worsening epigastric pain radiating to the back. PMH notable for uncontrolled type 2 diabetes on insulin, previous admission for acute alcoholic pancreatitis 2/13/2019, hypertension. RUQ ultrasound notable for gallstones in gallbladder with dilated common bile duct at 8 mm.  She will be admitted for further work-up of epigastric pain likely recurrence of acute alcoholic pancreatitis.    * Pancreatitis- (present on admission)  Assessment & Plan  Patient presented to hospital with 1 day history of worsening epigastric pain radiating to back. Reports previous episode of alcoholic pancreatitis within the last year. Lipase 348/ Lipid panel WNL. AST/ALT WNL. Patient reports excessive alcohol use in past few weeks.  Likely related to alcohol use given absence of liver enzymes and mildly dilated common bile duct.    -Lipase found to be elevated at 348 on initial check.  -RUQ U/S showed gallstones in gallbladder with dilated common bile duct at 8mm in addition to signs of cirrhosis.  - ml/hr.  -Started patient on clear liquid diet. Will advance as tolerated.  -Started patient on IV morphine for pain control.  -Started on zofran for n/v.  -Ordered daily weights and strict I/O's.  -MRCP ordered, plan to consult Dr. Belkis BARBER if MRCP results suggestive of blockage that needs ERCP    Gallstones  Assessment & Plan  Gallstones noted on RUQ US. Patient reports epigastric pain after eating. Discussed with radiologist who read US - not concerned about cholecystitis - no signs of inflammation or gallbladder wall thickening.    Thrombocytopenia (HCC)- (present on admission)  Assessment & Plan  Chronic low platelets.  Likely due to splenomegaly noted on physical exam on US. No signs of acute bleed.    - CTM      DM2 (diabetes mellitus, type 2) (HCC)- (present on admission)  Assessment & Plan  Patient with history of insulin dependent DM2. Poor control - BS in 400s at home, last Ha1C 12.6 8/20. Home insulin regimen lantus 40 in evening and novolog 10 in morning. Blood sugar found to be 326 on initial check.    -Insulin correctional scale and lantus 20 units in the evening.    HTN (hypertension)- (present on admission)  Assessment & Plan  Pt reports previous hx of HTN, but is unsure which medications she takes for it. Patient's blood pressure elevated at 223/101 on admission, stable during day.    -Likely worsened secondary to pain and pancreatitis.  -Held home furosemide and aldactone.   -Started patient on PRN IV labetalol for blood pressure control. Started on amlodipine 5 mg.    Iron deficiency anemia- (present on admission)  Assessment & Plan  Patient has history of anemia, low platelets secondary to history of alcohol use.  History of low platelets, low Hgb, low MCV, increased lymphocytes, neurophils. Low platelets likely due to splenomegaly    -Hgb 8.4, history of chronic anemia. Anemia workup suggestive of iron deficiency anemia (low iron, % saturation,     Plan:  Patient offered iron supplementation, refused iron supplementation.    Tobacco dependence- (present on admission)  Assessment & Plan  Patient reports 40 year history of smoking. Says that she used to smoke ~2PPD, but is currently smoking 3 cigarettes at this time.    -Started on nicotine replacement therapy.  -Counseled patient on cessation.    Pyelectasis  Assessment & Plan  RUQ U/S showed hydronephrosis, repeat US with rental protocol noted right renal pyelectasis.  - CTM    Alcohol abuse- (present on admission)  Assessment & Plan  Patient has history of heavy alcohol use. Says that she had 2 beers on Wednesday and had a few sips of a cocktail the weekend  before. Suspect excessive alcohol use. Alcohol level negative.    -Educated patient on importance of quitting alcohol and mortality.    Chest pain- (present on admission)  Assessment & Plan  Patient reports episode of chest pain with exertion without radiation on Wednesday. Denies any previous history of CAD.    -Ordered EKG for initial evaluation, which showed no acute changes.

## 2020-09-27 NOTE — NON-PROVIDER
[facility] [place] [facility] [place]    Name Demetrice Jaime     1960   Age/Sex 60 y.o. female   MRN [id number]   Code Status Full Code     After 5PM or if no immediate response to page, please call for cross-coverage  Attending/Team: Dr. ANTIONE Brasher Call [phone] to page   1st Call - Day Intern (R1):   Dr. Adams 2nd Call - Day Sr. Resident (R2/R3):   Dr. Larose         Chief complaint/ reason for interval visit (Primary Diagnosis)   Acute on chronic pancreatitis w/ symptomatic cholelithiasis     Interval Problem Daily Status Update    Vitals stable O/N. No acute complaints. Pt states that her epigastric pain has improved with pain medications, but not resolved. She denies nausea, vomiting, headache, diarrhea, constipation. She is tolerating a clear liquid diet well. There is increased abdominal distension on exam. MRCP scheduled for today.     B/l renal US revealed right renal pyelectasis (congenital malformation), no evidence of hydronephrosis.          ROS  Constitutional: Negative for chills, fever, malaise/fatigue and weight loss.   HENT: Negative for congestion, ear discharge, ear pain, sinus pain and sore throat.    Respiratory: Negative for cough, shortness of breath and wheezing.    Cardiovascular: Negative for chest pain, palpitations and leg swelling.   Gastrointestinal: Negative for constipation, diarrhea and vomiting.   Genitourinary: Negative for dysuria, frequency, hematuria and urgency.   Musculoskeletal: Positive for mid to right sided lower back pain likely related to acute pancreatitis. Negative for joint pain and neck pain.   Skin: Negative for itching and rash.   Neurological: Negative for dizziness, tingling, tremors and weakness.   Endo/Heme/Allergies: Does not bruise/bleed easily.   Psychiatric/Behavioral: Negative for depression and memory loss. The patient is not nervous    Consultants/Specialty  GI    Disposition  Inpatient    Quality  Measures  Quality-Core Measures        Physical Exam       Vitals:    09/27/20 0800 09/27/20 0809 09/27/20 0921 09/27/20 1126   BP: (!) 83/43 (!) 84/42 100/54 127/67   Pulse:   92 98   Resp: 16  16 16   Temp:    37.4 °C (99.4 °F)   TempSrc:    Temporal   SpO2:    93%   Weight:         Body mass index is 30.49 kg/m². Weight: 85.7 kg (188 lb 15 oz)  Oxygen Therapy:  Pulse Oximetry: 93 %, O2 (LPM): 0, O2 Delivery Device: None - Room Air    Physical Exam  Constitutional:       Appearance: Pt is a white female who appears older than stated age. She is resting comfortably, lying flat in her hospital bed. Appears to be in no acute distress. She has one facial piercing and tattoos on her hands and forearms bilaterally with evidence of former IV drug use. Her frame shows abdominal distension with emaciated extremities.   HENT:      Head: Normocephalic and atraumatic.      Nose: Nose normal.      Mouth: Mucous membranes are moist.   Eyes:      Extraocular Movements: Extraocular movements intact.      Pupils: Pupils are equal, round, and reactive to light.   Neck:      Musculoskeletal: Normal range of motion. No neck rigidity or muscular tenderness.   Cardiovascular:      Rate and Rhythm: Normal rate and regular rhythm.      Pulses: Normal pulses.      Heart sounds: Normal heart sounds.   Pulmonary:      Effort: Pulmonary effort is normal.      Breath sounds: Normal breath sounds.   Abdominal:   Abdomen is soft with moderate distension. Normal bowel sounds in all 4 quadrants. Pt has mild  abdominal tenderness in the mid epigastric, RUQ and RLQ with voluntary guarding. There is no rebound. Negative Jolly's sign. Negative Aba sign. The liver is palpated approximately 3cm below the costal margin. There is palpable splenomegaly when the pt is in the left lateral decubitus position. No ventral or umbilical hernias present.  Musculoskeletal: Normal range of motion.         General: No swelling.      Right lower leg: No edema.       Left lower leg: No edema.   Skin:     General: Skin is warm and dry.      Capillary Refill: Capillary refill takes less than 2 seconds.   Neurological:      General: No focal deficit present.      Mental Status: She is alert and oriented to person, place, and time.   Psychiatric:         Mood and Affect: Mood normal.         Behavior: Behavior normal.     Lab Data Review:  CBC: pancytopenia with evidence of hemodilution, but overall unchanged absolute cell count  CMP: stable; Glu 118    9/27/2020  12:45 PM    Recent Labs     09/25/20 1326 09/26/20 0718 09/27/20  0824   SODIUM 134* 134* 137   POTASSIUM 4.4 3.7 4.3   CHLORIDE 101 100 105   CO2 23 24 25   BUN 17 10 9   CREATININE 0.77 0.68 0.83   MAGNESIUM  --  1.6  1.6 1.6   PHOSPHORUS  --  2.6  2.6 2.7   CALCIUM 9.2 8.6 8.4*       Recent Labs     09/25/20 1326 09/26/20 0718 09/27/20 0824   ALTSGPT 21 20 18   ASTSGOT 29 25 27   ALKPHOSPHAT 88 72 62   TBILIRUBIN 0.7 0.7 0.6   LIPASE 348*  --   --    GLUCOSE 326* 176* 118*       Recent Labs     09/25/20 1326 09/26/20 0718 09/26/20  1522 09/27/20 0824   RBC 4.32 4.04*  --  3.47*   HEMOGLOBIN 10.4* 9.6*  --  8.4*   HEMATOCRIT 32.3* 31.6*  --  26.9*   PLATELETCT 73* 67*  --  51*   PROTHROMBTM  --   --  16.7*  --    INR  --   --  1.31*  --    IRON  --   --  38*  --    FERRITIN  --   --  16.8  --    TOTIRONBC  --   --  326  --        Recent Labs     09/25/20 1326 09/26/20 0718 09/27/20 0824   WBC 3.0* 3.1* 2.3*   NEUTSPOLYS 69.60 67.20 70.40   LYMPHOCYTES 17.60* 20.10* 17.30*   MONOCYTES 8.80 9.10 11.10   EOSINOPHILS 2.70 2.30 0.40   BASOPHILS 1.00 1.00 0.40   ASTSGOT 29 25 27   ALTSGPT 21 20 18   ALKPHOSPHAT 88 72 62   TBILIRUBIN 0.7 0.7 0.6           Assessment/Plan     # Acute on chronic gallstone vs. acalculous vs. ETOH pancreatitis  #Possible cholecystitis   Pt has h/o chronic pancreatitis due to ETOH abuse, liver cirrhosis with esophageal varices s/p banding and current heavy drinking. Suspicious that  acute pancreatitis could be due to ongoing ETOH use. Lipase was found to be 348 on initial presentation. RUQ US revealed a dilated CBD and cholelithiasis. Pt is also symptomatic for intermittent post-prandial pain in RUQ with fatty meals. Thus, it remains unclear of the etiology of this acute on chronic pancreatitis. MRCP scheduled for today. If positive for gallstone pancreatitis, will consult with GI for ERCP. If negative, will consider HIDA scan. No evidence of acute cholecystitis per radiologist.  -MRCP today; appreciate GI recs  -LR at 150mL/h + IM Morphine + Benadryl for pain control  -Continue on clear liquid diet; advance to low fat diet as tolerated. Continue Zofran PRN for nausea  - Model for End-Stage Liver Disease (MELD) score is 9; which correlates to a 3.4 year median survival rate after surgery, if indicated      #Pancytopenia with microcytic anemia secondary to ETOH use  Pt has a h/o microcytic anemia, and low platelets likely secondary to ETOH use. However, this could also be due to a hemolytic anemia vs. Iron deficiency. Hemolysis is less likely given negative LDH, normal bilirubin, negative direct heidy. However, reticulocyte studies show elevated absolute count and immature reticulocytes. This would be expected in a hemolytic anemia. Thus, this must be further investigated with haptoglobin and iron studies  - Iron studies show low total iron, normal ferritin, decreased %saturation  -Replete iron and follow pending haptoglobin     #Right sided hydronephrosis  RUQ US showed evidence of right sided hydronephrosis. Pt is asymptomatic and is able to void urine, denies dysuria. UA on admission was unremarkable Pt also has a 40 pack year h/o tobacco use. 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   - Likely no further intervention needed     #Hyperglycemia   Pt has h/o poorly controlled insulin dependent T2DM. Pt is not compliant with outpatient insulin regimen. States  that her current regimen is 40 Lantus in the evening and only 10 Novolog in the morning. She states that at times her FSBG > 400. Last a1c was 12.5  -Diabetes education likely needed  - Insulin correction and 20 units of  Lantus QHS  - Regular BG checks

## 2020-09-27 NOTE — PROGRESS NOTES
Patient has a history of hypothyroidism along with a change in mental status  -TSH 0.213, some acute decrease may be due to acute illness  - Continue levothyroxine  - Plan to repeat in 4-6 weeks with PCP   Patient to MRI without tele. This was dicussed with primary team yesterday. Monitor room notified.

## 2020-09-27 NOTE — PROGRESS NOTES
Patient back from MRI. Monitor room notified. Advanced to full liquid diet. Patient currently c/o upper abdominal pain- so will CTM.

## 2020-09-27 NOTE — ASSESSMENT & PLAN NOTE
Chronic low platelets. Likely due to splenomegaly noted on physical exam on US. No signs of acute bleed.    - CTM

## 2020-09-27 NOTE — CARE PLAN
"  Problem: Safety  Goal: Will remain free from falls  Outcome: PROGRESSING AS EXPECTED   Low fall risk- patient calls appropriately.     Problem: Pain Management  Goal: Pain level will decrease to patient's comfort goal  Outcome: PROGRESSING AS EXPECTED   IV morphine given in conjunction with IV benadryl x1 today.     Problem: Mobility  Goal: Risk for activity intolerance will decrease  Outcome: PROGRESSING AS EXPECTED   Up to bathroom multiple times. Patient refusing to walk in the hallway stating \"I dont want to do too much..\"  Will continue to encourage activity.   "

## 2020-09-28 PROBLEM — I48.91 ATRIAL FIBRILLATION (HCC): Status: ACTIVE | Noted: 2020-09-28

## 2020-09-28 PROBLEM — I49.9 ARRHYTHMIA: Status: ACTIVE | Noted: 2020-09-28

## 2020-09-28 LAB
ALBUMIN SERPL BCP-MCNC: 3.2 G/DL (ref 3.2–4.9)
ALBUMIN SERPL BCP-MCNC: 3.2 G/DL (ref 3.2–4.9)
ALBUMIN/GLOB SERPL: 1.1 G/DL
ALBUMIN/GLOB SERPL: 1.2 G/DL
ALP SERPL-CCNC: 77 U/L (ref 30–99)
ALP SERPL-CCNC: 78 U/L (ref 30–99)
ALT SERPL-CCNC: 20 U/L (ref 2–50)
ALT SERPL-CCNC: 20 U/L (ref 2–50)
ANION GAP SERPL CALC-SCNC: 10 MMOL/L (ref 7–16)
ANION GAP SERPL CALC-SCNC: 8 MMOL/L (ref 7–16)
AST SERPL-CCNC: 28 U/L (ref 12–45)
AST SERPL-CCNC: 30 U/L (ref 12–45)
BILIRUB SERPL-MCNC: 0.5 MG/DL (ref 0.1–1.5)
BILIRUB SERPL-MCNC: 0.5 MG/DL (ref 0.1–1.5)
BUN SERPL-MCNC: 7 MG/DL (ref 8–22)
BUN SERPL-MCNC: 7 MG/DL (ref 8–22)
CALCIUM SERPL-MCNC: 8.7 MG/DL (ref 8.5–10.5)
CALCIUM SERPL-MCNC: 8.9 MG/DL (ref 8.5–10.5)
CHLORIDE SERPL-SCNC: 104 MMOL/L (ref 96–112)
CHLORIDE SERPL-SCNC: 105 MMOL/L (ref 96–112)
CO2 SERPL-SCNC: 24 MMOL/L (ref 20–33)
CO2 SERPL-SCNC: 24 MMOL/L (ref 20–33)
CREAT SERPL-MCNC: 0.63 MG/DL (ref 0.5–1.4)
CREAT SERPL-MCNC: 0.72 MG/DL (ref 0.5–1.4)
EKG IMPRESSION: NORMAL
ERYTHROCYTE [DISTWIDTH] IN BLOOD BY AUTOMATED COUNT: 45.9 FL (ref 35.9–50)
GLOBULIN SER CALC-MCNC: 2.7 G/DL (ref 1.9–3.5)
GLOBULIN SER CALC-MCNC: 2.9 G/DL (ref 1.9–3.5)
GLUCOSE BLD-MCNC: 115 MG/DL (ref 65–99)
GLUCOSE BLD-MCNC: 167 MG/DL (ref 65–99)
GLUCOSE BLD-MCNC: 177 MG/DL (ref 65–99)
GLUCOSE BLD-MCNC: 204 MG/DL (ref 65–99)
GLUCOSE BLD-MCNC: 270 MG/DL (ref 65–99)
GLUCOSE SERPL-MCNC: 198 MG/DL (ref 65–99)
GLUCOSE SERPL-MCNC: 202 MG/DL (ref 65–99)
HCT VFR BLD AUTO: 32 % (ref 37–47)
HGB BLD-MCNC: 9.9 G/DL (ref 12–16)
MCH RBC QN AUTO: 23.7 PG (ref 27–33)
MCHC RBC AUTO-ENTMCNC: 30.9 G/DL (ref 33.6–35)
MCV RBC AUTO: 76.6 FL (ref 81.4–97.8)
PLATELET # BLD AUTO: 70 K/UL (ref 164–446)
PMV BLD AUTO: 11.2 FL (ref 9–12.9)
POTASSIUM SERPL-SCNC: 4.1 MMOL/L (ref 3.6–5.5)
POTASSIUM SERPL-SCNC: 4.3 MMOL/L (ref 3.6–5.5)
PROT SERPL-MCNC: 5.9 G/DL (ref 6–8.2)
PROT SERPL-MCNC: 6.1 G/DL (ref 6–8.2)
RBC # BLD AUTO: 4.18 M/UL (ref 4.2–5.4)
SODIUM SERPL-SCNC: 136 MMOL/L (ref 135–145)
SODIUM SERPL-SCNC: 139 MMOL/L (ref 135–145)
TROPONIN T SERPL-MCNC: 12 NG/L (ref 6–19)
WBC # BLD AUTO: 3.1 K/UL (ref 4.8–10.8)

## 2020-09-28 PROCEDURE — 700102 HCHG RX REV CODE 250 W/ 637 OVERRIDE(OP): Performed by: STUDENT IN AN ORGANIZED HEALTH CARE EDUCATION/TRAINING PROGRAM

## 2020-09-28 PROCEDURE — 700111 HCHG RX REV CODE 636 W/ 250 OVERRIDE (IP): Performed by: STUDENT IN AN ORGANIZED HEALTH CARE EDUCATION/TRAINING PROGRAM

## 2020-09-28 PROCEDURE — 93010 ELECTROCARDIOGRAM REPORT: CPT | Performed by: INTERNAL MEDICINE

## 2020-09-28 PROCEDURE — 99232 SBSQ HOSP IP/OBS MODERATE 35: CPT | Mod: GC | Performed by: INTERNAL MEDICINE

## 2020-09-28 PROCEDURE — 770020 HCHG ROOM/CARE - TELE (206)

## 2020-09-28 PROCEDURE — 80053 COMPREHEN METABOLIC PANEL: CPT

## 2020-09-28 PROCEDURE — A9270 NON-COVERED ITEM OR SERVICE: HCPCS | Performed by: STUDENT IN AN ORGANIZED HEALTH CARE EDUCATION/TRAINING PROGRAM

## 2020-09-28 PROCEDURE — 36415 COLL VENOUS BLD VENIPUNCTURE: CPT

## 2020-09-28 PROCEDURE — 85027 COMPLETE CBC AUTOMATED: CPT

## 2020-09-28 PROCEDURE — 82962 GLUCOSE BLOOD TEST: CPT

## 2020-09-28 RX ADMIN — DIPHENHYDRAMINE HYDROCHLORIDE 25 MG: 50 INJECTION, SOLUTION INTRAMUSCULAR; INTRAVENOUS at 08:37

## 2020-09-28 RX ADMIN — DIPHENHYDRAMINE HYDROCHLORIDE 25 MG: 50 INJECTION, SOLUTION INTRAMUSCULAR; INTRAVENOUS at 17:53

## 2020-09-28 RX ADMIN — DIPHENHYDRAMINE HYDROCHLORIDE 25 MG: 50 INJECTION, SOLUTION INTRAMUSCULAR; INTRAVENOUS at 13:58

## 2020-09-28 RX ADMIN — GABAPENTIN 600 MG: 300 CAPSULE ORAL at 05:57

## 2020-09-28 RX ADMIN — DIPHENHYDRAMINE HYDROCHLORIDE 25 MG: 50 INJECTION, SOLUTION INTRAMUSCULAR; INTRAVENOUS at 01:17

## 2020-09-28 RX ADMIN — MULTIPLE VITAMINS W/ MINERALS TAB 1 TABLET: TAB at 05:57

## 2020-09-28 RX ADMIN — FOLIC ACID 1 MG: 1 TABLET ORAL at 05:57

## 2020-09-28 RX ADMIN — GABAPENTIN 600 MG: 300 CAPSULE ORAL at 17:41

## 2020-09-28 RX ADMIN — MORPHINE SULFATE 2 MG: 4 INJECTION INTRAVENOUS at 17:53

## 2020-09-28 RX ADMIN — MORPHINE SULFATE 2 MG: 4 INJECTION INTRAVENOUS at 01:17

## 2020-09-28 RX ADMIN — AMLODIPINE BESYLATE 5 MG: 5 TABLET ORAL at 05:57

## 2020-09-28 RX ADMIN — MORPHINE SULFATE 2 MG: 4 INJECTION INTRAVENOUS at 08:37

## 2020-09-28 RX ADMIN — FERROUS SULFATE TAB 325 MG (65 MG ELEMENTAL FE) 325 MG: 325 (65 FE) TAB at 08:13

## 2020-09-28 RX ADMIN — INSULIN GLARGINE 20 UNITS: 100 INJECTION, SOLUTION SUBCUTANEOUS at 17:43

## 2020-09-28 RX ADMIN — BUPROPION HYDROCHLORIDE 100 MG: 100 TABLET, FILM COATED ORAL at 05:57

## 2020-09-28 RX ADMIN — Medication 100 MG: at 05:57

## 2020-09-28 RX ADMIN — MORPHINE SULFATE 2 MG: 4 INJECTION INTRAVENOUS at 13:46

## 2020-09-28 ASSESSMENT — ENCOUNTER SYMPTOMS
CONSTIPATION: 0
FOCAL WEAKNESS: 0
SHORTNESS OF BREATH: 0
WHEEZING: 0
COUGH: 0
WEAKNESS: 0
NAUSEA: 0
HEMOPTYSIS: 0
FEVER: 0
DIARRHEA: 1
PALPITATIONS: 0
CHILLS: 0
BLURRED VISION: 0
DIZZINESS: 0
DEPRESSION: 0
SPUTUM PRODUCTION: 0
VOMITING: 0
TINGLING: 0
HEARTBURN: 0
TREMORS: 0
SORE THROAT: 0
EYE PAIN: 0
HEADACHES: 1
ABDOMINAL PAIN: 1

## 2020-09-28 ASSESSMENT — PAIN DESCRIPTION - PAIN TYPE
TYPE: ACUTE PAIN

## 2020-09-28 ASSESSMENT — FIBROSIS 4 INDEX: FIB4 SCORE: 5.28

## 2020-09-28 NOTE — PROGRESS NOTES
Assessed at bedside for HR of 160s. Patient complaining of palpitations and chest tightness. EKG stat ordered that showed Afib with RVR with rate of 140 - new onset. Blood pressure stable at SBP : 140s. Given Metop 5 mg iv once with conversion to sinus.  Chest xray : no acute changes. Troponin not elevated.

## 2020-09-28 NOTE — DISCHARGE SUMMARY
Discharge Summary    Date of Admission: 9/25/2020  Date of Discharge: 9/30/2020   Discharging Attending: Dr Kat NEWTON  Discharging Senior Resident: Dr. Michael.  Discharging Intern: Dr. Valdes    CHIEF COMPLAINT ON ADMISSION  Chief Complaint   Patient presents with   • Abdominal Pain     sudden onset mid abd pain radiating to her R side   • Nausea       Reason for Admission  -------------------------------  Acute pancreatitis likely secondary to chronic alcohol use    Admission Date  9/25/2020    CODE STATUS  Full Code    HPI & HOSPITAL COURSE  This is a 60-year-old female with PMHx notable for uncontrolled type 2 diabetes on insulin, previous admission for acute alcoholic pancreatitis 2/13/2019, hypertension presented with 1 day history of worsening epigastric pain radiating to the back.  Labs were remarkable for elevated serum lipase at 348 .RUQ ultrasound notable for gallstones in gallbladder with dilated common bile duct at 8 mm.  GI was consulted and recommended MRCP, and ERCP if stone/ obstruction on imaging. MRCP showed mildly distended common bile duct without intrahepatic biliary dilatation or visualized intra or extra ductal obstructing lesion. No further imaging/procedures done due to less likelihood for obstruction    Patient was managed conservatively with IV fluids and supportive measures with significant clinical improvement.  Ultrasound abdomen was repeated since patient expressed repeated concerns for abdominal fluid collection after IV fluid administration.  Repeat ultrasound abdomen was consistent with previous findings of minimal ascites. No further interventions.    Patient had 2 episodes of atrial fibrillation during hospital stay.  Reverted to sinus rhythm with IV metoprolol and remained in sinus rhythm since then. TSH normal.  Echo 09/20 EF 70%, moderately dilated left atrium with mild mitral regurgitation.  JO VASC 3, patient educated on anticoagulation, benefit versus risk  explained was started on apixaban for stroke risk reduction.     She remained hemodynamically stable with significant clinical improvement and hence will be discharged home with close outpatient follow-up    Therefore, she is discharged in good and stable condition to home with close outpatient follow-up.    The patient met 2-midnight criteria for an inpatient stay at the time of discharge.    PHYSICAL EXAM ON DISCHARGE  Temp:  [36.2 °C (97.2 °F)-37.4 °C (99.4 °F)] 36.7 °C (98.1 °F)  Pulse:  [72-97] 87  Resp:  [16-18] 18  BP: (131-168)/(67-85) 131/69  SpO2:  [91 %-95 %] 95 %      Discharge Date  9/30/2020    FOLLOW UP ITEMS POST DISCHARGE  Establish care with [facility] in 2 weeks  Alcohol cessation    DISCHARGE DIAGNOSES  Principal Problem:    Pancreatitis POA: Yes  Active Problems:    Gallstones POA: Unknown    Atrial fibrillation (HCC) POA: No    Abdominal pain POA: Yes    Iron deficiency anemia POA: Yes    HTN (hypertension) POA: Yes    DM2 (diabetes mellitus, type 2) (HCC) POA: Yes    Thrombocytopenia (HCC) POA: Yes    Tobacco dependence POA: Yes    Chest pain POA: Yes    Alcohol abuse POA: Yes    Pyelectasis POA: Unknown  Resolved Problems:    * No resolved hospital problems. *      FOLLOW UP  Establish care with [facility] in 1 week  [address] [place] [address]    MEDICATIONS ON DISCHARGE     Medication List      START taking these medications      Instructions   * apixaban 5mg Tabs  Commonly known as: ELIQUIS   Take 1 Tab by mouth 2 Times a Day for 30 days.  Dose: 5 mg     * apixaban 5mg Tabs  Commonly known as: ELIQUIS   Take 1 Tab by mouth 2 Times a Day for 30 days.  Dose: 5 mg     ferrous sulfate 325 (65 Fe) MG tablet  Start taking on: October 1, 2020   Take 1 Tab by mouth every morning with breakfast for 30 days.  Dose: 325 mg     metoprolol 25 MG Tabs  Commonly known as: LOPRESSOR   Take 1 Tab by mouth 2 times a day for 30 days.  Dose: 25 mg     thiamine 100 MG tablet  Start taking on: October 1, 2020  Commonly  known as: THIAMINE   Take 1 Tab by mouth every day for 30 days.  Dose: 100 mg         * This list has 2 medication(s) that are the same as other medications prescribed for you. Read the directions carefully, and ask your doctor or other care provider to review them with you.            CHANGE how you take these medications      Instructions   buPROPion 100 MG Tabs  What changed: Another medication with the same name was removed. Continue taking this medication, and follow the directions you see here.  Commonly known as: WELLBUTRIN   Take 100 mg by mouth every morning.  Dose: 100 mg        CONTINUE taking these medications      Instructions   ALLERGY PO   Take 1 Tab by mouth every morning.  Dose: 1 Tab     gabapentin 300 MG Caps  Commonly known as: NEURONTIN   Take 2 Caps by mouth 2 Times a Day for 30 days.  Dose: 600 mg     Lantus SoloStar 100 UNIT/ML Sopn injection  Generic drug: insulin glargine   Doctor's comments: E 11.65  Inject 40 Units as instructed every evening.  Dose: 40 Units     NovoLOG FlexPen 100 UNIT/ML injection PEN  Generic drug: insulin aspart   Doctor's comments: 71 - 120 = 0 Units 121 - 199 = 3 Units 200 - 249 = 6 Units 250 - 299 = 9 Units 300 - 349 = 12 Units 350 - 399 = 15 Units 400 - 499 = 18 Units >500 = Call MD And call MD. E11.65  Inject 3-18 Units as instructed 3 times a day before meals.  Dose: 3-18 Units        STOP taking these medications    furosemide 40 MG Tabs  Commonly known as: LASIX     spironolactone 50 MG Tabs  Commonly known as: ALDACTONE            Allergies  No Known Allergies    DIET  Orders Placed This Encounter   Procedures   • Diet Order Low Fiber(GI Soft)     Standing Status:   Standing     Number of Occurrences:   1     Order Specific Question:   Diet:     Answer:   Low Fiber(GI Soft) [2]       ACTIVITY  As tolerated.  Weight bearing as tolerated      PROCEDURES  none

## 2020-09-28 NOTE — PROGRESS NOTES
Received Bedside report. Assumed care at 0700. This pt is AOx4, ambulatory with SBA, voiding appropriately, has abdominal pain. Patient and RN discussed plan of care: questions answered. Labs noted, assessment complete, patient tolerating clear liquid low fiber GI soft diet. Tele box in place. Pt is on RA. Call light in place, fall precautions in place, patient educated on importance of calling for assistance. No additional needs at this time. VSS

## 2020-09-28 NOTE — DISCHARGE PLANNING
Care Transition Team Assessment    KIMBERLY Lucero, mother, [phone]    RN CM spoke with patient at the bedside to complete transition assessment.  Patient verified information on face sheet as correct.  She states she lives alone in a 1st floor apartment.  She states prior to admission she was independent for ADLs/IADLs.   She uses no DME @ home.  Per patient, she just has to turn her paperwork in to Dr. Larson and she will be established with him.  Patient denies hx of alcohol or substance abuse recently, however, it is documented in chart that she had alcohol last Wednesday and was positive for Meth in March.  She uses the [facility] for her medications.  She doesn't think she will need anything on discharge.      DC Plan:  Home w/ no needs    Information Source  Orientation : Oriented x 4  Information Given By: Patient  Who is responsible for making decisions for patient? : Patient    Readmission Evaluation  Is this a readmission?: No    Elopement Risk  Legal Hold: No  Ambulatory or Self Mobile in Wheelchair: Yes  Disoriented: No  Psychiatric Symptoms: None  History of Wandering: No  Elopement this Admit: No  Vocalizing Wanting to Leave: No  Displays Behaviors, Body Language Wanting to Leave: No-Not at Risk for Elopement  Elopement Risk: Not at Risk for Elopement    Interdisciplinary Discharge Planning  Primary Care Physician: none-working on getting establisehd with an MD  Lives with - Patient's Self Care Capacity: Alone and Able to Care For Self  Patient or legal guardian wants to designate a caregiver: No  Support Systems: Family Member(s), Friends / Neighbors  Housing / Facility: 1 Story Apartment / Condo  Do You Take your Prescribed Medications Regularly: Yes  Able to Return to Previous ADL's: Yes  Mobility Issues: No  Prior Services: Home-Independent  Patient Prefers to be Discharged to:: Home  Assistance Needed: No  Durable Medical Equipment: Not Applicable    Discharge  Preparedness  What is your plan after discharge?: Home with help  What are your discharge supports?: Parent  Prior Functional Level: Independent with Activities of Daily Living, Independent with Medication Management, Drives Self, Ambulatory  Difficulity with ADLs: None  Difficulity with IADLs: None    Functional Assesment  Prior Functional Level: Independent with Activities of Daily Living, Independent with Medication Management, Drives Self, Ambulatory    Finances  Financial Barriers to Discharge: No  Prescription Coverage: Yes              Advance Directive  Advance Directive?: None  Advance Directive offered?: AD Booklet refused    Domestic Abuse  Have you ever been the victim of abuse or violence?: No  Physical Abuse or Sexual Abuse: No  Verbal Abuse or Emotional Abuse: No  Possible Abuse/Neglect Reported to:: Not Applicable    Psychological Assessment  History of Substance Abuse: Methamphetamine, Alcohol  Date Last Used - Alcohol: 09/23/2020  Date Last Used - Methamphetamine: 03/2020  Substance Abuse Comments: states she last used alcohol last Wednesday and that she used meth last in March of this year.  History of Psychiatric Problems: Yes(anxiety and depression)  Non-compliant with Treatment: No  Newly Diagnosed Illness: No    Discharge Risks or Barriers  Discharge risks or barriers?: No PCP  Patient risk factors: Substance abuse, Lives alone and no community support    Anticipated Discharge Information  Discharge Disposition: Discharged to home/self care (01)  Discharge Address: [address] [place] [address]   NEEL Caputo [id number]  Discharge Contact Phone Number: [phone]

## 2020-09-28 NOTE — CARE PLAN
Problem: Communication  Goal: The ability to communicate needs accurately and effectively will improve  Outcome: PROGRESSING AS EXPECTED     Problem: Pain Management  Goal: Pain level will decrease to patient's comfort goal  Outcome: PROGRESSING AS EXPECTED     Problem: Knowledge Deficit  Goal: Knowledge of disease process/condition, treatment plan, diagnostic tests, and medications will improve  Outcome: PROGRESSING SLOWER THAN EXPECTED  Goal: Knowledge of the prescribed therapeutic regimen will improve  Outcome: PROGRESSING SLOWER THAN EXPECTED

## 2020-09-28 NOTE — PROGRESS NOTES
MS:     SR-ST   Convert to a-flutter at 2219 convert back to SR at 2315   Up to 150's    (R) PVC (F) PAC    .16/.08/.38

## 2020-09-29 ENCOUNTER — APPOINTMENT (OUTPATIENT)
Dept: CARDIOLOGY | Facility: MEDICAL CENTER | Age: 60
DRG: 439 | End: 2020-09-29
Attending: STUDENT IN AN ORGANIZED HEALTH CARE EDUCATION/TRAINING PROGRAM
Payer: MEDICAID

## 2020-09-29 ENCOUNTER — APPOINTMENT (OUTPATIENT)
Dept: RADIOLOGY | Facility: MEDICAL CENTER | Age: 60
DRG: 439 | End: 2020-09-29
Attending: STUDENT IN AN ORGANIZED HEALTH CARE EDUCATION/TRAINING PROGRAM
Payer: MEDICAID

## 2020-09-29 PROBLEM — R10.9 ABDOMINAL PAIN: Status: ACTIVE | Noted: 2020-09-29

## 2020-09-29 LAB
ALBUMIN SERPL BCP-MCNC: 3.4 G/DL (ref 3.2–4.9)
ALBUMIN/GLOB SERPL: 1.1 G/DL
ALP SERPL-CCNC: 79 U/L (ref 30–99)
ALT SERPL-CCNC: 19 U/L (ref 2–50)
ANION GAP SERPL CALC-SCNC: 9 MMOL/L (ref 7–16)
AST SERPL-CCNC: 28 U/L (ref 12–45)
BASOPHILS # BLD AUTO: 0.9 % (ref 0–1.8)
BASOPHILS # BLD: 0.04 K/UL (ref 0–0.12)
BILIRUB SERPL-MCNC: 0.7 MG/DL (ref 0.1–1.5)
BUN SERPL-MCNC: 9 MG/DL (ref 8–22)
CALCIUM SERPL-MCNC: 9.2 MG/DL (ref 8.5–10.5)
CHLORIDE SERPL-SCNC: 102 MMOL/L (ref 96–112)
CO2 SERPL-SCNC: 23 MMOL/L (ref 20–33)
CREAT SERPL-MCNC: 0.81 MG/DL (ref 0.5–1.4)
EOSINOPHIL # BLD AUTO: 0.08 K/UL (ref 0–0.51)
EOSINOPHIL NFR BLD: 1.8 % (ref 0–6.9)
ERYTHROCYTE [DISTWIDTH] IN BLOOD BY AUTOMATED COUNT: 45.9 FL (ref 35.9–50)
GLOBULIN SER CALC-MCNC: 3 G/DL (ref 1.9–3.5)
GLUCOSE BLD-MCNC: 113 MG/DL (ref 65–99)
GLUCOSE BLD-MCNC: 155 MG/DL (ref 65–99)
GLUCOSE BLD-MCNC: 180 MG/DL (ref 65–99)
GLUCOSE SERPL-MCNC: 180 MG/DL (ref 65–99)
HAPTOGLOB SERPL-MCNC: <10 MG/DL (ref 30–200)
HCT VFR BLD AUTO: 34.3 % (ref 37–47)
HGB BLD-MCNC: 10.6 G/DL (ref 12–16)
IMM GRANULOCYTES # BLD AUTO: 0.01 K/UL (ref 0–0.11)
IMM GRANULOCYTES NFR BLD AUTO: 0.2 % (ref 0–0.9)
LV EJECT FRACT  99904: 70
LV EJECT FRACT MOD 2C 99903: 55.64
LV EJECT FRACT MOD 4C 99902: 60.98
LV EJECT FRACT MOD BP 99901: 59.93
LYMPHOCYTES # BLD AUTO: 0.83 K/UL (ref 1–4.8)
LYMPHOCYTES NFR BLD: 18.4 % (ref 22–41)
MCH RBC QN AUTO: 23.9 PG (ref 27–33)
MCHC RBC AUTO-ENTMCNC: 30.9 G/DL (ref 33.6–35)
MCV RBC AUTO: 77.4 FL (ref 81.4–97.8)
MONOCYTES # BLD AUTO: 0.46 K/UL (ref 0–0.85)
MONOCYTES NFR BLD AUTO: 10.2 % (ref 0–13.4)
NEUTROPHILS # BLD AUTO: 3.09 K/UL (ref 2–7.15)
NEUTROPHILS NFR BLD: 68.5 % (ref 44–72)
NRBC # BLD AUTO: 0 K/UL
NRBC BLD-RTO: 0 /100 WBC
PLATELET # BLD AUTO: 78 K/UL (ref 164–446)
PMV BLD AUTO: 10.7 FL (ref 9–12.9)
POTASSIUM SERPL-SCNC: 4.2 MMOL/L (ref 3.6–5.5)
PROT SERPL-MCNC: 6.4 G/DL (ref 6–8.2)
RBC # BLD AUTO: 4.43 M/UL (ref 4.2–5.4)
SODIUM SERPL-SCNC: 134 MMOL/L (ref 135–145)
WBC # BLD AUTO: 4.5 K/UL (ref 4.8–10.8)

## 2020-09-29 PROCEDURE — 93306 TTE W/DOPPLER COMPLETE: CPT | Mod: 26 | Performed by: INTERNAL MEDICINE

## 2020-09-29 PROCEDURE — 93005 ELECTROCARDIOGRAM TRACING: CPT | Performed by: INTERNAL MEDICINE

## 2020-09-29 PROCEDURE — 85025 COMPLETE CBC W/AUTO DIFF WBC: CPT

## 2020-09-29 PROCEDURE — A9270 NON-COVERED ITEM OR SERVICE: HCPCS | Performed by: STUDENT IN AN ORGANIZED HEALTH CARE EDUCATION/TRAINING PROGRAM

## 2020-09-29 PROCEDURE — 76700 US EXAM ABDOM COMPLETE: CPT

## 2020-09-29 PROCEDURE — 700102 HCHG RX REV CODE 250 W/ 637 OVERRIDE(OP): Performed by: STUDENT IN AN ORGANIZED HEALTH CARE EDUCATION/TRAINING PROGRAM

## 2020-09-29 PROCEDURE — 700101 HCHG RX REV CODE 250: Performed by: STUDENT IN AN ORGANIZED HEALTH CARE EDUCATION/TRAINING PROGRAM

## 2020-09-29 PROCEDURE — 82962 GLUCOSE BLOOD TEST: CPT | Mod: 91

## 2020-09-29 PROCEDURE — 93306 TTE W/DOPPLER COMPLETE: CPT

## 2020-09-29 PROCEDURE — 36415 COLL VENOUS BLD VENIPUNCTURE: CPT

## 2020-09-29 PROCEDURE — 700111 HCHG RX REV CODE 636 W/ 250 OVERRIDE (IP): Performed by: STUDENT IN AN ORGANIZED HEALTH CARE EDUCATION/TRAINING PROGRAM

## 2020-09-29 PROCEDURE — 700101 HCHG RX REV CODE 250: Performed by: INTERNAL MEDICINE

## 2020-09-29 PROCEDURE — 99232 SBSQ HOSP IP/OBS MODERATE 35: CPT | Mod: GC | Performed by: INTERNAL MEDICINE

## 2020-09-29 PROCEDURE — 80053 COMPREHEN METABOLIC PANEL: CPT

## 2020-09-29 PROCEDURE — 770020 HCHG ROOM/CARE - TELE (206)

## 2020-09-29 RX ADMIN — MORPHINE SULFATE 2 MG: 4 INJECTION INTRAVENOUS at 22:21

## 2020-09-29 RX ADMIN — GABAPENTIN 600 MG: 300 CAPSULE ORAL at 05:39

## 2020-09-29 RX ADMIN — MULTIPLE VITAMINS W/ MINERALS TAB 1 TABLET: TAB at 05:39

## 2020-09-29 RX ADMIN — Medication 100 MG: at 05:39

## 2020-09-29 RX ADMIN — DIPHENHYDRAMINE HYDROCHLORIDE 25 MG: 50 INJECTION, SOLUTION INTRAMUSCULAR; INTRAVENOUS at 00:38

## 2020-09-29 RX ADMIN — BUPROPION HYDROCHLORIDE 100 MG: 100 TABLET, FILM COATED ORAL at 05:39

## 2020-09-29 RX ADMIN — GABAPENTIN 600 MG: 300 CAPSULE ORAL at 17:33

## 2020-09-29 RX ADMIN — INSULIN GLARGINE 20 UNITS: 100 INJECTION, SOLUTION SUBCUTANEOUS at 17:35

## 2020-09-29 RX ADMIN — MORPHINE SULFATE 2 MG: 4 INJECTION INTRAVENOUS at 00:38

## 2020-09-29 RX ADMIN — METOPROLOL TARTRATE 5 MG: 5 INJECTION, SOLUTION INTRAVENOUS at 18:02

## 2020-09-29 RX ADMIN — DOCUSATE SODIUM 50 MG AND SENNOSIDES 8.6 MG 2 TABLET: 8.6; 5 TABLET, FILM COATED ORAL at 17:33

## 2020-09-29 RX ADMIN — AMLODIPINE BESYLATE 5 MG: 5 TABLET ORAL at 05:39

## 2020-09-29 RX ADMIN — POLYETHYLENE GLYCOL 3350 1 PACKET: 17 POWDER, FOR SOLUTION ORAL at 08:22

## 2020-09-29 RX ADMIN — MORPHINE SULFATE 2 MG: 4 INJECTION INTRAVENOUS at 08:12

## 2020-09-29 RX ADMIN — MORPHINE SULFATE 2 MG: 4 INJECTION INTRAVENOUS at 15:27

## 2020-09-29 RX ADMIN — DIPHENHYDRAMINE HYDROCHLORIDE 25 MG: 50 INJECTION, SOLUTION INTRAMUSCULAR; INTRAVENOUS at 15:27

## 2020-09-29 RX ADMIN — DIPHENHYDRAMINE HYDROCHLORIDE 25 MG: 50 INJECTION, SOLUTION INTRAMUSCULAR; INTRAVENOUS at 08:12

## 2020-09-29 RX ADMIN — DIPHENHYDRAMINE HYDROCHLORIDE 25 MG: 50 INJECTION, SOLUTION INTRAMUSCULAR; INTRAVENOUS at 22:21

## 2020-09-29 RX ADMIN — FOLIC ACID 1 MG: 1 TABLET ORAL at 05:39

## 2020-09-29 ASSESSMENT — ENCOUNTER SYMPTOMS
HEADACHES: 1
COUGH: 0
CHILLS: 0
NAUSEA: 0
CONSTIPATION: 0
VOMITING: 0
HEARTBURN: 0
SHORTNESS OF BREATH: 0
TINGLING: 0
WEAKNESS: 0
PALPITATIONS: 0
HEMOPTYSIS: 0
TREMORS: 0
SORE THROAT: 0
EYE PAIN: 0
DIZZINESS: 0
BLURRED VISION: 0
FOCAL WEAKNESS: 0
FEVER: 0
WHEEZING: 0
DEPRESSION: 0
SPUTUM PRODUCTION: 0
ABDOMINAL PAIN: 1

## 2020-09-29 ASSESSMENT — PAIN DESCRIPTION - PAIN TYPE
TYPE: ACUTE PAIN

## 2020-09-29 NOTE — PROGRESS NOTES
Daily Progress Note:     Date of Service: 9/28/2020  Primary Team: [facility] White Team   Attending: LUPE Torrez*   Senior Resident: Dr. LUPE Michael  Intern: Dr. STEVE Garcia  Contact:  [phone]    Chief Complaint:   60-year-old woman with past medical history significant for liver cirrhosis likely secondary to alcohol use, IV meth, hypertension, insulin dependent diabetes mellitus 2, fibromyalgia and hepatitis C admitted on September 25, 2020 with epigastric pain radiating to the back and worse after eating.  She had an ultrasound of the right upper quadrant showing distended gallbladder with no gallbladder wall thickening or natalee-cholecystic fluid.  MRCP as shown mildly distended common bile duct without intrahepatic biliary dilation or visualize intra-or extra duct obstructing lesion       Subjective:  Overnight she developed 1 episode of A. fib with RVR that was back to normal rhythm with 5 mg of IV metoprolol.  Per telemetry there has not been any other EKG change for the rest of the night.  When I spoke to the patient this morning, she initially said that she felt better, but later in the day she said that her symptoms have not changed since admission.  She has slowly been reintroduced to a solid diet, and in the afternoon she denied any complaint of abdominal pain related to food.  She persistently states that her abdominal girth has increased, and she may need to have her fluid removed from her abdomen despite minimal ascites on abdominal MRI.    Consultants/Specialty:    Review of Systems:   Review of Systems   Constitutional: Negative for chills, fever and malaise/fatigue.   HENT: Negative for sore throat.    Eyes: Negative for blurred vision and pain.   Respiratory: Negative for cough, hemoptysis, sputum production, shortness of breath and wheezing.    Cardiovascular: Negative for chest pain, palpitations and leg swelling.   Gastrointestinal: Positive for abdominal pain and diarrhea.  Negative for constipation, heartburn, nausea and vomiting.   Genitourinary: Negative for dysuria, frequency and urgency.   Neurological: Positive for headaches. Negative for dizziness, tingling, tremors, focal weakness and weakness.   Psychiatric/Behavioral: Negative for depression.       Objective Data:   Physical Exam:   Vitals:   Temp:  [36.3 °C (97.3 °F)-37.2 °C (98.9 °F)] 37.2 °C (98.9 °F)  Pulse:  [] 98  Resp:  [18-20] 18  BP: (128-165)/(55-89) 157/80  SpO2:  [91 %-95 %] 91 %   CREATE MACRO BE SURE THAT THIS IS PERTINENT TO YOUR PATIENT!  Physical Exam  Constitutional:       General: She is not in acute distress.     Appearance: She is obese. She is ill-appearing.   HENT:      Head: Normocephalic.      Nose: Nose normal.      Mouth/Throat:      Mouth: Mucous membranes are moist.   Eyes:      Extraocular Movements: Extraocular movements intact.      Pupils: Pupils are equal, round, and reactive to light.   Neck:      Musculoskeletal: No neck rigidity or muscular tenderness.   Cardiovascular:      Rate and Rhythm: Normal rate and regular rhythm.      Pulses: Normal pulses.      Heart sounds: Murmur present. No friction rub. No gallop.    Pulmonary:      Effort: Pulmonary effort is normal. No respiratory distress.      Breath sounds: No wheezing, rhonchi or rales.   Chest:      Chest wall: No tenderness.   Abdominal:      General: There is distension.      Palpations: There is no mass.      Tenderness: There is abdominal tenderness (in the epigastric area).   Musculoskeletal:         General: No swelling or tenderness.      Right lower leg: No edema.      Left lower leg: No edema.   Neurological:      General: No focal deficit present.      Mental Status: She is alert and oriented to person, place, and time.      Cranial Nerves: No cranial nerve deficit.       Labs:   Recent Labs     09/26/20  0718 09/27/20  0824 09/27/20  2344 09/28/20  0752   WBC 3.1* 2.3* 3.0* 3.1*   RBC 4.04* 3.47* 3.98* 4.18*    HEMOGLOBIN 9.6* 8.4* 9.4* 9.9*   HEMATOCRIT 31.6* 26.9* 30.6* 32.0*   MCV 78.2* 77.5* 76.9* 76.6*   MCH 23.8* 24.2* 23.6* 23.7*   RDW 47.8 46.7 46.5 45.9   PLATELETCT 67* 51* 59* 70*   MPV  --  10.8 10.3 11.2   NEUTSPOLYS 67.20 70.40 63.90  --    LYMPHOCYTES 20.10* 17.30* 21.90*  --    MONOCYTES 9.10 11.10 11.90  --    EOSINOPHILS 2.30 0.40 1.30  --    BASOPHILS 1.00 0.40 0.70  --      Recent Labs     09/27/20  0824 09/27/20  2344 09/28/20  0752   SODIUM 137 139 136   POTASSIUM 4.3 4.3 4.1   CHLORIDE 105 105 104   CO2 25 24 24   GLUCOSE 118* 202* 198*   BUN 9 7* 7*     Recent Labs     09/27/20 0824 09/27/20 2344 09/28/20  0752   ALBUMIN 2.8* 3.2 3.2   TBILIRUBIN 0.6 0.5 0.5   ALKPHOSPHAT 62 77 78   TOTPROTEIN 5.2* 5.9* 6.1   ALTSGPT 18 20 20   ASTSGOT 27 30 28   CREATININE 0.83 0.72 0.63     DX-CHEST-PORTABLE (1 VIEW)   Final Result         1.  No acute cardiopulmonary disease.      [id number]/O   Final Result      1.  Cholelithiasis and distended gallbladder. Cholecystitis is not excluded.   2.  Mildly distended common bile duct without intrahepatic biliary dilatation or visualized intra or extra ductal obstructing lesion   3.  Cirrhosis   4.  Enlarged portal vein   5.  Splenomegaly   6.  These findings are in keeping with portal hypertension   7.  Small volume ascites   8.  Trace BILATERAL pleural effusions   9.  Mild RIGHT hydronephrosis      US-RENAL   Final Result      1.  Right renal pyelectasis. Probably no hydronephrosis      2.  Cirrhosis and portal hypertension      DX-CHEST-2 VIEWS   Final Result      No acute cardiopulmonary abnormality identified.      US-RUQ   Final Result      Gallstones within a distended gallbladder.      Findings of cirrhosis.      Prominence of the common duct measuring 8 mm. Further evaluation can be obtained with MRCP as clinically indicated.      Mild right hydronephrosis.      Small amount of ascites.                  EC-ECHOCARDIOGRAM COMPLETE W/O CONT    (Results  Pending)     Problem Representation:   60-year-old woman that was admitted with signs and symptoms of pancreatitis that has improved and now presenting with mild epigastric pain with no longer nausea vomiting, and now she is slowly reintroduced to solid food.    * Pancreatitis- (present on admission)  Assessment & Plan  Patient presented to hospital with 1 day history of worsening epigastric pain radiating to back. Reports previous episode of alcoholic pancreatitis within the last year. Lipase 348/ Lipid panel WNL. AST/ALT WNL. Patient reports excessive alcohol use in past few weeks.  Likely related to alcohol use given absence of liver enzymes and mildly dilated common bile duct.    -Lipase found to be elevated at 348 on initial check.  -RUQ U/S showed gallstones in gallbladder with dilated common bile duct at 8mm in addition to signs of cirrhosis.  - MRCP as shown mildly distended common bile duct without intrahepatic biliary dilation or visualize intra-or extra duct obstructing lesion   - Slowly restarted on solid diet  -Started patient on IV morphine for pain control.  -Started on zofran for n/v.    Given the MRCP, it is possible that patient had passed a stone that was lodged in the common bile duct and that was finally passed in the stools after a period of time. It is also a possibility that it could have participated in the etiology of pancreatitis, but it is less likely given that on admission with acute sxs, patient's alk phos was within normal limit.    Plan.:  -As patient continues to slowly tolerate solid food intake, without any nausea and just with mild discomfort in her abdominal area, she would likely to be discharged in a few days pertaining no worsening of her abdominal pain      Atrial fibrillation (HCC)  Assessment & Plan  - Patient had chest pain on 09/27/20 and EKG showing Afib with RVR  - Back to normal rate control with 5 mg IV metoprolol  - CHADsVASC score of 3  - Echo has been ordered  -  Will monitor and decide the use of anticoag    Gallstones  Assessment & Plan  Gallstones noted on RUQ US. Patient reports epigastric pain after eating. Discussed with radiologist who read US - not concerned about cholecystitis - no signs of inflammation or gallbladder wall thickening.    Thrombocytopenia (HCC)- (present on admission)  Assessment & Plan  Chronic low platelets. Likely due to splenomegaly noted on physical exam on US. No signs of acute bleed.    - CTM      DM2 (diabetes mellitus, type 2) (HCC)- (present on admission)  Assessment & Plan  Patient with history of insulin dependent DM2. Poor control - BS in 400s at home, last Ha1C 12.6 8/20. Home insulin regimen lantus 40 in evening and novolog 10 in morning. Blood sugar found to be 326 on initial check.    -Insulin correctional scale and lantus 20 units in the evening.    HTN (hypertension)- (present on admission)  Assessment & Plan  Pt reports previous hx of HTN, but is unsure which medications she takes for it. Patient's blood pressure elevated at 223/101 on admission, stable during day.    -Likely worsened secondary to pain and pancreatitis.  -Held home furosemide and aldactone.   -Started patient on PRN IV labetalol for blood pressure control. Started on amlodipine 5 mg.    Iron deficiency anemia- (present on admission)  Assessment & Plan  Patient has history of anemia, low platelets secondary to history of alcohol use.  History of low platelets, low Hgb, low MCV, increased lymphocytes, neurophils. Low platelets likely due to splenomegaly    -Hgb 8.4, history of chronic anemia. Anemia workup suggestive of iron deficiency anemia (low iron, % saturation,     Plan:  - On iron supplement.    Tobacco dependence- (present on admission)  Assessment & Plan  Patient reports 40 year history of smoking. Says that she used to smoke ~2PPD, but is currently smoking 3 cigarettes at this time.    -Started on nicotine replacement therapy.  -Counseled patient on  cessation.    Pyelectasis  Assessment & Plan  RUQ U/S showed hydronephrosis, repeat US with rental protocol noted right renal pyelectasis.  - CTM    Alcohol abuse- (present on admission)  Assessment & Plan  Patient has history of heavy alcohol use. Says that she had 2 beers on Wednesday and had a few sips of a cocktail the weekend before. Suspect excessive alcohol use. Alcohol level negative.    -Educated patient on importance of quitting alcohol and mortality.    Chest pain- (present on admission)  Assessment & Plan  Patient reports episode of chest pain with exertion without radiation on Wednesday. Denies any previous history of CAD.    -Ordered EKG for initial evaluation, which showed no acute changes.

## 2020-09-29 NOTE — ASSESSMENT & PLAN NOTE
-For the last 2 days, patient was complaining of left mid abdominal pain, but it appears to have resolved.  -Repeated abdominal ultrasound shows just a small to moderate ascites on the right side of the abdomen but no fluid collection on the left side where the patient was complaining of tenderness.    Plan:  -We will continue to monitor

## 2020-09-29 NOTE — ASSESSMENT & PLAN NOTE
- Patient had 2 episodes of A. fib with RVR during her hospitalization, with resolving with IV metoprolol.  - Back to normal rate control with 5 mg IV metoprolol  - CHADsVASC score of 3 with stroke risk of 3.2% per year in >40346 patients and 4.6% risk of stroke?TIA/systemiuc embolism  - Risk of bleed is 5.8% on HAS-BLED score for major bleed  -We discussed the risk of stroke versus bleeding with the patient.  And we recommended the use of metoprolol and apixaban.

## 2020-09-29 NOTE — CARE PLAN
Problem: Communication  Goal: The ability to communicate needs accurately and effectively will improve  Outcome: PROGRESSING AS EXPECTED   Patient educated to utilize call light. Patient and family oriented to hospital room. Patient encouraged to ask questions about plan of care. Patient effectively uses call light and is involved in POC.       Problem: Pain Management  Goal: Pain level will decrease to patient's comfort goal  Outcome: PROGRESSING SLOWER THAN EXPECTED   Patient educated to pain scale system. Patient encouraged to verbalize discomfort. Patient taught about non-pharmacological pain management. Patient medicated appropriately via MAR. Patient continues to state pain is uncontrolled.

## 2020-09-29 NOTE — PROGRESS NOTES
Daily Progress Note:     Date of Service: 9/29/2020  Primary Team: [facility] White Team   Attending: Omkar Stephens MD   Senior Resident: Dr. LUPE Michael  Intern: Dr. STEVE Garcia  Contact:  [phone]    Chief Complaint:   60-year-old woman with past medical history significant for liver cirrhosis likely secondary to alcohol use, IV meth, hypertension, insulin dependent diabetes mellitus 2, fibromyalgia and hepatitis C admitted on September 25, 2020 with epigastric pain radiating to the back and worse after eating.  She had an ultrasound of the right upper quadrant showing distended gallbladder with no gallbladder wall thickening or natalee-cholecystic fluid.  MRCP as shown mildly distended common bile duct without intrahepatic biliary dilation or visualize intra-or extra duct obstructing lesion       Subjective:  - No acute event overnight  - Epigastric pain has improved with no further Nausea or vomiting  - Patient has not had any bowel for the last  3 days, and she now c/o of abdominal pain on the left side with the feeling that her abdomen is growing laterally.  - Able to urinate without any difficulty  - No further tele abnormality after the last Afib     Consultants/Specialty:    Review of Systems:   Review of Systems   Constitutional: Negative for chills, fever and malaise/fatigue.   HENT: Negative for sore throat.    Eyes: Negative for blurred vision and pain.   Respiratory: Negative for cough, hemoptysis, sputum production, shortness of breath and wheezing.    Cardiovascular: Negative for chest pain, palpitations and leg swelling.   Gastrointestinal: Positive for abdominal pain. Negative for constipation, heartburn, nausea and vomiting.   Genitourinary: Negative for dysuria, frequency and urgency.   Neurological: Positive for headaches. Negative for dizziness, tingling, tremors, focal weakness and weakness.   Psychiatric/Behavioral: Negative for depression.       Objective Data:   Physical Exam:    Vitals:   Temp:  [37.1 °C (98.7 °F)-37.6 °C (99.6 °F)] 37.4 °C (99.3 °F)  Pulse:  [] 105  Resp:  [16-18] 16  BP: (125-177)/() 125/68  SpO2:  [90 %-94 %] 92 %   CREATE MACRO BE SURE THAT THIS IS PERTINENT TO YOUR PATIENT!  Physical Exam  Constitutional:       General: She is not in acute distress.     Appearance: She is obese. She is ill-appearing.   HENT:      Head: Normocephalic.      Nose: Nose normal.      Mouth/Throat:      Mouth: Mucous membranes are moist.   Eyes:      Extraocular Movements: Extraocular movements intact.      Pupils: Pupils are equal, round, and reactive to light.   Neck:      Musculoskeletal: No neck rigidity or muscular tenderness.   Cardiovascular:      Rate and Rhythm: Normal rate and regular rhythm.      Pulses: Normal pulses.      Heart sounds: Murmur present. No friction rub. No gallop.    Pulmonary:      Effort: Pulmonary effort is normal. No respiratory distress.      Breath sounds: No wheezing, rhonchi or rales.   Chest:      Chest wall: No tenderness.   Abdominal:      General: There is distension.      Palpations: There is no mass.      Tenderness: There is abdominal tenderness (superficial tenderness in the left middle abdominal area). There is no guarding or rebound.   Musculoskeletal:         General: No swelling or tenderness.      Right lower leg: No edema.      Left lower leg: No edema.   Neurological:      General: No focal deficit present.      Mental Status: She is alert and oriented to person, place, and time.      Cranial Nerves: No cranial nerve deficit.       Labs:   Recent Labs     09/27/20  0824 09/27/20  2344 09/28/20  0752 09/29/20  0746   WBC 2.3* 3.0* 3.1* 4.5*   RBC 3.47* 3.98* 4.18* 4.43   HEMOGLOBIN 8.4* 9.4* 9.9* 10.6*   HEMATOCRIT 26.9* 30.6* 32.0* 34.3*   MCV 77.5* 76.9* 76.6* 77.4*   MCH 24.2* 23.6* 23.7* 23.9*   RDW 46.7 46.5 45.9 45.9   PLATELETCT 51* 59* 70* 78*   MPV 10.8 10.3 11.2 10.7   NEUTSPOLYS 70.40 63.90  --  68.50   LYMPHOCYTES  17.30* 21.90*  --  18.40*   MONOCYTES 11.10 11.90  --  10.20   EOSINOPHILS 0.40 1.30  --  1.80   BASOPHILS 0.40 0.70  --  0.90     Recent Labs     09/27/20  2344 09/28/20  0752 09/29/20  0746   SODIUM 139 136 134*   POTASSIUM 4.3 4.1 4.2   CHLORIDE 105 104 102   CO2 24 24 23   GLUCOSE 202* 198* 180*   BUN 7* 7* 9     Recent Labs     09/27/20  2344 09/28/20  0752 09/29/20  0746   ALBUMIN 3.2 3.2 3.4   TBILIRUBIN 0.5 0.5 0.7   ALKPHOSPHAT 77 78 79   TOTPROTEIN 5.9* 6.1 6.4   ALTSGPT 20 20 19   ASTSGOT 30 28 28   CREATININE 0.72 0.63 0.81     DX-CHEST-PORTABLE (1 VIEW)   Final Result         1.  No acute cardiopulmonary disease.      [id number]   Final Result      1.  Cholelithiasis and distended gallbladder. Cholecystitis is not excluded.   2.  Mildly distended common bile duct without intrahepatic biliary dilatation or visualized intra or extra ductal obstructing lesion   3.  Cirrhosis   4.  Enlarged portal vein   5.  Splenomegaly   6.  These findings are in keeping with portal hypertension   7.  Small volume ascites   8.  Trace BILATERAL pleural effusions   9.  Mild RIGHT hydronephrosis      US-RENAL   Final Result      1.  Right renal pyelectasis. Probably no hydronephrosis      2.  Cirrhosis and portal hypertension      DX-CHEST-2 VIEWS   Final Result      No acute cardiopulmonary abnormality identified.      US-RUQ   Final Result      Gallstones within a distended gallbladder.      Findings of cirrhosis.      Prominence of the common duct measuring 8 mm. Further evaluation can be obtained with MRCP as clinically indicated.      Mild right hydronephrosis.      Small amount of ascites.                  EC-ECHOCARDIOGRAM COMPLETE W/O CONT    (Results Pending)     Problem Representation:   60-year-old woman that was admitted with signs and symptoms of pancreatitis that has improved and now presenting with mild epigastric pain with no longer nausea vomiting, and now she is slowly reintroduced to solid food.    *  Pancreatitis- (present on admission)  Assessment & Plan  Patient presented to hospital with 1 day history of worsening epigastric pain radiating to back. Reports previous episode of alcoholic pancreatitis within the last year. Lipase 348/ Lipid panel WNL. AST/ALT WNL. Patient reports excessive alcohol use in past few weeks.  Likely related to alcohol use given absence of liver enzymes and mildly dilated common bile duct.    -Lipase found to be elevated at 348 on initial check.  -RUQ U/S showed gallstones in gallbladder with dilated common bile duct at 8mm in addition to signs of cirrhosis.  - MRCP as shown mildly distended common bile duct without intrahepatic biliary dilation or visualize intra-or extra duct obstructing lesion   - Slowly restarted on solid diet  -Started patient on IV morphine for pain control.  -Started on zofran for n/v.    Given the MRCP, it is possible that patient had passed a stone that was lodged in the common bile duct and that was finally passed in the stools after a period of time. It is also a possibility that it could have participated in the etiology of pancreatitis, but it is less likely given that on admission with acute sxs, patient's alk phos was within normal limit.    Plan.:  -As patient continues to slowly tolerate solid food intake, without any nausea and just with mild discomfort in her abdominal area, she would likely to be discharged in a few days pertaining no worsening of her abdominal pain      Abdominal pain- (present on admission)  Assessment & Plan  - Patient has a c/o of abdominal discomfort/pain that started yesterday and has been persistent  - On physical exam, there is tenderness on palpation of the middle left abdomen with superficial tenderness and no rebound.  - possible explanations: this might be a complication of her pancreatitis although unlikely / this could be due to constipation / ascites although on recent imaging there was very little amount of  ascites    Plan:  - Abdominal ultrasound to order and follow    Atrial fibrillation (HCC)  Assessment & Plan  - Patient had chest pain on 09/27/20 and EKG showing Afib with RVR  - Back to normal rate control with 5 mg IV metoprolol  - CHADsVASC score of 3  - Patient will be started on anticoag    Gallstones  Assessment & Plan  Gallstones noted on RUQ US. Patient reports epigastric pain after eating. Discussed with radiologist who read US - not concerned about cholecystitis - no signs of inflammation or gallbladder wall thickening.    Thrombocytopenia (HCC)- (present on admission)  Assessment & Plan  Chronic low platelets. Likely due to splenomegaly noted on physical exam on US. No signs of acute bleed.    - CTM      DM2 (diabetes mellitus, type 2) (HCC)- (present on admission)  Assessment & Plan  Patient with history of insulin dependent DM2. Poor control - BS in 400s at home, last Ha1C 12.6 8/20. Home insulin regimen lantus 40 in evening and novolog 10 in morning. Blood sugar found to be 326 on initial check.    -Insulin correctional scale and lantus 20 units in the evening.    HTN (hypertension)- (present on admission)  Assessment & Plan  Pt reports previous hx of HTN, but is unsure which medications she takes for it. Patient's blood pressure elevated at 223/101 on admission, stable during day.    -Likely worsened secondary to pain and pancreatitis.  -Held home furosemide and aldactone.   -Started patient on PRN IV labetalol for blood pressure control. Started on amlodipine 5 mg.    Iron deficiency anemia- (present on admission)  Assessment & Plan  Patient has history of anemia, low platelets secondary to history of alcohol use.  History of low platelets, low Hgb, low MCV, increased lymphocytes, neurophils. Low platelets likely due to splenomegaly    -Hgb 8.4, history of chronic anemia. Anemia workup suggestive of iron deficiency anemia (low iron, % saturation,     Plan:  - On iron supplement.    Tobacco  dependence- (present on admission)  Assessment & Plan  Patient reports 40 year history of smoking. Says that she used to smoke ~2PPD, but is currently smoking 3 cigarettes at this time.    -Started on nicotine replacement therapy.  -Counseled patient on cessation.    Pyelectasis  Assessment & Plan  RUQ U/S showed hydronephrosis, repeat US with rental protocol noted right renal pyelectasis.  - CTM    Alcohol abuse- (present on admission)  Assessment & Plan  Patient has history of heavy alcohol use. Says that she had 2 beers on Wednesday and had a few sips of a cocktail the weekend before. Suspect excessive alcohol use. Alcohol level negative.    -Educated patient on importance of quitting alcohol and mortality.    Chest pain- (present on admission)  Assessment & Plan  Patient reports episode of chest pain with exertion without radiation on Wednesday. Denies any previous history of CAD.    -Ordered EKG for initial evaluation, which showed no acute changes.

## 2020-09-29 NOTE — PROGRESS NOTES
Received Bedside report. Assumed care at 0700. This pt is AOx4, ambulatory with SBA, voiding appropriately, has significant pain to abdomen. Patient and RN discussed plan of care: questions answered. Labs noted, assessment complete, patient tolerating low fiber GI soft diet. Tele box in place. Pt is on RA. Call light in place, fall precautions in place, patient educated on importance of calling for assistance. No additional needs at this time.

## 2020-09-30 VITALS
OXYGEN SATURATION: 95 % | SYSTOLIC BLOOD PRESSURE: 131 MMHG | HEART RATE: 87 BPM | RESPIRATION RATE: 18 BRPM | DIASTOLIC BLOOD PRESSURE: 69 MMHG | BODY MASS INDEX: 30.36 KG/M2 | TEMPERATURE: 98.1 F | WEIGHT: 188.93 LBS | HEIGHT: 66 IN

## 2020-09-30 LAB
ALBUMIN SERPL BCP-MCNC: 3.4 G/DL (ref 3.2–4.9)
ALBUMIN/GLOB SERPL: 1 G/DL
ALP SERPL-CCNC: 88 U/L (ref 30–99)
ALT SERPL-CCNC: 18 U/L (ref 2–50)
ANION GAP SERPL CALC-SCNC: 11 MMOL/L (ref 7–16)
AST SERPL-CCNC: 28 U/L (ref 12–45)
BILIRUB SERPL-MCNC: 0.7 MG/DL (ref 0.1–1.5)
BUN SERPL-MCNC: 11 MG/DL (ref 8–22)
CALCIUM SERPL-MCNC: 9.1 MG/DL (ref 8.5–10.5)
CHLORIDE SERPL-SCNC: 102 MMOL/L (ref 96–112)
CO2 SERPL-SCNC: 25 MMOL/L (ref 20–33)
CREAT SERPL-MCNC: 0.83 MG/DL (ref 0.5–1.4)
EKG IMPRESSION: NORMAL
ERYTHROCYTE [DISTWIDTH] IN BLOOD BY AUTOMATED COUNT: 45.7 FL (ref 35.9–50)
GLOBULIN SER CALC-MCNC: 3.3 G/DL (ref 1.9–3.5)
GLUCOSE SERPL-MCNC: 122 MG/DL (ref 65–99)
HCT VFR BLD AUTO: 32.5 % (ref 37–47)
HGB BLD-MCNC: 10.1 G/DL (ref 12–16)
MCH RBC QN AUTO: 23.8 PG (ref 27–33)
MCHC RBC AUTO-ENTMCNC: 31.1 G/DL (ref 33.6–35)
MCV RBC AUTO: 76.7 FL (ref 81.4–97.8)
PLATELET # BLD AUTO: 74 K/UL (ref 164–446)
POTASSIUM SERPL-SCNC: 3.9 MMOL/L (ref 3.6–5.5)
PROT SERPL-MCNC: 6.7 G/DL (ref 6–8.2)
RBC # BLD AUTO: 4.24 M/UL (ref 4.2–5.4)
SODIUM SERPL-SCNC: 138 MMOL/L (ref 135–145)
WBC # BLD AUTO: 3.5 K/UL (ref 4.8–10.8)

## 2020-09-30 PROCEDURE — 80053 COMPREHEN METABOLIC PANEL: CPT

## 2020-09-30 PROCEDURE — 99238 HOSP IP/OBS DSCHRG MGMT 30/<: CPT | Mod: GC | Performed by: INTERNAL MEDICINE

## 2020-09-30 PROCEDURE — 85027 COMPLETE CBC AUTOMATED: CPT

## 2020-09-30 PROCEDURE — 93010 ELECTROCARDIOGRAM REPORT: CPT | Performed by: INTERNAL MEDICINE

## 2020-09-30 PROCEDURE — 700111 HCHG RX REV CODE 636 W/ 250 OVERRIDE (IP): Performed by: STUDENT IN AN ORGANIZED HEALTH CARE EDUCATION/TRAINING PROGRAM

## 2020-09-30 PROCEDURE — A9270 NON-COVERED ITEM OR SERVICE: HCPCS | Performed by: STUDENT IN AN ORGANIZED HEALTH CARE EDUCATION/TRAINING PROGRAM

## 2020-09-30 PROCEDURE — 700102 HCHG RX REV CODE 250 W/ 637 OVERRIDE(OP): Performed by: STUDENT IN AN ORGANIZED HEALTH CARE EDUCATION/TRAINING PROGRAM

## 2020-09-30 PROCEDURE — 36415 COLL VENOUS BLD VENIPUNCTURE: CPT

## 2020-09-30 RX ORDER — POTASSIUM CHLORIDE 20 MEQ/1
20 TABLET, EXTENDED RELEASE ORAL ONCE
Status: DISCONTINUED | OUTPATIENT
Start: 2020-09-30 | End: 2020-09-30 | Stop reason: HOSPADM

## 2020-09-30 RX ORDER — FERROUS SULFATE 325(65) MG
325 TABLET ORAL
Qty: 30 TAB | Refills: 0 | Status: SHIPPED | OUTPATIENT
Start: 2020-10-01 | End: 2020-10-31

## 2020-09-30 RX ORDER — LANOLIN ALCOHOL/MO/W.PET/CERES
100 CREAM (GRAM) TOPICAL DAILY
Qty: 30 TAB | Refills: 0 | Status: SHIPPED | OUTPATIENT
Start: 2020-10-01 | End: 2020-10-31

## 2020-09-30 RX ORDER — GABAPENTIN 300 MG/1
600 CAPSULE ORAL 2 TIMES DAILY
Qty: 30 CAP | Refills: 0 | Status: SHIPPED | OUTPATIENT
Start: 2020-09-30 | End: 2020-10-30

## 2020-09-30 RX ADMIN — DIPHENHYDRAMINE HYDROCHLORIDE 25 MG: 50 INJECTION, SOLUTION INTRAMUSCULAR; INTRAVENOUS at 05:14

## 2020-09-30 RX ADMIN — FOLIC ACID 1 MG: 1 TABLET ORAL at 05:14

## 2020-09-30 RX ADMIN — Medication 100 MG: at 05:14

## 2020-09-30 RX ADMIN — DOCUSATE SODIUM 50 MG AND SENNOSIDES 8.6 MG 2 TABLET: 8.6; 5 TABLET, FILM COATED ORAL at 05:17

## 2020-09-30 RX ADMIN — BUPROPION HYDROCHLORIDE 100 MG: 100 TABLET, FILM COATED ORAL at 05:14

## 2020-09-30 RX ADMIN — MORPHINE SULFATE 2 MG: 4 INJECTION INTRAVENOUS at 05:14

## 2020-09-30 RX ADMIN — MULTIPLE VITAMINS W/ MINERALS TAB 1 TABLET: TAB at 05:14

## 2020-09-30 RX ADMIN — NICOTINE 14 MG: 14 PATCH TRANSDERMAL at 05:14

## 2020-09-30 RX ADMIN — AMLODIPINE BESYLATE 5 MG: 5 TABLET ORAL at 05:14

## 2020-09-30 RX ADMIN — GABAPENTIN 600 MG: 300 CAPSULE ORAL at 05:14

## 2020-09-30 RX ADMIN — MAGNESIUM HYDROXIDE 30 ML: 400 SUSPENSION ORAL at 08:36

## 2020-09-30 ASSESSMENT — ENCOUNTER SYMPTOMS
HEADACHES: 0
NAUSEA: 0
CONSTIPATION: 0
DIZZINESS: 0
COUGH: 0
SPUTUM PRODUCTION: 0
PALPITATIONS: 0
FEVER: 0
TREMORS: 0
HEARTBURN: 0
SHORTNESS OF BREATH: 0
ABDOMINAL PAIN: 0
VOMITING: 0
CHILLS: 0
DEPRESSION: 0
WHEEZING: 0
TINGLING: 0
BLURRED VISION: 0
HEMOPTYSIS: 0
FOCAL WEAKNESS: 0
EYE PAIN: 0
SORE THROAT: 0
WEAKNESS: 0

## 2020-09-30 ASSESSMENT — PAIN DESCRIPTION - PAIN TYPE: TYPE: CHRONIC PAIN

## 2020-09-30 NOTE — PROGRESS NOTES
Assumed care of PT A&O 4. Pt resting in bed with no signs of labored breathing. On RA. Tele monitor in place, cardiac rhythm being monitored. Call light within reach, bed in lowest position, upper bed rails up. Pt was updated on plan of care for the night.

## 2020-09-30 NOTE — DISCHARGE PLANNING
Anticipated Discharge Disposition: Home w/ no needs    Action: Called patient's pharmacy to check on cost of medication.  Per the pharmacy staff, it is not letting them bill her insurance for the medication.  They deleted the script from their system and asked that MD send the script again.  Per Robyn Portillo, prescription should be paid for @ 100% like all of her other medications.  MAMI NEWSOME sent TT to Dr. Garcia to request new script be sent so that it can be run through patient's insurance.    Barriers to Discharge: medication coverage verification    Plan: Rn CM to call pharmacy back to discuss medication coverage.

## 2020-09-30 NOTE — DISCHARGE INSTRUCTIONS
Discharge Instructions    Discharged to home by taxi with self. Discharged via walking, hospital escort:   Special equipment needed: Not Applicable    Be sure to schedule a follow-up appointment with your primary care doctor or any specialists as instructed.     Discharge Plan:   Diet Plan: Discussed  Activity Level: Discussed  Confirmed Follow up Appointment: Patient to Call and Schedule Appointment  Confirmed Symptoms Management: Discussed  Medication Reconciliation Updated: Yes  Influenza Vaccine Indication: Patient Refuses    I understand that a diet low in cholesterol, fat, and sodium is recommended for good health. Unless I have been given specific instructions below for another diet, I accept this instruction as my diet prescription.   Other diet: GI soft diet     Special Instructions:     Acute Pancreatitis    The pancreas is a gland that is located behind the stomach on the left side of the abdomen. It produces enzymes that help to digest food. The pancreas also releases the hormones glucagon and insulin, which help to regulate blood sugar. Acute pancreatitis happens when inflammation of the pancreas suddenly occurs and the pancreas becomes irritated and swollen.  Most acute attacks last a few days and cause serious problems. Some people become dehydrated and develop low blood pressure. In severe cases, bleeding in the abdomen can lead to shock and can be life-threatening. The lungs, heart, and kidneys may fail.  What are the causes?  This condition may be caused by:  · Alcohol abuse.  · Drug abuse.  · Gallstones or other conditions that can block the tube that drains the pancreas (pancreatic duct).  · A tumor in the pancreas.  Other causes include:  · Certain medicines.  · Exposure to certain chemicals.  · Diabetes.  · An infection in the pancreas.  · Damage caused by an accident (trauma).  · The poison (venom) from a scorpion bite.  · Abdominal surgery.  · Autoimmune pancreatitis. This is when the body's  disease-fighting (immune) system attacks the pancreas.  · Genes that are passed from parent to child (inherited).  In some cases, the cause of this condition is not known.  What are the signs or symptoms?  Symptoms of this condition include:  · Pain in the upper abdomen that may radiate to the back. Pain may be severe.  · Tenderness and swelling of the abdomen.  · Nausea and vomiting.  · Fever.  How is this diagnosed?  This condition may be diagnosed based on:  · A physical exam.  · Blood tests.  · Imaging tests, such as X-rays, CT or MRI scans, or an ultrasound of the abdomen.  How is this treated?  Treatment for this condition usually requires a stay in the hospital. Treatment for this condition may include:  · Pain medicine.  · Fluid replacement through an IV.  · Placing a tube in the stomach to remove stomach contents and to control vomiting (NG tube, or nasogastric tube).  · Not eating for 3-4 days. This gives the pancreas a rest, because enzymes are not being produced that can cause further damage.  · Antibiotic medicines, if your condition is caused by an infection.  · Treating any underlying conditions that may be the cause.  · Steroid medicines, if your condition is caused by your immune system attacking your body's own tissues (autoimmune disease).  · Surgery on the pancreas or gallbladder.  Follow these instructions at home:  Eating and drinking    · Follow instructions from your health care provider about diet. This may involve avoiding alcohol and decreasing the amount of fat in your diet.  · Eat smaller, more frequent meals. This reduces the amount of digestive fluids that the pancreas produces.  · Drink enough fluid to keep your urine pale yellow.  · Do not drink alcohol if it caused your condition.  General instructions  · Take over-the-counter and prescription medicines only as told by your health care provider.  · Do not drive or use heavy machinery while taking prescription pain medicine.  · Ask  your health care provider if the medicine prescribed to you can cause constipation. You may need to take steps to prevent or treat constipation, such as:  ? Take an over-the-counter or prescription medicine for constipation.  ? Eat foods that are high in fiber such as whole grains and beans.  ? Limit foods that are high in fat and processed sugars, such as fried or sweet foods.  · Do not use any products that contain nicotine or tobacco, such as cigarettes, e-cigarettes, and chewing tobacco. If you need help quitting, ask your health care provider.  · Get plenty of rest.  · If directed, check your blood sugar at home as told by your health care provider.  · Keep all follow-up visits as told by your health care provider. This is important.  Contact a health care provider if you:  · Do not recover as quickly as expected.  · Develop new or worsening symptoms.  · Have persistent pain, weakness, or nausea.  · Recover and then have another episode of pain.  · Have a fever.  Get help right away if:  · You cannot eat or keep fluids down.  · Your pain becomes severe.  · Your skin or the white part of your eyes turns yellow (jaundice).  · You have sudden swelling in your abdomen.  · You vomit.  · You feel dizzy or you faint.  · Your blood sugar is high (over 300 mg/dL).  Summary  · Acute pancreatitis happens when inflammation of the pancreas suddenly occurs and the pancreas becomes irritated and swollen.  · This condition is typically caused by alcohol abuse, drug abuse, or gallstones.  · Treatment for this condition usually requires a stay in the hospital.  This information is not intended to replace advice given to you by your health care provider. Make sure you discuss any questions you have with your health care provider.  Document Released: 12/18/2006 Document Revised: 10/07/2019 Document Reviewed: 06/24/2019  Elsevier Patient Education © 2020 [employer]    Pancreatitis Eating Plan  Pancreatitis is when your pancreas  becomes irritated and swollen (inflamed). The pancreas is a small organ located behind your stomach. It helps your body digest food and regulate your blood sugar. Pancreatitis can affect how your body digests food, especially foods with fat. You may also have other symptoms such as abdominal pain or nausea.  When you have pancreatitis, following a low-fat eating plan may help you manage symptoms and recover more quickly. Work with your health care provider or a diet and nutrition specialist (dietitian) to create an eating plan that is right for you.  What are tips for following this plan?  Reading food labels  Use the information on food labels to help keep track of how much fat you eat:  · Check the serving size.  · Look for the amount of total fat in grams (g) in one serving.  ? Low-fat foods have 3 g of fat or less per serving.  ? Fat-free foods have 0.5 g of fat or less per serving.  · Keep track of how much fat you eat based on how many servings you eat.  ? For example, if you eat two servings, the amount of fat you eat will be two times what is listed on the label.  Shopping    · Buy low-fat or nonfat foods, such as:  ? Fresh, frozen, or canned fruits and vegetables.  ? Grains, including pasta, bread, and rice.  ? Lean meat, poultry, fish, and other protein foods.  ? Low-fat or nonfat dairy.  · Avoid buying bakery products and other sweets made with whole milk, butter, and eggs.  · Avoid buying snack foods with added fat, such as anything with butter or cheese flavoring.  Cooking  · Remove skin from poultry, and remove extra fat from meat.  · Limit the amount of fat and oil you use to 6 teaspoons or less per day.  · Cook using low-fat methods, such as boiling, broiling, grilling, steaming, or baking.  · Use spray oil to cook. Add fat-free chicken broth to add flavor and moisture.  · Avoid adding cream to thicken soups or sauces. Use other thickeners such as corn starch or tomato paste.  Meal planning    · Eat  a low-fat diet as told by your dietitian. For most people, this means having no more than 55-65 grams of fat each day.  · Eat small, frequent meals throughout the day. For example, you may have 5-6 small meals instead of 3 large meals.  · Drink enough fluid to keep your urine pale yellow.  · Do not drink alcohol. Talk to your health care provider if you need help stopping.  · Limit how much caffeine you have, including black coffee, black and green tea, caffeinated soft drinks, and energy drinks.  General information  · Let your health care provider or dietitian know if you have unplanned weight loss on this eating plan.  · You may be instructed to follow a clear liquid diet during a flare of symptoms. Talk with your health care provider about how to manage your diet during symptoms of a flare.  · Take any vitamins or supplements as told by your health care provider.  · Work with a dietitian, especially if you have other conditions such as obesity or diabetes mellitus.  What foods should I avoid?  Fruits  Fried fruits. Fruits served with butter or cream.  Vegetables  Fried vegetables. Vegetables cooked with butter, cheese, or cream.  Grains  Biscuits, waffles, donuts, pastries, and croissants. Pies and cookies. Butter-flavored popcorn. Regular crackers.  Meats and other protein foods  Fatty cuts of meat. Poultry with skin. Organ meats. Humphries, sausage, and cold cuts. Whole eggs. Nuts and nut butters.  Dairy  Whole and 2% milk. Whole milk yogurt. Whole milk ice cream. Cream and half-and-half. Cream cheese. Sour cream. Cheese.  Beverages  Wine, beer, and liquor.  The items listed above may not be a complete list of foods and beverages to avoid. Contact a dietitian for more information.  Summary  · Pancreatitis can affect how your body digests food, especially foods with fat.  · When you have pancreatitis, it is recommended that you follow a low-fat eating plan to help you recover more quickly and manage symptoms. For  most people, this means limiting fat to no more than 55-65 grams per day.  · Do not drink alcohol. Limit the amount of caffeine you have, and drink enough fluid to keep your urine pale yellow.  This information is not intended to replace advice given to you by your health care provider. Make sure you discuss any questions you have with your health care provider.  Document Released: 03/26/2019 Document Revised: 04/09/2020 Document Reviewed: 03/26/2019  Elsevier Patient Education © 2020 [employer]    · Is patient discharged on Warfarin / Coumadin?   No     Depression / Suicide Risk    As you are discharged from this [facility] facility, it is important to learn how to keep safe from harming yourself.    Recognize the warning signs:  · Abrupt changes in personality, positive or negative- including increase in energy   · Giving away possessions  · Change in eating patterns- significant weight changes-  positive or negative  · Change in sleeping patterns- unable to sleep or sleeping all the time   · Unwillingness or inability to communicate  · Depression  · Unusual sadness, discouragement and loneliness  · Talk of wanting to die  · Neglect of personal appearance   · Rebelliousness- reckless behavior  · Withdrawal from people/activities they love  · Confusion- inability to concentrate     If you or a loved one observes any of these behaviors or has concerns about self-harm, here's what you can do:  · Talk about it- your feelings and reasons for harming yourself  · Remove any means that you might use to hurt yourself (examples: pills, rope, extension cords, firearm)  · Get professional help from the community (Mental Health, Substance Abuse, psychological counseling)  · Do not be alone:Call your Safe Contact- someone whom you trust who will be there for you.  · Call your local CRISIS HOTLINE 661-0884 or 506-118-1251  · Call your local Children's Mobile Crisis Response Team [place] (184) 533-2731 or  www.[employer]  · Call the toll free National Suicide Prevention Hotlines   · National Suicide Prevention Lifeline 609-208-AFTT (8280)  · National Hope Line Network 800-SUICIDE (625-3179)

## 2020-09-30 NOTE — CARE PLAN
Problem: Communication  Goal: The ability to communicate needs accurately and effectively will improve  Outcome: PROGRESSING AS EXPECTED  Intervention: [place] patient and significant other/support system to call light to alert staff of needs  Note: Patient encouraged to voice needs and concerns, pt verbalized understanding.      Problem: Discharge Barriers/Planning  Goal: Patient's continuum of care needs will be met  Outcome: PROGRESSING AS EXPECTED  Intervention: Assess potential discharge barriers on admission and throughout hospital stay  Note: Pt updated on plan for D/C today if cleared by MD pt verbalized understanding.

## 2020-09-30 NOTE — PROGRESS NOTES
Monitor room notified this RN that patient had converted into atrial fibrillation with HR of 140. Patient stated some chest discomfort. EKG ordered, rhythm verified. Patient administered 5mg of IV metoprolol PRN. Dr. Garcia updated.

## 2020-09-30 NOTE — CARE PLAN
Problem: Safety  Goal: Will remain free from injury  Note: Pt mobility assessed at beginning of shift. Pt is up independently. Fall precautions in place. Non-slip socks on. Bed in lowest locked position. Clutter free environment. Call light within reach. Pt educated to call for assistance and verbalizes understanding.      Problem: Knowledge Deficit  Goal: Knowledge of the prescribed therapeutic regimen will improve  Note: Pt educated about disease process. Reason why medications are taken. And informed about treatment plan.

## 2020-09-30 NOTE — PROGRESS NOTES
Daily Progress Note:     Date of Service: 9/30/2020  Primary Team: [facility] White Team   Attending: Omkar Stephens MD   Senior Resident: Dr. LUPE Michael  Intern: Dr. STEVE Garcia  Contact:  [phone]    Chief Complaint:   60-year-old woman with past medical history significant for liver cirrhosis likely secondary to alcohol use, IV meth, hypertension, insulin dependent diabetes mellitus 2, fibromyalgia and hepatitis C admitted on September 25, 2020 with epigastric pain radiating to the back and worse after eating.  She had an ultrasound of the right upper quadrant showing distended gallbladder with no gallbladder wall thickening or natalee-cholecystic fluid.  MRCP as shown mildly distended common bile duct without intrahepatic biliary dilation or visualize intra-or extra duct obstructing lesion       Subjective:  - Overnight the patient had another episodes of A. fib with aVR with return to sinus rhythm after IV 5 mg of metoprolol.  Patient always wanted to have a smoke during the night, and she was told by the nursing staff that she could not.  - Patient is able to ambulate and urinate without any difficulty.  -The left abdominal pain that persisted for the last 2 days now as resolved.  -Overall patient appears to be improving, and her vitals are stable.    Consultants/Specialty:    Review of Systems:   Review of Systems   Constitutional: Negative for chills, fever and malaise/fatigue.   HENT: Negative for sore throat.    Eyes: Negative for blurred vision and pain.   Respiratory: Negative for cough, hemoptysis, sputum production, shortness of breath and wheezing.    Cardiovascular: Negative for chest pain, palpitations and leg swelling.   Gastrointestinal: Negative for abdominal pain, constipation, heartburn, nausea and vomiting.   Genitourinary: Negative for dysuria, frequency and urgency.   Neurological: Negative for dizziness, tingling, tremors, focal weakness, weakness and headaches.    Psychiatric/Behavioral: Negative for depression.       Objective Data:   Physical Exam:   Vitals:   Temp:  [36.2 °C (97.2 °F)-37.6 °C (99.6 °F)] 36.7 °C (98.1 °F)  Pulse:  [] 87  Resp:  [16-18] 18  BP: (131-168)/(67-85) 131/69  SpO2:  [91 %-95 %] 95 %   CREATE MACRO BE SURE THAT THIS IS PERTINENT TO YOUR PATIENT!  Physical Exam  Constitutional:       General: She is not in acute distress.     Appearance: She is obese. She is not ill-appearing.   HENT:      Head: Normocephalic.      Nose: Nose normal.      Mouth/Throat:      Mouth: Mucous membranes are moist.   Eyes:      Extraocular Movements: Extraocular movements intact.      Pupils: Pupils are equal, round, and reactive to light.   Neck:      Musculoskeletal: No neck rigidity or muscular tenderness.   Cardiovascular:      Rate and Rhythm: Normal rate and regular rhythm.      Pulses: Normal pulses.      Heart sounds: No murmur. No friction rub. No gallop.    Pulmonary:      Effort: Pulmonary effort is normal. No respiratory distress.      Breath sounds: No wheezing, rhonchi or rales.   Chest:      Chest wall: No tenderness.   Abdominal:      General: There is distension.      Palpations: There is no mass.      Tenderness: There is no abdominal tenderness. There is no guarding or rebound.   Musculoskeletal:         General: No swelling or tenderness.      Right lower leg: No edema.      Left lower leg: No edema.   Neurological:      General: No focal deficit present.      Mental Status: She is alert and oriented to person, place, and time.      Cranial Nerves: No cranial nerve deficit.   Psychiatric:         Mood and Affect: Mood normal.       Labs:   Recent Labs     09/27/20  2344 09/28/20  0752 09/29/20  0746 09/30/20  0728   WBC 3.0* 3.1* 4.5* 3.5*   RBC 3.98* 4.18* 4.43 4.24   HEMOGLOBIN 9.4* 9.9* 10.6* 10.1*   HEMATOCRIT 30.6* 32.0* 34.3* 32.5*   MCV 76.9* 76.6* 77.4* 76.7*   MCH 23.6* 23.7* 23.9* 23.8*   RDW 46.5 45.9 45.9 45.7   PLATELETCT 59* 70*  78* 74*   MPV 10.3 11.2 10.7  --    NEUTSPOLYS 63.90  --  68.50  --    LYMPHOCYTES 21.90*  --  18.40*  --    MONOCYTES 11.90  --  10.20  --    EOSINOPHILS 1.30  --  1.80  --    BASOPHILS 0.70  --  0.90  --      Recent Labs     09/28/20  0752 09/29/20  0746 09/30/20  0728   SODIUM 136 134* 138   POTASSIUM 4.1 4.2 3.9   CHLORIDE 104 102 102   CO2 24 23 25   GLUCOSE 198* 180* 122*   BUN 7* 9 11     Recent Labs     09/28/20  0752 09/29/20  0746 09/30/20  0728   ALBUMIN 3.2 3.4 3.4   TBILIRUBIN 0.5 0.7 0.7   ALKPHOSPHAT 78 79 88   TOTPROTEIN 6.1 6.4 6.7   ALTSGPT 20 19 18   ASTSGOT 28 28 28   CREATININE 0.63 0.81 0.83     US-ABDOMEN COMPLETE SURVEY   Final Result         1.  Hepatomegaly with nodular contour, appearance suggests changes of cirrhosis   2.  Common bile duct dilatation, consider distal obstructing stone, stenosis, or ampullary lesion. Could be further evaluated with ERCP or MRCP.   3.  Cholelithiasis with distended gallbladder but normal gallbladder wall thickness, findings insufficient for diagnosis of cholecystitis.   4.  Mild right hydronephrosis   5.  Ascites      EC-ECHOCARDIOGRAM COMPLETE W/O CONT   Final Result      DX-CHEST-PORTABLE (1 VIEW)   Final Result         1.  No acute cardiopulmonary disease.      [id number]-Y/O   Final Result      1.  Cholelithiasis and distended gallbladder. Cholecystitis is not excluded.   2.  Mildly distended common bile duct without intrahepatic biliary dilatation or visualized intra or extra ductal obstructing lesion   3.  Cirrhosis   4.  Enlarged portal vein   5.  Splenomegaly   6.  These findings are in keeping with portal hypertension   7.  Small volume ascites   8.  Trace BILATERAL pleural effusions   9.  Mild RIGHT hydronephrosis      US-RENAL   Final Result      1.  Right renal pyelectasis. Probably no hydronephrosis      2.  Cirrhosis and portal hypertension      DX-CHEST-2 VIEWS   Final Result      No acute cardiopulmonary abnormality identified.      US-RUQ    Final Result      Gallstones within a distended gallbladder.      Findings of cirrhosis.      Prominence of the common duct measuring 8 mm. Further evaluation can be obtained with MRCP as clinically indicated.      Mild right hydronephrosis.      Small amount of ascites.                    Problem Representation:   60-year-old woman that was admitted with signs and symptoms of pancreatitis that has improved and now presenting with mild epigastric pain with no longer nausea vomiting, and now she is slowly reintroduced to solid food.    * Pancreatitis- (present on admission)  Assessment & Plan  Patient presented to hospital with 1 day history of worsening epigastric pain radiating to back. Reports previous episode of alcoholic pancreatitis within the last year. Lipase 348/ Lipid panel WNL. AST/ALT WNL. Patient reports excessive alcohol use in past few weeks.  Likely related to alcohol use given absence of liver enzymes and mildly dilated common bile duct.    -Lipase found to be elevated at 348 on initial check.  -RUQ U/S showed gallstones in gallbladder with dilated common bile duct at 8mm in addition to signs of cirrhosis.  - MRCP as shown mildly distended common bile duct without intrahepatic biliary dilation or visualize intra-or extra duct obstructing lesion   - Slowly restarted on solid diet  -Started patient on IV morphine for pain control.  -Started on zofran for n/v.    Given the MRCP, it is possible that patient had passed a stone that was lodged in the common bile duct and that was finally passed in the stools after a period of time. It is also a possibility that it could have participated in the etiology of pancreatitis, but it is less likely given that on admission with acute sxs, patient's alk phos was within normal limit.    Plan  -Patient has continued to improve.  Her initial epigastric umbilical pain has now subsided.  This subsequent mid lateral abdominal pain that she had has also  subsided.      Abdominal pain- (present on admission)  Assessment & Plan  -For the last 2 days, patient was complaining of left mid abdominal pain, but it appears to have resolved.  -Repeated abdominal ultrasound shows just a small to moderate ascites on the right side of the abdomen but no fluid collection on the left side where the patient was complaining of tenderness.    Plan:  -We will continue to monitor    Atrial fibrillation (HCC)  Assessment & Plan  - Patient had 2 episodes of A. fib with RVR during her hospitalization, with resolving with IV metoprolol.  - Back to normal rate control with 5 mg IV metoprolol  - CHADsVASC score of 3 with stroke risk of 3.2% per year in >07841 patients and 4.6% risk of stroke?TIA/systemiuc embolism  - Risk of bleed is 5.8% on HAS-BLED score for major bleed  -We discussed the risk of stroke versus bleeding with the patient.  And we recommended the use of metoprolol and apixaban.    Gallstones  Assessment & Plan  Gallstones noted on RUQ US. Patient reports epigastric pain after eating. Discussed with radiologist who read US - not concerned about cholecystitis - no signs of inflammation or gallbladder wall thickening.    Thrombocytopenia (HCC)- (present on admission)  Assessment & Plan  Chronic low platelets. Likely due to splenomegaly noted on physical exam on US. No signs of acute bleed.    - CTM      DM2 (diabetes mellitus, type 2) (HCC)- (present on admission)  Assessment & Plan  Patient with history of insulin dependent DM2. Poor control - BS in 400s at home, last Ha1C 12.6 8/20. Home insulin regimen lantus 40 in evening and novolog 10 in morning. Blood sugar found to be 326 on initial check.    -Insulin correctional scale and lantus 20 units in the evening.    HTN (hypertension)- (present on admission)  Assessment & Plan  Pt reports previous hx of HTN, but is unsure which medications she takes for it. Patient's blood pressure elevated at 223/101 on admission, stable during  day.  Patient's blood pressure appears to have stabilized.  On 9/30/2020, it was 138/67.    Plan  Given that the blood pressure of the patient has stabilized, on discharge, she will be recommended to take metoprolol for blood pressure control and A. fib    Iron deficiency anemia- (present on admission)  Assessment & Plan  Patient has history of anemia, low platelets secondary to history of alcohol use.  History of low platelets, low Hgb, low MCV, increased lymphocytes, neurophils. Low platelets likely due to splenomegaly    -Hgb 8.4, history of chronic anemia. Anemia workup suggestive of iron deficiency anemia (low iron, % saturation,     Plan:  - On iron supplement.    Tobacco dependence- (present on admission)  Assessment & Plan  Patient reports 40 year history of smoking. Says that she used to smoke ~2PPD, but is currently smoking 3 cigarettes at this time.    -Started on nicotine replacement therapy.  -Counseled patient on cessation.    Pyelectasis  Assessment & Plan  RUQ U/S showed hydronephrosis, repeat US with rental protocol noted right renal pyelectasis.  - CTM    Alcohol abuse- (present on admission)  Assessment & Plan  Patient has history of heavy alcohol use. Says that she had 2 beers on Wednesday and had a few sips of a cocktail the weekend before. Suspect excessive alcohol use. Alcohol level negative.    -Educated patient on importance of quitting alcohol and mortality.    Chest pain- (present on admission)  Assessment & Plan  Patient reports episode of chest pain with exertion without radiation on Wednesday. Denies any previous history of CAD.    -Ordered EKG for initial evaluation, which showed no acute changes.

## 2020-09-30 NOTE — PROGRESS NOTES
Patient discharge home. Taxi voucher given. Signed discharge paperwork placed in the chart, copy given to patient. All questions answered at this time. Gave pt contact information for [facility] to establish a primary care. All belongings collected by the patient and taken home.

## 2020-09-30 NOTE — PROGRESS NOTES
Assumed care of pt this am. Pt is A&O x4. Pt verbalized wanting to be discharge today, MD is aware. Pt updated on the plan of care. Hourly rounding in place.

## 2020-10-01 ENCOUNTER — PATIENT OUTREACH (OUTPATIENT)
Dept: HEALTH INFORMATION MANAGEMENT | Facility: OTHER | Age: 60
End: 2020-10-01

## 2020-10-01 LAB — GLUCOSE BLD-MCNC: 190 MG/DL (ref 65–99)

## 2020-10-01 SDOH — ECONOMIC STABILITY: FOOD INSECURITY: WITHIN THE PAST 12 MONTHS, YOU WORRIED THAT YOUR FOOD WOULD RUN OUT BEFORE YOU GOT MONEY TO BUY MORE.: NEVER TRUE

## 2020-10-01 SDOH — ECONOMIC STABILITY: TRANSPORTATION INSECURITY
IN THE PAST 12 MONTHS, HAS THE LACK OF TRANSPORTATION KEPT YOU FROM MEDICAL APPOINTMENTS OR FROM GETTING MEDICATIONS?: NO

## 2020-10-01 SDOH — ECONOMIC STABILITY: FOOD INSECURITY: WITHIN THE PAST 12 MONTHS, THE FOOD YOU BOUGHT JUST DIDN'T LAST AND YOU DIDN'T HAVE MONEY TO GET MORE.: NEVER TRUE

## 2020-10-01 SDOH — ECONOMIC STABILITY: TRANSPORTATION INSECURITY
IN THE PAST 12 MONTHS, HAS LACK OF TRANSPORTATION KEPT YOU FROM MEETINGS, WORK, OR FROM GETTING THINGS NEEDED FOR DAILY LIVING?: NO

## 2020-10-01 SDOH — ECONOMIC STABILITY: INCOME INSECURITY: HOW HARD IS IT FOR YOU TO PAY FOR THE VERY BASICS LIKE FOOD, HOUSING, MEDICAL CARE, AND HEATING?: NOT HARD AT ALL

## 2020-10-01 NOTE — PROGRESS NOTES
JOSE Pellteier spoke with pt via mobile phone after d/c from [facility]. Pt has all her medications and has no questions for a CCM pharmD. Pt was provided with PCP information to establish with a PCP at [facility] and stated she will call herself. Pt will be driving herself to her medical appts. Pt has no trouble paying for resources and lives in an apartment by herself with her mom as a good suuport syste, whom also lives in the same apartment complex. Pt did not express any other questions or concerns at the moment for JOSE, but would like a follow up phone call next to remind her to make an appt with a PCP.     Community Health Worker Intake  • Social determinates of health intake completed  • Identified barriers to none  • Contact information provided to Demetrice  • Outpatient assessment completed.  • Did the patient receive medications post discharge: Yes    Plan:  I will follow up with pt next Thursday 10/8 to see if pt has established with a PCP yet.

## 2020-10-04 ENCOUNTER — HOSPITAL ENCOUNTER (EMERGENCY)
Facility: MEDICAL CENTER | Age: 60
End: 2020-10-05
Attending: EMERGENCY MEDICINE
Payer: MEDICAID

## 2020-10-04 ENCOUNTER — APPOINTMENT (OUTPATIENT)
Dept: RADIOLOGY | Facility: MEDICAL CENTER | Age: 60
End: 2020-10-04
Attending: EMERGENCY MEDICINE
Payer: MEDICAID

## 2020-10-04 DIAGNOSIS — R10.12 LEFT UPPER QUADRANT ABDOMINAL PAIN: ICD-10-CM

## 2020-10-04 LAB
ALBUMIN SERPL BCP-MCNC: 3.5 G/DL (ref 3.2–4.9)
ALBUMIN/GLOB SERPL: 1.1 G/DL
ALP SERPL-CCNC: 85 U/L (ref 30–99)
ALT SERPL-CCNC: 13 U/L (ref 2–50)
ANION GAP SERPL CALC-SCNC: 11 MMOL/L (ref 7–16)
AST SERPL-CCNC: 25 U/L (ref 12–45)
BASOPHILS # BLD AUTO: 1.1 % (ref 0–1.8)
BASOPHILS # BLD: 0.05 K/UL (ref 0–0.12)
BILIRUB SERPL-MCNC: 0.7 MG/DL (ref 0.1–1.5)
BUN SERPL-MCNC: 11 MG/DL (ref 8–22)
CALCIUM SERPL-MCNC: 8.9 MG/DL (ref 8.5–10.5)
CHLORIDE SERPL-SCNC: 101 MMOL/L (ref 96–112)
CO2 SERPL-SCNC: 23 MMOL/L (ref 20–33)
CREAT SERPL-MCNC: 0.71 MG/DL (ref 0.5–1.4)
EOSINOPHIL # BLD AUTO: 0.12 K/UL (ref 0–0.51)
EOSINOPHIL NFR BLD: 2.7 % (ref 0–6.9)
ERYTHROCYTE [DISTWIDTH] IN BLOOD BY AUTOMATED COUNT: 43.4 FL (ref 35.9–50)
GLOBULIN SER CALC-MCNC: 3.3 G/DL (ref 1.9–3.5)
GLUCOSE SERPL-MCNC: 181 MG/DL (ref 65–99)
HCT VFR BLD AUTO: 32.6 % (ref 37–47)
HGB BLD-MCNC: 10.1 G/DL (ref 12–16)
IMM GRANULOCYTES # BLD AUTO: 0.02 K/UL (ref 0–0.11)
IMM GRANULOCYTES NFR BLD AUTO: 0.4 % (ref 0–0.9)
LIPASE SERPL-CCNC: 30 U/L (ref 11–82)
LYMPHOCYTES # BLD AUTO: 0.8 K/UL (ref 1–4.8)
LYMPHOCYTES NFR BLD: 17.7 % (ref 22–41)
MCH RBC QN AUTO: 23.8 PG (ref 27–33)
MCHC RBC AUTO-ENTMCNC: 31 G/DL (ref 33.6–35)
MCV RBC AUTO: 76.7 FL (ref 81.4–97.8)
MONOCYTES # BLD AUTO: 0.44 K/UL (ref 0–0.85)
MONOCYTES NFR BLD AUTO: 9.8 % (ref 0–13.4)
NEUTROPHILS # BLD AUTO: 3.08 K/UL (ref 2–7.15)
NEUTROPHILS NFR BLD: 68.3 % (ref 44–72)
NRBC # BLD AUTO: 0 K/UL
NRBC BLD-RTO: 0 /100 WBC
PLATELET # BLD AUTO: 73 K/UL (ref 164–446)
PMV BLD AUTO: 10.1 FL (ref 9–12.9)
POTASSIUM SERPL-SCNC: 3.7 MMOL/L (ref 3.6–5.5)
PROT SERPL-MCNC: 6.8 G/DL (ref 6–8.2)
RBC # BLD AUTO: 4.25 M/UL (ref 4.2–5.4)
SODIUM SERPL-SCNC: 135 MMOL/L (ref 135–145)
WBC # BLD AUTO: 4.5 K/UL (ref 4.8–10.8)

## 2020-10-04 PROCEDURE — 96374 THER/PROPH/DIAG INJ IV PUSH: CPT

## 2020-10-04 PROCEDURE — 81003 URINALYSIS AUTO W/O SCOPE: CPT

## 2020-10-04 PROCEDURE — 700111 HCHG RX REV CODE 636 W/ 250 OVERRIDE (IP): Performed by: EMERGENCY MEDICINE

## 2020-10-04 PROCEDURE — 85025 COMPLETE CBC W/AUTO DIFF WBC: CPT

## 2020-10-04 PROCEDURE — 99285 EMERGENCY DEPT VISIT HI MDM: CPT

## 2020-10-04 PROCEDURE — 80053 COMPREHEN METABOLIC PANEL: CPT

## 2020-10-04 PROCEDURE — 93005 ELECTROCARDIOGRAM TRACING: CPT | Performed by: EMERGENCY MEDICINE

## 2020-10-04 PROCEDURE — 83690 ASSAY OF LIPASE: CPT

## 2020-10-04 RX ADMIN — FAMOTIDINE 20 MG: 10 INJECTION, SOLUTION INTRAVENOUS at 23:15

## 2020-10-04 SDOH — HEALTH STABILITY: MENTAL HEALTH: HOW OFTEN DO YOU HAVE A DRINK CONTAINING ALCOHOL?: PATIENT DECLINED

## 2020-10-04 SDOH — HEALTH STABILITY: MENTAL HEALTH: HOW MANY STANDARD DRINKS CONTAINING ALCOHOL DO YOU HAVE ON A TYPICAL DAY?: PATIENT DECLINED

## 2020-10-04 SDOH — HEALTH STABILITY: MENTAL HEALTH: HOW OFTEN DO YOU HAVE 6 OR MORE DRINKS ON ONE OCCASION?: PATIENT DECLINED

## 2020-10-04 ASSESSMENT — FIBROSIS 4 INDEX: FIB4 SCORE: 5.26

## 2020-10-05 ENCOUNTER — APPOINTMENT (OUTPATIENT)
Dept: RADIOLOGY | Facility: MEDICAL CENTER | Age: 60
End: 2020-10-05
Attending: EMERGENCY MEDICINE
Payer: MEDICAID

## 2020-10-05 VITALS
BODY MASS INDEX: 30.22 KG/M2 | OXYGEN SATURATION: 98 % | DIASTOLIC BLOOD PRESSURE: 98 MMHG | WEIGHT: 188 LBS | RESPIRATION RATE: 18 BRPM | TEMPERATURE: 98.1 F | HEIGHT: 66 IN | SYSTOLIC BLOOD PRESSURE: 165 MMHG | HEART RATE: 80 BPM

## 2020-10-05 LAB
APPEARANCE UR: CLEAR
BILIRUB UR QL STRIP.AUTO: NEGATIVE
COLOR UR: YELLOW
EKG IMPRESSION: NORMAL
GLUCOSE UR STRIP.AUTO-MCNC: NEGATIVE MG/DL
KETONES UR STRIP.AUTO-MCNC: NEGATIVE MG/DL
LEUKOCYTE ESTERASE UR QL STRIP.AUTO: NEGATIVE
MICRO URNS: NORMAL
NITRITE UR QL STRIP.AUTO: NEGATIVE
PH UR STRIP.AUTO: 6.5 [PH] (ref 5–8)
PROT UR QL STRIP: NEGATIVE MG/DL
RBC UR QL AUTO: NEGATIVE
SP GR UR STRIP.AUTO: 1
UROBILINOGEN UR STRIP.AUTO-MCNC: 1 MG/DL

## 2020-10-05 PROCEDURE — 74177 CT ABD & PELVIS W/CONTRAST: CPT

## 2020-10-05 PROCEDURE — 700117 HCHG RX CONTRAST REV CODE 255: Performed by: EMERGENCY MEDICINE

## 2020-10-05 RX ORDER — FAMOTIDINE 20 MG/1
20 TABLET, FILM COATED ORAL 2 TIMES DAILY
Qty: 60 TAB | Refills: 0 | Status: ON HOLD | OUTPATIENT
Start: 2020-10-05 | End: 2021-01-01

## 2020-10-05 RX ADMIN — IOHEXOL 100 ML: 350 INJECTION, SOLUTION INTRAVENOUS at 00:30

## 2020-10-05 NOTE — ED TRIAGE NOTES
Demetrice Jaime  60 y.o.  Chief Complaint   Patient presents with   • Abdominal Pain     upper abd pain x 1 week; pt c/o nausea, denies V/D; per EMS blood glucose 236

## 2020-10-05 NOTE — ED NOTES
Demetrice Jaime is being discharged from the Emergency Department in stable condition. Discharge and follow up instructions were given to patient.   Prescription was explained to the patient. Demetrice Jaime is alert and oriented and verbalizes understanding. VSS. The patient ambulates with steady gait.

## 2020-10-05 NOTE — ED PROVIDER NOTES
ER Provider Note     Scribed for Cooper Warner M.D. by Ines Vasquez. 10/4/2020, 10:46 PM.    Primary Care Provider: Pcp Pt States None  Means of Arrival: EMS   History obtained from: Patient  History limited by: None     CHIEF COMPLAINT  Chief Complaint   Patient presents with   • Abdominal Pain     upper abd pain x 1 week; pt c/o nausea, denies V/D; per EMS blood glucose 236       HPI  Demetrice Jaime is a 60 y.o. female who presents to the Emergency Department for evaluation of acute upper abdominal pain onset about a week prior to arrival. She denies any vomiting or diarrhea but reports that the pain has been worsening. She states that the pain does not radiate. There are no known alleviating or exacerbating factors. She denies any abdominal surgical history.    REVIEW OF SYSTEMS  See HPI for further details. All other systems are negative.     PAST MEDICAL HISTORY   has a past medical history of Anemia, Anesthesia, Arthritis, Breath shortness, Cirrhosis (HCC), Dental disorder, Depression, Depression (6/22/2016), Diabetes, Fibromyalgia, Hepatitis C, Hiatus hernia syndrome, Hypertension, Indigestion, Obesity, Other specified disorder of intestines, Pain (12-19-14), Pneumonia, Psychiatric problem, Restless leg syndrome, Sleep apnea, Snoring, Stroke (HCC) (2011), Unspecified hemorrhagic conditions, and Urinary bladder disorder.    SURGICAL HISTORY   has a past surgical history that includes gyn surgery; gyn surgery (2010); carpal tunnel release (2010); shoulder surgery (2010); other (2010); other; anterior and posterior repair (12/6/2013); enterocele repair (12/6/2013); bladder sling female (12/6/2013); gastroscopy with clipping (7/6/2014); gastroscopy with banding (7/8/2014); cath removal (11/14/2014); cath placement (12/22/2014); cataract extraction with iol; tonsillectomy; gastroscopy (N/A, 8/21/2017); gastroscopy with banding (N/A, 2/8/2018); and gastroscopy (N/A, 2/12/2018).    SOCIAL HISTORY  Social  "History     Tobacco Use   • Smoking status: Current Every Day Smoker     Packs/day: 0.50     Years: 42.00     Pack years: 21.00     Types: Cigarettes   • Smokeless tobacco: Never Used   Substance Use Topics   • Alcohol use: Yes     Alcohol/week: 0.0 - 3.6 oz     Frequency: Patient refused     Drinks per session: Patient refused     Binge frequency: Patient refused   • Drug use: Not Currently     Comment: Hx IV meth use, states she used 12/18/17 for first time in yrs       Social History     Substance and Sexual Activity   Drug Use Not Currently    Comment: Hx IV meth use, states she used 12/18/17 for first time in yrs        FAMILY HISTORY  Family History   Problem Relation Age of Onset   • Diabetes Mother    • Hypertension Mother        CURRENT MEDICATIONS  Current Outpatient Medications   Medication Instructions   • apixaban (ELIQUIS) 5 mg, Oral, 2 TIMES DAILY   • apixaban (ELIQUIS) 5 mg, Oral, 2 TIMES DAILY   • buPROPion (WELLBUTRIN) 100 mg, Oral, EVERY MORNING   • Chlorpheniramine Maleate (ALLERGY PO) 1 Tab, Oral, EVERY MORNING   • ferrous sulfate 325 mg, Oral, EVERY MORNING WITH BREAKFAST   • gabapentin (NEURONTIN) 600 mg, Oral, 2 TIMES DAILY   • Lantus SoloStar 40 Units, Subcutaneous, EVERY EVENING   • metoprolol (LOPRESSOR) 25 mg, Oral, 2 TIMES DAILY   • NovoLOG FlexPen 3-18 Units, Subcutaneous, 3 TIMES DAILY BEFORE MEALS   • thiamine (THIAMINE) 100 mg, Oral, DAILY       ALLERGIES  No Known Allergies    PHYSICAL EXAM  VITAL SIGNS: BP (!) 191/86   Pulse 77   Temp 36.3 °C (97.3 °F) (Oral)   Resp 20   Ht 1.676 m (5' 6\")   Wt 85.3 kg (188 lb)   SpO2 97%   BMI 30.34 kg/m²      Constitutional: Alert in mild distress.  HENT: No signs of trauma, Bilateral external ears normal, Nose normal.   Eyes: Pupils are equal and reactive, Conjunctiva normal, Non-icteric.   Neck: Normal range of motion, No tenderness, Supple, No stridor.   Lymphatic: No lymphadenopathy noted.   Cardiovascular: Regular rate and rhythm, no " palpable thrill  Thorax & Lungs: No respiratory distress,  No chest tenderness.   Abdomen: Bowel sounds normal, Soft, Left upper quadrant tenderness, No masses, No pulsatile masses. No peritoneal signs.  Skin: Warm, Dry, No erythema, No rash.   Back: No bony tenderness, No CVA tenderness.   Extremities: Intact distal pulses, No edema, No tenderness, No cyanosis.  Musculoskeletal: Good range of motion in all major joints. No tenderness to palpation or major deformities noted.   Neurologic: Alert , Normal motor function, Normal sensory function, No focal deficits noted.   Psychiatric: Affect normal, Judgment normal, Mood normal.     DIAGNOSTIC STUDIES / PROCEDURES    EKG Interpretation:  Interpreted by me    12 Lead EKG interpreted by me to show:  Normal sinus rhythm  Rate 75  Axis: Normal  Intervals: Normal  Normal T waves, no inversion  Normal ST segments, no elevation or depression  My impression of this EKG: Does not indicate ischemia or arrhythmia at this time.     LABS  Labs Reviewed   CBC WITH DIFFERENTIAL - Abnormal; Notable for the following components:       Result Value    WBC 4.5 (*)     Hemoglobin 10.1 (*)     Hematocrit 32.6 (*)     MCV 76.7 (*)     MCH 23.8 (*)     MCHC 31.0 (*)     Platelet Count 73 (*)     Lymphocytes 17.70 (*)     Lymphs (Absolute) 0.80 (*)     All other components within normal limits   COMP METABOLIC PANEL - Abnormal; Notable for the following components:    Glucose 181 (*)     All other components within normal limits   LIPASE   URINALYSIS,CULTURE IF INDICATED   ESTIMATED GFR     All labs reviewed by me.    RADIOLOGY  CT-ABDOMEN-PELVIS WITH   Final Result      1.  Cirrhotic liver and findings consistent with portal hypertension including enlargement of the portal vein, esophageal varices, splenomegaly, and ascites.   2.  Mild gallbladder wall thickening, likely secondary to liver disease.   3.  Splenomegaly. 16 mm cyst at the splenic hilum.         The radiologist's interpretation  of all radiological studies have been reviewed by me.    COURSE & MEDICAL DECISION MAKING  Pertinent Labs & Imaging studies reviewed. (See chart for details)    This is a 60 y.o. female that presents with abdominal pain.  This could represent atypical chest pain.  I would an EKG as a screening tool.  In addition this could present gastritis and will give Pepcid for that.  Pancreatitis versus hepatitis is also possible and I will get labs to evaluate for these pathologies.  We will get a CT scan to evaluate for intra-abdominal abscess or obstruction..     10:46 PM - Patient seen and examined at bedside. Ordered CT-abdomen-pelvis UA, CBC with differential, CMP, Lipase, and EKG.  Patient will be medicated with Pepcid for her symptoms.     1:31 AM Patient was reevaluated at bedside. Discussed lab and radiology results with the patient and informed them that they are reassuring. Return precautions were discussed with the patient, and they were cleared for discharge at this time. Patient was understanding and agreeable to discharge.     The patient will return for new or worsening symptoms and is stable at the time of discharge.    The patient is referred to a primary physician for blood pressure management, diabetic screening, and for all other preventative health concerns.    Some ascites is seen on CT scan.  There is no fluid wave.  There is no peritoneal signs.  Do not believe that the patient needs to have peritoneal fluid removed at this time.  EKG is normal.  Mild anemia.  No electrolyte derangement.  Lipase is negative.  At this time the patient feels improved after Pepcid.  Will discharge home with strict return precautions and follow-up.    DISPOSITION:  Patient will be discharged home in stable condition.    FOLLOW UP:  [address] [address]  [phone]  Go in 2 days        OUTPATIENT MEDICATIONS:  New Prescriptions    FAMOTIDINE (PEPCID) 20 MG TAB    Take 1 Tab by  mouth 2 times a day.       FINAL IMPRESSION  1. Left upper quadrant abdominal pain          Ines KUMAR (Scribe), am scribing for, and in the presence of, Cooper Warner M.D..    Electronically signed by: Ines Vasquez (Elvinibshyla), 10/4/2020    Cooper KUMAR M.D. personally performed the services described in this documentation, as scribed by Ines Vasquez in my presence, and it is both accurate and complete.     The note accurately reflects work and decisions made by me.  Cooper Warner M.D.  10/5/2020  2:31 AM

## 2020-10-13 ENCOUNTER — PATIENT OUTREACH (OUTPATIENT)
Dept: HEALTH INFORMATION MANAGEMENT | Facility: OTHER | Age: 60
End: 2020-10-13

## 2020-10-13 NOTE — PROGRESS NOTES
JOSE Pelletier spoke with pt via mobile phone to follow up with pt regarding establishing with a PCP. Pt is established with PCP Thi at [facility] and has another appt on November 17th at 3pm. Pt will drive herself to her appts. I also discussed with pt, the resource called [facility]. Pt agreed to give them a call first, instead of coming to the ED. Pt was provided with [facility] contact information. Pt did not express any other questions or concerns for CCM at this time and will be dc from CCM due to all goals/needs met.

## 2020-10-23 ENCOUNTER — HOSPITAL ENCOUNTER (EMERGENCY)
Facility: MEDICAL CENTER | Age: 60
End: 2020-10-23
Attending: EMERGENCY MEDICINE
Payer: MEDICAID

## 2020-10-23 VITALS
BODY MASS INDEX: 26.82 KG/M2 | TEMPERATURE: 97.4 F | HEIGHT: 67 IN | SYSTOLIC BLOOD PRESSURE: 133 MMHG | WEIGHT: 170.86 LBS | HEART RATE: 70 BPM | DIASTOLIC BLOOD PRESSURE: 65 MMHG | OXYGEN SATURATION: 95 % | RESPIRATION RATE: 16 BRPM

## 2020-10-23 DIAGNOSIS — R10.13 EPIGASTRIC PAIN: ICD-10-CM

## 2020-10-23 LAB
ALBUMIN SERPL BCP-MCNC: 3.3 G/DL (ref 3.2–4.9)
ALBUMIN/GLOB SERPL: 1.1 G/DL
ALP SERPL-CCNC: 68 U/L (ref 30–99)
ALT SERPL-CCNC: 17 U/L (ref 2–50)
ANION GAP SERPL CALC-SCNC: 9 MMOL/L (ref 7–16)
APPEARANCE UR: CLEAR
AST SERPL-CCNC: 23 U/L (ref 12–45)
BASOPHILS # BLD AUTO: 0.8 % (ref 0–1.8)
BASOPHILS # BLD: 0.03 K/UL (ref 0–0.12)
BILIRUB SERPL-MCNC: 0.7 MG/DL (ref 0.1–1.5)
BILIRUB UR QL STRIP.AUTO: NEGATIVE
BUN SERPL-MCNC: 16 MG/DL (ref 8–22)
CALCIUM SERPL-MCNC: 8.7 MG/DL (ref 8.5–10.5)
CHLORIDE SERPL-SCNC: 103 MMOL/L (ref 96–112)
CO2 SERPL-SCNC: 24 MMOL/L (ref 20–33)
COLOR UR: YELLOW
CREAT SERPL-MCNC: 0.85 MG/DL (ref 0.5–1.4)
EOSINOPHIL # BLD AUTO: 0.05 K/UL (ref 0–0.51)
EOSINOPHIL NFR BLD: 1.4 % (ref 0–6.9)
ERYTHROCYTE [DISTWIDTH] IN BLOOD BY AUTOMATED COUNT: 41.2 FL (ref 35.9–50)
ETHANOL BLD-MCNC: <10.1 MG/DL (ref 0–10)
GLOBULIN SER CALC-MCNC: 3.1 G/DL (ref 1.9–3.5)
GLUCOSE SERPL-MCNC: 183 MG/DL (ref 65–99)
GLUCOSE UR STRIP.AUTO-MCNC: NEGATIVE MG/DL
HCT VFR BLD AUTO: 29.8 % (ref 37–47)
HGB BLD-MCNC: 9 G/DL (ref 12–16)
IMM GRANULOCYTES # BLD AUTO: 0.01 K/UL (ref 0–0.11)
IMM GRANULOCYTES NFR BLD AUTO: 0.3 % (ref 0–0.9)
KETONES UR STRIP.AUTO-MCNC: NEGATIVE MG/DL
LACTATE BLD-SCNC: 1.3 MMOL/L (ref 0.5–2)
LEUKOCYTE ESTERASE UR QL STRIP.AUTO: NEGATIVE
LIPASE SERPL-CCNC: 94 U/L (ref 11–82)
LYMPHOCYTES # BLD AUTO: 0.38 K/UL (ref 1–4.8)
LYMPHOCYTES NFR BLD: 10.6 % (ref 22–41)
MCH RBC QN AUTO: 23.7 PG (ref 27–33)
MCHC RBC AUTO-ENTMCNC: 30.2 G/DL (ref 33.6–35)
MCV RBC AUTO: 78.4 FL (ref 81.4–97.8)
MICRO URNS: NORMAL
MONOCYTES # BLD AUTO: 0.31 K/UL (ref 0–0.85)
MONOCYTES NFR BLD AUTO: 8.6 % (ref 0–13.4)
NEUTROPHILS # BLD AUTO: 2.82 K/UL (ref 2–7.15)
NEUTROPHILS NFR BLD: 78.3 % (ref 44–72)
NITRITE UR QL STRIP.AUTO: NEGATIVE
NRBC # BLD AUTO: 0 K/UL
NRBC BLD-RTO: 0 /100 WBC
PH UR STRIP.AUTO: 5.5 [PH] (ref 5–8)
PLATELET # BLD AUTO: 59 K/UL (ref 164–446)
POTASSIUM SERPL-SCNC: 4.6 MMOL/L (ref 3.6–5.5)
PROT SERPL-MCNC: 6.4 G/DL (ref 6–8.2)
PROT UR QL STRIP: NEGATIVE MG/DL
RBC # BLD AUTO: 3.8 M/UL (ref 4.2–5.4)
RBC UR QL AUTO: NEGATIVE
SODIUM SERPL-SCNC: 136 MMOL/L (ref 135–145)
SP GR UR STRIP.AUTO: 1.01
UROBILINOGEN UR STRIP.AUTO-MCNC: 0.2 MG/DL
WBC # BLD AUTO: 3.6 K/UL (ref 4.8–10.8)

## 2020-10-23 PROCEDURE — 99285 EMERGENCY DEPT VISIT HI MDM: CPT

## 2020-10-23 PROCEDURE — 80053 COMPREHEN METABOLIC PANEL: CPT

## 2020-10-23 PROCEDURE — 81003 URINALYSIS AUTO W/O SCOPE: CPT | Mod: XU

## 2020-10-23 PROCEDURE — A9270 NON-COVERED ITEM OR SERVICE: HCPCS | Performed by: EMERGENCY MEDICINE

## 2020-10-23 PROCEDURE — 80307 DRUG TEST PRSMV CHEM ANLYZR: CPT

## 2020-10-23 PROCEDURE — 85025 COMPLETE CBC W/AUTO DIFF WBC: CPT

## 2020-10-23 PROCEDURE — 83690 ASSAY OF LIPASE: CPT

## 2020-10-23 PROCEDURE — 83605 ASSAY OF LACTIC ACID: CPT

## 2020-10-23 PROCEDURE — 96374 THER/PROPH/DIAG INJ IV PUSH: CPT

## 2020-10-23 PROCEDURE — 700102 HCHG RX REV CODE 250 W/ 637 OVERRIDE(OP): Performed by: EMERGENCY MEDICINE

## 2020-10-23 PROCEDURE — 700111 HCHG RX REV CODE 636 W/ 250 OVERRIDE (IP): Performed by: EMERGENCY MEDICINE

## 2020-10-23 RX ORDER — SUCRALFATE ORAL 1 G/10ML
1 SUSPENSION ORAL 4 TIMES DAILY
Qty: 414 ML | Refills: 0 | Status: SHIPPED | OUTPATIENT
Start: 2020-10-23 | End: 2021-01-01

## 2020-10-23 RX ADMIN — LIDOCAINE HYDROCHLORIDE 30 ML: 20 SOLUTION OROPHARYNGEAL at 02:49

## 2020-10-23 RX ADMIN — FENTANYL CITRATE 50 MCG: 50 INJECTION, SOLUTION INTRAMUSCULAR; INTRAVENOUS at 02:50

## 2020-10-23 SDOH — HEALTH STABILITY: MENTAL HEALTH: HOW OFTEN DO YOU HAVE 6 OR MORE DRINKS ON ONE OCCASION?: NEVER

## 2020-10-23 SDOH — HEALTH STABILITY: MENTAL HEALTH: HOW OFTEN DO YOU HAVE A DRINK CONTAINING ALCOHOL?: NEVER

## 2020-10-23 SDOH — HEALTH STABILITY: MENTAL HEALTH: HOW MANY STANDARD DRINKS CONTAINING ALCOHOL DO YOU HAVE ON A TYPICAL DAY?: NOT ASKED

## 2020-10-23 ASSESSMENT — ENCOUNTER SYMPTOMS
FEVER: 0
SORE THROAT: 0
CHILLS: 0
COUGH: 0
SHORTNESS OF BREATH: 0
HEADACHES: 0
DIZZINESS: 0

## 2020-10-23 ASSESSMENT — FIBROSIS 4 INDEX: FIB4 SCORE: 5.6

## 2020-10-23 NOTE — ED NOTES
Patient calm and in room at this time. Respirations even and unlabored. Pt denies any and all pain at this time. VS stable. Awaiting UA.

## 2020-10-23 NOTE — ED TRIAGE NOTES
Chief Complaint   Patient presents with   • Abdominal Pain     Seen here two weeks ago for the same symptoms. RUQ/LUQ pain coming across medial. Diagnosed with Pancreatitis last visit. No N/V/D. History of alcoholism. Stopped drinking in 2019     Patient BIB EMS into triage for the above complaint.  Pt is AO x 4, follows commands, and responds appropriately to questions. Patient's breathing is unlabored and pain is currently 8/10 on the 0-10 pain scale.  Pt placed in lobby. Patient educated on triage process and encouraged to alert staff for any changes.

## 2020-10-30 ENCOUNTER — HOSPITAL ENCOUNTER (EMERGENCY)
Facility: MEDICAL CENTER | Age: 60
End: 2020-10-30
Attending: EMERGENCY MEDICINE
Payer: MEDICAID

## 2020-10-30 VITALS
DIASTOLIC BLOOD PRESSURE: 89 MMHG | SYSTOLIC BLOOD PRESSURE: 184 MMHG | BODY MASS INDEX: 26.68 KG/M2 | WEIGHT: 170 LBS | HEIGHT: 67 IN | OXYGEN SATURATION: 96 % | TEMPERATURE: 98 F | HEART RATE: 103 BPM | RESPIRATION RATE: 20 BRPM

## 2020-10-30 DIAGNOSIS — K59.00 CONSTIPATION, UNSPECIFIED CONSTIPATION TYPE: ICD-10-CM

## 2020-10-30 PROCEDURE — 99283 EMERGENCY DEPT VISIT LOW MDM: CPT

## 2020-10-30 PROCEDURE — 700101 HCHG RX REV CODE 250: Performed by: EMERGENCY MEDICINE

## 2020-10-30 RX ORDER — ENEMA 19; 7 G/133ML; G/133ML
1 ENEMA RECTAL ONCE
Status: COMPLETED | OUTPATIENT
Start: 2020-10-30 | End: 2020-10-30

## 2020-10-30 RX ADMIN — SODIUM PHOSPHATE, DIBASIC AND SODIUM PHOSPHATE, MONOBASIC 133 ML: 7; 19 ENEMA RECTAL at 10:45

## 2020-10-30 ASSESSMENT — FIBROSIS 4 INDEX: FIB4 SCORE: 5.58

## 2020-10-30 ASSESSMENT — LIFESTYLE VARIABLES: DO YOU DRINK ALCOHOL: NO

## 2020-10-30 NOTE — ED TRIAGE NOTES
Chief Complaint   Patient presents with   • Constipation   • Rectal Pain     Pt bib ems, history of constipation, here for rectal pain from unable to have bm.   Pt was given Ketamine 22mg pta.

## 2020-10-30 NOTE — ED NOTES
Pt discharge home. Pt given discharge instructions . Pt verbalized understanding, all questions answered ,vss upon d/c. Pt steady on feet upon discharge, she will f/u pcp .   Pt calling family for ride home

## 2020-10-30 NOTE — ED PROVIDER NOTES
ED Provider Note    CHIEF COMPLAINT  Chief Complaint   Patient presents with   • Constipation   • Rectal Pain       HPI  Demetrice Jaime is a 60 y.o. female who presents to the emergency department reporting constipation.  She has severe rectal pain.  Patient has not had a bowel movement in 3 days.  Most recent bowel movement was just small nuggets.  She feels a severe amount of pressure in her rectum.  A small amount of liquid came out today, but nothing else.  She has not tried any home treatment as of yet.  Denies abdominal pain, nausea or vomiting.  She has not had a fever.  Denies dysuria hematuria frequency.      Medical record is reviewed.  Patient seen here in the emergency department on the 23rd with upper abdominal pain.  She had laboratory data demonstrating anemia.  Patient does have a history of pancreatitis and reports that she stopped drinking recently.  Her lipase was slightly elevated during her last visit.  10/5 CT abdomen showed cirrhotic liver with portal hypertension.  There is mild gallbladder thickening related to liver disease.  There is splenomegaly.    REVIEW OF SYSTEMS  As per HPI, otherwise a 10 point review of systems is negative    PAST MEDICAL HISTORY  Past Medical History:   Diagnosis Date   • Anemia    • Anesthesia     pt's mother has hard time waking up = states it took a week or two   • Arthritis     generalized   • Breath shortness    • Cirrhosis (HCC)    • Dental disorder     dentures   • Depression    • Depression 6/22/2016   • Diabetes     insulin   • Fibromyalgia    • Hepatitis C    • Hiatus hernia syndrome    • Hypertension    • Indigestion    • Obesity    • Other specified disorder of intestines     constipation/diarrhea   • Pain 12-19-14    generalized; 4/10   • Pneumonia    • Psychiatric problem     depression and anxiety   • Restless leg syndrome    • Sleep apnea     has had sleep study, no cpap   • Snoring    • Stroke (HCC) 2011   • Unspecified hemorrhagic  conditions     reports nose bleeds   • Urinary bladder disorder     frequency/urgency       SOCIAL HISTORY  Social History     Tobacco Use   • Smoking status: Current Every Day Smoker     Packs/day: 0.50     Years: 42.00     Pack years: 21.00     Types: Cigarettes   • Smokeless tobacco: Never Used   Substance Use Topics   • Alcohol use: Not Currently     Frequency: Never     Binge frequency: Never     Comment: Last drink over a year ago   • Drug use: Not Currently     Comment: Hx IV meth use, states she used 12/18/17 for first time in yrs        SURGICAL HISTORY  Past Surgical History:   Procedure Laterality Date   • GASTROSCOPY N/A 2/12/2018    Procedure: GASTROSCOPY;  Surgeon: Willard Bustamante M.D.;  Location: SURGERY [facility];  Service: Gastroenterology   • GASTROSCOPY WITH BANDING N/A 2/8/2018    Procedure: GASTROSCOPY WITH BANDING;  Surgeon: Davonte Mauricio M.D.;  Location: SURGERY SAME DAY [facility];  Service: Gastroenterology   • GASTROSCOPY N/A 8/21/2017    Procedure: GASTROSCOPY WITH BANDING  ;  Surgeon: Anthony Dent M.D.;  Location: SURGERY SAME DAY [facility];  Service:    • CATH PLACEMENT  12/22/2014    Performed by Helga Santiago M.D. at SURGERY SAME DAY [facility]   • CATH REMOVAL  11/14/2014    Performed by Helga Santiago M.D. at SURGERY [facility]   • GASTROSCOPY WITH BANDING  7/8/2014    Performed by Davonte Mauricio M.D. at ENDOSCOPY [facility]   • GASTROSCOPY WITH CLIPPING  7/6/2014    Performed by Joshua Vigil M.D. at ENDOSCOPY [facility]   • ANTERIOR AND POSTERIOR REPAIR  12/6/2013    Performed by Isaías Lambert M.D. at SURGERY SAME DAY [facility]   • ENTEROCELE REPAIR  12/6/2013    Performed by Isaías Lambert M.D. at SURGERY SAME DAY [facility]   • BLADDER SLING FEMALE  12/6/2013    Performed by Isaías Lambert M.D. at SURGERY SAME DAY [facility]   • GYN SURGERY  2010    hysterectomy   • CARPAL TUNNEL RELEASE  2010    right hand  "  • SHOULDER SURGERY  2010    left shoulder   • OTHER  2010    port placement   • CATARACT EXTRACTION WITH IOL     • GYN SURGERY      tubal ligation   • OTHER      oral surgery   • TONSILLECTOMY         CURRENT MEDICATIONS  Home Medications    **Home medications have not yet been reviewed for this encounter**         ALLERGIES  No Known Allergies    PHYSICAL EXAM  VITAL SIGNS: BP (!) 184/89   Pulse (!) 103   Temp 36.7 °C (98 °F) (Temporal)   Resp 20   Ht 1.702 m (5' 7\")   Wt 77.1 kg (170 lb)   SpO2 96%   BMI 26.63 kg/m²    Constitutional: Awake and alert.  Anxious crying tearful  HENT:  Atraumatic, Normocephalic.  Eyes: Normal inspection  Neck: Supple  Cardiovascular: Evaded heart rate,  Thorax & Lungs: No respiratory distress  Abdomen: Bowel sounds normal, soft, non-distended, nontender, no mass  Rectal: There is a large firm stool at the rectum.  No bloody stool.  She was manually disimpacted during the exam.  Only soft stool remained.  Skin: Warm, Dry, No rash.   Neurologic: Grossly normal   Psychiatric: Anxious appearing        Medications   fleet enema 133 mL (133 mL Rectal Given 10/30/20 1045)         COURSE & MEDICAL DECISION MAKING  Patient presents to the ER reporting severe constipation.  She was manually disimpacted.  She felt better but there is still a fair amount of stool.  She was given a fleets enema and had a large bowel movement.  She reported her symptoms all resolved.  Abdomen was reexamined no tenderness either on arrival or after treatment.  At this point she will be discharged to return to the ER for any abdominal pain, fevers, black or bloody stool or concern.    FINAL IMPRESSION  1.  Fecal impaction      This dictation was created using voice recognition software. The accuracy of the dictation is limited to the abilities of the software.  The nursing notes were reviewed and certain aspects of this information were incorporated into this note.      Electronically signed by: Celestine ANDRADE" SHABANA Peterson, 10/30/2020 11:40 AM

## 2020-11-03 ENCOUNTER — PRE-ADMISSION TESTING (OUTPATIENT)
Dept: ADMISSIONS | Facility: MEDICAL CENTER | Age: 60
End: 2020-11-03
Attending: SURGERY
Payer: MEDICAID

## 2020-11-03 VITALS — BODY MASS INDEX: 25.05 KG/M2 | WEIGHT: 159.61 LBS | HEIGHT: 67 IN

## 2020-11-03 DIAGNOSIS — Z01.812 PRE-OPERATIVE LABORATORY EXAMINATION: ICD-10-CM

## 2020-11-03 LAB
COVID ORDER STATUS COVID19: NORMAL
SARS-COV+SARS-COV-2 AG RESP QL IA.RAPID: NOTDETECTED
SPECIMEN SOURCE: NORMAL

## 2020-11-03 PROCEDURE — 87426 SARSCOV CORONAVIRUS AG IA: CPT

## 2020-11-03 ASSESSMENT — FIBROSIS 4 INDEX: FIB4 SCORE: 5.58

## 2020-11-04 ENCOUNTER — APPOINTMENT (OUTPATIENT)
Dept: RADIOLOGY | Facility: MEDICAL CENTER | Age: 60
End: 2020-11-04
Attending: SURGERY
Payer: MEDICAID

## 2020-11-04 ENCOUNTER — ANESTHESIA (OUTPATIENT)
Dept: SURGERY | Facility: MEDICAL CENTER | Age: 60
End: 2020-11-04
Payer: MEDICAID

## 2020-11-04 ENCOUNTER — HOSPITAL ENCOUNTER (OUTPATIENT)
Facility: MEDICAL CENTER | Age: 60
End: 2020-11-05
Attending: SURGERY | Admitting: SURGERY
Payer: MEDICAID

## 2020-11-04 ENCOUNTER — ANESTHESIA EVENT (OUTPATIENT)
Dept: SURGERY | Facility: MEDICAL CENTER | Age: 60
End: 2020-11-04
Payer: MEDICAID

## 2020-11-04 DIAGNOSIS — G89.18 POST-OPERATIVE PAIN: ICD-10-CM

## 2020-11-04 LAB
EKG IMPRESSION: NORMAL
GLUCOSE BLD-MCNC: 127 MG/DL (ref 65–99)
GLUCOSE BLD-MCNC: 231 MG/DL (ref 65–99)

## 2020-11-04 PROCEDURE — 700111 HCHG RX REV CODE 636 W/ 250 OVERRIDE (IP): Performed by: ANESTHESIOLOGY

## 2020-11-04 PROCEDURE — 160028 HCHG SURGERY MINUTES - 1ST 30 MINS LEVEL 3: Performed by: SURGERY

## 2020-11-04 PROCEDURE — 700105 HCHG RX REV CODE 258: Performed by: SURGERY

## 2020-11-04 PROCEDURE — 160039 HCHG SURGERY MINUTES - EA ADDL 1 MIN LEVEL 3: Performed by: SURGERY

## 2020-11-04 PROCEDURE — 700102 HCHG RX REV CODE 250 W/ 637 OVERRIDE(OP): Performed by: RADIOLOGY

## 2020-11-04 PROCEDURE — 99153 MOD SED SAME PHYS/QHP EA: CPT

## 2020-11-04 PROCEDURE — 160035 HCHG PACU - 1ST 60 MINS PHASE I: Performed by: SURGERY

## 2020-11-04 PROCEDURE — 93010 ELECTROCARDIOGRAM REPORT: CPT | Performed by: INTERNAL MEDICINE

## 2020-11-04 PROCEDURE — 160036 HCHG PACU - EA ADDL 30 MINS PHASE I: Performed by: SURGERY

## 2020-11-04 PROCEDURE — A9270 NON-COVERED ITEM OR SERVICE: HCPCS | Performed by: SURGERY

## 2020-11-04 PROCEDURE — 700101 HCHG RX REV CODE 250: Performed by: ANESTHESIOLOGY

## 2020-11-04 PROCEDURE — 160002 HCHG RECOVERY MINUTES (STAT): Performed by: SURGERY

## 2020-11-04 PROCEDURE — A9270 NON-COVERED ITEM OR SERVICE: HCPCS | Performed by: RADIOLOGY

## 2020-11-04 PROCEDURE — G0378 HOSPITAL OBSERVATION PER HR: HCPCS

## 2020-11-04 PROCEDURE — 82962 GLUCOSE BLOOD TEST: CPT

## 2020-11-04 PROCEDURE — 700101 HCHG RX REV CODE 250: Performed by: SURGERY

## 2020-11-04 PROCEDURE — 160009 HCHG ANES TIME/MIN: Performed by: SURGERY

## 2020-11-04 PROCEDURE — 160048 HCHG OR STATISTICAL LEVEL 1-5: Performed by: SURGERY

## 2020-11-04 PROCEDURE — 700111 HCHG RX REV CODE 636 W/ 250 OVERRIDE (IP)

## 2020-11-04 PROCEDURE — 700102 HCHG RX REV CODE 250 W/ 637 OVERRIDE(OP): Performed by: SURGERY

## 2020-11-04 PROCEDURE — 93005 ELECTROCARDIOGRAM TRACING: CPT | Performed by: SURGERY

## 2020-11-04 PROCEDURE — 501838 HCHG SUTURE GENERAL: Performed by: SURGERY

## 2020-11-04 RX ORDER — MIDAZOLAM HYDROCHLORIDE 1 MG/ML
.5-2 INJECTION INTRAMUSCULAR; INTRAVENOUS PRN
Status: ACTIVE | OUTPATIENT
Start: 2020-11-04 | End: 2020-11-04

## 2020-11-04 RX ORDER — DEXAMETHASONE SODIUM PHOSPHATE 4 MG/ML
INJECTION, SOLUTION INTRA-ARTICULAR; INTRALESIONAL; INTRAMUSCULAR; INTRAVENOUS; SOFT TISSUE PRN
Status: DISCONTINUED | OUTPATIENT
Start: 2020-11-04 | End: 2020-11-04 | Stop reason: SURG

## 2020-11-04 RX ORDER — BUPIVACAINE HYDROCHLORIDE AND EPINEPHRINE 5; 5 MG/ML; UG/ML
INJECTION, SOLUTION EPIDURAL; INTRACAUDAL; PERINEURAL
Status: DISCONTINUED | OUTPATIENT
Start: 2020-11-04 | End: 2020-11-04 | Stop reason: HOSPADM

## 2020-11-04 RX ORDER — ONDANSETRON 2 MG/ML
4 INJECTION INTRAMUSCULAR; INTRAVENOUS PRN
Status: DISCONTINUED | OUTPATIENT
Start: 2020-11-04 | End: 2020-11-04

## 2020-11-04 RX ORDER — SODIUM CHLORIDE, SODIUM LACTATE, POTASSIUM CHLORIDE, CALCIUM CHLORIDE 600; 310; 30; 20 MG/100ML; MG/100ML; MG/100ML; MG/100ML
INJECTION, SOLUTION INTRAVENOUS CONTINUOUS
Status: ACTIVE | OUTPATIENT
Start: 2020-11-04 | End: 2020-11-04

## 2020-11-04 RX ORDER — CEFAZOLIN SODIUM 1 G/3ML
INJECTION, POWDER, FOR SOLUTION INTRAMUSCULAR; INTRAVENOUS PRN
Status: DISCONTINUED | OUTPATIENT
Start: 2020-11-04 | End: 2020-11-04 | Stop reason: SURG

## 2020-11-04 RX ORDER — MIDAZOLAM HYDROCHLORIDE 1 MG/ML
INJECTION INTRAMUSCULAR; INTRAVENOUS
Status: COMPLETED
Start: 2020-11-04 | End: 2020-11-04

## 2020-11-04 RX ORDER — FUROSEMIDE 40 MG/1
40 TABLET ORAL DAILY
COMMUNITY
End: 2021-01-01

## 2020-11-04 RX ORDER — LABETALOL HYDROCHLORIDE 5 MG/ML
5 INJECTION, SOLUTION INTRAVENOUS
Status: COMPLETED | OUTPATIENT
Start: 2020-11-04 | End: 2020-11-04

## 2020-11-04 RX ORDER — FAMOTIDINE 20 MG/1
20 TABLET, FILM COATED ORAL 2 TIMES DAILY
Status: DISCONTINUED | OUTPATIENT
Start: 2020-11-04 | End: 2020-11-05 | Stop reason: HOSPADM

## 2020-11-04 RX ORDER — HYDRALAZINE HYDROCHLORIDE 20 MG/ML
5-10 INJECTION INTRAMUSCULAR; INTRAVENOUS PRN
Status: DISCONTINUED | OUTPATIENT
Start: 2020-11-04 | End: 2020-11-04 | Stop reason: HOSPADM

## 2020-11-04 RX ORDER — DIPHENHYDRAMINE HYDROCHLORIDE 50 MG/ML
25 INJECTION INTRAMUSCULAR; INTRAVENOUS EVERY 6 HOURS PRN
Status: DISCONTINUED | OUTPATIENT
Start: 2020-11-04 | End: 2020-11-05 | Stop reason: HOSPADM

## 2020-11-04 RX ORDER — ONDANSETRON 2 MG/ML
INJECTION INTRAMUSCULAR; INTRAVENOUS PRN
Status: DISCONTINUED | OUTPATIENT
Start: 2020-11-04 | End: 2020-11-04 | Stop reason: SURG

## 2020-11-04 RX ORDER — HALOPERIDOL 5 MG/ML
1 INJECTION INTRAMUSCULAR
Status: DISCONTINUED | OUTPATIENT
Start: 2020-11-04 | End: 2020-11-04 | Stop reason: HOSPADM

## 2020-11-04 RX ORDER — GABAPENTIN 300 MG/1
300 CAPSULE ORAL 3 TIMES DAILY
Status: ON HOLD | COMMUNITY
End: 2021-01-01

## 2020-11-04 RX ORDER — DIPHENHYDRAMINE HYDROCHLORIDE 50 MG/ML
12.5 INJECTION INTRAMUSCULAR; INTRAVENOUS
Status: DISCONTINUED | OUTPATIENT
Start: 2020-11-04 | End: 2020-11-04 | Stop reason: HOSPADM

## 2020-11-04 RX ORDER — SCOLOPAMINE TRANSDERMAL SYSTEM 1 MG/1
1 PATCH, EXTENDED RELEASE TRANSDERMAL
Status: DISCONTINUED | OUTPATIENT
Start: 2020-11-04 | End: 2020-11-05 | Stop reason: HOSPADM

## 2020-11-04 RX ORDER — HALOPERIDOL 5 MG/ML
1 INJECTION INTRAMUSCULAR EVERY 6 HOURS PRN
Status: DISCONTINUED | OUTPATIENT
Start: 2020-11-04 | End: 2020-11-05 | Stop reason: HOSPADM

## 2020-11-04 RX ORDER — SUCRALFATE ORAL 1 G/10ML
1 SUSPENSION ORAL 4 TIMES DAILY
Status: DISCONTINUED | OUTPATIENT
Start: 2020-11-04 | End: 2020-11-05 | Stop reason: HOSPADM

## 2020-11-04 RX ORDER — OXYCODONE HYDROCHLORIDE 5 MG/1
5 TABLET ORAL
Status: DISCONTINUED | OUTPATIENT
Start: 2020-11-04 | End: 2020-11-05 | Stop reason: HOSPADM

## 2020-11-04 RX ORDER — GABAPENTIN 300 MG/1
300 CAPSULE ORAL 3 TIMES DAILY
Status: DISCONTINUED | OUTPATIENT
Start: 2020-11-04 | End: 2020-11-05 | Stop reason: HOSPADM

## 2020-11-04 RX ORDER — LIDOCAINE HYDROCHLORIDE 20 MG/ML
INJECTION, SOLUTION EPIDURAL; INFILTRATION; INTRACAUDAL; PERINEURAL PRN
Status: DISCONTINUED | OUTPATIENT
Start: 2020-11-04 | End: 2020-11-04 | Stop reason: SURG

## 2020-11-04 RX ORDER — DEXAMETHASONE SODIUM PHOSPHATE 4 MG/ML
4 INJECTION, SOLUTION INTRA-ARTICULAR; INTRALESIONAL; INTRAMUSCULAR; INTRAVENOUS; SOFT TISSUE
Status: DISCONTINUED | OUTPATIENT
Start: 2020-11-04 | End: 2020-11-05 | Stop reason: HOSPADM

## 2020-11-04 RX ORDER — SODIUM CHLORIDE 9 MG/ML
500 INJECTION, SOLUTION INTRAVENOUS
Status: ACTIVE | OUTPATIENT
Start: 2020-11-04 | End: 2020-11-04

## 2020-11-04 RX ORDER — INSULIN GLARGINE 100 [IU]/ML
40 INJECTION, SOLUTION SUBCUTANEOUS EVERY EVENING
Status: DISCONTINUED | OUTPATIENT
Start: 2020-11-05 | End: 2020-11-05 | Stop reason: HOSPADM

## 2020-11-04 RX ORDER — ONDANSETRON 2 MG/ML
4 INJECTION INTRAMUSCULAR; INTRAVENOUS EVERY 4 HOURS PRN
Status: DISCONTINUED | OUTPATIENT
Start: 2020-11-04 | End: 2020-11-05 | Stop reason: HOSPADM

## 2020-11-04 RX ORDER — FUROSEMIDE 40 MG/1
40 TABLET ORAL DAILY
Status: DISCONTINUED | OUTPATIENT
Start: 2020-11-05 | End: 2020-11-05 | Stop reason: HOSPADM

## 2020-11-04 RX ORDER — BUPROPION HYDROCHLORIDE 100 MG/1
100 TABLET ORAL EVERY MORNING
Status: DISCONTINUED | OUTPATIENT
Start: 2020-11-05 | End: 2020-11-05 | Stop reason: HOSPADM

## 2020-11-04 RX ORDER — ONDANSETRON 2 MG/ML
4 INJECTION INTRAMUSCULAR; INTRAVENOUS
Status: DISCONTINUED | OUTPATIENT
Start: 2020-11-04 | End: 2020-11-04 | Stop reason: HOSPADM

## 2020-11-04 RX ORDER — METOPROLOL SUCCINATE 25 MG/1
25 TABLET, EXTENDED RELEASE ORAL DAILY
Status: DISCONTINUED | OUTPATIENT
Start: 2020-11-05 | End: 2020-11-05 | Stop reason: HOSPADM

## 2020-11-04 RX ORDER — METOPROLOL SUCCINATE 25 MG/1
25 TABLET, EXTENDED RELEASE ORAL DAILY
COMMUNITY
End: 2021-01-01

## 2020-11-04 RX ADMIN — PROPOFOL 140 MG: 10 INJECTION, EMULSION INTRAVENOUS at 10:55

## 2020-11-04 RX ADMIN — LABETALOL HYDROCHLORIDE 5 MG: 5 INJECTION, SOLUTION INTRAVENOUS at 14:32

## 2020-11-04 RX ADMIN — LIDOCAINE HYDROCHLORIDE 0.5 ML: 10 INJECTION, SOLUTION EPIDURAL; INFILTRATION; INTRACAUDAL; PERINEURAL at 09:40

## 2020-11-04 RX ADMIN — LABETALOL HYDROCHLORIDE 5 MG: 5 INJECTION, SOLUTION INTRAVENOUS at 15:05

## 2020-11-04 RX ADMIN — POVIDONE IODINE 15 ML: 100 SOLUTION TOPICAL at 09:14

## 2020-11-04 RX ADMIN — FENTANYL CITRATE 50 MCG: 50 INJECTION INTRAMUSCULAR; INTRAVENOUS at 16:52

## 2020-11-04 RX ADMIN — OXYCODONE 5 MG: 5 TABLET ORAL at 20:30

## 2020-11-04 RX ADMIN — SODIUM CHLORIDE, POTASSIUM CHLORIDE, SODIUM LACTATE AND CALCIUM CHLORIDE: 600; 310; 30; 20 INJECTION, SOLUTION INTRAVENOUS at 09:40

## 2020-11-04 RX ADMIN — GABAPENTIN 300 MG: 300 CAPSULE ORAL at 20:29

## 2020-11-04 RX ADMIN — FAMOTIDINE 20 MG: 20 TABLET, FILM COATED ORAL at 20:29

## 2020-11-04 RX ADMIN — SUCRALFATE 1 G: 1 SUSPENSION ORAL at 20:30

## 2020-11-04 RX ADMIN — ONDANSETRON 4 MG: 2 INJECTION INTRAMUSCULAR; INTRAVENOUS at 10:54

## 2020-11-04 RX ADMIN — MIDAZOLAM HYDROCHLORIDE 1 MG: 1 INJECTION INTRAMUSCULAR; INTRAVENOUS at 16:52

## 2020-11-04 RX ADMIN — LABETALOL HYDROCHLORIDE 5 MG: 5 INJECTION, SOLUTION INTRAVENOUS at 13:46

## 2020-11-04 RX ADMIN — OXYCODONE 5 MG: 5 TABLET ORAL at 23:33

## 2020-11-04 RX ADMIN — APIXABAN 5 MG: 5 TABLET, FILM COATED ORAL at 20:29

## 2020-11-04 RX ADMIN — HYDRALAZINE HYDROCHLORIDE 5 MG: 20 INJECTION INTRAMUSCULAR; INTRAVENOUS at 16:31

## 2020-11-04 RX ADMIN — MIDAZOLAM HYDROCHLORIDE 1 MG: 1 INJECTION, SOLUTION INTRAMUSCULAR; INTRAVENOUS at 16:52

## 2020-11-04 RX ADMIN — LABETALOL HYDROCHLORIDE 5 MG: 5 INJECTION, SOLUTION INTRAVENOUS at 13:07

## 2020-11-04 RX ADMIN — DEXAMETHASONE SODIUM PHOSPHATE 4 MG: 4 INJECTION, SOLUTION INTRA-ARTICULAR; INTRALESIONAL; INTRAMUSCULAR; INTRAVENOUS; SOFT TISSUE at 10:54

## 2020-11-04 RX ADMIN — FENTANYL CITRATE 100 MCG: 50 INJECTION, SOLUTION INTRAMUSCULAR; INTRAVENOUS at 10:55

## 2020-11-04 RX ADMIN — CEFAZOLIN 2 G: 330 INJECTION, POWDER, FOR SOLUTION INTRAMUSCULAR; INTRAVENOUS at 10:53

## 2020-11-04 RX ADMIN — LIDOCAINE HYDROCHLORIDE 80 MG: 20 INJECTION, SOLUTION EPIDURAL; INFILTRATION; INTRACAUDAL at 10:56

## 2020-11-04 ASSESSMENT — COGNITIVE AND FUNCTIONAL STATUS - GENERAL
DAILY ACTIVITIY SCORE: 24
SUGGESTED CMS G CODE MODIFIER DAILY ACTIVITY: CH
SUGGESTED CMS G CODE MODIFIER MOBILITY: CH
MOBILITY SCORE: 24

## 2020-11-04 ASSESSMENT — PATIENT HEALTH QUESTIONNAIRE - PHQ9
SUM OF ALL RESPONSES TO PHQ9 QUESTIONS 1 AND 2: 2
2. FEELING DOWN, DEPRESSED, IRRITABLE, OR HOPELESS: SEVERAL DAYS
4. FEELING TIRED OR HAVING LITTLE ENERGY: NEARLY EVERY DAY
6. FEELING BAD ABOUT YOURSELF - OR THAT YOU ARE A FAILURE OR HAVE LET YOURSELF OR YOUR FAMILY DOWN: NOT AL ALL
1. LITTLE INTEREST OR PLEASURE IN DOING THINGS: SEVERAL DAYS
7. TROUBLE CONCENTRATING ON THINGS, SUCH AS READING THE NEWSPAPER OR WATCHING TELEVISION: NEARLY EVERY DAY
3. TROUBLE FALLING OR STAYING ASLEEP OR SLEEPING TOO MUCH: NEARLY EVERY DAY
9. THOUGHTS THAT YOU WOULD BE BETTER OFF DEAD, OR OF HURTING YOURSELF: NOT AT ALL
SUM OF ALL RESPONSES TO PHQ QUESTIONS 1-9: 17
5. POOR APPETITE OR OVEREATING: NEARLY EVERY DAY
8. MOVING OR SPEAKING SO SLOWLY THAT OTHER PEOPLE COULD HAVE NOTICED. OR THE OPPOSITE, BEING SO FIGETY OR RESTLESS THAT YOU HAVE BEEN MOVING AROUND A LOT MORE THAN USUAL: NEARLY EVERY DAY

## 2020-11-04 ASSESSMENT — LIFESTYLE VARIABLES
HAVE YOU EVER FELT YOU SHOULD CUT DOWN ON YOUR DRINKING: NO
CONSUMPTION TOTAL: NEGATIVE
EVER HAD A DRINK FIRST THING IN THE MORNING TO STEADY YOUR NERVES TO GET RID OF A HANGOVER: NO
DOES PATIENT WANT TO STOP DRINKING: NO
EVER FELT BAD OR GUILTY ABOUT YOUR DRINKING: NO
HOW MANY TIMES IN THE PAST YEAR HAVE YOU HAD 5 OR MORE DRINKS IN A DAY: 0
TOTAL SCORE: 0
ALCOHOL_USE: NO
ON A TYPICAL DAY WHEN YOU DRINK ALCOHOL HOW MANY DRINKS DO YOU HAVE: 0
TOTAL SCORE: 0
AVERAGE NUMBER OF DAYS PER WEEK YOU HAVE A DRINK CONTAINING ALCOHOL: 0
TOTAL SCORE: 0
HAVE PEOPLE ANNOYED YOU BY CRITICIZING YOUR DRINKING: NO

## 2020-11-04 ASSESSMENT — PAIN DESCRIPTION - PAIN TYPE
TYPE: ACUTE PAIN;SURGICAL PAIN
TYPE: ACUTE PAIN
TYPE: SURGICAL PAIN

## 2020-11-04 ASSESSMENT — PAIN SCALES - GENERAL: PAIN_LEVEL: 1

## 2020-11-04 ASSESSMENT — FIBROSIS 4 INDEX: FIB4 SCORE: 5.58

## 2020-11-04 NOTE — ANESTHESIA PREPROCEDURE EVALUATION
Relevant Problems   ANESTHESIA   (+) FLORINDA (obstructive sleep apnea)      PULMONARY   (+) History of hepatitis C      NEURO   (+) History of cirrhosis of liver   (+) History of hepatitis C      CARDIAC   (+) Arrhythmia   (+) Atrial fibrillation (HCC)   (+) Esophageal varices (HCC)   (+) HTN (hypertension)   (+) Vein, varicose      GI   (+) Hiatal hernia         (+) ARF (acute renal failure) (HCC)   (+) Chronic hepatitis C virus infection (CMS-HCC)   (+) Cirrhosis (HCC)   (+) Disorder of liver   (+) Hepatitis C   (+) Pyelectasis      ENDO   (+) DM2 (diabetes mellitus, type 2) (HCC)       Physical Exam    Airway   Mallampati: II  TM distance: >3 FB  Neck ROM: full       Cardiovascular - normal exam  Rhythm: regular  Rate: normal  (-) murmur     Dental - normal exam           Pulmonary - normal exam  Breath sounds clear to auscultation     Abdominal    Neurological - normal exam                 Anesthesia Plan    ASA 4       Plan - general       Airway plan will be LMA        Induction: intravenous    Postoperative Plan: Postoperative administration of opioids is intended.    Pertinent diagnostic labs and testing reviewed    Informed Consent:    Anesthetic plan and risks discussed with patient.    Use of blood products discussed with: patient whom consented to blood products.

## 2020-11-04 NOTE — OR NURSING
1135 Dr. Mcmanus notified this RN that he consulted Interventional Radiology regarding removal of broke catheter. Pt to be NPO until she goes to IR.    1140 Pt arrived to PACU from OR. Report received from OR RN and Dr. ARMAAN Fishman, anesthesiologist.    Notified Ila, PACU Charge RN regarding Pt going to IR.    Pt still sleeping. Dressing to right side of neck and upper chest, CDI. Vital signs are stable. Awaiting Pt to wake up. Will continue to monitor Pt closely.

## 2020-11-04 NOTE — OR NURSING
1215 SMASON Schneider RN notified this RN that they planned to come get the Pt to IR in 1.5 hours. Pt will remain NPO. Will continue to monitor Pt closely until she goes to IR.

## 2020-11-04 NOTE — OR NURSING
1237 Dr. Mcmanus at bedside checking on Pt. MD notified this RN that he will put admission orders after Pt is done with IR procedure. Pt will be staying overnight at the hospital.

## 2020-11-04 NOTE — ANESTHESIA TIME REPORT
Anesthesia Start and Stop Event Times     Date Time Event    11/4/2020 1009 Ready for Procedure     1053 Anesthesia Start     1144 Anesthesia Stop        Responsible Staff  11/04/20    Name Role Begin End    Urbano Fishman M.D. Anesth 1053 1144        Preop Diagnosis (Free Text):  Pre-op Diagnosis     INFECTED PORT        Preop Diagnosis (Codes):    Post op Diagnosis  Sepsis due to infected central venous catheter (HCC)      Premium Reason  Non-Premium    Comments:

## 2020-11-04 NOTE — OP REPORT
POST-OPERATIVE NOTE    PREOPERATIVE DIAGNOSIS: Attention to port    POSTOPERATIVE DIAGNOSIS: Same    PROCEDURE PERFORMED: Port removal    SURGEON: Leonidas Mcmanus M.D., MD    ASSISTANT: None    ANESTHESIOLOGIST:  Anesthesiologist: Urbano Fishman M.D.     ANESTHESIA: general     FINDINGS: Severely scarred in port.     WOUND CLASS: Clean    SPECIMEN: Port    ESTIMATED BLOOD LOSS: Minimal mL    Indications.  Patient is a 60-year-old female who had an indwelling catheter placed many years ago.  Patient was counseled extensively as of the risk first benefits of surgery and agreed to proceed fully informed.  We did this in the operating room due to the potential for it to be scarred in secondary to its long-term placement.    Description:    The patient was prepped and draped in the standard sterile surgical fashion after induction of general anesthesia.  An appropriate timeout was performed and antibiotics delivered.  I began with a incision over the prior scar.  I was able to remove the port from its cavity and excised the capsule.    On attempting to pull the catheter out it was clearly stuck.  I carefully excised the capsule around the catheter in order to pull it out without tension.  Unfortunately during this dissection the catheter snapped.  I could feel the catheter in the neck near the jugular.  I made a counterincision.  However dissecting out the catheter it appeared to be the portion of the catheter that was in the internal jugular vein.  At that point I obtained an intraoperative vascular consult who recommended consulting IR postoperatively to try to snag the catheter.    At that point the incisions were closed with 3-0 Vicryl and 4-0 Monocryl for the skin edges.  The patient was then extubated, to recovery with plan for IR consultation.    Complications: Catheter broke off        ____________________________________     Leonidas Mcmanus M.D., MD    DT: 11/4/2020  11:32 AM      Cc: Itzel Ness  M.D.

## 2020-11-04 NOTE — ANESTHESIA PROCEDURE NOTES
Airway    Date/Time: 11/4/2020 10:53 AM  Performed by: Urbano Fishman M.D.  Authorized by: Urbano Fishman M.D.     Location:  OR  Urgency:  Elective  Indications for Airway Management:  Anesthesia      Spontaneous Ventilation: absent    Sedation Level:  Deep  Preoxygenated: Yes    Final Airway Type:  Supraglottic airway  Final Supraglottic Airway:  Standard LMA    SGA Size:  3  Number of Attempts at Approach:  1

## 2020-11-04 NOTE — ANESTHESIA POSTPROCEDURE EVALUATION
Patient: Demetrice Jaime    Procedure Summary     Date: 11/04/20 Room / Location: [place] [address] / SURGERY [facility]    Anesthesia Start: 1053 Anesthesia Stop: 1144    Procedure: REMOVAL, CATHETER- INFECTED PORT A CATH (Chest) Diagnosis: (INFECTED PORT)    Surgeons: Leonidas Mcmanus M.D. Responsible Provider: Urbano Fishman M.D.    Anesthesia Type: general ASA Status: 4          Final Anesthesia Type: general  Last vitals  BP   Blood Pressure: (!) 174/80    Temp   36.3 °C (97.4 °F)    Pulse   Pulse: (!) 108   Resp   16    SpO2   95 %      Anesthesia Post Evaluation    Patient location during evaluation: PACU  Patient participation: complete - patient participated  Level of consciousness: awake and alert  Pain score: 1    Airway patency: patent  Anesthetic complications: no  Cardiovascular status: hemodynamically stable  Respiratory status: acceptable  Hydration status: euvolemic    PONV: none           Nurse Pain Score: 0 (NPRS)

## 2020-11-05 ENCOUNTER — PATIENT OUTREACH (OUTPATIENT)
Dept: HEALTH INFORMATION MANAGEMENT | Facility: OTHER | Age: 60
End: 2020-11-05

## 2020-11-05 VITALS
RESPIRATION RATE: 18 BRPM | DIASTOLIC BLOOD PRESSURE: 66 MMHG | SYSTOLIC BLOOD PRESSURE: 131 MMHG | OXYGEN SATURATION: 96 % | HEIGHT: 67 IN | BODY MASS INDEX: 24.74 KG/M2 | TEMPERATURE: 98.9 F | HEART RATE: 87 BPM | WEIGHT: 157.63 LBS

## 2020-11-05 LAB
ERYTHROCYTE [DISTWIDTH] IN BLOOD BY AUTOMATED COUNT: 39.8 FL (ref 35.9–50)
GLUCOSE BLD-MCNC: 182 MG/DL (ref 65–99)
HCT VFR BLD AUTO: 32.3 % (ref 37–47)
HGB BLD-MCNC: 9.7 G/DL (ref 12–16)
MCH RBC QN AUTO: 23 PG (ref 27–33)
MCHC RBC AUTO-ENTMCNC: 30 G/DL (ref 33.6–35)
MCV RBC AUTO: 76.7 FL (ref 81.4–97.8)
PLATELET # BLD AUTO: 66 K/UL (ref 164–446)
RBC # BLD AUTO: 4.21 M/UL (ref 4.2–5.4)
WBC # BLD AUTO: 3.9 K/UL (ref 4.8–10.8)

## 2020-11-05 PROCEDURE — 700102 HCHG RX REV CODE 250 W/ 637 OVERRIDE(OP): Performed by: RADIOLOGY

## 2020-11-05 PROCEDURE — 700102 HCHG RX REV CODE 250 W/ 637 OVERRIDE(OP): Performed by: SURGERY

## 2020-11-05 PROCEDURE — A9270 NON-COVERED ITEM OR SERVICE: HCPCS | Performed by: SURGERY

## 2020-11-05 PROCEDURE — 85027 COMPLETE CBC AUTOMATED: CPT

## 2020-11-05 PROCEDURE — 82962 GLUCOSE BLOOD TEST: CPT

## 2020-11-05 PROCEDURE — 700111 HCHG RX REV CODE 636 W/ 250 OVERRIDE (IP): Performed by: SURGERY

## 2020-11-05 PROCEDURE — G0378 HOSPITAL OBSERVATION PER HR: HCPCS

## 2020-11-05 PROCEDURE — A9270 NON-COVERED ITEM OR SERVICE: HCPCS | Performed by: RADIOLOGY

## 2020-11-05 RX ORDER — OXYCODONE HYDROCHLORIDE 5 MG/1
5 TABLET ORAL EVERY 4 HOURS PRN
Qty: 15 TAB | Refills: 0 | Status: SHIPPED | OUTPATIENT
Start: 2020-11-05 | End: 2020-11-09

## 2020-11-05 RX ADMIN — METOPROLOL SUCCINATE 25 MG: 25 TABLET, EXTENDED RELEASE ORAL at 06:05

## 2020-11-05 RX ADMIN — FUROSEMIDE 40 MG: 40 TABLET ORAL at 06:05

## 2020-11-05 RX ADMIN — SUCRALFATE 1 G: 1 SUSPENSION ORAL at 08:46

## 2020-11-05 RX ADMIN — BUPROPION HYDROCHLORIDE 100 MG: 100 TABLET, FILM COATED ORAL at 04:41

## 2020-11-05 RX ADMIN — APIXABAN 5 MG: 5 TABLET, FILM COATED ORAL at 04:41

## 2020-11-05 RX ADMIN — OXYCODONE 5 MG: 5 TABLET ORAL at 11:37

## 2020-11-05 RX ADMIN — GABAPENTIN 300 MG: 300 CAPSULE ORAL at 11:38

## 2020-11-05 RX ADMIN — FAMOTIDINE 20 MG: 20 TABLET, FILM COATED ORAL at 04:42

## 2020-11-05 RX ADMIN — OXYCODONE 5 MG: 5 TABLET ORAL at 04:41

## 2020-11-05 RX ADMIN — GABAPENTIN 300 MG: 300 CAPSULE ORAL at 04:41

## 2020-11-05 ASSESSMENT — PAIN DESCRIPTION - PAIN TYPE
TYPE: ACUTE PAIN;SURGICAL PAIN
TYPE: SURGICAL PAIN
TYPE: SURGICAL PAIN

## 2020-11-05 NOTE — PROGRESS NOTES
Pt A&O x .4  Reporting 4/10 pain. Declines interventions at this time. Pt requesting to wait for breakfast before next pain medication dose.  Right chest incision with tegaderm and gauze, CDI.   Neck incision with steri strips well approximated.  Right groin incision with tegaderm and gauze, CDI.  Pt reports n/t in BLE- baseline.  2+ pedal pulses noted to BLE.  Pt sating 95% on RA. Up self, steady gait noted.  Discharge orders received. Pt requesting to have breakfast and 1x dose of pain medication prior to discharge.  New breakfast ordered.  Oxy prescription given to pt by MD.   POC discussed with pt. All needs addressed at this time.

## 2020-11-05 NOTE — OR NURSING
Pt AA/Ox4. VSS. Dressing to right side of neck, upper chest and groin, CDI. Pt reports 2-3/10 pain scale. Pain medication was given in IR. Pt denies numbness or tingling. Pt moves all extremities. SCDs in place.    Report given to MAMI Law.    Pt's mother, Luisa, updated regarding plan of care.    Pt via dawit, accompanied by RN, was transferred to [facility] at 1833. All personal belongings sent with Pt.    Oxygen tank was 3/4 full when Pt left PACU.

## 2020-11-05 NOTE — DISCHARGE SUMMARY
Discharge Summary           Date of Admission:11/4/2020     Date of Discharge: November 5, 2020    Admission Diagnosis: Attention to Port-A-Cath     [id number]     Discharge Diagnosis: Same    Procedures performed: Port-A-Cath removal followed by interventional radiology retrieval of the distal end of the catheter    Hospital Course: Patient underwent a Port-A-Cath removal with severing of the catheter during dissection.  Interventional radiology was consulted and retrieved the distal portion of the catheter.  Patient had a stable hemoglobin on postoperative day #1 and was discharged home without event.    Condition on discharge: Good    Follow up:   Dr. Mcmanus in 7 days    Activity:  As tolerated    Discharge Medications:    Demetrice Jaime   Home Medication Instructions NICKY:[id number]    Printed on:11/05/20 0734   Medication Information                      apixaban (ELIQUIS) 5mg Tab  Take 5 mg by mouth 2 Times a Day.             buPROPion (WELLBUTRIN) 100 MG Tab  Take 100 mg by mouth every morning.             famotidine (PEPCID) 20 MG Tab  Take 1 Tab by mouth 2 times a day.             furosemide (LASIX) 40 MG Tab  Take 40 mg by mouth every day.             gabapentin (NEURONTIN) 300 MG Cap  Take 300 mg by mouth 3 times a day.             insulin glargine (LANTUS SOLOSTAR) 100 UNIT/ML Solution Pen-injector injection  Inject 40 Units as instructed every evening.             metoprolol SR (TOPROL XL) 25 MG TABLET SR 24 HR  Take 25 mg by mouth every day.             NOVOLOG, insulin aspart, (NOVOLOG FLEXPEN) 100 UNIT/ML injection PEN  Inject 3-18 Units as instructed 3 times a day before meals.             oxyCODONE immediate-release (ROXICODONE) 5 MG Tab  Take 1 Tab by mouth every four hours as needed for up to 4 days.             sucralfate (CARAFATE) 1 GM/10ML Suspension  Take 10 mL by mouth 4 times a day.

## 2020-11-05 NOTE — PROGRESS NOTES
IR RN note    Patient underwent a Foreign body retrieval  by Dr. Elizabeth.  Procedure site was verified by MD using imaging guidance. Consent was signed.  IR dane Silverio, Blanca, and Chip assisted. Patient was placed in a supine position.  Vitals were taken every 5 minutes and remained stable during procedure (see doc flow sheet for results).  CO2 waveform capnography was monitored throughout procedure, see chart.  Right groin access site.   A guaze and tegaderm dressing was placed over surgical site. Held pressure for 3 min.  Report called to Ruddy BOLAÑOS. Pt transported by dawit with RN to [facility], with monitor .   Reviewed sedation orders with MD NEWTON procedure ended at 17:02    15 cm of white catheter retrieved

## 2020-11-05 NOTE — DISCHARGE INSTRUCTIONS
Discharge Instructions    Discharged to home by car with relative. Discharged via wheelchair, hospital escort: Yes.  Special equipment needed: Not Applicable    Be sure to schedule a follow-up appointment with your primary care doctor or any specialists as instructed.     Discharge Plan:   Influenza Vaccine Indication: Not indicated: Previously immunized this influenza season and > 8 years of age    I understand that a diet low in cholesterol, fat, and sodium is recommended for good health. Unless I have been given specific instructions below for another diet, I accept this instruction as my diet prescription.   Other diet: Regular    Special Instructions: None    · Is patient discharged on Warfarin / Coumadin?   No     Depression / Suicide Risk    As you are discharged from this [facility] facility, it is important to learn how to keep safe from harming yourself.    Recognize the warning signs:  · Abrupt changes in personality, positive or negative- including increase in energy   · Giving away possessions  · Change in eating patterns- significant weight changes-  positive or negative  · Change in sleeping patterns- unable to sleep or sleeping all the time   · Unwillingness or inability to communicate  · Depression  · Unusual sadness, discouragement and loneliness  · Talk of wanting to die  · Neglect of personal appearance   · Rebelliousness- reckless behavior  · Withdrawal from people/activities they love  · Confusion- inability to concentrate     If you or a loved one observes any of these behaviors or has concerns about self-harm, here's what you can do:  · Talk about it- your feelings and reasons for harming yourself  · Remove any means that you might use to hurt yourself (examples: pills, rope, extension cords, firearm)  · Get professional help from the community (Mental Health, Substance Abuse, psychological counseling)  · Do not be alone:Call your Safe Contact- someone whom you trust who will be there for  you.  · Call your local CRISIS HOTLINE 228-9919 or 047-263-4290  · Call your local Children's Mobile Crisis Response Team [place] (134) 673-2592 or www.HelloFresh  · Call the toll free National Suicide Prevention Hotlines   · National Suicide Prevention Lifeline 278-520-VFQY (5460)  · National Hope Line Network 800-SUICIDE (176-3649)      Implanted Port Removal, Care After  This sheet gives you information about how to care for yourself after your procedure. Your health care provider may also give you more specific instructions. If you have problems or questions, contact your health care provider.  What can I expect after the procedure?  After the procedure, it is common to have:  · Soreness or pain near your incision.  · Some swelling or bruising near your incision.  Follow these instructions at home:  Medicines  · Take over-the-counter and prescription medicines only as told by your health care provider.  · If you were prescribed an antibiotic medicine, take it as told by your health care provider. Do not stop taking the antibiotic even if you start to feel better.  Bathing  · Do not take baths, swim, or use a hot tub until your health care provider approves. Ask your health care provider if you can take showers. You may only be allowed to take sponge baths.  Incision care    · Follow instructions from your health care provider about how to take care of your incision. Make sure you:  ? Wash your hands with soap and water before you change your bandage (dressing). If soap and water are not available, use hand .  ? Change your dressing as told by your health care provider.  ? Keep your dressing dry.  ? Leave stitches (sutures), skin glue, or adhesive strips in place. These skin closures may need to stay in place for 2 weeks or longer. If adhesive strip edges start to loosen and curl up, you may trim the loose edges. Do not remove adhesive strips completely unless your health care provider tells  you to do that.  · Check your incision area every day for signs of infection. Check for:  ? More redness, swelling, or pain.  ? More fluid or blood.  ? Warmth.  ? Pus or a bad smell.  Driving    · Do not drive for 24 hours if you were given a medicine to help you relax (sedative) during your procedure.  · If you did not receive a sedative, ask your health care provider when it is safe to drive.  Activity  · Return to your normal activities as told by your health care provider. Ask your health care provider what activities are safe for you.  · Do not lift anything that is heavier than 10 lb (4.5 kg), or the limit that you are told, until your health care provider says that it is safe.  · Do not do activities that involve lifting your arms over your head.  General instructions  · Do not use any products that contain nicotine or tobacco, such as cigarettes and e-cigarettes. These can delay healing. If you need help quitting, ask your health care provider.  · Keep all follow-up visits as told by your health care provider. This is important.  Contact a health care provider if:  · You have more redness, swelling, or pain around your incision.  · You have more fluid or blood coming from your incision.  · Your incision feels warm to the touch.  · You have pus or a bad smell coming from your incision.  · You have pain that is not relieved by your pain medicine.  Get help right away if you have:  · A fever or chills.  · Chest pain.  · Difficulty breathing.  Summary  · After the procedure, it is common to have pain, soreness, swelling, or bruising near your incision.  · If you were prescribed an antibiotic medicine, take it as told by your health care provider. Do not stop taking the antibiotic even if you start to feel better.  · Do not drive for 24 hours if you were given a sedative during your procedure.  · Return to your normal activities as told by your health care provider. Ask your health care provider what activities  are safe for you.  This information is not intended to replace advice given to you by your health care provider. Make sure you discuss any questions you have with your health care provider.  Document Released: 11/28/2016 Document Revised: 01/31/2019 Document Reviewed: 01/31/2019  Elsevier Patient Education © 2020 [employer]

## 2020-11-05 NOTE — OR NURSING
1710 Report received from SAMSON Landaverde RN. Pt coming back to PACU. Pt to be on bedrest for 2 hours.

## 2020-11-05 NOTE — OR SURGEON
Immediate Post- Operative Note        PostOp Diagnosis: Retained Catheter Fragment in RIJ      Procedure(s): Fluoro guided Snare Removal      Estimated Blood Loss: Less than 5 ml        Complications: None            11/4/2020     5:14 PM     Hector Elizabeth M.D.

## 2020-11-05 NOTE — PROGRESS NOTES
Bedside report received. Assessment completed.  Pt is A&O x4. Pt on room air.   Medicating for pain PRN per MAR  Denies nausea.   - numbness, - tingling.  Surgical site to R neck and R groin with gauze, steristrips, tegaderm. CDI.    LDA saline locked.  Last BM PTA . +flatus,   +void.  regular diet. Tolerates well.   Pt up with SBA.  Call light and belongings within reach. All needs met at this time. Fall Precautions and hourly rounding in place.

## 2020-11-05 NOTE — PROGRESS NOTES
"CHW Prabhu met with pt at bedside to further offer CCM services. Pt is a readmit for CCM and previously dc due to all goals/needs met. Pt sees PCP Thi and states that she will call to schedule her own appt. Pt reports that she has been getting \"forgetful\" lately and would like to stay in contact with CCM after dc to be reminded of her upcoming appts and medical needs.     Plan:  I will call pt via mobile phone after dc to set up a schedule to remind her of her upcoming appts. Pt said that she will inform me of when and where she has appts and requests that this CHW call her before her appts to remind. Pt did not express any other questions or concerns for CCM.  "

## 2020-11-05 NOTE — PROGRESS NOTES
Patient to be discharged today - patient aware and agreeable to plan. D/c instructions reviewed with patient - ?'s/concerns answered. Patient educated on pain regimen, s&s of infection, and mobility/weight restrictions. Oxycodone rx with patient.

## 2020-11-05 NOTE — OR NURSING
1634 Pt to IR. Usman and mary Ortiz from IR here to take Pt .    1636 Updated MAMI Landaverde regarding Pt's elevated BG=483/87. Hydralazine 5mg iv given per anesthesia MD order.

## 2020-11-05 NOTE — CARE PLAN
Problem: Discharge Barriers/Planning  Goal: Patient's continuum of care needs will be met  Outcome: PROGRESSING AS EXPECTED   Discharge plan discussed with pt. Breakfast ordered. Plan to discharge after breakfast arrives.    Problem: Pain Management  Goal: Pain level will decrease to patient's comfort goal  Outcome: PROGRESSING AS EXPECTED   Pain well controlled with PO oxy. Oxy prescription for discharge given to pt. Heat/ice packs offered. Extra pillows in use.

## 2020-11-09 ENCOUNTER — PATIENT OUTREACH (OUTPATIENT)
Dept: HEALTH INFORMATION MANAGEMENT | Facility: OTHER | Age: 60
End: 2020-11-09

## 2020-11-09 NOTE — PROGRESS NOTES
"Outgoing call to patient, referred by CHW for wound bleeding. Patient states she is headed out of town but will be back tomorrow late. She states there is \"oozing\" from the wound above the port site. She has tried to hold pressure to stop the bleeding but when she moves or during sleep it continues. Per chart review, patient is on apixaban. She does not have any dressing supplies currently and has it open to air and wiping it off when it bleeds/oozes. Recommended picking up some wound supplies from the drugstore to keep area clean and dry. Recommended calling surgeon's office to schedule follow up appointment asap and to notify office staff if bleeding does not stop before appointment date. Reviewed s/s of infection and return to ED if any infection suspected, especially since she is travelling over the next few days. CCM RN to follow up later this week when patient back in town to assess for any additional CCM RN needs.   "

## 2020-11-09 NOTE — PROGRESS NOTES
"JOSE Pelletier spoke with pt via mobile phone after dc from [facility]. Pt reports that she has obtained all of her medications after dc and has no questions for a CCM pharmD. Pt states that she has made a follow up appt with [facility] already but cannot remember the day and time at the moment. Pt reports concern about her \"chest wound is bleeding\" and would like to be connected with a CCM RN in the meantime. Pt is aware that she needs to make her follow up appt with Leonidas Mcmanus at [facility].   We were unable to finish outpatient assessment due to pt stating that she is busy getting ready to leave town for a . Pt requested a call back either Wednesday afternoon or Thursday morning.     Community Health Worker Intake  • Social determinates of health intake completed  • Identified barriers to none  • Contact information provided to Demetrice Jaime   • Outpatient assessment completed.  • Did the patient receive medications post discharge: Yes    Plan:  I will call [facility] to find out when pt's follow up appt with her PCP. I will also route pt's chart to MAMI Shin for a follow up phone call. I will also call pt on Wednesday or Thursday to finish outpatient assessment.         "

## 2020-11-13 ENCOUNTER — PATIENT OUTREACH (OUTPATIENT)
Dept: HEALTH INFORMATION MANAGEMENT | Facility: OTHER | Age: 60
End: 2020-11-13

## 2020-11-17 NOTE — PROGRESS NOTES
11/17/2020 Outgoing call to follow up on patient status. TC to home phone, patient not there currently, able to reach patient on her mobile phone. Patient reports bleeding has stopped and she has been taking all her medication as prescribed. Patient has PCP appointment today and is hopeful she will continue to be more comfortable managing health after this appointment Patient denies any additional questions or concerns about her health at this time. Patient requested I call back to her mother at the home phone to provide contact information for any questions/concerns at later date/time. Will dc patient from [facility] RN services at this time, no additional needs.

## 2021-01-01 ENCOUNTER — APPOINTMENT (OUTPATIENT)
Dept: RADIOLOGY | Facility: MEDICAL CENTER | Age: 61
DRG: 432 | End: 2021-01-01
Attending: INTERNAL MEDICINE
Payer: MEDICAID

## 2021-01-01 ENCOUNTER — APPOINTMENT (OUTPATIENT)
Dept: ADMISSIONS | Facility: MEDICAL CENTER | Age: 61
End: 2021-01-01
Attending: INTERNAL MEDICINE
Payer: MEDICAID

## 2021-01-01 ENCOUNTER — APPOINTMENT (OUTPATIENT)
Dept: RADIOLOGY | Facility: MEDICAL CENTER | Age: 61
DRG: 868 | End: 2021-01-01
Attending: INTERNAL MEDICINE
Payer: MEDICAID

## 2021-01-01 ENCOUNTER — ANESTHESIA EVENT (OUTPATIENT)
Dept: SURGERY | Facility: MEDICAL CENTER | Age: 61
End: 2021-01-01
Payer: MEDICAID

## 2021-01-01 ENCOUNTER — PATIENT OUTREACH (OUTPATIENT)
Dept: HEALTH INFORMATION MANAGEMENT | Facility: OTHER | Age: 61
End: 2021-01-01

## 2021-01-01 ENCOUNTER — ANESTHESIA (OUTPATIENT)
Dept: SURGERY | Facility: MEDICAL CENTER | Age: 61
End: 2021-01-01
Payer: MEDICAID

## 2021-01-01 ENCOUNTER — APPOINTMENT (OUTPATIENT)
Dept: RADIOLOGY | Facility: MEDICAL CENTER | Age: 61
DRG: 432 | End: 2021-01-01
Attending: EMERGENCY MEDICINE
Payer: MEDICAID

## 2021-01-01 ENCOUNTER — HOSPITAL ENCOUNTER (INPATIENT)
Facility: MEDICAL CENTER | Age: 61
LOS: 3 days | DRG: 432 | End: 2021-12-07
Attending: EMERGENCY MEDICINE | Admitting: INTERNAL MEDICINE
Payer: MEDICAID

## 2021-01-01 ENCOUNTER — HOSPITAL ENCOUNTER (OUTPATIENT)
Facility: MEDICAL CENTER | Age: 61
End: 2021-12-03
Attending: INTERNAL MEDICINE
Payer: MEDICAID

## 2021-01-01 ENCOUNTER — APPOINTMENT (OUTPATIENT)
Dept: RADIOLOGY | Facility: MEDICAL CENTER | Age: 61
End: 2021-01-01
Attending: EMERGENCY MEDICINE
Payer: MEDICAID

## 2021-01-01 ENCOUNTER — HOSPITAL ENCOUNTER (OUTPATIENT)
Facility: MEDICAL CENTER | Age: 61
End: 2021-09-28
Attending: INTERNAL MEDICINE | Admitting: INTERNAL MEDICINE
Payer: MEDICAID

## 2021-01-01 ENCOUNTER — HOSPITAL ENCOUNTER (OUTPATIENT)
Dept: LAB | Facility: MEDICAL CENTER | Age: 61
End: 2021-11-17
Attending: INTERNAL MEDICINE
Payer: MEDICAID

## 2021-01-01 ENCOUNTER — HOSPITAL ENCOUNTER (OUTPATIENT)
Facility: MEDICAL CENTER | Age: 61
End: 2021-02-17
Attending: EMERGENCY MEDICINE | Admitting: STUDENT IN AN ORGANIZED HEALTH CARE EDUCATION/TRAINING PROGRAM
Payer: MEDICAID

## 2021-01-01 ENCOUNTER — HOSPITAL ENCOUNTER (OUTPATIENT)
Dept: LAB | Facility: MEDICAL CENTER | Age: 61
End: 2021-05-14
Attending: STUDENT IN AN ORGANIZED HEALTH CARE EDUCATION/TRAINING PROGRAM
Payer: MEDICAID

## 2021-01-01 ENCOUNTER — TELEPHONE (OUTPATIENT)
Dept: RADIOLOGY | Facility: MEDICAL CENTER | Age: 61
End: 2021-01-01

## 2021-01-01 ENCOUNTER — HOSPITAL ENCOUNTER (OUTPATIENT)
Facility: MEDICAL CENTER | Age: 61
End: 2021-11-01
Attending: INTERNAL MEDICINE | Admitting: INTERNAL MEDICINE
Payer: MEDICAID

## 2021-01-01 ENCOUNTER — OFFICE VISIT (OUTPATIENT)
Dept: INTERNAL MEDICINE | Facility: OTHER | Age: 61
End: 2021-01-01
Payer: MEDICAID

## 2021-01-01 ENCOUNTER — HOSPITAL ENCOUNTER (OUTPATIENT)
Dept: LAB | Facility: MEDICAL CENTER | Age: 61
End: 2021-04-14
Attending: STUDENT IN AN ORGANIZED HEALTH CARE EDUCATION/TRAINING PROGRAM
Payer: MEDICAID

## 2021-01-01 ENCOUNTER — HOSPITAL ENCOUNTER (OUTPATIENT)
Dept: LAB | Facility: MEDICAL CENTER | Age: 61
End: 2021-01-19
Attending: INTERNAL MEDICINE
Payer: MEDICAID

## 2021-01-01 ENCOUNTER — HOSPITAL ENCOUNTER (INPATIENT)
Facility: MEDICAL CENTER | Age: 61
LOS: 5 days | DRG: 868 | End: 2021-08-29
Attending: STUDENT IN AN ORGANIZED HEALTH CARE EDUCATION/TRAINING PROGRAM | Admitting: STUDENT IN AN ORGANIZED HEALTH CARE EDUCATION/TRAINING PROGRAM
Payer: MEDICAID

## 2021-01-01 ENCOUNTER — PHARMACY VISIT (OUTPATIENT)
Dept: PHARMACY | Facility: MEDICAL CENTER | Age: 61
End: 2021-01-01
Payer: COMMERCIAL

## 2021-01-01 ENCOUNTER — HOSPITAL ENCOUNTER (OUTPATIENT)
Facility: MEDICAL CENTER | Age: 61
End: 2021-02-15
Attending: INTERNAL MEDICINE | Admitting: INTERNAL MEDICINE
Payer: MEDICAID

## 2021-01-01 ENCOUNTER — HOSPITAL ENCOUNTER (EMERGENCY)
Facility: MEDICAL CENTER | Age: 61
End: 2021-07-18
Attending: EMERGENCY MEDICINE
Payer: MEDICAID

## 2021-01-01 ENCOUNTER — HOSPITAL ENCOUNTER (OUTPATIENT)
Dept: RADIOLOGY | Facility: MEDICAL CENTER | Age: 61
End: 2021-12-03
Attending: INTERNAL MEDICINE
Payer: MEDICAID

## 2021-01-01 ENCOUNTER — HOSPITAL ENCOUNTER (OUTPATIENT)
Facility: MEDICAL CENTER | Age: 61
End: 2021-03-05
Attending: EMERGENCY MEDICINE | Admitting: STUDENT IN AN ORGANIZED HEALTH CARE EDUCATION/TRAINING PROGRAM
Payer: MEDICAID

## 2021-01-01 ENCOUNTER — TELEPHONE (OUTPATIENT)
Dept: BEHAVIORAL HEALTH | Facility: PSYCHIATRIC FACILITY | Age: 61
End: 2021-01-01

## 2021-01-01 VITALS
HEART RATE: 80 BPM | TEMPERATURE: 98 F | SYSTOLIC BLOOD PRESSURE: 143 MMHG | OXYGEN SATURATION: 97 % | RESPIRATION RATE: 20 BRPM | WEIGHT: 153.22 LBS | DIASTOLIC BLOOD PRESSURE: 68 MMHG | BODY MASS INDEX: 23.22 KG/M2 | HEIGHT: 68 IN

## 2021-01-01 VITALS
SYSTOLIC BLOOD PRESSURE: 157 MMHG | DIASTOLIC BLOOD PRESSURE: 85 MMHG | TEMPERATURE: 97.5 F | BODY MASS INDEX: 23.42 KG/M2 | OXYGEN SATURATION: 100 % | HEART RATE: 93 BPM | HEIGHT: 66 IN | WEIGHT: 145.72 LBS | RESPIRATION RATE: 18 BRPM

## 2021-01-01 VITALS
SYSTOLIC BLOOD PRESSURE: 107 MMHG | WEIGHT: 145.72 LBS | DIASTOLIC BLOOD PRESSURE: 55 MMHG | TEMPERATURE: 97.8 F | RESPIRATION RATE: 20 BRPM | BODY MASS INDEX: 22.87 KG/M2 | HEIGHT: 67 IN | HEART RATE: 88 BPM | OXYGEN SATURATION: 98 %

## 2021-01-01 VITALS
HEIGHT: 67 IN | SYSTOLIC BLOOD PRESSURE: 171 MMHG | RESPIRATION RATE: 18 BRPM | DIASTOLIC BLOOD PRESSURE: 76 MMHG | TEMPERATURE: 97.4 F | WEIGHT: 149.25 LBS | HEART RATE: 86 BPM | OXYGEN SATURATION: 100 % | BODY MASS INDEX: 23.43 KG/M2

## 2021-01-01 VITALS
BODY MASS INDEX: 24.02 KG/M2 | SYSTOLIC BLOOD PRESSURE: 140 MMHG | OXYGEN SATURATION: 94 % | RESPIRATION RATE: 14 BRPM | HEIGHT: 66 IN | HEART RATE: 94 BPM | TEMPERATURE: 97.5 F | DIASTOLIC BLOOD PRESSURE: 79 MMHG | WEIGHT: 149.47 LBS

## 2021-01-01 VITALS
OXYGEN SATURATION: 94 % | BODY MASS INDEX: 24.72 KG/M2 | WEIGHT: 153.8 LBS | DIASTOLIC BLOOD PRESSURE: 78 MMHG | SYSTOLIC BLOOD PRESSURE: 145 MMHG | HEIGHT: 66 IN | HEART RATE: 104 BPM | TEMPERATURE: 97.8 F

## 2021-01-01 VITALS
DIASTOLIC BLOOD PRESSURE: 87 MMHG | WEIGHT: 153 LBS | TEMPERATURE: 97.6 F | RESPIRATION RATE: 17 BRPM | BODY MASS INDEX: 23.19 KG/M2 | OXYGEN SATURATION: 96 % | SYSTOLIC BLOOD PRESSURE: 126 MMHG | HEART RATE: 90 BPM | HEIGHT: 68 IN

## 2021-01-01 VITALS
BODY MASS INDEX: 24.91 KG/M2 | RESPIRATION RATE: 18 BRPM | HEART RATE: 96 BPM | HEIGHT: 66 IN | DIASTOLIC BLOOD PRESSURE: 65 MMHG | OXYGEN SATURATION: 95 % | TEMPERATURE: 97.5 F | SYSTOLIC BLOOD PRESSURE: 147 MMHG | WEIGHT: 155 LBS

## 2021-01-01 VITALS
DIASTOLIC BLOOD PRESSURE: 21 MMHG | HEIGHT: 67 IN | BODY MASS INDEX: 27.13 KG/M2 | TEMPERATURE: 98.2 F | OXYGEN SATURATION: 32 % | WEIGHT: 172.84 LBS | SYSTOLIC BLOOD PRESSURE: 100 MMHG

## 2021-01-01 DIAGNOSIS — E11.40 TYPE 2 DIABETES MELLITUS WITH DIABETIC NEUROPATHY, WITH LONG-TERM CURRENT USE OF INSULIN (HCC): ICD-10-CM

## 2021-01-01 DIAGNOSIS — K92.0 GASTROINTESTINAL HEMORRHAGE WITH HEMATEMESIS: ICD-10-CM

## 2021-01-01 DIAGNOSIS — Z79.4 TYPE 2 DIABETES MELLITUS WITH DIABETIC NEUROPATHY, WITH LONG-TERM CURRENT USE OF INSULIN (HCC): ICD-10-CM

## 2021-01-01 DIAGNOSIS — Z01.812 PRE-OPERATIVE LABORATORY EXAMINATION: ICD-10-CM

## 2021-01-01 DIAGNOSIS — K70.31 ALCOHOLIC CIRRHOSIS OF LIVER WITH ASCITES (HCC): ICD-10-CM

## 2021-01-01 DIAGNOSIS — K31.89 PORTAL HYPERTENSIVE GASTROPATHY (HCC): ICD-10-CM

## 2021-01-01 DIAGNOSIS — M25.561 ACUTE PAIN OF RIGHT KNEE: ICD-10-CM

## 2021-01-01 DIAGNOSIS — R18.8 CIRRHOSIS OF LIVER WITH ASCITES, UNSPECIFIED HEPATIC CIRRHOSIS TYPE (HCC): ICD-10-CM

## 2021-01-01 DIAGNOSIS — K92.1 MELENA: ICD-10-CM

## 2021-01-01 DIAGNOSIS — F10.11 HISTORY OF ALCOHOL ABUSE: ICD-10-CM

## 2021-01-01 DIAGNOSIS — I85.10 SECONDARY ESOPHAGEAL VARICES WITHOUT BLEEDING (HCC): ICD-10-CM

## 2021-01-01 DIAGNOSIS — D64.9 SYMPTOMATIC ANEMIA: ICD-10-CM

## 2021-01-01 DIAGNOSIS — J96.01 ACUTE HYPOXEMIC RESPIRATORY FAILURE (HCC): ICD-10-CM

## 2021-01-01 DIAGNOSIS — B18.2 CHRONIC HEPATITIS C WITHOUT HEPATIC COMA (HCC): ICD-10-CM

## 2021-01-01 DIAGNOSIS — K72.00 SHOCK LIVER: ICD-10-CM

## 2021-01-01 DIAGNOSIS — R10.84 GENERALIZED ABDOMINAL PAIN: ICD-10-CM

## 2021-01-01 DIAGNOSIS — R57.8 HEMORRHAGIC SHOCK (HCC): ICD-10-CM

## 2021-01-01 DIAGNOSIS — K92.0 HEMATEMESIS WITH NAUSEA: ICD-10-CM

## 2021-01-01 DIAGNOSIS — N81.10 VAGINAL PROLAPSE: ICD-10-CM

## 2021-01-01 DIAGNOSIS — D61.818 PANCYTOPENIA (HCC): ICD-10-CM

## 2021-01-01 DIAGNOSIS — K92.2 UPPER GI BLEED: ICD-10-CM

## 2021-01-01 DIAGNOSIS — Z87.19 HISTORY OF ESOPHAGEAL VARICES: ICD-10-CM

## 2021-01-01 DIAGNOSIS — K70.9 ALCOHOLIC LIVER DISEASE (HCC): ICD-10-CM

## 2021-01-01 DIAGNOSIS — K76.6 PORTAL HYPERTENSIVE GASTROPATHY (HCC): ICD-10-CM

## 2021-01-01 DIAGNOSIS — I10 PRIMARY HYPERTENSION: ICD-10-CM

## 2021-01-01 DIAGNOSIS — D50.9 MICROCYTIC ANEMIA: ICD-10-CM

## 2021-01-01 DIAGNOSIS — Z01.810 PRE-OPERATIVE CARDIOVASCULAR EXAMINATION: ICD-10-CM

## 2021-01-01 DIAGNOSIS — K92.2 ACUTE UPPER GI BLEEDING: ICD-10-CM

## 2021-01-01 DIAGNOSIS — I48.91 ATRIAL FIBRILLATION, UNSPECIFIED TYPE (HCC): ICD-10-CM

## 2021-01-01 DIAGNOSIS — R18.8 OTHER ASCITES: ICD-10-CM

## 2021-01-01 DIAGNOSIS — D69.6 THROMBOCYTOPENIA (HCC): ICD-10-CM

## 2021-01-01 DIAGNOSIS — N17.9 ACUTE KIDNEY INJURY (HCC): ICD-10-CM

## 2021-01-01 DIAGNOSIS — R55 NEAR SYNCOPE: ICD-10-CM

## 2021-01-01 DIAGNOSIS — F51.01 PRIMARY INSOMNIA: ICD-10-CM

## 2021-01-01 DIAGNOSIS — K76.82 HEPATIC ENCEPHALOPATHY (HCC): ICD-10-CM

## 2021-01-01 DIAGNOSIS — K74.60 CIRRHOSIS OF LIVER WITH ASCITES, UNSPECIFIED HEPATIC CIRRHOSIS TYPE (HCC): ICD-10-CM

## 2021-01-01 DIAGNOSIS — R11.2 NON-INTRACTABLE VOMITING WITH NAUSEA, UNSPECIFIED VOMITING TYPE: ICD-10-CM

## 2021-01-01 LAB
25(OH)D3 SERPL-MCNC: 62 NG/ML (ref 30–100)
ABO GROUP BLD: NORMAL
AFP-TM SERPL-MCNC: 2 NG/ML (ref 0–9)
AFP-TM SERPL-MCNC: 3 NG/ML (ref 0–9)
ALBUMIN FLD-MCNC: 0.5 G/DL
ALBUMIN SERPL BCP-MCNC: 1.9 G/DL (ref 3.2–4.9)
ALBUMIN SERPL BCP-MCNC: 2.3 G/DL (ref 3.2–4.9)
ALBUMIN SERPL BCP-MCNC: 2.4 G/DL (ref 3.2–4.9)
ALBUMIN SERPL BCP-MCNC: 2.5 G/DL (ref 3.2–4.9)
ALBUMIN SERPL BCP-MCNC: 2.5 G/DL (ref 3.2–4.9)
ALBUMIN SERPL BCP-MCNC: 2.8 G/DL (ref 3.2–4.9)
ALBUMIN SERPL BCP-MCNC: 2.9 G/DL (ref 3.2–4.9)
ALBUMIN SERPL BCP-MCNC: 3.7 G/DL (ref 3.2–4.9)
ALBUMIN SERPL BCP-MCNC: 3.8 G/DL (ref 3.2–4.9)
ALBUMIN SERPL BCP-MCNC: 3.9 G/DL (ref 3.2–4.9)
ALBUMIN SERPL BCP-MCNC: 3.9 G/DL (ref 3.2–4.9)
ALBUMIN SERPL BCP-MCNC: 4.1 G/DL (ref 3.2–4.9)
ALBUMIN/GLOB SERPL: 0.5 G/DL
ALBUMIN/GLOB SERPL: 0.6 G/DL
ALBUMIN/GLOB SERPL: 0.6 G/DL
ALBUMIN/GLOB SERPL: 0.7 G/DL
ALBUMIN/GLOB SERPL: 0.7 G/DL
ALBUMIN/GLOB SERPL: 0.9 G/DL
ALBUMIN/GLOB SERPL: 1 G/DL
ALBUMIN/GLOB SERPL: 1.1 G/DL
ALBUMIN/GLOB SERPL: 1.2 G/DL
ALP SERPL-CCNC: 143 U/L (ref 30–99)
ALP SERPL-CCNC: 61 U/L (ref 30–99)
ALP SERPL-CCNC: 61 U/L (ref 30–99)
ALP SERPL-CCNC: 64 U/L (ref 30–99)
ALP SERPL-CCNC: 72 U/L (ref 30–99)
ALP SERPL-CCNC: 77 U/L (ref 30–99)
ALP SERPL-CCNC: 79 U/L (ref 30–99)
ALP SERPL-CCNC: 81 U/L (ref 30–99)
ALP SERPL-CCNC: 81 U/L (ref 30–99)
ALP SERPL-CCNC: 87 U/L (ref 30–99)
ALP SERPL-CCNC: 89 U/L (ref 30–99)
ALP SERPL-CCNC: 93 U/L (ref 30–99)
ALT SERPL-CCNC: 10 U/L (ref 2–50)
ALT SERPL-CCNC: 10 U/L (ref 2–50)
ALT SERPL-CCNC: 12 U/L (ref 2–50)
ALT SERPL-CCNC: 13 U/L (ref 2–50)
ALT SERPL-CCNC: 14 U/L (ref 2–50)
ALT SERPL-CCNC: 15 U/L (ref 2–50)
ALT SERPL-CCNC: 17 U/L (ref 2–50)
ALT SERPL-CCNC: 19 U/L (ref 2–50)
ALT SERPL-CCNC: 2327 U/L (ref 2–50)
ALT SERPL-CCNC: 7 U/L (ref 2–50)
ALT SERPL-CCNC: 7 U/L (ref 2–50)
ALT SERPL-CCNC: 8 U/L (ref 2–50)
AMMONIA PLAS-SCNC: 36 UMOL/L (ref 11–45)
AMMONIA PLAS-SCNC: 97 UMOL/L (ref 11–45)
ANION GAP SERPL CALC-SCNC: 10 MMOL/L (ref 7–16)
ANION GAP SERPL CALC-SCNC: 10 MMOL/L (ref 7–16)
ANION GAP SERPL CALC-SCNC: 11 MMOL/L (ref 7–16)
ANION GAP SERPL CALC-SCNC: 11 MMOL/L (ref 7–16)
ANION GAP SERPL CALC-SCNC: 12 MMOL/L (ref 7–16)
ANION GAP SERPL CALC-SCNC: 13 MMOL/L (ref 7–16)
ANION GAP SERPL CALC-SCNC: 13 MMOL/L (ref 7–16)
ANION GAP SERPL CALC-SCNC: 14 MMOL/L (ref 7–16)
ANION GAP SERPL CALC-SCNC: 14 MMOL/L (ref 7–16)
ANION GAP SERPL CALC-SCNC: 18 MMOL/L (ref 7–16)
ANION GAP SERPL CALC-SCNC: 19 MMOL/L (ref 7–16)
ANION GAP SERPL CALC-SCNC: 4 MMOL/L (ref 7–16)
ANION GAP SERPL CALC-SCNC: 5 MMOL/L (ref 7–16)
ANION GAP SERPL CALC-SCNC: 7 MMOL/L (ref 7–16)
ANION GAP SERPL CALC-SCNC: 7 MMOL/L (ref 7–16)
ANION GAP SERPL CALC-SCNC: 8 MMOL/L (ref 7–16)
ANION GAP SERPL CALC-SCNC: 9 MMOL/L (ref 7–16)
ANISOCYTOSIS BLD QL SMEAR: ABNORMAL
APPEARANCE FLD: CLEAR
APPEARANCE FLD: CLEAR
APPEARANCE UR: ABNORMAL
APPEARANCE UR: CLEAR
APTT PPP: 34.1 SEC (ref 24.7–36)
APTT PPP: 36.2 SEC (ref 24.7–36)
APTT PPP: 46 SEC (ref 24.7–36)
AST SERPL-CCNC: 21 U/L (ref 12–45)
AST SERPL-CCNC: 24 U/L (ref 12–45)
AST SERPL-CCNC: 24 U/L (ref 12–45)
AST SERPL-CCNC: 25 U/L (ref 12–45)
AST SERPL-CCNC: 25 U/L (ref 12–45)
AST SERPL-CCNC: 26 U/L (ref 12–45)
AST SERPL-CCNC: 27 U/L (ref 12–45)
AST SERPL-CCNC: 27 U/L (ref 12–45)
AST SERPL-CCNC: 30 U/L (ref 12–45)
AST SERPL-CCNC: 31 U/L (ref 12–45)
AST SERPL-CCNC: 39 U/L (ref 12–45)
AST SERPL-CCNC: 4749 U/L (ref 12–45)
BACTERIA #/AREA URNS HPF: ABNORMAL /HPF
BACTERIA #/AREA URNS HPF: NEGATIVE /HPF
BACTERIA FLD AEROBE CULT: NORMAL
BACTERIA FLD AEROBE CULT: NORMAL
BARCODED ABORH UBTYP: 1700
BARCODED ABORH UBTYP: 5100
BARCODED ABORH UBTYP: 9500
BARCODED PRD CODE UBPRD: NORMAL
BARCODED UNIT NUM UBUNT: NORMAL
BASE EXCESS BLDA CALC-SCNC: -10 MMOL/L (ref -4–3)
BASE EXCESS BLDA CALC-SCNC: -11 MMOL/L (ref -4–3)
BASE EXCESS BLDA CALC-SCNC: -11 MMOL/L (ref -4–3)
BASE EXCESS BLDA CALC-SCNC: -14 MMOL/L (ref -4–3)
BASOPHILS # BLD AUTO: 0.3 % (ref 0–1.8)
BASOPHILS # BLD AUTO: 0.7 % (ref 0–1.8)
BASOPHILS # BLD AUTO: 0.7 % (ref 0–1.8)
BASOPHILS # BLD AUTO: 0.8 % (ref 0–1.8)
BASOPHILS # BLD AUTO: 0.8 % (ref 0–1.8)
BASOPHILS # BLD AUTO: 0.9 % (ref 0–1.8)
BASOPHILS # BLD AUTO: 1 % (ref 0–1.8)
BASOPHILS # BLD AUTO: 1.1 % (ref 0–1.8)
BASOPHILS # BLD AUTO: 1.1 % (ref 0–1.8)
BASOPHILS # BLD AUTO: 1.8 % (ref 0–1.8)
BASOPHILS # BLD: 0.02 K/UL (ref 0–0.12)
BASOPHILS # BLD: 0.03 K/UL (ref 0–0.12)
BASOPHILS # BLD: 0.04 K/UL (ref 0–0.12)
BASOPHILS # BLD: 0.04 K/UL (ref 0–0.12)
BASOPHILS # BLD: 0.1 K/UL (ref 0–0.12)
BASOPHILS # BLD: 0.46 K/UL (ref 0–0.12)
BILIRUB CONJ SERPL-MCNC: <0.2 MG/DL (ref 0.1–0.5)
BILIRUB INDIRECT SERPL-MCNC: NORMAL MG/DL (ref 0–1)
BILIRUB SERPL-MCNC: 0.4 MG/DL (ref 0.1–1.5)
BILIRUB SERPL-MCNC: 0.5 MG/DL (ref 0.1–1.5)
BILIRUB SERPL-MCNC: 0.5 MG/DL (ref 0.1–1.5)
BILIRUB SERPL-MCNC: 0.7 MG/DL (ref 0.1–1.5)
BILIRUB SERPL-MCNC: 0.8 MG/DL (ref 0.1–1.5)
BILIRUB SERPL-MCNC: 0.8 MG/DL (ref 0.1–1.5)
BILIRUB SERPL-MCNC: 0.9 MG/DL (ref 0.1–1.5)
BILIRUB SERPL-MCNC: 1 MG/DL (ref 0.1–1.5)
BILIRUB SERPL-MCNC: 3.3 MG/DL (ref 0.1–1.5)
BILIRUB UR QL STRIP.AUTO: ABNORMAL
BILIRUB UR QL STRIP.AUTO: ABNORMAL
BILIRUB UR QL STRIP.AUTO: NEGATIVE
BILIRUB UR QL STRIP.AUTO: NEGATIVE
BLD GP AB SCN SERPL QL: NORMAL
BODY FLD TYPE: NORMAL
BODY TEMPERATURE: ABNORMAL DEGREES
BREATHS SETTING VENT: 18
BREATHS SETTING VENT: 24
BREATHS SETTING VENT: 24
BUN SERPL-MCNC: 10 MG/DL (ref 8–22)
BUN SERPL-MCNC: 10 MG/DL (ref 8–22)
BUN SERPL-MCNC: 12 MG/DL (ref 8–22)
BUN SERPL-MCNC: 12 MG/DL (ref 8–22)
BUN SERPL-MCNC: 13 MG/DL (ref 8–22)
BUN SERPL-MCNC: 14 MG/DL (ref 8–22)
BUN SERPL-MCNC: 16 MG/DL (ref 8–22)
BUN SERPL-MCNC: 16 MG/DL (ref 8–22)
BUN SERPL-MCNC: 18 MG/DL (ref 8–22)
BUN SERPL-MCNC: 26 MG/DL (ref 8–22)
BUN SERPL-MCNC: 44 MG/DL (ref 8–22)
BUN SERPL-MCNC: 44 MG/DL (ref 8–22)
BUN SERPL-MCNC: 46 MG/DL (ref 8–22)
BUN SERPL-MCNC: 49 MG/DL (ref 8–22)
BUN SERPL-MCNC: 50 MG/DL (ref 8–22)
BUN SERPL-MCNC: 56 MG/DL (ref 8–22)
BUN SERPL-MCNC: 8 MG/DL (ref 8–22)
BUN SERPL-MCNC: 9 MG/DL (ref 8–22)
BURR CELLS BLD QL SMEAR: NORMAL
C TRACH DNA SPEC QL NAA+PROBE: NEGATIVE
CA-I SERPL-SCNC: 1 MMOL/L (ref 1.1–1.3)
CA-I SERPL-SCNC: 1.1 MMOL/L (ref 1.1–1.3)
CALCIUM SERPL-MCNC: 6.9 MG/DL (ref 8.5–10.5)
CALCIUM SERPL-MCNC: 7 MG/DL (ref 8.5–10.5)
CALCIUM SERPL-MCNC: 7.1 MG/DL (ref 8.5–10.5)
CALCIUM SERPL-MCNC: 7.3 MG/DL (ref 8.5–10.5)
CALCIUM SERPL-MCNC: 7.4 MG/DL (ref 8.5–10.5)
CALCIUM SERPL-MCNC: 8 MG/DL (ref 8.5–10.5)
CALCIUM SERPL-MCNC: 8.1 MG/DL (ref 8.5–10.5)
CALCIUM SERPL-MCNC: 8.2 MG/DL (ref 8.5–10.5)
CALCIUM SERPL-MCNC: 8.4 MG/DL (ref 8.5–10.5)
CALCIUM SERPL-MCNC: 8.5 MG/DL (ref 8.5–10.5)
CALCIUM SERPL-MCNC: 8.6 MG/DL (ref 8.5–10.5)
CALCIUM SERPL-MCNC: 8.7 MG/DL (ref 8.5–10.5)
CALCIUM SERPL-MCNC: 9 MG/DL (ref 8.4–10.2)
CALCIUM SERPL-MCNC: 9 MG/DL (ref 8.5–10.5)
CALCIUM SERPL-MCNC: 9.3 MG/DL (ref 8.5–10.5)
CALCIUM SERPL-MCNC: 9.3 MG/DL (ref 8.5–10.5)
CALCIUM SERPL-MCNC: 9.4 MG/DL (ref 8.5–10.5)
CALCIUM SERPL-MCNC: 9.6 MG/DL (ref 8.5–10.5)
CALCIUM SERPL-MCNC: 9.7 MG/DL (ref 8.5–10.5)
CALCIUM SERPL-MCNC: 9.8 MG/DL (ref 8.5–10.5)
CFT BLD TEG: 3.5 MIN (ref 4.6–9.1)
CFT BLD TEG: 4.6 MIN (ref 4.6–9.1)
CFT BLD TEG: 5.2 MIN (ref 4.6–9.1)
CFT P HPASE BLD TEG: 3.4 MIN (ref 4.3–8.3)
CFT P HPASE BLD TEG: 4.6 MIN (ref 4.3–8.3)
CFT P HPASE BLD TEG: 5.2 MIN (ref 4.3–8.3)
CHLORIDE SERPL-SCNC: 101 MMOL/L (ref 96–112)
CHLORIDE SERPL-SCNC: 103 MMOL/L (ref 96–112)
CHLORIDE SERPL-SCNC: 103 MMOL/L (ref 96–112)
CHLORIDE SERPL-SCNC: 105 MMOL/L (ref 96–112)
CHLORIDE SERPL-SCNC: 105 MMOL/L (ref 96–112)
CHLORIDE SERPL-SCNC: 106 MMOL/L (ref 96–112)
CHLORIDE SERPL-SCNC: 107 MMOL/L (ref 96–112)
CHLORIDE SERPL-SCNC: 108 MMOL/L (ref 96–112)
CHLORIDE SERPL-SCNC: 108 MMOL/L (ref 96–112)
CHLORIDE SERPL-SCNC: 109 MMOL/L (ref 96–112)
CHLORIDE SERPL-SCNC: 113 MMOL/L (ref 96–112)
CHLORIDE SERPL-SCNC: 114 MMOL/L (ref 96–112)
CHLORIDE SERPL-SCNC: 114 MMOL/L (ref 96–112)
CHLORIDE SERPL-SCNC: 115 MMOL/L (ref 96–112)
CHLORIDE SERPL-SCNC: 117 MMOL/L (ref 96–112)
CLOT ANGLE BLD TEG: 70.2 DEGREES (ref 63–78)
CLOT ANGLE BLD TEG: 73.1 DEGREES (ref 63–78)
CLOT ANGLE BLD TEG: 73.9 DEGREES (ref 63–78)
CLOT LYSIS 30M P MA LENFR BLD TEG: 0 % (ref 0–2.6)
CO2 BLDA-SCNC: 12 MMOL/L (ref 20–33)
CO2 BLDA-SCNC: 15 MMOL/L (ref 20–33)
CO2 BLDA-SCNC: 16 MMOL/L (ref 20–33)
CO2 BLDA-SCNC: 20 MMOL/L (ref 20–33)
CO2 SERPL-SCNC: 10 MMOL/L (ref 20–33)
CO2 SERPL-SCNC: 13 MMOL/L (ref 20–33)
CO2 SERPL-SCNC: 15 MMOL/L (ref 20–33)
CO2 SERPL-SCNC: 15 MMOL/L (ref 20–33)
CO2 SERPL-SCNC: 16 MMOL/L (ref 20–33)
CO2 SERPL-SCNC: 20 MMOL/L (ref 20–33)
CO2 SERPL-SCNC: 22 MMOL/L (ref 20–33)
CO2 SERPL-SCNC: 23 MMOL/L (ref 20–33)
CO2 SERPL-SCNC: 24 MMOL/L (ref 20–33)
CO2 SERPL-SCNC: 25 MMOL/L (ref 20–33)
CO2 SERPL-SCNC: 25 MMOL/L (ref 20–33)
CO2 SERPL-SCNC: 27 MMOL/L (ref 20–33)
CO2 SERPL-SCNC: 28 MMOL/L (ref 20–33)
CO2 SERPL-SCNC: 28 MMOL/L (ref 20–33)
CO2 SERPL-SCNC: 29 MMOL/L (ref 20–33)
COLOR FLD: NORMAL
COLOR FLD: YELLOW
COLOR UR: ABNORMAL
COLOR UR: ABNORMAL
COLOR UR: YELLOW
COLOR UR: YELLOW
COMMENT 1642: NORMAL
COMMENT 1642: NORMAL
COMPONENT CT 8504CT: NORMAL
COMPONENT F 8504F: NORMAL
COMPONENT F 8504F: NORMAL
COMPONENT P 8504P: NORMAL
COMPONENT R 8504R: NORMAL
CORTIS SERPL-MCNC: 5.3 UG/DL (ref 0–23)
COVID ORDER STATUS COVID19: NORMAL
CREAT SERPL-MCNC: 0.62 MG/DL (ref 0.5–1.4)
CREAT SERPL-MCNC: 0.66 MG/DL (ref 0.5–1.4)
CREAT SERPL-MCNC: 0.7 MG/DL (ref 0.5–1.4)
CREAT SERPL-MCNC: 0.7 MG/DL (ref 0.5–1.4)
CREAT SERPL-MCNC: 0.71 MG/DL (ref 0.5–1.4)
CREAT SERPL-MCNC: 0.71 MG/DL (ref 0.5–1.4)
CREAT SERPL-MCNC: 0.73 MG/DL (ref 0.5–1.4)
CREAT SERPL-MCNC: 0.78 MG/DL (ref 0.5–1.4)
CREAT SERPL-MCNC: 0.79 MG/DL (ref 0.5–1.4)
CREAT SERPL-MCNC: 0.82 MG/DL (ref 0.5–1.4)
CREAT SERPL-MCNC: 0.84 MG/DL (ref 0.5–1.4)
CREAT SERPL-MCNC: 0.85 MG/DL (ref 0.5–1.4)
CREAT SERPL-MCNC: 0.86 MG/DL (ref 0.5–1.4)
CREAT SERPL-MCNC: 0.87 MG/DL (ref 0.5–1.4)
CREAT SERPL-MCNC: 1.08 MG/DL (ref 0.5–1.4)
CREAT SERPL-MCNC: 1.12 MG/DL (ref 0.5–1.4)
CREAT SERPL-MCNC: 1.36 MG/DL (ref 0.5–1.4)
CREAT SERPL-MCNC: 1.59 MG/DL (ref 0.5–1.4)
CREAT SERPL-MCNC: 1.67 MG/DL (ref 0.5–1.4)
CREAT SERPL-MCNC: 2.58 MG/DL (ref 0.5–1.4)
CSF COMMENTS 1658: NORMAL
CSF COMMENTS 1658: NORMAL
CT.EXTRINSIC BLD ROTEM: 1.3 MIN (ref 0.8–2.1)
CT.EXTRINSIC BLD ROTEM: 1.4 MIN (ref 0.8–2.1)
CT.EXTRINSIC BLD ROTEM: 1.8 MIN (ref 0.8–2.1)
CYTOLOGY REG CYTOL: NORMAL
DACRYOCYTES BLD QL SMEAR: NORMAL
DELSYS IDSYS: ABNORMAL
EKG IMPRESSION: NORMAL
END TIDAL CARBON DIOXIDE IECO2: 23 MMHG
END TIDAL CARBON DIOXIDE IECO2: 26 MMHG
END TIDAL CARBON DIOXIDE IECO2: 28 MMHG
END TIDAL CARBON DIOXIDE IECO2: 31 MMHG
EOSINOPHIL # BLD AUTO: 0.04 K/UL (ref 0–0.51)
EOSINOPHIL # BLD AUTO: 0.04 K/UL (ref 0–0.51)
EOSINOPHIL # BLD AUTO: 0.05 K/UL (ref 0–0.51)
EOSINOPHIL # BLD AUTO: 0.06 K/UL (ref 0–0.51)
EOSINOPHIL # BLD AUTO: 0.07 K/UL (ref 0–0.51)
EOSINOPHIL # BLD AUTO: 0.09 K/UL (ref 0–0.51)
EOSINOPHIL # BLD AUTO: 0.11 K/UL (ref 0–0.51)
EOSINOPHIL # BLD AUTO: 0.12 K/UL (ref 0–0.51)
EOSINOPHIL # BLD AUTO: 0.23 K/UL (ref 0–0.51)
EOSINOPHIL # BLD AUTO: 0.3 K/UL (ref 0–0.51)
EOSINOPHIL NFR BLD: 0.8 % (ref 0–6.9)
EOSINOPHIL NFR BLD: 0.9 % (ref 0–6.9)
EOSINOPHIL NFR BLD: 1.4 % (ref 0–6.9)
EOSINOPHIL NFR BLD: 1.9 % (ref 0–6.9)
EOSINOPHIL NFR BLD: 2 % (ref 0–6.9)
EOSINOPHIL NFR BLD: 2 % (ref 0–6.9)
EOSINOPHIL NFR BLD: 2.3 % (ref 0–6.9)
EOSINOPHIL NFR BLD: 2.7 % (ref 0–6.9)
EOSINOPHIL NFR BLD: 3 % (ref 0–6.9)
EOSINOPHIL NFR BLD: 3.1 % (ref 0–6.9)
EOSINOPHIL NFR FLD: 1 %
EPI CELLS #/AREA URNS HPF: ABNORMAL /HPF
EPI CELLS #/AREA URNS HPF: NEGATIVE /HPF
EPI CELLS #/AREA URNS HPF: NEGATIVE /HPF
EPI CELLS #/AREA URNS HPF: NORMAL /HPF
ERYTHROCYTE [DISTWIDTH] IN BLOOD BY AUTOMATED COUNT: 43.4 FL (ref 35.9–50)
ERYTHROCYTE [DISTWIDTH] IN BLOOD BY AUTOMATED COUNT: 43.5 FL (ref 35.9–50)
ERYTHROCYTE [DISTWIDTH] IN BLOOD BY AUTOMATED COUNT: 44 FL (ref 35.9–50)
ERYTHROCYTE [DISTWIDTH] IN BLOOD BY AUTOMATED COUNT: 44.1 FL (ref 35.9–50)
ERYTHROCYTE [DISTWIDTH] IN BLOOD BY AUTOMATED COUNT: 45.2 FL (ref 35.9–50)
ERYTHROCYTE [DISTWIDTH] IN BLOOD BY AUTOMATED COUNT: 45.6 FL (ref 35.9–50)
ERYTHROCYTE [DISTWIDTH] IN BLOOD BY AUTOMATED COUNT: 46.1 FL (ref 35.9–50)
ERYTHROCYTE [DISTWIDTH] IN BLOOD BY AUTOMATED COUNT: 46.8 FL (ref 35.9–50)
ERYTHROCYTE [DISTWIDTH] IN BLOOD BY AUTOMATED COUNT: 47.2 FL (ref 35.9–50)
ERYTHROCYTE [DISTWIDTH] IN BLOOD BY AUTOMATED COUNT: 47.3 FL (ref 35.9–50)
ERYTHROCYTE [DISTWIDTH] IN BLOOD BY AUTOMATED COUNT: 47.4 FL (ref 35.9–50)
ERYTHROCYTE [DISTWIDTH] IN BLOOD BY AUTOMATED COUNT: 48.4 FL (ref 35.9–50)
ERYTHROCYTE [DISTWIDTH] IN BLOOD BY AUTOMATED COUNT: 48.7 FL (ref 35.9–50)
ERYTHROCYTE [DISTWIDTH] IN BLOOD BY AUTOMATED COUNT: 50.4 FL (ref 35.9–50)
ERYTHROCYTE [DISTWIDTH] IN BLOOD BY AUTOMATED COUNT: 50.6 FL (ref 35.9–50)
ERYTHROCYTE [DISTWIDTH] IN BLOOD BY AUTOMATED COUNT: 51 FL (ref 35.9–50)
ERYTHROCYTE [DISTWIDTH] IN BLOOD BY AUTOMATED COUNT: 51.4 FL (ref 35.9–50)
ERYTHROCYTE [DISTWIDTH] IN BLOOD BY AUTOMATED COUNT: 51.6 FL (ref 35.9–50)
ERYTHROCYTE [DISTWIDTH] IN BLOOD BY AUTOMATED COUNT: 53.1 FL (ref 35.9–50)
ERYTHROCYTE [DISTWIDTH] IN BLOOD BY AUTOMATED COUNT: 54.8 FL (ref 35.9–50)
ERYTHROCYTE [DISTWIDTH] IN BLOOD BY AUTOMATED COUNT: 55.4 FL (ref 35.9–50)
ERYTHROCYTE [DISTWIDTH] IN BLOOD BY AUTOMATED COUNT: 55.4 FL (ref 35.9–50)
ERYTHROCYTE [DISTWIDTH] IN BLOOD BY AUTOMATED COUNT: 56.1 FL (ref 35.9–50)
ERYTHROCYTE [DISTWIDTH] IN BLOOD BY AUTOMATED COUNT: 56.8 FL (ref 35.9–50)
EST. AVERAGE GLUCOSE BLD GHB EST-MCNC: 100 MG/DL
EST. AVERAGE GLUCOSE BLD GHB EST-MCNC: 120 MG/DL
EST. AVERAGE GLUCOSE BLD GHB EST-MCNC: 126 MG/DL
EST. AVERAGE GLUCOSE BLD GHB EST-MCNC: 126 MG/DL
ETHANOL BLD-MCNC: <10.1 MG/DL (ref 0–10)
EXTERNAL QUALITY CONTROL: NORMAL
FERRITIN SERPL-MCNC: 16.3 NG/ML (ref 10–291)
FERRITIN SERPL-MCNC: 23.3 NG/ML (ref 10–291)
FIBRINOGEN PPP-MCNC: 139 MG/DL (ref 215–460)
FOLATE SERPL-MCNC: 12.3 NG/ML
FOLATE SERPL-MCNC: 18.2 NG/ML
GLOBULIN SER CALC-MCNC: 1.6 G/DL (ref 1.9–3.5)
GLOBULIN SER CALC-MCNC: 2.4 G/DL (ref 1.9–3.5)
GLOBULIN SER CALC-MCNC: 3.2 G/DL (ref 1.9–3.5)
GLOBULIN SER CALC-MCNC: 3.2 G/DL (ref 1.9–3.5)
GLOBULIN SER CALC-MCNC: 3.5 G/DL (ref 1.9–3.5)
GLOBULIN SER CALC-MCNC: 3.9 G/DL (ref 1.9–3.5)
GLOBULIN SER CALC-MCNC: 4.1 G/DL (ref 1.9–3.5)
GLOBULIN SER CALC-MCNC: 4.2 G/DL (ref 1.9–3.5)
GLOBULIN SER CALC-MCNC: 4.8 G/DL (ref 1.9–3.5)
GLOBULIN SER CALC-MCNC: 4.9 G/DL (ref 1.9–3.5)
GLOBULIN SER CALC-MCNC: 5 G/DL (ref 1.9–3.5)
GLUCOSE BLD-MCNC: 100 MG/DL (ref 65–99)
GLUCOSE BLD-MCNC: 101 MG/DL (ref 65–99)
GLUCOSE BLD-MCNC: 102 MG/DL (ref 65–99)
GLUCOSE BLD-MCNC: 108 MG/DL (ref 65–99)
GLUCOSE BLD-MCNC: 110 MG/DL (ref 65–99)
GLUCOSE BLD-MCNC: 111 MG/DL (ref 65–99)
GLUCOSE BLD-MCNC: 111 MG/DL (ref 65–99)
GLUCOSE BLD-MCNC: 113 MG/DL (ref 65–99)
GLUCOSE BLD-MCNC: 116 MG/DL (ref 65–99)
GLUCOSE BLD-MCNC: 117 MG/DL (ref 65–99)
GLUCOSE BLD-MCNC: 120 MG/DL (ref 65–99)
GLUCOSE BLD-MCNC: 121 MG/DL (ref 65–99)
GLUCOSE BLD-MCNC: 122 MG/DL (ref 65–99)
GLUCOSE BLD-MCNC: 126 MG/DL (ref 65–99)
GLUCOSE BLD-MCNC: 130 MG/DL (ref 65–99)
GLUCOSE BLD-MCNC: 130 MG/DL (ref 65–99)
GLUCOSE BLD-MCNC: 138 MG/DL (ref 65–99)
GLUCOSE BLD-MCNC: 144 MG/DL (ref 65–99)
GLUCOSE BLD-MCNC: 147 MG/DL (ref 65–99)
GLUCOSE BLD-MCNC: 150 MG/DL (ref 65–99)
GLUCOSE BLD-MCNC: 151 MG/DL (ref 65–99)
GLUCOSE BLD-MCNC: 155 MG/DL (ref 65–99)
GLUCOSE BLD-MCNC: 158 MG/DL (ref 65–99)
GLUCOSE BLD-MCNC: 158 MG/DL (ref 65–99)
GLUCOSE BLD-MCNC: 165 MG/DL (ref 65–99)
GLUCOSE BLD-MCNC: 167 MG/DL (ref 65–99)
GLUCOSE BLD-MCNC: 171 MG/DL (ref 65–99)
GLUCOSE BLD-MCNC: 191 MG/DL (ref 65–99)
GLUCOSE BLD-MCNC: 193 MG/DL (ref 65–99)
GLUCOSE BLD-MCNC: 207 MG/DL (ref 65–99)
GLUCOSE BLD-MCNC: 219 MG/DL (ref 65–99)
GLUCOSE BLD-MCNC: 221 MG/DL (ref 65–99)
GLUCOSE BLD-MCNC: 52 MG/DL (ref 65–99)
GLUCOSE BLD-MCNC: 67 MG/DL (ref 65–99)
GLUCOSE BLD-MCNC: 73 MG/DL (ref 65–99)
GLUCOSE BLD-MCNC: 78 MG/DL (ref 65–99)
GLUCOSE BLD-MCNC: 82 MG/DL (ref 65–99)
GLUCOSE BLD-MCNC: 99 MG/DL (ref 65–99)
GLUCOSE SERPL-MCNC: 101 MG/DL (ref 65–99)
GLUCOSE SERPL-MCNC: 107 MG/DL (ref 65–99)
GLUCOSE SERPL-MCNC: 109 MG/DL (ref 65–99)
GLUCOSE SERPL-MCNC: 110 MG/DL (ref 65–99)
GLUCOSE SERPL-MCNC: 119 MG/DL (ref 65–99)
GLUCOSE SERPL-MCNC: 123 MG/DL (ref 65–99)
GLUCOSE SERPL-MCNC: 125 MG/DL (ref 65–99)
GLUCOSE SERPL-MCNC: 157 MG/DL (ref 65–99)
GLUCOSE SERPL-MCNC: 159 MG/DL (ref 65–99)
GLUCOSE SERPL-MCNC: 161 MG/DL (ref 65–99)
GLUCOSE SERPL-MCNC: 166 MG/DL (ref 65–99)
GLUCOSE SERPL-MCNC: 171 MG/DL (ref 65–99)
GLUCOSE SERPL-MCNC: 209 MG/DL (ref 65–99)
GLUCOSE SERPL-MCNC: 217 MG/DL (ref 65–99)
GLUCOSE SERPL-MCNC: 222 MG/DL (ref 65–99)
GLUCOSE SERPL-MCNC: 62 MG/DL (ref 65–99)
GLUCOSE SERPL-MCNC: 91 MG/DL (ref 65–99)
GLUCOSE SERPL-MCNC: 94 MG/DL (ref 65–99)
GLUCOSE SERPL-MCNC: 95 MG/DL (ref 65–99)
GLUCOSE SERPL-MCNC: 96 MG/DL (ref 65–99)
GLUCOSE UR STRIP.AUTO-MCNC: NEGATIVE MG/DL
GRAM STN SPEC: NORMAL
HBA1C MFR BLD: 5.1 % (ref 4–5.6)
HBA1C MFR BLD: 5.8 % (ref 0–5.6)
HBA1C MFR BLD: 6 % (ref 4–5.6)
HBA1C MFR BLD: 6 % (ref 4–5.6)
HCO3 BLDA-SCNC: 11.2 MMOL/L (ref 17–25)
HCO3 BLDA-SCNC: 14.1 MMOL/L (ref 17–25)
HCO3 BLDA-SCNC: 15.1 MMOL/L (ref 17–25)
HCO3 BLDA-SCNC: 18.4 MMOL/L (ref 17–25)
HCT VFR BLD AUTO: 15.6 % (ref 37–47)
HCT VFR BLD AUTO: 16.8 % (ref 37–47)
HCT VFR BLD AUTO: 19.9 % (ref 37–47)
HCT VFR BLD AUTO: 21.4 % (ref 37–47)
HCT VFR BLD AUTO: 21.8 % (ref 37–47)
HCT VFR BLD AUTO: 22.2 % (ref 37–47)
HCT VFR BLD AUTO: 25.7 % (ref 37–47)
HCT VFR BLD AUTO: 26.8 % (ref 37–47)
HCT VFR BLD AUTO: 27.5 % (ref 37–47)
HCT VFR BLD AUTO: 27.9 % (ref 37–47)
HCT VFR BLD AUTO: 28.1 % (ref 37–47)
HCT VFR BLD AUTO: 28.7 % (ref 37–47)
HCT VFR BLD AUTO: 30.7 % (ref 37–47)
HCT VFR BLD AUTO: 31 % (ref 37–47)
HCT VFR BLD AUTO: 31.2 % (ref 37–47)
HCT VFR BLD AUTO: 31.4 % (ref 37–47)
HCT VFR BLD AUTO: 31.5 % (ref 37–47)
HCT VFR BLD AUTO: 31.7 % (ref 37–47)
HCT VFR BLD AUTO: 33.4 % (ref 37–47)
HCT VFR BLD AUTO: 33.7 % (ref 37–47)
HCT VFR BLD AUTO: 35 % (ref 37–47)
HCT VFR BLD AUTO: 35.8 % (ref 37–47)
HCT VFR BLD AUTO: 36.8 % (ref 37–47)
HCT VFR BLD AUTO: 36.8 % (ref 37–47)
HGB BLD-MCNC: 10.2 G/DL (ref 12–16)
HGB BLD-MCNC: 10.3 G/DL (ref 12–16)
HGB BLD-MCNC: 10.9 G/DL (ref 12–16)
HGB BLD-MCNC: 11.5 G/DL (ref 12–16)
HGB BLD-MCNC: 4.4 G/DL (ref 12–16)
HGB BLD-MCNC: 5.4 G/DL (ref 12–16)
HGB BLD-MCNC: 6.3 G/DL (ref 12–16)
HGB BLD-MCNC: 7 G/DL (ref 12–16)
HGB BLD-MCNC: 7.1 G/DL (ref 12–16)
HGB BLD-MCNC: 7.2 G/DL (ref 12–16)
HGB BLD-MCNC: 8.6 G/DL (ref 12–16)
HGB BLD-MCNC: 8.9 G/DL (ref 12–16)
HGB BLD-MCNC: 9 G/DL (ref 12–16)
HGB BLD-MCNC: 9.1 G/DL (ref 12–16)
HGB BLD-MCNC: 9.2 G/DL (ref 12–16)
HGB BLD-MCNC: 9.3 G/DL (ref 12–16)
HGB BLD-MCNC: 9.3 G/DL (ref 12–16)
HGB BLD-MCNC: 9.5 G/DL (ref 12–16)
HGB BLD-MCNC: 9.6 G/DL (ref 12–16)
HGB BLD-MCNC: 9.6 G/DL (ref 12–16)
HGB BLD-MCNC: 9.7 G/DL (ref 12–16)
HGB BLD-MCNC: 9.8 G/DL (ref 12–16)
HISTIOCYTES NFR FLD: 69 %
HISTIOCYTES NFR FLD: 7 %
HIV 1+2 AB+HIV1 P24 AG SERPL QL IA: NORMAL
HIV 1+2 AB+HIV1 P24 AG SERPL QL IA: NORMAL
HOROWITZ INDEX BLDA+IHG-RTO: 113 MM[HG]
HOROWITZ INDEX BLDA+IHG-RTO: 337 MM[HG]
HOROWITZ INDEX BLDA+IHG-RTO: 408 MM[HG]
HOROWITZ INDEX BLDA+IHG-RTO: 483 MM[HG]
HYALINE CASTS #/AREA URNS LPF: ABNORMAL /LPF
HYALINE CASTS #/AREA URNS LPF: ABNORMAL /LPF
HYALINE CASTS #/AREA URNS LPF: NORMAL /LPF
HYALINE CASTS #/AREA URNS LPF: NORMAL /LPF
HYPOCHROMIA BLD QL SMEAR: ABNORMAL
HYPOCHROMIA BLD QL SMEAR: ABNORMAL
IMM GRANULOCYTES # BLD AUTO: 0 K/UL (ref 0–0.11)
IMM GRANULOCYTES # BLD AUTO: 0.01 K/UL (ref 0–0.11)
IMM GRANULOCYTES # BLD AUTO: 0.02 K/UL (ref 0–0.11)
IMM GRANULOCYTES # BLD AUTO: 0.13 K/UL (ref 0–0.11)
IMM GRANULOCYTES # BLD AUTO: 0.22 K/UL (ref 0–0.11)
IMM GRANULOCYTES NFR BLD AUTO: 0 % (ref 0–0.9)
IMM GRANULOCYTES NFR BLD AUTO: 0.3 % (ref 0–0.9)
IMM GRANULOCYTES NFR BLD AUTO: 0.4 % (ref 0–0.9)
IMM GRANULOCYTES NFR BLD AUTO: 0.5 % (ref 0–0.9)
IMM GRANULOCYTES NFR BLD AUTO: 0.5 % (ref 0–0.9)
IMM GRANULOCYTES NFR BLD AUTO: 0.6 % (ref 0–0.9)
IMM GRANULOCYTES NFR BLD AUTO: 0.9 % (ref 0–0.9)
IMM GRANULOCYTES NFR BLD AUTO: 1.5 % (ref 0–0.9)
INR PPP: 1.27 (ref 0.87–1.13)
INR PPP: 1.33 (ref 0.87–1.13)
INR PPP: 1.44 (ref 0.87–1.13)
INR PPP: 2.18 (ref 0.87–1.13)
IRON SATN MFR SERPL: 20 % (ref 15–55)
IRON SATN MFR SERPL: 9 % (ref 15–55)
IRON SERPL-MCNC: 37 UG/DL (ref 40–170)
IRON SERPL-MCNC: 84 UG/DL (ref 40–170)
KETONES UR STRIP.AUTO-MCNC: ABNORMAL MG/DL
KETONES UR STRIP.AUTO-MCNC: ABNORMAL MG/DL
KETONES UR STRIP.AUTO-MCNC: NEGATIVE MG/DL
KETONES UR STRIP.AUTO-MCNC: NEGATIVE MG/DL
LACTATE BLD-SCNC: 1.8 MMOL/L (ref 0.5–2)
LEUKOCYTE ESTERASE UR QL STRIP.AUTO: ABNORMAL
LEUKOCYTE ESTERASE UR QL STRIP.AUTO: ABNORMAL
LEUKOCYTE ESTERASE UR QL STRIP.AUTO: NEGATIVE
LEUKOCYTE ESTERASE UR QL STRIP.AUTO: NEGATIVE
LIPASE SERPL-CCNC: 176 U/L (ref 11–82)
LIPASE SERPL-CCNC: 24 U/L (ref 11–82)
LIPASE SERPL-CCNC: 28 U/L (ref 11–82)
LIPASE SERPL-CCNC: 43 U/L (ref 11–82)
LYMPHOCYTES # BLD AUTO: 0.32 K/UL (ref 1–4.8)
LYMPHOCYTES # BLD AUTO: 0.37 K/UL (ref 1–4.8)
LYMPHOCYTES # BLD AUTO: 0.42 K/UL (ref 1–4.8)
LYMPHOCYTES # BLD AUTO: 0.44 K/UL (ref 1–4.8)
LYMPHOCYTES # BLD AUTO: 0.48 K/UL (ref 1–4.8)
LYMPHOCYTES # BLD AUTO: 0.54 K/UL (ref 1–4.8)
LYMPHOCYTES # BLD AUTO: 0.57 K/UL (ref 1–4.8)
LYMPHOCYTES # BLD AUTO: 0.63 K/UL (ref 1–4.8)
LYMPHOCYTES # BLD AUTO: 0.69 K/UL (ref 1–4.8)
LYMPHOCYTES # BLD AUTO: 1.34 K/UL (ref 1–4.8)
LYMPHOCYTES # BLD AUTO: 1.89 K/UL (ref 1–4.8)
LYMPHOCYTES # BLD AUTO: 2.47 K/UL (ref 1–4.8)
LYMPHOCYTES NFR BLD: 11.7 % (ref 22–41)
LYMPHOCYTES NFR BLD: 12.3 % (ref 22–41)
LYMPHOCYTES NFR BLD: 12.7 % (ref 22–41)
LYMPHOCYTES NFR BLD: 16.1 % (ref 22–41)
LYMPHOCYTES NFR BLD: 18.7 % (ref 22–41)
LYMPHOCYTES NFR BLD: 20 % (ref 22–41)
LYMPHOCYTES NFR BLD: 20.6 % (ref 22–41)
LYMPHOCYTES NFR BLD: 22.3 % (ref 22–41)
LYMPHOCYTES NFR BLD: 24.4 % (ref 22–41)
LYMPHOCYTES NFR BLD: 9 % (ref 22–41)
LYMPHOCYTES NFR BLD: 9.5 % (ref 22–41)
LYMPHOCYTES NFR BLD: 9.6 % (ref 22–41)
LYMPHOCYTES NFR FLD: 27 %
LYMPHOCYTES NFR FLD: 7 %
MACROCYTES BLD QL SMEAR: ABNORMAL
MAGNESIUM SERPL-MCNC: 1.7 MG/DL (ref 1.5–2.5)
MAGNESIUM SERPL-MCNC: 1.7 MG/DL (ref 1.5–2.5)
MAGNESIUM SERPL-MCNC: 1.8 MG/DL (ref 1.5–2.5)
MAGNESIUM SERPL-MCNC: 1.9 MG/DL (ref 1.5–2.5)
MAGNESIUM SERPL-MCNC: 2.1 MG/DL (ref 1.5–2.5)
MAGNESIUM SERPL-MCNC: 2.2 MG/DL (ref 1.5–2.5)
MANUAL DIFF BLD: NORMAL
MCF BLD TEG: 51.1 MM (ref 52–69)
MCF BLD TEG: 55.7 MM (ref 52–69)
MCF BLD TEG: 57.8 MM (ref 52–69)
MCF.PLATELET INHIB BLD ROTEM: 13.1 MM (ref 15–32)
MCF.PLATELET INHIB BLD ROTEM: 13.3 MM (ref 15–32)
MCF.PLATELET INHIB BLD ROTEM: 16.4 MM (ref 15–32)
MCH RBC QN AUTO: 22.6 PG (ref 27–33)
MCH RBC QN AUTO: 22.8 PG (ref 27–33)
MCH RBC QN AUTO: 22.8 PG (ref 27–33)
MCH RBC QN AUTO: 23 PG (ref 27–33)
MCH RBC QN AUTO: 23.9 PG (ref 27–33)
MCH RBC QN AUTO: 24.1 PG (ref 27–33)
MCH RBC QN AUTO: 24.2 PG (ref 27–33)
MCH RBC QN AUTO: 24.2 PG (ref 27–33)
MCH RBC QN AUTO: 24.5 PG (ref 27–33)
MCH RBC QN AUTO: 24.6 PG (ref 27–33)
MCH RBC QN AUTO: 24.7 PG (ref 27–33)
MCH RBC QN AUTO: 24.7 PG (ref 27–33)
MCH RBC QN AUTO: 24.8 PG (ref 27–33)
MCH RBC QN AUTO: 24.9 PG (ref 27–33)
MCH RBC QN AUTO: 27.6 PG (ref 27–33)
MCH RBC QN AUTO: 27.6 PG (ref 27–33)
MCH RBC QN AUTO: 27.7 PG (ref 27–33)
MCH RBC QN AUTO: 27.8 PG (ref 27–33)
MCH RBC QN AUTO: 27.9 PG (ref 27–33)
MCH RBC QN AUTO: 28.1 PG (ref 27–33)
MCH RBC QN AUTO: 28.4 PG (ref 27–33)
MCH RBC QN AUTO: 28.6 PG (ref 27–33)
MCH RBC QN AUTO: 28.8 PG (ref 27–33)
MCH RBC QN AUTO: 28.8 PG (ref 27–33)
MCHC RBC AUTO-ENTMCNC: 28.2 G/DL (ref 33.6–35)
MCHC RBC AUTO-ENTMCNC: 29.4 G/DL (ref 33.6–35)
MCHC RBC AUTO-ENTMCNC: 29.6 G/DL (ref 33.6–35)
MCHC RBC AUTO-ENTMCNC: 30.3 G/DL (ref 33.6–35)
MCHC RBC AUTO-ENTMCNC: 30.4 G/DL (ref 33.6–35)
MCHC RBC AUTO-ENTMCNC: 30.8 G/DL (ref 33.6–35)
MCHC RBC AUTO-ENTMCNC: 30.8 G/DL (ref 33.6–35)
MCHC RBC AUTO-ENTMCNC: 31 G/DL (ref 33.6–35)
MCHC RBC AUTO-ENTMCNC: 31.1 G/DL (ref 33.6–35)
MCHC RBC AUTO-ENTMCNC: 31.3 G/DL (ref 33.6–35)
MCHC RBC AUTO-ENTMCNC: 31.4 G/DL (ref 33.6–35)
MCHC RBC AUTO-ENTMCNC: 31.5 G/DL (ref 33.6–35)
MCHC RBC AUTO-ENTMCNC: 31.7 G/DL (ref 33.6–35)
MCHC RBC AUTO-ENTMCNC: 32.1 G/DL (ref 33.6–35)
MCHC RBC AUTO-ENTMCNC: 33 G/DL (ref 33.6–35)
MCHC RBC AUTO-ENTMCNC: 33.2 G/DL (ref 33.6–35)
MCHC RBC AUTO-ENTMCNC: 33.3 G/DL (ref 33.6–35)
MCHC RBC AUTO-ENTMCNC: 33.5 G/DL (ref 33.6–35)
MCHC RBC AUTO-ENTMCNC: 33.5 G/DL (ref 33.6–35)
MCHC RBC AUTO-ENTMCNC: 33.6 G/DL (ref 33.6–35)
MCHC RBC AUTO-ENTMCNC: 34.2 G/DL (ref 33.6–35)
MCV RBC AUTO: 74.7 FL (ref 81.4–97.8)
MCV RBC AUTO: 75.4 FL (ref 81.4–97.8)
MCV RBC AUTO: 76.2 FL (ref 81.4–97.8)
MCV RBC AUTO: 76.6 FL (ref 81.4–97.8)
MCV RBC AUTO: 77.3 FL (ref 81.4–97.8)
MCV RBC AUTO: 78.2 FL (ref 81.4–97.8)
MCV RBC AUTO: 79.1 FL (ref 81.4–97.8)
MCV RBC AUTO: 79.4 FL (ref 81.4–97.8)
MCV RBC AUTO: 79.7 FL (ref 81.4–97.8)
MCV RBC AUTO: 80.4 FL (ref 81.4–97.8)
MCV RBC AUTO: 80.8 FL (ref 81.4–97.8)
MCV RBC AUTO: 81.4 FL (ref 81.4–97.8)
MCV RBC AUTO: 81.6 FL (ref 81.4–97.8)
MCV RBC AUTO: 83 FL (ref 81.4–97.8)
MCV RBC AUTO: 83.1 FL (ref 81.4–97.8)
MCV RBC AUTO: 84.4 FL (ref 81.4–97.8)
MCV RBC AUTO: 84.8 FL (ref 81.4–97.8)
MCV RBC AUTO: 84.9 FL (ref 81.4–97.8)
MCV RBC AUTO: 85.2 FL (ref 81.4–97.8)
MCV RBC AUTO: 85.7 FL (ref 81.4–97.8)
MCV RBC AUTO: 85.9 FL (ref 81.4–97.8)
MCV RBC AUTO: 86.3 FL (ref 81.4–97.8)
MCV RBC AUTO: 87.3 FL (ref 81.4–97.8)
MCV RBC AUTO: 88.4 FL (ref 81.4–97.8)
MESOTHL CELL NFR FLD: 17 %
MESOTHL CELL NFR FLD: 3 %
MICRO URNS: ABNORMAL
MICROCYTES BLD QL SMEAR: ABNORMAL
MICROCYTES BLD QL SMEAR: ABNORMAL
MODE IMODE: ABNORMAL
MONOCYTES # BLD AUTO: 0.15 K/UL (ref 0–0.85)
MONOCYTES # BLD AUTO: 0.15 K/UL (ref 0–0.85)
MONOCYTES # BLD AUTO: 0.25 K/UL (ref 0–0.85)
MONOCYTES # BLD AUTO: 0.25 K/UL (ref 0–0.85)
MONOCYTES # BLD AUTO: 0.27 K/UL (ref 0–0.85)
MONOCYTES # BLD AUTO: 0.29 K/UL (ref 0–0.85)
MONOCYTES # BLD AUTO: 0.3 K/UL (ref 0–0.85)
MONOCYTES # BLD AUTO: 0.3 K/UL (ref 0–0.85)
MONOCYTES # BLD AUTO: 0.4 K/UL (ref 0–0.85)
MONOCYTES # BLD AUTO: 0.63 K/UL (ref 0–0.85)
MONOCYTES # BLD AUTO: 0.67 K/UL (ref 0–0.85)
MONOCYTES # BLD AUTO: 1.83 K/UL (ref 0–0.85)
MONOCYTES NFR BLD AUTO: 11.1 % (ref 0–13.4)
MONOCYTES NFR BLD AUTO: 11.3 % (ref 0–13.4)
MONOCYTES NFR BLD AUTO: 12.2 % (ref 0–13.4)
MONOCYTES NFR BLD AUTO: 2.6 % (ref 0–13.4)
MONOCYTES NFR BLD AUTO: 4.2 % (ref 0–13.4)
MONOCYTES NFR BLD AUTO: 7.4 % (ref 0–13.4)
MONOCYTES NFR BLD AUTO: 7.4 % (ref 0–13.4)
MONOCYTES NFR BLD AUTO: 7.6 % (ref 0–13.4)
MONOCYTES NFR BLD AUTO: 7.7 % (ref 0–13.4)
MONOCYTES NFR BLD AUTO: 8.1 % (ref 0–13.4)
MONOCYTES NFR BLD AUTO: 8.8 % (ref 0–13.4)
MONOCYTES NFR BLD AUTO: 9 % (ref 0–13.4)
MONONUC CELLS NFR FLD: 10 %
MONONUC CELLS NFR FLD: 44 %
MORPHOLOGY BLD-IMP: NORMAL
N GONORRHOEA DNA SPEC QL NAA+PROBE: NEGATIVE
NEUTROPHILS # BLD AUTO: 1.25 K/UL (ref 2–7.15)
NEUTROPHILS # BLD AUTO: 1.4 K/UL (ref 2–7.15)
NEUTROPHILS # BLD AUTO: 1.79 K/UL (ref 2–7.15)
NEUTROPHILS # BLD AUTO: 1.87 K/UL (ref 2–7.15)
NEUTROPHILS # BLD AUTO: 11.24 K/UL (ref 2–7.15)
NEUTROPHILS # BLD AUTO: 11.93 K/UL (ref 2–7.15)
NEUTROPHILS # BLD AUTO: 2.25 K/UL (ref 2–7.15)
NEUTROPHILS # BLD AUTO: 2.43 K/UL (ref 2–7.15)
NEUTROPHILS # BLD AUTO: 2.59 K/UL (ref 2–7.15)
NEUTROPHILS # BLD AUTO: 2.65 K/UL (ref 2–7.15)
NEUTROPHILS # BLD AUTO: 2.91 K/UL (ref 2–7.15)
NEUTROPHILS # BLD AUTO: 21.87 K/UL (ref 2–7.15)
NEUTROPHILS NFR BLD: 63.5 % (ref 44–72)
NEUTROPHILS NFR BLD: 66 % (ref 44–72)
NEUTROPHILS NFR BLD: 66.3 % (ref 44–72)
NEUTROPHILS NFR BLD: 68.4 % (ref 44–72)
NEUTROPHILS NFR BLD: 68.5 % (ref 44–72)
NEUTROPHILS NFR BLD: 70.2 % (ref 44–72)
NEUTROPHILS NFR BLD: 75.1 % (ref 44–72)
NEUTROPHILS NFR BLD: 75.2 % (ref 44–72)
NEUTROPHILS NFR BLD: 77.3 % (ref 44–72)
NEUTROPHILS NFR BLD: 78.9 % (ref 44–72)
NEUTROPHILS NFR BLD: 80.5 % (ref 44–72)
NEUTROPHILS NFR BLD: 85.1 % (ref 44–72)
NEUTROPHILS NFR FLD: 10 %
NEUTROPHILS NFR FLD: 5 %
NITRITE UR QL STRIP.AUTO: NEGATIVE
NRBC # BLD AUTO: 0 K/UL
NRBC # BLD AUTO: 0.03 K/UL
NRBC # BLD AUTO: 0.17 K/UL
NRBC # BLD AUTO: 0.45 K/UL
NRBC BLD-RTO: 0 /100 WBC
NRBC BLD-RTO: 0.2 /100 WBC
NRBC BLD-RTO: 1.1 /100 WBC
NRBC BLD-RTO: 1.8 /100 WBC
NT-PROBNP SERPL IA-MCNC: 894 PG/ML (ref 0–125)
O2/TOTAL GAS SETTING VFR VENT: 30 %
O2/TOTAL GAS SETTING VFR VENT: 30 %
O2/TOTAL GAS SETTING VFR VENT: 40 %
O2/TOTAL GAS SETTING VFR VENT: 40 %
OVALOCYTES BLD QL SMEAR: NORMAL
PA AA BLD-ACNC: 29.2 % (ref 0–11)
PA AA BLD-ACNC: 30.4 % (ref 0–11)
PA AA BLD-ACNC: 34.4 % (ref 0–11)
PA ADP BLD-ACNC: 66.2 % (ref 0–17)
PA ADP BLD-ACNC: 68.4 % (ref 0–17)
PA ADP BLD-ACNC: 76 % (ref 0–17)
PATHOLOGY CONSULT NOTE: NORMAL
PCO2 BLDA: 23.7 MMHG (ref 26–37)
PCO2 BLDA: 27.8 MMHG (ref 26–37)
PCO2 BLDA: 31.1 MMHG (ref 26–37)
PCO2 BLDA: 56.5 MMHG (ref 26–37)
PCO2 TEMP ADJ BLDA: 23.5 MMHG (ref 26–37)
PCO2 TEMP ADJ BLDA: 26.8 MMHG (ref 26–37)
PCO2 TEMP ADJ BLDA: 30.1 MMHG (ref 26–37)
PCO2 TEMP ADJ BLDA: 56.5 MMHG (ref 26–37)
PEEP END EXPIRATORY PRESSURE IPEEP: 8 CMH20
PERCENT MINUTE VOLUME IPMV: 120
PH BLDA: 7.12 [PH] (ref 7.4–7.5)
PH BLDA: 7.29 [PH] (ref 7.4–7.5)
PH BLDA: 7.29 [PH] (ref 7.4–7.5)
PH BLDA: 7.31 [PH] (ref 7.4–7.5)
PH TEMP ADJ BLDA: 7.12 [PH] (ref 7.4–7.5)
PH TEMP ADJ BLDA: 7.29 [PH] (ref 7.4–7.5)
PH TEMP ADJ BLDA: 7.3 [PH] (ref 7.4–7.5)
PH TEMP ADJ BLDA: 7.33 [PH] (ref 7.4–7.5)
PH UR STRIP.AUTO: 5.5 [PH] (ref 5–8)
PH UR STRIP.AUTO: 5.5 [PH] (ref 5–8)
PH UR STRIP.AUTO: 7 [PH] (ref 5–8)
PH UR STRIP.AUTO: 7 [PH] (ref 5–8)
PHOSPHATE SERPL-MCNC: 2.9 MG/DL (ref 2.5–4.5)
PHOSPHATE SERPL-MCNC: 2.9 MG/DL (ref 2.5–4.5)
PHOSPHATE SERPL-MCNC: 4.2 MG/DL (ref 2.5–4.5)
PHOSPHATE SERPL-MCNC: 5 MG/DL (ref 2.5–4.5)
PHOSPHATE SERPL-MCNC: 6.6 MG/DL (ref 2.5–4.5)
PLATELET # BLD AUTO: 100 K/UL (ref 164–446)
PLATELET # BLD AUTO: 102 K/UL (ref 164–446)
PLATELET # BLD AUTO: 106 K/UL (ref 164–446)
PLATELET # BLD AUTO: 120 K/UL (ref 164–446)
PLATELET # BLD AUTO: 138 K/UL (ref 164–446)
PLATELET # BLD AUTO: 142 K/UL (ref 164–446)
PLATELET # BLD AUTO: 145 K/UL (ref 164–446)
PLATELET # BLD AUTO: 182 K/UL (ref 164–446)
PLATELET # BLD AUTO: 53 K/UL (ref 164–446)
PLATELET # BLD AUTO: 62 K/UL (ref 164–446)
PLATELET # BLD AUTO: 62 K/UL (ref 164–446)
PLATELET # BLD AUTO: 66 K/UL (ref 164–446)
PLATELET # BLD AUTO: 68 K/UL (ref 164–446)
PLATELET # BLD AUTO: 68 K/UL (ref 164–446)
PLATELET # BLD AUTO: 72 K/UL (ref 164–446)
PLATELET # BLD AUTO: 73 K/UL (ref 164–446)
PLATELET # BLD AUTO: 74 K/UL (ref 164–446)
PLATELET # BLD AUTO: 75 K/UL (ref 164–446)
PLATELET # BLD AUTO: 76 K/UL (ref 164–446)
PLATELET # BLD AUTO: 78 K/UL (ref 164–446)
PLATELET # BLD AUTO: 81 K/UL (ref 164–446)
PLATELET # BLD AUTO: 84 K/UL (ref 164–446)
PLATELET # BLD AUTO: 91 K/UL (ref 164–446)
PLATELET # BLD AUTO: 92 K/UL (ref 164–446)
PLATELET BLD QL SMEAR: NORMAL
PMV BLD AUTO: 10.3 FL (ref 9–12.9)
PMV BLD AUTO: 10.9 FL (ref 9–12.9)
PMV BLD AUTO: 11.2 FL (ref 9–12.9)
PMV BLD AUTO: 11.3 FL (ref 9–12.9)
PMV BLD AUTO: 11.4 FL (ref 9–12.9)
PMV BLD AUTO: 11.6 FL (ref 9–12.9)
PMV BLD AUTO: 11.7 FL (ref 9–12.9)
PMV BLD AUTO: 12.1 FL (ref 9–12.9)
PMV BLD AUTO: 12.1 FL (ref 9–12.9)
PMV BLD AUTO: 12.4 FL (ref 9–12.9)
PMV BLD AUTO: 12.6 FL (ref 9–12.9)
PMV BLD AUTO: 12.7 FL (ref 9–12.9)
PMV BLD AUTO: 12.9 FL (ref 9–12.9)
PO2 BLDA: 101 MMHG (ref 64–87)
PO2 BLDA: 163 MMHG (ref 64–87)
PO2 BLDA: 193 MMHG (ref 64–87)
PO2 BLDA: 34 MMHG (ref 64–87)
PO2 TEMP ADJ BLDA: 100 MMHG (ref 64–87)
PO2 TEMP ADJ BLDA: 159 MMHG (ref 64–87)
PO2 TEMP ADJ BLDA: 188 MMHG (ref 64–87)
PO2 TEMP ADJ BLDA: 34 MMHG (ref 64–87)
POIKILOCYTOSIS BLD QL SMEAR: NORMAL
POTASSIUM SERPL-SCNC: 3.4 MMOL/L (ref 3.6–5.5)
POTASSIUM SERPL-SCNC: 3.8 MMOL/L (ref 3.6–5.5)
POTASSIUM SERPL-SCNC: 3.8 MMOL/L (ref 3.6–5.5)
POTASSIUM SERPL-SCNC: 4.1 MMOL/L (ref 3.6–5.5)
POTASSIUM SERPL-SCNC: 4.1 MMOL/L (ref 3.6–5.5)
POTASSIUM SERPL-SCNC: 4.2 MMOL/L (ref 3.6–5.5)
POTASSIUM SERPL-SCNC: 4.2 MMOL/L (ref 3.6–5.5)
POTASSIUM SERPL-SCNC: 4.3 MMOL/L (ref 3.6–5.5)
POTASSIUM SERPL-SCNC: 4.3 MMOL/L (ref 3.6–5.5)
POTASSIUM SERPL-SCNC: 4.4 MMOL/L (ref 3.6–5.5)
POTASSIUM SERPL-SCNC: 4.4 MMOL/L (ref 3.6–5.5)
POTASSIUM SERPL-SCNC: 4.5 MMOL/L (ref 3.6–5.5)
POTASSIUM SERPL-SCNC: 4.5 MMOL/L (ref 3.6–5.5)
POTASSIUM SERPL-SCNC: 4.6 MMOL/L (ref 3.6–5.5)
POTASSIUM SERPL-SCNC: 4.9 MMOL/L (ref 3.6–5.5)
POTASSIUM SERPL-SCNC: 5.1 MMOL/L (ref 3.6–5.5)
POTASSIUM SERPL-SCNC: 5.2 MMOL/L (ref 3.6–5.5)
POTASSIUM SERPL-SCNC: 5.6 MMOL/L (ref 3.6–5.5)
PRODUCT TYPE UPROD: NORMAL
PROT FLD-MCNC: 1 G/DL
PROT FLD-MCNC: 1.4 G/DL
PROT SERPL-MCNC: 3.5 G/DL (ref 6–8.2)
PROT SERPL-MCNC: 4.9 G/DL (ref 6–8.2)
PROT SERPL-MCNC: 7 G/DL (ref 6–8.2)
PROT SERPL-MCNC: 7.1 G/DL (ref 6–8.2)
PROT SERPL-MCNC: 7.1 G/DL (ref 6–8.2)
PROT SERPL-MCNC: 7.3 G/DL (ref 6–8.2)
PROT SERPL-MCNC: 7.4 G/DL (ref 6–8.2)
PROT SERPL-MCNC: 7.6 G/DL (ref 6–8.2)
PROT SERPL-MCNC: 7.6 G/DL (ref 6–8.2)
PROT SERPL-MCNC: 7.9 G/DL (ref 6–8.2)
PROT UR QL STRIP: 30 MG/DL
PROT UR QL STRIP: NEGATIVE MG/DL
PROTHROMBIN TIME: 16.3 SEC (ref 12–14.6)
PROTHROMBIN TIME: 16.9 SEC (ref 12–14.6)
PROTHROMBIN TIME: 17.1 SEC (ref 12–14.6)
PROTHROMBIN TIME: 23.6 SEC (ref 12–14.6)
RBC # BLD AUTO: 1.93 M/UL (ref 4.2–5.4)
RBC # BLD AUTO: 1.96 M/UL (ref 4.2–5.4)
RBC # BLD AUTO: 2.28 M/UL (ref 4.2–5.4)
RBC # BLD AUTO: 2.48 M/UL (ref 4.2–5.4)
RBC # BLD AUTO: 2.51 M/UL (ref 4.2–5.4)
RBC # BLD AUTO: 2.56 M/UL (ref 4.2–5.4)
RBC # BLD AUTO: 3.03 M/UL (ref 4.2–5.4)
RBC # BLD AUTO: 3.12 M/UL (ref 4.2–5.4)
RBC # BLD AUTO: 3.31 M/UL (ref 4.2–5.4)
RBC # BLD AUTO: 3.33 M/UL (ref 4.2–5.4)
RBC # BLD AUTO: 3.36 M/UL (ref 4.2–5.4)
RBC # BLD AUTO: 3.63 M/UL (ref 4.2–5.4)
RBC # BLD AUTO: 3.65 M/UL (ref 4.2–5.4)
RBC # BLD AUTO: 3.85 M/UL (ref 4.2–5.4)
RBC # BLD AUTO: 3.85 M/UL (ref 4.2–5.4)
RBC # BLD AUTO: 3.92 M/UL (ref 4.2–5.4)
RBC # BLD AUTO: 3.99 M/UL (ref 4.2–5.4)
RBC # BLD AUTO: 3.99 M/UL (ref 4.2–5.4)
RBC # BLD AUTO: 4.4 M/UL (ref 4.2–5.4)
RBC # BLD AUTO: 4.47 M/UL (ref 4.2–5.4)
RBC # BLD AUTO: 4.47 M/UL (ref 4.2–5.4)
RBC # BLD AUTO: 4.57 M/UL (ref 4.2–5.4)
RBC # BLD AUTO: 4.76 M/UL (ref 4.2–5.4)
RBC # BLD AUTO: 4.83 M/UL (ref 4.2–5.4)
RBC # FLD: <2000 CELLS/UL
RBC # FLD: <2000 CELLS/UL
RBC # URNS HPF: ABNORMAL /HPF
RBC # URNS HPF: ABNORMAL /HPF
RBC # URNS HPF: NORMAL /HPF
RBC # URNS HPF: NORMAL /HPF
RBC BLD AUTO: PRESENT
RBC UR QL AUTO: ABNORMAL
RBC UR QL AUTO: NEGATIVE
RH BLD: NORMAL
RPR SER QL: REACTIVE
RPR SER-TITR: NORMAL {TITER}
SAO2 % BLDA: 100 % (ref 93–99)
SAO2 % BLDA: 47 % (ref 93–99)
SAO2 % BLDA: 97 % (ref 93–99)
SAO2 % BLDA: 99 % (ref 93–99)
SARS-COV+SARS-COV-2 AG RESP QL IA.RAPID: NEGATIVE
SARS-COV-2 RNA RESP QL NAA+PROBE: NOTDETECTED
SIGNIFICANT IND 70042: NORMAL
SITE SITE: NORMAL
SODIUM SERPL-SCNC: 134 MMOL/L (ref 135–145)
SODIUM SERPL-SCNC: 135 MMOL/L (ref 135–145)
SODIUM SERPL-SCNC: 136 MMOL/L (ref 135–145)
SODIUM SERPL-SCNC: 136 MMOL/L (ref 135–145)
SODIUM SERPL-SCNC: 137 MMOL/L (ref 135–145)
SODIUM SERPL-SCNC: 137 MMOL/L (ref 135–145)
SODIUM SERPL-SCNC: 138 MMOL/L (ref 135–145)
SODIUM SERPL-SCNC: 139 MMOL/L (ref 135–145)
SODIUM SERPL-SCNC: 140 MMOL/L (ref 135–145)
SODIUM SERPL-SCNC: 141 MMOL/L (ref 135–145)
SODIUM SERPL-SCNC: 142 MMOL/L (ref 135–145)
SODIUM SERPL-SCNC: 143 MMOL/L (ref 135–145)
SODIUM SERPL-SCNC: 144 MMOL/L (ref 135–145)
SOURCE SOURCE: NORMAL
SP GR UR STRIP.AUTO: 1.01
SP GR UR STRIP.AUTO: 1.02
SP GR UR STRIP.AUTO: 1.02
SP GR UR STRIP.AUTO: <=1.005
SPECIMEN DRAWN FROM PATIENT: ABNORMAL
SPECIMEN SOURCE: NORMAL
TEG ALGORITHM TGALG: ABNORMAL
TIBC SERPL-MCNC: 396 UG/DL (ref 250–450)
TIBC SERPL-MCNC: 430 UG/DL (ref 250–450)
TIDAL VOLUME IVT: 350 ML
TREPONEMA PALLIDUM IGG+IGM AB [PRESENCE] IN SERUM OR PLASMA BY IMMUNOASSAY: REACTIVE
TROPONIN T SERPL-MCNC: 12 NG/L (ref 6–19)
TROPONIN T SERPL-MCNC: 13 NG/L (ref 6–19)
TROPONIN T SERPL-MCNC: 28 NG/L (ref 6–19)
TROPONIN T SERPL-MCNC: 8 NG/L (ref 6–19)
TSH SERPL DL<=0.005 MIU/L-ACNC: 1.7 UIU/ML (ref 0.38–5.33)
UIBC SERPL-MCNC: 346 UG/DL (ref 110–370)
UIBC SERPL-MCNC: 359 UG/DL (ref 110–370)
UNIT STATUS USTAT: NORMAL
UROBILINOGEN UR STRIP.AUTO-MCNC: 0.2 MG/DL
UROBILINOGEN UR STRIP.AUTO-MCNC: 0.2 MG/DL
UROBILINOGEN UR STRIP.AUTO-MCNC: 2 MG/DL
UROBILINOGEN UR STRIP.AUTO-MCNC: 4 MG/DL
VIT B12 SERPL-MCNC: 698 PG/ML (ref 211–911)
VIT B12 SERPL-MCNC: 809 PG/ML (ref 211–911)
WBC # BLD AUTO: 14.3 K/UL (ref 4.8–10.8)
WBC # BLD AUTO: 14.6 K/UL (ref 4.8–10.8)
WBC # BLD AUTO: 14.8 K/UL (ref 4.8–10.8)
WBC # BLD AUTO: 14.9 K/UL (ref 4.8–10.8)
WBC # BLD AUTO: 15.6 K/UL (ref 4.8–10.8)
WBC # BLD AUTO: 15.7 K/UL (ref 4.8–10.8)
WBC # BLD AUTO: 16.2 K/UL (ref 4.8–10.8)
WBC # BLD AUTO: 17.7 K/UL (ref 4.8–10.8)
WBC # BLD AUTO: 2 K/UL (ref 4.8–10.8)
WBC # BLD AUTO: 2.1 K/UL (ref 4.8–10.8)
WBC # BLD AUTO: 2.5 K/UL (ref 4.8–10.8)
WBC # BLD AUTO: 2.7 K/UL (ref 4.8–10.8)
WBC # BLD AUTO: 2.8 K/UL (ref 4.8–10.8)
WBC # BLD AUTO: 21.5 K/UL (ref 4.8–10.8)
WBC # BLD AUTO: 25.7 K/UL (ref 4.8–10.8)
WBC # BLD AUTO: 26.2 K/UL (ref 4.8–10.8)
WBC # BLD AUTO: 3 K/UL (ref 4.8–10.8)
WBC # BLD AUTO: 3.3 K/UL (ref 4.8–10.8)
WBC # BLD AUTO: 3.4 K/UL (ref 4.8–10.8)
WBC # BLD AUTO: 3.4 K/UL (ref 4.8–10.8)
WBC # BLD AUTO: 3.6 K/UL (ref 4.8–10.8)
WBC # BLD AUTO: 3.6 K/UL (ref 4.8–10.8)
WBC # BLD AUTO: 3.7 K/UL (ref 4.8–10.8)
WBC # BLD AUTO: 3.8 K/UL (ref 4.8–10.8)
WBC # FLD: 38 CELLS/UL
WBC # FLD: 94 CELLS/UL
WBC #/AREA URNS HPF: ABNORMAL /HPF
WBC #/AREA URNS HPF: ABNORMAL /HPF
WBC #/AREA URNS HPF: NORMAL /HPF
WBC #/AREA URNS HPF: NORMAL /HPF
ZINC SERPL-MCNC: 74.5 UG/DL (ref 60–120)

## 2021-01-01 PROCEDURE — 82962 GLUCOSE BLOOD TEST: CPT

## 2021-01-01 PROCEDURE — 85576 BLOOD PLATELET AGGREGATION: CPT | Mod: 91

## 2021-01-01 PROCEDURE — 160002 HCHG RECOVERY MINUTES (STAT): Performed by: INTERNAL MEDICINE

## 2021-01-01 PROCEDURE — 80053 COMPREHEN METABOLIC PANEL: CPT

## 2021-01-01 PROCEDURE — U0003 INFECTIOUS AGENT DETECTION BY NUCLEIC ACID (DNA OR RNA); SEVERE ACUTE RESPIRATORY SYNDROME CORONAVIRUS 2 (SARS-COV-2) (CORONAVIRUS DISEASE [COVID-19]), AMPLIFIED PROBE TECHNIQUE, MAKING USE OF HIGH THROUGHPUT TECHNOLOGIES AS DESCRIBED BY CMS-2020-01-R: HCPCS

## 2021-01-01 PROCEDURE — A9270 NON-COVERED ITEM OR SERVICE: HCPCS | Performed by: INTERNAL MEDICINE

## 2021-01-01 PROCEDURE — 94003 VENT MGMT INPAT SUBQ DAY: CPT

## 2021-01-01 PROCEDURE — 700105 HCHG RX REV CODE 258: Performed by: INTERNAL MEDICINE

## 2021-01-01 PROCEDURE — 160048 HCHG OR STATISTICAL LEVEL 1-5: Performed by: INTERNAL MEDICINE

## 2021-01-01 PROCEDURE — 83036 HEMOGLOBIN GLYCOSYLATED A1C: CPT

## 2021-01-01 PROCEDURE — 82962 GLUCOSE BLOOD TEST: CPT | Mod: 91

## 2021-01-01 PROCEDURE — 700102 HCHG RX REV CODE 250 W/ 637 OVERRIDE(OP): Performed by: INTERNAL MEDICINE

## 2021-01-01 PROCEDURE — 36430 TRANSFUSION BLD/BLD COMPNT: CPT

## 2021-01-01 PROCEDURE — 83735 ASSAY OF MAGNESIUM: CPT

## 2021-01-01 PROCEDURE — 160009 HCHG ANES TIME/MIN: Performed by: INTERNAL MEDICINE

## 2021-01-01 PROCEDURE — 87205 SMEAR GRAM STAIN: CPT

## 2021-01-01 PROCEDURE — 87070 CULTURE OTHR SPECIMN AEROBIC: CPT

## 2021-01-01 PROCEDURE — 700101 HCHG RX REV CODE 250: Performed by: INTERNAL MEDICINE

## 2021-01-01 PROCEDURE — 770022 HCHG ROOM/CARE - ICU (200)

## 2021-01-01 PROCEDURE — 700111 HCHG RX REV CODE 636 W/ 250 OVERRIDE (IP): Performed by: ANESTHESIOLOGY

## 2021-01-01 PROCEDURE — 83605 ASSAY OF LACTIC ACID: CPT

## 2021-01-01 PROCEDURE — 85730 THROMBOPLASTIN TIME PARTIAL: CPT

## 2021-01-01 PROCEDURE — 99217 PR OBSERVATION CARE DISCHARGE: CPT | Performed by: STUDENT IN AN ORGANIZED HEALTH CARE EDUCATION/TRAINING PROGRAM

## 2021-01-01 PROCEDURE — 84100 ASSAY OF PHOSPHORUS: CPT

## 2021-01-01 PROCEDURE — 85610 PROTHROMBIN TIME: CPT

## 2021-01-01 PROCEDURE — 93005 ELECTROCARDIOGRAM TRACING: CPT | Performed by: EMERGENCY MEDICINE

## 2021-01-01 PROCEDURE — 02HV33Z INSERTION OF INFUSION DEVICE INTO SUPERIOR VENA CAVA, PERCUTANEOUS APPROACH: ICD-10-PCS | Performed by: EMERGENCY MEDICINE

## 2021-01-01 PROCEDURE — 85384 FIBRINOGEN ACTIVITY: CPT | Mod: 91

## 2021-01-01 PROCEDURE — 700111 HCHG RX REV CODE 636 W/ 250 OVERRIDE (IP): Performed by: STUDENT IN AN ORGANIZED HEALTH CARE EDUCATION/TRAINING PROGRAM

## 2021-01-01 PROCEDURE — 700105 HCHG RX REV CODE 258: Performed by: STUDENT IN AN ORGANIZED HEALTH CARE EDUCATION/TRAINING PROGRAM

## 2021-01-01 PROCEDURE — G0378 HOSPITAL OBSERVATION PER HR: HCPCS

## 2021-01-01 PROCEDURE — 80048 BASIC METABOLIC PNL TOTAL CA: CPT

## 2021-01-01 PROCEDURE — 99406 BEHAV CHNG SMOKING 3-10 MIN: CPT | Performed by: STUDENT IN AN ORGANIZED HEALTH CARE EDUCATION/TRAINING PROGRAM

## 2021-01-01 PROCEDURE — 99233 SBSQ HOSP IP/OBS HIGH 50: CPT | Performed by: INTERNAL MEDICINE

## 2021-01-01 PROCEDURE — 700111 HCHG RX REV CODE 636 W/ 250 OVERRIDE (IP): Performed by: INTERNAL MEDICINE

## 2021-01-01 PROCEDURE — 83550 IRON BINDING TEST: CPT

## 2021-01-01 PROCEDURE — 87591 N.GONORRHOEAE DNA AMP PROB: CPT

## 2021-01-01 PROCEDURE — 86850 RBC ANTIBODY SCREEN: CPT

## 2021-01-01 PROCEDURE — 36415 COLL VENOUS BLD VENIPUNCTURE: CPT

## 2021-01-01 PROCEDURE — 96365 THER/PROPH/DIAG IV INF INIT: CPT

## 2021-01-01 PROCEDURE — 74177 CT ABD & PELVIS W/CONTRAST: CPT

## 2021-01-01 PROCEDURE — 160208 HCHG ENDO MINUTES - EA ADDL 1 MIN LEVEL 4: Performed by: INTERNAL MEDICINE

## 2021-01-01 PROCEDURE — 85027 COMPLETE CBC AUTOMATED: CPT

## 2021-01-01 PROCEDURE — 02HV33Z INSERTION OF INFUSION DEVICE INTO SUPERIOR VENA CAVA, PERCUTANEOUS APPROACH: ICD-10-PCS | Performed by: INTERNAL MEDICINE

## 2021-01-01 PROCEDURE — 49082 ABD PARACENTESIS: CPT

## 2021-01-01 PROCEDURE — 99239 HOSP IP/OBS DSCHRG MGMT >30: CPT | Performed by: INTERNAL MEDICINE

## 2021-01-01 PROCEDURE — 89051 BODY FLUID CELL COUNT: CPT

## 2021-01-01 PROCEDURE — 85025 COMPLETE CBC W/AUTO DIFF WBC: CPT

## 2021-01-01 PROCEDURE — 84484 ASSAY OF TROPONIN QUANT: CPT

## 2021-01-01 PROCEDURE — A9270 NON-COVERED ITEM OR SERVICE: HCPCS | Performed by: STUDENT IN AN ORGANIZED HEALTH CARE EDUCATION/TRAINING PROGRAM

## 2021-01-01 PROCEDURE — C1751 CATH, INF, PER/CENT/MIDLINE: HCPCS

## 2021-01-01 PROCEDURE — 71045 X-RAY EXAM CHEST 1 VIEW: CPT

## 2021-01-01 PROCEDURE — 96372 THER/PROPH/DIAG INJ SC/IM: CPT | Mod: XU

## 2021-01-01 PROCEDURE — C1729 CATH, DRAINAGE: HCPCS

## 2021-01-01 PROCEDURE — U0005 INFEC AGEN DETEC AMPLI PROBE: HCPCS

## 2021-01-01 PROCEDURE — 82330 ASSAY OF CALCIUM: CPT

## 2021-01-01 PROCEDURE — 85347 COAGULATION TIME ACTIVATED: CPT | Mod: 91

## 2021-01-01 PROCEDURE — 82533 TOTAL CORTISOL: CPT

## 2021-01-01 PROCEDURE — 160036 HCHG PACU - EA ADDL 30 MINS PHASE I: Performed by: INTERNAL MEDICINE

## 2021-01-01 PROCEDURE — 96375 TX/PRO/DX INJ NEW DRUG ADDON: CPT

## 2021-01-01 PROCEDURE — 83690 ASSAY OF LIPASE: CPT

## 2021-01-01 PROCEDURE — 99285 EMERGENCY DEPT VISIT HI MDM: CPT

## 2021-01-01 PROCEDURE — 82105 ALPHA-FETOPROTEIN SERUM: CPT

## 2021-01-01 PROCEDURE — 700102 HCHG RX REV CODE 250 W/ 637 OVERRIDE(OP): Performed by: STUDENT IN AN ORGANIZED HEALTH CARE EDUCATION/TRAINING PROGRAM

## 2021-01-01 PROCEDURE — 87389 HIV-1 AG W/HIV-1&-2 AB AG IA: CPT

## 2021-01-01 PROCEDURE — 82803 BLOOD GASES ANY COMBINATION: CPT

## 2021-01-01 PROCEDURE — 700101 HCHG RX REV CODE 250: Performed by: ANESTHESIOLOGY

## 2021-01-01 PROCEDURE — 82140 ASSAY OF AMMONIA: CPT

## 2021-01-01 PROCEDURE — 88305 TISSUE EXAM BY PATHOLOGIST: CPT | Mod: 59

## 2021-01-01 PROCEDURE — 700105 HCHG RX REV CODE 258: Performed by: ANESTHESIOLOGY

## 2021-01-01 PROCEDURE — 86900 BLOOD TYPING SEROLOGIC ABO: CPT

## 2021-01-01 PROCEDURE — 700111 HCHG RX REV CODE 636 W/ 250 OVERRIDE (IP): Performed by: EMERGENCY MEDICINE

## 2021-01-01 PROCEDURE — 501629 HCHG TUBE, LUKI TRAP STERILE DISP: Performed by: INTERNAL MEDICINE

## 2021-01-01 PROCEDURE — 700105 HCHG RX REV CODE 258: Performed by: EMERGENCY MEDICINE

## 2021-01-01 PROCEDURE — 82040 ASSAY OF SERUM ALBUMIN: CPT

## 2021-01-01 PROCEDURE — 700101 HCHG RX REV CODE 250: Performed by: EMERGENCY MEDICINE

## 2021-01-01 PROCEDURE — 700111 HCHG RX REV CODE 636 W/ 250 OVERRIDE (IP)

## 2021-01-01 PROCEDURE — 36556 INSERT NON-TUNNEL CV CATH: CPT

## 2021-01-01 PROCEDURE — 770006 HCHG ROOM/CARE - MED/SURG/GYN SEMI*

## 2021-01-01 PROCEDURE — 87015 SPECIMEN INFECT AGNT CONCNTJ: CPT

## 2021-01-01 PROCEDURE — C9803 HOPD COVID-19 SPEC COLLECT: HCPCS

## 2021-01-01 PROCEDURE — 160035 HCHG PACU - 1ST 60 MINS PHASE I: Performed by: INTERNAL MEDICINE

## 2021-01-01 PROCEDURE — 99291 CRITICAL CARE FIRST HOUR: CPT

## 2021-01-01 PROCEDURE — 82306 VITAMIN D 25 HYDROXY: CPT

## 2021-01-01 PROCEDURE — 82728 ASSAY OF FERRITIN: CPT

## 2021-01-01 PROCEDURE — 81001 URINALYSIS AUTO W/SCOPE: CPT

## 2021-01-01 PROCEDURE — 700117 HCHG RX CONTRAST REV CODE 255: Performed by: EMERGENCY MEDICINE

## 2021-01-01 PROCEDURE — P9016 RBC LEUKOCYTES REDUCED: HCPCS | Mod: 91

## 2021-01-01 PROCEDURE — 80069 RENAL FUNCTION PANEL: CPT

## 2021-01-01 PROCEDURE — RXMED WILLOW AMBULATORY MEDICATION CHARGE: Performed by: INTERNAL MEDICINE

## 2021-01-01 PROCEDURE — C9113 INJ PANTOPRAZOLE SODIUM, VIA: HCPCS | Performed by: INTERNAL MEDICINE

## 2021-01-01 PROCEDURE — 74018 RADEX ABDOMEN 1 VIEW: CPT

## 2021-01-01 PROCEDURE — 76700 US EXAM ABDOM COMPLETE: CPT

## 2021-01-01 PROCEDURE — 99284 EMERGENCY DEPT VISIT MOD MDM: CPT

## 2021-01-01 PROCEDURE — 36600 WITHDRAWAL OF ARTERIAL BLOOD: CPT

## 2021-01-01 PROCEDURE — 82803 BLOOD GASES ANY COMBINATION: CPT | Mod: 91

## 2021-01-01 PROCEDURE — 160025 RECOVERY II MINUTES (STATS): Performed by: INTERNAL MEDICINE

## 2021-01-01 PROCEDURE — 99220 PR INITIAL OBSERVATION CARE,LEVL III: CPT | Mod: 25 | Performed by: STUDENT IN AN ORGANIZED HEALTH CARE EDUCATION/TRAINING PROGRAM

## 2021-01-01 PROCEDURE — 84157 ASSAY OF PROTEIN OTHER: CPT

## 2021-01-01 PROCEDURE — 160046 HCHG PACU - 1ST 60 MINS PHASE II: Performed by: INTERNAL MEDICINE

## 2021-01-01 PROCEDURE — 30233K1 TRANSFUSION OF NONAUTOLOGOUS FROZEN PLASMA INTO PERIPHERAL VEIN, PERCUTANEOUS APPROACH: ICD-10-PCS | Performed by: INTERNAL MEDICINE

## 2021-01-01 PROCEDURE — 85347 COAGULATION TIME ACTIVATED: CPT

## 2021-01-01 PROCEDURE — 94799 UNLISTED PULMONARY SVC/PX: CPT

## 2021-01-01 PROCEDURE — 86593 SYPHILIS TEST NON-TREP QUANT: CPT

## 2021-01-01 PROCEDURE — 96372 THER/PROPH/DIAG INJ SC/IM: CPT

## 2021-01-01 PROCEDURE — 99232 SBSQ HOSP IP/OBS MODERATE 35: CPT | Performed by: INTERNAL MEDICINE

## 2021-01-01 PROCEDURE — 84630 ASSAY OF ZINC: CPT

## 2021-01-01 PROCEDURE — 87040 BLOOD CULTURE FOR BACTERIA: CPT

## 2021-01-01 PROCEDURE — 700101 HCHG RX REV CODE 250: Performed by: STUDENT IN AN ORGANIZED HEALTH CARE EDUCATION/TRAINING PROGRAM

## 2021-01-01 PROCEDURE — 160203 HCHG ENDO MINUTES - 1ST 30 MINS LEVEL 4: Performed by: INTERNAL MEDICINE

## 2021-01-01 PROCEDURE — 30233N1 TRANSFUSION OF NONAUTOLOGOUS RED BLOOD CELLS INTO PERIPHERAL VEIN, PERCUTANEOUS APPROACH: ICD-10-PCS | Performed by: INTERNAL MEDICINE

## 2021-01-01 PROCEDURE — 93010 ELECTROCARDIOGRAM REPORT: CPT | Performed by: INTERNAL MEDICINE

## 2021-01-01 PROCEDURE — 85384 FIBRINOGEN ACTIVITY: CPT

## 2021-01-01 PROCEDURE — 700102 HCHG RX REV CODE 250 W/ 637 OVERRIDE(OP): Performed by: EMERGENCY MEDICINE

## 2021-01-01 PROCEDURE — 83540 ASSAY OF IRON: CPT

## 2021-01-01 PROCEDURE — P9012 CRYOPRECIPITATE EACH UNIT: HCPCS | Mod: 91

## 2021-01-01 PROCEDURE — 96374 THER/PROPH/DIAG INJ IV PUSH: CPT

## 2021-01-01 PROCEDURE — 06L38CZ OCCLUSION OF ESOPHAGEAL VEIN WITH EXTRALUMINAL DEVICE, VIA NATURAL OR ARTIFICIAL OPENING ENDOSCOPIC: ICD-10-PCS | Performed by: INTERNAL MEDICINE

## 2021-01-01 PROCEDURE — 160207 HCHG ENDO MINUTES - EA ADDL 1 MIN LEVEL 3: Performed by: INTERNAL MEDICINE

## 2021-01-01 PROCEDURE — 31500 INSERT EMERGENCY AIRWAY: CPT

## 2021-01-01 PROCEDURE — 94760 N-INVAS EAR/PLS OXIMETRY 1: CPT

## 2021-01-01 PROCEDURE — P9034 PLATELETS, PHERESIS: HCPCS

## 2021-01-01 PROCEDURE — 96376 TX/PRO/DX INJ SAME DRUG ADON: CPT | Mod: XU

## 2021-01-01 PROCEDURE — 99291 CRITICAL CARE FIRST HOUR: CPT | Mod: 25 | Performed by: INTERNAL MEDICINE

## 2021-01-01 PROCEDURE — 37799 UNLISTED PX VASCULAR SURGERY: CPT

## 2021-01-01 PROCEDURE — 96376 TX/PRO/DX INJ SAME DRUG ADON: CPT

## 2021-01-01 PROCEDURE — 99406 BEHAV CHNG SMOKING 3-10 MIN: CPT | Performed by: INTERNAL MEDICINE

## 2021-01-01 PROCEDURE — 0BH17EZ INSERTION OF ENDOTRACHEAL AIRWAY INTO TRACHEA, VIA NATURAL OR ARTIFICIAL OPENING: ICD-10-PCS | Performed by: INTERNAL MEDICINE

## 2021-01-01 PROCEDURE — 97161 PT EVAL LOW COMPLEX 20 MIN: CPT

## 2021-01-01 PROCEDURE — 36620 INSERTION CATHETER ARTERY: CPT

## 2021-01-01 PROCEDURE — 96375 TX/PRO/DX INJ NEW DRUG ADDON: CPT | Mod: XU

## 2021-01-01 PROCEDURE — 82042 OTHER SOURCE ALBUMIN QUAN EA: CPT

## 2021-01-01 PROCEDURE — 87491 CHLMYD TRACH DNA AMP PROBE: CPT

## 2021-01-01 PROCEDURE — 94002 VENT MGMT INPAT INIT DAY: CPT

## 2021-01-01 PROCEDURE — 99219 PR INITIAL OBSERVATION CARE,LEVL II: CPT | Mod: 25 | Performed by: STUDENT IN AN ORGANIZED HEALTH CARE EDUCATION/TRAINING PROGRAM

## 2021-01-01 PROCEDURE — 99292 CRITICAL CARE ADDL 30 MIN: CPT | Mod: 25 | Performed by: INTERNAL MEDICINE

## 2021-01-01 PROCEDURE — 86923 COMPATIBILITY TEST ELECTRIC: CPT

## 2021-01-01 PROCEDURE — 4410569 US-PARACENTESIS, ABD WITH IMAGING

## 2021-01-01 PROCEDURE — 86592 SYPHILIS TEST NON-TREP QUAL: CPT

## 2021-01-01 PROCEDURE — 86901 BLOOD TYPING SEROLOGIC RH(D): CPT

## 2021-01-01 PROCEDURE — 82746 ASSAY OF FOLIC ACID SERUM: CPT

## 2021-01-01 PROCEDURE — 87426 SARSCOV CORONAVIRUS AG IA: CPT | Performed by: INTERNAL MEDICINE

## 2021-01-01 PROCEDURE — 36556 INSERT NON-TUNNEL CV CATH: CPT | Mod: LT | Performed by: INTERNAL MEDICINE

## 2021-01-01 PROCEDURE — 0W9G3ZZ DRAINAGE OF PERITONEAL CAVITY, PERCUTANEOUS APPROACH: ICD-10-PCS | Performed by: INTERNAL MEDICINE

## 2021-01-01 PROCEDURE — 99222 1ST HOSP IP/OBS MODERATE 55: CPT | Mod: 25 | Performed by: INTERNAL MEDICINE

## 2021-01-01 PROCEDURE — 99213 OFFICE O/P EST LOW 20 MIN: CPT | Mod: GE | Performed by: STUDENT IN AN ORGANIZED HEALTH CARE EDUCATION/TRAINING PROGRAM

## 2021-01-01 PROCEDURE — 86780 TREPONEMA PALLIDUM: CPT

## 2021-01-01 PROCEDURE — C9113 INJ PANTOPRAZOLE SODIUM, VIA: HCPCS | Performed by: EMERGENCY MEDICINE

## 2021-01-01 PROCEDURE — 99255 IP/OBS CONSLTJ NEW/EST HI 80: CPT | Performed by: INTERNAL MEDICINE

## 2021-01-01 PROCEDURE — 5A1945Z RESPIRATORY VENTILATION, 24-96 CONSECUTIVE HOURS: ICD-10-PCS | Performed by: INTERNAL MEDICINE

## 2021-01-01 PROCEDURE — 74175 CTA ABDOMEN W/CONTRAST: CPT

## 2021-01-01 PROCEDURE — 99232 SBSQ HOSP IP/OBS MODERATE 35: CPT | Mod: GC | Performed by: INTERNAL MEDICINE

## 2021-01-01 PROCEDURE — 88312 SPECIAL STAINS GROUP 1: CPT

## 2021-01-01 PROCEDURE — 03HY32Z INSERTION OF MONITORING DEVICE INTO UPPER ARTERY, PERCUTANEOUS APPROACH: ICD-10-PCS | Performed by: INTERNAL MEDICINE

## 2021-01-01 PROCEDURE — 49083 ABD PARACENTESIS W/IMAGING: CPT

## 2021-01-01 PROCEDURE — 80076 HEPATIC FUNCTION PANEL: CPT

## 2021-01-01 PROCEDURE — 84443 ASSAY THYROID STIM HORMONE: CPT

## 2021-01-01 PROCEDURE — 93005 ELECTROCARDIOGRAM TRACING: CPT | Performed by: STUDENT IN AN ORGANIZED HEALTH CARE EDUCATION/TRAINING PROGRAM

## 2021-01-01 PROCEDURE — 83880 ASSAY OF NATRIURETIC PEPTIDE: CPT

## 2021-01-01 PROCEDURE — 97165 OT EVAL LOW COMPLEX 30 MIN: CPT

## 2021-01-01 PROCEDURE — A9270 NON-COVERED ITEM OR SERVICE: HCPCS | Performed by: EMERGENCY MEDICINE

## 2021-01-01 PROCEDURE — 31500 INSERT EMERGENCY AIRWAY: CPT | Performed by: INTERNAL MEDICINE

## 2021-01-01 PROCEDURE — 96374 THER/PROPH/DIAG INJ IV PUSH: CPT | Mod: XU

## 2021-01-01 PROCEDURE — P9017 PLASMA 1 DONOR FRZ W/IN 8 HR: HCPCS

## 2021-01-01 PROCEDURE — 88112 CYTOPATH CELL ENHANCE TECH: CPT

## 2021-01-01 PROCEDURE — 85027 COMPLETE CBC AUTOMATED: CPT | Mod: 91

## 2021-01-01 PROCEDURE — 93005 ELECTROCARDIOGRAM TRACING: CPT

## 2021-01-01 PROCEDURE — 96368 THER/DIAG CONCURRENT INF: CPT

## 2021-01-01 PROCEDURE — 73564 X-RAY EXAM KNEE 4 OR MORE: CPT | Mod: RT

## 2021-01-01 PROCEDURE — 82077 ASSAY SPEC XCP UR&BREATH IA: CPT

## 2021-01-01 PROCEDURE — 82607 VITAMIN B-12: CPT

## 2021-01-01 PROCEDURE — 96366 THER/PROPH/DIAG IV INF ADDON: CPT

## 2021-01-01 PROCEDURE — 99291 CRITICAL CARE FIRST HOUR: CPT | Performed by: INTERNAL MEDICINE

## 2021-01-01 PROCEDURE — 85007 BL SMEAR W/DIFF WBC COUNT: CPT

## 2021-01-01 PROCEDURE — 160202 HCHG ENDO MINUTES - 1ST 30 MINS LEVEL 3: Performed by: INTERNAL MEDICINE

## 2021-01-01 PROCEDURE — 30233R1 TRANSFUSION OF NONAUTOLOGOUS PLATELETS INTO PERIPHERAL VEIN, PERCUTANEOUS APPROACH: ICD-10-PCS | Performed by: INTERNAL MEDICINE

## 2021-01-01 PROCEDURE — 36620 INSERTION CATHETER ARTERY: CPT | Performed by: INTERNAL MEDICINE

## 2021-01-01 PROCEDURE — 99292 CRITICAL CARE ADDL 30 MIN: CPT | Performed by: INTERNAL MEDICINE

## 2021-01-01 PROCEDURE — 88305 TISSUE EXAM BY PATHOLOGIST: CPT

## 2021-01-01 PROCEDURE — 99152 MOD SED SAME PHYS/QHP 5/>YRS: CPT

## 2021-01-01 PROCEDURE — 500066 HCHG BITE BLOCK, ECT: Performed by: INTERNAL MEDICINE

## 2021-01-01 RX ORDER — AMOXICILLIN 250 MG
2 CAPSULE ORAL 2 TIMES DAILY
Status: DISCONTINUED | OUTPATIENT
Start: 2021-01-01 | End: 2021-01-01 | Stop reason: HOSPADM

## 2021-01-01 RX ORDER — ONDANSETRON 4 MG/1
4 TABLET, ORALLY DISINTEGRATING ORAL EVERY 4 HOURS PRN
Status: DISCONTINUED | OUTPATIENT
Start: 2021-01-01 | End: 2021-01-01 | Stop reason: HOSPADM

## 2021-01-01 RX ORDER — DIPHENHYDRAMINE HYDROCHLORIDE 50 MG/ML
12.5 INJECTION INTRAMUSCULAR; INTRAVENOUS
Status: DISCONTINUED | OUTPATIENT
Start: 2021-01-01 | End: 2021-01-01 | Stop reason: HOSPADM

## 2021-01-01 RX ORDER — TRAZODONE HYDROCHLORIDE 100 MG/1
100 TABLET ORAL
Status: ON HOLD | COMMUNITY
Start: 2021-01-01 | End: 2021-01-01

## 2021-01-01 RX ORDER — OXYCODONE HYDROCHLORIDE 10 MG/1
10 TABLET ORAL
Status: DISCONTINUED | OUTPATIENT
Start: 2021-01-01 | End: 2021-01-01 | Stop reason: HOSPADM

## 2021-01-01 RX ORDER — FUROSEMIDE 40 MG/1
40 TABLET ORAL DAILY
Status: DISCONTINUED | OUTPATIENT
Start: 2021-01-01 | End: 2021-01-01 | Stop reason: HOSPADM

## 2021-01-01 RX ORDER — DEXTROSE MONOHYDRATE 25 G/50ML
50 INJECTION, SOLUTION INTRAVENOUS
Status: DISCONTINUED | OUTPATIENT
Start: 2021-01-01 | End: 2021-01-01 | Stop reason: HOSPADM

## 2021-01-01 RX ORDER — OXYCODONE HCL 5 MG/5 ML
5 SOLUTION, ORAL ORAL
Status: DISCONTINUED | OUTPATIENT
Start: 2021-01-01 | End: 2021-01-01 | Stop reason: HOSPADM

## 2021-01-01 RX ORDER — SODIUM CHLORIDE, SODIUM LACTATE, POTASSIUM CHLORIDE, CALCIUM CHLORIDE 600; 310; 30; 20 MG/100ML; MG/100ML; MG/100ML; MG/100ML
INJECTION, SOLUTION INTRAVENOUS CONTINUOUS
Status: DISCONTINUED | OUTPATIENT
Start: 2021-01-01 | End: 2021-01-01 | Stop reason: HOSPADM

## 2021-01-01 RX ORDER — SODIUM CHLORIDE, SODIUM LACTATE, POTASSIUM CHLORIDE, CALCIUM CHLORIDE 600; 310; 30; 20 MG/100ML; MG/100ML; MG/100ML; MG/100ML
INJECTION, SOLUTION INTRAVENOUS CONTINUOUS
Status: DISCONTINUED | OUTPATIENT
Start: 2021-01-01 | End: 2021-01-01

## 2021-01-01 RX ORDER — ONDANSETRON 2 MG/ML
INJECTION INTRAMUSCULAR; INTRAVENOUS
Status: COMPLETED
Start: 2021-01-01 | End: 2021-01-01

## 2021-01-01 RX ORDER — ONDANSETRON 2 MG/ML
4 INJECTION INTRAMUSCULAR; INTRAVENOUS
Status: DISCONTINUED | OUTPATIENT
Start: 2021-01-01 | End: 2021-01-01 | Stop reason: HOSPADM

## 2021-01-01 RX ORDER — LIDOCAINE HYDROCHLORIDE 20 MG/ML
5 SOLUTION OROPHARYNGEAL EVERY 4 HOURS PRN
Status: DISCONTINUED | OUTPATIENT
Start: 2021-01-01 | End: 2021-01-01 | Stop reason: HOSPADM

## 2021-01-01 RX ORDER — ENALAPRIL MALEATE 5 MG/1
5 TABLET ORAL DAILY
Status: ON HOLD | COMMUNITY
End: 2021-01-01

## 2021-01-01 RX ORDER — POLYETHYLENE GLYCOL 3350 17 G/17G
1 POWDER, FOR SOLUTION ORAL
Status: DISCONTINUED | OUTPATIENT
Start: 2021-01-01 | End: 2021-01-01 | Stop reason: HOSPADM

## 2021-01-01 RX ORDER — OCTREOTIDE ACETATE 100 UG/ML
50 INJECTION, SOLUTION INTRAVENOUS; SUBCUTANEOUS ONCE
Status: COMPLETED | OUTPATIENT
Start: 2021-01-01 | End: 2021-01-01

## 2021-01-01 RX ORDER — HYDROMORPHONE HYDROCHLORIDE 1 MG/ML
0.5 INJECTION, SOLUTION INTRAMUSCULAR; INTRAVENOUS; SUBCUTANEOUS
Status: DISCONTINUED | OUTPATIENT
Start: 2021-01-01 | End: 2021-01-01 | Stop reason: HOSPADM

## 2021-01-01 RX ORDER — DEXTROSE MONOHYDRATE 25 G/50ML
50 INJECTION, SOLUTION INTRAVENOUS
Status: DISCONTINUED | OUTPATIENT
Start: 2021-01-01 | End: 2021-01-01

## 2021-01-01 RX ORDER — DIPHENHYDRAMINE HYDROCHLORIDE 50 MG/ML
25 INJECTION INTRAMUSCULAR; INTRAVENOUS EVERY 6 HOURS PRN
Status: DISCONTINUED | OUTPATIENT
Start: 2021-01-01 | End: 2021-01-01 | Stop reason: HOSPADM

## 2021-01-01 RX ORDER — LABETALOL HYDROCHLORIDE 5 MG/ML
5 INJECTION, SOLUTION INTRAVENOUS
Status: DISCONTINUED | OUTPATIENT
Start: 2021-01-01 | End: 2021-01-01 | Stop reason: HOSPADM

## 2021-01-01 RX ORDER — ATROPINE SULFATE 10 MG/ML
2 SOLUTION/ DROPS OPHTHALMIC EVERY 4 HOURS PRN
Status: DISCONTINUED | OUTPATIENT
Start: 2021-01-01 | End: 2021-01-01 | Stop reason: HOSPADM

## 2021-01-01 RX ORDER — FUROSEMIDE 40 MG/1
40 TABLET ORAL EVERY MORNING
COMMUNITY
End: 2021-01-01 | Stop reason: SDUPTHER

## 2021-01-01 RX ORDER — DOXYCYCLINE 100 MG/1
100 TABLET ORAL EVERY 12 HOURS
Qty: 28 TABLET | Refills: 0 | Status: SHIPPED | OUTPATIENT
Start: 2021-01-01 | End: 2021-01-01 | Stop reason: SDUPTHER

## 2021-01-01 RX ORDER — ACETAMINOPHEN 325 MG/1
650 TABLET ORAL EVERY 6 HOURS PRN
Status: DISCONTINUED | OUTPATIENT
Start: 2021-01-01 | End: 2021-01-01 | Stop reason: HOSPADM

## 2021-01-01 RX ORDER — LIDOCAINE HYDROCHLORIDE 20 MG/ML
15 SOLUTION OROPHARYNGEAL
Status: DISCONTINUED | OUTPATIENT
Start: 2021-01-01 | End: 2021-01-01 | Stop reason: HOSPADM

## 2021-01-01 RX ORDER — LISINOPRIL 20 MG/1
20 TABLET ORAL DAILY
Status: ON HOLD | COMMUNITY
End: 2021-01-01

## 2021-01-01 RX ORDER — CALCIUM CHLORIDE 100 MG/ML
1 INJECTION INTRAVENOUS; INTRAVENTRICULAR ONCE
Status: DISCONTINUED | OUTPATIENT
Start: 2021-01-01 | End: 2021-01-01

## 2021-01-01 RX ORDER — LISINOPRIL 20 MG/1
20 TABLET ORAL
Status: ON HOLD | COMMUNITY
Start: 2021-01-01 | End: 2021-01-01

## 2021-01-01 RX ORDER — PROMETHAZINE HYDROCHLORIDE 25 MG/1
12.5-25 TABLET ORAL EVERY 4 HOURS PRN
Status: DISCONTINUED | OUTPATIENT
Start: 2021-01-01 | End: 2021-01-01 | Stop reason: HOSPADM

## 2021-01-01 RX ORDER — LIDOCAINE HYDROCHLORIDE 10 MG/ML
INJECTION, SOLUTION EPIDURAL; INFILTRATION; INTRACAUDAL; PERINEURAL
Status: COMPLETED
Start: 2021-01-01 | End: 2021-01-01

## 2021-01-01 RX ORDER — LISINOPRIL 5 MG/1
5 TABLET ORAL EVERY MORNING
COMMUNITY
End: 2021-01-01

## 2021-01-01 RX ORDER — FUROSEMIDE 40 MG/1
40 TABLET ORAL DAILY
Status: ON HOLD | COMMUNITY
End: 2021-01-01

## 2021-01-01 RX ORDER — LIDOCAINE HYDROCHLORIDE 20 MG/ML
INJECTION, SOLUTION EPIDURAL; INFILTRATION; INTRACAUDAL; PERINEURAL PRN
Status: DISCONTINUED | OUTPATIENT
Start: 2021-01-01 | End: 2021-01-01 | Stop reason: SURG

## 2021-01-01 RX ORDER — ONDANSETRON 2 MG/ML
4 INJECTION INTRAMUSCULAR; INTRAVENOUS EVERY 4 HOURS PRN
Status: DISCONTINUED | OUTPATIENT
Start: 2021-01-01 | End: 2021-01-01 | Stop reason: HOSPADM

## 2021-01-01 RX ORDER — PHENYLEPHRINE HCL IN 0.9% NACL 0.5 MG/5ML
SYRINGE (ML) INTRAVENOUS PRN
Status: DISCONTINUED | OUTPATIENT
Start: 2021-01-01 | End: 2021-01-01 | Stop reason: SURG

## 2021-01-01 RX ORDER — METOCLOPRAMIDE HYDROCHLORIDE 5 MG/ML
10 INJECTION INTRAMUSCULAR; INTRAVENOUS EVERY 6 HOURS
Status: DISCONTINUED | OUTPATIENT
Start: 2021-01-01 | End: 2021-01-01 | Stop reason: HOSPADM

## 2021-01-01 RX ORDER — OXCARBAZEPINE 300 MG/1
300 TABLET, FILM COATED ORAL EVERY MORNING
Status: DISCONTINUED | OUTPATIENT
Start: 2021-01-01 | End: 2021-01-01 | Stop reason: HOSPADM

## 2021-01-01 RX ORDER — SODIUM CHLORIDE 9 MG/ML
INJECTION, SOLUTION INTRAVENOUS CONTINUOUS
Status: ACTIVE | OUTPATIENT
Start: 2021-01-01 | End: 2021-01-01

## 2021-01-01 RX ORDER — ONDANSETRON 2 MG/ML
8 INJECTION INTRAMUSCULAR; INTRAVENOUS ONCE
Status: COMPLETED | OUTPATIENT
Start: 2021-01-01 | End: 2021-01-01

## 2021-01-01 RX ORDER — SODIUM CHLORIDE 9 MG/ML
INJECTION, SOLUTION INTRAVENOUS CONTINUOUS
Status: DISCONTINUED | OUTPATIENT
Start: 2021-01-01 | End: 2021-01-01

## 2021-01-01 RX ORDER — SUCCINYLCHOLINE CHLORIDE 20 MG/ML
INJECTION INTRAMUSCULAR; INTRAVENOUS PRN
Status: DISCONTINUED | OUTPATIENT
Start: 2021-01-01 | End: 2021-01-01 | Stop reason: SURG

## 2021-01-01 RX ORDER — HYDROMORPHONE HYDROCHLORIDE 2 MG/ML
1.25-2 INJECTION, SOLUTION INTRAMUSCULAR; INTRAVENOUS; SUBCUTANEOUS
Status: DISCONTINUED | OUTPATIENT
Start: 2021-01-01 | End: 2021-01-01

## 2021-01-01 RX ORDER — MORPHINE SULFATE 10 MG/ML
5 INJECTION, SOLUTION INTRAMUSCULAR; INTRAVENOUS
Status: DISCONTINUED | OUTPATIENT
Start: 2021-01-01 | End: 2021-01-01 | Stop reason: HOSPADM

## 2021-01-01 RX ORDER — AMITRIPTYLINE HYDROCHLORIDE 25 MG/1
25 TABLET, FILM COATED ORAL
COMMUNITY
Start: 2021-01-01 | End: 2021-01-01

## 2021-01-01 RX ORDER — GLYCOPYRROLATE 1 MG/1
1 TABLET ORAL 3 TIMES DAILY PRN
Status: DISCONTINUED | OUTPATIENT
Start: 2021-01-01 | End: 2021-01-01 | Stop reason: HOSPADM

## 2021-01-01 RX ORDER — ONDANSETRON 2 MG/ML
4 INJECTION INTRAMUSCULAR; INTRAVENOUS EVERY 4 HOURS PRN
Status: DISCONTINUED | OUTPATIENT
Start: 2021-01-01 | End: 2021-01-01

## 2021-01-01 RX ORDER — LIDOCAINE 50 MG/G
1 PATCH TOPICAL EVERY 24 HOURS
Qty: 7 PATCH | Refills: 0 | Status: SHIPPED | OUTPATIENT
Start: 2021-01-01 | End: 2021-01-01

## 2021-01-01 RX ORDER — SUCRALFATE ORAL 1 G/10ML
1 SUSPENSION ORAL EVERY 6 HOURS
Status: DISCONTINUED | OUTPATIENT
Start: 2021-01-01 | End: 2021-01-01

## 2021-01-01 RX ORDER — LACTULOSE 10 G/15ML
300 SOLUTION ORAL EVERY 8 HOURS
Status: DISCONTINUED | OUTPATIENT
Start: 2021-01-01 | End: 2021-01-01

## 2021-01-01 RX ORDER — TRAZODONE HYDROCHLORIDE 100 MG/1
100 TABLET ORAL NIGHTLY PRN
Qty: 180 TABLET | Refills: 0 | Status: SHIPPED | OUTPATIENT
Start: 2021-01-01

## 2021-01-01 RX ORDER — LABETALOL HYDROCHLORIDE 5 MG/ML
20 INJECTION, SOLUTION INTRAVENOUS ONCE
Status: COMPLETED | OUTPATIENT
Start: 2021-01-01 | End: 2021-01-01

## 2021-01-01 RX ORDER — HYDROMORPHONE HYDROCHLORIDE 1 MG/ML
1 INJECTION, SOLUTION INTRAMUSCULAR; INTRAVENOUS; SUBCUTANEOUS EVERY 4 HOURS PRN
Status: DISCONTINUED | OUTPATIENT
Start: 2021-01-01 | End: 2021-01-01

## 2021-01-01 RX ORDER — HYDROCODONE BITARTRATE AND ACETAMINOPHEN 5; 325 MG/1; MG/1
1-2 TABLET ORAL EVERY 6 HOURS PRN
Status: DISCONTINUED | OUTPATIENT
Start: 2021-01-01 | End: 2021-01-01 | Stop reason: HOSPADM

## 2021-01-01 RX ORDER — KETOROLAC TROMETHAMINE 30 MG/ML
INJECTION, SOLUTION INTRAMUSCULAR; INTRAVENOUS PRN
Status: DISCONTINUED | OUTPATIENT
Start: 2021-01-01 | End: 2021-01-01 | Stop reason: SURG

## 2021-01-01 RX ORDER — TRAZODONE HYDROCHLORIDE 100 MG/1
100 TABLET ORAL NIGHTLY PRN
COMMUNITY
End: 2021-01-01 | Stop reason: SDUPTHER

## 2021-01-01 RX ORDER — SPIRONOLACTONE 50 MG/1
50 TABLET, FILM COATED ORAL
Qty: 180 TABLET | Refills: 0 | Status: SHIPPED | OUTPATIENT
Start: 2021-01-01

## 2021-01-01 RX ORDER — IBUPROFEN 200 MG
200 TABLET ORAL
Status: ON HOLD | COMMUNITY
End: 2021-01-01

## 2021-01-01 RX ORDER — DEXMEDETOMIDINE HYDROCHLORIDE 4 UG/ML
.1-1.5 INJECTION, SOLUTION INTRAVENOUS CONTINUOUS
Status: DISCONTINUED | OUTPATIENT
Start: 2021-01-01 | End: 2021-01-01

## 2021-01-01 RX ORDER — LISINOPRIL 20 MG/1
20 TABLET ORAL DAILY
Status: DISCONTINUED | OUTPATIENT
Start: 2021-01-01 | End: 2021-01-01 | Stop reason: HOSPADM

## 2021-01-01 RX ORDER — ONDANSETRON 2 MG/ML
4 INJECTION INTRAMUSCULAR; INTRAVENOUS ONCE
Status: COMPLETED | OUTPATIENT
Start: 2021-01-01 | End: 2021-01-01

## 2021-01-01 RX ORDER — TRIAMCINOLONE ACETONIDE 1 MG/G
CREAM TOPICAL 2 TIMES DAILY
Status: DISCONTINUED | OUTPATIENT
Start: 2021-01-01 | End: 2021-01-01

## 2021-01-01 RX ORDER — FUROSEMIDE 20 MG/1
20 TABLET ORAL DAILY
Status: ON HOLD | COMMUNITY
End: 2021-01-01

## 2021-01-01 RX ORDER — BISACODYL 10 MG
10 SUPPOSITORY, RECTAL RECTAL
Status: DISCONTINUED | OUTPATIENT
Start: 2021-01-01 | End: 2021-01-01 | Stop reason: HOSPADM

## 2021-01-01 RX ORDER — OXCARBAZEPINE 300 MG/1
300-600 TABLET, FILM COATED ORAL
Status: DISCONTINUED | OUTPATIENT
Start: 2021-01-01 | End: 2021-01-01

## 2021-01-01 RX ORDER — ROCURONIUM BROMIDE 10 MG/ML
100 INJECTION, SOLUTION INTRAVENOUS ONCE
Status: COMPLETED | OUTPATIENT
Start: 2021-01-01 | End: 2021-01-01

## 2021-01-01 RX ORDER — FLUCONAZOLE 150 MG/1
150 TABLET ORAL DAILY
Qty: 3 TABLET | Refills: 0 | Status: SHIPPED | OUTPATIENT
Start: 2021-01-01 | End: 2021-01-01

## 2021-01-01 RX ORDER — SPIRONOLACTONE 25 MG/1
25 TABLET ORAL
Status: DISCONTINUED | OUTPATIENT
Start: 2021-01-01 | End: 2021-01-01

## 2021-01-01 RX ORDER — ASPIRIN 81 MG/1
81 TABLET ORAL 2 TIMES DAILY
Qty: 180 TABLET | Refills: 0 | Status: SHIPPED | OUTPATIENT
Start: 2021-01-01

## 2021-01-01 RX ORDER — OXCARBAZEPINE 300 MG/1
600 TABLET, FILM COATED ORAL EVERY EVENING
Status: DISCONTINUED | OUTPATIENT
Start: 2021-01-01 | End: 2021-01-01 | Stop reason: HOSPADM

## 2021-01-01 RX ORDER — DEXAMETHASONE SODIUM PHOSPHATE 4 MG/ML
INJECTION, SOLUTION INTRA-ARTICULAR; INTRALESIONAL; INTRAMUSCULAR; INTRAVENOUS; SOFT TISSUE PRN
Status: DISCONTINUED | OUTPATIENT
Start: 2021-01-01 | End: 2021-01-01 | Stop reason: SURG

## 2021-01-01 RX ORDER — MAGNESIUM SULFATE HEPTAHYDRATE 40 MG/ML
2 INJECTION, SOLUTION INTRAVENOUS ONCE
Status: COMPLETED | OUTPATIENT
Start: 2021-01-01 | End: 2021-01-01

## 2021-01-01 RX ORDER — SODIUM BICARBONATE IN D5W 150/1000ML
PLASTIC BAG, INJECTION (ML) INTRAVENOUS CONTINUOUS
Status: DISCONTINUED | OUTPATIENT
Start: 2021-01-01 | End: 2021-01-01

## 2021-01-01 RX ORDER — SUCRALFATE ORAL 1 G/10ML
1 SUSPENSION ORAL 4 TIMES DAILY
Qty: 520 ML | Refills: 0 | Status: ON HOLD | OUTPATIENT
Start: 2021-01-01 | End: 2021-01-01

## 2021-01-01 RX ORDER — OMEPRAZOLE 20 MG/1
20 CAPSULE, DELAYED RELEASE ORAL
COMMUNITY
End: 2021-01-01

## 2021-01-01 RX ORDER — LORAZEPAM 2 MG/ML
1 CONCENTRATE ORAL
Status: DISCONTINUED | OUTPATIENT
Start: 2021-01-01 | End: 2021-01-01 | Stop reason: HOSPADM

## 2021-01-01 RX ORDER — ROCURONIUM BROMIDE 10 MG/ML
INJECTION, SOLUTION INTRAVENOUS PRN
Status: DISCONTINUED | OUTPATIENT
Start: 2021-01-01 | End: 2021-01-01 | Stop reason: SURG

## 2021-01-01 RX ORDER — SODIUM CHLORIDE, SODIUM LACTATE, POTASSIUM CHLORIDE, CALCIUM CHLORIDE 600; 310; 30; 20 MG/100ML; MG/100ML; MG/100ML; MG/100ML
1000 INJECTION, SOLUTION INTRAVENOUS ONCE
Status: COMPLETED | OUTPATIENT
Start: 2021-01-01 | End: 2021-01-01

## 2021-01-01 RX ORDER — LISINOPRIL 20 MG/1
20 TABLET ORAL EVERY MORNING
Qty: 180 TABLET | Refills: 0 | Status: SHIPPED | OUTPATIENT
Start: 2021-01-01

## 2021-01-01 RX ORDER — SODIUM CHLORIDE, SODIUM LACTATE, POTASSIUM CHLORIDE, AND CALCIUM CHLORIDE .6; .31; .03; .02 G/100ML; G/100ML; G/100ML; G/100ML
500 INJECTION, SOLUTION INTRAVENOUS ONCE
Status: COMPLETED | OUTPATIENT
Start: 2021-01-01 | End: 2021-01-01

## 2021-01-01 RX ORDER — MORPHINE SULFATE 4 MG/ML
4 INJECTION, SOLUTION INTRAMUSCULAR; INTRAVENOUS ONCE
Status: COMPLETED | OUTPATIENT
Start: 2021-01-01 | End: 2021-01-01

## 2021-01-01 RX ORDER — NOREPINEPHRINE BITARTRATE 0.03 MG/ML
0-30 INJECTION, SOLUTION INTRAVENOUS CONTINUOUS
Status: DISCONTINUED | OUTPATIENT
Start: 2021-01-01 | End: 2021-01-01

## 2021-01-01 RX ORDER — MORPHINE SULFATE 4 MG/ML
4 INJECTION, SOLUTION INTRAMUSCULAR; INTRAVENOUS
Status: DISCONTINUED | OUTPATIENT
Start: 2021-01-01 | End: 2021-01-01 | Stop reason: HOSPADM

## 2021-01-01 RX ORDER — TRAZODONE HYDROCHLORIDE 50 MG/1
100 TABLET ORAL NIGHTLY PRN
Status: DISCONTINUED | OUTPATIENT
Start: 2021-01-01 | End: 2021-01-01 | Stop reason: HOSPADM

## 2021-01-01 RX ORDER — LABETALOL HYDROCHLORIDE 5 MG/ML
10 INJECTION, SOLUTION INTRAVENOUS EVERY 4 HOURS PRN
Status: DISCONTINUED | OUTPATIENT
Start: 2021-01-01 | End: 2021-01-01 | Stop reason: HOSPADM

## 2021-01-01 RX ORDER — ACETAMINOPHEN 325 MG/1
650 TABLET ORAL EVERY 4 HOURS PRN
Status: DISCONTINUED | OUTPATIENT
Start: 2021-01-01 | End: 2021-01-01 | Stop reason: HOSPADM

## 2021-01-01 RX ORDER — CLONIDINE HYDROCHLORIDE 0.1 MG/1
0.1 TABLET ORAL ONCE
Status: ACTIVE | OUTPATIENT
Start: 2021-01-01 | End: 2021-01-01

## 2021-01-01 RX ORDER — OMEPRAZOLE 20 MG/1
20 CAPSULE, DELAYED RELEASE ORAL EVERY MORNING
Status: DISCONTINUED | OUTPATIENT
Start: 2021-01-01 | End: 2021-01-01 | Stop reason: HOSPADM

## 2021-01-01 RX ORDER — LISINOPRIL 20 MG/1
20 TABLET ORAL EVERY MORNING
COMMUNITY
End: 2021-01-01 | Stop reason: SDUPTHER

## 2021-01-01 RX ORDER — GABAPENTIN 400 MG/1
800 CAPSULE ORAL 2 TIMES DAILY
Status: DISCONTINUED | OUTPATIENT
Start: 2021-01-01 | End: 2021-01-01 | Stop reason: HOSPADM

## 2021-01-01 RX ORDER — EPINEPHRINE 1 MG/ML(1)
AMPUL (ML) INJECTION
Status: ACTIVE
Start: 2021-01-01 | End: 2021-01-01

## 2021-01-01 RX ORDER — TRAZODONE HYDROCHLORIDE 100 MG/1
100 TABLET ORAL NIGHTLY PRN
Status: DISCONTINUED | OUTPATIENT
Start: 2021-01-01 | End: 2021-01-01 | Stop reason: HOSPADM

## 2021-01-01 RX ORDER — DICYCLOMINE HYDROCHLORIDE 10 MG/1
10 CAPSULE ORAL
COMMUNITY
Start: 2021-01-01

## 2021-01-01 RX ORDER — OMEPRAZOLE 20 MG/1
20 CAPSULE, DELAYED RELEASE ORAL 2 TIMES DAILY
Status: DISCONTINUED | OUTPATIENT
Start: 2021-01-01 | End: 2021-01-01 | Stop reason: HOSPADM

## 2021-01-01 RX ORDER — LACTULOSE 10 G/15ML
30 SOLUTION ORAL
COMMUNITY
Start: 2021-01-01

## 2021-01-01 RX ORDER — ONDANSETRON 2 MG/ML
INJECTION INTRAMUSCULAR; INTRAVENOUS PRN
Status: DISCONTINUED | OUTPATIENT
Start: 2021-01-01 | End: 2021-01-01 | Stop reason: SURG

## 2021-01-01 RX ORDER — ONDANSETRON 4 MG/1
4 TABLET, ORALLY DISINTEGRATING ORAL ONCE
Status: CANCELLED | OUTPATIENT
Start: 2021-01-01 | End: 2021-01-01

## 2021-01-01 RX ORDER — HYDROMORPHONE HYDROCHLORIDE 1 MG/ML
0.2 INJECTION, SOLUTION INTRAMUSCULAR; INTRAVENOUS; SUBCUTANEOUS
Status: DISCONTINUED | OUTPATIENT
Start: 2021-01-01 | End: 2021-01-01 | Stop reason: HOSPADM

## 2021-01-01 RX ORDER — HALOPERIDOL 5 MG/ML
1 INJECTION INTRAMUSCULAR
Status: DISCONTINUED | OUTPATIENT
Start: 2021-01-01 | End: 2021-01-01 | Stop reason: HOSPADM

## 2021-01-01 RX ORDER — LORAZEPAM 2 MG/ML
2 INJECTION INTRAMUSCULAR
Status: DISCONTINUED | OUTPATIENT
Start: 2021-01-01 | End: 2021-01-01

## 2021-01-01 RX ORDER — INSULIN GLARGINE 100 [IU]/ML
20 INJECTION, SOLUTION SUBCUTANEOUS EVERY EVENING
Status: DISCONTINUED | OUTPATIENT
Start: 2021-01-01 | End: 2021-01-01

## 2021-01-01 RX ORDER — OXYCODONE HYDROCHLORIDE 5 MG/1
5 TABLET ORAL
Status: DISCONTINUED | OUTPATIENT
Start: 2021-01-01 | End: 2021-01-01 | Stop reason: HOSPADM

## 2021-01-01 RX ORDER — LISINOPRIL 5 MG/1
5 TABLET ORAL EVERY MORNING
COMMUNITY
Start: 2021-01-01 | End: 2021-01-01

## 2021-01-01 RX ORDER — CALCIUM GLUCONATE 20 MG/ML
2 INJECTION, SOLUTION INTRAVENOUS ONCE
Status: COMPLETED | OUTPATIENT
Start: 2021-01-01 | End: 2021-01-01

## 2021-01-01 RX ORDER — MORPHINE SULFATE 10 MG/ML
10 INJECTION, SOLUTION INTRAMUSCULAR; INTRAVENOUS
Status: DISCONTINUED | OUTPATIENT
Start: 2021-01-01 | End: 2021-01-01 | Stop reason: HOSPADM

## 2021-01-01 RX ORDER — EPINEPHRINE 0.1 MG/ML
1 SYRINGE (ML) INJECTION ONCE
Status: DISCONTINUED | OUTPATIENT
Start: 2021-01-01 | End: 2021-01-01

## 2021-01-01 RX ORDER — PROCHLORPERAZINE EDISYLATE 5 MG/ML
5-10 INJECTION INTRAMUSCULAR; INTRAVENOUS EVERY 4 HOURS PRN
Status: DISCONTINUED | OUTPATIENT
Start: 2021-01-01 | End: 2021-01-01 | Stop reason: HOSPADM

## 2021-01-01 RX ORDER — IPRATROPIUM BROMIDE AND ALBUTEROL SULFATE 2.5; .5 MG/3ML; MG/3ML
3 SOLUTION RESPIRATORY (INHALATION)
Status: DISCONTINUED | OUTPATIENT
Start: 2021-01-01 | End: 2021-01-01

## 2021-01-01 RX ORDER — HYDROMORPHONE HYDROCHLORIDE 1 MG/ML
0.5 INJECTION, SOLUTION INTRAMUSCULAR; INTRAVENOUS; SUBCUTANEOUS
Status: DISCONTINUED | OUTPATIENT
Start: 2021-01-01 | End: 2021-01-01

## 2021-01-01 RX ORDER — ETOMIDATE 2 MG/ML
20 INJECTION INTRAVENOUS ONCE
Status: COMPLETED | OUTPATIENT
Start: 2021-01-01 | End: 2021-01-01

## 2021-01-01 RX ORDER — SPIRONOLACTONE 50 MG/1
50 TABLET, FILM COATED ORAL
Status: DISCONTINUED | OUTPATIENT
Start: 2021-01-01 | End: 2021-01-01 | Stop reason: HOSPADM

## 2021-01-01 RX ORDER — SUCRALFATE ORAL 1 G/10ML
1 SUSPENSION ORAL EVERY 6 HOURS
Status: COMPLETED | OUTPATIENT
Start: 2021-01-01 | End: 2021-01-01

## 2021-01-01 RX ORDER — SODIUM CHLORIDE 9 MG/ML
1000 INJECTION, SOLUTION INTRAVENOUS ONCE
Status: COMPLETED | OUTPATIENT
Start: 2021-01-01 | End: 2021-01-01

## 2021-01-01 RX ORDER — SUCRALFATE ORAL 1 G/10ML
1 SUSPENSION ORAL 4 TIMES DAILY
Status: DISCONTINUED | OUTPATIENT
Start: 2021-01-01 | End: 2021-01-01

## 2021-01-01 RX ORDER — PROMETHAZINE HYDROCHLORIDE 12.5 MG/1
12.5-25 SUPPOSITORY RECTAL EVERY 4 HOURS PRN
Status: DISCONTINUED | OUTPATIENT
Start: 2021-01-01 | End: 2021-01-01 | Stop reason: HOSPADM

## 2021-01-01 RX ORDER — LIDOCAINE HYDROCHLORIDE 20 MG/ML
15 SOLUTION OROPHARYNGEAL ONCE
Status: COMPLETED | OUTPATIENT
Start: 2021-01-01 | End: 2021-01-01

## 2021-01-01 RX ORDER — SUCCINYLCHOLINE/SOD CL,ISO/PF 200MG/10ML
SYRINGE (ML) INTRAVENOUS PRN
Status: DISCONTINUED | OUTPATIENT
Start: 2021-01-01 | End: 2021-01-01 | Stop reason: SURG

## 2021-01-01 RX ORDER — OMEPRAZOLE 20 MG/1
20 CAPSULE, DELAYED RELEASE ORAL 2 TIMES DAILY
Qty: 28 CAPSULE | Refills: 0 | Status: ON HOLD | OUTPATIENT
Start: 2021-01-01 | End: 2021-01-01

## 2021-01-01 RX ORDER — MEPERIDINE HYDROCHLORIDE 25 MG/ML
25 INJECTION INTRAMUSCULAR; INTRAVENOUS; SUBCUTANEOUS
Status: DISCONTINUED | OUTPATIENT
Start: 2021-01-01 | End: 2021-01-01 | Stop reason: HOSPADM

## 2021-01-01 RX ORDER — MIDAZOLAM HYDROCHLORIDE 1 MG/ML
1 INJECTION INTRAMUSCULAR; INTRAVENOUS
Status: DISCONTINUED | OUTPATIENT
Start: 2021-01-01 | End: 2021-01-01 | Stop reason: HOSPADM

## 2021-01-01 RX ORDER — DOXYCYCLINE 100 MG/1
100 TABLET ORAL EVERY 12 HOURS
Status: DISCONTINUED | OUTPATIENT
Start: 2021-01-01 | End: 2021-01-01 | Stop reason: HOSPADM

## 2021-01-01 RX ORDER — HYDROMORPHONE HYDROCHLORIDE 1 MG/ML
0.1 INJECTION, SOLUTION INTRAMUSCULAR; INTRAVENOUS; SUBCUTANEOUS
Status: DISCONTINUED | OUTPATIENT
Start: 2021-01-01 | End: 2021-01-01 | Stop reason: HOSPADM

## 2021-01-01 RX ORDER — INSULIN GLARGINE 100 [IU]/ML
20-30 INJECTION, SOLUTION SUBCUTANEOUS 2 TIMES DAILY
Qty: 30 ML | Refills: 3 | Status: SHIPPED | OUTPATIENT
Start: 2021-01-01

## 2021-01-01 RX ORDER — HYDROMORPHONE HYDROCHLORIDE 1 MG/ML
.5-2 INJECTION, SOLUTION INTRAMUSCULAR; INTRAVENOUS; SUBCUTANEOUS
Status: DISCONTINUED | OUTPATIENT
Start: 2021-01-01 | End: 2021-01-01

## 2021-01-01 RX ORDER — INSULIN GLARGINE 100 [IU]/ML
0.2 INJECTION, SOLUTION SUBCUTANEOUS EVERY EVENING
Status: DISCONTINUED | OUTPATIENT
Start: 2021-01-01 | End: 2021-01-01

## 2021-01-01 RX ORDER — SPIRONOLACTONE 25 MG/1
25 TABLET ORAL
Status: ON HOLD | COMMUNITY
Start: 2021-01-01 | End: 2021-01-01 | Stop reason: SDUPTHER

## 2021-01-01 RX ORDER — SPIRONOLACTONE 50 MG/1
50 TABLET, FILM COATED ORAL DAILY
Status: ON HOLD | COMMUNITY
End: 2021-01-01

## 2021-01-01 RX ORDER — LISINOPRIL 5 MG/1
5 TABLET ORAL DAILY
Status: DISCONTINUED | OUTPATIENT
Start: 2021-01-01 | End: 2021-01-01 | Stop reason: HOSPADM

## 2021-01-01 RX ORDER — OMEPRAZOLE 20 MG/1
20 CAPSULE, DELAYED RELEASE ORAL EVERY MORNING
Status: SHIPPED | COMMUNITY
End: 2021-01-01 | Stop reason: SDUPTHER

## 2021-01-01 RX ORDER — LIDOCAINE HYDROCHLORIDE 20 MG/ML
5 SOLUTION OROPHARYNGEAL ONCE
Status: DISCONTINUED | OUTPATIENT
Start: 2021-01-01 | End: 2021-01-01

## 2021-01-01 RX ORDER — HYDROMORPHONE HYDROCHLORIDE 1 MG/ML
.5-1 INJECTION, SOLUTION INTRAMUSCULAR; INTRAVENOUS; SUBCUTANEOUS
Status: DISCONTINUED | OUTPATIENT
Start: 2021-01-01 | End: 2021-01-01

## 2021-01-01 RX ORDER — DOXYCYCLINE 100 MG/1
100 TABLET ORAL EVERY 12 HOURS
Qty: 28 TABLET | Refills: 0 | Status: SHIPPED | OUTPATIENT
Start: 2021-01-01 | End: 2021-01-01

## 2021-01-01 RX ORDER — OMEPRAZOLE 20 MG/1
20 CAPSULE, DELAYED RELEASE ORAL EVERY MORNING
Qty: 180 CAPSULE | Refills: 0 | Status: SHIPPED | OUTPATIENT
Start: 2021-01-01

## 2021-01-01 RX ORDER — LISINOPRIL 20 MG/1
20 TABLET ORAL DAILY
Status: DISCONTINUED | OUTPATIENT
Start: 2021-01-01 | End: 2021-01-01

## 2021-01-01 RX ORDER — GABAPENTIN 800 MG/1
800 TABLET ORAL 3 TIMES DAILY
COMMUNITY
End: 2021-01-01

## 2021-01-01 RX ORDER — FUROSEMIDE 40 MG/1
40 TABLET ORAL EVERY MORNING
Qty: 180 TABLET | Refills: 0 | Status: SHIPPED | OUTPATIENT
Start: 2021-01-01

## 2021-01-01 RX ORDER — GABAPENTIN 400 MG/1
800 CAPSULE ORAL 3 TIMES DAILY
Status: DISCONTINUED | OUTPATIENT
Start: 2021-01-01 | End: 2021-01-01 | Stop reason: HOSPADM

## 2021-01-01 RX ORDER — INSULIN GLARGINE 100 [IU]/ML
20-30 INJECTION, SOLUTION SUBCUTANEOUS 2 TIMES DAILY
COMMUNITY
End: 2021-01-01 | Stop reason: SDUPTHER

## 2021-01-01 RX ORDER — OXCARBAZEPINE 300 MG/1
600 TABLET, FILM COATED ORAL
Status: DISCONTINUED | OUTPATIENT
Start: 2021-01-01 | End: 2021-01-01 | Stop reason: HOSPADM

## 2021-01-01 RX ORDER — FLUOXETINE HYDROCHLORIDE 20 MG/1
20 CAPSULE ORAL DAILY
COMMUNITY
Start: 2021-01-01 | End: 2021-01-01

## 2021-01-01 RX ORDER — OXYCODONE HCL 5 MG/5 ML
10 SOLUTION, ORAL ORAL
Status: DISCONTINUED | OUTPATIENT
Start: 2021-01-01 | End: 2021-01-01 | Stop reason: HOSPADM

## 2021-01-01 RX ORDER — NICOTINE 21 MG/24HR
21 PATCH, TRANSDERMAL 24 HOURS TRANSDERMAL
Status: DISCONTINUED | OUTPATIENT
Start: 2021-01-01 | End: 2021-01-01 | Stop reason: HOSPADM

## 2021-01-01 RX ORDER — POLYVINYL ALCOHOL 14 MG/ML
2 SOLUTION/ DROPS OPHTHALMIC EVERY 6 HOURS PRN
Status: DISCONTINUED | OUTPATIENT
Start: 2021-01-01 | End: 2021-01-01 | Stop reason: HOSPADM

## 2021-01-01 RX ORDER — ATORVASTATIN CALCIUM 40 MG/1
40 TABLET, FILM COATED ORAL EVERY MORNING
Qty: 180 TABLET | Refills: 0 | Status: SHIPPED | OUTPATIENT
Start: 2021-01-01

## 2021-01-01 RX ORDER — SPIRONOLACTONE 50 MG/1
50 TABLET, FILM COATED ORAL DAILY
Status: DISCONTINUED | OUTPATIENT
Start: 2021-01-01 | End: 2021-01-01 | Stop reason: HOSPADM

## 2021-01-01 RX ORDER — PANTOPRAZOLE SODIUM 40 MG/10ML
40 INJECTION, POWDER, LYOPHILIZED, FOR SOLUTION INTRAVENOUS 2 TIMES DAILY
Status: DISCONTINUED | OUTPATIENT
Start: 2021-01-01 | End: 2021-01-01

## 2021-01-01 RX ORDER — CLONIDINE HYDROCHLORIDE 0.1 MG/1
0.1 TABLET ORAL EVERY 6 HOURS PRN
Status: DISCONTINUED | OUTPATIENT
Start: 2021-01-01 | End: 2021-01-01 | Stop reason: HOSPADM

## 2021-01-01 RX ORDER — ASPIRIN 81 MG/1
81 TABLET ORAL 2 TIMES DAILY
COMMUNITY
Start: 2021-01-01 | End: 2021-01-01 | Stop reason: SDUPTHER

## 2021-01-01 RX ORDER — SUCRALFATE ORAL 1 G/10ML
1 SUSPENSION ORAL 4 TIMES DAILY
Status: DISCONTINUED | OUTPATIENT
Start: 2021-01-01 | End: 2021-01-01 | Stop reason: HOSPADM

## 2021-01-01 RX ORDER — GABAPENTIN 800 MG/1
2400 TABLET ORAL
Qty: 360 TABLET | Refills: 0 | Status: SHIPPED | OUTPATIENT
Start: 2021-01-01

## 2021-01-01 RX ORDER — IPRATROPIUM BROMIDE AND ALBUTEROL SULFATE 2.5; .5 MG/3ML; MG/3ML
3 SOLUTION RESPIRATORY (INHALATION)
Status: DISCONTINUED | OUTPATIENT
Start: 2021-01-01 | End: 2021-01-01 | Stop reason: HOSPADM

## 2021-01-01 RX ORDER — GLYCOPYRROLATE 0.2 MG/ML
0.2 INJECTION INTRAMUSCULAR; INTRAVENOUS 3 TIMES DAILY PRN
Status: DISCONTINUED | OUTPATIENT
Start: 2021-01-01 | End: 2021-01-01 | Stop reason: HOSPADM

## 2021-01-01 RX ORDER — INSULIN GLARGINE 100 [IU]/ML
20 INJECTION, SOLUTION SUBCUTANEOUS 2 TIMES DAILY
Status: DISCONTINUED | OUTPATIENT
Start: 2021-01-01 | End: 2021-01-01 | Stop reason: HOSPADM

## 2021-01-01 RX ORDER — GAUZE BANDAGE 2" X 2"
100 BANDAGE TOPICAL DAILY
Status: DISCONTINUED | OUTPATIENT
Start: 2021-01-01 | End: 2021-01-01

## 2021-01-01 RX ORDER — LORAZEPAM 2 MG/ML
1-4 INJECTION INTRAMUSCULAR
Status: DISCONTINUED | OUTPATIENT
Start: 2021-01-01 | End: 2021-01-01 | Stop reason: HOSPADM

## 2021-01-01 RX ORDER — ALBUMIN (HUMAN) 12.5 G/50ML
50 SOLUTION INTRAVENOUS ONCE
Status: DISCONTINUED | OUTPATIENT
Start: 2021-01-01 | End: 2021-01-01

## 2021-01-01 RX ORDER — ATORVASTATIN CALCIUM 40 MG/1
40 TABLET, FILM COATED ORAL EVERY MORNING
COMMUNITY
Start: 2021-01-01 | End: 2021-01-01 | Stop reason: SDUPTHER

## 2021-01-01 RX ORDER — SPIRONOLACTONE 25 MG/1
50 TABLET ORAL
Qty: 30 TABLET | Refills: 3 | Status: SHIPPED | OUTPATIENT
Start: 2021-01-01 | End: 2021-01-01 | Stop reason: SDUPTHER

## 2021-01-01 RX ORDER — LISINOPRIL 10 MG/1
5 TABLET ORAL DAILY
Status: DISCONTINUED | OUTPATIENT
Start: 2021-01-01 | End: 2021-01-01

## 2021-01-01 RX ORDER — SPIRONOLACTONE 25 MG/1
50 TABLET ORAL DAILY
Status: DISCONTINUED | OUTPATIENT
Start: 2021-01-01 | End: 2021-01-01 | Stop reason: HOSPADM

## 2021-01-01 RX ORDER — NICOTINE 21 MG/24HR
14 PATCH, TRANSDERMAL 24 HOURS TRANSDERMAL
Status: DISCONTINUED | OUTPATIENT
Start: 2021-01-01 | End: 2021-01-01 | Stop reason: HOSPADM

## 2021-01-01 RX ORDER — ENALAPRILAT 1.25 MG/ML
1.25 INJECTION INTRAVENOUS EVERY 6 HOURS PRN
Status: DISCONTINUED | OUTPATIENT
Start: 2021-01-01 | End: 2021-01-01 | Stop reason: HOSPADM

## 2021-01-01 RX ORDER — SODIUM CHLORIDE, SODIUM LACTATE, POTASSIUM CHLORIDE, AND CALCIUM CHLORIDE .6; .31; .03; .02 G/100ML; G/100ML; G/100ML; G/100ML
1000 INJECTION, SOLUTION INTRAVENOUS ONCE
Status: COMPLETED | OUTPATIENT
Start: 2021-01-01 | End: 2021-01-01

## 2021-01-01 RX ORDER — AMLODIPINE BESYLATE 5 MG/1
5 TABLET ORAL
Status: DISCONTINUED | OUTPATIENT
Start: 2021-01-01 | End: 2021-01-01 | Stop reason: HOSPADM

## 2021-01-01 RX ORDER — OXCARBAZEPINE 300 MG/1
300-600 TABLET, FILM COATED ORAL
COMMUNITY
Start: 2021-01-01

## 2021-01-01 RX ADMIN — OCTREOTIDE ACETATE 50 MCG: 100 INJECTION, SOLUTION INTRAVENOUS; SUBCUTANEOUS at 03:13

## 2021-01-01 RX ADMIN — NICOTINE POLACRILEX 2 MG: 2 GUM, CHEWING BUCCAL at 21:28

## 2021-01-01 RX ADMIN — NICOTINE TRANSDERMAL SYSTEM 21 MG: 21 PATCH, EXTENDED RELEASE TRANSDERMAL at 07:33

## 2021-01-01 RX ADMIN — CALCIUM CHLORIDE 1000 MG: 100 INJECTION, SOLUTION INTRAVENOUS at 08:22

## 2021-01-01 RX ADMIN — PROPOFOL 100 MG: 10 INJECTION, EMULSION INTRAVENOUS at 08:39

## 2021-01-01 RX ADMIN — Medication 100 MG: at 08:36

## 2021-01-01 RX ADMIN — FENTANYL CITRATE 100 MCG: 50 INJECTION, SOLUTION INTRAMUSCULAR; INTRAVENOUS at 09:47

## 2021-01-01 RX ADMIN — CALCIUM GLUCONATE 2 G: 20 INJECTION, SOLUTION INTRAVENOUS at 09:45

## 2021-01-01 RX ADMIN — Medication 200 MCG: at 08:57

## 2021-01-01 RX ADMIN — NOREPINEPHRINE BITARTRATE 18 MCG/MIN: 1 INJECTION, SOLUTION, CONCENTRATE INTRAVENOUS at 15:06

## 2021-01-01 RX ADMIN — TRAZODONE HYDROCHLORIDE 100 MG: 50 TABLET ORAL at 21:28

## 2021-01-01 RX ADMIN — FENTANYL CITRATE 100 MCG: 50 INJECTION, SOLUTION INTRAMUSCULAR; INTRAVENOUS at 14:30

## 2021-01-01 RX ADMIN — HYDROMORPHONE HYDROCHLORIDE 1 MG: 1 INJECTION, SOLUTION INTRAMUSCULAR; INTRAVENOUS; SUBCUTANEOUS at 09:03

## 2021-01-01 RX ADMIN — ENOXAPARIN SODIUM 40 MG: 40 INJECTION SUBCUTANEOUS at 05:40

## 2021-01-01 RX ADMIN — INSULIN HUMAN 2 UNITS: 100 INJECTION, SOLUTION PARENTERAL at 14:11

## 2021-01-01 RX ADMIN — SPIRONOLACTONE 25 MG: 50 TABLET ORAL at 04:38

## 2021-01-01 RX ADMIN — ONDANSETRON 4 MG: 2 INJECTION INTRAMUSCULAR; INTRAVENOUS at 08:47

## 2021-01-01 RX ADMIN — SODIUM CHLORIDE, POTASSIUM CHLORIDE, SODIUM LACTATE AND CALCIUM CHLORIDE 500 ML: 600; 310; 30; 20 INJECTION, SOLUTION INTRAVENOUS at 00:00

## 2021-01-01 RX ADMIN — PROPOFOL 200 MG: 10 INJECTION, EMULSION INTRAVENOUS at 08:36

## 2021-01-01 RX ADMIN — VASOPRESSIN 0.03 UNITS/MIN: 20 INJECTION INTRAVENOUS at 03:34

## 2021-01-01 RX ADMIN — GABAPENTIN 800 MG: 400 CAPSULE ORAL at 05:31

## 2021-01-01 RX ADMIN — OMEPRAZOLE 20 MG: 20 CAPSULE, DELAYED RELEASE ORAL at 04:38

## 2021-01-01 RX ADMIN — NOREPINEPHRINE BITARTRATE 15 MCG/MIN: 1 INJECTION, SOLUTION, CONCENTRATE INTRAVENOUS at 23:56

## 2021-01-01 RX ADMIN — IOHEXOL 97 ML: 350 INJECTION, SOLUTION INTRAVENOUS at 00:45

## 2021-01-01 RX ADMIN — GABAPENTIN 800 MG: 400 CAPSULE ORAL at 05:40

## 2021-01-01 RX ADMIN — ONDANSETRON 4 MG: 2 INJECTION INTRAMUSCULAR; INTRAVENOUS at 13:57

## 2021-01-01 RX ADMIN — LISINOPRIL 5 MG: 5 TABLET ORAL at 04:38

## 2021-01-01 RX ADMIN — DOCUSATE SODIUM 50 MG AND SENNOSIDES 8.6 MG 2 TABLET: 8.6; 5 TABLET, FILM COATED ORAL at 05:41

## 2021-01-01 RX ADMIN — INSULIN HUMAN 2 UNITS: 100 INJECTION, SOLUTION PARENTERAL at 17:24

## 2021-01-01 RX ADMIN — OXCARBAZEPINE 300 MG: 300 TABLET, FILM COATED ORAL at 05:32

## 2021-01-01 RX ADMIN — SPIRONOLACTONE 50 MG: 50 TABLET ORAL at 05:20

## 2021-01-01 RX ADMIN — CEFTRIAXONE SODIUM 1 G: 1 INJECTION, POWDER, FOR SOLUTION INTRAMUSCULAR; INTRAVENOUS at 05:40

## 2021-01-01 RX ADMIN — ONDANSETRON 8 MG: 2 INJECTION INTRAMUSCULAR; INTRAVENOUS at 10:01

## 2021-01-01 RX ADMIN — MORPHINE SULFATE 10 MG: 10 INJECTION INTRAVENOUS at 18:20

## 2021-01-01 RX ADMIN — INSULIN HUMAN 1 UNITS: 100 INJECTION, SOLUTION PARENTERAL at 17:56

## 2021-01-01 RX ADMIN — PROPOFOL 20 MCG/KG/MIN: 10 INJECTION, EMULSION INTRAVENOUS at 19:25

## 2021-01-01 RX ADMIN — MIDAZOLAM HYDROCHLORIDE 2 MG: 1 INJECTION, SOLUTION INTRAMUSCULAR; INTRAVENOUS at 14:26

## 2021-01-01 RX ADMIN — METOCLOPRAMIDE 10 MG: 5 INJECTION, SOLUTION INTRAMUSCULAR; INTRAVENOUS at 07:39

## 2021-01-01 RX ADMIN — MORPHINE SULFATE 10 MG: 10 INJECTION INTRAVENOUS at 01:06

## 2021-01-01 RX ADMIN — DOXYCYCLINE 100 MG: 100 TABLET, FILM COATED ORAL at 04:37

## 2021-01-01 RX ADMIN — DEXTROSE MONOHYDRATE 50 ML: 25 INJECTION, SOLUTION INTRAVENOUS at 05:45

## 2021-01-01 RX ADMIN — OXCARBAZEPINE 300 MG: 300 TABLET, FILM COATED ORAL at 07:07

## 2021-01-01 RX ADMIN — INSULIN HUMAN 2 UNITS: 100 INJECTION, SOLUTION PARENTERAL at 11:37

## 2021-01-01 RX ADMIN — GABAPENTIN 800 MG: 400 CAPSULE ORAL at 05:20

## 2021-01-01 RX ADMIN — HYDROMORPHONE HYDROCHLORIDE 0.5 MG: 10 INJECTION INTRAMUSCULAR; INTRAVENOUS; SUBCUTANEOUS at 17:36

## 2021-01-01 RX ADMIN — INSULIN HUMAN 1 UNITS: 100 INJECTION, SOLUTION PARENTERAL at 09:02

## 2021-01-01 RX ADMIN — ONDANSETRON 4 MG: 2 INJECTION INTRAMUSCULAR; INTRAVENOUS at 08:36

## 2021-01-01 RX ADMIN — OMEPRAZOLE 20 MG: 20 CAPSULE, DELAYED RELEASE ORAL at 17:37

## 2021-01-01 RX ADMIN — LIDOCAINE HYDROCHLORIDE 15 ML: 20 SOLUTION ORAL; TOPICAL at 10:04

## 2021-01-01 RX ADMIN — ONDANSETRON 4 MG: 4 TABLET, ORALLY DISINTEGRATING ORAL at 00:21

## 2021-01-01 RX ADMIN — ROCURONIUM BROMIDE 5 MG: 10 INJECTION, SOLUTION INTRAVENOUS at 08:39

## 2021-01-01 RX ADMIN — NOREPINEPHRINE BITARTRATE 26 MCG/MIN: 1 INJECTION, SOLUTION, CONCENTRATE INTRAVENOUS at 04:27

## 2021-01-01 RX ADMIN — GABAPENTIN 800 MG: 400 CAPSULE ORAL at 05:41

## 2021-01-01 RX ADMIN — OXCARBAZEPINE 300 MG: 300 TABLET, FILM COATED ORAL at 05:39

## 2021-01-01 RX ADMIN — SODIUM CHLORIDE, POTASSIUM CHLORIDE, SODIUM LACTATE AND CALCIUM CHLORIDE: 600; 310; 30; 20 INJECTION, SOLUTION INTRAVENOUS at 03:59

## 2021-01-01 RX ADMIN — DOXYCYCLINE 100 MG: 100 TABLET, FILM COATED ORAL at 17:59

## 2021-01-01 RX ADMIN — DEXAMETHASONE SODIUM PHOSPHATE 4 MG: 4 INJECTION, SOLUTION INTRAMUSCULAR; INTRAVENOUS at 14:37

## 2021-01-01 RX ADMIN — HYDROMORPHONE HYDROCHLORIDE 1 MG: 1 INJECTION, SOLUTION INTRAMUSCULAR; INTRAVENOUS; SUBCUTANEOUS at 21:29

## 2021-01-01 RX ADMIN — SPIRONOLACTONE 50 MG: 25 TABLET ORAL at 05:41

## 2021-01-01 RX ADMIN — DOXYCYCLINE 100 MG: 100 TABLET, FILM COATED ORAL at 05:20

## 2021-01-01 RX ADMIN — Medication 200 MCG: at 14:33

## 2021-01-01 RX ADMIN — TRIAMCINOLONE ACETONIDE: 1 CREAM TOPICAL at 11:23

## 2021-01-01 RX ADMIN — INSULIN HUMAN 1 UNITS: 100 INJECTION, SOLUTION PARENTERAL at 13:36

## 2021-01-01 RX ADMIN — PANTOPRAZOLE SODIUM 8 MG/HR: 40 INJECTION, POWDER, FOR SOLUTION INTRAVENOUS at 04:13

## 2021-01-01 RX ADMIN — GABAPENTIN 800 MG: 400 CAPSULE ORAL at 17:48

## 2021-01-01 RX ADMIN — SODIUM CHLORIDE, POTASSIUM CHLORIDE, SODIUM LACTATE AND CALCIUM CHLORIDE: 600; 310; 30; 20 INJECTION, SOLUTION INTRAVENOUS at 04:47

## 2021-01-01 RX ADMIN — METOCLOPRAMIDE 10 MG: 5 INJECTION, SOLUTION INTRAMUSCULAR; INTRAVENOUS at 05:41

## 2021-01-01 RX ADMIN — LORAZEPAM 2 MG: 2 INJECTION INTRAMUSCULAR; INTRAVENOUS at 23:23

## 2021-01-01 RX ADMIN — LIDOCAINE HYDROCHLORIDE 30 MG: 20 INJECTION, SOLUTION EPIDURAL; INFILTRATION; INTRACAUDAL; PERINEURAL at 14:30

## 2021-01-01 RX ADMIN — FUROSEMIDE 40 MG: 40 TABLET ORAL at 04:38

## 2021-01-01 RX ADMIN — OCTREOTIDE ACETATE 50 MCG/HR: 200 INJECTION, SOLUTION INTRAVENOUS; SUBCUTANEOUS at 04:51

## 2021-01-01 RX ADMIN — LORAZEPAM 2 MG: 2 INJECTION INTRAMUSCULAR; INTRAVENOUS at 02:14

## 2021-01-01 RX ADMIN — ROCURONIUM BROMIDE 30 MG: 10 INJECTION INTRAVENOUS at 14:30

## 2021-01-01 RX ADMIN — ROCURONIUM BROMIDE 100 MG: 10 INJECTION, SOLUTION INTRAVENOUS at 10:02

## 2021-01-01 RX ADMIN — DIPHENHYDRAMINE HYDROCHLORIDE 25 MG: 50 INJECTION INTRAMUSCULAR; INTRAVENOUS at 13:58

## 2021-01-01 RX ADMIN — SUCRALFATE 1 G: 1 SUSPENSION ORAL at 17:49

## 2021-01-01 RX ADMIN — OMEPRAZOLE 20 MG: 20 CAPSULE, DELAYED RELEASE ORAL at 18:34

## 2021-01-01 RX ADMIN — SUCRALFATE 1 G: 1 SUSPENSION ORAL at 04:15

## 2021-01-01 RX ADMIN — SUCRALFATE 1 G: 1 SUSPENSION ORAL at 20:47

## 2021-01-01 RX ADMIN — ONDANSETRON 4 MG: 2 INJECTION INTRAMUSCULAR; INTRAVENOUS at 02:58

## 2021-01-01 RX ADMIN — NICOTINE TRANSDERMAL SYSTEM 21 MG: 21 PATCH, EXTENDED RELEASE TRANSDERMAL at 05:41

## 2021-01-01 RX ADMIN — LISINOPRIL 5 MG: 10 TABLET ORAL at 06:59

## 2021-01-01 RX ADMIN — FUROSEMIDE 40 MG: 40 TABLET ORAL at 06:58

## 2021-01-01 RX ADMIN — DIPHENHYDRAMINE HYDROCHLORIDE 25 MG: 50 INJECTION INTRAMUSCULAR; INTRAVENOUS at 20:48

## 2021-01-01 RX ADMIN — FUROSEMIDE 40 MG: 40 TABLET ORAL at 05:40

## 2021-01-01 RX ADMIN — INSULIN GLARGINE 20 UNITS: 100 INJECTION, SOLUTION SUBCUTANEOUS at 21:25

## 2021-01-01 RX ADMIN — ETOMIDATE 20 MG: 2 INJECTION INTRAVENOUS at 10:02

## 2021-01-01 RX ADMIN — THIAMINE HYDROCHLORIDE 500 MG: 100 INJECTION, SOLUTION INTRAMUSCULAR; INTRAVENOUS at 05:41

## 2021-01-01 RX ADMIN — OXCARBAZEPINE 600 MG: 300 TABLET, FILM COATED ORAL at 21:29

## 2021-01-01 RX ADMIN — OXCARBAZEPINE 600 MG: 300 TABLET, FILM COATED ORAL at 17:59

## 2021-01-01 RX ADMIN — OMEPRAZOLE 20 MG: 20 CAPSULE, DELAYED RELEASE ORAL at 04:39

## 2021-01-01 RX ADMIN — SODIUM CHLORIDE 1000 ML: 9 INJECTION, SOLUTION INTRAVENOUS at 04:27

## 2021-01-01 RX ADMIN — ONDANSETRON 4 MG: 2 INJECTION INTRAMUSCULAR; INTRAVENOUS at 17:20

## 2021-01-01 RX ADMIN — NICOTINE 14 MG: 14 PATCH TRANSDERMAL at 05:41

## 2021-01-01 RX ADMIN — MAGNESIUM SULFATE HEPTAHYDRATE 2 G: 40 INJECTION, SOLUTION INTRAVENOUS at 09:04

## 2021-01-01 RX ADMIN — SPIRONOLACTONE 50 MG: 50 TABLET ORAL at 05:40

## 2021-01-01 RX ADMIN — SODIUM CHLORIDE: 9 INJECTION, SOLUTION INTRAVENOUS at 04:33

## 2021-01-01 RX ADMIN — LISINOPRIL 5 MG: 10 TABLET ORAL at 05:41

## 2021-01-01 RX ADMIN — INSULIN HUMAN 2 UNITS: 100 INJECTION, SOLUTION PARENTERAL at 21:11

## 2021-01-01 RX ADMIN — DEXMEDETOMIDINE HYDROCHLORIDE 0.2 MCG/KG/HR: 100 INJECTION, SOLUTION INTRAVENOUS at 14:03

## 2021-01-01 RX ADMIN — ONDANSETRON 4 MG: 2 INJECTION INTRAMUSCULAR; INTRAVENOUS at 03:05

## 2021-01-01 RX ADMIN — DOXYCYCLINE 100 MG: 100 TABLET, FILM COATED ORAL at 19:06

## 2021-01-01 RX ADMIN — LORAZEPAM 2 MG: 2 INJECTION INTRAMUSCULAR; INTRAVENOUS at 10:59

## 2021-01-01 RX ADMIN — VASOPRESSIN 0.03 UNITS/MIN: 20 INJECTION INTRAVENOUS at 05:40

## 2021-01-01 RX ADMIN — HYDROMORPHONE HYDROCHLORIDE 0.5 MG: 1 INJECTION, SOLUTION INTRAMUSCULAR; INTRAVENOUS; SUBCUTANEOUS at 17:21

## 2021-01-01 RX ADMIN — OCTREOTIDE ACETATE 50 MCG/HR: 200 INJECTION, SOLUTION INTRAVENOUS; SUBCUTANEOUS at 04:22

## 2021-01-01 RX ADMIN — GABAPENTIN 800 MG: 400 CAPSULE ORAL at 04:38

## 2021-01-01 RX ADMIN — INSULIN HUMAN 1 UNITS: 100 INJECTION, SOLUTION PARENTERAL at 20:20

## 2021-01-01 RX ADMIN — DOCUSATE SODIUM 50 MG AND SENNOSIDES 8.6 MG 2 TABLET: 8.6; 5 TABLET, FILM COATED ORAL at 17:37

## 2021-01-01 RX ADMIN — PANTOPRAZOLE SODIUM 40 MG: 40 INJECTION, POWDER, FOR SOLUTION INTRAVENOUS at 18:29

## 2021-01-01 RX ADMIN — ACETAMINOPHEN 650 MG: 325 TABLET, FILM COATED ORAL at 17:52

## 2021-01-01 RX ADMIN — SODIUM CHLORIDE, POTASSIUM CHLORIDE, SODIUM LACTATE AND CALCIUM CHLORIDE: 600; 310; 30; 20 INJECTION, SOLUTION INTRAVENOUS at 08:38

## 2021-01-01 RX ADMIN — OMEPRAZOLE 20 MG: 20 CAPSULE, DELAYED RELEASE ORAL at 17:48

## 2021-01-01 RX ADMIN — POVIDONE IODINE 15 ML: 100 SOLUTION TOPICAL at 08:01

## 2021-01-01 RX ADMIN — THIAMINE HYDROCHLORIDE 500 MG: 100 INJECTION, SOLUTION INTRAMUSCULAR; INTRAVENOUS at 05:40

## 2021-01-01 RX ADMIN — ONDANSETRON 4 MG: 2 INJECTION INTRAMUSCULAR; INTRAVENOUS at 06:59

## 2021-01-01 RX ADMIN — SODIUM CHLORIDE, POTASSIUM CHLORIDE, SODIUM LACTATE AND CALCIUM CHLORIDE: 600; 310; 30; 20 INJECTION, SOLUTION INTRAVENOUS at 08:02

## 2021-01-01 RX ADMIN — GABAPENTIN 800 MG: 400 CAPSULE ORAL at 21:28

## 2021-01-01 RX ADMIN — SODIUM CHLORIDE, POTASSIUM CHLORIDE, SODIUM LACTATE AND CALCIUM CHLORIDE: 600; 310; 30; 20 INJECTION, SOLUTION INTRAVENOUS at 12:28

## 2021-01-01 RX ADMIN — INSULIN GLARGINE 20 UNITS: 100 INJECTION, SOLUTION SUBCUTANEOUS at 09:16

## 2021-01-01 RX ADMIN — OMEPRAZOLE 20 MG: 20 CAPSULE, DELAYED RELEASE ORAL at 05:32

## 2021-01-01 RX ADMIN — ONDANSETRON 4 MG: 4 TABLET, ORALLY DISINTEGRATING ORAL at 10:01

## 2021-01-01 RX ADMIN — SODIUM CHLORIDE 1000 ML: 9 INJECTION, SOLUTION INTRAVENOUS at 16:20

## 2021-01-01 RX ADMIN — SODIUM CHLORIDE, POTASSIUM CHLORIDE, SODIUM LACTATE AND CALCIUM CHLORIDE 500 ML: 600; 310; 30; 20 INJECTION, SOLUTION INTRAVENOUS at 20:35

## 2021-01-01 RX ADMIN — OMEPRAZOLE 20 MG: 20 CAPSULE, DELAYED RELEASE ORAL at 17:59

## 2021-01-01 RX ADMIN — GABAPENTIN 800 MG: 400 CAPSULE ORAL at 18:34

## 2021-01-01 RX ADMIN — OXCARBAZEPINE 600 MG: 300 TABLET, FILM COATED ORAL at 18:34

## 2021-01-01 RX ADMIN — PANTOPRAZOLE SODIUM 40 MG: 40 INJECTION, POWDER, FOR SOLUTION INTRAVENOUS at 05:41

## 2021-01-01 RX ADMIN — GABAPENTIN 800 MG: 400 CAPSULE ORAL at 04:40

## 2021-01-01 RX ADMIN — SODIUM CHLORIDE, POTASSIUM CHLORIDE, SODIUM LACTATE AND CALCIUM CHLORIDE: 600; 310; 30; 20 INJECTION, SOLUTION INTRAVENOUS at 00:37

## 2021-01-01 RX ADMIN — INSULIN HUMAN 1 UNITS: 100 INJECTION, SOLUTION PARENTERAL at 13:45

## 2021-01-01 RX ADMIN — PANTOPRAZOLE SODIUM 40 MG: 40 INJECTION, POWDER, FOR SOLUTION INTRAVENOUS at 14:02

## 2021-01-01 RX ADMIN — LIDOCAINE HYDROCHLORIDE 0.5 ML: 10 INJECTION, SOLUTION EPIDURAL; INFILTRATION; INTRACAUDAL; PERINEURAL at 12:49

## 2021-01-01 RX ADMIN — LABETALOL HYDROCHLORIDE 10 MG: 5 INJECTION, SOLUTION INTRAVENOUS at 21:31

## 2021-01-01 RX ADMIN — DOCUSATE SODIUM 50 MG AND SENNOSIDES 8.6 MG 2 TABLET: 8.6; 5 TABLET, FILM COATED ORAL at 18:34

## 2021-01-01 RX ADMIN — FUROSEMIDE 40 MG: 40 TABLET ORAL at 05:20

## 2021-01-01 RX ADMIN — DOXYCYCLINE 100 MG: 100 TABLET, FILM COATED ORAL at 04:38

## 2021-01-01 RX ADMIN — SUGAMMADEX 150 MG: 100 INJECTION, SOLUTION INTRAVENOUS at 14:54

## 2021-01-01 RX ADMIN — DOCUSATE SODIUM 50 MG AND SENNOSIDES 8.6 MG 2 TABLET: 8.6; 5 TABLET, FILM COATED ORAL at 21:28

## 2021-01-01 RX ADMIN — NITROGLYCERIN 0.5 INCH: 20 OINTMENT TOPICAL at 00:37

## 2021-01-01 RX ADMIN — Medication 200 MCG: at 14:36

## 2021-01-01 RX ADMIN — PANTOPRAZOLE SODIUM 80 MG: 40 INJECTION, POWDER, FOR SOLUTION INTRAVENOUS at 03:55

## 2021-01-01 RX ADMIN — FUROSEMIDE 40 MG: 40 TABLET ORAL at 05:31

## 2021-01-01 RX ADMIN — MORPHINE SULFATE 4 MG: 4 INJECTION INTRAVENOUS at 07:33

## 2021-01-01 RX ADMIN — PROPOFOL 10 MCG/KG/MIN: 10 INJECTION, EMULSION INTRAVENOUS at 09:45

## 2021-01-01 RX ADMIN — OMEPRAZOLE 20 MG: 20 CAPSULE, DELAYED RELEASE ORAL at 05:41

## 2021-01-01 RX ADMIN — METOCLOPRAMIDE 10 MG: 5 INJECTION, SOLUTION INTRAMUSCULAR; INTRAVENOUS at 12:31

## 2021-01-01 RX ADMIN — SODIUM CHLORIDE, POTASSIUM CHLORIDE, SODIUM LACTATE AND CALCIUM CHLORIDE 1000 ML: 600; 310; 30; 20 INJECTION, SOLUTION INTRAVENOUS at 16:30

## 2021-01-01 RX ADMIN — NOREPINEPHRINE BITARTRATE 30 MCG/MIN: 1 INJECTION, SOLUTION, CONCENTRATE INTRAVENOUS at 09:50

## 2021-01-01 RX ADMIN — OXCARBAZEPINE 300 MG: 300 TABLET, FILM COATED ORAL at 04:37

## 2021-01-01 RX ADMIN — Medication 100 MCG: at 14:30

## 2021-01-01 RX ADMIN — LORAZEPAM 2 MG: 2 INJECTION INTRAMUSCULAR; INTRAVENOUS at 18:20

## 2021-01-01 RX ADMIN — DEXAMETHASONE SODIUM PHOSPHATE 4 MG: 4 INJECTION, SOLUTION INTRA-ARTICULAR; INTRALESIONAL; INTRAMUSCULAR; INTRAVENOUS; SOFT TISSUE at 08:47

## 2021-01-01 RX ADMIN — CEFTRIAXONE SODIUM 1 G: 1 INJECTION, POWDER, FOR SOLUTION INTRAMUSCULAR; INTRAVENOUS at 08:46

## 2021-01-01 RX ADMIN — OXCARBAZEPINE 600 MG: 300 TABLET, FILM COATED ORAL at 19:06

## 2021-01-01 RX ADMIN — SPIRONOLACTONE 25 MG: 50 TABLET ORAL at 05:31

## 2021-01-01 RX ADMIN — NOREPINEPHRINE BITARTRATE 20 MCG/MIN: 1 INJECTION, SOLUTION, CONCENTRATE INTRAVENOUS at 08:36

## 2021-01-01 RX ADMIN — Medication 0.5 ML: at 12:49

## 2021-01-01 RX ADMIN — DEXTROSE MONOHYDRATE 50 ML: 25 INJECTION, SOLUTION INTRAVENOUS at 12:18

## 2021-01-01 RX ADMIN — LISINOPRIL 5 MG: 5 TABLET ORAL at 05:31

## 2021-01-01 RX ADMIN — DOCUSATE SODIUM 50 MG AND SENNOSIDES 8.6 MG 2 TABLET: 8.6; 5 TABLET, FILM COATED ORAL at 20:16

## 2021-01-01 RX ADMIN — SODIUM CHLORIDE, POTASSIUM CHLORIDE, SODIUM LACTATE AND CALCIUM CHLORIDE 1000 ML: 600; 310; 30; 20 INJECTION, SOLUTION INTRAVENOUS at 12:30

## 2021-01-01 RX ADMIN — OXCARBAZEPINE 300 MG: 300 TABLET, FILM COATED ORAL at 09:19

## 2021-01-01 RX ADMIN — SUCRALFATE 1 G: 1 SUSPENSION ORAL at 09:18

## 2021-01-01 RX ADMIN — MORPHINE SULFATE 4 MG: 4 INJECTION INTRAVENOUS at 01:01

## 2021-01-01 RX ADMIN — SODIUM CHLORIDE, POTASSIUM CHLORIDE, SODIUM LACTATE AND CALCIUM CHLORIDE: 600; 310; 30; 20 INJECTION, SOLUTION INTRAVENOUS at 12:48

## 2021-01-01 RX ADMIN — ONDANSETRON 4 MG: 2 INJECTION INTRAMUSCULAR; INTRAVENOUS at 14:37

## 2021-01-01 RX ADMIN — OXCARBAZEPINE 300 MG: 300 TABLET, FILM COATED ORAL at 05:20

## 2021-01-01 RX ADMIN — METOCLOPRAMIDE 10 MG: 5 INJECTION, SOLUTION INTRAMUSCULAR; INTRAVENOUS at 00:13

## 2021-01-01 RX ADMIN — KETOROLAC TROMETHAMINE 15 MG: 30 INJECTION, SOLUTION INTRAMUSCULAR at 14:37

## 2021-01-01 RX ADMIN — SODIUM CHLORIDE, POTASSIUM CHLORIDE, SODIUM LACTATE AND CALCIUM CHLORIDE: 600; 310; 30; 20 INJECTION, SOLUTION INTRAVENOUS at 09:45

## 2021-01-01 RX ADMIN — PANTOPRAZOLE SODIUM 40 MG: 40 INJECTION, POWDER, FOR SOLUTION INTRAVENOUS at 05:40

## 2021-01-01 RX ADMIN — MAGNESIUM SULFATE 2 G: 2 INJECTION INTRAVENOUS at 11:46

## 2021-01-01 RX ADMIN — SUCCINYLCHOLINE CHLORIDE 100 MG: 20 INJECTION, SOLUTION INTRAMUSCULAR; INTRAVENOUS; PARENTERAL at 08:39

## 2021-01-01 RX ADMIN — LISINOPRIL 5 MG: 5 TABLET ORAL at 05:20

## 2021-01-01 RX ADMIN — GABAPENTIN 800 MG: 400 CAPSULE ORAL at 17:59

## 2021-01-01 RX ADMIN — FENTANYL CITRATE 100 MCG: 50 INJECTION, SOLUTION INTRAMUSCULAR; INTRAVENOUS at 10:58

## 2021-01-01 RX ADMIN — DOCUSATE SODIUM 50 MG AND SENNOSIDES 8.6 MG 2 TABLET: 8.6; 5 TABLET, FILM COATED ORAL at 04:39

## 2021-01-01 RX ADMIN — IOHEXOL 100 ML: 350 INJECTION, SOLUTION INTRAVENOUS at 01:38

## 2021-01-01 RX ADMIN — OCTREOTIDE ACETATE 50 MCG/HR: 200 INJECTION, SOLUTION INTRAVENOUS; SUBCUTANEOUS at 14:13

## 2021-01-01 RX ADMIN — MORPHINE SULFATE 5 MG: 10 INJECTION INTRAVENOUS at 23:19

## 2021-01-01 RX ADMIN — SPIRONOLACTONE 25 MG: 50 TABLET ORAL at 04:37

## 2021-01-01 RX ADMIN — ENOXAPARIN SODIUM 40 MG: 40 INJECTION SUBCUTANEOUS at 17:37

## 2021-01-01 RX ADMIN — INSULIN HUMAN 2 UNITS: 100 INJECTION, SOLUTION PARENTERAL at 17:40

## 2021-01-01 RX ADMIN — LIDOCAINE HYDROCHLORIDE 50 MG: 20 INJECTION, SOLUTION EPIDURAL; INFILTRATION; INTRACAUDAL at 08:36

## 2021-01-01 RX ADMIN — THIAMINE HYDROCHLORIDE 500 MG: 100 INJECTION, SOLUTION INTRAMUSCULAR; INTRAVENOUS at 14:16

## 2021-01-01 RX ADMIN — ACETAMINOPHEN 650 MG: 325 TABLET, FILM COATED ORAL at 09:58

## 2021-01-01 RX ADMIN — LISINOPRIL 20 MG: 20 TABLET ORAL at 23:17

## 2021-01-01 RX ADMIN — OXCARBAZEPINE 600 MG: 300 TABLET, FILM COATED ORAL at 17:48

## 2021-01-01 RX ADMIN — DOXYCYCLINE 100 MG: 100 TABLET, FILM COATED ORAL at 13:32

## 2021-01-01 RX ADMIN — FUROSEMIDE 40 MG: 40 TABLET ORAL at 04:39

## 2021-01-01 RX ADMIN — ONDANSETRON 4 MG: 4 TABLET, ORALLY DISINTEGRATING ORAL at 13:36

## 2021-01-01 RX ADMIN — GABAPENTIN 800 MG: 400 CAPSULE ORAL at 17:36

## 2021-01-01 RX ADMIN — OCTREOTIDE ACETATE 50 MCG/HR: 200 INJECTION, SOLUTION INTRAVENOUS; SUBCUTANEOUS at 05:40

## 2021-01-01 RX ADMIN — NOREPINEPHRINE BITARTRATE 10 MCG/MIN: 1 INJECTION, SOLUTION, CONCENTRATE INTRAVENOUS at 10:18

## 2021-01-01 RX ADMIN — MORPHINE SULFATE 10 MG: 10 INJECTION INTRAVENOUS at 02:52

## 2021-01-01 RX ADMIN — LIDOCAINE HYDROCHLORIDE 0.5 ML: 10 INJECTION, SOLUTION EPIDURAL; INFILTRATION; INTRACAUDAL at 08:02

## 2021-01-01 RX ADMIN — SODIUM CHLORIDE 1000 ML: 9 INJECTION, SOLUTION INTRAVENOUS at 03:06

## 2021-01-01 RX ADMIN — OMEPRAZOLE 20 MG: 20 CAPSULE, DELAYED RELEASE ORAL at 05:20

## 2021-01-01 RX ADMIN — LABETALOL HYDROCHLORIDE 20 MG: 5 INJECTION, SOLUTION INTRAVENOUS at 02:58

## 2021-01-01 RX ADMIN — AMLODIPINE BESYLATE 5 MG: 5 TABLET ORAL at 21:28

## 2021-01-01 RX ADMIN — PROPOFOL 100 MG: 10 INJECTION, EMULSION INTRAVENOUS at 14:30

## 2021-01-01 RX ADMIN — ACETAMINOPHEN 650 MG: 325 TABLET ORAL at 16:30

## 2021-01-01 RX ADMIN — DOXYCYCLINE 100 MG: 100 TABLET, FILM COATED ORAL at 17:47

## 2021-01-01 RX ADMIN — SODIUM BICARBONATE: 84 INJECTION, SOLUTION INTRAVENOUS at 09:45

## 2021-01-01 RX ADMIN — SPIRONOLACTONE 50 MG: 50 TABLET ORAL at 06:58

## 2021-01-01 SDOH — ECONOMIC STABILITY: FOOD INSECURITY: WITHIN THE PAST 12 MONTHS, YOU WORRIED THAT YOUR FOOD WOULD RUN OUT BEFORE YOU GOT MONEY TO BUY MORE.: SOMETIMES TRUE

## 2021-01-01 SDOH — ECONOMIC STABILITY: FOOD INSECURITY: WITHIN THE PAST 12 MONTHS, THE FOOD YOU BOUGHT JUST DIDN'T LAST AND YOU DIDN'T HAVE MONEY TO GET MORE.: SOMETIMES TRUE

## 2021-01-01 ASSESSMENT — LIFESTYLE VARIABLES
TOTAL SCORE: 0
ALCOHOL_USE: NO
TOTAL SCORE: 0
AVERAGE NUMBER OF DAYS PER WEEK YOU HAVE A DRINK CONTAINING ALCOHOL: 0
HAVE YOU EVER FELT YOU SHOULD CUT DOWN ON YOUR DRINKING: NO
DOES PATIENT WANT TO STOP DRINKING: NO
EVER HAD A DRINK FIRST THING IN THE MORNING TO STEADY YOUR NERVES TO GET RID OF A HANGOVER: NO
EVER FELT BAD OR GUILTY ABOUT YOUR DRINKING: NO
TOTAL SCORE: 0
CONSUMPTION TOTAL: NEGATIVE
DOES PATIENT WANT TO STOP DRINKING: NO
SUBSTANCE_ABUSE: 0
HOW MANY TIMES IN THE PAST YEAR HAVE YOU HAD 5 OR MORE DRINKS IN A DAY: 0
TOTAL SCORE: 0
TOTAL SCORE: 0
HAVE YOU EVER FELT YOU SHOULD CUT DOWN ON YOUR DRINKING: NO
HOW MANY TIMES IN THE PAST YEAR HAVE YOU HAD 5 OR MORE DRINKS IN A DAY: 0
EVER FELT BAD OR GUILTY ABOUT YOUR DRINKING: NO
HAVE PEOPLE ANNOYED YOU BY CRITICIZING YOUR DRINKING: NO
CONSUMPTION TOTAL: NEGATIVE
HAVE PEOPLE ANNOYED YOU BY CRITICIZING YOUR DRINKING: NO
ALCOHOL_USE: NO
ON A TYPICAL DAY WHEN YOU DRINK ALCOHOL HOW MANY DRINKS DO YOU HAVE: 0
AVERAGE NUMBER OF DAYS PER WEEK YOU HAVE A DRINK CONTAINING ALCOHOL: 0
ON A TYPICAL DAY WHEN YOU DRINK ALCOHOL HOW MANY DRINKS DO YOU HAVE: 0
TOTAL SCORE: 0
EVER HAD A DRINK FIRST THING IN THE MORNING TO STEADY YOUR NERVES TO GET RID OF A HANGOVER: NO

## 2021-01-01 ASSESSMENT — COGNITIVE AND FUNCTIONAL STATUS - GENERAL
MOBILITY SCORE: 23
HELP NEEDED FOR BATHING: A LITTLE
SUGGESTED CMS G CODE MODIFIER DAILY ACTIVITY: CH
DAILY ACTIVITIY SCORE: 24
DAILY ACTIVITIY SCORE: 23
SUGGESTED CMS G CODE MODIFIER MOBILITY: CH
CLIMB 3 TO 5 STEPS WITH RAILING: A LITTLE
SUGGESTED CMS G CODE MODIFIER MOBILITY: CI
SUGGESTED CMS G CODE MODIFIER DAILY ACTIVITY: CI
MOBILITY SCORE: 23
DRESSING REGULAR LOWER BODY CLOTHING: A LITTLE
CLIMB 3 TO 5 STEPS WITH RAILING: A LITTLE
DAILY ACTIVITIY SCORE: 22
MOBILITY SCORE: 24
SUGGESTED CMS G CODE MODIFIER DAILY ACTIVITY: CJ
TOILETING: A LITTLE
SUGGESTED CMS G CODE MODIFIER MOBILITY: CI

## 2021-01-01 ASSESSMENT — ENCOUNTER SYMPTOMS
BLURRED VISION: 0
CONSTIPATION: 0
VOMITING: 1
CONSTIPATION: 0
LOSS OF CONSCIOUSNESS: 0
CHILLS: 0
ABDOMINAL PAIN: 0
FEVER: 0
BRUISES/BLEEDS EASILY: 0
MYALGIAS: 0
CHILLS: 0
SORE THROAT: 0
NAUSEA: 1
CHILLS: 0
CHILLS: 0
COUGH: 0
PSYCHIATRIC NEGATIVE: 1
FOCAL WEAKNESS: 0
PALPITATIONS: 0
CHILLS: 1
NAUSEA: 1
COUGH: 0
NAUSEA: 1
MYALGIAS: 0
BLOOD IN STOOL: 0
DIZZINESS: 1
SORE THROAT: 0
FEVER: 0
DIZZINESS: 0
WHEEZING: 0
ABDOMINAL PAIN: 1
DIARRHEA: 0
VOMITING: 1
BLURRED VISION: 0
MUSCULOSKELETAL NEGATIVE: 1
DIARRHEA: 0
NAUSEA: 0
WEIGHT LOSS: 1
MYALGIAS: 0
SHORTNESS OF BREATH: 0
DEPRESSION: 0
ABDOMINAL PAIN: 1
WEAKNESS: 1
HEADACHES: 0
NAUSEA: 1
NECK PAIN: 0
BRUISES/BLEEDS EASILY: 0
COUGH: 0
ABDOMINAL PAIN: 1
LOSS OF CONSCIOUSNESS: 0
CONSTIPATION: 0
SORE THROAT: 0
NAUSEA: 0
SHORTNESS OF BREATH: 0
BLURRED VISION: 0
NAUSEA: 1
PALPITATIONS: 0
HEADACHES: 0
FEVER: 0
FEVER: 0
DEPRESSION: 0
BLOOD IN STOOL: 0
LOSS OF CONSCIOUSNESS: 0
HEADACHES: 0
ABDOMINAL PAIN: 1
WEIGHT LOSS: 1
SEIZURES: 0
VOMITING: 0
NEUROLOGICAL NEGATIVE: 1
VOMITING: 0
DEPRESSION: 0
VOMITING: 0
ABDOMINAL PAIN: 1
EYES NEGATIVE: 1
BACK PAIN: 0
CHILLS: 0
COUGH: 0
ROS SKIN COMMENTS: SKIN LESIONS
BLOOD IN STOOL: 0
CONSTIPATION: 0
WEAKNESS: 0
FEVER: 1
SENSORY CHANGE: 1
PALPITATIONS: 0
NERVOUS/ANXIOUS: 1
DIARRHEA: 0
DIARRHEA: 0
VOMITING: 1
DOUBLE VISION: 0
CARDIOVASCULAR NEGATIVE: 1
PALPITATIONS: 0
RESPIRATORY NEGATIVE: 1
FEVER: 0
NAUSEA: 0
VOMITING: 1
WEIGHT LOSS: 1
INSOMNIA: 0
FOCAL WEAKNESS: 0
PALPITATIONS: 0
HEMOPTYSIS: 0
COUGH: 0
VOMITING: 1
BLOOD IN STOOL: 1
SHORTNESS OF BREATH: 1
SHORTNESS OF BREATH: 0
SORE THROAT: 0
FEVER: 0
MYALGIAS: 0
HEARTBURN: 1

## 2021-01-01 ASSESSMENT — PAIN DESCRIPTION - PAIN TYPE
TYPE: ACUTE PAIN
TYPE: ACUTE PAIN;REFERRED PAIN
TYPE: ACUTE PAIN
TYPE: SURGICAL PAIN

## 2021-01-01 ASSESSMENT — FIBROSIS 4 INDEX
FIB4 SCORE: 7.88
FIB4 SCORE: 3.27
FIB4 SCORE: 5.56
FIB4 SCORE: 4.55
FIB4 SCORE: 5.59
FIB4 SCORE: 6.05
FIB4 SCORE: 5.55
FIB4 SCORE: 6.05
FIB4 SCORE: 3.11
FIB4 SCORE: 6.05
FIB4 SCORE: 6.98
FIB4 SCORE: 5.55
FIB4 SCORE: 6.98
FIB4 SCORE: 5.55
FIB4 SCORE: 6.47
FIB4 SCORE: 6.63

## 2021-01-01 ASSESSMENT — GAIT ASSESSMENTS
DISTANCE (FEET): 200
GAIT LEVEL OF ASSIST: SUPERVISED

## 2021-01-01 ASSESSMENT — PATIENT HEALTH QUESTIONNAIRE - PHQ9
2. FEELING DOWN, DEPRESSED, IRRITABLE, OR HOPELESS: SEVERAL DAYS
4. FEELING TIRED OR HAVING LITTLE ENERGY: SEVERAL DAYS
SUM OF ALL RESPONSES TO PHQ9 QUESTIONS 1 AND 2: 0
2. FEELING DOWN, DEPRESSED, IRRITABLE, OR HOPELESS: NOT AT ALL
SUM OF ALL RESPONSES TO PHQ9 QUESTIONS 1 AND 2: 0
5. POOR APPETITE OR OVEREATING: SEVERAL DAYS
7. TROUBLE CONCENTRATING ON THINGS, SUCH AS READING THE NEWSPAPER OR WATCHING TELEVISION: SEVERAL DAYS
SUM OF ALL RESPONSES TO PHQ9 QUESTIONS 1 AND 2: 0
1. LITTLE INTEREST OR PLEASURE IN DOING THINGS: NOT AT ALL
SUM OF ALL RESPONSES TO PHQ9 QUESTIONS 1 AND 2: 2
8. MOVING OR SPEAKING SO SLOWLY THAT OTHER PEOPLE COULD HAVE NOTICED. OR THE OPPOSITE, BEING SO FIGETY OR RESTLESS THAT YOU HAVE BEEN MOVING AROUND A LOT MORE THAN USUAL: SEVERAL DAYS
1. LITTLE INTEREST OR PLEASURE IN DOING THINGS: SEVERAL DAYS
SUM OF ALL RESPONSES TO PHQ QUESTIONS 1-9: 8
1. LITTLE INTEREST OR PLEASURE IN DOING THINGS: NOT AT ALL
6. FEELING BAD ABOUT YOURSELF - OR THAT YOU ARE A FAILURE OR HAVE LET YOURSELF OR YOUR FAMILY DOWN: SEVERAL DAYS
1. LITTLE INTEREST OR PLEASURE IN DOING THINGS: NOT AT ALL
2. FEELING DOWN, DEPRESSED, IRRITABLE, OR HOPELESS: NOT AT ALL
3. TROUBLE FALLING OR STAYING ASLEEP OR SLEEPING TOO MUCH: SEVERAL DAYS
9. THOUGHTS THAT YOU WOULD BE BETTER OFF DEAD, OR OF HURTING YOURSELF: NOT AT ALL

## 2021-01-01 ASSESSMENT — ACTIVITIES OF DAILY LIVING (ADL): TOILETING: INDEPENDENT

## 2021-01-01 ASSESSMENT — PAIN SCALES - GENERAL
PAIN_LEVEL: 3
PAIN_LEVEL: 0
PAIN_LEVEL: 0

## 2021-01-27 NOTE — OR NURSING
"Pt seen for scheduled PAT. DOS is 2/15/21. Pt is a poor historian and does not have complete medication list, instructed pt to get insulin instructions from MD and to bring in medication list on day of surgery. Anesthesia protocol instructions and handouts given, instructed pt to take gabapentin and \"depression\" medication on DOS, pt verbalizes understanding.   "

## 2021-02-14 NOTE — OR NURSING
COVID-19 Pre-surgery screenin. Do you have an undiagnosed respiratory illness or symptoms such as coughing or sneezing? no (Yes/No)  a. Onset of Sx no  b. Acute vs. chronic respiratory illness no    2. Do you have an unexplained fever greater than 100.4 degrees Fahrenheit or 38 degrees Celsius?     no (Yes/No)    3. Have you had direct exposure to a patient who tested positive for Covid-19?    no (Yes/No)    4. Have you had any loss of your sense of taste or smell? Have you had N/V or sore throat? no    Patient has been informed of visitor policy and asked to wear a mask upon entering the hospital   YEs (Yes/No)

## 2021-02-15 NOTE — ANESTHESIA TIME REPORT
Anesthesia Start and Stop Event Times     Date Time Event    2/15/2021 0759 Ready for Procedure     0836 Anesthesia Start     0928 Anesthesia Stop        Responsible Staff  02/15/21    Name Role Begin End    Israel Burton M.D. Anesth 0836 0928        Preop Diagnosis (Free Text):  Pre-op Diagnosis     CIRRHOSIS, ASCITES, ESOPHGEAL VARICES WITHOUT BLEEDING, ANEMIA, IRON DEFICIENCY, CHRONIC BLOOD LOSS, DIABETIC TYPE II        Preop Diagnosis (Codes):    Post op Diagnosis  Esophageal varices      Premium Reason  Non-Premium    Comments:

## 2021-02-15 NOTE — OR NURSING
Assume care for pt in pre-op. Patient allergies and NPO status verified. Belongings secured. Patient verbalizes understanding of pain scale, expected course of stay and plan of care. Surgical site verified with patient. IV access established. FBs = 73. Call light within reach. No further needs at this time. Hourly rounding in place.

## 2021-02-15 NOTE — PROCEDURES
DATE OF PROCEDURE:  02/15/2021     PROCEDURE:  Upper endoscopy with cold forceps biopsies and variceal band   ligation.     SURGEON:  Anthony Dent MD     INDICATIONS:  The patient is a 60-year-old female with history of cirrhosis,   prior esophageal varices, iron-deficiency anemia, rectal bleeding and   abdominal pain.     INFORMED CONSENT:  Written informed consent was obtained from the patient   after thorough explanation of indications, benefits and risks of the   procedure, which included but not limited to bleeding, infection, perforation,   adverse reaction of medications and missed lesions.     MEDICATIONS GIVEN:  General anesthesia with endotracheal intubation.     Continuous telemetry, BP, pulse oximetry, and capnography were performed by   the anesthesiologist throughout the procedure.     A midsize flexible upper endoscope was used for the procedure.     FINDINGS:  The patient was placed in left lateral decubitus position.  After   anesthesia was achieved, the endoscope was passed in the posterior pharynx   under direct visualization, advanced to the second portion of duodenum without   difficulty.  The patient tolerated the procedure well.  1.  Esophagus:  She did have three columns of large esophageal varices   extending from 30 cm down to the gastroesophageal junction at 40 cm.  There   were areas of fibrosis from prior banding.  Several of the varices did have   red spots concerning for potential future bleeding.  2.  Stomach:  She had changes of mild portal hypertensive gastropathy, but   otherwise unremarkable.  Random gastric biopsy obtained for H. pylori.    Retroflexion in the stomach revealed no gastric varices.  3.  Duodenum:  Normal.     After a complete endoscopic exam was performed, the endoscope was removed.  A   7 shooter [employer]  was applied to the endoscope.  The   endoscope was reinserted to the level of the gastroesophageal junction.  Four   bands were  successfully placed on the three columns of varices in the distal   esophagus without difficulty.     IMPRESSION:    1.  Large esophageal varices with red spots and fibrosis from prior banding,   band ligation x4 performed.  2.  Mild portal hypertensive gastropathy.     PLAN:    1.  PPI twice daily for 14 days.  2.  Sucralfate 4 times daily for 14 days.  3.  A GI cocktail as needed for two to three days to help with pain   post-banding.  4.  Repeat EGD in 4-6 weeks for additional banding.  5.  Proceed with colonoscopy.        ______________________________  SHAWN BOOKER MD    [facility]/SUB    DD:  02/15/2021 09:33  DT:  02/15/2021 10:41    Job#:  [id number]

## 2021-02-15 NOTE — ANESTHESIA PROCEDURE NOTES
Airway    Date/Time: 2/15/2021 8:36 AM  Performed by: Israel Burton M.D.  Authorized by: Israel Burton M.D.     Location:  OR  Urgency:  Elective  Indications for Airway Management:  Anesthesia      Spontaneous Ventilation: absent    Sedation Level:  Deep  Preoxygenated: Yes    Patient Position:  Sniffing  Final Airway Type:  Endotracheal airway  Final Endotracheal Airway:  ETT  Cuffed: Yes    Technique Used for Successful ETT Placement:  Direct laryngoscopy    Insertion Site:  Oral  Blade Type:  Deejay  Laryngoscope Blade/Videolaryngoscope Blade Size:  3  ETT Size (mm):  6.5  Measured from:  Teeth  ETT to Teeth (cm):  18  Placement Verified by: auscultation and capnometry    Cormack-Lehane Classification:  Grade I - full view of glottis  Number of Attempts at Approach:  1

## 2021-02-15 NOTE — PROGRESS NOTES
Med rec updated and complete  Allergies reviewed  Pt reports that she ran out of her LASIX 40MG about a month ago  Pt reports no antibiotics in the last 2 weeks

## 2021-02-15 NOTE — ANESTHESIA POSTPROCEDURE EVALUATION
Patient: Demetrice Jaime    Procedure Summary     Date: 02/15/21 Room / Location: [place] [id number] / SURGERY [facility]    Anesthesia Start: 0836 Anesthesia Stop:     Procedures:       COLONOSCOPY (N/A Anus)      GASTROSCOPY - W/BANDING. (N/A Esophagus) Diagnosis: (esophageal varicies, colon polyps)    Surgeons: Anthony Dent M.D. Responsible Provider: Israel Burton M.D.    Anesthesia Type: general ASA Status: 3          Final Anesthesia Type: general  Last vitals  BP   NIBP: (!) 183/92    Temp   36.5 °C (97.7 °F)    Pulse   94   Resp   14    SpO2   97 %      Anesthesia Post Evaluation    Patient location during evaluation: PACU  Patient participation: complete - patient participated  Level of consciousness: awake and alert  Pain score: 0    Airway patency: patent  Anesthetic complications: no  Cardiovascular status: hemodynamically stable  Respiratory status: acceptable  Hydration status: euvolemic    PONV: none          No complications documented.     Nurse Pain Score: 5 (NPRS)

## 2021-02-15 NOTE — OR NURSING
0926 PT arrives from OR. Report received.     0938- Pt sitting up in bed drinking water.    0950- Pt reporting pain. 100mcg fentanyl given.    1002- Pt oxygenating well on 1L NC.    1004- Viscous Lidocaine given for pain per MD.      1015- Report called to MAMI Willett.     1029- Pt transported to phase two.

## 2021-02-15 NOTE — DISCHARGE INSTRUCTIONS
GASTROSCOPY OR ERCP  1. Don't eat or drink anything for about an hour after the test. You can then resume your regular diet.   2. Don't drive or drink alcohol for 24 hours. The medication you received will make you too drowsy.  3. Don't take any coffee, tea, or aspirin products until after you see your doctor. These can harm the lining of your stomach.  4. If you begin to vomit bloody material, or develop black or bloody stools, call your doctor as soon as possible.   5. If you have any neck, chest, abdominal pain or temp over 100 degrees call your doctor.       You should call 911 if you develop problems with breathing or chest pain.    Nurse's Signature: ___________________________________    I acknowledge receipt and understanding of these Home Care instructions.      ACTIVITY: Rest and take it easy for the first 24 hours.  A responsible adult is recommended to remain with you during that time.  It is normal to feel sleepy.  We encourage you to not do anything that requires balance, judgment or coordination.    MILD FLU-LIKE SYMPTOMS ARE NORMAL. YOU MAY EXPERIENCE GENERALIZED MUSCLE ACHES, THROAT IRRITATION, HEADACHE AND/OR SOME NAUSEA.    FOR 24 HOURS DO NOT:  Drive, operate machinery or run household appliances.  Drink beer or alcoholic beverages.   Make important decisions or sign legal documents.    SPECIAL INSTRUCTIONS: See above    DIET: To avoid nausea, slowly advance diet as tolerated, avoiding spicy or greasy foods for the first day.  Add more substantial food to your diet according to your physician's instructions.  Babies can be fed formula or breast milk as soon as they are hungry.  INCREASE FLUIDS AND FIBER TO AVOID CONSTIPATION.    SURGICAL DRESSING/BATHING: N/A    FOLLOW-UP APPOINTMENT:  A follow-up appointment should be arranged with your doctor in 1-2 weeks; call to schedule.    You should CALL YOUR PHYSICIAN if you develop:  Fever greater than 101 degrees F.  Pain not relieved by medication, or  persistent nausea or vomiting.  Excessive bleeding (blood soaking through dressing) or unexpected drainage from the wound.  Extreme redness or swelling around the incision site, drainage of pus or foul smelling drainage.  Inability to urinate or empty your bladder within 8 hours.  Problems with breathing or chest pain.    You should call 911 if you develop problems with breathing or chest pain.  If you are unable to contact your doctor or surgical center, you should go to the nearest emergency room or urgent care center.  Physician's telephone #: [phone]    If any questions arise, call your doctor.  If your doctor is not available, please feel free to call the Surgical Center at [phone]. The Contact Center is open Monday through Friday 7AM to 5PM and may speak to a nurse at [phone], or toll free at [phone].     A registered nurse may call you a few days after your surgery to see how you are doing after your procedure.    MEDICATIONS: Resume taking daily medication.  Take prescribed pain medication with food.  If no medication is prescribed, you may take non-aspirin pain medication if needed.  PAIN MEDICATION CAN BE VERY CONSTIPATING.  Take a stool softener or laxative such as senokot, pericolace, or milk of magnesia if needed.    Prescription given for Omeprazole.     If your physician has prescribed pain medication that includes Acetaminophen (Tylenol), do not take additional Acetaminophen (Tylenol) while taking the prescribed medication.    Depression / Suicide Risk    As you are discharged from this [facility] facility, it is important to learn how to keep safe from harming yourself.    Recognize the warning signs:  · Abrupt changes in personality, positive or negative- including increase in energy   · Giving away possessions  · Change in eating patterns- significant weight changes-  positive or negative  · Change in sleeping patterns- unable to sleep or sleeping all the  time   · Unwillingness or inability to communicate  · Depression  · Unusual sadness, discouragement and loneliness  · Talk of wanting to die  · Neglect of personal appearance   · Rebelliousness- reckless behavior  · Withdrawal from people/activities they love  · Confusion- inability to concentrate     If you or a loved one observes any of these behaviors or has concerns about self-harm, here's what you can do:  · Talk about it- your feelings and reasons for harming yourself  · Remove any means that you might use to hurt yourself (examples: pills, rope, extension cords, firearm)  · Get professional help from the community (Mental Health, Substance Abuse, psychological counseling)  · Do not be alone:Call your Safe Contact- someone whom you trust who will be there for you.  · Call your local CRISIS HOTLINE 407-3621 or 143-893-3157  · Call your local Children's Mobile Crisis Response Team [place] (543) 091-4710 or www.[employer]  · Call the toll free National Suicide Prevention Hotlines   · National Suicide Prevention Lifeline 516-317-GAEH (4845)  · National Hope Line Network 800-SUICIDE (922-4196)

## 2021-02-15 NOTE — PROCEDURES
DATE OF PROCEDURE:  02/15/2021     PROCEDURE:  Colonoscopy with cold snare polypectomy and Hemoclip placement.     :  Anthony Dent MD     INDICATION:  The patient is a 60-year-old female with history of cirrhosis,   iron deficiency anemia, history of colon polyps, rectal bleeding and abdominal   pain.     INFORMED CONSENT:  Written informed consent was obtained from the patient   after thorough explanation of indications, benefits and risks of the   procedure, which included but not limited to bleeding, infection, perforation,   adverse reaction to medication and missed lesions.     MEDICATIONS GIVEN:  General anesthesia with endotracheal intubation.     Continuous telemetry, BP, pulse oximetry and capnography were performed by the   anesthesiologist throughout the procedure.     An adult colonoscope was used for the procedure.     FINDINGS:  The patient was placed in the left lateral decubitus position.    After anesthesia was achieved, a digital rectal exam was performed and found   to be normal.  The endoscope was then inserted into the rectum and advanced to   the cecum, which was identified by the appendiceal orifice and the ileocecal   valve.  The terminal ileum was intubated and found to be normal.  The   colonoscope was then withdrawn with careful inspection of the mucosa.  She did   have three sessile polyps in the ascending colon measuring 6 mm to 9 mm in   size.  These were removed by cold snare polypectomy and placed in bottle B.    The largest polyp did have evidence of possible visible vessel.  Following   removal ____ Hemoclip applied for hemostasis without difficulty.  She had   three sessile polyps in the transverse colon measuring 5 mm in size up to 7 mm   in size.  These were removed by cold snare polypectomy and placed in bottle   C.  She had a 4 mm sessile polyp within the sigmoid colon removed with cold   snare polypectomy and placed in bottle D.  She had a single right-sided    diverticulum.  She had mild sigmoid diverticulosis.  Retroflexion in the   rectum revealed small rectal varices without stigmata of bleeding.  The   patient underwent a GoLYTELY prep, the result of which was good.  The patient   tolerated the procedure well.     IMPRESSION:  1.  Seven colon polyps, removed.  2.  Diverticulosis.  3.  Small rectal varices.     PLAN:  1.  Await biopsies.  Future colonoscopy depending on pathology.  2.  Consider initiation of a nonselective beta blocker given both esophageal   and rectal varices.  3.  Follow up in clinic.        ______________________________  MD DANIKA KHAN/NADINE/MICHAEL    DD:  02/15/2021 09:36  DT:  02/15/2021 10:12    Job#:  [id number]

## 2021-02-15 NOTE — ANESTHESIA PREPROCEDURE EVALUATION
Relevant Problems   ANESTHESIA   (+) FLORINDA (obstructive sleep apnea)      PULMONARY   (+) History of hepatitis C      NEURO   (+) History of cirrhosis of liver   (+) History of hepatitis C      CARDIAC   (+) Arrhythmia   (+) Atrial fibrillation (HCC)   (+) Esophageal varices (HCC)   (+) HTN (hypertension)   (+) Vein, varicose      GI   (+) Hiatal hernia         (+) ARF (acute renal failure) (HCC)   (+) Chronic hepatitis C virus infection (CMS-HCC)   (+) Cirrhosis (HCC)   (+) Disorder of liver   (+) Hepatitis C   (+) Pyelectasis      ENDO   (+) DM2 (diabetes mellitus, type 2) (HCC)       Physical Exam    Airway   Mallampati: II  TM distance: >3 FB  Neck ROM: full       Cardiovascular - normal exam  Rhythm: regular  Rate: normal  (-) murmur     Dental - normal exam           Pulmonary - normal exam  Breath sounds clear to auscultation     Abdominal    Neurological - normal exam                 Anesthesia Plan    ASA 3   ASA physical status 3 criteria: COPD and alcohol and/or substance dependence or abuse    Plan - general       Airway plan will be ETT          Induction: intravenous    Postoperative Plan: Postoperative administration of opioids is intended.    Pertinent diagnostic labs and testing reviewed    Informed Consent:    Anesthetic plan and risks discussed with patient.    Use of blood products discussed with: patient whom consented to blood products.

## 2021-02-15 NOTE — OR NURSING
1015 Report received from MAMI Keene in PACU.    1045 pt able to void w/o difficulty. Ambulated with steady gate to recliner. Pt sitting up, drinking ice water.     1055 family brought back to bedside.    1110 d/c instructions given to patient and brother. All questions answered. Patient states readiness to go home. IV removed, tip intact.    1120 pt brought down by wheelchair with CNA. Instructions and prescriptions given to brother.

## 2021-02-15 NOTE — OR SURGEON
Immediate Post OP Note    PreOp Diagnosis: Cirrhosis, iron deficiency anemia, rectal bleeding, abdominal pain, esophageal varices    PostOp Diagnosis: Large esophagea varices with red spots, mild portal gastropathy, colon polyps, diverticulosis, rectal varices    Procedure(s):  COLONOSCOPY with cold snare polypectomy and hemoclip placement - Wound Class: Clean Contaminated  GASTROSCOPY - W/BANDING and biopsies. - Wound Class: Clean Contaminated    Surgeon(s):  Anthony Dent M.D.    Anesthesiologist/Type of Anesthesia:  Anesthesiologist: Israel Burton M.D./General    Surgical Staff:  Endoscopy Technician: Radha Bahena  Endoscopy Nurse: Kevin Ahmadi R.N.    Specimens removed if any:  ID Type Source Tests Collected by Time Destination   A :  Tissue Gastric PATHOLOGY SPECIMEN Anthony Dent M.D. 2/15/2021  8:48 AM    B : X  3 polyps Polyp Colon - Ascending PATHOLOGY SPECIMEN Anthony Dent M.D. 2/15/2021  9:03 AM    C : X 3 polyps Polyp Colon - Transverse PATHOLOGY SPECIMEN Anthony Dent M.D. 2/15/2021  9:11 AM    D : 1 polyps Polyp Colon - Sigmoid PATHOLOGY SPECIMEN Anthony Dent M.D. 2/15/2021  9:17 AM        Estimated Blood Loss: None    Findings:   1. 3 columns of large esophageal varices with red spots and fibrosis from prior banding.  Band ligation x4 performed.  2. Mild portal gastrpathy  3. 7 colon polyps, 4 mm to 9 mm, removed with cold snare.  One polyp in ascending colon with possible visible vessel, hemoclipped x1  4. Left colon diverticulosis, mild  5. Small rectal varices    Complications: None        2/15/2021 9:22 AM Anthony Dent M.D.

## 2021-02-16 PROBLEM — D61.818 PANCYTOPENIA (HCC): Status: ACTIVE | Noted: 2021-01-01

## 2021-02-16 NOTE — ASSESSMENT & PLAN NOTE
Hemoglobin A1c of 12.6 as of August 2020 and we will repeat on this admission  Patient states she takes 20 units of Lantus in the morning and at lunch and 40 units at night.  She states she takes Humalog 3 to 10 units with meals  We will resume patient on Lantus 40 units nightly and high sliding scale insulin.   POC glucose QA [facility]  Recommend outpatient optimization after discharge and follow-up with diabetic educator

## 2021-02-16 NOTE — ASSESSMENT & PLAN NOTE
Postoperative day #1 status post variceal banding ligation x4  No evidence of bleed on this admission  CT thoracoabdominal aorta appreciates extensive varices  Denies hemoptysis, hematemesis, melena or hematochezia.

## 2021-02-16 NOTE — PROGRESS NOTES
[facility] Medicine Daily Progress Note    Date of Service  2/16/2021    Chief Complaint  60 y.o. female with PMHx of HTN, HLD, DM2, Hep C, Hiatal Hernia, GERD, Cirrhosis admitted 2/16/2021 with Abdominal Pain.    Hospital Course  60 y.o. female who presented 2/16/2021 with complaints of severe abdominal pain after EGD colonoscopy earlier yesterday.  It was noted that patient had EGD banding procedure performed earlier in the day.  She states she has difficulty swallowing food and complains of odynophagia.  She complained of mild nausea but extreme abdominal pain while at home prompting her visit to the ED.  Patient states that she has quit drinking 2 years ago and has liver disease/cirrhosis because of this.  She states that she has had 1 paracentesis over 10 years ago and has not required any other subsequently.  She states she is compliant with her home medication regiment and states that she takes Lantus 20 units in a.m., 20 units at lunch and 40 units at night.  She states she takes between 3 to 10 units of NovoLog with meals.  She was informed we will optimize her insulin regimen while inpatient.  She currently admits to pyrosis, abdominal pain and otherwise denies at this time: Hematemesis, melena, hematochezia, diarrhea, constipation.     Vital signs on admission were as follows: 98.7, 94, 14, 197/86, 94% room air.     Nitropaste was added and analgesics administered to patient for extreme abdominal pain with elevated blood pressure as high as 219/91.     CT/CTA complete thoracoabdominal aorta performed which revealed cirrhosis with portal hypertension including extensive esophageal varices, moderate quantity of dependent ascites in the pelvis and diverticulosis.     Urinalysis performed which revealed dilute urine otherwise unremarkable.  Troponin T within normal limits and CMP unremarkable.  Lipase measured at 24 and lactic acid 1.8.  Patient was noted to be pancytopenic with WBC of 3.7, H/H 10.3/33.4 with  MCV of 74.7 and platelet count of 66.     Patient in agreement with hospitalization to manage her abdominal pain.  ERP had consulted with GI who will see patient in a.m.  She agrees to full CODE STATUS at time of evaluation is alert and oriented x4.    Interval Problem Update  Patient examined at bedside   Not in any acute distress  No overnight complaints      Consultants/Specialty  None    Code Status  Full Code    Disposition  TBD    Review of Systems  Review of Systems   Constitutional: Positive for malaise/fatigue and weight loss.   HENT: Negative.    Eyes: Negative.    Respiratory: Negative.    Cardiovascular: Negative.    Gastrointestinal: Positive for abdominal pain, nausea and vomiting.   Genitourinary: Negative.    Musculoskeletal: Negative.    Skin: Negative.    Neurological: Negative.    Endo/Heme/Allergies: Negative.    Psychiatric/Behavioral: Negative.         Physical Exam  Temp:  [37.1 °C (98.7 °F)] 37.1 °C (98.7 °F)  Pulse:  [80-97] 82  Resp:  [11-18] 13  BP: (124-219)/() 137/66  SpO2:  [89 %-100 %] 99 %    Physical Exam  Vitals reviewed.   Constitutional:       Appearance: Normal appearance.   HENT:      Head: Normocephalic and atraumatic.      Right Ear: External ear normal.      Left Ear: External ear normal.      Nose: Nose normal.      Mouth/Throat:      Mouth: Mucous membranes are moist.   Eyes:      Pupils: Pupils are equal, round, and reactive to light.   Cardiovascular:      Rate and Rhythm: Normal rate and regular rhythm.      Pulses: Normal pulses.      Heart sounds: Normal heart sounds.   Pulmonary:      Effort: Pulmonary effort is normal.      Breath sounds: Normal breath sounds.   Abdominal:      General: Abdomen is flat.   Musculoskeletal:         General: Normal range of motion.      Cervical back: Neck supple.   Skin:     General: Skin is warm.      Capillary Refill: Capillary refill takes less than 2 seconds.   Neurological:      General: No focal deficit present.    Psychiatric:         Mood and Affect: Mood normal.         Fluids  No intake or output data in the 24 hours ending 02/16/21 1021    Laboratory  Recent Labs     02/16/21  0015   WBC 3.7*   RBC 4.47   HEMOGLOBIN 10.3*   HEMATOCRIT 33.4*   MCV 74.7*   MCH 23.0*   MCHC 30.8*   RDW 44.0   PLATELETCT 66*     Recent Labs     02/16/21  0015   SODIUM 135   POTASSIUM 3.8   CHLORIDE 101   CO2 23   GLUCOSE 101*   BUN 9   CREATININE 0.70   CALCIUM 9.6     Recent Labs     02/16/21  0704   APTT 34.1   INR 1.33*               Imaging  CT-CTA COMPLETE THORACOABDOMINAL AORTA   Final Result         1.  Normal aorta without visualized aneurysm or dissection.   2.  Changes of cirrhosis with stigmata of portal hypertension including extensive esophageal varices.   3.  Cholelithiasis with gallbladder wall thickening, gallbladder wall thickening is nonspecific in the setting of hepatocellular disease, and appears similar compared to prior study.   4.  Moderate quantity of dependent ascites in the pelvis   5.  Diverticulosis                 Assessment/Plan  * Abdominal pain- (present on admission)  Assessment & Plan  Patient had band ligation procedure yesterday and postop day 1.    H&H stable from prior  CT/CTA complete thoracoabdominal aorta reveals extensive esophageal varices moderate amount of ascites in pelvis with diverticulosis otherwise unremarkable.  Analgesics ordered for pain control  ERP had consulted with gastroenterology who will follow up with patient in a.m.    Esophageal varices (HCC)- (present on admission)  Assessment & Plan  Postoperative day #1 status post variceal banding ligation x4  No evidence of bleed on this admission  CT thoracoabdominal aorta appreciates extensive varices  Denies hemoptysis, hematemesis, melena or hematochezia.    Cirrhosis (HCC)- (present on admission)  Assessment & Plan  Meld score of 8  Follow-up GI/hepatologist on discharge  Continue home regiment of Lasix and spironolactone  PT/INR  ordered and pending  Thrombocytopenia consistent with liver cirrhosis    DM2 (diabetes mellitus, type 2) (HCC)- (present on admission)  Assessment & Plan  Hemoglobin A1c of 12.6 as of August 2020 and we will repeat on this admission  Patient states she takes 20 units of Lantus in the morning and at lunch and 40 units at night.  She states she takes Humalog 3 to 10 units with meals  We will resume patient on Lantus 40 units nightly and high sliding scale insulin.   POC glucose QA [facility]  Recommend outpatient optimization after discharge and follow-up with diabetic educator    HTN (hypertension)- (present on admission)  Assessment & Plan  Patient had extensive malignant hypertension on presentation secondary to pain  Labetalol 10 mg IV as needed  Analgesics for pain control  Continue with Lasix and spironolactone    Pancytopenia (HCC)- (present on admission)  Assessment & Plan  Hemoglobin stable from last draw and we will repeat CBC in a.m.    Nicotine dependence- (present on admission)  Assessment & Plan  Patient in agreement with nicotine patch/Nicorette gum.  She states she is trying to cut down and after 3 minutes consultation agrees for smoking cessation counseling after discharge as well.         VTE prophylaxis: Lovenox

## 2021-02-16 NOTE — H&P
Hospital Medicine History & Physical Note    Date of Service  2/16/2021    Primary Care Physician  Itzel Ness M.D.    Consultants  Gastroenterology    Code Status  Full Code    Chief Complaint  Chief Complaint   Patient presents with   • Abdominal Pain     Colonscopy and endoscopy was performed today, pt unable to get pain meds. 100 fentanyl given in route by EMS. HTN hx - out of medications       History of Presenting Illness  60 y.o. female who presented 2/16/2021 with complaints of severe abdominal pain after EGD colonoscopy earlier yesterday.  It was noted that patient had EGD banding procedure performed earlier in the day.  She states she has difficulty swallowing food and complains of odynophagia.  She complained of mild nausea but extreme abdominal pain while at home prompting her visit to the ED.  Patient states that she has quit drinking 2 years ago and has liver disease/cirrhosis because of this.  She states that she has had 1 paracentesis over 10 years ago and has not required any other subsequently.  She states she is compliant with her home medication regiment and states that she takes Lantus 20 units in a.m., 20 units at lunch and 40 units at night.  She states she takes between 3 to 10 units of NovoLog with meals.  She was informed we will optimize her insulin regimen while inpatient.  She currently admits to pyrosis, abdominal pain and otherwise denies at this time: Hematemesis, melena, hematochezia, diarrhea, constipation.    Vital signs on admission were as follows: 98.7, 94, 14, 197/86, 94% room air.    Nitropaste was added and analgesics administered to patient for extreme abdominal pain with elevated blood pressure as high as 219/91.    CT/CTA complete thoracoabdominal aorta performed which revealed cirrhosis with portal hypertension including extensive esophageal varices, moderate quantity of dependent ascites in the pelvis and diverticulosis.    Urinalysis performed which revealed  dilute urine otherwise unremarkable.  Troponin T within normal limits and CMP unremarkable.  Lipase measured at 24 and lactic acid 1.8.  Patient was noted to be pancytopenic with WBC of 3.7, H/H 10.3/33.4 with MCV of 74.7 and platelet count of 66.    Patient in agreement with hospitalization to manage her abdominal pain.  ERP had consulted with GI who will see patient in a.m.  She agrees to full CODE STATUS at time of evaluation is alert and oriented x4.        Review of Systems  Review of Systems   Constitutional: Negative for chills and fever.   HENT: Negative for congestion and sore throat.    Eyes: Negative for blurred vision and double vision.   Respiratory: Negative for cough, shortness of breath and wheezing.    Cardiovascular: Negative for chest pain and palpitations.   Gastrointestinal: Positive for abdominal pain and heartburn. Negative for blood in stool, constipation, diarrhea, melena, nausea and vomiting.   Genitourinary: Negative for dysuria and frequency.   Musculoskeletal: Negative for back pain and neck pain.   Skin: Negative for itching and rash.   Neurological: Negative for focal weakness, loss of consciousness, weakness and headaches.   Endo/Heme/Allergies: Negative for environmental allergies. Does not bruise/bleed easily.   Psychiatric/Behavioral: Negative for depression. The patient does not have insomnia.        Past Medical History   has a past medical history of Anemia, Anesthesia, Arrhythmia, Arthritis, Breath shortness, Cataract, Cirrhosis (HCC), Dental disorder, Diabetes, Fibromyalgia, GERD (gastroesophageal reflux disease), Hepatitis C, Hiatus hernia syndrome, Hypertension (11/03/2020), Obesity, Other specified disorder of intestines, Pain (11/03/2020), Psychiatric problem, Restless leg syndrome, Sleep apnea, Snoring, Stroke (HCC) (2011), Unspecified hemorrhagic conditions, and Urinary bladder disorder.    Surgical History   has a past surgical history that includes gyn surgery; gyn  surgery (2010); carpal tunnel release (2010); shoulder surgery (2010); other (2010); other; anterior and posterior repair (12/6/2013); enterocele repair (12/6/2013); bladder sling female (12/6/2013); gastroscopy with clipping (7/6/2014); gastroscopy with banding (7/8/2014); cath removal (11/14/2014); cath placement (12/22/2014); cataract extraction with iol (Bilateral); tonsillectomy; gastroscopy (N/A, 8/21/2017); gastroscopy with banding (N/A, 2/8/2018); gastroscopy (N/A, 2/12/2018); cath removal (11/4/2020); pr colonoscopy,diagnostic (N/A, 2/15/2021); and pr upper gi endoscopy,diagnosis (N/A, 2/15/2021).     Family History  family history includes Diabetes in her mother; Hypertension in her mother.     Social History   reports that she has been smoking cigarettes. She has a 11.25 pack-year smoking history. She has never used smokeless tobacco. She reports previous alcohol use. She reports previous drug use.    Allergies  Allergies   Allergen Reactions   • Penicillins Hives and Itching     Hives, itching        Medications  Prior to Admission Medications   Prescriptions Last Dose Informant Patient Reported? Taking?   NOVOLOG, insulin aspart, (NOVOLOG FLEXPEN) 100 UNIT/ML injection PEN Not Taking at Not Taking Patient No No   Sig: Inject 3-18 Units as instructed 3 times a day before meals.   Patient not taking: Reported on 2/16/2021   OXcarbazepine (TRILEPTAL) 300 MG Tab 02/13/2021 at PM Patient Yes No   Sig: Take 300-600 mg by mouth 2 (two) times a day. 1 tab = 300 mg every morning  2 tabs = 600 mg every evening   famotidine (PEPCID) 20 MG Tab Not Taking at Completed Patient No No   Sig: Take 1 Tab by mouth 2 times a day.   Patient not taking: Reported on 2/16/2021   furosemide (LASIX) 40 MG Tab Last Week at UNK Patient Yes Yes   Sig: Take 40 mg by mouth every day.   gabapentin (NEURONTIN) 800 MG tablet 2/14/2021 at 1500 Patient Yes No   Sig: Take 800 mg by mouth 3 times a day.   hyoscyamine-maalox plus-lidocaine  viscous (GI COCKTAIL) NEW at Did Not Start Patient No No   Sig: Take 15 mL by mouth every 6 hours as needed (abdominal pain or cramping).   Patient not taking: Reported on 2/16/2021   insulin glargine (LANTUS SOLOSTAR) 100 UNIT/ML Solution Pen-injector injection 02/13/2021 at AM Patient No No   Sig: Inject 40 Units as instructed every evening.   Patient taking differently: Inject 20 Units under the skin 2 (two) times a day.   insulin lispro (HUMALOG) 100 UNIT/ML 02/13/2021 at PM Patient Yes Yes   Sig: Inject 10 Units under the skin 2 times daily, before breakfast and dinner.   lisinopril (PRINIVIL) 5 MG Tab 02/13/2021 at AM Patient Yes No   Sig: Take 5 mg by mouth every day.   omeprazole (PRILOSEC) 20 MG delayed-release capsule NEW at Did Not Start Patient No No   Sig: Take 1 capsule by mouth 2 times a day for 14 days.   Patient not taking: Reported on 2/16/2021   spironolactone (ALDACTONE) 50 MG Tab 02/13/2021 at AM Patient Yes No   Sig: Take 50 mg by mouth every day.   sucralfate (CARAFATE) 1 GM/10ML Suspension Duplicate at Duplicate Patient No No   Sig: Take 10 mL by mouth 4 times a day.   Patient not taking: Reported on 2/16/2021   sucralfate (CARAFATE) 1 GM/10ML Suspension New at Did Not Start Patient No No   Sig: Take 10 mL by mouth 4 times a day for 14 days.   traZODone (DESYREL) 100 MG Tab 02/13/2021 at PM Patient Yes No   Sig: Take 100 mg by mouth at bedtime as needed for Sleep.      Facility-Administered Medications Last Administration Doses Remaining   lidocaine (XYLOCAINE) 2 % viscous solution 5 mL None recorded 1          Physical Exam  Temp:  [37.1 °C (98.7 °F)] 37.1 °C (98.7 °F)  Pulse:  [82-97] 86  Resp:  [11-18] 17  BP: (143-219)/(70-92) 165/79  SpO2:  [93 %-100 %] 98 %    Physical Exam  Vitals reviewed.   Constitutional:       General: She is in acute distress.      Appearance: She is not ill-appearing, toxic-appearing or diaphoretic.      Comments: Cachectic appearing/malnourished appearance,  appears age greater than stated.  Pleasant cooperative and mild distress secondary to abdominal pain.   HENT:      Head: Normocephalic and atraumatic.      Mouth/Throat:      Mouth: Mucous membranes are dry.      Pharynx: Oropharynx is clear. No posterior oropharyngeal erythema.      Comments: Edentulous  Eyes:      General: No scleral icterus.     Extraocular Movements: Extraocular movements intact.      Pupils: Pupils are equal, round, and reactive to light.      Comments: Pale conjunctiva   Neck:      Vascular: No carotid bruit.   Cardiovascular:      Rate and Rhythm: Normal rate and regular rhythm.      Pulses: Normal pulses.      Heart sounds: Normal heart sounds. No murmur. No friction rub. No gallop.    Pulmonary:      Effort: Pulmonary effort is normal.      Breath sounds: Normal breath sounds. No wheezing, rhonchi or rales.   Abdominal:      General: Abdomen is flat. Bowel sounds are normal. There is no distension.      Palpations: Abdomen is soft. There is no mass.      Tenderness: There is abdominal tenderness. There is no rebound.   Musculoskeletal:         General: No swelling. Normal range of motion.      Cervical back: Normal range of motion.      Right lower leg: No edema.      Left lower leg: No edema.   Lymphadenopathy:      Cervical: No cervical adenopathy.   Skin:     General: Skin is warm and dry.      Capillary Refill: Capillary refill takes less than 2 seconds.      Findings: No erythema or rash.   Neurological:      General: No focal deficit present.      Mental Status: She is alert and oriented to person, place, and time. Mental status is at baseline.      Cranial Nerves: No cranial nerve deficit.      Sensory: No sensory deficit.      Motor: No weakness.   Psychiatric:         Mood and Affect: Mood normal.         Behavior: Behavior normal.         Thought Content: Thought content normal.         Judgment: Judgment normal.         Laboratory:  Recent Labs     02/16/21  0015   WBC 3.7*   RBC  4.47   HEMOGLOBIN 10.3*   HEMATOCRIT 33.4*   MCV 74.7*   MCH 23.0*   MCHC 30.8*   RDW 44.0   PLATELETCT 66*     Recent Labs     02/16/21  0015   SODIUM 135   POTASSIUM 3.8   CHLORIDE 101   CO2 23   GLUCOSE 101*   BUN 9   CREATININE 0.70   CALCIUM 9.6     Recent Labs     02/16/21  0015   ALTSGPT 10   ASTSGOT 26   ALKPHOSPHAT 64   TBILIRUBIN 0.9   LIPASE 24   GLUCOSE 101*         No results for input(s): NTPROBNP in the last 72 hours.      Recent Labs     02/16/21  0015 02/16/21  0216   TROPONINT 13 12     I reviewed the above twelve-lead EKG and did not appreciate any signs of ischemia.  QTC minimally elevated and normal sinus rhythm per my interpretation.    Imaging:  CT-CTA COMPLETE THORACOABDOMINAL AORTA   Final Result         1.  Normal aorta without visualized aneurysm or dissection.   2.  Changes of cirrhosis with stigmata of portal hypertension including extensive esophageal varices.   3.  Cholelithiasis with gallbladder wall thickening, gallbladder wall thickening is nonspecific in the setting of hepatocellular disease, and appears similar compared to prior study.   4.  Moderate quantity of dependent ascites in the pelvis   5.  Diverticulosis                  CT scan thoracoabdominal aorta appreciated and interpreted by myself.  I do appreciate esophageal varices as appreciated in image above.    Assessment/Plan:  I anticipate this patient is appropriate for observation status at this time.    * Abdominal pain- (present on admission)  Assessment & Plan  Patient had band ligation procedure yesterday and postop day 1.    H&H stable from prior  CT/CTA complete thoracoabdominal aorta reveals extensive esophageal varices moderate amount of ascites in pelvis with diverticulosis otherwise unremarkable.  Analgesics ordered for pain control  ERP had consulted with gastroenterology who will follow up with patient in a.m.    Esophageal varices (HCC)- (present on admission)  Assessment & Plan  Postoperative day #1 status  post variceal banding ligation x4  No evidence of bleed on this admission  CT thoracoabdominal aorta appreciates extensive varices  Denies hemoptysis, hematemesis, melena or hematochezia.    Cirrhosis (HCC)- (present on admission)  Assessment & Plan  Meld score of 8  Follow-up GI/hepatologist on discharge  Continue home regiment of Lasix and spironolactone  PT/INR ordered and pending  Thrombocytopenia consistent with liver cirrhosis    DM2 (diabetes mellitus, type 2) (HCC)- (present on admission)  Assessment & Plan  Hemoglobin A1c of 12.6 as of August 2020 and we will repeat on this admission  Patient states she takes 20 units of Lantus in the morning and at lunch and 40 units at night.  She states she takes Humalog 3 to 10 units with meals  We will resume patient on Lantus 40 units nightly and high sliding scale insulin.   POC glucose QA [facility]  Recommend outpatient optimization after discharge and follow-up with diabetic educator    HTN (hypertension)- (present on admission)  Assessment & Plan  Patient had extensive malignant hypertension on presentation secondary to pain  Labetalol 10 mg IV as needed  Analgesics for pain control  Continue with Lasix and spironolactone    Pancytopenia (HCC)- (present on admission)  Assessment & Plan  Hemoglobin stable from last draw and we will repeat CBC in a.m.    Nicotine dependence- (present on admission)  Assessment & Plan  Patient in agreement with nicotine patch/Nicorette gum.  She states she is trying to cut down and after 3 minutes consultation agrees for smoking cessation counseling after discharge as well.

## 2021-02-16 NOTE — ASSESSMENT & PLAN NOTE
Patient had band ligation procedure yesterday and postop day 1.    H&H stable from prior  CT/CTA complete thoracoabdominal aorta reveals extensive esophageal varices moderate amount of ascites in pelvis with diverticulosis otherwise unremarkable.  Analgesics ordered for pain control  ERP had consulted with gastroenterology who will follow up with patient in a.m.

## 2021-02-16 NOTE — ED NOTES
Report received from Rodolfo BOLAÑOS.  Pressure difference noted between arms.  Reports pain from head to midchest to upper abdomen to back.  Code aorta initiated.  PIV placed, labs drawn and sent.  Nitro paste applied.  Pt transported to CT.

## 2021-02-16 NOTE — ED NOTES
Med Rec completed per patient at bedside.  Preferred Pharmacy: [facility]  Allergies reviewed  No ORAL antibiotics in last 14 days    Patient was discharged from surgery yesterday 2/15/2021 and was prescribed GI Cocktail, Carafate Susp, Omeprazole. Patient was unable to start the medications yet. Patient reports she picked up the Carafate and omeprazole but pharmacy didn't carry GI cocktail.     Patient reports she takes her Lantus 20 units after she eats  Every morning and evening. Then takes her Humalog 10 units twice daily before she eats.    Patient is prescribed spironolactone 50 mg once daily but reports that she takes it twice daily. She also reports that she is on furosemide 40 mg daily but takes its PRN.

## 2021-02-16 NOTE — ED NOTES
Pt taken to floor by ER tech in stable condition. Breakfast just arrived. Informed not to eat it until BS checked if she needs insulin.

## 2021-02-16 NOTE — DISCHARGE PLANNING
Pt independent prior to admission. She lives alone but helps take care of her mother who lives across the complex from her. She is unsure if she has transportion benefits through [facility] and will call her brother for a ride when discharged. She did not want to add him to [employer].      Care Transition Team Assessment  (Luisa Lucero, mother~ [phone] (same mobile number as pt)    Information Source  Orientation : Oriented x 4  Information Given By: Patient  Who is responsible for making decisions for patient? : Patient    Readmission Evaluation  Is this a readmission?: No    Elopement Risk  Legal Hold: No  Ambulatory or Self Mobile in Wheelchair: No-Not an Elopement Risk    Interdisciplinary Discharge Planning  Does Admitting Nurse Feel This Could be a Complex Discharge?: No  Primary Care Physician: Thi  Lives with - Patient's Self Care Capacity: Alone and Able to Care For Self  Support Systems: Family Member(s)  Housing / Facility: 1 Story Apartment / Condo  Do You Take your Prescribed Medications Regularly: Yes  Able to Return to Previous ADL's: Yes  Mobility Issues: No  Prior Services: None  Patient Prefers to be Discharged to:: home  Assistance Needed: Unknown at this Time  Durable Medical Equipment: Not Applicable    Discharge Preparedness  What is your plan after discharge?: Home with help  What are your discharge supports?: Parent, Sibling  Prior Functional Level: Independent with Activities of Daily Living  Difficulity with ADLs: None  Difficulity with IADLs: None    Functional Assesment  Prior Functional Level: Independent with Activities of Daily Living    Finances  Prescription Coverage: Yes    Advance Directive  Advance Directive?: None    Domestic Abuse  Have you ever been the victim of abuse or violence?: No     Discharge Risks or Barriers  Discharge risks or barriers?: Lives alone, no community support  Patient risk factors: Substance abuse    Anticipated Discharge Information  Discharge  Disposition: Still a Patient (31)  Discharge Address: [address] [place] [address]  Discharge Contact Phone Number: [phone]

## 2021-02-16 NOTE — ED NOTES
"Pt BIBA from home for evaluation of R sided abdominal pain. Pt reports having a endoscopy and colonscopy performed here earlier today and was released home. EMS gave 100 fentanyl in route. Pt reports unable to get medications and here for pain control. Pt also has hx of HTN but has been out of medications, not able to take. Pain at 8/10.     Past Medical History:   Diagnosis Date   • Anemia    • Anesthesia     pt's mother has hard time waking up = states it took a week or two   • Arrhythmia    • Arthritis     generalized   • Breath shortness     with exertion   • Cataract     removed bilat   • Cirrhosis (HCC)    • Dental disorder     dentures upper   • Diabetes     insulin   • Fibromyalgia    • GERD (gastroesophageal reflux disease)    • Hepatitis C     states reciceved treatment   • Hiatus hernia syndrome    • Hypertension 11/03/2020    reports on no meds at this time   • Obesity    • Other specified disorder of intestines     constipation/diarrhea   • Pain 11/03/2020    \"my legs always hurt\"; 5/10   • Psychiatric problem     depression and anxiety   • Restless leg syndrome    • Sleep apnea     has had sleep study, no cpap   • Snoring    • Stroke (HCC) 2011    no residual problems    • Unspecified hemorrhagic conditions     reports nose bleeds   • Urinary bladder disorder     frequency/urgency       " ----- Message from Josh Abel MD sent at 3/12/2020  9:52 AM EDT -----  Schedule curettage

## 2021-02-16 NOTE — ED NOTES
Pt medicated per MAR.  Pt added to transport list.   Attempted to call report, receiving RN to call back shortly.

## 2021-02-16 NOTE — ASSESSMENT & PLAN NOTE
Meld score of 8  Follow-up GI/hepatologist on discharge  Continue home regiment of Lasix and spironolactone  PT/INR ordered and pending  Thrombocytopenia consistent with liver cirrhosis

## 2021-02-16 NOTE — PROGRESS NOTES
Attending Hospitalist today is Dr Hogan starting at 0700. Please call this Physician for orders, updates and questions.

## 2021-02-16 NOTE — ED NOTES
Assumed care of pt, report received. Pt vomited up morning meds given by Noc RN.   Admitting MD notified, asked for new nausea med order. Order received.   Pt also with pain 9/10.  Pt mediated per MAR.

## 2021-02-16 NOTE — ASSESSMENT & PLAN NOTE
Patient in agreement with nicotine patch/Nicorette gum.  She states she is trying to cut down and after 3 minutes consultation agrees for smoking cessation counseling after discharge as well.

## 2021-02-16 NOTE — ED NOTES
Pressures greater than 200, ERP aware. Medicated per MAR. Morphine administered. Pt placed on 2L NC to maintain saturation, pt dropping to mid 80s after morphine administration.

## 2021-02-16 NOTE — ED PROVIDER NOTES
ED Provider Note    CHIEF COMPLAINT  Chief Complaint   Patient presents with   • Abdominal Pain     Colonscopy and endoscopy was performed today, pt unable to get pain meds. 100 fentanyl given in route by EMS. HTN hx - out of medications       HPI  Demetrice Jaime is a 60 y.o. female who presents with a chief complaint of head, chest, and abdominal pain radiating to the back that started at approximately 1:00 PM after eating a sandwich.  Patient underwent EGD and colonoscopy this morning at 730 with SUNIL Coleman.  She notes that she felt well after the procedure until 1:00 PM as detailed above.  She did not take any medication because every time she tried to swallow something the pain became significantly worse.  She did try the Carafate but was unable to get it down due to pain.  She denies any associated fevers, chills, shortness of breath, nausea, vomiting, dysuria, diarrhea.    ACS risk factors: No history of MI or hyperlipidemia, she does have history of hypertension and diabetes and is a current every day smoker,use, no current amphetamine/cocaine use, not obese, and no family history of CAD prior to age 61    ACS features: Not exertional, no shortness of breath, no diaphoresis    PE risk factors: No history of DVT/PE, pleuritic component, hemoptysis, unilateral leg swelling/pain, recent travel/immobilization, or exogenous hormone use.  She did undergo colonoscopy and endoscopy earlier this morning.    Aortic dissection features: She does have neuro symptoms in the form of a headache, pain does radiate to the back, no ripping/tearing pain    No IVDA, no fevers/recent illness, not positional    REVIEW OF SYSTEMS  See HPI for further details.  Headache.  Chest pain.  Abdominal pain.  All other systems are negative.     PAST MEDICAL HISTORY   has a past medical history of Anemia, Anesthesia, Arrhythmia, Arthritis, Breath shortness, Cataract, Cirrhosis (HCC), Dental disorder, Diabetes, Fibromyalgia,  "GERD (gastroesophageal reflux disease), Hepatitis C, Hiatus hernia syndrome, Hypertension (11/03/2020), Obesity, Other specified disorder of intestines, Pain (11/03/2020), Psychiatric problem, Restless leg syndrome, Sleep apnea, Snoring, Stroke (HCC) (2011), Unspecified hemorrhagic conditions, and Urinary bladder disorder.    SOCIAL HISTORY  Social History     Tobacco Use   • Smoking status: Current Every Day Smoker     Packs/day: 0.25     Years: 45.00     Pack years: 11.25     Types: Cigarettes   • Smokeless tobacco: Never Used   Substance and Sexual Activity   • Alcohol use: Not Currently     Comment: Last drink 2019   • Drug use: Not Currently     Comment: 02/2021- CBD gummies for sleep ,  Hx IV meth use, states she used 12/18/17 for first time in yrs    • Sexual activity: Not on file       SURGICAL HISTORY   has a past surgical history that includes gyn surgery; gyn surgery (2010); carpal tunnel release (2010); shoulder surgery (2010); other (2010); other; anterior and posterior repair (12/6/2013); enterocele repair (12/6/2013); bladder sling female (12/6/2013); gastroscopy with clipping (7/6/2014); gastroscopy with banding (7/8/2014); cath removal (11/14/2014); cath placement (12/22/2014); cataract extraction with iol (Bilateral); tonsillectomy; gastroscopy (N/A, 8/21/2017); gastroscopy with banding (N/A, 2/8/2018); gastroscopy (N/A, 2/12/2018); cath removal (11/4/2020); colonoscopy,diagnostic (N/A, 2/15/2021); and upper gi endoscopy,diagnosis (N/A, 2/15/2021).    CURRENT MEDICATIONS  Home Medications    **Home medications have not yet been reviewed for this encounter**         ALLERGIES  Allergies   Allergen Reactions   • Penicillins      Hives, itching        PHYSICAL EXAM  VITAL SIGNS: BP (!) 197/86   Pulse 83   Temp 37.1 °C (98.7 °F) (Tympanic)   Resp 18   Ht 1.676 m (5' 6\")   Wt 70.3 kg (155 lb)   LMP  (LMP Unknown)   SpO2 93%   BMI 25.02 kg/m²    Pulse ox interpretation: I interpret this pulse ox " as normal.  Constitutional: Alert in no apparent distress.  HENT: No signs of trauma, Bilateral external ears normal, Nose normal.  Moist mucous membranes.  Eyes: Pupils are equal and reactive, Conjunctiva normal, Non-icteric.   Neck: Normal range of motion, No tenderness, Supple, No stridor.   Lymphatic: No lymphadenopathy noted.   Cardiovascular: Regular rate and rhythm, no murmurs. Pulses symmetrical.  Thorax & Lungs: Normal breath sounds, No respiratory distress, No wheezing, No chest tenderness.   Abdomen: Bowel sounds normal, Soft, bilateral upper quadrant and right lower quadrant tenderness, No masses, No pulsatile masses. No peritoneal signs.  Skin: Warm, Dry, No erythema, No rash.   Back: Normal alignment.  Extremities: Intact distal pulses, No edema, No tenderness, No cyanosis.  Musculoskeletal: Good range of motion in all major joints. No tenderness to palpation or major deformities noted.   Neurologic: Alert, Normal motor function, Normal sensory function, No focal deficits noted.   Psychiatric: Affect normal, Judgment normal, Mood normal.     DIAGNOSTIC STUDIES / PROCEDURES    LABS  Results for orders placed or performed during the hospital encounter of 02/16/21   CBC WITH DIFFERENTIAL   Result Value Ref Range    WBC 3.7 (L) 4.8 - 10.8 K/uL    RBC 4.47 4.20 - 5.40 M/uL    Hemoglobin 10.3 (L) 12.0 - 16.0 g/dL    Hematocrit 33.4 (L) 37.0 - 47.0 %    MCV 74.7 (L) 81.4 - 97.8 fL    MCH 23.0 (L) 27.0 - 33.0 pg    MCHC 30.8 (L) 33.6 - 35.0 g/dL    RDW 44.0 35.9 - 50.0 fL    Platelet Count 66 (L) 164 - 446 K/uL    Neutrophils-Polys 70.20 44.00 - 72.00 %    Lymphocytes 18.70 (L) 22.00 - 41.00 %    Monocytes 8.10 0.00 - 13.40 %    Eosinophils 1.90 0.00 - 6.90 %    Basophils 0.80 0.00 - 1.80 %    Immature Granulocytes 0.30 0.00 - 0.90 %    Nucleated RBC 0.00 /100 WBC    Neutrophils (Absolute) 2.59 2.00 - 7.15 K/uL    Lymphs (Absolute) 0.69 (L) 1.00 - 4.80 K/uL    Monos (Absolute) 0.30 0.00 - 0.85 K/uL    Eos  (Absolute) 0.07 0.00 - 0.51 K/uL    Baso (Absolute) 0.03 0.00 - 0.12 K/uL    Immature Granulocytes (abs) 0.01 0.00 - 0.11 K/uL    NRBC (Absolute) 0.00 K/uL   Comp Metabolic Panel   Result Value Ref Range    Anion Gap 11.0 7.0 - 16.0    Globulin 3.2 1.9 - 3.5 g/dL    A-G Ratio 1.2 g/dL   EKG   Result Value Ref Range    Report       [facility] Emergency Dept[facility]    Test Date:  2021  Pt Name:    PADMAJA FLETCHER            Department: ER  MRN:        [id number]                      Room:        01  Gender:     Female                       Technician: [id number]  :        1960                   Requested By:GINNA VERONICA  Order #:    [id number]                    Reading MD:    Measurements  Intervals                                Axis  Rate:       89                           P:          73  NM:         173                          QRS:        53  QRSD:       87                           T:          64  QT:         397  QTc:        484    Interpretive Statements  Sinus rhythm  Low voltage, extremity leads  Anteroseptal infarct, old  Compared to ECG 2020 09:59:47  Sinus tachycardia no longer present  Myocardial infarct finding still present       RADIOLOGY  CTA thoracoabdominal aorta    COURSE & MEDICAL DECISION MAKING  Pertinent Labs & Imaging studies reviewed. (See chart for details)  Records obtained and reviewed: Patient underwent EGD earlier this morning and was diagnosed with large esophageal varices with red spots, mild portal gastropathy, colon polyps, diverticulosis, and rectal varices.  Band ligation of the 3 columns of large esophageal varices were banded.  She was noted to have 7 colon polyps from 4 mm to 9 mm in size which were removed with cold snare.  One polyp in the ascending colon demonstrated a possible visible vessel and was hemoclipped.  Rectal varices were small and the diverticulosis was noted to be mild.    Patient underwent echocardiogram 2020 and LVEF was  noted to be 70% with normal diastolic function and moderate concentric left ventricular hypertrophy.  She underwent cardiac stress test 6/30/2020 which did not reveal ischemic changes.    This is a 60-year-old female several hours status post EGD with variceal banding and colonoscopy with colon polyp removal and Hemoclip who is here with headache, chest pain radiating to the back, and abdominal pain.  Arrives hypertensive and I was notified by nursing staff that the blood pressures in bilateral upper extremities were 20 mmHg asymmetric.  Code aorta was called and the patient was emergently evaluated at bedside.  Differential diagnosis includes, but is not limited to, aortic dissection, esophageal spasm, postprocedure pain, esophageal rupture, gastric rupture, intestinal rupture, pancreatitis, colitis, appendicitis, cystitis, mesenteric ischemia, ACS.    During evaluation at bedside patient did appear to be uncomfortable but was not in acute distress.  Lungs are clear.  Abdomen is soft with tenderness in the bilateral upper quadrants and right lower quadrant.  No peritoneal signs.  She has equal pulses in her bilateral upper and lower extremities.    Sent emergently to the CT scanner for CTA to rule out aortic dissection.    CBC reveals leukocytopenia which is consistent with prior, anemia consistent with prior, and thrombocytopenia consistent with prior.  Metabolic panel reveals normal electrolytes, renal, and liver function.  Blood glucose slightly elevated to 101.  Lipase and lactate are both normal.  Initial troponin was 13 and has trended down to 12.  UA does not suggest infection.    CTA of the thoracoabdominal aorta did not demonstrate aneurysm or dissection.  Noted were changes of cirrhosis with stigmata of portal hypertension including extensive esophageal varices.  Also noted with cholelithiasis with gallbladder wall thickening which appears similar compared to prior study.  A moderate quantity of dependent  ascites was noted in the pelvis.  Incidental finding of diverticulosis.  Patient was hypertensive initially and 0.5 inches of Nitropaste were placed which only partially improved her hypertension.  She was then given a dose of labetalol which did improve her blood pressure to 140s systolic.    I spoke with the on-call gastroenterologist, Dr. Smith, regarding the patient's symptoms.  She has recommended hospitalization for pain control and GI will see the patient in the morning..    The patient was given Carafate and was able to tolerate water and a very small amount of applesauce but otherwise no solid foods.  Patient was discussed with the hospitalist and admitted in guarded condition.    FINAL IMPRESSION  1.  Dysphagia  2.  Chest pain  3.  Abdominal pain  4.  Hypertension    Electronically signed by: Isaias Martinez M.D., 2/16/2021 12:26 AM

## 2021-02-16 NOTE — ASSESSMENT & PLAN NOTE
Patient had extensive malignant hypertension on presentation secondary to pain  Labetalol 10 mg IV as needed  Analgesics for pain control  Continue with Lasix and spironolactone

## 2021-02-17 PROBLEM — I85.00 ESOPHAGEAL VARICES (HCC): Status: RESOLVED | Noted: 2017-08-21 | Resolved: 2021-01-01

## 2021-02-17 PROBLEM — R10.9 ABDOMINAL PAIN: Status: RESOLVED | Noted: 2020-09-29 | Resolved: 2021-01-01

## 2021-02-17 NOTE — PROGRESS NOTES
2 RN skin check complete with yecenia.   Devices in place IV site.  Skin assessed under devices yes.  Confirmed pressure ulcers found on: none.  New potential pressure ulcers noted on: none. Wound consult placed: none.  The following interventions in place: patient able to turn self in bed, ambulatory.

## 2021-02-17 NOTE — PROGRESS NOTES
Patient arrived from [facility] and v[facility], denies pain at this time, no nausea at this time and refusing nausea meds and all evening meds. Patient resting in bed. Needs met at this time, will monitor.

## 2021-02-17 NOTE — CARE PLAN
Problem: Communication  Goal: The ability to communicate needs accurately and effectively will improve  Outcome: PROGRESSING AS EXPECTED     Problem: Safety  Goal: Will remain free from injury  Outcome: PROGRESSING AS EXPECTED  Goal: Will remain free from falls  Outcome: PROGRESSING AS EXPECTED     Problem: Infection  Goal: Will remain free from infection  Outcome: PROGRESSING AS EXPECTED     Problem: Venous Thromboembolism (VTW)/Deep Vein Thrombosis (DVT) Prevention:  Goal: Patient will participate in Venous Thrombosis (VTE)/Deep Vein Thrombosis (DVT)Prevention Measures  Outcome: PROGRESSING AS EXPECTED     Problem: Bowel/Gastric:  Goal: Normal bowel function is maintained or improved  Outcome: PROGRESSING AS EXPECTED  Goal: Will not experience complications related to bowel motility  Outcome: PROGRESSING AS EXPECTED     Problem: Knowledge Deficit  Goal: Knowledge of disease process/condition, treatment plan, diagnostic tests, and medications will improve  Outcome: PROGRESSING AS EXPECTED     Problem: Discharge Barriers/Planning  Goal: Patient's continuum of care needs will be met  Outcome: PROGRESSING AS EXPECTED     Problem: Fluid Volume:  Goal: Will maintain balanced intake and output  Outcome: PROGRESSING AS EXPECTED

## 2021-02-17 NOTE — DISCHARGE SUMMARY
Discharge Summary    CHIEF COMPLAINT ON ADMISSION  Chief Complaint   Patient presents with   • Abdominal Pain     Colonscopy and endoscopy was performed today, pt unable to get pain meds. 100 fentanyl given in route by EMS. HTN hx - out of medications       Reason for Admission  EMS     Admission Date  2/16/2021    CODE STATUS  Full Code    HPI & HOSPITAL COURSE  60 y.o. female with PMHx of HTN, HLD, DM2, Hep C, Hiatal Hernia, GERD, Cirrhosis admitted 2/16/2021 with Abdominal Pain.    60 y.o. female who presented 2/16/2021 with complaints of severe abdominal pain after EGD colonoscopy earlier yesterday.  It was noted that patient had EGD banding procedure performed earlier in the day.  She states she has difficulty swallowing food and complains of odynophagia.  She complained of mild nausea but extreme abdominal pain while at home prompting her visit to the ED.  Patient states that she has quit drinking 2 years ago and has liver disease/cirrhosis because of this.  She states that she has had 1 paracentesis over 10 years ago and has not required any other subsequently.  She states she is compliant with her home medication regiment and states that she takes Lantus 20 units in a.m., 20 units at lunch and 40 units at night.  She states she takes between 3 to 10 units of NovoLog with meals.  She was informed we will optimize her insulin regimen while inpatient.  She currently admits to pyrosis, abdominal pain and otherwise denies at this time: Hematemesis, melena, hematochezia, diarrhea, constipation.     Vital signs on admission were as follows: 98.7, 94, 14, 197/86, 94% room air.     Nitropaste was added and analgesics administered to patient for extreme abdominal pain with elevated blood pressure as high as 219/91.     CT/CTA complete thoracoabdominal aorta performed which revealed cirrhosis with portal hypertension including extensive esophageal varices, moderate quantity of dependent ascites in the pelvis and  diverticulosis.    Urinalysis performed which revealed dilute urine otherwise unremarkable.  Troponin T within normal limits and CMP unremarkable.  Lipase measured at 24 and lactic acid 1.8.  Patient was noted to be pancytopenic with WBC of 3.7, H/H 10.3/33.4 with MCV of 74.7 and platelet count of 66.     Patient in agreement with hospitalization to manage her abdominal pain.  ERP had consulted with GI who will see patient in a.m.  She agrees to full CODE STATUS at time of evaluation is alert and oriented x4.    Patient examined at bedside   Not in any acute distress  No overnight complaints    Dr. Dean from GI will make appointment for outpatient follow up with Dr. Jiang. Patient to continue taking Lasix and Spironolactone    Therefore, she is discharged in good and stable condition to home with close outpatient follow-up.    The patient met 2-midnight criteria for an inpatient stay at the time of discharge.    Discharge Date  2/17/21    FOLLOW UP ITEMS POST DISCHARGE  Follow up with Gastroenterologist Dr. Jiang in 1 week    DISCHARGE DIAGNOSES  Principal Problem (Resolved):    Abdominal pain POA: Yes  Active Problems:    HTN (hypertension) POA: Yes    DM2 (diabetes mellitus, type 2) (HCC) POA: Yes    Cirrhosis (HCC) POA: Yes    Nicotine dependence POA: Yes    Pancytopenia (HCC) POA: Yes  Resolved Problems:    Esophageal varices (HCC) POA: Yes      FOLLOW UP  No future appointments.  No follow-up provider specified.    MEDICATIONS ON DISCHARGE     Medication List      CHANGE how you take these medications      Instructions   Lantus SoloStar 100 UNIT/ML Sopn injection  What changed:   · how much to take  · when to take this  Generic drug: insulin glargine   Doctor's comments: E 11.65  Inject 40 Units as instructed every evening.  Dose: 40 Units     sucralfate 1 GM/10ML Susp  What changed: Another medication with the same name was removed. Continue taking this medication, and follow the directions you see  here.  Commonly known as: CARAFATE   Take 10 mL by mouth 4 times a day.  Dose: 1 g        CONTINUE taking these medications      Instructions   furosemide 40 MG Tabs  Commonly known as: LASIX   Take 40 mg by mouth every day.  Dose: 40 mg     gabapentin 800 MG tablet  Commonly known as: NEURONTIN   Take 800 mg by mouth 3 times a day.  Dose: 800 mg     HumaLOG 100 UNIT/ML  Generic drug: insulin lispro   Inject 10 Units under the skin 2 times daily, before breakfast and dinner.  Dose: 10 Units     lisinopril 5 MG Tabs  Commonly known as: PRINIVIL   Take 5 mg by mouth every day.  Dose: 5 mg     OXcarbazepine 300 MG Tabs  Commonly known as: TRILEPTAL   Take 300-600 mg by mouth 2 (two) times a day. 1 tab = 300 mg every morning  2 tabs = 600 mg every evening  Dose: 300-600 mg     spironolactone 50 MG Tabs  Commonly known as: ALDACTONE   Take 50 mg by mouth every day.  Dose: 50 mg     traZODone 100 MG Tabs  Commonly known as: DESYREL   Take 100 mg by mouth at bedtime as needed for Sleep.  Dose: 100 mg        STOP taking these medications    famotidine 20 MG Tabs  Commonly known as: PEPCID     hyoscyamine-maalox plus-lidocaine viscous  Commonly known as: GI COCKTAIL     NovoLOG FlexPen 100 UNIT/ML injection PEN  Generic drug: insulin aspart     omeprazole 20 MG delayed-release capsule  Commonly known as: PRILOSEC            Allergies  Allergies   Allergen Reactions   • Penicillins Hives and Itching     Hives, itching        DIET  Orders Placed This Encounter   Procedures   • Diet Order Diet: Consistent CHO (Diabetic); Second Modifier: (optional): 2 Gram Sodium; Fluid modifications: (optional): 1500 ml Fluid Restriction     Standing Status:   Standing     Number of Occurrences:   1     Order Specific Question:   Diet:     Answer:   Consistent CHO (Diabetic) [4]     Order Specific Question:   Second Modifier: (optional)     Answer:   2 Gram Sodium [7]     Order Specific Question:   Fluid modifications: (optional)     Answer:    1500 ml Fluid Restriction [9]       ACTIVITY  As tolerated.  Weight bearing as tolerated    CONSULTATIONS  Gastroenterology    PROCEDURES  EGD with Esophageal Variceal Band Ligation    LABORATORY  Lab Results   Component Value Date    SODIUM 140 02/17/2021    POTASSIUM 3.4 (L) 02/17/2021    CHLORIDE 103 02/17/2021    CO2 29 02/17/2021    GLUCOSE 125 (H) 02/17/2021    BUN 10 02/17/2021    CREATININE 0.79 02/17/2021    CREATININE 0.6 02/03/2008        Lab Results   Component Value Date    WBC 3.6 (L) 02/17/2021    HEMOGLOBIN 10.2 (L) 02/17/2021    HEMATOCRIT 33.7 (L) 02/17/2021    PLATELETCT 75 (L) 02/17/2021        Total time of the discharge process exceeds 37 minutes.

## 2021-02-17 NOTE — DISCHARGE INSTRUCTIONS
Discharge Instructions    Discharged to home by car with friend. Discharged via wheelchair, hospital escort: Yes.  Special equipment needed: None    Be sure to schedule a follow-up appointment with your primary care doctor or any specialists as instructed.     Discharge Plan:   Influenza Vaccine Indication: Indicated: 9 to 64 years of age    I understand that a diet low in cholesterol, fat, and sodium is recommended for good health. Unless I have been given specific instructions below for another diet, I accept this instruction as my diet prescription.   Other diet: None    Special Instructions: None    · Is patient discharged on Warfarin / Coumadin?   No     Depression / Suicide Risk    As you are discharged from this [facility] facility, it is important to learn how to keep safe from harming yourself.    Recognize the warning signs:  · Abrupt changes in personality, positive or negative- including increase in energy   · Giving away possessions  · Change in eating patterns- significant weight changes-  positive or negative  · Change in sleeping patterns- unable to sleep or sleeping all the time   · Unwillingness or inability to communicate  · Depression  · Unusual sadness, discouragement and loneliness  · Talk of wanting to die  · Neglect of personal appearance   · Rebelliousness- reckless behavior  · Withdrawal from people/activities they love  · Confusion- inability to concentrate     If you or a loved one observes any of these behaviors or has concerns about self-harm, here's what you can do:  · Talk about it- your feelings and reasons for harming yourself  · Remove any means that you might use to hurt yourself (examples: pills, rope, extension cords, firearm)  · Get professional help from the community (Mental Health, Substance Abuse, psychological counseling)  · Do not be alone:Call your Safe Contact- someone whom you trust who will be there for you.  · Call your local CRISIS HOTLINE 335-8442 or  442-329-7814  · Call your local Children's Mobile Crisis Response Team [place] (158) 346-1705 or www.[employer].Pinchd  · Call the toll free National Suicide Prevention Hotlines   · National Suicide Prevention Lifeline 155-066-ENUA (7148)  · National CoverMyMeds Line Network 800-SUICIDE (593-3166)

## 2021-02-17 NOTE — PROGRESS NOTES
Patient had a pleasant night. Patient had complaints of itching at the beginning of the shift. Patient slept through the night. Patient resting in bed at this time. Denies any discomfort.

## 2021-02-22 NOTE — PROGRESS NOTES
JOSE Pelletier spoke with pt via mobile phone after dc from [facility]. I had just signed an encounter, closing out this case because the pt had not responded to my previous voicemails or text messages.   Pt reports that she has a hospital follow up appt scheduled with her PCP Thi for 3/8 at 330 pm. Pt states that she just bought a new car and will have transportation to her medical appts. Pt reports that she has all of her medications and has no questions for a CCM pharmD. Pt did not express any questions or concerns for CCM at this time.     Community Health Worker Intake  • Social determinates of health intake completed  • Identified barriers to none  • Contact information provided to Demetrice Jaime   • Outpatient assessment completed.  • Did the patient receive medications post discharge: Yes    Plan:  Pt will be removed from CCM case load at this time due to all needs/goals met.

## 2021-03-04 PROBLEM — R11.2 NAUSEA & VOMITING: Status: ACTIVE | Noted: 2021-01-01
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
## 2021-03-04 PROBLEM — I16.0 HYPERTENSIVE URGENCY: Status: ACTIVE | Noted: 2021-01-01

## 2021-03-04 NOTE — ED TRIAGE NOTES
"Chief Complaint   Patient presents with   • N/V     x3 days   • Syncope     multiple times unsure if she hit her head   • Knee Pain     Rt knee denies trauma      Pt BIB EMS for above complaint. Patient states she had her gallbladder taken out a month ago and has not had any problems since. For the last 3 days pt has been unable to keep any food or liquid down. Patient states she has been feeling lightheaded and having syncopal episodes. Patient unsure if she hit her head but no obvious trauma noted. Patient states she also has been having Rt ankle pain that has progressed to her Rt knee. Patient denies pain from syncopal episodes. No obvious deformity, swelling, heat, or redness noted. Pedal pulses +2 in both feet. Pt given 250mL NS, 4mg zofran, and 100 fentanyl.     BP (!) 198/89   Pulse 79   Temp 36.7 °C (98.1 °F) (Temporal)   Resp 16   Ht 1.702 m (5' 7\")   Wt 68 kg (150 lb)   LMP  (LMP Unknown)   SpO2 98%   BMI 23.49 kg/m²     "

## 2021-03-04 NOTE — ED PROVIDER NOTES
"ED Provider Note    Scribed for Donte Mcdaniels M.D. by Ilana Dover. 3/4/2021  2:50 PM    Primary care provider: Itzel Ness M.D.  Means of arrival: EMS  History obtained from: Patient  History limited by: None    CHIEF COMPLAINT  Chief Complaint   Patient presents with   • N/V     x3 days   • Syncope     multiple times unsure if she hit her head   • Knee Pain     Rt knee denies trauma        HPI  Demetrice Jaime is a 60 y.o. female, with a history of an endoscopy, cholecystectomy, and diabetes, who presents to the Emergency Department for ongoing right knee pain with an onset of one day ago status post ground level fall. She describes her pain as \"burning\" and \"like ice water to skin\" in quality. Her knee pain is localized and does not radiate. She notes she hit her occiput on the shower floor, but denies any associated loss of consciousness. The patient adds that over the past month it seems as if her bilateral ankles are \"kinking\" and does not hold up her weight well. No alleviating or exacerbating factors were identified. She reports associated mild right knee swelling, and headache. She denies any associated numbness or tingling.     The patient is also presenting with ongoing nausea and vomiting with an onset of 3 days ago. She describes she has been having difficulty keeping both fluids and food down. She denies being prescribed any medications for her nausea and vomiting. She notes she was seen and evaluated approximately one week ago for similar complaints. Exacerbating factors include eating. No alleviating factors were identified. She reports associated diffuse abdominal pain. She denies any associated fever.     REVIEW OF SYSTEMS  Pertinent positives include ground level fall, right knee pain, right knee swelling, headache, nausea, vomiting, and abdominal pain. Pertinent negatives include no fever, numbness, or tingling.  All other systems reviewed and negative.    PAST MEDICAL " HISTORY   has a past medical history of Anemia, Anesthesia, Arrhythmia, Arthritis, Breath shortness, Cataract, Cirrhosis (HCC), Congestive heart failure (HCC), Dental disorder, Diabetes, Fall, Fibromyalgia, GERD (gastroesophageal reflux disease), Hepatitis C, Hiatus hernia syndrome, Hypertension (11/03/2020), Indigestion, Myocardial infarct (HCC), Obesity, Other specified disorder of intestines, Pain (11/03/2020), Psychiatric problem, Restless leg syndrome, Sleep apnea, Snoring, Stroke (HCC) (2011), Unspecified hemorrhagic conditions, and Urinary bladder disorder.    SURGICAL HISTORY   has a past surgical history that includes gyn surgery; gyn surgery (2010); other (2010); other; anterior and posterior repair (12/6/2013); enterocele repair (12/6/2013); bladder sling female (12/6/2013); gastroscopy with clipping (7/6/2014); gastroscopy with banding (7/8/2014); cath removal (11/14/2014); cath placement (12/22/2014); cataract extraction with iol (Bilateral); tonsillectomy; gastroscopy (N/A, 8/21/2017); gastroscopy with banding (N/A, 2/8/2018); gastroscopy (N/A, 2/12/2018); cath removal (11/4/2020); colonoscopy,diagnostic (N/A, 2/15/2021); and upper gi endoscopy,diagnosis (N/A, 2/15/2021).    SOCIAL HISTORY  Social History     Tobacco Use   • Smoking status: Current Every Day Smoker     Packs/day: 0.25     Years: 45.00     Pack years: 11.25     Types: Cigarettes   • Smokeless tobacco: Never Used   Substance Use Topics   • Alcohol use: Not Currently     Comment: Last drink 2019   • Drug use: Not Currently     Comment: 02/2021- CBD gummies for sleep ,  Hx IV meth use, states she used 12/18/17 for first time in yrs       Social History     Substance and Sexual Activity   Drug Use Not Currently    Comment: 02/2021- CBD gummies for sleep ,  Hx IV meth use, states she used 12/18/17 for first time in yrs        FAMILY HISTORY  Family History   Problem Relation Age of Onset   • Diabetes Mother    • Hypertension Mother   "      CURRENT MEDICATIONS  Home Medications     Reviewed by Arvin ASHBY Chief Goes Out, PhT (Pharmacy Tech) on 03/04/21 at 1849  Med List Status: Partial   Medication Last Dose Status   furosemide (LASIX) 40 MG Tab Ranout Active   gabapentin (NEURONTIN) 800 MG tablet 3/3/2021 Active   ibuprofen (MOTRIN) 200 MG Tab 2/27/2021 Active   insulin glargine (LANTUS SOLOSTAR) 100 UNIT/ML Solution Pen-injector injection 3/1/2021 Active   insulin lispro (HUMALOG) 100 UNIT/ML  Active   lisinopril (PRINIVIL) 5 MG Tab Ranout Active   omeprazole (PRILOSEC) 20 MG delayed-release capsule 3/1/2021 Active   OXcarbazepine (TRILEPTAL) 300 MG Tab 3/2/2021 Active   spironolactone (ALDACTONE) 50 MG Tab about a week ago Active   sucralfate (CARAFATE) 1 GM/10ML Suspension Not Taking Active   traZODone (DESYREL) 100 MG Tab 3/3/2021 Active                ALLERGIES  Allergies   Allergen Reactions   • Penicillins Hives and Itching     Hives, itching        PHYSICAL EXAM  VITAL SIGNS: BP (!) 198/89   Pulse 79   Temp 36.7 °C (98.1 °F) (Temporal)   Resp 16   Ht 1.702 m (5' 7\")   Wt 68 kg (150 lb)   LMP  (LMP Unknown)   SpO2 98%   BMI 23.49 kg/m²     Constitutional: Well developed, Well nourished, Moderate distress, Disheveled  HENT: Normocephalic, Atraumatic, Bilateral external ears normal, Oropharynx dry, No oral exudates. esophageal varices.   Eyes: PERRLA, EOMI, Conjunctiva normal, No discharge.   Neck: No tenderness, Supple, No stridor.   Lymphatic: No lymphadenopathy noted.   Cardiovascular: Normal heart rate, Normal rhythm.   Thorax & Lungs: Clear to auscultation bilaterally, No respiratory distress, No wheezing, No crackles.   Abdomen: Mild diffuse tenderness, Soft, No masses, No pulsatile masses.   Skin: Warm, Dry, No erythema, No rash.   Extremities: No cyanosis. 2+ pulses  Musculoskeletal: Right knee with mild effusion and full range of motion, No tenderness to palpation or major deformities noted.  Intact distal " pulses  Neurologic: Awake, alert. Moves all extremities spontaneously. Hyperesthesia and skin sensitivities throughout right leg,   Psychiatric: Affect normal, Judgment normal, Mood normal.     LABS  Results for orders placed or performed during the hospital encounter of 03/04/21   CBC WITH DIFFERENTIAL   Result Value Ref Range    WBC 2.8 (L) 4.8 - 10.8 K/uL    RBC 4.57 4.20 - 5.40 M/uL    Hemoglobin 10.9 (L) 12.0 - 16.0 g/dL    Hematocrit 35.0 (L) 37.0 - 47.0 %    MCV 76.6 (L) 81.4 - 97.8 fL    MCH 23.9 (L) 27.0 - 33.0 pg    MCHC 31.1 (L) 33.6 - 35.0 g/dL    RDW 45.6 35.9 - 50.0 fL    Platelet Count 78 (L) 164 - 446 K/uL    Neutrophils-Polys 66.00 44.00 - 72.00 %    Lymphocytes 22.30 22.00 - 41.00 %    Monocytes 8.80 0.00 - 13.40 %    Eosinophils 1.40 0.00 - 6.90 %    Basophils 1.10 0.00 - 1.80 %    Immature Granulocytes 0.40 0.00 - 0.90 %    Nucleated RBC 0.00 /100 WBC    Neutrophils (Absolute) 1.87 (L) 2.00 - 7.15 K/uL    Lymphs (Absolute) 0.63 (L) 1.00 - 4.80 K/uL    Monos (Absolute) 0.25 0.00 - 0.85 K/uL    Eos (Absolute) 0.04 0.00 - 0.51 K/uL    Baso (Absolute) 0.03 0.00 - 0.12 K/uL    Immature Granulocytes (abs) 0.01 0.00 - 0.11 K/uL    NRBC (Absolute) 0.00 K/uL   COMP METABOLIC PANEL   Result Value Ref Range    Sodium 141 135 - 145 mmol/L    Potassium 4.4 3.6 - 5.5 mmol/L    Chloride 107 96 - 112 mmol/L    Co2 24 20 - 33 mmol/L    Anion Gap 10.0 7.0 - 16.0    Glucose 96 65 - 99 mg/dL    Bun 12 8 - 22 mg/dL    Creatinine 0.73 0.50 - 1.40 mg/dL    Calcium 9.7 8.5 - 10.5 mg/dL    AST(SGOT) 39 12 - 45 U/L    ALT(SGPT) 14 2 - 50 U/L    Alkaline Phosphatase 72 30 - 99 U/L    Total Bilirubin 0.7 0.1 - 1.5 mg/dL    Albumin 3.9 3.2 - 4.9 g/dL    Total Protein 7.4 6.0 - 8.2 g/dL    Globulin 3.5 1.9 - 3.5 g/dL    A-G Ratio 1.1 g/dL   LIPASE   Result Value Ref Range    Lipase 43 11 - 82 U/L   URINALYSIS (UA)    Specimen: Urine   Result Value Ref Range    Color Yellow     Character Clear     Specific Gravity 1.011 <1.035     Ph 7.0 5.0 - 8.0    Glucose Negative Negative mg/dL    Ketones Negative Negative mg/dL    Protein Negative Negative mg/dL    Bilirubin Negative Negative    Urobilinogen, Urine 0.2 Negative    Nitrite Negative Negative    Leukocyte Esterase Negative Negative    Occult Blood Trace (A) Negative    Micro Urine Req Microscopic    TROPONIN   Result Value Ref Range    Troponin T 8 6 - 19 ng/L   URINE MICROSCOPIC (W/UA)   Result Value Ref Range    WBC 0-2 /hpf    RBC 0-2 /hpf    Bacteria Negative None /hpf    Epithelial Cells Negative /hpf    Hyaline Cast 0-2 /lpf   ESTIMATED GFR   Result Value Ref Range    GFR If African American >60 >60 mL/min/1.73 m 2    GFR If Non African American >60 >60 mL/min/1.73 m 2   SARS-CoV-2 PCR (24 hour In-House): Collect NP swab in VTM    Specimen: Respirate   Result Value Ref Range    SARS-CoV-2 Source NP Swab    EKG   Result Value Ref Range    Report       [facility] Emergency Dept.    Test Date:  2021  Pt Name:    PADMAJA FLETCHER            Department: ER  MRN:        [id number]                      Room:       [facility]  Gender:     Female                       Technician: [id number]  :        1960                   Requested By:ER TRIAGE PROTOCOL  Order #:    [id number]                    Reading MD: SANDY DELONG MD    Measurements  Intervals                                Axis  Rate:       80                           P:          71  OH:         172                          QRS:        50  QRSD:       84                           T:          71  QT:         424  QTc:        490    Interpretive Statements  SINUS RHYTHM  LOW VOLTAGE IN FRONTAL LEADS  BORDERLINE PROLONGED QT INTERVAL  Compared to ECG 2021 00:51:16  Myocardial infarct finding no longer present  Electronically Signed On 3-4-2021 18:19:28 PST by SANDY DELONG MD        All labs reviewed by me. 12 Lead EKG interpreted by me as shown above.     RADIOLOGY  DX-KNEE COMPLETE 4+ RIGHT    Final Result      No evidence of acute fracture or dislocation.      Mild degenerative changes.      DX-CHEST-PORTABLE (1 VIEW)   Final Result      1.  There is no acute cardiopulmonary process.        The radiologist's interpretation of all radiological studies have been reviewed by me.      COURSE & MEDICAL DECISION MAKING  Pertinent Labs & Imaging studies reviewed. (See chart for details)    I reviewed the patient's medical records which showed she had a history of MI, fibromyalgia, anemia, and diabetes. She was last seen here 2 weeks ago for abdominal pain and discharged on 2/17. She had a EDG with a banding procedure done.     3:17 PM - Patient seen and examined at bedside. Discussed plan of care with patient. I informed them that labs and imaging will be ordered to evaluate symptoms. Patient is understanding and agreeable with plan.  Patient will be treated with Zofran 4 mg injection, and NS infusion 1000 ml. Ordered EKG, Troponin, UA, Lipase, CMP, CBC, DX chest, and DX right knee to evaluate her symptoms. The differential diagnoses include but are not limited to: syncope, anemia, neuropathy, and radiculopathy.     Decision Making:  Patient with intractable nausea vomiting, the patient also had a syncopal episode and right knee pain.  Due to the patient's persistent symptoms I believe the patient needs to be hospitalized, discussed case with the hospitalist for hospitalization.    HYDRATION: Based on the patient's presentation of Acute Vomiting the patient was given IV fluids. IV Hydration was used because oral hydration was not adequate alone. Upon recheck following hydration, the patient was mildly improved.        FINAL IMPRESSION  1. Non-intractable vomiting with nausea, unspecified vomiting type    2. Near syncope    3. Acute pain of right knee          I, Ilana Dover (Rashi), am scribing for, and in the presence of, Donte Mcdaniels M.D..    Electronically signed by: Ilana Dover (Rashi),  3/4/2021    IDonte M.D. personally performed the services described in this documentation, as scribed by Ilana Dover in my presence, and it is both accurate and complete.    C    The note accurately reflects work and decisions made by me.  Donte Mcdaniels M.D.  3/4/2021  7:21 PM

## 2021-03-04 NOTE — ED NOTES
Unable to pull blood from pt's IV and 2 other IV's attempted. Will keep trying to get IV access and labs.

## 2021-03-05 PROBLEM — I16.0 HYPERTENSIVE URGENCY: Status: RESOLVED | Noted: 2021-01-01 | Resolved: 2021-01-01

## 2021-03-05 PROBLEM — R11.2 NAUSEA & VOMITING: Status: RESOLVED | Noted: 2021-01-01 | Resolved: 2021-01-01

## 2021-03-05 NOTE — DISCHARGE INSTRUCTIONS
Discharge Instructions    Discharged to home by car with relative. Discharged via wheelchair, hospital escort: Yes.  Special equipment needed: Not Applicable    Be sure to schedule a follow-up appointment with your primary care doctor or any specialists as instructed.     Discharge Plan:   Diet Plan: Discussed  Activity Level: Discussed  Confirmed Follow up Appointment: Patient to Call and Schedule Appointment  Confirmed Symptoms Management: Discussed  Medication Reconciliation Updated: Yes  Influenza Vaccine Indication: Not indicated: Previously immunized this influenza season and > 8 years of age    I understand that a diet low in cholesterol, fat, and sodium is recommended for good health. Unless I have been given specific instructions below for another diet, I accept this instruction as my diet prescription.   Other diet: Heart Healthy     Special Instructions: None    · Is patient discharged on Warfarin / Coumadin?   No     Depression / Suicide Risk    As you are discharged from this [facility] facility, it is important to learn how to keep safe from harming yourself.    Recognize the warning signs:  · Abrupt changes in personality, positive or negative- including increase in energy   · Giving away possessions  · Change in eating patterns- significant weight changes-  positive or negative  · Change in sleeping patterns- unable to sleep or sleeping all the time   · Unwillingness or inability to communicate  · Depression  · Unusual sadness, discouragement and loneliness  · Talk of wanting to die  · Neglect of personal appearance   · Rebelliousness- reckless behavior  · Withdrawal from people/activities they love  · Confusion- inability to concentrate     If you or a loved one observes any of these behaviors or has concerns about self-harm, here's what you can do:  · Talk about it- your feelings and reasons for harming yourself  · Remove any means that you might use to hurt yourself (examples: pills, rope,  extension cords, firearm)  · Get professional help from the community (Mental Health, Substance Abuse, psychological counseling)  · Do not be alone:Call your Safe Contact- someone whom you trust who will be there for you.  · Call your local CRISIS HOTLINE 221-2837 or 905-826-0514  · Call your local Children's Mobile Crisis Response Team [place] (495) 271-0605 or www.Reframe It  · Call the toll free National Suicide Prevention Hotlines   · National Suicide Prevention Lifeline 727-225-XWCE (4923)  · [employer] Line Network 800-SUICIDE (689-2329)            FOLLOW UP ITEMS POST DISCHARGE  Please call [phone] to schedule PCP appointment for patient.    Required specialty appointments include:       Discharge Instructions per ARMAAN BranchPArunRArunN.  -Follow up with PCP  -Utilize lidocaine patch to help with RIGHT knee and RIGHT foot pain    DIET: Cardiac and Diabetic diet    ACTIVITY: as tolerated    DIAGNOSIS: RIGHT knee pain    Return to ER if symptoms persists, chest pain, palpitation, shortness of breath, numbness, tingling, weakness, and HIGH fevers.

## 2021-03-05 NOTE — ED NOTES
Report given to Toan FRANCISCO RN. Upon shift change pt is resting comfortably in bed with even and unlabored breaths, in no apparent distress.

## 2021-03-05 NOTE — DISCHARGE SUMMARY
"Discharge Summary    CHIEF COMPLAINT ON ADMISSION  Chief Complaint   Patient presents with   • N/V     x3 days   • Syncope     multiple times unsure if she hit her head   • Knee Pain     Rt knee denies trauma        Reason for Admission  ems     Admission Date  3/4/2021    CODE STATUS  Full Code    HPI & HOSPITAL COURSE  Ms. Jaime is a 60 y.o. female with past medical history of diabetes, hypertension, who presented 3/4/2021 with R knee pain. Patient reported she fell twice this week. She described her RLE pain as \" burning\" and \"like ice water to skin\". She also reports R ankle pain. She had fall twice this week and denies loss of consciousness, fever, chills, chest pain, sob, headache. Patient also reports feeling nausea, unable keep food down and had vomiting once. Denies hematemesis. She had long standing history of DM and reports she takes lantus and novolog.  In ED, she was noted to have hypertensive urgency with bp 200/90. R knee xray showed no acute fractures. She is admitted as observation status for further evaluation and managements.    Patient seen and examined. Patient still complains of RIGHT knee tenderness and some RIGHT foot tenderness. RIGHT knee exhibit some swelling, but no noted fracture per imaging. Discussed with patient as she was requesting an MRI of her RIGHT foot, but no indication. Educated patient about her diagnosis of diabetes and neuropathy as contributing factor to her RIGHT foot pain. Patient HIGHLY recommended to follow up with PCP. All questions and concerns answered prior to being d/c. Patient d/c home.     Therefore, she is discharged in good and stable condition to home with close outpatient follow-up.    The patient recovered much more quickly than anticipated on admission.    Discharge Date  03/05/21      FOLLOW UP ITEMS POST DISCHARGE  Please call [phone] to schedule PCP appointment for patient.    Required specialty appointments include:       Discharge Instructions " per DC Barnch  -Follow up with PCP  -Utilize lidocaine patch to help with RIGHT knee and RIGHT foot pain    DIET: Cardiac and Diabetic diet    ACTIVITY: as tolerated    DIAGNOSIS: RIGHT knee pain    Return to ER if symptoms persists, chest pain, palpitation, shortness of breath, numbness, tingling, weakness, and HIGH fevers.       DISCHARGE DIAGNOSES  Principal Problem:    Fall POA: Unknown  Active Problems:    DM2 (diabetes mellitus, type 2) (formerly [facility]) POA: Yes    Neuropathy POA: Yes    Tobacco dependence POA: Yes  Resolved Problems:    Hypertensive urgency POA: Unknown    Nausea & vomiting POA: Unknown      FOLLOW UP  No future appointments.  Itzel Ness M.D.  [address] [address]  [phone]    Schedule an appointment as soon as possible for a visit in 1 week        MEDICATIONS ON DISCHARGE     Medication List      START taking these medications      Instructions   lidocaine 5 % Ptch  Commonly known as: LIDODERM   Doctor's comments: Apply to RIGHT knee and RIGHT foot  Place 1 Patch on the skin every 24 hours for 7 days.  Dose: 1 Patch        CONTINUE taking these medications      Instructions   furosemide 40 MG Tabs  Commonly known as: LASIX   Take 40 mg by mouth every day.  Dose: 40 mg     gabapentin 800 MG tablet  Commonly known as: NEURONTIN   Take 800 mg by mouth 3 times a day.  Dose: 800 mg     ibuprofen 200 MG Tabs  Commonly known as: MOTRIN   Take 200 mg by mouth 1 time a day as needed (pain).  Dose: 200 mg     insulin aspart 100 UNIT/ML Soln  Commonly known as: NovoLOG   Inject 3 Units under the skin 3 times a day before meals.  Dose: 3 Units     Lantus SoloStar 100 UNIT/ML Sopn injection  Generic drug: insulin glargine   Doctor's comments: E 11.65  Inject 40 Units as instructed every evening.  Dose: 40 Units     lisinopril 5 MG Tabs  Commonly known as: PRINIVIL   Take 5 mg by mouth every day.  Dose: 5 mg     omeprazole 20 MG delayed-release  capsule  Commonly known as: PRILOSEC   Take 20 mg by mouth every morning.  Dose: 20 mg     OXcarbazepine 300 MG Tabs  Commonly known as: TRILEPTAL   Take 300-600 mg by mouth 2 (two) times a day. 1 tab = 300 mg every morning  2 tabs = 600 mg every evening  Dose: 300-600 mg     spironolactone 50 MG Tabs  Commonly known as: ALDACTONE   Take 50 mg by mouth every day.  Dose: 50 mg     sucralfate 1 GM/10ML Susp  Commonly known as: CARAFATE   Take 10 mL by mouth 4 times a day.  Dose: 1 g     traZODone 100 MG Tabs  Commonly known as: DESYREL   Take 100 mg by mouth at bedtime as needed for Sleep.  Dose: 100 mg            Allergies  Allergies   Allergen Reactions   • Penicillins Hives and Itching     Hives, itching        DIET  Orders Placed This Encounter   Procedures   • Diet Order Diet: Consistent CHO (Diabetic); Tray Modifications (optional): Small Meals     Standing Status:   Standing     Number of Occurrences:   1     Order Specific Question:   Diet:     Answer:   Consistent CHO (Diabetic) [4]     Order Specific Question:   Tray Modifications (optional)     Answer:   Small Meals       ACTIVITY  As tolerated.  Weight bearing as tolerated    CONSULTATIONS  NONE    PROCEDURES  NONE    IMAGING    DX-KNEE COMPLETE 4+ RIGHT   Final Result      No evidence of acute fracture or dislocation.      Mild degenerative changes.      DX-CHEST-PORTABLE (1 VIEW)   Final Result      1.  There is no acute cardiopulmonary process.            LABORATORY  Lab Results   Component Value Date    SODIUM 141 03/04/2021    POTASSIUM 4.4 03/04/2021    CHLORIDE 107 03/04/2021    CO2 24 03/04/2021    GLUCOSE 96 03/04/2021    BUN 12 03/04/2021    CREATININE 0.73 03/04/2021    CREATININE 0.6 02/03/2008        Lab Results   Component Value Date    WBC 2.8 (L) 03/04/2021    HEMOGLOBIN 10.9 (L) 03/04/2021    HEMATOCRIT 35.0 (L) 03/04/2021    PLATELETCT 78 (L) 03/04/2021        Total time of the discharge process exceeds 36  minutes.    ============================================================================================================================  Please note that this dictation was created using voice recognition software. I have made every reasonable attempt to correct obvious errors, but there may be errors of grammar and possibly content that I did not discover before finalizing the note.    Electronically signed by:  DAMIEN Somers, MSN, APRN, FNP-C  Hospitalist Services  [facility]  [phone]  [email or web address]  03/05/21     [id number]

## 2021-03-05 NOTE — PROGRESS NOTES
Discharge instructions given to patient. Informed patient that she could get lidocaine patch over the counter right now. PIV removed. All questions answered at this time. Patient states that she has an appointment with her primary on Monday. Patient advised to keep that appointment.

## 2021-03-05 NOTE — THERAPY
Occupational Therapy   Initial Evaluation     Patient Name: Demetrice Jaime  Age:  60 y.o., Sex:  female  Medical Record #: [id number]  Today's Date: 3/5/2021     Precautions  Precautions: Fall Risk    Assessment  Patient is 60 y.o. female admitted for N/V, syncope, and R knee pain. PMHx of DMII, HTN. Completed ADLs/txfs with SPV and functional ambulation w/o AD and SPV, no overt LOB. 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 Patient will not be actively followed for occupational therapy services at this time, however may be seen if requested by physician for 1 more visit within 30 days to address any discharge or equipment needs.     Plan    Recommend Occupational Therapy d/c needs only     DC Equipment Recommendations: Tub / Shower Seat  Discharge Recommendations: Anticipate that the patient will have no further occupational therapy needs after discharge from the hospital      Objective     03/05/21 0756   Prior Living Situation   Prior Services Home-Independent   Housing / Facility 1 Story Apartment / Condo   Steps Into Home   (ramp)   Bathroom Set up Bathtub / Shower Combination   Equipment Owned None   Lives with - Patient's Self Care Capacity Alone and Able to Care For Self   Comments Reports mother lives down the hallway from her. she has been staying with mother d/t mother's mobility issues to assist.    Prior Level of ADL Function   Self Feeding Independent   Grooming / Hygiene Independent   Bathing Independent   Dressing Independent   Toileting Independent   Prior Level of IADL Function   Medication Management Independent   Laundry Independent   Kitchen Mobility Independent   Finances Independent   Home Management Independent   Shopping Independent   Prior Level Of Mobility Independent Without Device in Community;Independent Without Device in Home   Driving / Transportation Driving Independent   Occupation (Pre-Hospital Vocational) Retired Due To Disability   History of Falls    History of Falls Yes   Date of Last Fall   (reports hx of falls)   Vitals   O2 (LPM) 2   O2 Delivery Device Nasal Cannula   Pain 0 - 10 Group   Location Knee;Foot   Location Orientation Right;Left   Therapist Pain Assessment Post Activity Pain Same as Prior to Activity;During Activity;Nurse Notified   Cognition    Cognition / Consciousness WDL   Comments pleasant and cooperative   Passive ROM Upper Body   Passive ROM Upper Body WDL   Active ROM Upper Body   Active ROM Upper Body  WDL   Strength Upper Body   Upper Body Strength  WDL   Balance Assessment   Sitting Balance (Static) Good   Sitting Balance (Dynamic) Good   Standing Balance (Static) Fair   Standing Balance (Dynamic) Fair -   Weight Shift Sitting Good   Weight Shift Standing Fair   Comments slightly unsteady but no overt LOB. Per PT note recommended walking stick   Bed Mobility    Supine to Sit Supervised   Sit to Supine Supervised   Scooting Supervised   Comments HOB flat   ADL Assessment   Grooming Standing;Supervision  (washing hands)   Lower Body Dressing Supervision   Toileting Supervision   Functional Mobility   Sit to Stand Supervised   Bed, Chair, Wheelchair Transfer Supervised   Toilet Transfers Supervised   Transfer Method Stand Step   Mobility w/o AD in room and hallway   Edema / Skin Assessment   Edema / Skin  Not Assessed   Activity Tolerance   Sitting in Chair 6 min on toilet   Sitting Edge of Bed 3 min   Standing 6 min   Comments no c/o fatigue   Anticipated Discharge Equipment and Recommendations   DC Equipment Recommendations Tub / Shower Seat   Discharge Recommendations Anticipate that the patient will have no further occupational therapy needs after discharge from the hospital

## 2021-03-05 NOTE — ED NOTES
Attempted additional IV access again but was unsuccessful. Was able to draw blood for labs though, sent to lab.

## 2021-03-05 NOTE — ASSESSMENT & PLAN NOTE
Bp 200/90 on admission  Denies headache, blurry vision  Home bp meds: lisinopril 5mg daily and spirolactone 50mg daily, will inc lisinopril 20mg and cont spirolactone and add amlodipine 5mg daily  Labetalol as needed with parameters

## 2021-03-05 NOTE — PROGRESS NOTES
Pt brought to floor by transport. Pt alert and oriented on arrival. Pt ambulated to bathroom independently with steady gait. HTN noted, other vitals stable. Admission completed. Safety precautions in place, call bell use explained and left within reach of pt.    182.88

## 2021-03-05 NOTE — CARE PLAN
Problem: Safety  Goal: Will remain free from falls  Outcome: PROGRESSING AS EXPECTED     Problem: Pain Management  Goal: Pain level will decrease to patient's comfort goal  Outcome: PROGRESSING AS EXPECTED     Problem: Respiratory:  Goal: Respiratory status will improve  Outcome: PROGRESSING SLOWER THAN EXPECTED

## 2021-03-05 NOTE — ED NOTES
Med Rec completed: per patient  Preferred Pharmacy: [facility] in [place]  Allergies   Allergen Reactions   • Penicillins Hives and Itching     Hives, itching        No ORAL antibiotics in last 14 days    Medication Sig Comments   • omeprazole (PRILOSEC) 20 MG delayed-release capsule Take 20 mg by mouth every morning.    • ibuprofen (MOTRIN) 200 MG Tab Take 200 mg by mouth 1 time a day as needed (pain).    • insulin aspart (NOVOLOG) 100 UNIT/ML Solution Inject 3 Units under the skin 3 times a day before meals. Switches between novolog and humalog. LF was novolog  Patient reports taking 3 units before breakfast an 20 units in the evening.   • furosemide (LASIX) 40 MG Tab Take 40 mg by mouth every day.    • [DISCONTINUED] insulin lispro (HUMALOG) 100 UNIT/ML Inject 3 Units under the skin 3 times a day before meals. Switches between novolog and humalog. LF was novolog   • gabapentin (NEURONTIN) 800 MG tablet Take 800 mg by mouth 3 times a day.    • lisinopril (PRINIVIL) 5 MG Tab Take 5 mg by mouth every day.    • OXcarbazepine (TRILEPTAL) 300 MG Tab Take 300-600 mg by mouth 2 (two) times a day. 1 tab = 300 mg every morning  2 tabs = 600 mg every evening    • traZODone (DESYREL) 100 MG Tab Take 100 mg by mouth at bedtime as needed for Sleep.    • spironolactone (ALDACTONE) 50 MG Tab Take 50 mg by mouth every day. Patient does not take as prescribed: She takes 1 tablet twice daily   • insulin glargine (LANTUS SOLOSTAR) 100 UNIT/ML Solution Pen-injector injection Inject 40 Units as instructed every evening. Patient does not take as prescribed: She takes 20 units in the AM and 40 units in the afternoon.

## 2021-03-05 NOTE — DISCHARGE PLANNING
Spoke with Gt at [facility] on [facility]. Lido patch 5% needs prior auth. PA number [phone]. Called and worked on PA. PA sent clinical review, can take up to 24 hrs. Case # [id number].

## 2021-03-05 NOTE — ASSESSMENT & PLAN NOTE
Denies syncope, LOC  Suspecting from neuropathy sec to long standing hx of DM2  Cont gabapentin  PT/OT  Fall precautions

## 2021-03-05 NOTE — H&P
"Hospital Medicine History & Physical Note    Date of Service  3/4/2021    Primary Care Physician  Itzel Ness M.D.    Consultants  None     Code Status  Full Code    Chief Complaint  Chief Complaint   Patient presents with   • N/V     x3 days   • Syncope     multiple times unsure if she hit her head   • Knee Pain     Rt knee denies trauma        History of Presenting Illness  60 y.o. female with past medical history of diabetes, hypertension, who presented 3/4/2021 with R knee pain. Patient reported she fell twice this week. She described her RLE pain as \" burning\" and \"like ice water to skin\". She also reports R ankle pain. She had fall twice this week and denies loss of consciousness, fever, chills, chest pain, sob, headache.  Patient also reports feeling nausea, unable keep food down and had vomiting once. Denies hematemesis. She had long standing history of DM and reports she takes lantus and novolog.   In ED, she was noted to have hypertensive urgency with bp 200/90. R knee xray showed no acute fractures. She is admitted as observation status for further evaluation and managements.    Review of Systems  Review of Systems   Constitutional: Negative for chills and fever.   HENT: Negative for ear discharge.    Eyes: Negative for blurred vision.   Respiratory: Negative for cough and hemoptysis.    Cardiovascular: Negative for chest pain and palpitations.   Gastrointestinal: Positive for nausea and vomiting.   Genitourinary: Negative for dysuria.   Musculoskeletal: Positive for joint pain (R knee pain). Negative for myalgias.   Skin: Negative for rash.   Neurological: Positive for sensory change (burning sensation of RLE). Negative for focal weakness.   Endo/Heme/Allergies: Does not bruise/bleed easily.   Psychiatric/Behavioral: Negative for depression.       Past Medical History   has a past medical history of Anemia, Anesthesia, Arrhythmia, Arthritis, Breath shortness, Cataract, Cirrhosis (HCC), Congestive " heart failure (HCC), Dental disorder, Diabetes, Fall, Fibromyalgia, GERD (gastroesophageal reflux disease), Hepatitis C, Hiatus hernia syndrome, Hypertension (11/03/2020), Indigestion, Myocardial infarct (HCC), Obesity, Other specified disorder of intestines, Pain (11/03/2020), Psychiatric problem, Restless leg syndrome, Sleep apnea, Snoring, Stroke (HCC) (2011), Unspecified hemorrhagic conditions, and Urinary bladder disorder.    Surgical History   has a past surgical history that includes gyn surgery; gyn surgery (2010); other (2010); other; anterior and posterior repair (12/6/2013); enterocele repair (12/6/2013); bladder sling female (12/6/2013); gastroscopy with clipping (7/6/2014); gastroscopy with banding (7/8/2014); cath removal (11/14/2014); cath placement (12/22/2014); cataract extraction with iol (Bilateral); tonsillectomy; gastroscopy (N/A, 8/21/2017); gastroscopy with banding (N/A, 2/8/2018); gastroscopy (N/A, 2/12/2018); cath removal (11/4/2020); pr colonoscopy,diagnostic (N/A, 2/15/2021); and pr upper gi endoscopy,diagnosis (N/A, 2/15/2021).     Family History  family history includes Diabetes in her mother; Hypertension in her mother.     Social History   reports that she has been smoking cigarettes. She has a 11.25 pack-year smoking history. She has never used smokeless tobacco. She reports previous alcohol use. She reports previous drug use.    Allergies  Allergies   Allergen Reactions   • Penicillins Hives and Itching     Hives, itching        Medications  Prior to Admission Medications   Prescriptions Last Dose Informant Patient Reported? Taking?   OXcarbazepine (TRILEPTAL) 300 MG Tab 3/2/2021 at HS Patient Yes No   Sig: Take 300-600 mg by mouth 2 (two) times a day. 1 tab = 300 mg every morning  2 tabs = 600 mg every evening   furosemide (LASIX) 40 MG Tab [place] at UNK Patient Yes No   Sig: Take 40 mg by mouth every day.   gabapentin (NEURONTIN) 800 MG tablet 3/3/2021 at AM Patient Yes No   Sig:  Take 800 mg by mouth 3 times a day.   ibuprofen (MOTRIN) 200 MG Tab 2/27/2021 at PRN Patient Yes Yes   Sig: Take 200 mg by mouth 1 time a day as needed (pain).   insulin aspart (NOVOLOG) 100 UNIT/ML Solution UNK at [facility] Patient's Home Pharmacy Yes Yes   Sig: Inject 3 Units under the skin 3 times a day before meals.   insulin glargine (LANTUS SOLOSTAR) 100 UNIT/ML Solution Pen-injector injection 3/1/2021 at [facility] Patient's Home Pharmacy No No   Sig: Inject 40 Units as instructed every evening.   lisinopril (PRINIVIL) 5 MG Tab [place] at [facility] Patient Yes No   Sig: Take 5 mg by mouth every day.   omeprazole (PRILOSEC) 20 MG delayed-release capsule 3/1/2021 at AM Patient Yes Yes   Sig: Take 20 mg by mouth every morning.   spironolactone (ALDACTONE) 50 MG Tab about a week ago at [facility] Patient Yes No   Sig: Take 50 mg by mouth every day.   sucralfate (CARAFATE) 1 GM/10ML Suspension Not Taking at Completed Patient No No   Sig: Take 10 mL by mouth 4 times a day.   Patient not taking: Reported on 3/4/2021   traZODone (DESYREL) 100 MG Tab 3/3/2021 at PM Patient Yes No   Sig: Take 100 mg by mouth at bedtime as needed for Sleep.      Facility-Administered Medications: None       Physical Exam  Temp:  [36.7 °C (98 °F)-36.7 °C (98.1 °F)] 36.7 °C (98 °F)  Pulse:  [79-92] 92  Resp:  [12-19] 16  BP: (184-218)/(83-95) 189/84  SpO2:  [93 %-100 %] 93 %    Physical Exam  Vitals reviewed.   Constitutional:       General: She is not in acute distress.     Appearance: Normal appearance.   HENT:      Head: Normocephalic and atraumatic.      Nose: Nose normal.      Mouth/Throat:      Mouth: Mucous membranes are dry.      Pharynx: Oropharynx is clear.   Eyes:      Extraocular Movements: Extraocular movements intact.      Conjunctiva/sclera: Conjunctivae normal.      Pupils: Pupils are equal, round, and reactive to light.   Cardiovascular:      Rate and Rhythm: Normal rate and regular rhythm.      Pulses: Normal pulses.      Heart sounds: Normal  heart sounds.   Pulmonary:      Effort: Pulmonary effort is normal.      Breath sounds: Normal breath sounds.   Abdominal:      General: Abdomen is flat. Bowel sounds are normal.      Palpations: Abdomen is soft.   Musculoskeletal:         General: Tenderness (tenderness on R medial patellar area) present. No swelling (mild swelling of R knee). Normal range of motion.      Cervical back: Normal range of motion and neck supple.   Skin:     General: Skin is warm and dry.   Neurological:      General: No focal deficit present.      Mental Status: She is alert and oriented to person, place, and time. Mental status is at baseline.   Psychiatric:         Mood and Affect: Mood normal.         Behavior: Behavior normal.         Laboratory:  Recent Labs     03/04/21  1630   WBC 2.8*   RBC 4.57   HEMOGLOBIN 10.9*   HEMATOCRIT 35.0*   MCV 76.6*   MCH 23.9*   MCHC 31.1*   RDW 45.6   PLATELETCT 78*     Recent Labs     03/04/21  1630   SODIUM 141   POTASSIUM 4.4   CHLORIDE 107   CO2 24   GLUCOSE 96   BUN 12   CREATININE 0.73   CALCIUM 9.7     Recent Labs     03/04/21  1630   ALTSGPT 14   ASTSGOT 39   ALKPHOSPHAT 72   TBILIRUBIN 0.7   LIPASE 43   GLUCOSE 96         No results for input(s): NTPROBNP in the last 72 hours.      Recent Labs     03/04/21  1630   TROPONINT 8       Imaging:  DX-KNEE COMPLETE 4+ RIGHT   Final Result      No evidence of acute fracture or dislocation.      Mild degenerative changes.      DX-CHEST-PORTABLE (1 VIEW)   Final Result      1.  There is no acute cardiopulmonary process.            Assessment/Plan:  I anticipate this patient is appropriate for observation status at this time.    * Fall  Assessment & Plan  Denies syncope, LOC  Suspecting from neuropathy sec to long standing hx of DM2  Cont gabapentin  PT/OT  Fall precautions    Nausea & vomiting  Assessment & Plan  Reports nausea and non-hematemesis  Likely sec to gastroparesis due to DM2  Normal lipase  Small frequent meals, avoid large  meals  Follows GI Dr. Dent as outpatient        Hypertensive urgency  Assessment & Plan  Bp 200/90 on admission  Denies headache, blurry vision  Home bp meds: lisinopril 5mg daily and spirolactone 50mg daily, will inc lisinopril 20mg and cont spirolactone and add amlodipine 5mg daily  Labetalol as needed with parameters    DM2 (diabetes mellitus, type 2) (Former[facility])- (present on admission)  Assessment & Plan  Long standing of DM2  A1c 5.8 2/2021  Cont lantus and SSI  Accucheck with hypoglycemic protocol    Tobacco dependence- (present on admission)  Assessment & Plan  Ongoing smoking  5 minutes on tobacco cessation counseling provided including nicotine patches, gum, and dangers of smoking. Discussed the risks of smoking including increased risk of heart disease, stroke, cancer and COPD. Discussed the benefits of quitting smoking.   Nicotine patch      Neuropathy- (present on admission)  Assessment & Plan  Cont gabapentin

## 2021-03-05 NOTE — ASSESSMENT & PLAN NOTE
Ongoing smoking  5 minutes on tobacco cessation counseling provided including nicotine patches, gum, and dangers of smoking. Discussed the risks of smoking including increased risk of heart disease, stroke, cancer and COPD. Discussed the benefits of quitting smoking.   Nicotine patch

## 2021-03-05 NOTE — ASSESSMENT & PLAN NOTE
Reports nausea and non-hematemesis  Likely sec to gastroparesis due to DM2  Normal lipase  Small frequent meals, avoid large meals  Follows GI Dr. Dent as outpatient

## 2021-03-05 NOTE — THERAPY
Physical Therapy   Initial Evaluation     Patient Name: Demetrice Jaime  Age:  60 y.o., Sex:  female  Medical Record #: [id number]  Today's Date: 3/5/2021     Precautions: Fall Risk    Assessment  Patient is 60 y.o. female s/p fall at home who reports falling 1-2x/month. She presents to PT with slight impairment to fine touch to plantar aspect of B feet as well as impairment to great toe and ankle proprioception. During gait x200', pt with poor transition from heel strike to toe off, often with excessive supination and lifting of great toe during stance (likely contributing to her instability/falls at home). While she does not require further acute PT intervention, she could benefit from OP PT services to address higher level balance deficits. She has been educated on use of SPC or walking stick to reduce falls and increase stability in the interim.     Plan    Recommend Physical Therapy for Evaluation only     DC Equipment Recommendations: None  Discharge Recommendations: Recommend outpatient physical therapy services to address higher level deficits        Objective       03/05/21 0813   Prior Living Situation   Prior Services None   Housing / Facility 1 Story Apartment / Condo   Steps Into Home   (has ramp)   Equipment Owned None   Lives with - Patient's Self Care Capacity Alone and Able to Care For Self   Comments Pt reports her mother lives down the sun and has extra SPCs she can utilize   Prior Level of Functional Mobility   Bed Mobility Independent   Transfer Status Independent   Ambulation Independent   Distance Ambulation (Feet)   (community)   Assistive Devices Used None   Stairs Independent   Balance Assessment   Comments slight instability x2 where pt was able to self-correct.    Gait Analysis   Gait Level Of Assist Supervised   Assistive Device None   Distance (Feet) 200   Weight Bearing Status FWB   Comments pt able to perform head turns and up/down without change in velocity or LOB.    Bed  Mobility    Supine to Sit Supervised   Sit to Supine Supervised   Scooting Supervised   Rolling Supervised   Functional Mobility   Sit to Stand Supervised   Bed, Chair, Wheelchair Transfer Supervised   Transfer Method Stand Step   Anticipated Discharge Equipment and Recommendations   DC Equipment Recommendations None   Discharge Recommendations Recommend outpatient physical therapy services to address higher level deficits

## 2021-03-08 NOTE — PROGRESS NOTES
JOSE Pelletier spoke with pt via mobile phone after dc from [facility]. Pt reports that she has a hospital follow up appt scheduled with her PCP Thi for today 3/8 at 330 pm. Pt states that she just bought a new car and will have transportation to her medical appts. Pt reports that she has all of her medications and has no questions for a CCM pharmD. Pt did not express any questions or concerns for CCM at this time.      Community Health Worker Intake  · Social determinates of health intake completed  · Identified barriers to none  · Contact information provided to Demetrice Jaime   · Outpatient assessment completed.  · Did the patient receive medications post discharge: Yes     Plan:  Pt will be removed from CCM case load at this time due to all needs/goals met

## 2021-07-18 NOTE — ED TRIAGE NOTES
"Chief Complaint   Patient presents with   • Abdominal Pain     pt complaning of bloating that started 2 days ago hx of cirrohsis    • Painful Urination     started few weeks ago treated for UTI completed antibiotics but still having symptoms   • Diarrhea     started couple weeks ago     Pt walked in for above complaint. Pt states she also has been having bleeding between her rectum and vaginal opening that started w/ the diarrhea. Pt denies any fevers or vomiting. Pt placed back in the lobby and educated to alert staff of any changes or concerns.    /65   Pulse 99   Temp 37.3 °C (99.1 °F) (Temporal)   Resp 18   Ht 1.727 m (5' 8\")   Wt 69.5 kg (153 lb 3.5 oz)   LMP  (LMP Unknown)   SpO2 97% Comment: RA  BMI 23.30 kg/m²     "

## 2021-07-18 NOTE — ED NOTES
Late entry: straight cath done, pt tolerated good. Leonidas rn in to start us iv. Side rails up and call light in place. Awaiting ct. Pt aware of poc.

## 2021-07-18 NOTE — ED PROVIDER NOTES
"ED Provider Note    CHIEF COMPLAINT  Chief Complaint   Patient presents with   • Abdominal Pain     pt complaning of bloating that started 2 days ago hx of cirrohsis    • Painful Urination     started few weeks ago treated for UTI completed antibiotics but still having symptoms   • Diarrhea     started couple weeks ago       HPI  Demetricehugo Jaime is a 60 y.o. female who presents with abdominal pain.  The patient states the pain started a couple of days ago.  She states she does have associated diarrhea that she has had over the last couple of weeks.  She states it also hurts with urination.  She says she has a lot of irritation between her vaginal region and her rectum.  She says she has a known history of cirrhosis from alcohol abuse and occasionally uses alcohol.  She has not had any associated vomiting.  She has not any fevers.  She states she has distention with diffuse abdominal pain.    REVIEW OF SYSTEMS  See HPI for further details. All other systems are negative.     PAST MEDICAL HISTORY  Past Medical History:   Diagnosis Date   • Hypertension 11/03/2020    reports on no meds at this time   • Pain 11/03/2020    \"my legs always hurt\"; 5/10   • Stroke (HCC) 2011    no residual problems    • Anemia    • Anesthesia     pt's mother has hard time waking up = states it took a week or two   • Arrhythmia    • Arthritis     generalized   • Breath shortness     with exertion   • Cataract     removed bilat   • Cirrhosis (HCC)    • Congestive heart failure (HCC)    • Dental disorder     dentures upper   • Diabetes     insulin   • Fall    • Fibromyalgia    • GERD (gastroesophageal reflux disease)    • Hepatitis C     states reciceved treatment   • Hiatus hernia syndrome    • Indigestion    • Myocardial infarct (HCC)    • Obesity    • Other specified disorder of intestines     constipation/diarrhea   • Psychiatric problem     depression and anxiety   • Restless leg syndrome    • Sleep apnea     has had sleep study, no " cpap   • Snoring    • Unspecified hemorrhagic conditions     reports nose bleeds   • Urinary bladder disorder     frequency/urgency       FAMILY HISTORY  [unfilled]    SOCIAL HISTORY  Social History     Socioeconomic History   • Marital status:      Spouse name: Not on file   • Number of children: Not on file   • Years of education: Not on file   • Highest education level: Not on file   Occupational History   • Not on file   Tobacco Use   • Smoking status: Current Every Day Smoker     Packs/day: 0.25     Years: 45.00     Pack years: 11.25     Types: Cigarettes   • Smokeless tobacco: Never Used   Vaping Use   • Vaping Use: Never used   Substance and Sexual Activity   • Alcohol use: Yes     Comment: occ   • Drug use: Not Currently     Comment: 02/2021- CBD gummies for sleep ,  Hx IV meth use, states she used 12/18/17 for first time in yrs    • Sexual activity: Not on file   Other Topics Concern   • Not on file   Social History Narrative   • Not on file     Social Determinants of Health     Financial Resource Strain: Low Risk    • Difficulty of Paying Living Expenses: Not hard at all   Food Insecurity: No Food Insecurity   • Worried About Running Out of Food in the Last Year: Never true   • Ran Out of Food in the Last Year: Never true   Transportation Needs: No Transportation Needs   • Lack of Transportation (Medical): No   • Lack of Transportation (Non-Medical): No   Physical Activity:    • Days of Exercise per Week:    • Minutes of Exercise per Session:    Stress:    • Feeling of Stress :    Social Connections:    • Frequency of Communication with Friends and Family:    • Frequency of Social Gatherings with Friends and Family:    • Attends Muslim Services:    • Active Member of Clubs or Organizations:    • Attends Club or Organization Meetings:    • Marital Status:    Intimate Partner Violence:    • Fear of Current or Ex-Partner:    • Emotionally Abused:    • Physically Abused:    • Sexually Abused:         SURGICAL HISTORY  Past Surgical History:   Procedure Laterality Date   • PB COLONOSCOPY,DIAGNOSTIC N/A 2/15/2021    Procedure: COLONOSCOPY;  Surgeon: Anthony Dent M.D.;  Location: SURGERY [facility];  Service: Gastroenterology   • PB UPPER GI ENDOSCOPY,DIAGNOSIS N/A 2/15/2021    Procedure: GASTROSCOPY - W/BANDING.;  Surgeon: Anthony Dent M.D.;  Location: SURGERY [facility];  Service: Gastroenterology   • CATH REMOVAL  11/4/2020    Procedure: REMOVAL, CATHETER- INFECTED PORT A CATH;  Surgeon: Leonidas Mcmanus M.D.;  Location: SURGERY [facility];  Service: General   • GASTROSCOPY N/A 2/12/2018    Procedure: GASTROSCOPY;  Surgeon: Willard Bustamante M.D.;  Location: [facility];  Service: Gastroenterology   • GASTROSCOPY WITH BANDING N/A 2/8/2018    Procedure: GASTROSCOPY WITH BANDING;  Surgeon: Davonte Mauricio M.D.;  Location: SURGERY SAME DAY [facility];  Service: Gastroenterology   • GASTROSCOPY N/A 8/21/2017    Procedure: GASTROSCOPY WITH BANDING  ;  Surgeon: Anthony Dent M.D.;  Location: SURGERY SAME DAY [facility];  Service:    • CATH PLACEMENT  12/22/2014    Performed by Helga Santiago M.D. at SURGERY SAME DAY [facility]   • CATH REMOVAL  11/14/2014    Performed by Helga Santiago M.D. at SURGERY [facility] ORS   • GASTROSCOPY WITH BANDING  7/8/2014    Performed by aDvonte Mauricio M.D. at ENDOSCOPY [facility]   • GASTROSCOPY WITH CLIPPING  7/6/2014    Performed by Joshua Vigil M.D. at ENDOSCOPY [facility] ORS   • ANTERIOR AND POSTERIOR REPAIR  12/6/2013    Performed by Isaías Lambert M.D. at SURGERY SAME DAY [facility] ORS   • ENTEROCELE REPAIR  12/6/2013    Performed by Isaías Lambert M.D. at SURGERY SAME DAY [facility] ORS   • BLADDER SLING FEMALE  12/6/2013    Performed by Isaías Lambert M.D. at SURGERY SAME DAY [facility] ORS   • GYN SURGERY  2010    hysterectomy   • OTHER  2010    port placement   • CATARACT EXTRACTION WITH IOL Bilateral  "   • GYN SURGERY      tubal ligation   • OTHER      oral surgery   • TONSILLECTOMY         CURRENT MEDICATIONS  Home Medications    **Home medications have not yet been reviewed for this encounter**         ALLERGIES  Allergies   Allergen Reactions   • Penicillins Hives and Itching     Hives, itching        PHYSICAL EXAM  VITAL SIGNS: /65   Pulse 99   Temp 37.3 °C (99.1 °F) (Temporal)   Resp 18   Ht 1.727 m (5' 8\")   Wt 69.5 kg (153 lb 3.5 oz)   LMP  (LMP Unknown)   SpO2 97% Comment: RA  BMI 23.30 kg/m²       Constitutional: Chronically ill in appearance.   HENT: Normocephalic, Atraumatic, Bilateral external ears normal, Oropharynx moist, No oral exudates, Nose normal.   Eyes: PERRLA, EOMI, Conjunctiva normal, No discharge.   Neck: Normal range of motion, No tenderness, Supple, No stridor.   Lymphatic: No lymphadenopathy noted.   Cardiovascular: Normal heart rate, Normal rhythm, No murmurs, No rubs, No gallops.   Thorax & Lungs: Normal breath sounds, No respiratory distress, No wheezing, No chest tenderness.   Abdomen: Distention, diffuse tenderness, hepatomegaly  Skin: Warm, Dry, No erythema, No rash.   Back: No tenderness, No CVA tenderness.   Genitalia: External genitalia with vulvovaginitis  Extremities: Intact distal pulses, No edema, No tenderness, No cyanosis, No clubbing.   Neurologic: Alert & oriented x 3, Normal motor function, Normal sensory function, No focal deficits noted.   Psychiatric: Affect normal, Judgment normal, Mood normal.     RADIOLOGY/  CT-ABDOMEN-PELVIS WITH   Final Result         1. Cirrhotic liver with portal hypertension, splenomegaly and varices.   2. There is a metallic clip in the cecum which could relate to GI Hemoclip.   3. Mild gallbladder wall thickening could relate to liver disease.   4. Moderate abdominal ascites.        PROCEDURES paracentesis  Paracentesis Procedure Note    Indication: Ascites    Consent: The patient was counseled regarding the procedure, it's " indications, risks, potential complications and alternatives and any questions were answered. Consent was obtained.    Procedure: The patient was placed in the supine position with the head of the bed slightly elevated and the appropriate landmarks were identified. The skin over the puncture site in the right lower quadrant region was prepped with betadine and draped in a sterile fashion. Local anesthesia was obtained by infiltration using 1% Lidocaine without epinephrine. A paracentesis catheter was then advanced into the abdominal cavity over a needle and the needle was withdrawn.  I was then unable to remove any fluid and I attempted to redirect the blunt needle and catheter and the patient was not tolerating the procedure secondary to pain and the procedure was aborted.    Complications: None        COURSE & MEDICAL DECISION MAKING  Pertinent Labs & Imaging studies reviewed. (See chart for details)  This a 60-year-old female who presents with multiple complaints.  As for her urinalysis her initial urinalysis was contaminated and therefore we performed a straight catheterization.  This does not show any evidence of an infection I suspect the pain she is describing with urination is more from the vulvovaginitis noted clinically.  This could be from a fungal infection we will place the patient on 3 days of Diflucan.  We will have the patient utilize sitz bath's to help with the discomfort.  She does have some slight distention and CT scan shows evidence of ascites with no evidence of intra-abdominal inflammation.  She was requesting a paracentesis.  I did give it 1 attempt does have a hard time getting fluid.  I did visualize some small pockets of fluid with the ultrasound however did not want to cause any bowel nor hepatic damage therefore the procedure was aborted as I had our time get in ascitic fluid and return.  Please see my procedure note.  The patient will be discharged with clear instructions to follow-up  with her gastroenterologist within 5 to 7 days.  She is instructed refrain from any alcohol abuse.  She will return if she is acutely worse.    FINAL IMPRESSION  1.  Abdominal pain suspect secondary to ascites  2.  Vulvovaginitis  3.  Diarrhea  4.  Stable anemia    Disposition  The patient will be discharged in stable condition         Electronically signed by: Danny Sawyer M.D., 7/17/2021 11:47 PM

## 2021-08-24 PROBLEM — I85.00 ESOPHAGEAL VARICES (HCC): Status: ACTIVE | Noted: 2021-01-01

## 2021-08-24 PROBLEM — L51.9 ERYTHEMA MULTIFORME: Status: ACTIVE | Noted: 2021-01-01

## 2021-08-24 NOTE — PROGRESS NOTES
[facility]PITAL[facility] TRIAGE OFFICER DIRECT ADMISSION REPORT  Transferring facility: [facility]  Transferring physician: Dr Correa  Transferring facility/physician contact number: [phone]  Chief complaint:   Pertinent history & patient course: Worsening ascites with generalized rash and fever  Pertinent imaging & lab results: No labs  Code Status: Full code per transferring provider, I personally verified with the transferring provider patient's code status and the transferring provider has confirmed this with the patient.  Further work up or recommendations per triage officer prior to transfer: CBC CMP, blood culture  Consultants called prior to transfer and pertinent input from consultants: None  Patient accepted for transfer: Yes  Consultants to be called upon arrival: Yes  Admission status: Inpatient.   Floor requested: Medical floor  If ICU transfer, name of intensivist case discussed with and pertinent input from critical care: Not ICU    Please inform the triage officer upon arrival of the patient to [facility] for assignment of a hospitalist to perform admission.     For any question or concerns regarding the care of this patient, please reach out to the assigned hospitalist.

## 2021-08-25 PROBLEM — A53.9 SYPHILIS: Status: ACTIVE | Noted: 2021-01-01

## 2021-08-25 PROBLEM — L98.9 SKIN LESIONS: Status: ACTIVE | Noted: 2021-01-01

## 2021-08-25 NOTE — CARE PLAN
The patient is Stable - Low risk of patient condition declining or worsening         Progress made toward(s) clinical / shift goals:  diagnostic - pt will agreed to be on NPO until abdominal ultrasound done.    Patient is not progressing towards the following goals:

## 2021-08-25 NOTE — H&P
Hospital Medicine History & Physical Note    Date of Service  8/24/2021    Primary Care Physician  Itzel Ness M.D.      Code Status  Full Code    Chief Complaint  No chief complaint on file.      History of Presenting Illness  Demetrice Jaime is a 61 y.o. female who presented 8/24/2021 with history of cirrhosis due to hepatitis C, esophageal varices requiring banding, depression, diabetes. Patient presents as direct admission from clinic. Patient endorses worsening nausea, vomiting. Patient states that she feels that her ascites is worsening. Patient additionally reports presence of multiple circular, no vescicular, lesions over hands and feet. Patient denies significant weight gain, fever, chills.  Vitals upon presentation: /69, HR 99, RR 16, T 99.7. Await labs, imaging studies.     I discussed the plan of care with patient.    Review of Systems  Review of Systems   Constitutional: Negative for chills and fever.   HENT: Negative for congestion and sore throat.    Eyes: Negative for blurred vision.   Respiratory: Negative for cough and shortness of breath.    Cardiovascular: Negative for chest pain and palpitations.   Gastrointestinal: Positive for abdominal pain, nausea and vomiting. Negative for constipation and diarrhea.   Genitourinary: Negative for dysuria and frequency.   Musculoskeletal: Negative for myalgias.   Skin: Positive for rash.   Neurological: Negative for seizures and loss of consciousness.   Psychiatric/Behavioral: Negative for depression.       Past Medical History   has a past medical history of Anemia, Anesthesia, Arrhythmia, Arthritis, Breath shortness, Cataract, Cirrhosis (HCC), Congestive heart failure (HCC), Dental disorder, Diabetes, Fall, Fibromyalgia, GERD (gastroesophageal reflux disease), Hepatitis C, Hiatus hernia syndrome, Hypertension (11/03/2020), Indigestion, Myocardial infarct (HCC), Obesity, Other specified disorder of intestines, Pain (11/03/2020),  Psychiatric problem, Restless leg syndrome, Sleep apnea, Snoring, Stroke (HCC) (2011), Unspecified hemorrhagic conditions, and Urinary bladder disorder.    Surgical History   has a past surgical history that includes gyn surgery; gyn surgery (2010); other (2010); other; anterior and posterior repair (12/6/2013); enterocele repair (12/6/2013); bladder sling female (12/6/2013); gastroscopy with clipping (7/6/2014); gastroscopy with banding (7/8/2014); cath removal (11/14/2014); cath placement (12/22/2014); cataract extraction with iol (Bilateral); tonsillectomy; gastroscopy (N/A, 8/21/2017); gastroscopy with banding (N/A, 2/8/2018); gastroscopy (N/A, 2/12/2018); cath removal (11/4/2020); pr colonoscopy,diagnostic (N/A, 2/15/2021); and pr upper gi endoscopy,diagnosis (N/A, 2/15/2021).     Family History  family history includes Diabetes in her mother; Hypertension in her mother.   Family history reviewed with patient. There is no family history that is pertinent to the chief complaint.     Social History   reports that she has been smoking cigarettes. She has a 11.25 pack-year smoking history. She has never used smokeless tobacco. She reports current alcohol use. She reports previous drug use.    Allergies  Allergies   Allergen Reactions   • Penicillins Hives and Itching     Hives, itching        Medications  Prior to Admission Medications   Prescriptions Last Dose Informant Patient Reported? Taking?   OXcarbazepine (TRILEPTAL) 300 MG Tab  Patient Yes No   Sig: Take 300-600 mg by mouth 2 (two) times a day. 1 tab = 300 mg every morning  2 tabs = 600 mg every evening   furosemide (LASIX) 40 MG Tab  Patient Yes No   Sig: Take 40 mg by mouth every day.   gabapentin (NEURONTIN) 800 MG tablet  Patient Yes No   Sig: Take 800 mg by mouth 3 times a day.   ibuprofen (MOTRIN) 200 MG Tab  Patient Yes No   Sig: Take 200 mg by mouth 1 time a day as needed (pain).   insulin aspart (NOVOLOG) 100 UNIT/ML Solution  Patient's Home  Pharmacy Yes No   Sig: Inject 3 Units under the skin 3 times a day before meals.   insulin glargine (LANTUS SOLOSTAR) 100 UNIT/ML Solution Pen-injector injection  Patient's Home Pharmacy No No   Sig: Inject 40 Units as instructed every evening.   lisinopril (PRINIVIL) 5 MG Tab  Patient Yes No   Sig: Take 5 mg by mouth every day.   omeprazole (PRILOSEC) 20 MG delayed-release capsule  Patient Yes No   Sig: Take 20 mg by mouth every morning.   spironolactone (ALDACTONE) 50 MG Tab  Patient Yes No   Sig: Take 50 mg by mouth every day.   sucralfate (CARAFATE) 1 GM/10ML Suspension  Patient No No   Sig: Take 10 mL by mouth 4 times a day.   Patient not taking: Reported on 3/4/2021   traZODone (DESYREL) 100 MG Tab  Patient Yes No   Sig: Take 100 mg by mouth at bedtime as needed for Sleep.      Facility-Administered Medications: None       Physical Exam  Temp:  [37.6 °C (99.7 °F)] 37.6 °C (99.7 °F)  Pulse:  [99] 99  Resp:  [16] 16  BP: (119)/(69) 119/69  SpO2:  [96 %] 96 %    Physical Exam  Constitutional:       General: She is not in acute distress.     Appearance: She is not toxic-appearing.      Comments: Chronically ill appearing, pleasant, conversational   HENT:      Head: Normocephalic.      Right Ear: External ear normal.      Left Ear: External ear normal.      Nose: Nose normal.      Mouth/Throat:      Mouth: Mucous membranes are moist.   Eyes:      General: No scleral icterus.     Pupils: Pupils are equal, round, and reactive to light.   Cardiovascular:      Rate and Rhythm: Normal rate and regular rhythm.      Heart sounds: No murmur heard.     Pulmonary:      Effort: No respiratory distress.      Breath sounds: No wheezing.   Abdominal:      Palpations: Abdomen is soft.      Tenderness: There is abdominal tenderness (mild tenderness to deep palpaiton in right lower quadrant). There is no guarding or rebound.   Musculoskeletal:         General: No swelling or deformity.   Skin:     Coloration: Skin is not jaundiced.       Findings: No bruising.      Comments: Multiple circular excoriations over hands and feet, circular measuring 0.5cm in diameter. Present over palmar aspect of hands and plantar aspect of feet.    Neurological:      General: No focal deficit present.      Mental Status: She is alert and oriented to person, place, and time.      Cranial Nerves: No cranial nerve deficit.   Psychiatric:         Mood and Affect: Mood normal.         Behavior: Behavior normal.         Thought Content: Thought content normal.         Judgment: Judgment normal.         Laboratory:          No results for input(s): ALTSGPT, ASTSGOT, ALKPHOSPHAT, TBILIRUBIN, DBILIRUBIN, GAMMAGT, AMYLASE, LIPASE, ALB, PREALBUMIN, GLUCOSE in the last 72 hours.      No results for input(s): NTPROBNP in the last 72 hours.      No results for input(s): TROPONINT in the last 72 hours.    Imaging:  US-ABDOMEN COMPLETE SURVEY    (Results Pending)   DX-CHEST-LIMITED (1 VIEW)    (Results Pending)       no X-Ray or EKG requiring interpretation    Assessment/Plan:  I anticipate this patient is appropriate for observation status at this time.    Erythema multiforme  Assessment & Plan  Multiple circular excoriations over hands and feet, circular measuring 0.5cm in diameter. Present over palmar aspect of hands and plantar aspect of feet.     Consider punch biopsy  Consider steroid treatment    Cirrhosis (HCC)- (present on admission)  Assessment & Plan  Secondary to hepatitis C  Compliant with home diuretic regimen  Does not appear acutely decompensated  Abdominal ultrasound ordered  Await labs - patient sent in as direct admit    DM2 (diabetes mellitus, type 2) (HCC)- (present on admission)  Assessment & Plan  Last A1C 02/21 5.8  Will repeat  Insulin sliding scale    Esophageal varices (HCC)  Assessment & Plan  History of  As identified on EGD 02/2021  PPI    Tobacco dependence- (present on admission)  Assessment & Plan  Tobacco cessation counseling and education  provided for 4 minutes. Nicotine replacement options provided including patch, and further medical treatments including Wellbutrin and Chantix. As well as over the counter options of lozenges and gum.      Neuropathy- (present on admission)  Assessment & Plan  Continue neurontin    Depression- (present on admission)  Assessment & Plan  Continue trazodone      VTE prophylaxis: SCDs/TEDs and enoxaparin ppx

## 2021-08-25 NOTE — PROGRESS NOTES
Pt arrived to unit on RA. No signs of acute distress. Call light within reach, hourly rounding initiated. Pt is AAOx4. Pt refused Bed-alarm.

## 2021-08-25 NOTE — PROGRESS NOTES
"Pt had abdominal ultrasound. Pt is asking if she could after the test. Pt was told that this RN will call first and update the doctor regarding result. Result came, paged hospitalist. Spoke with Dr. Grant and updated regarding abdominal result, with result of cholelithiasis.  was asked regarding diet,  said \"Please continue on strict NPO diet\". Pt aware.  "

## 2021-08-25 NOTE — PROGRESS NOTES
Hospital Medicine Daily Progress Note    Date of Service  8/26/2021    Chief Complaint  Demetrice Jaime is a 61 y.o. female admitted 8/24/2021 with worsening abdominal swelling, skin lesions    Hospital Course  61-year-old female past medical history hypertension, hepatitis, liver cirrhosis, fibromyalgia, history of meth use, type 2 diabetes, tobacco dependence presented 8/24/2021 complaining of worsening abdominal swelling, skin lesions.  She was seen in the primary care [address] [place] [address] clinic and she was referred for further evaluation at the hospital.  She reported she was feeling sicker lately.  She is very compliant with medication.  She follows with gastroenterologist for cirrhosis, chronic monitoring of esophageal varices.  On arrival ultrasound was done and showed some ascites.  She remained afebrile.  INR 1.44.  Paracentesis is ordered and pending.  In regards to skin lesions. Not typical explanation. Started as vesicle, ruptured, couple of them painful. She has some papularly erythema on the back, one on lower lip , multiple feet and hands. Syphilis checked and it is positive. Will consider ID consult tomorrow       Interval Problem Update  Feeling about the same. She would like to eat. Abdominal pain about the same. No lesion from US. AFP/HIV to follow. Avoid steroid cream for now. Consult ID tomorrow to see if she needs to be treated for syphilis     I have personally seen and examined the patient at bedside. I discussed the plan of care with patient, bedside RN, charge RN,  and pharmacy.    Consultants/Specialty  none     Code Status  Full Code    Disposition  Patient is not medically cleared.   Anticipate discharge to to home with close outpatient follow-up.  I have placed the appropriate orders for post-discharge needs.    Review of Systems  Review of Systems   Constitutional: Positive for chills, fever and malaise/fatigue.   HENT: Negative for sore throat.    Respiratory: Negative for  cough and shortness of breath.    Cardiovascular: Negative for chest pain and palpitations.   Gastrointestinal: Negative for abdominal pain, diarrhea, nausea and vomiting.   Genitourinary: Negative for dysuria and urgency.   Skin:        Skin lesions   Neurological: Negative for dizziness and headaches.   Psychiatric/Behavioral: Negative for substance abuse and suicidal ideas. The patient is nervous/anxious.         Physical Exam  Temp:  [36.2 °C (97.2 °F)-37.2 °C (98.9 °F)] 36.2 °C (97.2 °F)  Pulse:  [] 101  Resp:  [16] 16  BP: (113-141)/(53-73) 122/56  SpO2:  [95 %-97 %] 97 %    Physical Exam  Vitals and nursing note reviewed.   Constitutional:       General: She is not in acute distress.     Appearance: Normal appearance. She is ill-appearing.   HENT:      Head: Normocephalic and atraumatic.   Eyes:      General: No scleral icterus.  Cardiovascular:      Rate and Rhythm: Normal rate.      Heart sounds: No murmur heard.     Pulmonary:      Effort: No respiratory distress.   Abdominal:      General: There is distension.      Palpations: Abdomen is soft.      Tenderness: There is abdominal tenderness. There is no guarding.   Musculoskeletal:         General: No swelling. Normal range of motion.   Skin:     Coloration: Skin is not jaundiced.      Findings: Lesion present.      Comments: papular lesions on her hands and feet. One on her left lower lip, couple on her back   Neurological:      Mental Status: She is oriented to person, place, and time.   Psychiatric:         Mood and Affect: Mood normal.         Behavior: Behavior normal.         Thought Content: Thought content normal.         Judgment: Judgment normal.         Fluids    Intake/Output Summary (Last 24 hours) at 8/26/2021 1200  Last data filed at 8/26/2021 0945  Gross per 24 hour   Intake 870 ml   Output --   Net 870 ml       Laboratory  Recent Labs     08/24/21 2007 08/25/21  0606 08/26/21  0447   WBC 3.6* 3.0* 3.4*   RBC 3.99* 3.63* 3.85*    HEMOGLOBIN 9.6* 8.9* 9.3*   HEMATOCRIT 31.7* 28.7* 30.7*   MCV 79.4* 79.1* 79.7*   MCH 24.1* 24.5* 24.2*   MCHC 30.3* 31.0* 30.3*   RDW 47.2 47.4 46.1   PLATELETCT 84* 72* 102*   MPV  --   --  11.6     Recent Labs     08/24/21  1918 08/25/21  0606 08/26/21  0447   SODIUM 136 137 136   POTASSIUM 4.6 4.3 4.1   CHLORIDE 106 108 106   CO2 16* 22 25   GLUCOSE 110* 94 171*   BUN 13 14 14   CREATININE 0.66 0.62 0.84   CALCIUM 8.6 8.1* 8.5     Recent Labs     08/25/21  0606   APTT 46.0*   INR 1.44*               Imaging  US-ABDOMEN COMPLETE SURVEY   Final Result      1.  There is cholelithiasis. Gallbladder wall is mildly thickened which could be due to portal hypertension.      2.  Ascites is identified.      3.  Nodular liver surface consistent with cirrhosis.      4.  Hepatosplenomegaly.         DX-CHEST-LIMITED (1 VIEW)   Final Result         No acute cardiac or pulmonary abnormality is identified.      US-PARACENTESIS, ABD WITH IMAGING    (Results Pending)        Assessment/Plan  Weight loss- (present on admission)  Assessment & Plan  60 lbs in one year   Some intentional and some it is not intentional. She has had very recent images CT chest /abdomen 2/2021, recent MRI abdomen negative for any malignancy.  Follow up on paracentesis .   She tells me that she did have mammogram last year. She tells me that she follows with GI and she is up to date with EGD/colonoscopy  Continue monitoring and healthy lifestyle changes with PCP    Syphilis- (present on admission)  Assessment & Plan  Stage two  Pt allergic to penicillin  No neurological involvement   Started on doxycycline       Esophageal varices (HCC)- (present on admission)  Assessment & Plan  History of  As identified on EGD 02/2021  PPI    Skin lesions- (present on admission)  Assessment & Plan  Multiple circular excoriations over hands and feet, circular measuring 0.5cm in diameter. Present over palmar aspect of hands and plantar aspect of feet.     Hold steroid.  Related to syphilis, secondary per ID   Started on doxycycline     History of hepatitis C- (present on admission)  Assessment & Plan  successfully treated  Follow up with GI     Tobacco dependence- (present on admission)  Assessment & Plan  Tobacco cessation counseling and education provided for 4 minutes. Nicotine replacement options provided including patch, and further medical treatments including Wellbutrin and Chantix. As well as over the counter options of lozenges and gum.      Neuropathy- (present on admission)  Assessment & Plan  Continue neurontin    Depression- (present on admission)  Assessment & Plan  Continue trazodone    Cirrhosis (HCC)- (present on admission)  Assessment & Plan  Secondary to hepatitis C  Compliant with home diuretic regimen  Does not appear acutely decompensated  Abdominal ultrasound confirmed ascites.   Await labs - patient sent in as direct admit  Paracentesis pending   AFP to follow     DM2 (diabetes mellitus, type 2) (HCC)- (present on admission)  Assessment & Plan  Last A1C 02/21 6.0  Insulin sliding scale  No insulin at this time  Pt had lost lot of weight        VTE prophylaxis: enoxaparin ppx, hold and resume after paracentesis     I have performed a physical exam and reviewed and updated ROS and Plan today (8/26/2021). In review of yesterday's note (8/25/2021), there are no changes except as documented above.

## 2021-08-25 NOTE — CARE PLAN
Problem: Nutritional:  Goal: Achieve adequate nutritional intake  Description: Patient will consume >50% of meals  Outcome: Progressing   See dietary note. RD following.

## 2021-08-25 NOTE — PROGRESS NOTES
Assumed care of pt. Pt is A&Ox4, denies any pain at this time. Pt is NPO per orders at this time. Pt's BG in am was 101, no coverage needed. Pt asking when she can eat, explained that we will check with MD on POC.

## 2021-08-25 NOTE — PROGRESS NOTES
4 Eyes Skin Assessment Completed by MAMI Alvarado and MAMI Rodríguez.    Head WDL  Ears WDL  Nose Jaundice  Mouth Redness, scab on upper lip   Neck WDL  Breast/Chest WDL  Shoulder Blades WDL  Spine: scattered rash,skin tag  (R) Arm/Elbow/Hand WDL  (L) Arm/Elbow/Hand WDL  Abdomen WDL, distended  Groin WDL  Scrotum/Coccyx/Buttocks WDL  (R) Leg Redness, scattered rash  (L) Leg Redness, scattered rash  (R) Heel/Foot/Toe Redness, blanching, dry, rash  (L) Heel/Foot/Toe Redness, blanching, dry, rash          Devices In Places: NA      Interventions In Place Pressure Redistribution Mattress    Possible Skin Injury No    Pictures Uploaded Into Epic N/A  Wound Consult Placed N/A  RN Wound Prevention Protocol Ordered No

## 2021-08-25 NOTE — HOSPITAL COURSE
61-year-old female past medical history hypertension, hepatitis, liver cirrhosis, fibromyalgia, history of meth use, type 2 diabetes, tobacco dependence presented 8/24/2021 complaining of worsening abdominal swelling, skin lesions.  She was seen in the primary care [facility] clinic and she was referred for further evaluation at the hospital.  She reported she was feeling sicker lately.  She is very compliant with medication.  She follows with gastroenterologist for cirrhosis, chronic monitoring of esophageal varices.  On arrival ultrasound was done and showed some ascites.  She remained afebrile.  INR 1.44.  Paracentesis is ordered and pending.  In regards to skin lesions. Not typical explanation. Started as vesicle, ruptured, couple of them painful. She has some papularly erythema on the back, one on lower lip , multiple feet and hands. Syphilis checked and it is positive. Will consider ID consult tomorrow

## 2021-08-25 NOTE — DIETARY
Nutrition services: Day 1 of admit.  Demetrice Jaime is a 61 y.o. female with admitting DX of ascites/possible peritonitis.   Consult received per nutrition admit screen; recent weight loss and poor appetite (MST score = 2).     Visited pt at bedside, pt appears adequately nourished. Pt reports that she has had poor appetite x 3 months, however also reports that her baseline is only a small breakfast with a dinner later in the evening. Per pt report intake appears chronically low and baseline report does not differ from report of intake recently. She does report hx of trying nutrition supplements but does not like them. Pt states since admit she has had a great appetite and is eating well. She actually denies any recent weight changes, reporting a UBW = 142-147 lb.     Assessment:  Weight: 69.4 kg (153 lb)  Body mass index is 23.26 kg/m²., BMI classification: WNL  Diet/Intake: Regular; % this morning    Evaluation:   1. Per H&P hx of cirrhosis d/t hep C, esophageal varices, diabetes   2. A1C on admit = 6.0% (pre-diabetic), however prior hx of extremely elevated A1C (up to 16.8% in 2019)  3. MAR: albumin, lasix, insulin regular, prilosec, pericolace, aldactone  4. Weight hx via chart review shows stability 153-155 lb over past 6 months    Malnutrition Risk: Chronically poor oral intake per pt report, however does not meet additional criteria and has been eating well thus far during admit.     Recommendations/Plan:   1. Encourage intake of meals  2. Document intake of all meals as % taken in ADL's to provide interdisciplinary communication across all shifts.   3. Monitor weight.  4. Nutrition rep will continue to see patient for ongoing meal and snack preferences.   5. RD to monitor PO intake, wt trends, and nutrition labs/meds    RD to follow

## 2021-08-26 PROBLEM — R63.4 WEIGHT LOSS: Status: ACTIVE | Noted: 2021-01-01

## 2021-08-26 NOTE — PROGRESS NOTES
Hospital Medicine Daily Progress Note    Date of Service  8/26/2021    Chief Complaint  Demetrice Jaime is a 61 y.o. female admitted 8/24/2021 with worsening abdominal swelling, skin lesions    Hospital Course  61-year-old female past medical history hypertension, hepatitis, liver cirrhosis, fibromyalgia, history of meth use, type 2 diabetes, tobacco dependence presented 8/24/2021 complaining of worsening abdominal swelling, skin lesions.  She was seen in the primary care [address] [place] [address] clinic and she was referred for further evaluation at the hospital.  She reported she was feeling sicker lately.  She is very compliant with medication.  She follows with gastroenterologist for cirrhosis, chronic monitoring of esophageal varices.  On arrival ultrasound was done and showed some ascites.  She remained afebrile.  INR 1.44.  Paracentesis is ordered and pending.  In regards to skin lesions. Not typical explanation. Started as vesicle, ruptured, couple of them painful. She has some papularly erythema on the back, one on lower lip , multiple feet and hands. Syphilis checked and it is positive. Will consider ID consult tomorrow       Interval Problem Update  Feeling about the same. She would like to eat. Abdominal pain about the same. No lesion from US. AFP/HIV to follow. Avoid steroid cream for now. Consult ID tomorrow to see if she needs to be treated for syphilis     8/26- she feels sick, she is worried abut this new infection. She tells me that she did have an unprotected encounter 4 months ago. 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 She was started on doxycycline.   I did ask about weight loss. She tells me that she has had gradual weight loss. Some intentional and some it is not intentional. She has had very recent images CT chest /abdomen 2/2021, recent MRI abdomen negative for any malignancy. Encouraged to continue eating healthy and remain active. Follow up on paracentesis . She tells me that she did have  mammogram last year. She tells me that she follows with GI and she is up to date with EGD/colonoscopy  She did report dark stool last night. hgb remain stable. Continue to monitor      I have personally seen and examined the patient at bedside. I discussed the plan of care with patient, bedside RN, charge RN,  and pharmacy.    Consultants/Specialty  none     Code Status  Full Code    Disposition  Patient is not medically cleared.   Anticipate discharge to to home with close outpatient follow-up.  I have placed the appropriate orders for post-discharge needs.    Review of Systems  Review of Systems   Constitutional: Positive for malaise/fatigue and weight loss. Negative for chills and fever.   HENT: Negative for nosebleeds and sore throat.    Respiratory: Negative for cough.    Cardiovascular: Negative for chest pain and palpitations.   Gastrointestinal: Positive for abdominal pain and melena. Negative for blood in stool, constipation, diarrhea, nausea and vomiting.   Genitourinary: Negative for urgency.   Musculoskeletal: Negative for myalgias.   Neurological: Negative for headaches.        Physical Exam  Temp:  [36.2 °C (97.2 °F)-37.2 °C (98.9 °F)] 36.2 °C (97.2 °F)  Pulse:  [] 101  Resp:  [16] 16  BP: (113-141)/(53-73) 122/56  SpO2:  [95 %-97 %] 97 %    Physical Exam  Vitals and nursing note reviewed.   Constitutional:       Appearance: She is ill-appearing.   HENT:      Head: Normocephalic.      Mouth/Throat:      Mouth: Mucous membranes are dry.   Eyes:      General: No scleral icterus.  Cardiovascular:      Rate and Rhythm: Normal rate.      Heart sounds: No murmur heard.     Pulmonary:      Effort: Pulmonary effort is normal. No respiratory distress.      Breath sounds: No wheezing.   Abdominal:      General: There is distension.      Palpations: Abdomen is soft.      Tenderness: There is no abdominal tenderness. There is no guarding.   Musculoskeletal:         General: No swelling.   Skin:      Findings: Erythema and lesion present.      Comments: papulary lesions on her hands and feet, squamate, ruptured     Neurological:      Mental Status: She is alert and oriented to person, place, and time.      Cranial Nerves: No cranial nerve deficit.      Motor: No weakness.   Psychiatric:         Mood and Affect: Mood normal.         Behavior: Behavior normal.         Thought Content: Thought content normal.         Judgment: Judgment normal.         Fluids    Intake/Output Summary (Last 24 hours) at 8/26/2021 1151  Last data filed at 8/26/2021 0945  Gross per 24 hour   Intake 870 ml   Output --   Net 870 ml       Laboratory  Recent Labs     08/24/21 2007 08/25/21  0606 08/26/21  0447   WBC 3.6* 3.0* 3.4*   RBC 3.99* 3.63* 3.85*   HEMOGLOBIN 9.6* 8.9* 9.3*   HEMATOCRIT 31.7* 28.7* 30.7*   MCV 79.4* 79.1* 79.7*   MCH 24.1* 24.5* 24.2*   MCHC 30.3* 31.0* 30.3*   RDW 47.2 47.4 46.1   PLATELETCT 84* 72* 102*   MPV  --   --  11.6     Recent Labs     08/24/21 1918 08/25/21  0606 08/26/21 0447   SODIUM 136 137 136   POTASSIUM 4.6 4.3 4.1   CHLORIDE 106 108 106   CO2 16* 22 25   GLUCOSE 110* 94 171*   BUN 13 14 14   CREATININE 0.66 0.62 0.84   CALCIUM 8.6 8.1* 8.5     Recent Labs     08/25/21 0606   APTT 46.0*   INR 1.44*               Imaging  US-ABDOMEN COMPLETE SURVEY   Final Result      1.  There is cholelithiasis. Gallbladder wall is mildly thickened which could be due to portal hypertension.      2.  Ascites is identified.      3.  Nodular liver surface consistent with cirrhosis.      4.  Hepatosplenomegaly.         DX-CHEST-LIMITED (1 VIEW)   Final Result         No acute cardiac or pulmonary abnormality is identified.      US-PARACENTESIS, ABD WITH IMAGING    (Results Pending)        Assessment/Plan  Weight loss- (present on admission)  Assessment & Plan  60 lbs in one year   Some intentional and some it is not intentional. She has had very recent images CT chest /abdomen 2/2021, recent MRI abdomen negative  for any malignancy.  Follow up on paracentesis .   She tells me that she did have mammogram last year. She tells me that she follows with GI and she is up to date with EGD/colonoscopy  Continue monitoring and healthy lifestyle changes with PCP    Syphilis- (present on admission)  Assessment & Plan  Stage two  Pt allergic to penicillin  No neurological involvement   Started on doxycycline       Esophageal varices (HCC)- (present on admission)  Assessment & Plan  History of  As identified on EGD 02/2021  PPI    Skin lesions- (present on admission)  Assessment & Plan  Multiple circular excoriations over hands and feet, circular measuring 0.5cm in diameter. Present over palmar aspect of hands and plantar aspect of feet.     Hold steroid. Related to syphilis, secondary per ID   Started on doxycycline     History of hepatitis C- (present on admission)  Assessment & Plan  successfully treated  Follow up with GI     Tobacco dependence- (present on admission)  Assessment & Plan  Tobacco cessation counseling and education provided for 4 minutes. Nicotine replacement options provided including patch, and further medical treatments including Wellbutrin and Chantix. As well as over the counter options of lozenges and gum.      Neuropathy- (present on admission)  Assessment & Plan  Continue neurontin    Depression- (present on admission)  Assessment & Plan  Continue trazodone    Cirrhosis (HCC)- (present on admission)  Assessment & Plan  Secondary to hepatitis C  Compliant with home diuretic regimen  Does not appear acutely decompensated  Abdominal ultrasound confirmed ascites.   Await labs - patient sent in as direct admit  Paracentesis pending   AFP to follow     DM2 (diabetes mellitus, type 2) (HCC)- (present on admission)  Assessment & Plan  Last A1C 02/21 6.0  Insulin sliding scale  No insulin at this time  Pt had lost lot of weight        VTE prophylaxis: enoxaparin ppx, hold and resume after paracentesis     I have  performed a physical exam and reviewed and updated ROS and Plan today (8/26/2021). In review of yesterday's note (8/25/2021), there are no changes except as documented above.

## 2021-08-26 NOTE — CARE PLAN
The patient is Stable - Low risk of patient condition declining or worsening     Clinical shift goals: Pt's BG will remain WDL    Progress made toward(s) clinical / shift goals:  Pt BG was 101 and 150 throughout the day, no insulin coverage needed.     Patient is not progressing towards the following goals:

## 2021-08-26 NOTE — PROGRESS NOTES
Assumed care of pt. Pt is A&Ox4, denies any pain at this time. Pt's BG in am was 147, no coverage needed. ID doctor to bedside to perform perineal exam, this RN was present. Pt was placed on contact precautions and ID MD discussed this with pt.

## 2021-08-26 NOTE — PROGRESS NOTES
Pt alert/oriented x4, denies pain/discomfort. Pt took a shower @ 0300. No behavior issues noted. Call light within reach, personal belongings available, bed in lowest position, treaded socks on, and hourly rounding in place.      1 RN Skin Assessment completed.   Patient's risk of skin breakdown: Low    Devices in place and skin assessed under:   [] Pulse Ox  [x] PIV [] Central Line [] SCDs []Purewick  [] Berry  []Condom Cath   [] BMS        []  Cortrak   []  Oxymask   [] Bipap    [] Nasal Canula   [] N/A   [] Other(specify):     Right Ear  [x] WDL                or           [] Skin issue present (please provide assessment/description below)    Left Ear  [x] WDL                or           [] Skin issue present (please provide assessment/description below)    Right Elbow:  [x] WDL               or           [] Skin issue present (please provide assessment/description below)    Left Elbow:   [x] WDL                or           [] Skin issue present (please provide assessment/description below)    Coccyx/Buttocks:  [x] WDL                or           [] Skin issue present (please provide assessment/description below)    Right Heel/Foot/Toes:  [] WDL                or           [x] Skin issue present (please provide assessment/description below): dry/red/blanching    Left Heel/Foot/Toes:   [] WDL              or           [x] Skin issue present (please provide assessment/description below): dry/red/blanching    **Describe any other skin issues in areas not already listed from above list: BLE redness/rash, back with rash, chin scab    [] N/A    Interventions In Place: Pillows and Pressure Redistribution Mattress

## 2021-08-26 NOTE — CONSULTS
"INFECTIOUS DISEASES INPATIENT CONSULT NOTE     Date of Service: 8/26/2021    Consult Requested By: Eli Frankel M.D.    Reason for Consultation: suspected syphilis    History of Present Illness:   Demetrice Jaime is a 61 y.o. female admitted 8/24/2021 for decompensation of cirrhosis, ID consulted for suspicion of syphilis infection. Ms. Jaime reports that she had one unprotected sexual encounter over the last year, with subsequent development of non-pruritic, painful lesions on her palms and soles bilaterally. She denies any chancre although she did not self examine and is unsure of any increased hair loss. She has a history of IV drug use including heroin and meth in her younger years, but has been abstinent for the last 3 years at least. Denies any weakness/numbness, unsteadiness, fever, chills. Notes vaginal pain, discharge. She does have a history of a penicillin allergy, unsure of the specific medication or indication but that she had shortness of breath and wheezing and was told by a physician that she was severely allergic.    All other review of systems reviewed and negative except those documented above in the HPI.     PMH:   Past Medical History:   Diagnosis Date   • Anemia    • Anesthesia     pt's mother has hard time waking up = states it took a week or two   • Arrhythmia    • Arthritis     generalized   • Breath shortness     with exertion   • Cataract     removed bilat   • Cirrhosis (HCC)    • Congestive heart failure (HCC)    • Dental disorder     dentures upper   • Diabetes     insulin   • Fall    • Fibromyalgia    • GERD (gastroesophageal reflux disease)    • Hepatitis C     states reciceved treatment   • Hiatus hernia syndrome    • Hypertension 11/03/2020    reports on no meds at this time   • Indigestion    • Myocardial infarct (HCC)    • Obesity    • Other specified disorder of intestines     constipation/diarrhea   • Pain 11/03/2020    \"my legs always hurt\"; 5/10   • Psychiatric problem  "    depression and anxiety   • Restless leg syndrome    • Sleep apnea     has had sleep study, no cpap   • Snoring    • Stroke (HCC) 2011    no residual problems    • Unspecified hemorrhagic conditions     reports nose bleeds   • Urinary bladder disorder     frequency/urgency       PSH:  Past Surgical History:   Procedure Laterality Date   • PB COLONOSCOPY,DIAGNOSTIC N/A 2/15/2021    Procedure: COLONOSCOPY;  Surgeon: Anthony Dent M.D.;  Location: SURGERY [facility];  Service: Gastroenterology   • PB UPPER GI ENDOSCOPY,DIAGNOSIS N/A 2/15/2021    Procedure: GASTROSCOPY - W/BANDING.;  Surgeon: Anthony Dent M.D.;  Location: SURGERY [facility];  Service: Gastroenterology   • CATH REMOVAL  11/4/2020    Procedure: REMOVAL, CATHETER- INFECTED PORT A CATH;  Surgeon: Leonidas Mcmanus M.D.;  Location: SURGERY [facility];  Service: General   • GASTROSCOPY N/A 2/12/2018    Procedure: GASTROSCOPY;  Surgeon: Willard Bustamante M.D.;  Location: SURGERY [facility];  Service: Gastroenterology   • GASTROSCOPY WITH BANDING N/A 2/8/2018    Procedure: GASTROSCOPY WITH BANDING;  Surgeon: Davonte Mauricio M.D.;  Location: SURGERY SAME DAY [facility];  Service: Gastroenterology   • GASTROSCOPY N/A 8/21/2017    Procedure: GASTROSCOPY WITH BANDING  ;  Surgeon: Anthony Dent M.D.;  Location: SURGERY SAME DAY [facility];  Service:    • CATH PLACEMENT  12/22/2014    Performed by Helga Santiago M.D. at SURGERY SAME DAY [facility] ORS   • CATH REMOVAL  11/14/2014    Performed by Helga Santiago M.D. at SURGERY [facility] ORS   • GASTROSCOPY WITH BANDING  7/8/2014    Performed by Davonte Mauricio M.D. at ENDOSCOPY [facility]   • GASTROSCOPY WITH CLIPPING  7/6/2014    Performed by Joshua Vigil M.D. at ENDOSCOPY [facility]S   • ANTERIOR AND POSTERIOR REPAIR  12/6/2013    Performed by Isaías Lambert M.D. at SURGERY SAME DAY [facility]   • ENTEROCELE REPAIR  12/6/2013    Performed by Isaías WHITFIELD  SHABANA Lambert at SURGERY SAME DAY [facility]   • BLADDER SLING FEMALE  12/6/2013    Performed by Isaías Lambert M.D. at SURGERY SAME DAY [facility]   • GYN SURGERY  2010    hysterectomy   • OTHER  2010    port placement   • CATARACT EXTRACTION WITH IOL Bilateral    • GYN SURGERY      tubal ligation   • OTHER      oral surgery   • TONSILLECTOMY         FAMILY HX:  Family History   Problem Relation Age of Onset   • Diabetes Mother    • Hypertension Mother        SOCIAL HX:  Social History     Socioeconomic History   • Marital status:      Spouse name: Not on file   • Number of children: Not on file   • Years of education: Not on file   • Highest education level: Not on file   Occupational History   • Not on file   Tobacco Use   • Smoking status: Current Every Day Smoker     Packs/day: 0.25     Years: 45.00     Pack years: 11.25     Types: Cigarettes   • Smokeless tobacco: Never Used   Vaping Use   • Vaping Use: Never used   Substance and Sexual Activity   • Alcohol use: Yes     Comment: occ   • Drug use: Not Currently     Comment: 02/2021- CBD gummies for sleep ,  Hx IV meth use, states she used 12/18/17 for first time in yrs    • Sexual activity: Not on file   Other Topics Concern   • Not on file   Social History Narrative   • Not on file     Social Determinants of Health     Financial Resource Strain: Low Risk    • Difficulty of Paying Living Expenses: Not hard at all   Food Insecurity: No Food Insecurity   • Worried About Running Out of Food in the Last Year: Never true   • Ran Out of Food in the Last Year: Never true   Transportation Needs: No Transportation Needs   • Lack of Transportation (Medical): No   • Lack of Transportation (Non-Medical): No   Physical Activity:    • Days of Exercise per Week:    • Minutes of Exercise per Session:    Stress:    • Feeling of Stress :    Social Connections:    • Frequency of Communication with Friends and Family:    • Frequency of Social Gatherings with Friends  and Family:    • Attends Anabaptist Services:    • Active Member of Clubs or Organizations:    • Attends Club or Organization Meetings:    • Marital Status:    Intimate Partner Violence:    • Fear of Current or Ex-Partner:    • Emotionally Abused:    • Physically Abused:    • Sexually Abused:      Social History     Tobacco Use   Smoking Status Current Every Day Smoker   • Packs/day: 0.25   • Years: 45.00   • Pack years: 11.25   • Types: Cigarettes   Smokeless Tobacco Never Used     Social History     Substance and Sexual Activity   Alcohol Use Yes    Comment: occ       Allergies/Intolerances:  Allergies   Allergen Reactions   • Penicillins Hives and Itching     Hives, itching        History reviewed with the patient     Other Current Medications:    Current Facility-Administered Medications:   •  doxycycline monohydrate (ADOXA) tablet 100 mg, 100 mg, Oral, Q12HRS, Suzanne Mccloud M.D.  •  [Held by provider] enoxaparin (LOVENOX) inj 40 mg, 40 mg, Subcutaneous, DAILY AT 1800, Eli Frankel M.D.  •  furosemide (LASIX) tablet 40 mg, 40 mg, Oral, DAILY, Carmelo Murdock D.O., 40 mg at 08/26/21 0531  •  gabapentin (NEURONTIN) capsule 800 mg, 800 mg, Oral, BID, Carmelo Murdock D.O., 800 mg at 08/26/21 0531  •  spironolactone (ALDACTONE) tablet 25 mg, 25 mg, Oral, Q DAY, Carmelo Murdock D.O., 25 mg at 08/26/21 0531  •  omeprazole (PRILOSEC) capsule 20 mg, 20 mg, Oral, BID, Carmelo Murdock D.O., 20 mg at 08/26/21 0532  •  lisinopril (PRINIVIL) tablet 5 mg, 5 mg, Oral, DAILY, Carmelo Murdock D.O., 5 mg at 08/26/21 0531  •  senna-docusate (PERICOLACE or SENOKOT S) 8.6-50 MG per tablet 2 Tablet, 2 Tablet, Oral, BID, 2 Tablet at 08/25/21 1834 **AND** polyethylene glycol/lytes (MIRALAX) PACKET 1 Packet, 1 Packet, Oral, QDAY PRN **AND** magnesium hydroxide (MILK OF MAGNESIA) suspension 30 mL, 30 mL, Oral, QDAY PRN **AND** bisacodyl (DULCOLAX) suppository 10 mg, 10 mg, Rectal, QDAY PRN, Carmelo Murdock D.O.  •  cloNIDine  "(CATAPRES) tablet 0.1 mg, 0.1 mg, Oral, Q6HRS PRN, DAPHNE Esqueda.O.  •  enalaprilat (VASOTEC) injection 1.25 mg, 1.25 mg, Intravenous, Q6HRS PRN, DAPHNE Esqueda.O.  •  labetalol (NORMODYNE/TRANDATE) injection 10 mg, 10 mg, Intravenous, Q4HRS PRN, DAPHNE Esqueda.O.  •  ondansetron (ZOFRAN) syringe/vial injection 4 mg, 4 mg, Intravenous, Q4HRS PRN, DAPHNE Esqueda.O.  •  ondansetron (ZOFRAN ODT) dispertab 4 mg, 4 mg, Oral, Q4HRS PRN, DAPHNE Esqueda.O.  •  promethazine (PHENERGAN) tablet 12.5-25 mg, 12.5-25 mg, Oral, Q4HRS PRN, SHANIQUA EsquedaO.  •  promethazine (PHENERGAN) suppository 12.5-25 mg, 12.5-25 mg, Rectal, Q4HRS PRN, DAPHNE Esqueda.O.  •  prochlorperazine (COMPAZINE) injection 5-10 mg, 5-10 mg, Intravenous, Q4HRS PRN, DAPHNE Esqueda.O.  •  insulin regular (HumuLIN R,NovoLIN R) injection, 1-6 Units, Subcutaneous, 4X/DAY ACHS **AND** POC blood glucose manual result, , , Q AC AND BEDTIME(S) **AND** NOTIFY MD and PharmD, , , Once **AND** glucose 4 g chewable tablet 16 g, 16 g, Oral, Q15 MIN PRN **AND** dextrose 50% (D50W) injection 50 mL, 50 mL, Intravenous, Q15 MIN PRN, DAPHNE Esqueda.O.  •  OXcarbazepine (TRILEPTAL) tablet 300 mg, 300 mg, Oral, QAM, Carmelo Murdock D.O., 300 mg at 21 0532  •  OXcarbazepine (TRILEPTAL) tablet 600 mg, 600 mg, Oral, Q EVENING, Carmelo Murdock D.O., 600 mg at 21 0754  [unfilled]    Most Recent Vital Signs:  /56   Pulse (!) 101   Temp 36.2 °C (97.2 °F) (Temporal)   Resp 16   Ht 1.727 m (5' 8\")   Wt 69.4 kg (153 lb)   LMP  (LMP Unknown)   SpO2 97%   BMI 23.26 kg/m²   Temp  Av.9 °C (98.4 °F)  Min: 36.2 °C (97.2 °F)  Max: 37.6 °C (99.7 °F)    Physical Exam:  General: cachectic, no diaphoresis, no acute distress  HEENT: sclera anicteric, pupils mildly constricted but react and accommodate bilaterally  Neck: supple, no lymphadenopathy  Chest: CTAB, no rales, rhonchi or wheezes, normal work of breathing.  Cardiac: regular " rate and rhythm, normal S1 S2, no murmurs, rubs or gallops  Abdomen: +bowel sounds, soft, non-tender, distended  Skin: warm and dry, excoriations over hands and feet on palms and plantar surfaces, plantar excoriations are reported tender  Neuro: Alert and oriented times 3, non-focal exam, speech fluent, full range of motion to bilateral upper and lower extremities, proprioception intact bilateral feet and hands, Romberg sign negative, coordination is intact  Psych: normal mood and behavior, pleasant; memory intact, normal judgement    Pertinent Lab Results:  Recent Labs     08/24/21 2007 08/25/21 0606 08/26/21  0447   WBC 3.6* 3.0* 3.4*      Recent Labs     08/24/21 2007 08/25/21 0606 08/26/21  0447   HEMOGLOBIN 9.6* 8.9* 9.3*   HEMATOCRIT 31.7* 28.7* 30.7*   MCV 79.4* 79.1* 79.7*   MCH 24.1* 24.5* 24.2*   PLATELETCT 84* 72* 102*         Recent Labs     08/24/21 1918 08/25/21 0606 08/26/21  0447   SODIUM 136 137 136   POTASSIUM 4.6 4.3 4.1   CHLORIDE 106 108 106   CO2 16* 22 25   CREATININE 0.66 0.62 0.84        Recent Labs     08/25/21 0606 08/25/21  1024 08/26/21  0447   ALBUMIN 2.3* 2.5* 2.4*        Pertinent Micro:  Results     Procedure Component Value Units Date/Time    Fluid Culture with Gram Stain [[id number]]     Order Status: No result Specimen: Body Fluid from Peritoneal Fluid         Blood Culture   Date Value Ref Range Status   02/10/2018 No growth after 5 days of incubation.  Final   02/10/2018 No growth after 5 days of incubation.  Final        Studies:  DX-CHEST-LIMITED (1 VIEW)    Result Date: 8/24/2021 8/24/2021 9:07 PM HISTORY/REASON FOR EXAM:  Cough TECHNIQUE/EXAM DESCRIPTION AND NUMBER OF VIEWS: Single portable view of the chest. COMPARISON: 03/04/2021 FINDINGS: Heart size is within normal limits. No focal infiltrates or consolidations are identified in the lungs. No pleural fluid collections are identified. No pneumothorax is appreciated.     No acute cardiac or pulmonary abnormality is  identified.    US-ABDOMEN COMPLETE SURVEY    Result Date: 8/25/2021 8/25/2021 1:27 AM HISTORY/REASON FOR EXAM:  Cirrhosis History of cirrhosis TECHNIQUE/EXAM DESCRIPTION AND NUMBER OF VIEWS:  Complete abdomen survey. COMPARISON: 09/29/2020 FINDINGS: Liver surface appears nodular. The liver measures 18.43 cm. Shadowing gallstones are noted in the gallbladder. Gallbladder wall measures 4.00 mm. There is no pericholecystic fluid. The common duct measures 6.00 mm. Prior exam was 7.3 mm. The visualized pancreas is unremarkable. The visualized aorta is normal in caliber. Intrahepatic IVC is patent. The portal vein is patent with hepatopetal flow. The MPV measures 1.63 cm. The right kidney measures 11.03 cm. The left kidney measures 10.05 cm. There is no hydronephrosis. The spleen measures 17.61 cm maximally. The bladder demonstrates no focal wall abnormality. Moderate ascites is identified which is most prominent in the pelvis.     1.  There is cholelithiasis. Gallbladder wall is mildly thickened which could be due to portal hypertension. 2.  Ascites is identified. 3.  Nodular liver surface consistent with cirrhosis. 4.  Hepatosplenomegaly.       IMPRESSION:   1. Secondary syphilis      PLAN:   Demetrice Jaime is a 61 y.o. female with a history of hep C cirrhosis who presents with findings consistent with secondary syphilis.    #Secondary syphilis  Reactive RPR, antibody titer >1:256. HIV is negative. Physical exam findings and history is fairly convincing as well. She does have a penicillin allergy. No signs of neuro involvement.  -Doxycycline BID for 14 days  -Advised patient to refrain from sexual activities until at least a week after completion of treatment  -Advised patient to contact sexual partner for testing/treatment  -Gonococccal/chlamydia test ordered    Plan of care discussed with Dr. Mccloud, infectious disease attending.    Lee Cullen M.D.  [facility] PGY2

## 2021-08-26 NOTE — CARE PLAN
The patient is Stable - Low risk of patient condition declining or worsening    Shift Goals  Clinical Goals: pt will be safe and free from falls    Progress made toward(s) clinical / shift goals:  Safety precautions in place. Bed in low position, treaded socks on, personal possessions in reach, call light in reach and hourly rounding in place.

## 2021-08-27 NOTE — CARE PLAN
Problem: Nutritional:  Goal: Achieve adequate nutritional intake  Description: Patient will consume >50% of meals  Outcome: Met   Pt has consumed % of all meals recorded. RD available prn - and will monitor per dept policy

## 2021-08-27 NOTE — PROGRESS NOTES
"ADULT  INFECTIOUS DISEASES  CONSULT  FOLLOW UP NOTE     Reason for Consultation: syphilis    Interim History:   8/27 - Reports interval mild improvement in hand lesions  8/26 - started doxy for secondary syphilis    Subjective: Notes low energy today. Will be going for paracentesis. Denies fever, chills, nausea, vomiting, chest pain, shortness of breath, abdominal pain. No neuro symptoms.    Attempted to contact micro lab for gonococcal/chlamydia result, it was not run yesterday and will be done today with result available later in the afternoon.    Current Antimicrobials:    Previous Antimicrobials:    Allergies/Intolerances:  Allergies   Allergen Reactions   • Penicillins Hives and Itching     Hives, itching        Most Recent Vital Signs:  /57   Pulse (!) 105   Temp 36.2 °C (97.1 °F) (Temporal)   Resp 16   Ht 1.727 m (5' 8\")   Wt 69.4 kg (153 lb)   LMP  (LMP Unknown)   SpO2 93%   BMI 23.26 kg/m²   Temp  Min: 36.2 °C (97.1 °F)  Max: 36.9 °C (98.5 °F)    Physical Exam:  General: cachectic, no diaphoresis, no acute distress  HEENT: sclera anicteric, pupils mildly constricted but react and accommodate bilaterally  Neck: supple, no lymphadenopathy  Chest: CTAB, no rales, rhonchi or wheezes, normal work of breathing.  Cardiac: regular rate and rhythm, normal S1 S2, no murmurs, rubs or gallops  Abdomen: +bowel sounds, soft, non-tender, distended  Skin: warm and dry, excoriations over hands and feet on palms and plantar surfaces, plantar excoriations are reported tender  Neuro: Alert and oriented times 3, non-focal exam, speech fluent, full range of motion to bilateral upper and lower extremities, proprioception intact bilateral feet and hands, Romberg sign negative, coordination is intact  Psych: normal mood and behavior, pleasant; memory intact, normal judgement    Pertinent Lab Results:  Recent Labs     08/24/21 2007 08/25/21  0606 08/26/21  0447   WBC 3.6* 3.0* 3.4*      Recent Labs     08/24/21 2007 " 08/25/21  0606 08/26/21  0447   HEMOGLOBIN 9.6* 8.9* 9.3*   HEMATOCRIT 31.7* 28.7* 30.7*   MCV 79.4* 79.1* 79.7*   MCH 24.1* 24.5* 24.2*   PLATELETCT 84* 72* 102*         Recent Labs     08/24/21  1918 08/25/21  0606 08/26/21  0447   SODIUM 136 137 136   POTASSIUM 4.6 4.3 4.1   CHLORIDE 106 108 106   CO2 16* 22 25   CREATININE 0.66 0.62 0.84        Invalid input(s): ALT, ALKPHOS, BILITOT, TOTALBILIRUB, BILIRUBINTOT, BILIRUBINDIR, BILIRUBININD, ALKALINEPHOS     Microbiology:  Blood Culture   Date Value Ref Range Status   02/10/2018 No growth after 5 days of incubation.  Final   02/10/2018 No growth after 5 days of incubation.  Final        Studies:      IMPRESSSION:   Demetrice Jaime is a 61 y.o. female with a history of hep C cirrhosis who presents with findings consistent with secondary syphilis    PLAN:        #Secondary syphilis  Reactive RPR, antibody titer >1:256. HIV is negative. Physical exam findings and history is fairly convincing as well. She does have a penicillin allergy. No signs of neuro involvement.  -Doxycycline BID for 14 days  -Advised patient to refrain from sexual activities until at least a week after completion of treatment  -Advised patient to contact sexual partner for testing/treatment    #Vaginal discharge  Follow-up gonococcal antigen. If positive will need treatment. If positive for chlamydia would be covered by ongoing doxy regimen.  -Gonococccal/chlamydia test pending     Plan of care discussed with Dr. Corral, infectious disease attending.     Lee Cullen M.D.   8/27/2021  [facility] PGY2

## 2021-08-27 NOTE — PROGRESS NOTES
Dr. Elizabeth consented patient and performed  A paracentesi in U/S room 2  .  2800    ml of fluid removed,   1200  ml of fluid sent to lab .  Vitals monitored during procedure and documented in EPIC.  Patient tolerated procedure well..  Transport returned pt to her room

## 2021-08-27 NOTE — PROGRESS NOTES
"Assumed care of pt. Pt is A&Ox4, denies any pain at this time. Pt's BG in am was 138, no coverage needed. Pt maintains on contact precautions and ID MD to bedside this am. Pt reporting feeling weak and dizzy this am.    Called US this morning to find out if pt will be receiving paracentesis today, have been told the past 2 days that they are short staffed and would \"get to it when they could.\" US said this morning they would be coming by after 1000 to assess the amount of fluid the pt has.   "

## 2021-08-27 NOTE — PROGRESS NOTES
Pt alert and oriented x4, on room air. Medications taken whole. PIV dressing CDI. Dinner tray served with god appetite. No covergage needed on insulin administration. Denies any pain. All needs met.     1 RN Skin Assessment completed.   Patient's risk of skin breakdown: Low     Devices in place and skin assessed under:   []? Pulse Ox  [x]? PIV []? Central Line []? SCDs []?Purewick  []? Berry  []?Condom Cath   []? BMS        []?  Cortrak   []?  Oxymask   []? Bipap    []? Nasal Canula   []? N/A   []? Other(specify):      Right Ear  [x]? WDL                or           []? Skin issue present (please provide assessment/description below)     Left Ear  [x]? WDL                or           []? Skin issue present (please provide assessment/description below)     Right Elbow:  [x]? WDL               or           []? Skin issue present (please provide assessment/description below)     Left Elbow:   [x]? WDL                or           []? Skin issue present (please provide assessment/description below)     Coccyx/Buttocks:  [x]? WDL                or           []? Skin issue present (please provide assessment/description below)     Right Heel/Foot/Toes:  []? WDL                or           [x]? Skin issue present (please provide assessment/description below):dryness, red, peeling on plantar side     Left Heel/Foot/Toes:   []? WDL              or           [x]? Skin issue present (please provide assessment/description below): dryness, red, peeling on plantar side     **Describe any other skin issues in areas not already listed from above list: BLE redness/rash, back with redness/rash, perineal area few redness,  Left lip scab     []? N/A     Interventions In Place: Pillows and Pressure Redistribution Mattress

## 2021-08-27 NOTE — CARE PLAN
The patient is Stable - Low risk of patient condition declining or worsening    Shift Goals  Clinical Goals: Pt's understanding of disease process will improve    Progress made toward(s) clinical / shift goals: ID MD to bedside today, explained positive results of Syphilis, educated pt on disease.     Patient is not progressing towards the following goals:

## 2021-08-27 NOTE — DISCHARGE SUMMARY
Discharge Summary    CHIEF COMPLAINT ON ADMISSION  No chief complaint on file.      Reason for Admission  Ascities/possible peritonitis     Admission Date  8/24/2021    CODE STATUS  Full Code    HPI & HOSPITAL COURSE    61-year-old female past medical history hypertension, hepatitis, liver cirrhosis, fibromyalgia, history of meth use, type 2 diabetes, tobacco dependence presented 8/24/2021 complaining of worsening abdominal swelling, skin lesions.  She was seen in the primary care [facility] clinic and she was referred for further evaluation at the hospital.  She reported she was feeling sicker lately.  She is very compliant with medication.  She follows with gastroenterologist for cirrhosis, chronic monitoring of esophageal varices.  On arrival ultrasound was done and showed some ascites.  She remained afebrile.  INR 1.44.  Paracentesis was done and unremarkable. Pathology pending. AFP normal.   In regards to skin lesions. Not typical explanation. Started as vesicle, ruptured, couple of them painful. She has some papularly erythema on the back, one on lower lip , multiple feet and hands. Syphilis checked and it is positive. ID consulted and advised doxycycline for 14 days (last day 9/9/2021) . She is allergic to penicillin.   She reports some weight loss 60 lbs from last year. Some intentional and some unintentional. She has had very recent images CT chest /abdomen 2/2021, recent MRI abdomen negative for any malignancy. Encouraged to continue eating healthy and remain active. Follow up on paracentesis . She tells me that she did have mammogram last year. She tells me that she follows with GI and she is up to date with EGD/colonoscopy.    She also reports vaginal pain and rectal pain for about three months. She reports sometimes some loose stool. However she has cirrhosis and educated that she needs regular bowels yto avoid encephalopathy.     Her worsening ascites most likely related to cirrhosis. I did increase  spironolactone. I did advise to follow up with GI. Continue lasix.     She did have projectile vomiting 8/28/2021. Most likely to antibiotic. Repeat CBC/CMP unremarkable. Vitals stable. Abdominal XR no obstruction.     She continues to report weakness and not feeling well. But nothing objective.     She is discharge with her brother. Referral for GYN placed.   She will follow up with PCP      Therefore, she is discharged in guarded and stable condition to home with close outpatient follow-up.    The patient met 2-midnight criteria for an inpatient stay at the time of discharge.    Discharge Date  08/29/2021    FOLLOW UP ITEMS POST DISCHARGE  Pathology from paracentesis     DISCHARGE DIAGNOSES  Active Problems:    DM2 (diabetes mellitus, type 2) (HCC) POA: Yes    Cirrhosis (HCC) POA: Yes    Depression POA: Yes    Neuropathy POA: Yes    Tobacco dependence POA: Yes    History of hepatitis C POA: Yes    Skin lesions POA: Yes    Esophageal varices (HCC) POA: Yes    Syphilis POA: Yes    Weight loss POA: Yes  Resolved Problems:    * No resolved hospital problems. *      FOLLOW UP  No future appointments.  Itzel Ness M.D.  [address] [address]  [phone]      Please call and schedule a hospital follow up appointment with your primary care provider as needed. Thank you.      MEDICATIONS ON DISCHARGE     Medication List      START taking these medications      Instructions   doxycycline monohydrate 100 MG tablet  Commonly known as: ADOXA   Take 1 Tablet by mouth every 12 hours for 14 days.  Dose: 100 mg        CHANGE how you take these medications      Instructions   furosemide 40 MG Tabs  What changed: Another medication with the same name was removed. Continue taking this medication, and follow the directions you see here.  Commonly known as: LASIX   Take 40 mg by mouth every day.  Dose: 40 mg     insulin glargine 100 UNIT/ML Soln  What changed: Another medication with the same name was  removed. Continue taking this medication, and follow the directions you see here.  Commonly known as: Lantus   Inject 30 Units under the skin every morning before breakfast.  Dose: 30 Units     lisinopril 5 MG Tabs  What changed: Another medication with the same name was removed. Continue taking this medication, and follow the directions you see here.  Commonly known as: PRINIVIL   Take 5 mg by mouth every day.  Dose: 5 mg     spironolactone 25 MG Tabs  What changed: how much to take  Commonly known as: ALDACTONE   Take 2 Tablets by mouth every day.  Dose: 50 mg        CONTINUE taking these medications      Instructions   amitriptyline 25 MG Tabs  Commonly known as: ELAVIL   Take 25 mg by mouth at bedtime.  Dose: 25 mg     aspirin 81 MG EC tablet   Take 81 mg by mouth every day.  Dose: 81 mg     atorvastatin 40 MG Tabs  Commonly known as: LIPITOR   Take 40 mg by mouth every day.  Dose: 40 mg     dicyclomine 10 MG Caps  Commonly known as: BENTYL   Take 10 mg by mouth 2 (two) times a day.  Dose: 10 mg     FLUoxetine 20 MG Caps  Commonly known as: PROZAC   Take 20 mg by mouth every day.  Dose: 20 mg     gabapentin 800 MG tablet  Commonly known as: NEURONTIN   Take 800 mg by mouth 3 times a day.  Dose: 800 mg     insulin aspart 100 UNIT/ML Soln  Commonly known as: NovoLOG   Inject 3 Units under the skin 3 times a day before meals.  Dose: 3 Units     lactulose 10 GM/15ML Soln   Take 30 mL by mouth 2 (two) times a day.  Dose: 30 mL     * omeprazole 20 MG delayed-release capsule  Commonly known as: PRILOSEC   Take 20 mg by mouth every morning.  Dose: 20 mg     * omeprazole 20 MG delayed-release capsule  Commonly known as: PRILOSEC   Take 20 mg by mouth 2 (two) times a day.  Dose: 20 mg     OXcarbazepine 300 MG Tabs  Commonly known as: TRILEPTAL   Take 300-600 mg by mouth 2 (two) times a day. 1 tab = 300 mg every morning  2 tabs = 600 mg every evening  Dose: 300-600 mg     sucralfate 1 GM/10ML Susp  Commonly known as:  CARAFATE   Take 10 mL by mouth 4 times a day.  Dose: 1 g     traZODone 100 MG Tabs  Commonly known as: DESYREL   Take 100 mg by mouth at bedtime as needed for Sleep.  Dose: 100 mg         * This list has 2 medication(s) that are the same as other medications prescribed for you. Read the directions carefully, and ask your doctor or other care provider to review them with you.            STOP taking these medications    AMLODIPINE BESYLATE PO     enalapril 5 MG Tabs  Commonly known as: VASOTEC     ibuprofen 200 MG Tabs  Commonly known as: MOTRIN            Allergies  Allergies   Allergen Reactions   • Penicillins Hives and Itching     Hives, itching        DIET  Orders Placed This Encounter   Procedures   • Diet Order Diet: Regular     Standing Status:   Standing     Number of Occurrences:   1     Order Specific Question:   Diet:     Answer:   Regular [1]       ACTIVITY  As tolerated.  Weight bearing as tolerated    CONSULTATIONS  ID     PROCEDURES  Paracentesis     LABORATORY  Lab Results   Component Value Date    SODIUM 136 08/26/2021    POTASSIUM 4.1 08/26/2021    CHLORIDE 106 08/26/2021    CO2 25 08/26/2021    GLUCOSE 171 (H) 08/26/2021    BUN 14 08/26/2021    CREATININE 0.84 08/26/2021    CREATININE 0.6 02/03/2008        Lab Results   Component Value Date    WBC 3.4 (L) 08/26/2021    HEMOGLOBIN 9.3 (L) 08/26/2021    HEMATOCRIT 30.7 (L) 08/26/2021    PLATELETCT 102 (L) 08/26/2021        Total time of the discharge process exceeds 35 minutes.

## 2021-08-27 NOTE — PROGRESS NOTES
ISOLATION PRECAUTIONS EDUCATION    Educated paatient on isolation for OTHER.    Educated on reason for isolation, how the infection may be transmitted, and how to help prevent transmission to others. Educated precautions involves staff and visitors wearing PPE, following Standard Precautions and performing meticulous hand hygiene in order to prevent transmission of infection.     Contact Precautions: Educated that Contact Precautions involves staff and visitors wearing gowns and gloves when in the patient room.     In addition, educated that the patient may leave the room, but prior to exiting the patient room each time, the patient needs to have on a fresh patient gown, ensure the potentially infectious area is covered, and perform hand hygiene with soap and water or alcohol-based hand rub, immediately prior to exiting the room.       Patient transport and mobilization on unit  Educated that they may leave their room, but prior to exiting, the patient needs to have on a fresh patient gown, ensure the potentially infectious area is covered, performing appropriate hand hygiene immediately prior to exiting the room.

## 2021-08-28 PROBLEM — R11.12 PROJECTILE VOMITING: Status: ACTIVE | Noted: 2021-01-01

## 2021-08-28 PROBLEM — R11.12 PROJECTILE VOMITING: Status: RESOLVED | Noted: 2021-01-01 | Resolved: 2021-01-01

## 2021-08-28 NOTE — DISCHARGE INSTRUCTIONS
Discharge Instructions per Eli Frankel M.D.    Continue Antibiotic for 2 weeks  Follow-up with primary care  Follow-up with gastroenterologist  Follow-up with referral with gynecologist  Continue medications as instructed  Increase spironolactone to 50 mg    DIET: healthy     ACTIVITY: as tolerated     DIAGNOSIS: cirrhosis, syphilis     Return to ER if headache, fever, chest pain, severe abdominal pain, diarrhea, confusion, unusual bleeding        Cirrhosis    Cirrhosis is long-term (chronic) liver injury. The liver is the body's largest internal organ, and it performs many functions. It converts food into energy, removes toxic material from the blood, makes important proteins, and absorbs necessary vitamins from food.  In cirrhosis, healthy liver cells are replaced by scar tissue. This prevents blood from flowing through the liver, making it difficult for the liver to function. Scarring of the liver cannot be reversed, but treatment can prevent it from getting worse.  What are the causes?  Common causes of this condition are hepatitis C and long-term alcohol abuse. Other causes include:  · Nonalcoholic fatty liver disease. This happens when fat is deposited in the liver by causes other than alcohol.  · Hepatitis B infection.  · Autoimmune hepatitis. In this condition, the body's defense system (immune system) mistakenly attacks the liver cells, causing irritation and swelling (inflammation).  · Diseases that cause blockage of ducts inside the liver.  · Inherited liver diseases, such as hemochromatosis. This is one of the most common inherited liver diseases. In this disease, deposits of iron collect in the liver and other organs.  · Reactions to certain long-term medicines, such as amiodarone, a heart medicine.  · Parasitic infections. These include schistosomiasis, which is caused by a flatworm.  · Long-term contact to certain toxins. These toxins include certain organic solvents, such as toluene and  chloroform.  What increases the risk?  You are more likely to develop this condition if:  · You have certain types of viral hepatitis.  · You abuse alcohol, especially if you are female.  · You are overweight.  · You share needles.  · You have unprotected sex with someone who has viral hepatitis.  What are the signs or symptoms?  You may not have any signs and symptoms at first. Symptoms may not develop until the damage to your liver starts to get worse.  Early symptoms may include:  · Weakness and tiredness (fatigue).  · Changes in sleep patterns or having trouble sleeping.  · Itchiness.  · Tenderness in the right-upper part of your abdomen.  · Weight loss and muscle loss.  · Nausea.  · Loss of appetite.  · Appearance of tiny blood vessels under the skin.  Later symptoms may include:  · Fatigue or weakness that is getting worse.  · Yellow skin and eyes (jaundice).  · Buildup of fluid in the abdomen (ascites). You may notice that your clothes are tight around your waist.  · Weight gain.  · Swelling of the feet and ankles (edema).  · Trouble breathing.  · Easy bruising and bleeding.  · Vomiting blood.  · Black or bloody stool.  · Mental confusion.  How is this diagnosed?  Your health care provider may suspect cirrhosis based on your symptoms and medical history, especially if you have other medical conditions or a history of alcohol abuse. Your health care provider will do a physical exam to feel your liver and to check for signs of cirrhosis. He or she may perform other tests, including:  · Blood tests to check:  ? For hepatitis B or C.  ? Kidney function.  ? Liver function.  · Imaging tests such as:  ? MRI or CT scan to look for changes seen in advanced cirrhosis.  ? Ultrasound to see if normal liver tissue is being replaced by scar tissue.  · A procedure in which a long needle is used to take a sample of liver tissue to be checked in a lab (biopsy). Liver biopsy can confirm the diagnosis of cirrhosis.  How is this  treated?  Treatment for this condition depends on how damaged your liver is and what caused the damage. It may include treating the symptoms of cirrhosis, or treating the underlying causes in order to slow the damage. Treatment may include:  · Making lifestyle changes, such as:  ? Eating a healthy diet. You may need to work with your health care provider or a diet and nutrition specialist (dietitian) to develop an eating plan.  ? Restricting salt intake.  ? Maintaining a healthy weight.  ? Not abusing drugs or alcohol.  · Taking medicines to:  ? Treat liver infections or other infections.  ? Control itching.  ? Reduce fluid buildup.  ? Reduce certain blood toxins.  ? Reduce risk of bleeding from enlarged blood vessels in the stomach or esophagus (varices).  · Liver transplant. In this procedure, a liver from a donor is used to replace your diseased liver. This is done if cirrhosis has caused liver failure.  Other treatments and procedures may be done depending on the problems that you get from cirrhosis. Common problems include liver-related kidney failure (hepatorenal syndrome).  Follow these instructions at home:    · Take medicines only as told by your health care provider. Do not use medicines that are toxic to your liver. Ask your health care provider before taking any new medicines, including over-the-counter medicines.  · Rest as needed.  · Eat a well-balanced diet. Ask your health care provider or dietitian for more information.  · Limit your salt or water intake, if your health care provider asks you to do this.  · Do not drink alcohol. This is especially important if you are taking acetaminophen.  · Keep all follow-up visits as told by your health care provider. This is important.  Contact a health care provider if you:  · Have fatigue or weakness that is getting worse.  · Develop swelling of the hands, feet, legs, or face.  · Have a fever.  · Develop loss of appetite.  · Have nausea or  vomiting.  · Develop jaundice.  · Develop easy bruising or bleeding.  Get help right away if you:  · Vomit bright red blood or a material that looks like coffee grounds.  · Have blood in your stools.  · Notice that your stools appear black and tarry.  · Become confused.  · Have chest pain or trouble breathing.  Summary  · Cirrhosis is chronic liver injury. Liver damage cannot be reversed. Common causes are hepatitis C and long-term alcohol abuse.  · Tests used to diagnose cirrhosis include blood tests, imaging tests, and liver biopsy.  · Treatment for this condition involves treating the underlying cause. Avoid alcohol, drugs, salt, and medicines that may damage your liver.  · Contact your health care provider if you develop ascites, edema, jaundice, fever, nausea or vomiting, easy bruising or bleeding, or worsening fatigue.  This information is not intended to replace advice given to you by your health care provider. Make sure you discuss any questions you have with your health care provider.  Document Released: 12/18/2006 Document Revised: 04/08/2020 Document Reviewed: 11/07/2018  [email or web address] Patient Education © 2020 [email or web address]          Syphilis  Syphilis is an infection that can spread through sexual contact. The infection can cause serious complications, so it is important to get treatment right away.  There are four stages of syphilis:  · Primary stage. During this stage sores may form where the disease entered your body.  · Secondary stage. During this stage skin rashes and lesions will form.  · Latent stage. During this stage there are no symptoms, but the infection may still be contagious.  · Tertiary stage. This stage happens 10-30 years after the infection starts. During this stage, the disease damages organs and can lead to death. Most people do not develop this stage of syphilis.  What are the causes?  This condition is caused by bacteria called Treponema pallidum. The condition can spread during sexual  "activity, such as during oral, anal, or vaginal sex. It can also be spread from mother to fetus during pregnancy.  What increases the risk?  You are more likely to develop this condition if:  · You do not use a condom.  · You have or had another sexually transmitted infection (STI).  · You have multiple sex partners.  · You use illegal drugs through an IV.  What are the signs or symptoms?  Symptoms of this condition depend on the stage of the disease.  Primary stage  · Painless sores (chancres) in and around the genital organs, mouth, or hands. The sores are usually firm and round.  Secondary stage  · A rash or sores. The rash usually does not itch.  · A fever.  · A headache.  · A sore throat.  · Swollen lymph nodes.  · New sores in the mouth or on the genitals.  · A feeling of being ill.  · Pain in the joints.  · Patchy hair loss.  · Weight loss.  · Fatigue.  Latent stage  There are no symptoms during this stage.  Tertiary stage  · Dementia.  · Personality and mood changes.  · Difficulty walking and coordinating movements.  · Muscle weakness or paralysis.  · Problems with coordination.  · Heart failure.  · Trouble breathing.  · Fainting.  · Soft, rubbery growths on the skin, bones, or liver (gummas).  · Sudden \"lightning\" pains, numbness, or tingling.  · Vision changes.  · Hearing changes.  · Trouble controlling your urine and bowel movements.  How is this diagnosed?  This condition is diagnosed with:  · A physical exam.  · Blood tests.  · Tests of the the fluid (drainage) from a sore or rash.  · Tests of the fluid around the spine (lumbar puncture). These tests are done to check for an infection in the brain or nervous system.  · Imaging tests. These may be done to check for damage to the heart, aorta, or brain if the condition is in the tertiary stage. Tests may include:  ? An X-ray.  ? A CT scan.  ? An MRI.  ? An echocardiogram. This test takes a picture of the heart.  ? An ultrasound.  How is this " treated?  This condition can be cured with antibiotic medicine. During the first day of treatment, the medicine may cause you to experience fever, chills, headache, nausea, or aching all over your body. This is normal and should go away within 24 hours.  Follow these instructions at home:  Medicines    · Take over-the-counter and prescription medicines only as told by your health care provider.  · Take your antibiotic medicine as told by your health care provider. Do not stop taking the antibiotic even if you start to feel better. Incomplete treatment will put you at risk for continued infection and could be life threatening.  General instructions  · Do not have sex until your treatment is completed, or as directed by your health care provider.  · Tell your recent sexual partners that you were diagnosed with syphilis. It is important that they get treatment, even if they do not have symptoms.  · Keep all follow-up visits as told by your health care provider. This is important.  How is this prevented?  · Use latex condoms correctly whenever you have sex.  · Before you have sex, ask your partner if he or she has been tested for STIs. Ask about the test results.  · Avoid having multiple sexual partners.  Contact a health care provider if:  · You continue to have any of the following symptoms 24 hours after beginning treatment:  ? Fever.  ? Chills.  ? Headache.  ? Nausea.  ? Aching all over your body.  · Your symptoms do not improve, even with treatment.  Get help right away if:  · You have severe chest pain.  · You have trouble walking or coordinating movements.  · You are confused.  · You lose vision or hearing.  · You have numbness in your arms or legs.  · You have a seizure.  · You faint.  · You have a severe headache that does not go away with medicine.  Summary  · Syphilis is an infection that can spread through sexual contact.  · This condition can cause serious complications, so it is best to get treatment right  away. The condition can be cured with antibiotic medicine.  · This condition can also be spread from mother to fetus during pregnancy.  · Take your antibiotic medicine as told by your health care provider.  · Tell your recent sexual partners that you were diagnosed with syphilis. It is important that they get treatment, even if they do not have symptoms.  This information is not intended to replace advice given to you by your health care provider. Make sure you discuss any questions you have with your health care provider.  Document Released: 10/08/2014 Document Revised: 01/28/2020 Document Reviewed: 02/13/2018  [employer] Patient Education © 2020 [employer]        Discharge Instructions    Discharged to home by car with self. Discharged via wheelchair, hospital escort: Yes.  Special equipment needed: Not Applicable    Be sure to schedule a follow-up appointment with your primary care doctor or any specialists as instructed.     Discharge Plan:   Diet Plan: Discussed  Activity Level: Discussed  Confirmed Follow up Appointment: Patient to Call and Schedule Appointment  Confirmed Symptoms Management: Discussed  Medication Reconciliation Updated: Yes    I understand that a diet low in cholesterol, fat, and sodium is recommended for good health. Unless I have been given specific instructions below for another diet, I accept this instruction as my diet prescription.   Other diet: Regular    Special Instructions: None    · Is patient discharged on Warfarin / Coumadin?   No     Depression / Suicide Risk    As you are discharged from this [facility] facility, it is important to learn how to keep safe from harming yourself.    Recognize the warning signs:  · Abrupt changes in personality, positive or negative- including increase in energy   · Giving away possessions  · Change in eating patterns- significant weight changes-  positive or negative  · Change in sleeping patterns- unable to sleep or sleeping all the  time   · Unwillingness or inability to communicate  · Depression  · Unusual sadness, discouragement and loneliness  · Talk of wanting to die  · Neglect of personal appearance   · Rebelliousness- reckless behavior  · Withdrawal from people/activities they love  · Confusion- inability to concentrate     If you or a loved one observes any of these behaviors or has concerns about self-harm, here's what you can do:  · Talk about it- your feelings and reasons for harming yourself  · Remove any means that you might use to hurt yourself (examples: pills, rope, extension cords, firearm)  · Get professional help from the community (Mental Health, Substance Abuse, psychological counseling)  · Do not be alone:Call your Safe Contact- someone whom you trust who will be there for you.  · Call your local CRISIS HOTLINE 699-8428 or 696-510-3546  · Call your local Children's Mobile Crisis Response Team [place] (528) 071-0904 or www.[employer]  · Call the toll free National Suicide Prevention Hotlines   · National Suicide Prevention Lifeline 479-944-OGAY (0920)  · National Hope Line Network 800-SUICIDE (371-8450)

## 2021-08-28 NOTE — PROGRESS NOTES
Hospital Medicine Daily Progress Note    Date of Service  8/28/2021    Chief Complaint  Demetrice Jaime is a 61 y.o. female admitted 8/24/2021 with worsening abdominal swelling, skin lesions    Hospital Course  61-year-old female past medical history hypertension, hepatitis, liver cirrhosis, fibromyalgia, history of meth use, type 2 diabetes, tobacco dependence presented 8/24/2021 complaining of worsening abdominal swelling, skin lesions.  She was seen in the primary care [address] [place] [address] clinic and she was referred for further evaluation at the hospital.  She reported she was feeling sicker lately.  She is very compliant with medication.  She follows with gastroenterologist for cirrhosis, chronic monitoring of esophageal varices.  On arrival ultrasound was done and showed some ascites.  She remained afebrile.  INR 1.44.  Paracentesis is ordered and pending.  In regards to skin lesions. Not typical explanation. Started as vesicle, ruptured, couple of them painful. She has some papularly erythema on the back, one on lower lip , multiple feet and hands. Syphilis checked and it is positive. Will consider ID consult tomorrow       Interval Problem Update  Feeling about the same. She would like to eat. Abdominal pain about the same. No lesion from US. AFP/HIV to follow. Avoid steroid cream for now. Consult ID tomorrow to see if she needs to be treated for syphilis     8/26- she feels sick, she is worried abut this new infection. She tells me that she did have an unprotected encounter 4 months ago. 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 She was started on doxycycline.   I did ask about weight loss. She tells me that she has had gradual weight loss. Some intentional and some it is not intentional. She has had very recent images CT chest /abdomen 2/2021, recent MRI abdomen negative for any malignancy. Encouraged to continue eating healthy and remain active. Follow up on paracentesis . She tells me that she did have  mammogram last year. She tells me that she follows with GI and she is up to date with EGD/colonoscopy  She did report dark stool last night. hgb remain stable. Continue to monitor      8/27-she tolerated paracentesis well.  After that she was feeling tired, sick.  GC/chlamydia pending.  Patient continues to complain of muscle aches,.  She also complains of vaginal pain, rectal pain. I did advise her to follow-up with primary care.  GYN referral placed for outpatient    I have personally seen and examined the patient at bedside. I discussed the plan of care with patient, bedside RN, charge RN,  and pharmacy.    Consultants/Specialty  none     Code Status  Full Code    Disposition  Patient is not medically cleared.   Anticipate discharge to to home with close outpatient follow-up.  I have placed the appropriate orders for post-discharge needs.    Review of Systems  Review of Systems   Constitutional: Positive for malaise/fatigue and weight loss. Negative for chills and fever.   HENT: Negative for nosebleeds and sore throat.    Respiratory: Negative for cough.    Cardiovascular: Negative for chest pain and palpitations.   Gastrointestinal: Positive for abdominal pain and melena. Negative for blood in stool, constipation, diarrhea, nausea and vomiting.   Genitourinary: Negative for urgency.   Musculoskeletal: Negative for myalgias.   Neurological: Negative for headaches.        Physical Exam  Temp:  [36.7 °C (98 °F)-37.1 °C (98.7 °F)] 36.9 °C (98.5 °F)  Pulse:  [] 97  Resp:  [17-18] 18  BP: (110-120)/(55-75) 113/75  SpO2:  [97 %-98 %] 98 %    Physical Exam  Vitals and nursing note reviewed.   Constitutional:       Appearance: She is ill-appearing.   HENT:      Head: Normocephalic.      Mouth/Throat:      Mouth: Mucous membranes are dry.   Eyes:      General: No scleral icterus.  Cardiovascular:      Rate and Rhythm: Normal rate.      Heart sounds: No murmur heard.     Pulmonary:      Effort: Pulmonary  effort is normal. No respiratory distress.      Breath sounds: No wheezing.   Abdominal:      General: There is distension.      Palpations: Abdomen is soft.      Tenderness: There is no abdominal tenderness. There is no guarding.   Musculoskeletal:         General: No swelling.   Skin:     Findings: Erythema and lesion present.      Comments: papulary lesions on her hands and feet, squamate, ruptured     Neurological:      Mental Status: She is alert and oriented to person, place, and time.      Cranial Nerves: No cranial nerve deficit.      Motor: No weakness.   Psychiatric:         Mood and Affect: Mood normal.         Behavior: Behavior normal.         Thought Content: Thought content normal.         Judgment: Judgment normal.         Fluids    Intake/Output Summary (Last 24 hours) at 8/28/2021 1348  Last data filed at 8/27/2021 2020  Gross per 24 hour   Intake 610 ml   Output --   Net 610 ml       Laboratory  Recent Labs     08/26/21  0447   WBC 3.4*   RBC 3.85*   HEMOGLOBIN 9.3*   HEMATOCRIT 30.7*   MCV 79.7*   MCH 24.2*   MCHC 30.3*   RDW 46.1   PLATELETCT 102*   MPV 11.6     Recent Labs     08/26/21  0447   SODIUM 136   POTASSIUM 4.1   CHLORIDE 106   CO2 25   GLUCOSE 171*   BUN 14   CREATININE 0.84   CALCIUM 8.5                   Imaging  US-PARACENTESIS, ABD WITH IMAGING   Final Result      1. Ultrasound-guided therapeutic paracentesis of the left lower quadrant of the abdominal wall.      2. 2800 mL of fluid withdrawn.      US-ABDOMEN COMPLETE SURVEY   Final Result      1.  There is cholelithiasis. Gallbladder wall is mildly thickened which could be due to portal hypertension.      2.  Ascites is identified.      3.  Nodular liver surface consistent with cirrhosis.      4.  Hepatosplenomegaly.         DX-CHEST-LIMITED (1 VIEW)   Final Result         No acute cardiac or pulmonary abnormality is identified.      [id number] VIEW    (Results Pending)        Assessment/Plan  Weight loss- (present on  admission)  Assessment & Plan  60 lbs in one year   Some intentional and some it is not intentional. She has had very recent images CT chest /abdomen 2/2021, recent MRI abdomen negative for any malignancy.  Follow up on paracentesis .   She tells me that she did have mammogram last year. She tells me that she follows with GI and she is up to date with EGD/colonoscopy  Continue monitoring and healthy lifestyle changes with PCP    Syphilis- (present on admission)  Assessment & Plan  Stage two  Pt allergic to penicillin  No neurological involvement   Started on doxycycline   GC/chlamydia pending.  If GC positive she will need ceftriaxone before she is being discharged      Esophageal varices (HCC)- (present on admission)  Assessment & Plan  History of  As identified on EGD 02/2021  PPI    Skin lesions- (present on admission)  Assessment & Plan  Multiple circular excoriations over hands and feet, circular measuring 0.5cm in diameter. Present over palmar aspect of hands and plantar aspect of feet.     Hold steroid. Related to syphilis, secondary per ID   Started on doxycycline   8/28-improving.  Continue antibiotics    History of hepatitis C- (present on admission)  Assessment & Plan  successfully treated  Follow up with GI     Tobacco dependence- (present on admission)  Assessment & Plan  Tobacco cessation counseling and education provided for 4 minutes. Nicotine replacement options provided including patch, and further medical treatments including Wellbutrin and Chantix. As well as over the counter options of lozenges and gum.      Neuropathy- (present on admission)  Assessment & Plan  Continue neurontin    Depression- (present on admission)  Assessment & Plan  Continue trazodone    Cirrhosis (HCC)- (present on admission)  Assessment & Plan  Secondary to hepatitis C  Compliant with home diuretic regimen  Does not appear acutely decompensated  Abdominal ultrasound confirmed ascites.   Await labs - patient sent in as  direct admit  Paracentesis done 8/27 successfully   AFP normal  She will need follow-up with GI as outpatient    DM2 (diabetes mellitus, type 2) (HCC)- (present on admission)  Assessment & Plan  Last A1C 02/21 6.0  Insulin sliding scale  No insulin at this time  Pt had lost lot of weight        VTE prophylaxis: enoxaparin ppx, hold and resume after paracentesis     I have performed a physical exam and reviewed and updated ROS and Plan today (8/28/2021). In review of yesterday's note (8/27/2021), there are no changes except as documented above.

## 2021-08-28 NOTE — CARE PLAN
The patient is Stable - Low risk of patient condition declining or worsening    Shift Goals  Clinical Goals: Discharge pt.    Progress made toward(s) clinical / shift goals:  Discharge order in. Pt will DC home today.     Patient is not progressing towards the following goals: Pt unable to dc today, pt was having nausea with an episode of vomiting. Zofran given.

## 2021-08-28 NOTE — PROGRESS NOTES
Hospital Medicine Daily Progress Note    Date of Service  8/28/2021    Chief Complaint  Demetrice Jaime is a 61 y.o. female admitted 8/24/2021 with worsening abdominal swelling, skin lesions    Hospital Course  61-year-old female past medical history hypertension, hepatitis, liver cirrhosis, fibromyalgia, history of meth use, type 2 diabetes, tobacco dependence presented 8/24/2021 complaining of worsening abdominal swelling, skin lesions.  She was seen in the primary care [address] [place] [address] clinic and she was referred for further evaluation at the hospital.  She reported she was feeling sicker lately.  She is very compliant with medication.  She follows with gastroenterologist for cirrhosis, chronic monitoring of esophageal varices.  On arrival ultrasound was done and showed some ascites.  She remained afebrile.  INR 1.44.  Paracentesis is ordered and pending.  In regards to skin lesions. Not typical explanation. Started as vesicle, ruptured, couple of them painful. She has some papularly erythema on the back, one on lower lip , multiple feet and hands. Syphilis checked and it is positive. Will consider ID consult tomorrow       Interval Problem Update  Feeling about the same. She would like to eat. Abdominal pain about the same. No lesion from US. AFP/HIV to follow. Avoid steroid cream for now. Consult ID tomorrow to see if she needs to be treated for syphilis     8/26- she feels sick, she is worried abut this new infection. She tells me that she did have an unprotected encounter 4 months ago. 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 She was started on doxycycline.   I did ask about weight loss. She tells me that she has had gradual weight loss. Some intentional and some it is not intentional. She has had very recent images CT chest /abdomen 2/2021, recent MRI abdomen negative for any malignancy. Encouraged to continue eating healthy and remain active. Follow up on paracentesis . She tells me that she did have  mammogram last year. She tells me that she follows with GI and she is up to date with EGD/colonoscopy  She did report dark stool last night. hgb remain stable. Continue to monitor      8/28- she tells me that she does not feel well. Nauseas, vomiting later. She has abdominal pain. Vitals stable. All cultures NGT. Continue supportive measurement, zofran. XR to rule out obstruction. Repeat lab work and monitor overnight. Peritoneal fluid not indicative for SBP     I have personally seen and examined the patient at bedside. I discussed the plan of care with patient, bedside RN, charge RN,  and pharmacy.    Consultants/Specialty  none     Code Status  Full Code    Disposition  Patient is not medically cleared.   Anticipate discharge to to home with close outpatient follow-up.  I have placed the appropriate orders for post-discharge needs.    Review of Systems  Review of Systems   Constitutional: Positive for malaise/fatigue and weight loss. Negative for chills and fever.   HENT: Negative for nosebleeds and sore throat.    Respiratory: Negative for cough.    Cardiovascular: Negative for chest pain and palpitations.   Gastrointestinal: Positive for abdominal pain, melena, nausea and vomiting. Negative for blood in stool, constipation and diarrhea.   Genitourinary: Negative for urgency.   Musculoskeletal: Negative for myalgias.   Neurological: Negative for headaches.        Physical Exam  Temp:  [36.7 °C (98 °F)-37.1 °C (98.7 °F)] 36.9 °C (98.5 °F)  Pulse:  [] 97  Resp:  [17-18] 18  BP: (110-120)/(55-75) 113/75  SpO2:  [97 %-98 %] 98 %    Physical Exam  Vitals and nursing note reviewed.   Constitutional:       Appearance: She is ill-appearing.   HENT:      Head: Normocephalic.      Mouth/Throat:      Mouth: Mucous membranes are dry.   Eyes:      General: No scleral icterus.  Cardiovascular:      Rate and Rhythm: Normal rate.      Heart sounds: No murmur heard.     Pulmonary:      Effort: Pulmonary effort  is normal. No respiratory distress.      Breath sounds: No wheezing.   Abdominal:      General: There is distension.      Palpations: Abdomen is soft.      Tenderness: There is no abdominal tenderness. There is no guarding.   Musculoskeletal:         General: No swelling.   Skin:     Findings: Erythema and lesion present.      Comments: papulary lesions on her hands and feet, squamate, ruptured     Neurological:      Mental Status: She is alert and oriented to person, place, and time.      Cranial Nerves: No cranial nerve deficit.      Motor: No weakness.   Psychiatric:         Mood and Affect: Mood normal.         Behavior: Behavior normal.         Thought Content: Thought content normal.         Judgment: Judgment normal.         Fluids    Intake/Output Summary (Last 24 hours) at 8/28/2021 1343  Last data filed at 8/27/2021 2020  Gross per 24 hour   Intake 610 ml   Output --   Net 610 ml       Laboratory  Recent Labs     08/26/21  0447   WBC 3.4*   RBC 3.85*   HEMOGLOBIN 9.3*   HEMATOCRIT 30.7*   MCV 79.7*   MCH 24.2*   MCHC 30.3*   RDW 46.1   PLATELETCT 102*   MPV 11.6     Recent Labs     08/26/21  0447   SODIUM 136   POTASSIUM 4.1   CHLORIDE 106   CO2 25   GLUCOSE 171*   BUN 14   CREATININE 0.84   CALCIUM 8.5                   Imaging  US-PARACENTESIS, ABD WITH IMAGING   Final Result      1. Ultrasound-guided therapeutic paracentesis of the left lower quadrant of the abdominal wall.      2. 2800 mL of fluid withdrawn.      US-ABDOMEN COMPLETE SURVEY   Final Result      1.  There is cholelithiasis. Gallbladder wall is mildly thickened which could be due to portal hypertension.      2.  Ascites is identified.      3.  Nodular liver surface consistent with cirrhosis.      4.  Hepatosplenomegaly.         DX-CHEST-LIMITED (1 VIEW)   Final Result         No acute cardiac or pulmonary abnormality is identified.      [id number] VIEW    (Results Pending)        Assessment/Plan  Projectile vomiting  Assessment &  Plan  Not clear etiology  From antibiotic ?? Vs gastroparesis vs SBO ??  Continue supportive measurement  Monitor overnight. Repeat lab work     Weight loss- (present on admission)  Assessment & Plan  60 lbs in one year   Some intentional and some it is not intentional. She has had very recent images CT chest /abdomen 2/2021, recent MRI abdomen negative for any malignancy.  Follow up on paracentesis .   She tells me that she did have mammogram last year. She tells me that she follows with GI and she is up to date with EGD/colonoscopy  Continue monitoring and healthy lifestyle changes with PCP    Syphilis- (present on admission)  Assessment & Plan  Stage two  Pt allergic to penicillin  No neurological involvement   Started on doxycycline   GC/chlamydia negative      Esophageal varices (HCC)- (present on admission)  Assessment & Plan  History of  As identified on EGD 02/2021  PPI    Skin lesions- (present on admission)  Assessment & Plan  Multiple circular excoriations over hands and feet, circular measuring 0.5cm in diameter. Present over palmar aspect of hands and plantar aspect of feet.     Hold steroid. Related to syphilis, secondary per ID   Started on doxycycline   8/28-improving.  Continue antibiotics    History of hepatitis C- (present on admission)  Assessment & Plan  successfully treated  Follow up with GI     Tobacco dependence- (present on admission)  Assessment & Plan  Tobacco cessation counseling and education provided for 4 minutes. Nicotine replacement options provided including patch, and further medical treatments including Wellbutrin and Chantix. As well as over the counter options of lozenges and gum.      Neuropathy- (present on admission)  Assessment & Plan  Continue neurontin    Depression- (present on admission)  Assessment & Plan  Continue trazodone    Cirrhosis (HCC)- (present on admission)  Assessment & Plan  Secondary to hepatitis C  Compliant with home diuretic regimen  Does not appear  acutely decompensated  Abdominal ultrasound confirmed ascites.   Await labs - patient sent in as direct admit  Paracentesis done 8/27 successfully   AFP normal  She will need follow-up with GI as outpatient    DM2 (diabetes mellitus, type 2) (HCC)- (present on admission)  Assessment & Plan  Last A1C 02/21 6.0  Insulin sliding scale  No insulin at this time  Pt had lost lot of weight        VTE prophylaxis: enoxaparin ppx, hold and resume after paracentesis     I have performed a physical exam and reviewed and updated ROS and Plan today (8/28/2021). In review of yesterday's note (8/27/2021), there are no changes except as documented above.

## 2021-08-28 NOTE — PROGRESS NOTES
"Assumed care of pt at shift change. Pt is on RA with no signs of acute distress. A&Ox4. Pt reporting abdominal pain and nausea, medicated with po Zofran and Tylenol. Pt already removed her IV, when asked why  She stated \"they told me I was going home today.\" POC discussed with patient. All safety measures in place. Call light and personal belongings by bedside. Bed locked and in lowest position. Hourly rounding in place.       1 RN Skin Assessment completed.   Patient's risk of skin breakdown: Low     Devices in place and skin assessed under:   []??? Pulse Ox  [x]??? PIV []??? Central Line []??? SCDs []???Purewick  []??? Berry  []???Condom Cath   []??? BMS        []???  Cortrak   []???  Oxymask   []??? Bipap    []??? Nasal Canula   []??? N/A   []??? Other(specify):      Right Ear  [x]??? WDL                or           []??? Skin issue present (please provide assessment/description below)     Left Ear  [x]??? WDL                or           []??? Skin issue present (please provide assessment/description below)     Right Elbow:  [x]??? WDL               or           []??? Skin issue present (please provide assessment/description below)     Left Elbow:   [x]??? WDL                or           []??? Skin issue present (please provide assessment/description below)     Coccyx/Buttocks:  [x]??? WDL                or           []??? Skin issue present (please provide assessment/description below)     Right Heel/Foot/Toes:  []??? WDL                or           [x]??? Skin issue present (please provide assessment/description below):dryness, red, peeling      Left Heel/Foot/Toes:   []??? WDL              or           [x]??? Skin issue present (please provide assessment/description below): dryness, red, peeling     **Describe any other skin issues in areas not already listed from above list: Generalized redness/ rash noted  Left lip scab     []??? N/A     Interventions In Place: Pillows and Pressure Redistribution, pt able to " turn self and encouraged to do so.

## 2021-08-28 NOTE — DISCHARGE PLANNING
Meds-to-Beds: Discharge prescription orders listed below delivered to patient's bedside. MAMI Steve notified. Unable to reach patient via telelphone consult. Written information regarding the dispensed prescriptions was provided to the patient including the phone number of the pharmacy to call for any questions.    Current Outpatient Medications   Medication Sig Dispense Refill   • doxycycline monohydrate (ADOXA) 100 MG tablet Take 1 Tablet by mouth every 12 hours for 14 days. 28 Tablet 0        Shira Perez, PharmD

## 2021-08-28 NOTE — PROGRESS NOTES
Pt alert and oriented x4, on room air. Pt lying on her bed, no report of headache. PIV dressing CDI. Blood glucose monitored, insulin coverage administered. Continent of bowel and bladder. All needs attended.    1 RN Skin Assessment completed.   Patient's risk of skin breakdown: Low     Devices in place and skin assessed under:   []?? Pulse Ox  [x]?? PIV []?? Central Line []?? SCDs []??Purewick  []?? Berry  []??Condom Cath   []?? BMS        []??  Cortrak   []??  Oxymask   []?? Bipap    []?? Nasal Canula   []?? N/A   []?? Other(specify):      Right Ear  [x]?? WDL                or           []?? Skin issue present (please provide assessment/description below)     Left Ear  [x]?? WDL                or           []?? Skin issue present (please provide assessment/description below)     Right Elbow:  [x]?? WDL               or           []?? Skin issue present (please provide assessment/description below)     Left Elbow:   [x]?? WDL                or           []?? Skin issue present (please provide assessment/description below)     Coccyx/Buttocks:  [x]?? WDL                or           []?? Skin issue present (please provide assessment/description below)     Right Heel/Foot/Toes:  []?? WDL                or           [x]?? Skin issue present (please provide assessment/description below):dryness, red, peeling on plantar side     Left Heel/Foot/Toes:   []?? WDL              or           [x]?? Skin issue present (please provide assessment/description below): dryness, red, peeling on plantar side     **Describe any other skin issues in areas not already listed from above list: BLE redness/rash, back with redness/rash, perineal area few redness,  Left lip scab     []?? N/A     Interventions In Place: Pillows and Pressure Redistribution Mattress

## 2021-08-28 NOTE — CARE PLAN
The patient is Stable - Low risk of patient condition declining or worsening    Shift Goals  Clinical Goals: Pt's BG will remain WDL    Progress made toward(s) clinical / shift goals:  Pt's BG was 138 before breakfast and 207 before lunch, insulin coverage given before lunch per sliding scale.     Patient is not progressing towards the following goals:

## 2021-08-28 NOTE — CARE PLAN
The patient is Stable - Low risk of patient condition declining or worsening    Shift Goals  Clinical Goals: pt blood glucose will be controlled    Progress made toward(s) clinical / shift goals:  Pt blood glucose monitored, insulin coverage given.

## 2021-08-29 NOTE — PROGRESS NOTES
1 RN Skin Assessment completed.   Patient's risk of skin breakdown: Low     Devices in place and skin assessed under:   []???? Pulse Ox  []???? PIV []???? Central Line []???? SCDs []????Purewick  []???? Berry  []????Condom Cath   []???? BMS        []????  Cortrak   []????  Oxymask   []???? Bipap    []???? Nasal Canula   []???? N/A   []???? Other(specify):      Right Ear  [x]???? WDL                or           []???? Skin issue present (please provide assessment/description below)     Left Ear  [x]???? WDL                or           []???? Skin issue present (please provide assessment/description below)     Right Elbow:  [x]???? WDL               or           []???? Skin issue present (please provide assessment/description below)     Left Elbow:   [x]???? WDL                or           []???? Skin issue present (please provide assessment/description below)     Coccyx/Buttocks:  [x]???? WDL                or           []???? Skin issue present (please provide assessment/description below)     Right Heel/Foot/Toes:  []???? WDL                or           [x]???? Skin issue present (please provide assessment/description below):dryness, pinkish to red, peeling and blanching     Left Heel/Foot/Toes:   []???? WDL              or           [x]???? Skin issue present (please provide assessment/description below): dryness, pinkish to red, peeling and blanching     **Describe any other skin issues in areas not already listed from above list: Generalized redness/ rash noted  Left lip scab     []???? N/A     Interventions In Place: Pillows and Pressure Redistribution,

## 2021-08-29 NOTE — CARE PLAN
The patient is Stable - Low risk of patient condition declining or worsening    Shift Goals  Clinical Goals: discharge  Patient Goals: home    Progress made toward(s) clinical / shift goals:  Will assist in helping patient obtain all paperwork for discharged home.    Patient is not progressing towards the following goals:

## 2021-08-29 NOTE — CARE PLAN
The patient is Stable - Low risk of patient condition declining or worsening    Shift Goals  Clinical Goals: comfort    Progress made toward(s) clinical / shift goals:  pt will be able to sleep comfortably    Patient is not progressing towards the following goals:

## 2021-08-29 NOTE — PROGRESS NOTES
"Pt is moaning and asking for help. This RN went to pt room and assessed. Pt is complaining of nausea and saying \"I cant breath. I feel so sick\" Pt offered vomit bag and given PRN zofran. Latest o2 sat =95% on room air. Pt encouraged to rest and sleep.  "

## 2021-08-29 NOTE — PROGRESS NOTES
Pt in bed, moaning, complaining of nausea, spitting clear fluid into trash can, refused PO medications requesting IV, Pt is to be discharged does not IV access, MD spoke to family member  (mother) this morning about discharge, kisha will transport pt home. Antibiotics were delivered to floor (14 day supply) for pt. Discharge paperwork with follow up appointment will be given. Will be taken downstairs to meet bro at Emergency department.

## 2021-08-31 NOTE — PROGRESS NOTES
NATALIAW Debra called patient and left a voicemail introducing Community Care Management and provided all contact information.

## 2021-09-03 NOTE — PROGRESS NOTES
Community Health Worker Intake  • Social determinates of health intake : Complete   • Identified barriers to: Food   • Contact information provided to Demetrice Jaime Yes via mail  • Has PCP appointment scheduled for 09/18/2021 @ 11 am  • Scheduled Food Delivery/Home Visit/Outpatient Visit: No  · IOutpatient assessment completed.  • Did the patient receive medications post discharge: Yes    CHW Debra called patient via mobile phone and introduced Community Care Management and completed SDOH. Patient lives alone in an apartment and is an active . Patient has no questions or concerns with medications, Patient has an appointment scheduled with PCP on 09/18/2021 @ 11:00am . Patient tends to not have enough food to last after paying rent/ utilities.     Plan:   CHW discussed [facility]'s food pantry / [facility] and provided all contact information. CHW mailed all information per patient request.   CHW Debra will discharge patient from CCM and remove from case load and master list as all goals met.

## 2021-09-28 NOTE — OR SURGEON
Immediate Post OP Note    PreOp Diagnosis: Esophageal varices      PostOp Diagnosis: Esophageal varices, portal gsatropathy      Procedure(s):  GASTROSCOPY - Wound Class: Clean Contaminated  GASTROSCOPY, WITH BANDING - Wound Class: Clean Contaminated    Surgeon(s):  Anthony Dent M.D.    Anesthesiologist/Type of Anesthesia:  Anesthesiologist: Anderson Palma D.O./General    Surgical Staff:  Endoscopy Technician: Radha Severino; Franck Mason  Endoscopy Nurse: Vernell Reardon RYIFAN; Gaston Fine RYIFAN    Specimens removed if any:  None    Estimated Blood Loss: None    Findings:   1. 3 columns of medium esophageal varices with nipple sign and red garth signs, s/p band ligation x4.  Areas of fibrosis from prior banding evident  2. Mild portal gastropathy with friability  3. Retained food in stomach indicative of delayed gastric emptying  4. No gastric varices    Complications: None        9/28/2021 8:55 AM Anthony Dent M.D.

## 2021-09-28 NOTE — DISCHARGE INSTRUCTIONS
ACTIVITY: Rest and take it easy for the first 24 hours.  A responsible adult is recommended to remain with you during that time.  It is normal to feel sleepy.  We encourage you to not do anything that requires balance, judgment or coordination.    MILD FLU-LIKE SYMPTOMS ARE NORMAL. YOU MAY EXPERIENCE GENERALIZED MUSCLE ACHES, THROAT IRRITATION, HEADACHE AND/OR SOME NAUSEA.    FOR 24 HOURS DO NOT:  Drive, operate machinery or run household appliances.  Drink beer or alcoholic beverages.   Make important decisions or sign legal documents.    SPECIAL INSTRUCTIONS:     GASTROSCOPY OR ERCP  1. Don't eat or drink anything for about an hour after the test. You can then resume your regular diet.   2. Don't drive or drink alcohol for 24 hours. The medication you received will make you too drowsy.  3. Don't take any coffee, tea, or aspirin products until after you see your doctor. These can harm the lining of your stomach.  4. If you begin to vomit bloody material, or develop black or bloody stools, call your doctor as soon as possible.   5. If you have any neck, chest, abdominal pain or temp over 100 degrees call your doctor.     Take Prilosec over the counter as instructed by Dr. Dent    DIET: To avoid nausea, slowly advance diet as tolerated, avoiding spicy or greasy foods for the first day.  Add more substantial food to your diet according to your physician's instructions.     Low-Fiber Eating Plan  Fiber is found in fruits, vegetables, whole grains, and beans. Eating a diet low in fiber helps to reduce how often you have bowel movements and how much you produce during a bowel movement. A low-fiber eating plan may help your digestive system heal if:  · You have certain conditions, such as Crohn's disease or diverticulitis.  · You recently had radiation therapy on your pelvis or bowel.  · You recently had intestinal surgery.  · You have a new surgical opening in your abdomen (colostomy or ileostomy).  · Your intestine  is narrowed (stricture).  Your health care provider will determine how long you need to stay on this diet. Your health care provider may recommend that you work with a diet and nutrition specialist (dietitian).  What are tips for following this plan?  General guidelines  · Follow recommendations from your dietitian about how much fiber you should have each day.  · Most people on this eating plan should try to eat less than 10 grams (g) of fiber each day.   · Take vitamin and mineral supplements as told by your health care provider or dietitian. Chewable or liquid forms are best when on this eating plan.  Reading food labels  · Check food labels for the amount of dietary fiber.  · Choose foods that have less than 2 grams of fiber in one serving.  Cooking  · Use white flour and other allowed grains for baking and cooking.  · Cook meat using methods that keep it tender, such as braising or poaching.  · Cook eggs until the yolk is completely solid.  · Cook with healthy oils, such as olive oil or canola oil.  Meal planning    · Eat 5-6 small meals throughout the day instead of 3 large meals.  · If you are lactose intolerant:  ? Choose low-lactose dairy foods.  ? Do not eat dairy foods, if told by your dietitian.  · Limit fat and oils to less than 8 teaspoons a day.  · Eat small portions of desserts.  What foods are allowed?  The items listed below may not be a complete list. Talk with your dietitian about what dietary choices are best for you.  Grains  All bread and crackers made with white flour. Waffles, pancakes, and [place] toast. Bagels. Pretzels. [place] toast, zwieback, and matzoh. Cooked and dried cereals that do not contain whole grains, added fiber, seeds, or dried fruit. Cornmeal. [place]. Hot and cold cereals made with refined corn, wheat, rice, or oats. Plain pasta and noodles. White rice.  Vegetables  Well-cooked or canned vegetables without skin, seeds, or stems. Cooked potatoes without skins. Vegetable  juice.  Fruits  Soft-cooked or canned fruits without skin and seeds. Peeled ripe banana. Applesauce. Fruit juice without pulp.  Meats and other protein foods  Ground meat. Tender cuts of meat or poultry. Eggs. Fish, seafood, and shellfish. Smooth nut butters. Tofu.  Dairy  All milk products and drinks. Lactose-free milks, including rice, soy, and almond milks. Yogurt without fruit, nuts, chocolate, or granola mix-ins. Sour cream. Cottage cheese. Cheese.  Beverages  Decaf coffee. Fruit and vegetable juices or smoothies (in small amounts, with no pulp or skins, and with fruits from allowed list). Sports drinks. Herbal tea.  Fats and oils  Olive oil, canola oil, sunflower oil, flaxseed oil, and grapeseed oil. Mayonnaise. Cream cheese. Margarine. Butter.  Sweets and desserts  Plain cakes and cookies. Cream pies and pies made with allowed fruits. Pudding. Custard. Fruit gelatin. Sherbet. Popsicles. Ice cream without nuts. Plain hard candy. Honey. Jelly. Molasses. Syrups, including chocolate syrup. Chocolate. Marshmallows. Gumdrops.  Seasoning and other foods  Bouillon. Broth. Cream soups made from allowed foods. Strained soup. Casseroles made with allowed foods. Ketchup. Mild mustard. Mild salad dressings. Plain gravies. Vinegar. Spices in moderation. Salt. Sugar.  What foods are not allowed?  The items listed below may not be a complete list. Talk with your dietitian about what dietary choices are best for you.  Grains  Whole wheat and whole grain breads and crackers. Multigrain breads and crackers. Rye bread. Whole grain or multigrain cereals. Cereals with nuts, raisins, or coconut. Bran. Coarse wheat cereals. Granola. High-fiber cereals. Cornmeal or corn bread. Whole grain pasta. Wild or brown rice. Quinoa. Popcorn. Buckwheat. Wheat germ.  Vegetables  Potato skins. Raw or undercooked vegetables. All beans and bean sprouts. Cooked greens. Corn. Peas. Cabbage. Beets. Broccoli. [place] sprouts. Cauliflower. Mushrooms.  Onions. Peppers. Parsnips. Okra. Sauerkraut.  Fruit  Raw or dried fruit. Berries. Fruit juice with pulp. Prune juice.  Meats and other protein foods  Tough, fibrous meats with gristle. Fatty meat. Poultry with skin. Fried meat, poultry, or fish. Deli or lunch meats. Sausage, arndt, and hot dogs. Nuts and chunky nut butter. Dried peas, beans, and lentils.  Dairy  Yogurt with fruit, nuts, chocolate, or granola mix-ins.  Beverages  Caffeinated coffee and teas.  Fats and oils  Avocado. Coconut.  Sweets and desserts  Desserts, cookies, or candies that contain nuts or coconut. Dried fruit. Jams and preserves with seeds. Marmalade. Any dessert made with fruits or grains that are not allowed.  Seasoning and other foods  Corn tortilla chips. Soups made with vegetables or grains that are not allowed. Relish. Horseradish. Pickles. Olives.  Summary  · Most people on a low-fiber eating plan should eat less than 10 grams of fiber a day. Follow recommendations from your dietitian about how much fiber you should have each day.  · Always check food labels to see the dietary fiber content of packaged foods. In general, a low-fiber food will have fewer than 2 grams of fiber per serving.  · In general, try to avoid whole grains, raw fruits and vegetables, dried fruit, tough cuts of meat, nuts, and seeds.  · Take a vitamin and mineral supplement as told by your health care provider or dietitian.  This information is not intended to replace advice given to you by your health care provider. Make sure you discuss any questions you have with your health care provider.  Document Released: 06/09/2003 Document Revised: 04/10/2020 Document Reviewed: 02/20/2018  [employer] Patient Education © 2020 [employer]    FOLLOW-UP APPOINTMENT:  A follow-up appointment should be arranged with your doctor; call to schedule.    You should CALL YOUR PHYSICIAN if you develop:  Fever greater than 101 degrees F.  Pain not relieved by medication, or persistent  nausea or vomiting.  Excessive bleeding (blood soaking through dressing) or unexpected drainage from the wound.  Extreme redness or swelling around the incision site, drainage of pus or foul smelling drainage.  Inability to urinate or empty your bladder within 8 hours.  Problems with breathing or chest pain.    You should call 911 if you develop problems with breathing or chest pain.  If you are unable to contact your doctor or surgical center, you should go to the nearest emergency room or urgent care center.  Physician's telephone #:     Dr. Dent at [phone]    If any questions arise, call your doctor.  If your doctor is not available, please feel free to call the Surgical Center at [phone]. The Contact Center is open Monday through Friday 7AM to 5PM and may speak to a nurse at [phone], or toll free at [phone].     A registered nurse may call you a few days after your surgery to see how you are doing after your procedure.    MEDICATIONS: Resume taking daily medication.  Take prescribed pain medication with food.  If no medication is prescribed, you may take non-aspirin pain medication if needed.  PAIN MEDICATION CAN BE VERY CONSTIPATING.  Take a stool softener or laxative such as senokot, pericolace, or milk of magnesia if needed.    Prescription given for Carafate.  Last pain medication given at NONE.    If your physician has prescribed pain medication that includes Acetaminophen (Tylenol), do not take additional Acetaminophen (Tylenol) while taking the prescribed medication.        Depression / Suicide Risk    As you are discharged from this [facility] facility, it is important to learn how to keep safe from harming yourself.    Recognize the warning signs:  · Abrupt changes in personality, positive or negative- including increase in energy   · Giving away possessions  · Change in eating patterns- significant weight changes-  positive or negative  · Change in sleeping patterns-  unable to sleep or sleeping all the time   · Unwillingness or inability to communicate  · Depression  · Unusual sadness, discouragement and loneliness  · Talk of wanting to die  · Neglect of personal appearance   · Rebelliousness- reckless behavior  · Withdrawal from people/activities they love  · Confusion- inability to concentrate     If you or a loved one observes any of these behaviors or has concerns about self-harm, here's what you can do:  · Talk about it- your feelings and reasons for harming yourself  · Remove any means that you might use to hurt yourself (examples: pills, rope, extension cords, firearm)  · Get professional help from the community (Mental Health, Substance Abuse, psychological counseling)  · Do not be alone:Call your Safe Contact- someone whom you trust who will be there for you.  · Call your local CRISIS HOTLINE 923-7708 or 458-355-5204  · Call your local Children's Mobile Crisis Response Team [place] (604) 333-5363 or www.LiveBid  · Call the toll free National Suicide Prevention Hotlines   · National Suicide Prevention Lifeline 244-712-VGKD (7741)  · National Hope Line Network 800-SUICIDE (746-4512)

## 2021-09-28 NOTE — OR NURSING
0858 - Patient arrived from OR, report received from anesthesia and RN. Orders verified, [id number] in place. Patient awake and alert, following commands     0910 - Report given to MAMI French

## 2021-09-28 NOTE — ANESTHESIA PROCEDURE NOTES
Airway    Date/Time: 9/28/2021 8:44 AM  Performed by: Anderson Palma D.O.  Authorized by: Anderson Palma D.O.     Location:  OR  Urgency:  Elective  Indications for Airway Management:  Anesthesia      Spontaneous Ventilation: absent    Sedation Level:  Deep  Preoxygenated: Yes    Patient Position:  Sniffing  Mask Difficulty Assessment:  1 - vent by mask  Final Airway Type:  Endotracheal airway  Final Endotracheal Airway:  ETT  Cuffed: Yes    Technique Used for Successful ETT Placement:  Direct laryngoscopy    Insertion Site:  Oral  Blade Type:  Deejay  Laryngoscope Blade/Videolaryngoscope Blade Size:  3  ETT Size (mm):  7.0  Measured from:  Lips  ETT to Lips (cm):  21  Placement Verified by: auscultation and capnometry    Cormack-Lehane Classification:  Grade I - full view of glottis  Number of Attempts at Approach:  1

## 2021-09-28 NOTE — ANESTHESIA POSTPROCEDURE EVALUATION
Patient: Demetrice Jaime    Procedure Summary     Date: 09/28/21 Room / Location: [facility] ROOM 26 / SURGERY SAME DAY [facility]    Anesthesia Start: 0835 Anesthesia Stop: 0905    Procedures:       GASTROSCOPY (N/A Esophagus)      GASTROSCOPY, WITH BANDING (N/A Esophagus) Diagnosis: (esophageal varacies)    Surgeons: Anthony Dent M.D. Responsible Provider: Anderson Palma D.O.    Anesthesia Type: general ASA Status: 3          Final Anesthesia Type: general  Last vitals  BP   Blood Pressure: 125/84    Temp   36.4 °C (97.5 °F)    Pulse   (!) 101   Resp   18    SpO2   100 %      Anesthesia Post Evaluation    Patient location during evaluation: PACU  Patient participation: complete - patient participated  Level of consciousness: awake and alert  Pain score: 0    Airway patency: patent  Anesthetic complications: no  Cardiovascular status: hemodynamically stable  Respiratory status: acceptable  Hydration status: euvolemic    PONV: none          No complications documented.     Nurse Pain Score: 0 (NPRS)

## 2021-09-28 NOTE — OR NURSING
0910- report off from MAMI Viveros for break.     0922- Dr. Dent at bedside to update pt. Discharge instructions provided to pt from MD and RN updated in discharge paper work.     0928- hand off back to MAMI Viveros.

## 2021-09-28 NOTE — ANESTHESIA TIME REPORT
Anesthesia Start and Stop Event Times     Date Time Event    9/28/2021 0802 Ready for Procedure     0835 Anesthesia Start     0905 Anesthesia Stop        Responsible Staff  09/28/21    Name Role Begin End    Anderson Palma D.O. Anesth 0835 0905        Preop Diagnosis (Free Text):  Pre-op Diagnosis     ALCOHOL ABUSE, ASCITES, CIRRHOSIS, DIABETES MELLITUS (TYPE II) UNCONTROLLED, ESOPHAGEAL VARICES, GASTRITIS, UNSPECIFIED, WITHOUT BLEEDING        Preop Diagnosis (Codes):    Premium Reason  Non-Premium    Comments:

## 2021-09-28 NOTE — ANESTHESIA PREPROCEDURE EVALUATION
Relevant Problems   ANESTHESIA   (positive) FLORINDA (obstructive sleep apnea)      PULMONARY   (positive) History of hepatitis C      NEURO   (positive) History of cirrhosis of liver   (positive) History of hepatitis C      CARDIAC   (positive) Arrhythmia   (positive) Atrial fibrillation (HCC)   (positive) Esophageal varices (HCC)   (positive) HTN (hypertension)   (positive) Vein, varicose      GI   (positive) Hiatal hernia         (positive) ARF (acute renal failure) (HCC)   (positive) Chronic hepatitis C virus infection (CMS-HCC)   (positive) Cirrhosis (HCC)   (positive) Disorder of liver   (positive) Hepatitis C   (positive) Pyelectasis      ENDO   (positive) DM2 (diabetes mellitus, type 2) (HCC)       Physical Exam    Airway   Mallampati: II  TM distance: >3 FB  Neck ROM: full       Cardiovascular - normal exam  Rhythm: regular  Rate: normal  (-) murmur     Dental - normal exam           Pulmonary - normal exam  Breath sounds clear to auscultation     Abdominal    Neurological - normal exam                 Anesthesia Plan    ASA 3       Plan - general       Airway plan will be ETT          Induction: intravenous    Postoperative Plan: Postoperative administration of opioids is intended.    Pertinent diagnostic labs and testing reviewed    Informed Consent:    Anesthetic plan and risks discussed with patient.    Use of blood products discussed with: patient whom consented to blood products.

## 2021-09-28 NOTE — PROCEDURES
DATE OF PROCEDURE:  09/28/2021     PROCEDURE:  Upper endoscopy with variceal band ligation.     :  Anthony Dent MD     INDICATIONS:  The patient is a 61-year-old with history of cirrhosis related   to hepatitis C and alcohol with known esophageal varices requiring prior   banding in the past, who presents for further surveillance and banding of   esophageal varices.     INFORMED CONSENT:  Written informed consent was obtained from the patient   after thorough explanation of indications, benefits and risks of the   procedure, which included but was not limited to bleeding, infection,   perforation, adverse reaction to medications and missed lesions.     MEDICATIONS GIVEN:  Monitored anesthesia care with propofol.     Continuous telemetry, BP, pulse oximetry, and capnography were performed by   the anesthesiologist throughout the procedure.     Midsize flexible upper endoscope was used for the procedure.     FINDINGS:  The patient was placed in left lateral decubitus position.  After   anesthesia was achieved, the endoscope was passed in the posterior pharynx   under direct visualization, advanced to the second portion of duodenum without   difficulty.  The patient tolerated the procedure well with following   findings:  1.  Esophagus:  She did have 3 columns of medium esophageal varices extending   from the gastroesophageal junction up to 30 cm in the mid esophagus.  There   were areas of fibrosis from prior banding.  The varices did have evidence of   red garth signs as well as 1 nipple sign more distally in the esophagus at the   3 o'clock location.  I did perform variceal band ligation x4 with the initial   band placed over the nipple sign.  2.  Stomach:  Mild portal hypertensive gastropathy with friability.  There was   also some retained food within the stomach concerning for possible delayed   gastric emptying.  Retroflexion revealed no evidence for gastric varices.  3.  Duodenum normal.      IMPRESSION:  1.  Esophageal varices, medium sized with red garth and a nipple sign   indicating high risk for bleeding as well as areas of fibrosis from prior   banding.  Variceal band ligation x4 performed.  2.  Portal hypertensive gastropathy.  3.  Retained food in the stomach concerning for delayed gastric emptying,   possibly related to diabetes.  4.  No gastric varices.     PLAN:  1.  PPI b.i.d. and sucralfate 1 gram t.i.d. for 2 weeks.  2.  Recommend repeat upper endoscopy with additional banding in 4-6 weeks.  3.  Alcohol abstinence discussed.        ______________________________  MD DANIKA KHAN/RAZA    DD:  09/28/2021 09:01  DT:  09/28/2021 09:27    Job#:  [id number]

## 2021-09-28 NOTE — OR NURSING
0928 - Report received from MAMI French.       0932 - Patient tolerating liquids. Denies pain     0933 - Criteria met to transition patient to phase II recovery     0948 - Patient dressing at beside, steady gait.   Discharge instructions given to patient and patient friend, Dandre     0958 - All discharge criteria met. IV removed, patient escorted out by staff w/ all belongings.   Patient discharged

## 2021-09-28 NOTE — H&P
Patient seen and examined the day of procedure.  No changes to APRN H&P and agree with plan to proceed with EGD for surveillance of esophageal varices.

## 2021-10-22 NOTE — PREPROCEDURE INSTRUCTIONS
"Preadmit appointment: \" Preparing for your Procedure information\" sheet given to patient with verbal and written instructions. Patient instructed to continue prescribed medications through the day before surgery, instructed to take the following medications the day of surgery per anesthesia protocol:  Bentyl, Gabapentin, omeprazole.  Pt states, \"no issues with anesthesia\".  Fasting guidelines per GI's instructions for type of diet and when to initiate NPO status.  All Pt's questions and concerns answered or addressed.  COVID test 10/29, Fall Risk, Stop Bang/No C-PAP.  Not the best historian.  "

## 2021-11-01 PROBLEM — K31.89 PORTAL HYPERTENSIVE GASTROPATHY (HCC): Status: ACTIVE | Noted: 2021-01-01

## 2021-11-01 PROBLEM — K76.6 PORTAL HYPERTENSIVE GASTROPATHY (HCC): Status: ACTIVE | Noted: 2021-01-01

## 2021-11-01 NOTE — OR NURSING
Pt allergies and NPO status verified, home meds reconcilled. Belongings secured. Pt verbalizes understanding of the pain scale, expected course of stay, and plan of care.  Surgical site verified with pt.  IV access established.  FSBG 122

## 2021-11-01 NOTE — PROCEDURES
DATE OF PROCEDURE:  11/01/2021     PROCEDURE:  Upper endoscopy with variceal band ligation.     :  Anthony Dent MD     INDICATIONS:  The patient is a 61-year-old with history of cirrhosis and known   esophageal varices with prior bleeding, now presents for evaluation of   surveillance of esophageal varices and additional variceal band ligation.     INFORMED CONSENT:  Written informed consent was obtained from the patient   after thorough explanation of indications, benefits and risks of the   procedure, which included but was not limited to bleeding, infection,   perforation, adverse reaction to medications and missed lesions.     MEDICATIONS GIVEN:  General anesthesia with endotracheal intubation.     Continuous telemetry, BP, pulse oximetry, and capnography were performed by   the anesthesiologist throughout the procedure.     Midsize flexible upper endoscope was used for the procedure.     FINDINGS:  The patient was placed in left lateral decubitus position.  After   anesthesia was achieved, the endoscope was passed in the posterior pharynx   under direct visualization, advanced to the second portion of the duodenum   without difficulty.  The patient tolerated the procedure well with following   findings:  1.  Esophagus:  She had 3 columns of medium sized esophageal varices extending   from the GE junction up to the mid esophagus at 30 cm.  There were several   scattered red garth signs of the esophageal varices.  There were also several   areas of fibrosis noted from prior banding.  2.  Stomach: Diffuse mild to moderate portal hypertensive gastropathy   throughout the stomach.  Retroflexion in the stomach showed no gastric   varices.  3.  Duodenum:  Normal.     After complete endoscopic exam was performed, the endoscope was removed.  A   [employer]  was applied to the endoscope and the endoscope was   passed to the level of the gastroesophageal junction.  Three bands were    successfully placed on the 3 columns of esophageal varices in the distal   esophagus.     IMPRESSION:  1.  Medium esophageal varices with red garth signs and evidence of fibrosis   from prior banding, variceal band ligation x3 performed.  2.  Portal hypertensive gastropathy.  3.  No gastric varices.     PLAN:   1.  Repeat EGD in 4-6 weeks for additional banding.  2.  Omeprazole 20 mg twice daily for 2 weeks.  3.  Sucralfate 1 gram 3 times daily for 2 weeks.        ______________________________  MD DANIKA KHAN/APPLE/JONO    DD:  11/01/2021 14:56  DT:  11/01/2021 15:29    Job#:  [id number]

## 2021-11-01 NOTE — ANESTHESIA PROCEDURE NOTES
Airway    Date/Time: 11/1/2021 2:31 PM  Performed by: Raji Patel M.D.  Authorized by: Raji Patel M.D.     Location:  OR  Urgency:  Elective  Indications for Airway Management:  Anesthesia      Spontaneous Ventilation: absent    Sedation Level:  Deep  Preoxygenated: Yes    Patient Position:  Sniffing  Final Airway Type:  Endotracheal airway  Final Endotracheal Airway:  ETT  Cuffed: Yes    Technique Used for Successful ETT Placement:  Direct laryngoscopy    Insertion Site:  Oral  Blade Type:  Deejay  Laryngoscope Blade/Videolaryngoscope Blade Size:  3  ETT Size (mm):  7.0  Measured from:  Teeth  ETT to Teeth (cm):  21  Placement Verified by: auscultation and capnometry    Cormack-Lehane Classification:  Grade IIa - partial view of glottis  Number of Attempts at Approach:  1

## 2021-11-01 NOTE — OR NURSING
1613- Discharge instructions provided to patient. Patient verbalizes understanding. All questions and concerns addressed.   1625- Discharge instructions provided via phone to family member. All questions and concerns addressed.   1635- Patient D/Amilcar to care of family following an uneventful stay in Stage 2.

## 2021-11-01 NOTE — OR NURSING
1500: To PACU post gastroscopy w/ banding. Breathing is spontaneous and unlabored.  1515: No c/o pain.

## 2021-11-01 NOTE — OR SURGEON
Immediate Post OP Note    PreOp Diagnosis: Esophageal varices      PostOp Diagnosis: Esophageal varices, portal gastropathy      Procedure(s):  GASTROSCOPY - WITH BANDING - Wound Class: Clean Contaminated    Surgeon(s):  Anthony Dent M.D.    Anesthesiologist/Type of Anesthesia:  Anesthesiologist: Raji Patel M.D./General    Surgical Staff:  Circulator: Toan Hicks R.N.; Gala Kemp  Endoscopy Technician: Renetta Delgado; Margarette Ribera    Specimens removed if any:  None    Estimated Blood Loss: None    Findings:   1. 3 columns of medium esophageal varices with red garth signs and fibrosis from prior banding.  Band ligation x3 performed  2. Diffuse portal hypertensive gastropathy  3. No gastric varices    Complications: None        11/1/2021 2:49 PM Anthony Dent M.D.

## 2021-11-01 NOTE — ANESTHESIA TIME REPORT
Anesthesia Start and Stop Event Times     Date Time Event    11/1/2021 1420 Ready for Procedure     1426 Anesthesia Start     1506 Anesthesia Stop        Responsible Staff  11/01/21    Name Role Begin End    Raji Patel M.D. Anesth 1426 1506        Preop Diagnosis (Free Text):  Pre-op Diagnosis     ESOPHAGEAL VARICES WITHOUT BLEEDING        Preop Diagnosis (Codes):  Diagnosis Information     Diagnosis Code(s): Esophageal varices [I85]        Premium Reason  A. 3PM - 7AM    Comments:

## 2021-11-01 NOTE — DISCHARGE INSTRUCTIONS
ENDOSCOPY HOME CARE INSTRUCTIONS    GASTROSCOPY OR ERCP  1. Don't eat or drink anything for about an hour after the test. You can then resume your regular diet.  2. Don't drive or drink alcohol for 24 hours. The medication you received will make you too drowsy.  3. Don't take any coffee, tea, or aspirin products until after you see your doctor. These can harm the lining of your stomach.  4. If you begin to vomit bloody material, or develop black or bloody stools, call your doctor as soon as possible.  5. If you have any neck, chest, abdominal pain or temp of 100 degrees, call your doctor.  6. See your doctor ***  7. Additional instructions: ***  8. Prescriptions: ***    Soft Diet until further advised.    Dr. Dent [phone]    You should call [phone] if you develop problems with breathing or chest pain.  If any questions arise, call your doctor. If your doctor is not available, please feel free to call [phone]. You can also call the HEALTH HOTLINE open 24 hours/day, 7 days/week and speak to a nurse at [phone], or toll free [phone].    Depression / Suicide Risk    As you are discharged from this [facility] facility, it is important to learn how to keep safe from harming yourself.    Recognize the warning signs:  · Abrupt changes in personality, positive or negative- including increase in energy   · Giving away possessions  · Change in eating patterns- significant weight changes-  positive or negative  · Change in sleeping patterns- unable to sleep or sleeping all the time   · Unwillingness or inability to communicate  · Depression  · Unusual sadness, discouragement and loneliness  · Talk of wanting to die  · Neglect of personal appearance   · Rebelliousness- reckless behavior  · Withdrawal from people/activities they love  · Confusion- inability to concentrate     If you or a loved one observes any of these behaviors or has concerns about self-harm, here's what you can do:  · Talk about  it- your feelings and reasons for harming yourself  · Remove any means that you might use to hurt yourself (examples: pills, rope, extension cords, firearm)  · Get professional help from the community (Mental Health, Substance Abuse, psychological counseling)  · Do not be alone:Call your Safe Contact- someone whom you trust who will be there for you.  · Call your local CRISIS HOTLINE 069-6906 or 483-882-6532  · Call your local Children's Mobile Crisis Response Team [place] (772) 819-9342 or wwwThe Personal Bee  · Call the toll free National Suicide Prevention Hotlines   · National Suicide Prevention Lifeline 674-388-NHSI (4378)  · National Hope Line Network 800-SUICIDE (617-0722)    I acknowledge receipt and understanding of these Home Care Instructions.

## 2021-11-01 NOTE — ANESTHESIA POSTPROCEDURE EVALUATION
Patient: Demetrice Jaime    Procedure Summary     Date: 11/01/21 Room / Location:  ENDOSCOPIC ULTRASOUND ROOM / SURGERY [facility]    Anesthesia Start: 1426 Anesthesia Stop: 1506    Procedure: GASTROSCOPY - WITH BANDING (N/A ) Diagnosis:       Esophageal varices      (ESOPHAGEAL VARICES)    Surgeons: Anthony Dent M.D. Responsible Provider: Raji Patel M.D.    Anesthesia Type: general ASA Status: 3          Final Anesthesia Type: general  Last vitals  BP   Blood Pressure: 126/65    Temp   36.8 °C (98.2 °F)    Pulse   (!) 108   Resp   16    SpO2   97 %      Anesthesia Post Evaluation    Patient location during evaluation: PACU  Patient participation: complete - patient participated  Level of consciousness: awake and alert  Pain score: 3    Airway patency: patent  Anesthetic complications: no  Cardiovascular status: hemodynamically stable  Respiratory status: acceptable  Hydration status: euvolemic    PONV: none          No complications documented.     Nurse Pain Score: 6 (NPRS)

## 2021-11-10 NOTE — PATIENT INSTRUCTIONS
Please monitor your blood pressure with a machine. Coordinate with Care chest and get a blood pressure machine.    Sent you refill of all your medications to pharmacy. Start taking Furosemide and Aldactone medications today. Follow up with Dr. Dent(Gastroenterology) tomorrow.    Followup up in 6 months or earlier if needed.

## 2021-11-11 NOTE — ASSESSMENT & PLAN NOTE
A1c done on 10/2021 was 6%   Lantus 30U AM and 20U PM   Takes Gabapentin 800mg TID for neuropathy  Plan:  - Continue above regimen  - Followup on opthlamology referral and make an appointment

## 2021-11-11 NOTE — ASSESSMENT & PLAN NOTE
JO-VASc score of 3. She takes Aspirin and is not inclined in taking anti-coagulants such as eliquis after extensive counseling and understanding risks/benefits.

## 2021-11-11 NOTE — PROGRESS NOTES
Established Patient    Chief Complaint   Patient presents with   • GI Problem     follow up       HPI:  Demetrice Jaime is a 61 y.o. female presents to the clinic for a followup visit.    Cirrhosis - Sec. To Hep C and Alcoholism. She is currently on Furosemide 40mg /Aldactone 50mg and following with GI(Dr. Stevenson). Cirrhosis was complicated by esophageal varices s/p banding, most recently done on 11/1/2021. She reports not taking her diuretics for the past 3 days as she ran out of medications. She reports abdominal distension, had last paracentesis done on 8/27/2021 with 2800ml withdrawn.     Hepatitis C - h/o IV Meth use in 1990's s/p treatment and currently in remission. Lab work from 6/2020 showing positive Ab and negative viral load    Hypertension - She is currently on Lisinopril 20mg and BP today in the clinic was 145/78. She reports not taking her meds for the past couple of days as she ran out. Denies any complaints of chest pain, shortness of breath, headache, palpitations.    Type 2 DM- Currently on lantus 30U Am and 20U PM and ISS. A1c done on 10/2021 was 6%. She also takes Gabapentin 800mg TID done for neuropathy and her symptoms are well controlled on the above.    Paroxysmal A.Fib - JO-VASc score of 3. She takes Aspirin and is not inclined in taking anti-coagulants such as eliquis after extensive counseling and understanding risks/benefits.                Patient Active Problem List    Diagnosis Date Noted   • Portal hypertensive gastropathy (HCC) 11/01/2021   • Weight loss 08/26/2021   • Syphilis 08/25/2021   • Skin lesions 08/24/2021   • Esophageal varices (HCC) 08/24/2021   • Fall 03/04/2021   • Pancytopenia (HCC) 02/16/2021   • Arrhythmia 09/28/2020   • Atrial fibrillation (HCC) 09/28/2020   • Pyelectasis 09/26/2020   • Gallstones 09/26/2020   • Wound cellulitis 06/29/2020   • ARF (acute renal failure) (HCC) 06/29/2020   • Pancreatitis 02/13/2019   • Thrombocytopenia (HCC)  02/13/2019   • Alcohol abuse 02/13/2019   • Acute blood loss anemia 02/08/2018   • Acute upper GI bleeding 02/07/2018   • History of hepatitis C 02/07/2018   • Insulin dependent diabetes mellitus (HCC) 02/07/2018   • History of cirrhosis of liver 02/07/2018   • Methamphetamine abuse (HCC) 02/07/2018   • Hepatic encephalopathy (HCC) 02/07/2018   • Elevated liver enzymes 02/07/2018   • Ascites 12/20/2017   • Tobacco dependence 12/20/2017   • Anxiety 09/19/2016   • Restless leg 06/22/2016   • Insomnia 06/22/2016   • Hiatal hernia 06/22/2016   • Depression 06/22/2016   • Neuropathy 06/22/2016   • Back pain 06/22/2016   • Fibromyalgia 06/22/2016   • Bilateral cataracts 06/22/2016   • FLORINDA (obstructive sleep apnea) 06/22/2016   • Vein, varicose 06/22/2016   • Nicotine dependence 06/22/2016   • Colonic polyp 06/22/2016   • Muscle cramps 06/22/2016   • Microcytic anemia 06/22/2016   • Visit for preventive health examination 06/22/2016   • Chronic neck and back pain 06/22/2016   • Vertigo 06/22/2016   • Chronic hepatitis C virus infection (CMS-HCC) 01/16/2015   • Iron deficiency anemia 01/01/2015   • Coagulopathy (HCC) 01/01/2015   • HTN (hypertension) 01/01/2015   • DM2 (diabetes mellitus, type 2) (HCC) 01/01/2015   • Hepatitis C 01/01/2015   • Cirrhosis (HCC) 01/01/2015   • Disorder of liver 11/14/2014   • Chest pain 06/03/2014       Current Outpatient Medications   Medication Sig Dispense Refill   • furosemide (LASIX) 40 MG Tab Take 1 Tablet by mouth every morning. 180 Tablet 0   • spironolactone (ALDACTONE) 50 MG Tab Take 1 Tablet by mouth every day. 180 Tablet 0   • lisinopril (PRINIVIL) 20 MG Tab Take 1 Tablet by mouth every morning. 180 Tablet 0   • omeprazole (PRILOSEC) 20 MG delayed-release capsule Take 1 Capsule by mouth every morning. 180 Capsule 0   • gabapentin (NEURONTIN) 800 MG tablet Take 3 Tablets by mouth every day. 360 Tablet 0   • atorvastatin (LIPITOR) 40 MG Tab Take 1 Tablet by mouth every morning. 180  "Tablet 0   • aspirin 81 MG EC tablet Take 1 Tablet by mouth 2 times a day. 180 Tablet 0   • insulin glargine (LANTUS) 100 UNIT/ML Solution Inject 20-30 Units under the skin 2 times a day. 30 units in am 20 units at hs 30 mL 3   • traZODone (DESYREL) 100 MG Tab Take 1 Tablet by mouth at bedtime as needed for Sleep. 180 Tablet 0   • amitriptyline (ELAVIL) 25 MG Tab Take 25 mg by mouth at bedtime.     • dicyclomine (BENTYL) 10 MG Cap Take 10 mg by mouth 2 (two) times a day.     • lactulose 10 GM/15ML Solution Take 30 mL by mouth 2 (two) times a day.     • OXcarbazepine (TRILEPTAL) 300 MG Tab Take 300-600 mg by mouth 2 (two) times a day. 1 tab = 300 mg every morning  2 tabs = 600 mg every evening       No current facility-administered medications for this visit.       ROS: As per HPI. Additional pertinent systems as noted below.  Constitutional:  Negative for fever, chills   Cardiovascular:  Negative for chest pain, palpitations   Respiratory:  Negative for cough, sputum production, shortness of breath   Gastrointestinal: Positive for abdominal distension. Negative for abdominal pain, nausea, vomiting, diarrhea, or blood in stools   Genitourinary: Negative for dysuria, flank pain and hematuria  Skin: Negative for rash, itching  Extremities: Negative for leg swelling   Neurologic: Negative for headaches, dizziness, seizures, weakness         /78 (BP Location: Left arm, Patient Position: Sitting, BP Cuff Size: Adult)   Pulse (!) 104   Temp 36.6 °C (97.8 °F) (Temporal)   Ht 1.676 m (5' 6\")   Wt 69.8 kg (153 lb 12.8 oz)   LMP  (LMP Unknown)   SpO2 94%   BMI 24.82 kg/m²     Physical Exam   Constitutional:  Well developed, well nourished. Not in acute distress   HENT:  Normocephalic, Atraumatic, Oropharynx is pink and moist. No oral exudates, Nose normal   Eyes:  EOMI, Conjunctiva normal, No discharge. PERRLA   Neck:  Normal range of motion, No cervical lymphadenopathy. No thyromegaly.   Cardiovascular:  Regular " rate and rhythm, No pedal edema, Intact distal pulses   Respiratory: Clear to auscultation bilaterally, No use of accessory muscles   Gastrointestinal:  Soft, Distended. No tenderness, No palpable masses/hepatosplenomegaly   Musculoskeletal: No cyanosis or clubbing, normal ROM       Note: I have reviewed all pertinent labs and diagnostic tests associated with this visit with specific comments listed under the assessment and plan below    Assessment and Plan  DM2 (diabetes mellitus, type 2) (HCC)   A1c done on 10/2021 was 6%   Lantus 30U AM and 20U PM   Takes Gabapentin 800mg TID for neuropathy  Plan:  - Continue above regimen  - Followup on opthlamology referral and make an appointment      HTN (hypertension)  Reports running out on meds and has not taken meds over the past days  BP slightly elevated in the clinic at 145/78  Takes Lisinopril 20mg on daily basis  Does not check her BP on regular basis  Plan:  - DASH Diet/exercise  - Lisinopril refilled and continue same dose  - Advised her to get BP machine and monitor her BP 2-3 times/week.    Cirrhosis (HCC)  Sec. To Hep C and Alcoholism  Following with GI  Currently on Furosemide 40mg and Aldactone 50mg  C/b esophageal varices s/p banding most recently on 11/1/2021  US abdomen and AFP done on 8/2021 monitoring for HCC which were benign  Reports increased abdominal distension, last paracentesis done in 8/2021. Reports GI followup scheduled on 11/11/2021  Plan:  - Followup with GI and can consider increasing Spironolactone to 100mg but since she is following with GI tomorrow would await their input  - Educated her that she could require frequent paracentesis and to followup with GI during appt tomorrow and could potentially have to go ER to get it done if not done at GI clinic and to await GI input    Chronic hepatitis C virus infection (CMS-HCC)  h/o IV Meth use in 1990's s/p treatment and currently in remission. Lab work from 6/2020 showing positive Ab and  negative viral load    Atrial fibrillation (HCC)  JO-VASc score of 3. She takes Aspirin and is not inclined in taking anti-coagulants such as eliquis after extensive counseling and understanding risks/benefits.      Pancytopenia (HCC)  Likely sec. To cirrhosis with labs from Hb-9.7, Plt- 81 and WBC- 2.5  Denies any recent infections or bleeding epiosdes  Monitor    Microcytic anemia  Sec. To iron deficiency with iron levels of 37(1/2021) and normal ferritin  Plan:  - Discussed about oral supplementation, which she is not inclined to take at the moment.     Insomnia  Continue Trazodone 100mg prn at bedtime  Reports not taking Amitriptyline        Patient discussed with attending.    Signed by: Itzel Ness M.D.    Please note that this dictation was created using voice recognition software. I have made every reasonable attempt to correct obvious errors, but I expect that there are errors of grammar and possibly content that I did not discover before finalizing the note.

## 2021-11-11 NOTE — ASSESSMENT & PLAN NOTE
Reports running out on meds and has not taken meds over the past days  BP slightly elevated in the clinic at 145/78  Takes Lisinopril 20mg on daily basis  Does not check her BP on regular basis  Plan:  - DASH Diet/exercise  - Lisinopril refilled and continue same dose  - Advised her to get BP machine and monitor her BP 2-3 times/week.

## 2021-11-11 NOTE — ASSESSMENT & PLAN NOTE
h/o IV Meth use in 1990's s/p treatment and currently in remission. Lab work from 6/2020 showing positive Ab and negative viral load

## 2021-11-11 NOTE — ASSESSMENT & PLAN NOTE
Sec. To Hep C and Alcoholism  Following with GI  Currently on Furosemide 40mg and Aldactone 50mg  C/b esophageal varices s/p banding most recently on 11/1/2021  US abdomen and AFP done on 8/2021 monitoring for HCC which were benign  Reports increased abdominal distension, last paracentesis done in 8/2021. Reports GI followup scheduled on 11/11/2021  Plan:  - Followup with GI and can consider increasing Spironolactone to 100mg but since she is following with GI tomorrow would await their input  - Educated her that she could require frequent paracentesis and to followup with GI during appt tomorrow and could potentially have to go ER to get it done if not done at GI clinic and to await GI input

## 2021-11-11 NOTE — ASSESSMENT & PLAN NOTE
Sec. To iron deficiency with iron levels of 37(1/2021) and normal ferritin  Plan:  - Discussed about oral supplementation, which she is not inclined to take at the moment.

## 2021-11-11 NOTE — ASSESSMENT & PLAN NOTE
Likely sec. To cirrhosis with labs from Hb-9.7, Plt- 81 and WBC- 2.5  Denies any recent infections or bleeding epiosdes  Monitor

## 2021-11-16 NOTE — TELEPHONE ENCOUNTER
Received request via: Patient    Was the patient seen in the last year in this department? Yes    Does the patient have an active prescription (recently filled or refills available) for medication(s) requested? No     Pharmacy faxed over refill request for fluoxetine 20 MG. [facility]

## 2021-11-19 NOTE — TELEPHONE ENCOUNTER
Patient called requesting to schedule a paracentesis. I asked the patient who the ordering physician is. She stated Anthony Dent from [facility]. I explain to the patient that there is no order on file for her and that she only had a paracentesis done on 8/25/2021 and that I would need a new order to schedule her further. Patient seemed agitated and gave me her mother's phone number to call her back when I get a new order for the paracentesis.     I called and left a message for Mena, medical assistant for Anthony Dent at [facility]. I left my call back number as well as the fax number.

## 2021-12-04 PROBLEM — J96.01 ACUTE HYPOXEMIC RESPIRATORY FAILURE (HCC): Status: ACTIVE | Noted: 2021-01-01

## 2021-12-04 PROBLEM — K92.2 GIB (GASTROINTESTINAL BLEEDING): Status: ACTIVE | Noted: 2021-01-01

## 2021-12-04 PROBLEM — K92.2 GI BLEED: Status: ACTIVE | Noted: 2021-01-01

## 2021-12-04 PROBLEM — R57.8 HEMORRHAGIC SHOCK (HCC): Status: ACTIVE | Noted: 2021-01-01

## 2021-12-04 NOTE — ED PROVIDER NOTES
ED Provider Note     Scribed for Danielle Urbano D.O. by Danita Orozco. 2021, 2:37 AM.     Primary care provider: Itzel Ness M.D.  Means of arrival: Ambulance         History obtained from: Patient  History limited by: None    CHIEF COMPLAINT  Chief Complaint   Patient presents with   • Blood in Vomit     per EMS, pt started vomiting blood about 2 hours ago prior to arrival; noted coffee ground emesis upon arrival       HPI  Demetrice Jaime is a 61 y.o. female with a history of esophageal varices, end-stage liver disease, cirrhosis with ascites and diabetes who presents to the emergency Department for evaluation of moderate hematemesis onset today. Per EMS, she initially experienced coffee ground emesis but it turned to bright red blood upon arrival. She has associated melena.  She admits to being a heavy drinker but has not drank alcohol in a few days. She takes aspirin every day, but denies taking Ibuprofen. She has not taken any medications for the past two days.  Patient states that her mom was ill and just passed away a couple days ago.  The plan to have her  on Thursday.  Patient had esophageal banding done  by Dr. Dent and she just had a recent paracentesis done with 4 liters removed.    Most of history obtained from RN.     REVIEW OF SYSTEMS  Pertinent positives include hematemesis, coffee-ground emesis, and diarrhea.   See HPI for further details. All other systems are negative.    PAST MEDICAL HISTORY  Past Medical History:   Diagnosis Date   • Anemia    • Anesthesia     pt's mother has hard time waking up = states it took a week or two   • Arrhythmia     pt unsure what rhythm    • Arthritis     generalized   • Breath shortness     with exertion   • Cataract     removed bilat   • Cirrhosis (HCC)    • Congestive heart failure (HCC)    • Dental disorder     dentures upper   • Diabetes     insulin   • Esophageal varices (HCC)    • Fall    • Fibromyalgia    • GERD  "(gastroesophageal reflux disease)    • Hepatitis C     states received treatment    • Hiatus hernia syndrome    • High cholesterol    • Hypertension 11/03/2020   • Indigestion    • Myocardial infarct (HCC) ?   • Obesity    • Other specified disorder of intestines     constipation/diarrhea   • Pain 11/03/2020    \"my legs always hurt\"; 5/10   • Psychiatric problem     depression and anxiety   • Restless leg syndrome    • Sleep apnea     has had sleep study, no cpap   • Snoring    • Stroke (HCC) 2011    no residual problems    • Thrombocytopenia (HCC)    • Unspecified hemorrhagic conditions     reports nose bleeds   • Urinary bladder disorder     frequency/urgency   • Urinary incontinence        FAMILY HISTORY  Family History   Problem Relation Age of Onset   • Diabetes Mother    • Hypertension Mother        SOCIAL HISTORY  Social History     Tobacco Use   • Smoking status: Current Every Day Smoker     Packs/day: 0.25     Years: 45.00     Pack years: 11.25     Types: Cigarettes   • Smokeless tobacco: Never Used   Vaping Use   • Vaping Use: Never used   Substance Use Topics   • Alcohol use: Yes     Comment: quit 3 week in August    • Drug use: Not Currently     Comment: 02/2021- CBD gummies for sleep ,  Hx IV meth use, states she used 12/18/17 for first time in yrs       Social History     Substance and Sexual Activity   Drug Use Not Currently    Comment: 02/2021- CBD gummies for sleep ,  Hx IV meth use, states she used 12/18/17 for first time in yrs        SURGICAL HISTORY  Past Surgical History:   Procedure Laterality Date   • PB UPPER GI ENDOSCOPY,DIAGNOSIS N/A 11/1/2021    Procedure: GASTROSCOPY - WITH BANDING;  Surgeon: Anthony Dent M.D.;  Location: SURGERY [facility];  Service: Gastroenterology   • PB UPPER GI ENDOSCOPY,DIAGNOSIS N/A 9/28/2021    Procedure: GASTROSCOPY;  Surgeon: Anthony Dent M.D.;  Location: SURGERY SAME DAY [facility];  Service: Gastroenterology   • PB UPPER GI ENDOSCOPY,LIGAT " VARIX N/A 9/28/2021    Procedure: GASTROSCOPY, WITH BANDING;  Surgeon: Anthony Dent M.D.;  Location: SURGERY SAME DAY [facility];  Service: Gastroenterology   • PB COLONOSCOPY,DIAGNOSTIC N/A 2/15/2021    Procedure: COLONOSCOPY;  Surgeon: Anthony Dent M.D.;  Location: SURGERY [facility];  Service: Gastroenterology   • PB UPPER GI ENDOSCOPY,DIAGNOSIS N/A 2/15/2021    Procedure: GASTROSCOPY - W/BANDING.;  Surgeon: Anthony Dent M.D.;  Location: SURGERY [facility];  Service: Gastroenterology   • CATH REMOVAL  11/4/2020    Procedure: REMOVAL, CATHETER- INFECTED PORT A CATH;  Surgeon: Leonidas Mcmanus M.D.;  Location: SURGERY [facility];  Service: General   • GASTROSCOPY N/A 2/12/2018    Procedure: GASTROSCOPY;  Surgeon: Willard Bustamante M.D.;  Location: SURGERY [facility];  Service: Gastroenterology   • GASTROSCOPY WITH BANDING N/A 2/8/2018    Procedure: GASTROSCOPY WITH BANDING;  Surgeon: Davonte Mauricio M.D.;  Location: SURGERY SAME DAY [facility];  Service: Gastroenterology   • GASTROSCOPY N/A 8/21/2017    Procedure: GASTROSCOPY WITH BANDING  ;  Surgeon: Anthony Dent M.D.;  Location: SURGERY SAME DAY [facility];  Service:    • CATH PLACEMENT  12/22/2014    Performed by Helga Santiago M.D. at SURGERY SAME DAY [facility] ORS   • CATH REMOVAL  11/14/2014    Performed by Helga Santiago M.D. at SURGERY [facility] ORS   • GASTROSCOPY WITH BANDING  7/8/2014    Performed by Davonte Mauricio M.D. at ENDOSCOPY [facility] ORS   • GASTROSCOPY WITH CLIPPING  7/6/2014    Performed by Joshua Vigil M.D. at ENDOSCOPY [facility] ORS   • ANTERIOR AND POSTERIOR REPAIR  12/6/2013    Performed by Isaías Lambert M.D. at SURGERY SAME DAY [facility] ORS   • ENTEROCELE REPAIR  12/6/2013    Performed by Isaías Lambert M.D. at SURGERY SAME DAY [facility] ORS   • BLADDER SLING FEMALE  12/6/2013    Performed by Isaías Lambert M.D. at SURGERY SAME DAY [facility]   • GYN SURGERY  2010     hysterectomy   • OTHER  2010    port placement   • CATARACT EXTRACTION WITH IOL Bilateral    • GYN SURGERY      tubal ligation   • OTHER      oral surgery   • TONSILLECTOMY         CURRENT MEDICATIONS    Current Facility-Administered Medications:   •  octreotide (SANDOSTATIN) 1,250 mcg in  mL Infusion, 50 mcg/hr, Intravenous, Continuous, Danielle Urbano D.O., Last Rate: 10 mL/hr at 12/04/21 0422, 50 mcg/hr at 12/04/21 0422  •  pantoprazole (PROTONIX) 80 mg in  mL Infusion, 8 mg/hr, Intravenous, Continuous, Danielle Urbano D.O., Last Rate: 25 mL/hr at 12/04/21 0413, 8 mg/hr at 12/04/21 0413  •  NS infusion, , Intravenous, Continuous, Danielle Urbano D.O., Last Rate: 250 mL/hr at 12/04/21 0433, New Bag at 12/04/21 0433    Current Outpatient Medications:   •  furosemide (LASIX) 40 MG Tab, Take 1 Tablet by mouth every morning., Disp: 180 Tablet, Rfl: 0  •  spironolactone (ALDACTONE) 50 MG Tab, Take 1 Tablet by mouth every day., Disp: 180 Tablet, Rfl: 0  •  lisinopril (PRINIVIL) 20 MG Tab, Take 1 Tablet by mouth every morning., Disp: 180 Tablet, Rfl: 0  •  omeprazole (PRILOSEC) 20 MG delayed-release capsule, Take 1 Capsule by mouth every morning., Disp: 180 Capsule, Rfl: 0  •  gabapentin (NEURONTIN) 800 MG tablet, Take 3 Tablets by mouth every day., Disp: 360 Tablet, Rfl: 0  •  atorvastatin (LIPITOR) 40 MG Tab, Take 1 Tablet by mouth every morning., Disp: 180 Tablet, Rfl: 0  •  aspirin 81 MG EC tablet, Take 1 Tablet by mouth 2 times a day., Disp: 180 Tablet, Rfl: 0  •  insulin glargine (LANTUS) 100 UNIT/ML Solution, Inject 20-30 Units under the skin 2 times a day. 30 units in am 20 units at hs, Disp: 30 mL, Rfl: 3  •  traZODone (DESYREL) 100 MG Tab, Take 1 Tablet by mouth at bedtime as needed for Sleep., Disp: 180 Tablet, Rfl: 0  •  dicyclomine (BENTYL) 10 MG Cap, Take 10 mg by mouth 2 (two) times a day., Disp: , Rfl:   •  lactulose 10 GM/15ML Solution, Take 30 mL by mouth 2 (two) times a day., Disp: , Rfl:   •   "OXcarbazepine (TRILEPTAL) 300 MG Tab, Take 300-600 mg by mouth 2 (two) times a day. 1 tab = 300 mg every morning 2 tabs = 600 mg every evening, Disp: , Rfl:     ALLERGIES  Allergies   Allergen Reactions   • Codeine Rash     sorethroat   • Penicillins Hives and Itching     Hives, itching   Other reaction(s): Pruritus       PHYSICAL EXAM  VITAL SIGNS: /58   Pulse (!) 122   Temp 35.8 °C (96.5 °F) (Axillary)   Resp 20   Ht 1.702 m (5' 7\")   Wt 70.3 kg (155 lb)   LMP  (LMP Unknown)   SpO2 91%   BMI 24.28 kg/m²   Constitutional: Patient is a thin, ill appearing , confused elderly female who looks much older than stated age.  Actively vomiting bright red blood and passing dark black tarry stools.  HENT: Normocephalic, atraumatic.Nose normal with no mucosal edema or drainage. Bright red blood around oropharynx.  Eyes: PERRL, EOMI, Conjunctiva pale.  Neck: Supple   Lymphatic: No lymphadenopathy noted.   Cardiovascular: Tachycardic heart rate and Regular rhythm. No murmur  Thorax & Lungs: Clear and equal breath sounds with good excursion. No respiratory distress  Abdomen: Bowel sounds normal in all four quadrants. Soft,  moderate distension. Generalized abdominal tenderness.  Skin: Pale and cool, No erythema, No rashes or petechial lesions.  Back: No cervical, thoracic, or lumbosacral tenderness.  Rectal: Black tarry stool.  Extremities: Peripheral pulses 4/4 No edema, No tenderness,  Multiple areas of bruising and various stages of healing.   Musculoskeletal: Normal range of motion in all major joints. No tenderness to palpation or major deformities noted.   Neurologic: Awake but disoriented, Normal motor function, Normal sensory function, No lateralizing or focal deficits noted. DTR's 4/4 bilaterally.  Psychiatric: Affect sad and flat.  Judgment is impaired due to hypovolemia.    DIAGNOSTICS/PROCEDURES    LABS  Results for orders placed or performed during the hospital encounter of 12/04/21   CBC WITH " DIFFERENTIAL   Result Value Ref Range    WBC 14.8 (H) 4.8 - 10.8 K/uL    RBC 1.93 (L) 4.20 - 5.40 M/uL    Hemoglobin 4.4 (LL) 12.0 - 16.0 g/dL    Hematocrit 15.6 (LL) 37.0 - 47.0 %    MCV 80.8 (L) 81.4 - 97.8 fL    MCH 22.8 (L) 27.0 - 33.0 pg    MCHC 28.2 (L) 33.6 - 35.0 g/dL    RDW 45.2 35.9 - 50.0 fL    Platelet Count 182 164 - 446 K/uL    MPV 12.7 9.0 - 12.9 fL    Nucleated RBC 0.20 /100 WBC    NRBC (Absolute) 0.03 K/uL   COMP METABOLIC PANEL   Result Value Ref Range    Sodium 142 135 - 145 mmol/L    Potassium 4.2 3.6 - 5.5 mmol/L    Chloride 107 96 - 112 mmol/L    Co2 16 (L) 20 - 33 mmol/L    Anion Gap 19.0 (H) 7.0 - 16.0    Glucose 222 (H) 65 - 99 mg/dL    Bun 46 (H) 8 - 22 mg/dL    Creatinine 1.36 0.50 - 1.40 mg/dL    Calcium 8.0 (L) 8.5 - 10.5 mg/dL    AST(SGOT) 31 12 - 45 U/L    ALT(SGPT) 17 2 - 50 U/L    Alkaline Phosphatase 61 30 - 99 U/L    Total Bilirubin 0.7 0.1 - 1.5 mg/dL    Albumin 2.5 (L) 3.2 - 4.9 g/dL    Total Protein 4.9 (L) 6.0 - 8.2 g/dL    Globulin 2.4 1.9 - 3.5 g/dL    A-G Ratio 1.0 g/dL   LIPASE   Result Value Ref Range    Lipase 28 11 - 82 U/L   PROTHROMBIN TIME   Result Value Ref Range    PT 23.6 (H) 12.0 - 14.6 sec    INR 2.18 (H) 0.87 - 1.13   APTT   Result Value Ref Range    APTT 36.2 (H) 24.7 - 36.0 sec   COD (ADULT)   Result Value Ref Range    ABO Grouping Only O     Rh Grouping Only POS     Antibody Screen-Cod NEG    ESTIMATED GFR   Result Value Ref Range    GFR If  48 (A) >60 mL/min/1.73 m 2    GFR If Non African American 40 (A) >60 mL/min/1.73 m 2   EKG (NOW)   Result Value Ref Range    Report       [facility] Emergency Dept.    Test Date:  2021  Pt Name:    PADMAJA FLETCHER            Department: ER  MRN:        [id number]                      Room:       [facility]  Gender:     Female                       Technician: [id number]  :        1960                   Requested By:PERCY DEE  Order #:    [id number]                    Reading  MD:    Measurements  Intervals                                Axis  Rate:       115                          P:          74  NC:         120                          QRS:        75  QRSD:       88                           T:          95  QT:         356  QTc:        493    Interpretive Statements  SINUS TACHYCARDIA  LOW VOLTAGE IN FRONTAL LEADS  NONSPECIFIC T ABNORMALITIES, LATERAL LEADS  BORDERLINE PROLONGED QT INTERVAL  Compared to ECG 09/22/2021 13:51:06  T-wave abnormality now present  Sinus rhythm no longer present        Labs reviewed by me    RADIOLOGY/PROCEDURES  DX-CHEST-PORTABLE (1 VIEW)   Final Result         1.  No acute cardiopulmonary disease.        Results and radiologist interpretation reviewed by me.     COURSE & MEDICAL DECISION MAKING  Pertinent Labs & Imaging studies reviewed. (See chart for details)    2:37 AM - Patient seen and evaluated at bedside. Ordered for DX-Chest, EKG, COD, CBC w/ diff, CMP, Lipase, Prothrombin Time, and APTT to evaluate. Patient will be treated with Sandostatin 50 mcg, Sandostatin 1250 mcg in  mL Infusion, Protonix 80 mg in  mL IVPB. NS infusion 1000 mL, and Zofran 4 mg for her symptoms. Differential diagnoses include, but are not limited to, Esophageal varices vs. Upper GI bleed    4:18 AM - Paged GI.   There has been no response from GI after 6 pages.    5:15am- Paged Hospitalist.    5:45 amI discussed the patient's case and the above findings with Dr. Diaz (Hospitalist) who requested intensivist.  I then spoke with Dr. Peralta on-call for ICU who requested that we place a Cordis and the patient, start blood products and they will admit the patient to the intensive care unit.    I have explained to the patient the risks and benefits of transfusion of blood products.  This includes, as appropriate, the risk of mild allergic reaction, hemolytic reaction, transfusion-associated lung injury, febrile reactions, circulatory or iron overload, and  infection.    We discussed possible alternatives and their risks, including directed donation, autologous transfusion, and no transfusion, including IV or oral iron supplementation, as appropriate.  I believe the patient understands the risks and benefits and was able to express understanding.    HYDRATION: Based on the patient's presentation of Acute Vomiting the patient was given IV fluids. IV Hydration was used because oral hydration was not adequate alone. Upon recheck following hydration, the patient was improved.    Patient gave consent for Cordis central line which was placed by Dr. Peterson my partner.  This was necessary for blood products to be delivered in a timely fashion and in case the patient began vomiting again.  She is currently in critical condition.  6:10 amWe have still had no response from GI.  DISPOSITION:  Patient will be hospitalized by intensivist in critical condition.     FINAL IMPRESSION  1. Hematemesis with nausea    2. History of esophageal varices    3. Alcoholic liver disease (HCC)    4. History of alcohol abuse    5. Alcoholic cirrhosis of liver with ascites (HCC)    6. Upper GI bleed    7. Melena    8. Symptomatic anemia    9.  Critical care time 35 minutes.     Danita KUMAR (Rashi), am scribing for, and in the presence of, Danielle Urbano D.O..    Electronically signed by: Danita Orozco (Rashi), 12/4/2021    Danielle KUMAR D.O. personally performed the services described in this documentation, as scribed by Danita Orozco in my presence, and it is both accurate and complete.    The note accurately reflects work and decisions made by me.  Danielle Urbano D.O.  12/4/2021  6:12 AM

## 2021-12-04 NOTE — ASSESSMENT & PLAN NOTE
Due to bleeding esophageal varices  EGD with esophageal variceal band ligation on 12/4  Continue octreotide infusion  Continue pantoprazole twice daily  Prophylactic ceftriaxone  Trend hemoglobin and transfuse PRBCs to keep hemoglobin greater than 7  Trend thromboelastogram with platelet mapping and transfuse additional blood products based upon interpretation

## 2021-12-04 NOTE — ED TRIAGE NOTES
Demetrice Mcpherson Addison  61 y.o. female  Chief Complaint   Patient presents with   • Blood in Vomit     per EMS, pt started vomiting blood about 2 hours ago prior to arrival; noted coffee ground emesis upon arrival       Pt BIB EMS for above complaint. Pt also c/o blood in stool. Pt denies any abdominal pain at this time. Blood sugar at 338 per EMS.    Pt is alert and oriented, speaking in full sentences, follows commands and responds appropriately to questions. Resp are even and unlabored.     Pt educated on triage process. Pt encouraged to alert staff for any changes. This RN masked and in appropriate PPE during encounter.     Vitals:    12/04/21 0203   BP: 111/58   Pulse: (!) 122   Resp: 20   Temp: 35.8 °C (96.5 °F)   SpO2: 91%

## 2021-12-04 NOTE — ED NOTES
Blood transfusion started as per order. Pt denies any complaints. Pt denies chest pain and SOB. No apparent distress with stable VS. Pt tolerating blood transfusion without any difficulty. Will continue to monitor pt.    Pt updated and aware regarding plan for admission; verbalized understanding.

## 2021-12-04 NOTE — ASSESSMENT & PLAN NOTE
Gastrointestinal source of blood loss  Trend hemoglobin and transfuse PRBCs to keep hemoglobin greater than 7

## 2021-12-04 NOTE — PROGRESS NOTES
"Patient given [facility] \"Preventing the Spread of Infection\" brochure upon arrival.     US guided therapeutic paracentesis done by Dr. Lackey;(no H&P required as this is a NON SEDATION procedure) RLQ access site; 3600 mL obtained and discarded; pt tolerated the procedure well; pt hemodynamically stable pre/intra/post procedure; all questions and concerns answered prior to d/c; patient provided with all appropriate education for procedure; pt d/c home.     "

## 2021-12-04 NOTE — PROGRESS NOTES
Critical Care Progress Note    Date of admission  12/4/2021    Chief Complaint  61 y.o. female admitted 12/4/2021 with an acute upper gastrointestinal hemorrhage.  She has a history of alcohol abuse, hepatitis C, cirrhosis, prior esophageal variceal banding, primary hypertension, DM type II, paroxysmal atrial fibrillation and drug abuse.    Hospital Course      12/4 -    admitted to the ICU.        Interval Problem Update  Reviewed last 24 hour events:      EGD done - see note      Review of Systems  Review of Systems   Unable to perform ROS: Acuity of condition        Vital Signs for last 24 hours   Temp:  [35.7 °C (96.3 °F)-36.3 °C (97.3 °F)] 36.1 °C (97 °F)  Pulse:  [] 114  Resp:  [0-28] 18  BP: ()/(27-72) 145/70  SpO2:  [83 %-100 %] 100 %    Hemodynamic parameters for last 24 hours       Respiratory Information for the last 24 hours  Vent Mode: APVCMV  Rate (breaths/min): 24  Vt Target (mL): 350  PEEP/CPAP: 8  MAP: 13  Control VTE (exp VT): 330    Physical Exam   Physical Exam  Constitutional:       Appearance: She is not diaphoretic.      Comments: On ventilator   HENT:      Head: Normocephalic.      Nose: Nose normal.      Mouth/Throat:      Mouth: Mucous membranes are dry.   Eyes:      Pupils: Pupils are equal, round, and reactive to light.   Cardiovascular:      Comments: Sinus tachycardia  Pulmonary:      Breath sounds: No wheezing or rales.   Abdominal:      General: There is distension.      Tenderness: There is no abdominal tenderness.   Musculoskeletal:      Cervical back: Normal range of motion.      Right lower leg: No edema.      Left lower leg: No edema.   Neurological:      Comments: Sedated         Medications  Current Facility-Administered Medications   Medication Dose Route Frequency Provider Last Rate Last Admin   • octreotide (SANDOSTATIN) 1,250 mcg in  mL Infusion  50 mcg/hr Intravenous Continuous Danielle Urbano D.O. 10 mL/hr at 12/04/21 0422 50 mcg/hr at 12/04/21 0422   •  cefTRIAXone (Rocephin) 1 g in  mL IVPB  1 g Intravenous Q24HRS Kenji Delacruz M.D.   Stopped at 12/04/21 0916   • NS infusion   Intravenous Continuous Kenji Delacruz M.D.       • insulin regular (HumuLIN R,NovoLIN R) injection  2-9 Units Subcutaneous Q6HRS Kenji Delacruz M.D.   2 Units at 12/04/21 1137    And   • dextrose 50% (D50W) injection 50 mL  50 mL Intravenous Q15 MIN PRN Kenji Delacruz M.D.       • ondansetron (ZOFRAN) syringe/vial injection 4 mg  4 mg Intravenous Q4HRS PRN Kenji Delacruz M.D.       • norepinephrine (Levophed) 8 mg in 250 mL NS infusion (premix)  0-30 mcg/min Intravenous Continuous Cooper Torres M.D.   Stopped at 12/04/21 1019   • EPINEPHRINE HCL 1 MG/ML INJ SOLN            • pantoprazole (PROTONIX) injection 40 mg  40 mg Intravenous BID Cooper Torres M.D.       • Respiratory Therapy Consult   Nebulization Continuous RT Cooper Torres M.D.       • MD Alert...ICU Electrolyte Replacement per Pharmacy   Other PHARMACY TO DOSE Cooper Torres M.D.       • lidocaine (XYLOCAINE) 1 % injection 2 mL  2 mL Tracheal Tube Q30 MIN PRN Cooper Torres M.D.       • ipratropium-albuterol (DUONEB) nebulizer solution  3 mL Nebulization Q2HRS PRN (RT) Cooper Torres M.D.       • thiamine (B-1) 500 mg in dextrose 5% 100 mL IVPB  500 mg Intravenous DAILY Cooper Torres M.D.        Followed by   • [START ON 12/7/2021] thiamine (Vitamin B-1) tablet 100 mg  100 mg Enteral Tube DAILY Cooper Torres M.D.       • dexmedetomidine (PRECEDEX) 400 mcg/100mL NS premix infusion  0.1-1.5 mcg/kg/hr Intravenous Continuous Cooper Torres M.D.       • HYDROmorphone (Dilaudid) injection 0.5-2 mg  0.5-2 mg Intravenous Q HOUR PRN Cooper Torres M.D.       • HYDROmorphone (DILAUDID) 0.2 mg/mL in 50mL NS (Continuous Infusion)   Intravenous Continuous Cooper Torres M.D.           Fluids    Intake/Output Summary (Last 24  hours) at 12/4/2021 1308  Last data filed at 12/4/2021 1245  Gross per 24 hour   Intake 4675.55 ml   Output 2400 ml   Net 2275.55 ml       Laboratory  Recent Labs     12/04/21  1232   ISTATAPH 7.314*   ISTATAPCO2 27.8   ISTATAPO2 193*   ISTATATCO2 15*   [id number] 100*   ISTATARTHCO3 14.1*   ISTATARTBE -11*   ISTATTEMP 36.2 C   ISTATFIO2 40   ISTATSPEC Arterial   ISTATAPHTC 7.326*   [id number] 188*         Recent Labs     12/04/21  0259 12/04/21  0731   SODIUM 142 143   POTASSIUM 4.2 4.2   CHLORIDE 107 114*   CO2 16* 16*   BUN 46* 44*   CREATININE 1.36 1.12   MAGNESIUM 2.2  --    PHOSPHORUS 4.2  --    CALCIUM 8.0* 6.9*     Recent Labs     12/04/21  0259 12/04/21  0731   ALTSGPT 17  --    ASTSGOT 31  --    ALKPHOSPHAT 61  --    TBILIRUBIN 0.7  --    LIPASE 28  --    GLUCOSE 222* 166*     Recent Labs     12/04/21  0259 12/04/21  1130   WBC 14.8* 14.6*   NEUTSPOLYS 80.50*  --    LYMPHOCYTES 12.70*  --    MONOCYTES 4.20  --    EOSINOPHILS 0.80  --    BASOPHILS 0.30  --    ASTSGOT 31  --    ALTSGPT 17  --    ALKPHOSPHAT 61  --    TBILIRUBIN 0.7  --      Recent Labs     12/04/21  0259 12/04/21  1130   RBC 1.93* 1.96*   HEMOGLOBIN 4.4* 5.4*   HEMATOCRIT 15.6* 16.8*   PLATELETCT 182 145*   PROTHROMBTM 23.6*  --    APTT 36.2*  --    INR 2.18*  --        Imaging  X-Ray:  I have personally reviewed the images and compared with prior images. and My impression is: Clear lungs    Assessment/Plan  * GIB (gastrointestinal bleeding)- (present on admission)  Assessment & Plan  Due to bleeding esophageal varices  EGD with esophageal variceal band ligation on 12/4  Continue octreotide infusion  Change pantoprazole to twice daily dosing  Prophylactic ceftriaxone  Trend hemoglobin and transfuse PRBCs to keep hemoglobin greater than 7  Trend thromboelastogram with platelet mapping and transfuse additional blood products based upon interpretation    Acute hypoxemic respiratory failure (HCC)  Assessment & Plan  Intubated 12/4  All of the  appropriate ventilator bundles are in place  SAT/SBT as appropriate  Appropriate mobility    Hemorrhagic shock (HCC)  Assessment & Plan  Due to upper gastrointestinal hemorrhage  Fluid resuscitation    Alcohol abuse- (present on admission)  Assessment & Plan  High-dose IV thiamine  Observe for evidence of withdrawal    Acute blood loss anemia- (present on admission)  Assessment & Plan  Gastrointestinal source of blood loss  Trend hemoglobin and transfuse PRBCs to keep hemoglobin greater than 7    Chronic hepatitis C virus infection (CMS-HCC)- (present on admission)  Assessment & Plan  History of  Hepatitis C RNA negative in June 2020    Cirrhosis (HCC)- (present on admission)  Assessment & Plan  Due to hepatitis C and alcohol abuse    DM2 (diabetes mellitus, type 2) (HCC)- (present on admission)  Assessment & Plan  Check glycohemoglobin  Sliding scale insulin       VTE:  Contraindicated  Ulcer: PPI  Lines: Central Line  Ongoing indication addressed and Berry Catheter  Ongoing indication addressed    I have performed a physical exam and reviewed and updated ROS and Plan today (12/4/2021). In review of yesterday's note (12/3/2021), there are no changes except as documented above.     I have assessed and reassessed her respiratory status with ventilator adjustments, airway mechanics, ventilator waveforms, blood pressure, hemodynamics, cardiovascular status, serial determinations of hemoglobin and platelet count, response to transfusion of multiple blood products and her neurologic status.  She is at increased risk for worsening respiratory, cardiovascular and gastrointestinal system dysfunction.    Discussed patient condition and risk of morbidity and/or mortality with RN, RT, Pharmacy, Charge nurse / hot rounds and QA team     The patient remains critically ill.  Critical care time = 70 minutes in directly providing and coordinating critical care and extensive data review.  No time overlap and excludes  procedures.    The time spent is in addition of the time spent by Dr. Delacruz earlier today.    Cooper Torres MD  Pulmonary and Critical Care Medicine

## 2021-12-04 NOTE — CONSULTS
Gastroenterology Initial Consult Note               Author:  Zofia Smith D.O. Date & Time Created: 12/4/2021 8:22 AM       Patient ID:  Name:             Demetrice Jaime  YOB: 1960  Age:                 61 y.o.  female  MRN:               [id number]      Referring Provider:  Danielle Urbano DO        Medical Decision Making, by Problem:  Active Hospital Problems    Diagnosis    • GIB (gastrointestinal bleeding) [K92.2]    • GI bleed [K92.2]    • Hemorrhagic shock (HCC) [R57.8]    • Alcohol abuse [F10.10]    • Acute blood loss anemia [D62]    • Ascites [R18.8]    • Chronic hepatitis C virus infection (CMS-HCC) [B18.2]    • Cirrhosis (HCC) [K74.60]    • DM2 (diabetes mellitus, type 2) (HCC) [E11.9]            Assessment/Recommendations:  The patient is a very pleasant 61-year-old woman with a history of EtOH/HCV cirrhosis (complicated by EV, rectal varices, portal hypertensive gastropathy, ascites, and HE), HCV (status post treatment with SVR), GERD, PUD, HTN, diabetes, MI, fibromyalgia, obesity, FLORINDA, and prior CVA who presented with acute onset hematemesis/coffee-ground emesis and melena with fatigue/weakness.    UGI bleed with posthemorrhagic anemia and hemorrhagic shock: Most likely secondary to esophageal variceal bleeding given her presentation, significant symptomatic anemia, and labile blood pressure. She has associated NAHOMY which is improving with resuscitation. Unlikely to be related to post banding complications such as ulcer bleeding given her last EVBL 4 weeks ago. She also has a history of PUD and could have recurrent gastric ulcer with significant bleeding.  Plan for urgent diagnostic EGD with therapeutic intent.  The patient is a suitable candidate for endoscopy and sedation.  The risk/benefits/alternatives of EGD with possible intervention to include perforation, bleeding, infection, aspiration, missed lesions, need for repeat procedure, need for surgery, and need for additional  intervention were discussed with the patient who agrees to these risk and would like to proceed. She may ultimately need other interventions such as TIPS.  -N.p.o.  -Diagnostic EGD with possible intervention this morning  -2 large-bore IVs  -Continue supportive care and IVF  -Trend hemoglobin every 2-4 hours  -Continue Protonix drip 8 mg/hour  -Continue octreotide drip 50 mcg/hour  -Continue ceftriaxone 1 g IV daily  -Avoid NSAIDs  -It is okay to continue ASA 81 mg daily  -Transfuse for goal hemoglobin > 7  -Transfuse for goal platelet count > 50  -Given massive bleeding, would correct coagulopathy  -Consider administration of vitamin K following EGD  -Low threshold for IR consult for TIPS  -Would keep a [place] tube at bedside    Decompensated EtOH/HCV cirrhosis: History of EV status post prior EV bleed and recurrent need for EV BL. Has previously been on and NSBB for prevention of bleeding. She also has a history of ascites requiring paracentesis and diuretics as well as HE on lactulose and rifaximin. MELD is 18 today. Per her outpatient GI notes, she was previously determined not to be a transplant candidate due to social issues and prior polysubstance abuse. Would hold current outpatient medications until acute UGI bleeding is controlled.  Once acute UGI bleeding controlled:  -Start rifaximin 550 mg p.o. twice daily  -Start lactulose 30 mL 3 times daily and titrate to 3-4 soft bowel movements  -Daily MELD labs (CMP, INR)  -Hold spironolactone/Lasix  -Abdominal US with Dopplers to rule out PVT/HPT and other underlying pathology  -Ammonia level  -Low threshold for transfer to higher level of care      Zofia Smith D.O.      Thank you for inviting me to participate in the care of this patient. Please do not hesitate to call GI consultants with additional questions/concerns or changes in the patient's clinical status at  [phone].    ________________________________________________________________________________________________________________________________________________________      Presenting Chief Complaint:  Hematemesis  Melena  Abdominal pain  Fatigue/weakness      History of Present Illness:    This is a very pleasant 61 y.o. female with the past medical history as listed below. She notes acute onset hematemesis and coffee-ground emesis starting yesterday prior to presentation to the ER. She states that yesterday she had significant weakness/fatigue and kept falling multiple times throughout the day. She presented to the ER at 2:30 AM where she was noted to have a heart rate of 126 but was normotensive. During her time in the ER, her SBP did drop as low as 70s and most recently she has hypertensive. Her hemoglobin was 4.4. Her platelet count is 182 and her INR is 2.18. She has been stressed and having a hard time recently as her mother passed away a few days ago. She denies EtOH use. She is very focused on contacting her sister-in-law and brother but no contact information is available and she does not have her phone. She is also very worried about being able to make her mother's  on Thursday.    A right IJ Cordis has been placed. She is an octreotide 50 mcg/hour and Protonix 80 mg/hour. She has received 4 units PRBCs, 2 units FFP, and 1 unit platelets. No repeat hemoglobin since transfusion. Currently 1 unit PRBCs is running.    Of note, she was last seen on 2021 where she had reported running out of her diuretics which were represcribed (patient states today she has not been taking them recently). She also had slightly worsening HE related symptoms despite lactulose and was started on rifaximin. She has previously had EV bleeding and has been on and an NSBB.  She was previously determined not to be a transplant candidate given social issues and prior polysubstance abuse (per prior GI  "notes).        Review of Systems:  Review of Systems   Constitutional: Positive for malaise/fatigue. Negative for fever.   Respiratory: Positive for shortness of breath.    Cardiovascular: Negative for chest pain and leg swelling.   Gastrointestinal: Positive for abdominal pain, blood in stool, melena, nausea and vomiting.   Neurological: Positive for dizziness and weakness. Negative for loss of consciousness.             Past Medical History:  Past Medical History:   Diagnosis Date   • Anemia    • Anesthesia     pt's mother has hard time waking up = states it took a week or two   • Arrhythmia     pt unsure what rhythm    • Arthritis     generalized   • Breath shortness     with exertion   • Cataract     removed bilat   • Cirrhosis (HCC)    • Congestive heart failure (HCC)    • Dental disorder     dentures upper   • Diabetes     insulin   • Esophageal varices (HCC)    • Fall    • Fibromyalgia    • GERD (gastroesophageal reflux disease)    • Hepatitis C     states received treatment    • Hiatus hernia syndrome    • High cholesterol    • Hypertension 11/03/2020   • Indigestion    • Myocardial infarct (HCC) ?   • Obesity    • Other specified disorder of intestines     constipation/diarrhea   • Pain 11/03/2020    \"my legs always hurt\"; 5/10   • Psychiatric problem     depression and anxiety   • Restless leg syndrome    • Sleep apnea     has had sleep study, no cpap   • Snoring    • Stroke (HCC) 2011    no residual problems    • Thrombocytopenia (HCC)    • Unspecified hemorrhagic conditions     reports nose bleeds   • Urinary bladder disorder     frequency/urgency   • Urinary incontinence      Active Hospital Problems    Diagnosis    • GIB (gastrointestinal bleeding) [K92.2]    • GI bleed [K92.2]    • Hemorrhagic shock (HCC) [R57.8]    • Alcohol abuse [F10.10]    • Acute blood loss anemia [D62]    • Ascites [R18.8]    • Chronic hepatitis C virus infection (CMS-HCC) [B18.2]    • Cirrhosis (HCC) [K74.60]    • DM2 (diabetes " mellitus, type 2) (HCC) [E11.9]          Past Surgical History:  Past Surgical History:   Procedure Laterality Date   • PB UPPER GI ENDOSCOPY,DIAGNOSIS N/A 11/1/2021    Procedure: GASTROSCOPY - WITH BANDING;  Surgeon: Anthony Dent M.D.;  Location: [facility];  Service: Gastroenterology   • PB UPPER GI ENDOSCOPY,DIAGNOSIS N/A 9/28/2021    Procedure: GASTROSCOPY;  Surgeon: Anthony Dent M.D.;  Location: SURGERY SAME DAY [facility];  Service: Gastroenterology   • PB UPPER GI ENDOSCOPY,LIGAT VARIX N/A 9/28/2021    Procedure: GASTROSCOPY, WITH BANDING;  Surgeon: Anthony Dent M.D.;  Location: SURGERY SAME DAY [facility];  Service: Gastroenterology   • PB COLONOSCOPY,DIAGNOSTIC N/A 2/15/2021    Procedure: COLONOSCOPY;  Surgeon: Anthony Dent M.D.;  Location: [facility];  Service: Gastroenterology   • PB UPPER GI ENDOSCOPY,DIAGNOSIS N/A 2/15/2021    Procedure: GASTROSCOPY - W/BANDING.;  Surgeon: Anthony Dent M.D.;  Location: [facility];  Service: Gastroenterology   • CATH REMOVAL  11/4/2020    Procedure: REMOVAL, CATHETER- INFECTED PORT A CATH;  Surgeon: Leonidas Mcmanus M.D.;  Location: [facility];  Service: General   • GASTROSCOPY N/A 2/12/2018    Procedure: GASTROSCOPY;  Surgeon: Willard Bustamante M.D.;  Location: [facility];  Service: Gastroenterology   • GASTROSCOPY WITH BANDING N/A 2/8/2018    Procedure: GASTROSCOPY WITH BANDING;  Surgeon: Davonte Mauricio M.D.;  Location: SURGERY SAME DAY [facility]S;  Service: Gastroenterology   • GASTROSCOPY N/A 8/21/2017    Procedure: GASTROSCOPY WITH BANDING  ;  Surgeon: Anthony Dent M.D.;  Location: SURGERY SAME DAY [facility] ORS;  Service:    • CATH PLACEMENT  12/22/2014    Performed by Helga Santiago M.D. at SURGERY SAME DAY [facility] ORS   • CATH REMOVAL  11/14/2014    [facility]   • GASTROSCOPY WITH BANDING  7/8/2014     Performed by Davonte Mauricio M.D. at [facility]COPY [facility]   • GASTROSCOPY WITH CLIPPING  7/6/2014    Performed by Joshua Vigil M.D. at ENDOSCOPY [facility]   • ANTERIOR AND POSTERIOR REPAIR  12/6/2013    Performed by Isaías Lambert M.D. at SURGERY SAME DAY [facility] ORS   • ENTEROCELE REPAIR  12/6/2013    Performed by Isaías Lambert M.D. at SURGERY SAME DAY [facility] ORS   • BLADDER SLING FEMALE  12/6/2013    Performed by Isaías Lambert M.D. at SURGERY SAME DAY [facility] ORS   • GYN SURGERY  2010    hysterectomy   • OTHER  2010    port placement   • CATARACT EXTRACTION WITH IOL Bilateral    • GYN SURGERY      tubal ligation   • OTHER      oral surgery   • TONSILLECTOMY           Prior Endoscopic Procedures:  EGD 11/1/2021: 3 columns medium sized EV with red garth signs and fibrosis from prior banding (EVBL x3), diffuse portal hypertensive gastropathy, no gastric varices      Hospital Medications:  Current Facility-Administered Medications   Medication Dose Frequency Provider Last Rate Last Admin   • octreotide (SANDOSTATIN) 1,250 mcg in  mL Infusion  50 mcg/hr Continuous Danielle KAREN Urbano, D.O. 10 mL/hr at 12/04/21 0422 50 mcg/hr at 12/04/21 0422   • pantoprazole (PROTONIX) 80 mg in  mL Infusion  8 mg/hr Continuous Danielle KAREN Urbano, D.O. 25 mL/hr at 12/04/21 0413 8 mg/hr at 12/04/21 0413   • cefTRIAXone (Rocephin) 1 g in  mL IVPB  1 g Q24HRS Kenji Delacruz M.D.       • NS infusion   Continuous Kenji Delacruz M.D.       • calcium CHLORIDE 1,000 mg in dextrose 5% 100 mL IVPB  1,000 mg Once Kenji Delacruz M.D.       • insulin regular (HumuLIN R,NovoLIN R) injection  2-9 Units Q6HRS Kenji Delacruz M.D.        And   • dextrose 50% (D50W) injection 50 mL  50 mL Q15 MIN PRN Kenji Delacruz M.D.       • ondansetron (ZOFRAN) syringe/vial injection 4 mg  4 mg Q4HRS PRN Kenji Delacruz M.D.       Last reviewed on 12/4/2021  6:11 AM by Sydni Chaudhry        Current Outpatient  "Medications:  Medications Prior to Admission   Medication Sig Dispense Refill Last Dose   • furosemide (LASIX) 40 MG Tab Take 1 Tablet by mouth every morning. 180 Tablet 0 \"few days\"   • spironolactone (ALDACTONE) 50 MG Tab Take 1 Tablet by mouth every day. 180 Tablet 0 \"few days\"   • lisinopril (PRINIVIL) 20 MG Tab Take 1 Tablet by mouth every morning. 180 Tablet 0 \"few days\"   • omeprazole (PRILOSEC) 20 MG delayed-release capsule Take 1 Capsule by mouth every morning. 180 Capsule 0 \"few days\"   • gabapentin (NEURONTIN) 800 MG tablet Take 3 Tablets by mouth every day. 360 Tablet 0 \"few days\"   • atorvastatin (LIPITOR) 40 MG Tab Take 1 Tablet by mouth every morning. 180 Tablet 0 \"few days\"   • aspirin 81 MG EC tablet Take 1 Tablet by mouth 2 times a day. 180 Tablet 0 \"few days\"   • insulin glargine (LANTUS) 100 UNIT/ML Solution Inject 20-30 Units under the skin 2 times a day. 30 units in am 20 units at hs (Patient taking differently: Inject 30 Units under the skin every evening. 30 units in am 20 units at hs) 30 mL 3 \"few days\"   • traZODone (DESYREL) 100 MG Tab Take 1 Tablet by mouth at bedtime as needed for Sleep. 180 Tablet 0 \"few days\"   • dicyclomine (BENTYL) 10 MG Cap Take 10 mg by mouth 2 (two) times a day.   \"few days\"   • lactulose 10 GM/15ML Solution Take 30 mL by mouth 2 (two) times a day.   \"few days\"   • OXcarbazepine (TRILEPTAL) 300 MG Tab Take 300-600 mg by mouth 2 (two) times a day. 1 tab = 300 mg every morning  2 tabs = 600 mg every evening   \"few days\"         Medication Allergies:  Allergies   Allergen Reactions   • Codeine Rash     sorethroat   • Penicillins Hives and Itching     Hives, itching   Other reaction(s): Pruritus         Family Medical History:  Family History   Problem Relation Age of Onset   • Diabetes Mother    • Hypertension Mother          Social History:  Social History     Socioeconomic History   • Marital status:      Spouse name: Not on file   • Number of children: Not " on file   • Years of education: Not on file   • Highest education level: Not on file   Occupational History   • Not on file   Tobacco Use   • Smoking status: Current Every Day Smoker     Packs/day: 0.25     Years: 45.00     Pack years: 11.25     Types: Cigarettes   • Smokeless tobacco: Never Used   Vaping Use   • Vaping Use: Never used   Substance and Sexual Activity   • Alcohol use: Yes     Comment: quit 3 week in August    • Drug use: Not Currently     Comment: 02/2021- CBD gummies for sleep ,  Hx IV meth use, states she used 12/18/17 for first time in yrs    • Sexual activity: Not on file   Other Topics Concern   • Not on file   Social History Narrative   • Not on file     Social Determinants of Health     Financial Resource Strain:    • Difficulty of Paying Living Expenses: Not on file   Food Insecurity: Food Insecurity Present   • Worried About Running Out of Food in the Last Year: Sometimes true   • Ran Out of Food in the Last Year: Sometimes true   Transportation Needs: No Transportation Needs   • Lack of Transportation (Medical): No   • Lack of Transportation (Non-Medical): No   Physical Activity:    • Days of Exercise per Week: Not on file   • Minutes of Exercise per Session: Not on file   Stress:    • Feeling of Stress : Not on file   Social Connections:    • Frequency of Communication with Friends and Family: Not on file   • Frequency of Social Gatherings with Friends and Family: Not on file   • Attends Bahai Services: Not on file   • Active Member of Clubs or Organizations: Not on file   • Attends Club or Organization Meetings: Not on file   • Marital Status: Not on file   Intimate Partner Violence:    • Fear of Current or Ex-Partner: Not on file   • Emotionally Abused: Not on file   • Physically Abused: Not on file   • Sexually Abused: Not on file   Housing Stability:    • Unable to Pay for Housing in the Last Year: Not on file   • Number of Places Lived in the Last Year: Not on file   • Unstable  "Housing in the Last Year: Not on file         Vital signs:  Weight/BMI: Body mass index is 24.28 kg/m².  BP (!) 173/45   Pulse (!) 105   Temp (!) 35.7 °C (96.3 °F)   Resp 19   Ht 1.702 m (5' 7\")   Wt 70.3 kg (155 lb)   SpO2 100%   Vitals:    12/04/21 0736 12/04/21 0756 12/04/21 0800 12/04/21 0811   BP: 104/58 108/50 108/50 (!) 173/45   Pulse: (!) 113 (!) 115 (!) 118 (!) 105   Resp: 19 14 20 19   Temp: 36.1 °C (97 °F) (!) 35.7 °C (96.3 °F)     TempSrc:       SpO2: 100% 97% 100% 100%   Weight:       Height:         Oxygen Therapy:  Pulse Oximetry: 100 %, O2 Delivery Device: Nasal Cannula    Intake/Output Summary (Last 24 hours) at 12/4/2021 0822  Last data filed at 12/4/2021 0811  Gross per 24 hour   Intake 506 ml   Output --   Net 506 ml         Physical Exam:  Physical Exam  Vitals reviewed.   Constitutional:       General: She is in acute distress.      Appearance: She is ill-appearing.   HENT:      Head: Normocephalic and atraumatic.      Nose: Nose normal.   Eyes:      General: No scleral icterus.     Extraocular Movements: Extraocular movements intact.      Conjunctiva/sclera: Conjunctivae normal.   Cardiovascular:      Rate and Rhythm: Regular rhythm. Tachycardia present.      Heart sounds: Normal heart sounds. No murmur heard.  No gallop.    Pulmonary:      Effort: Pulmonary effort is normal. No respiratory distress.      Breath sounds: Normal breath sounds. No wheezing, rhonchi or rales.   Abdominal:      General: Abdomen is flat. Bowel sounds are normal. There is no distension.      Palpations: Abdomen is soft. There is no mass.      Tenderness: There is abdominal tenderness (Mild diffuse TTP). There is no guarding or rebound.   Musculoskeletal:         General: Normal range of motion.      Cervical back: Normal range of motion.   Skin:     General: Skin is warm and dry.      Coloration: Skin is not jaundiced.   Neurological:      General: No focal deficit present.      Mental Status: She is alert and " oriented to person, place, and time.   Psychiatric:         Mood and Affect: Mood is depressed.         Behavior: Behavior normal.         Judgment: Judgment normal.             Labs:  Recent Labs     12/04/21 0259 12/04/21 0731   SODIUM 142 143   POTASSIUM 4.2 4.2   CHLORIDE 107 114*   CO2 16* 16*   BUN 46* 44*   CREATININE 1.36 1.12   MAGNESIUM 2.2  --    PHOSPHORUS 4.2  --    CALCIUM 8.0* 6.9*     Recent Labs     12/04/21 0259 12/04/21 0731   ALTSGPT 17  --    ASTSGOT 31  --    ALKPHOSPHAT 61  --    TBILIRUBIN 0.7  --    LIPASE 28  --    GLUCOSE 222* 166*     Recent Labs     12/04/21 0259   WBC 14.8*   NEUTSPOLYS 80.50*   LYMPHOCYTES 12.70*   MONOCYTES 4.20   EOSINOPHILS 0.80   BASOPHILS 0.30   ASTSGOT 31   ALTSGPT 17   ALKPHOSPHAT 61   TBILIRUBIN 0.7     Recent Labs     12/04/21 0259   RBC 1.93*   HEMOGLOBIN 4.4*   HEMATOCRIT 15.6*   PLATELETCT 182   PROTHROMBTM 23.6*   APTT 36.2*   INR 2.18*     Recent Results (from the past 24 hour(s))   Fluid Cell Count    Collection Time: 12/03/21  2:00 PM   Result Value Ref Range    Fluid Type Ascites     Color-Body Fluid Yellow     Character-Body Fluid Clear     Total RBC Count <2000 cells/uL    Total WBC 38 cells/uL    Polys 5 %    Lymphs 27 %    Mononuclear Cells - Fluid 44 %    Mesothelial Cells - CSF 17 %    Fluid Histiocyte 7 %    Comments see below    FLUID ALBUMIN    Collection Time: 12/03/21  2:00 PM   Result Value Ref Range    Albumin 0.5 g/dL   FLUID TOTAL PROTEIN    Collection Time: 12/03/21  5:12 PM   Result Value Ref Range    Fluid Type Ascites     Total Protein Fluid 1.0 g/dL   CBC WITH DIFFERENTIAL    Collection Time: 12/04/21  2:59 AM   Result Value Ref Range    WBC 14.8 (H) 4.8 - 10.8 K/uL    RBC 1.93 (L) 4.20 - 5.40 M/uL    Hemoglobin 4.4 (LL) 12.0 - 16.0 g/dL    Hematocrit 15.6 (LL) 37.0 - 47.0 %    MCV 80.8 (L) 81.4 - 97.8 fL    MCH 22.8 (L) 27.0 - 33.0 pg    MCHC 28.2 (L) 33.6 - 35.0 g/dL    RDW 45.2 35.9 - 50.0 fL    Platelet Count 182 164 - 446  K/uL    MPV 12.7 9.0 - 12.9 fL    Neutrophils-Polys 80.50 (H) 44.00 - 72.00 %    Lymphocytes 12.70 (L) 22.00 - 41.00 %    Monocytes 4.20 0.00 - 13.40 %    Eosinophils 0.80 0.00 - 6.90 %    Basophils 0.30 0.00 - 1.80 %    Immature Granulocytes 1.50 (H) 0.00 - 0.90 %    Nucleated RBC 0.20 /100 WBC    Neutrophils (Absolute) 11.93 (H) 2.00 - 7.15 K/uL    Lymphs (Absolute) 1.89 1.00 - 4.80 K/uL    Monos (Absolute) 0.63 0.00 - 0.85 K/uL    Eos (Absolute) 0.12 0.00 - 0.51 K/uL    Baso (Absolute) 0.04 0.00 - 0.12 K/uL    Immature Granulocytes (abs) 0.22 (H) 0.00 - 0.11 K/uL    NRBC (Absolute) 0.03 K/uL    Hypochromia 3+ (A)     Anisocytosis 3+ (A)     Microcytosis 3+ (A)    COMP METABOLIC PANEL    Collection Time: 12/04/21  2:59 AM   Result Value Ref Range    Sodium 142 135 - 145 mmol/L    Potassium 4.2 3.6 - 5.5 mmol/L    Chloride 107 96 - 112 mmol/L    Co2 16 (L) 20 - 33 mmol/L    Anion Gap 19.0 (H) 7.0 - 16.0    Glucose 222 (H) 65 - 99 mg/dL    Bun 46 (H) 8 - 22 mg/dL    Creatinine 1.36 0.50 - 1.40 mg/dL    Calcium 8.0 (L) 8.5 - 10.5 mg/dL    AST(SGOT) 31 12 - 45 U/L    ALT(SGPT) 17 2 - 50 U/L    Alkaline Phosphatase 61 30 - 99 U/L    Total Bilirubin 0.7 0.1 - 1.5 mg/dL    Albumin 2.5 (L) 3.2 - 4.9 g/dL    Total Protein 4.9 (L) 6.0 - 8.2 g/dL    Globulin 2.4 1.9 - 3.5 g/dL    A-G Ratio 1.0 g/dL   LIPASE    Collection Time: 12/04/21  2:59 AM   Result Value Ref Range    Lipase 28 11 - 82 U/L   PROTHROMBIN TIME    Collection Time: 12/04/21  2:59 AM   Result Value Ref Range    PT 23.6 (H) 12.0 - 14.6 sec    INR 2.18 (H) 0.87 - 1.13   APTT    Collection Time: 12/04/21  2:59 AM   Result Value Ref Range    APTT 36.2 (H) 24.7 - 36.0 sec   COD (ADULT)    Collection Time: 12/04/21  2:59 AM   Result Value Ref Range    ABO Grouping Only O     Rh Grouping Only POS     Antibody Screen-Cod NEG     Component R       R99                 Red Cells, LR       [id number]   issued       12/04/21   04:56      Product Type R99     Dispense  Status issued     Unit Number (Barcoded) [id number]     Product Code (Barcoded) B4449V98     Blood Type (Barcoded) 5100     Component R       R99                 Red Cells, LR       [id number]   issued       12/04/21   07:26      Product Type R99     Dispense Status issued     Unit Number (Barcoded) [id number]     Product Code (Barcoded) S3620U18     Blood Type (Barcoded) 5100     Component R       R99                 Red Cells, LR       [id number]   issued       12/04/21   07:41      Product Type R99     Dispense Status issued     Unit Number (Barcoded) [id number]     Product Code (Barcoded) B7607C34     Blood Type (Barcoded) 9500    ESTIMATED GFR    Collection Time: 12/04/21  2:59 AM   Result Value Ref Range    GFR If  48 (A) >60 mL/min/1.73 m 2    GFR If Non African American 40 (A) >60 mL/min/1.73 m 2   PERIPHERAL SMEAR REVIEW    Collection Time: 12/04/21  2:59 AM   Result Value Ref Range    Peripheral Smear Review see below    PLATELET ESTIMATE    Collection Time: 12/04/21  2:59 AM   Result Value Ref Range    Plt Estimation Normal    MORPHOLOGY    Collection Time: 12/04/21  2:59 AM   Result Value Ref Range    RBC Morphology Present     Poikilocytosis 2+     Ovalocytes 2+     Tear Drop Cells 1+    DIFFERENTIAL COMMENT    Collection Time: 12/04/21  2:59 AM   Result Value Ref Range    Comments-Diff see below    FIBRINOGEN    Collection Time: 12/04/21  2:59 AM   Result Value Ref Range    Fibrinogen 139 (L) 215 - 460 mg/dL   IONIZED CALCIUM    Collection Time: 12/04/21  2:59 AM   Result Value Ref Range    Ionized Calcium 1.1 1.1 - 1.3 mmol/L   MAGNESIUM    Collection Time: 12/04/21  2:59 AM   Result Value Ref Range    Magnesium 2.2 1.5 - 2.5 mg/dL   PHOSPHORUS    Collection Time: 12/04/21  2:59 AM   Result Value Ref Range    Phosphorus 4.2 2.5 - 4.5 mg/dL   EKG (NOW)    Collection Time: 12/04/21  3:28 AM   Result Value Ref Range    Report       [facility] Emergency  Dept.    Test Date:  2021  Pt Name:    PADMAJA FLETCHER            Department:   MRN:        [id number]                      Room:        13  Gender:     Female                       Technician: [id number]  :        1960                   Requested By:PERCY DEE  Order #:    [id number]                    Reading MD:    Measurements  Intervals                                Axis  Rate:       115                          P:          74  NY:         120                          QRS:        75  QRSD:       88                           T:          95  QT:         356  QTc:        493    Interpretive Statements  SINUS TACHYCARDIA  LOW VOLTAGE IN FRONTAL LEADS  NONSPECIFIC T ABNORMALITIES, LATERAL LEADS  BORDERLINE PROLONGED QT INTERVAL  Compared to ECG 2021 13:51:06  T-wave abnormality now present  Sinus rhythm no longer present     PLATELET MAPPING WITH BASIC TEG    Collection Time: 21  5:55 AM   Result Value Ref Range    Reaction Time Initial-R 3.5 (L) 4.6 - 9.1 min    React Time Initial Hep 3.4 (L) 4.3 - 8.3 min    Clot Kinetics-K 1.3 0.8 - 2.1 min    Clot Angle-Angle 73.9 63.0 - 78.0 degrees    Maximum Clot Strength-MA 57.8 52.0 - 69.0 mm    TEG Functional Fibrinogen(MA) 13.3 (L) 15.0 - 32.0 mm    Lysis 30 minutes-LY30 0.0 0.0 - 2.6 %    % Inhibition ADP 68.4 (H) 0.0 - 17.0 %    % Inhibition AA 30.4 (H) 0.0 - 11.0 %    TEG Algorithm Link Algorithm    FRESH FROZEN PLASMA    Collection Time: 21  6:39 AM   Result Value Ref Range    Component F       TA                  Thawed Plasma       [id number]   issued       21   08:04      Product Type Thawed Apheresis Plasma     Dispense Status issued     Unit Number (Barcoded) [id number]     Product Code (Barcoded) G4210Y94     Blood Type (Barcoded) 1700     Component F       TA                  Thawed Plasma       [id number]   issued       21   08:19      Product Type Thawed Apheresis Plasma     Dispense Status issued      Unit Number (Barcoded) [id number]     Product Code (Barcoded) O9618Q96     Blood Type (Barcoded) 5100    Basic Metabolic Panel    Collection Time: 12/04/21  7:31 AM   Result Value Ref Range    Sodium 143 135 - 145 mmol/L    Potassium 4.2 3.6 - 5.5 mmol/L    Chloride 114 (H) 96 - 112 mmol/L    Co2 16 (L) 20 - 33 mmol/L    Glucose 166 (H) 65 - 99 mg/dL    Bun 44 (H) 8 - 22 mg/dL    Creatinine 1.12 0.50 - 1.40 mg/dL    Calcium 6.9 (LL) 8.5 - 10.5 mg/dL    Anion Gap 13.0 7.0 - 16.0   ESTIMATED GFR    Collection Time: 12/04/21  7:31 AM   Result Value Ref Range    GFR If African American 60 >60 mL/min/1.73 m 2    GFR If Non African American 50 (A) >60 mL/min/1.73 m 2         Radiology Review:  DX-CHEST-PORTABLE (1 VIEW)   Final Result         1.  No acute cardiopulmonary disease.      DX-CHEST-PORTABLE (1 VIEW)   Final Result         1.  No acute cardiopulmonary disease.            MDM (Data Review):   -Records reviewed and summarized in current documentation  -I personally reviewed and interpreted the laboratory results  -I personally reviewed the radiology images        Core Quality Measures   Reviewed items:  Labs, Medications and Radiology reports reviewed

## 2021-12-04 NOTE — ASSESSMENT & PLAN NOTE
Intubated 12/4  All of the appropriate ventilator bundles are in place  She is not a candidate for SBT  Mobility level 1

## 2021-12-04 NOTE — PROGRESS NOTES
4 Eyes Skin Assessment Completed by MAMI Donohue and MAMI Arias.    Head WDL  Ears WDL  Nose WDL  Mouth WDL  Neck WDL  Breast/Chest WDL  Shoulder Blades WDL  Spine WDL  (R) Arm/Elbow/Hand Abrasion  (L) Arm/Elbow/Hand WDL  Abdomen WDL  Groin WDL  Scrotum/Coccyx/Buttocks WDL  (R) Leg WDL  (L) Leg WDL  (R) Heel/Foot/Toe WDL  (L) Heel/Foot/Toe WDL          Devices In Places ECG, Blood Pressure Cuff, Pulse Ox and Nasal Cannula      Interventions In Place Gray Ear Foams, Q2 Turns and Low Air Loss Mattress    Possible Skin Injury No    Pictures Uploaded Into Epic N/A  Wound Consult Placed N/A  RN Wound Prevention Protocol Ordered No

## 2021-12-04 NOTE — ED NOTES
Right jugular central line placed by ERP.    Chest xray completed for Cordis central line placement verification

## 2021-12-04 NOTE — PROGRESS NOTES
tech from Lab called with critical result of H&H at 1140. Critical lab result read back to tech.   Dr. Delacruz notified of critical lab result at 1202.  Critical lab result read back by Dr. Delacruz.

## 2021-12-04 NOTE — DISCHARGE PLANNING
MAMI Donohue notified this [facility] that pt has no emergency contacts and is not able to recall any of her family's phone numbers. Per RN, a friend of the pt's named Corinne [phone] tried to come visit after she was intubated. W made phone call to Corinne to locate NOK. Corinne does not know any phone numbers for pt's family.    Per chart review, pt's mom, Luisa Lucero, has same number as pt [phone]. W made phone call to above number, no answer.    [facility] requested NOK from [facility] Carolyn.    Addendum @4406  LS notified by RN that pt's mom passed away a couple of days ago. RN reported that they received a call from pt's neighbor, Tyesha, [phone] who is currently watching the pt's dog. W made phone call to Tyesha and left VM with callback request.    OXANA Leon notified this [facility] that pt has a dtr, Devorah [phone]. W attempted to call Devorah at above number, however number is no longer in service.    Addendum @2228  LSW received phone call from pt's neighbor, Tyesha, who reported she does not know any names or numbers for any of the pt's family members.

## 2021-12-04 NOTE — ED NOTES
Noted projectile vomiting of bright red emesis. ERP updated and aware.     2 PIV access placed. Blood drawn and sent to lab.    Pt medicated as per MAR

## 2021-12-04 NOTE — PROCEDURES
Intubation    Date/Time: 12/4/2021 11:57 AM  Performed by: Kenji Delacruz M.D.  Authorized by: Kenji Delacruz M.D.     Consent:     Consent obtained:  Emergent situation    Consent given by:  Patient    Risks discussed:  Bleeding, aspiration, death and hypoxia    Alternatives discussed:  No treatment and delayed treatment  Pre-procedure details:     Patient status:  Awake    Mallampati score:  I    Pretreatment meds: Etomidate.    Paralytics:  Rocuronium  Procedure details:     Preoxygenation:  Bag valve mask    CPR in progress: no      Intubation method:  Oral    Oral intubation technique:  Video-assisted    Laryngoscope type:  GlideScope    Laryngoscope blade:  Mac 3    Cormack-Lehane Classification:  Grade 1    Tube size (mm):  7.5    Tube type:  Cuffed    Number of attempts:  1    Ventilation between attempts: no      Cricoid pressure: no      Tube visualized through cords: yes    Placement assessment:     ETT to lip:  21 cm    Tube secured with:  ETT martinez    Breath sounds:  Equal    Placement verification: chest rise, condensation, CXR verification and direct visualization      CXR findings:  ETT in proper place  Post-procedure details:     Patient tolerance of procedure:  Tolerated well, no immediate complications  Comments:      Emergent intubation to protect airway and facilitate EGD for massive ongoing hematemesis.

## 2021-12-04 NOTE — ED PROVIDER NOTES
ED Provider Note      Assistance was requested for placement of Cordis catheter.  Patient has active GI bleed with history of varices, significant anemia and soft blood pressures.  Procedure is indicated.  I discussed with the patient who consents.    Central Line Placement Procedure Note  Indication: vascular access    Consent: The patient provided verbal consent for this procedure.    Procedure: The patient was positioned appropriately and the skin over the right internal jugular vein was prepped with chlorhexidine. Local anesthesia was obtained by infiltration using 1% Lidocaine without epinephrine.  Ultrasound was used to identify the right internal jugular vein. A large bore needle was inserted.  A guide wire was then inserted into the vein through the needle.  An introducer catheter was then inserted into the vessel over the guide wire using the Seldinger technique.  There was good free flowing venous blood return and was flushed with saline solution.  The catheter was then securely fastened to the skin with sutures and covered with a sterile dressing.  A post procedure X-ray was ordered and showed good line position.    The patient tolerated the procedure well.    Complications: None      Signed Celestine Peterson M.D.

## 2021-12-04 NOTE — ED NOTES
[place]. Pt denies any complaints at this time. Pt is in no apparent distress with stable VS. Will continue to monitor pt.

## 2021-12-04 NOTE — OP REPORT
EGD Report    Date:  12/4/2021    Surgeon: Zofia Smith D.O., [facility]    Indications: Hematemesis, cirrhosis, esophageal varices, posthemorrhagic anemia, hemorrhagic shock    Procedure: Diagnostic EGD with variceal band ligation        Procedure in detail, Findings and Endoscopic Diagnosis:      -Prior to the procedure, a History and Physical was performed, and patient medications and allergies were reviewed. The patient’s tolerance of previous anesthesia was also reviewed. The risks and benefits of the procedure and the sedation options and risks were discussed with the patient. All questions were answered, and informed consent was obtained. The patient was deemed in satisfactory condition to undergo the procedure.  Since her initial evaluation, she was intubated for airway protection prior to being able to complete the paper consent.  As such, the procedure was done emergently with physician consent.    -Prior Anticoagulants: the patient has taken no previous anticoagulant or antiplatelet agents.    -ASA Grade Assessment: see anesthesia note     Anesthesia/medications:  see anesthesia note     -The patient was placed in the left lateral decubitus position. The scope was passed under direct vision. Continuous oxygen was provided via nasal cannula and intravenous sedation was administered in divided doses throughout the procedure. The patient’s blood pressure, pulse, and oxygen saturation were monitored continuously throughout the procedure.    -The adult gastroscope was gently advanced under direct visualization over the tongue, down the esophagus, through the stomach and into the 2nd portion of the duodenum. The color, texture, mucosa and anatomy of esophagus, stomach and duodenum were carefully examined with the scope. The scope was then withdrawn from the patient and the procedure terminated. Further details are in the finding section, based on anatomical location.  Retroflexion was performed in the  stomach.    -The patient tolerated the procedure well and there were no immediate complications. The patient was then transferred to the recovery room in stable condition.    EBL: Minimal    Complications:  No immediate complications      Findings:  Duodenum: There was small amounts of blood throughout the duodenum.  The duodenal mucosa was normal throughout the entire examined duodenum.    Stomach: There was blood and clot in the fundus of the stomach with small amounts of blood in the gastric body and antrum.  The gastric mucosa was normal in appearance throughout the entire examined stomach.  No gastric varices were identified.    Esophagus: The DH was located 3535 cm from the incisors.  The EGF was located 35 cm from the incisors.  The SCJ was located 35 cm from the incisors.  There were 3 columns of  medium-large esophageal varices.  At the junction of the mid to distal esophagus, there was a conglomerate of esophageal varices with associated red garth sign and nipple sign.  A total of 5 bands was placed successfully.  There was no bleeding at the end of the procedure.      Summary of Findings:  -Normal duodenum  -Normal stomach without evidence of gastric varices  -Medium-large esophageal varices with high risk stigmata; EVBL x5        Recommendations:  -Continue to monitor the patient in the ICU  -Avoid NGT or OGT placement  -N.p.o. until extubated  -Maintain central access  -Continue supportive care and IVF  -Trend hemoglobin every 2-4 hours  -Continue Protonix drip 8 mg/hour  -Continue octreotide drip 50 mcg/hour  -Continue ceftriaxone 1 g IV daily  -Avoid NSAIDs  -It is okay to continue ASA 81 mg daily  -Transfuse for goal hemoglobin > 7  -Transfuse for goal platelet count > 50  -Given massive bleeding, would correct coagulopathy  -Consider vitamin K 10 mg IV daily x3 doses  -Low threshold for IR consult for TIPS  -Would keep a [place] tube at bedside  -Repeat EGD with EVBL in 4 weeks      Zofia Smith   FACP      Thank you for inviting me to participate in the care of this patient. Please do not hesitate to call GI consultants with additional questions/concerns or changes in the patient's clinical status at [phone].

## 2021-12-04 NOTE — PROGRESS NOTES
Patient vomiting 2L of bright red blood. HR up  To 140s. Dr. Delacruz at bedside. Plan to intubate patient and insert OG tube prior to bedside EGD. ETT 21 at gums, bilateral breath sounds present, positive color change

## 2021-12-04 NOTE — DISCHARGE PLANNING
[employer] Online Search Results:      Moncho Jaime 1st Degree  : 1985   Age: 36   [phone]   [email or web address]   [address]  [place] [address]  (2008 - CURRENT)   Left VM with callback request.    Marshall Castaneda 1st Degree  : Aug 1960   Age: 61   [phone]   None found   [address] [address]  (2010 - CURRENT)  Left VM with callback request.  Received call back and spoke with Marshall who reported he does not know this pt.    Briana Stanislaw Castaneda 1st Degree  : 1960   Age: 61   [phone]   [email or web address]   [address] [address]  (2010 - CURRENT)   Left VM with callback request.    Chanelle Castaneda 2nd Degree  : Oct 1989   Age: 32   [phone]   [email or web address]   [address] [place] [address]  (10/31/2018 - CURRENT)   Spoke with Chanelle who reported she does not know this pt.    Anayeli Jaime 2nd Degree  : 1958   Age: 63   [phone]   [email or web address]   [address] [place] [address]  (2007 - CURRENT)   Phone number disconnected.    Guille Jaime 2nd Degree  : Mar 1952   Age: 69   [phone]   None found   [address] [place] [id number]  (6/15/2008 - CURRENT)   Left VM with callback request.

## 2021-12-04 NOTE — ED NOTES
Martin from Lab called with critical result of H&H at 0404. Critical lab result read back to Martin.   Dr. Urbano notified of critical lab result at 0405.  Critical lab result read back by Dr. Urbano.

## 2021-12-04 NOTE — ASSESSMENT & PLAN NOTE
Due to upper gastrointestinal hemorrhage  Fluid resuscitation  I am titrating norepinephrine to keep mean arterial pressure greater than 65

## 2021-12-04 NOTE — ASSESSMENT & PLAN NOTE
Consider diagnostic para once stable, avoid large volume para with acute shock and unstability  On C3 therapy

## 2021-12-04 NOTE — CONSULTS
Critical Care Consultation    Date of consult: 2021    Referring Physician  Danielle Urbano M.D.    Reason for Consultation  Life threatening gastrointestinal bleeding    History of Presenting Illness  61 y.o. female who presented 2021 with Hx of alcohol abuse, Hep C, cirrhosis, prior esophageal banding most recently  by Dr Filipe BARBER, hypertension, DM2, pAfib, prior drug abuse, ascites. That present with syncope yesterday, and moderate hematemesis today promptings ER visit. She presented at 230am to ER afebrile,  SBP 94 on room air. ER had a very hard time getting IV access and final a cordis was placed at 6am and she started her first blood transfusion at that time. Her hemoglobin was 4.4, plt 182, inr 2.18. When I walked in the room SBP was in the 70's and she is pale as a ghost, however was mentating. She would like to be full code and be out of the hospital by Thursday to go to her mothers . I have order additional 2 units of blood, 2 FFP, 1 plt, IV calcium to finish off a massive transfusion resus. Will follow up TEG to guide therapy. Dr Berman of GI has been consulted.     Code Status  Full Code    Review of Systems  Review of Systems   Unable to perform ROS: Acuity of condition       Past Medical History   has a past medical history of Anemia, Anesthesia, Arrhythmia, Arthritis, Breath shortness, Cataract, Cirrhosis (HCC), Congestive heart failure (HCC), Dental disorder, Diabetes, Esophageal varices (HCC), Fall, Fibromyalgia, GERD (gastroesophageal reflux disease), Hepatitis C, Hiatus hernia syndrome, High cholesterol, Hypertension (2020), Indigestion, Myocardial infarct (HCC) (?), Obesity, Other specified disorder of intestines, Pain (2020), Psychiatric problem, Restless leg syndrome, Sleep apnea, Snoring, Stroke (HCC) (), Thrombocytopenia (HCC), Unspecified hemorrhagic conditions, Urinary bladder disorder, and Urinary incontinence. She also has no past medical  "history of CAD (coronary artery disease) or COPD.    Surgical History   has a past surgical history that includes gyn surgery; gyn surgery (2010); other (2010); other; anterior and posterior repair (12/6/2013); enterocele repair (12/6/2013); bladder sling female (12/6/2013); gastroscopy with clipping (7/6/2014); gastroscopy with banding (7/8/2014); cath removal (11/14/2014); cath placement (12/22/2014); cataract extraction with iol (Bilateral); tonsillectomy; gastroscopy (N/A, 8/21/2017); gastroscopy with banding (N/A, 2/8/2018); gastroscopy (N/A, 2/12/2018); cath removal (11/4/2020); pr colonoscopy,diagnostic (N/A, 2/15/2021); pr upper gi endoscopy,diagnosis (N/A, 2/15/2021); pr upper gi endoscopy,diagnosis (N/A, 9/28/2021); pr upper gi endoscopy,ligat varix (N/A, 9/28/2021); and pr upper gi endoscopy,diagnosis (N/A, 11/1/2021).    Family History  family history includes Diabetes in her mother; Hypertension in her mother.    Social History   reports that she has been smoking cigarettes. She has a 11.25 pack-year smoking history. She has never used smokeless tobacco. She reports current alcohol use. She reports previous drug use.    Medications  Home Medications     Reviewed by Sydni Chaudhry (Pharmacy Tech) on 12/04/21 at 0611  Med List Status: Complete   Medication Last Dose Status   aspirin 81 MG EC tablet \"few days\" Active   atorvastatin (LIPITOR) 40 MG Tab \"few days\" Active   dicyclomine (BENTYL) 10 MG Cap \"few days\" Active   furosemide (LASIX) 40 MG Tab \"few days\" Active   gabapentin (NEURONTIN) 800 MG tablet \"few days\" Active   insulin glargine (LANTUS) 100 UNIT/ML Solution \"few days\" Active   lactulose 10 GM/15ML Solution \"few days\" Active   lisinopril (PRINIVIL) 20 MG Tab \"few days\" Active   omeprazole (PRILOSEC) 20 MG delayed-release capsule \"few days\" Active   OXcarbazepine (TRILEPTAL) 300 MG Tab \"few days\" Active   spironolactone (ALDACTONE) 50 MG Tab \"few days\" Active   traZODone (DESYREL) 100 " "MG Tab \"few days\" Active              Current Facility-Administered Medications   Medication Dose Route Frequency Provider Last Rate Last Admin   • octreotide (SANDOSTATIN) 1,250 mcg in  mL Infusion  50 mcg/hr Intravenous Continuous Danielle SHANIQUA WrightO. 10 mL/hr at 12/04/21 0422 50 mcg/hr at 12/04/21 0422   • pantoprazole (PROTONIX) 80 mg in  mL Infusion  8 mg/hr Intravenous Continuous Danielle SHANIQUA WrightO. 25 mL/hr at 12/04/21 0413 8 mg/hr at 12/04/21 0413   • NS infusion   Intravenous Continuous Danielle SHANIQUA WrightO. 250 mL/hr at 12/04/21 0433 New Bag at 12/04/21 0433   • cefTRIAXone (Rocephin) 1 g in  mL IVPB  1 g Intravenous Q24HRS Kenji Delacruz M.D.       • NS infusion   Intravenous Continuous Kenji Delacruz M.D.       • calcium CHLORIDE 1,000 mg in dextrose 5% 100 mL IVPB  1,000 mg Intravenous Once Kenji Delacruz M.D.       • insulin regular (HumuLIN R,NovoLIN R) injection  2-9 Units Subcutaneous Q6HRS Kenji Delacruz M.D.        And   • dextrose 50% (D50W) injection 50 mL  50 mL Intravenous Q15 MIN PRN Kenji Delacruz M.D.       • ondansetron (ZOFRAN) syringe/vial injection 4 mg  4 mg Intravenous Q4HRS PRN Kenji Delacruz M.D.         Current Outpatient Medications   Medication Sig Dispense Refill   • furosemide (LASIX) 40 MG Tab Take 1 Tablet by mouth every morning. 180 Tablet 0   • spironolactone (ALDACTONE) 50 MG Tab Take 1 Tablet by mouth every day. 180 Tablet 0   • lisinopril (PRINIVIL) 20 MG Tab Take 1 Tablet by mouth every morning. 180 Tablet 0   • omeprazole (PRILOSEC) 20 MG delayed-release capsule Take 1 Capsule by mouth every morning. 180 Capsule 0   • gabapentin (NEURONTIN) 800 MG tablet Take 3 Tablets by mouth every day. 360 Tablet 0   • atorvastatin (LIPITOR) 40 MG Tab Take 1 Tablet by mouth every morning. 180 Tablet 0   • aspirin 81 MG EC tablet Take 1 Tablet by mouth 2 times a day. 180 Tablet 0   • insulin glargine (LANTUS) 100 UNIT/ML Solution Inject 20-30 Units " under the skin 2 times a day. 30 units in am 20 units at hs (Patient taking differently: Inject 30 Units under the skin every evening. 30 units in am 20 units at hs) 30 mL 3   • traZODone (DESYREL) 100 MG Tab Take 1 Tablet by mouth at bedtime as needed for Sleep. 180 Tablet 0   • dicyclomine (BENTYL) 10 MG Cap Take 10 mg by mouth 2 (two) times a day.     • lactulose 10 GM/15ML Solution Take 30 mL by mouth 2 (two) times a day.     • OXcarbazepine (TRILEPTAL) 300 MG Tab Take 300-600 mg by mouth 2 (two) times a day. 1 tab = 300 mg every morning  2 tabs = 600 mg every evening         Allergies  Allergies   Allergen Reactions   • Codeine Rash     sorethroat   • Penicillins Hives and Itching     Hives, itching   Other reaction(s): Pruritus       Vital Signs last 24 hours  Temp:  [35.8 °C (96.5 °F)-36.1 °C (96.9 °F)] 35.9 °C (96.7 °F)  Pulse:  [110-124] 112  Resp:  [11-28] 14  BP: ()/(39-65) 100/56  SpO2:  [89 %-100 %] 94 %    Physical Exam  Physical Exam  Vitals and nursing note reviewed.   Constitutional:       General: She is in acute distress.      Appearance: She is ill-appearing.      Comments: Ill appearing pale ghost   HENT:      Head: Normocephalic.      Mouth/Throat:      Mouth: Mucous membranes are moist.      Comments: Black/burgundy vomiting on chin  Eyes:      Pupils: Pupils are equal, round, and reactive to light.   Cardiovascular:      Rate and Rhythm: Tachycardia present.      Heart sounds: Murmur heard.       Pulmonary:      Effort: No respiratory distress.      Breath sounds: No stridor. No wheezing or rhonchi.   Abdominal:      General: There is distension.      Palpations: There is no mass.      Tenderness: There is no abdominal tenderness. There is no rebound.      Hernia: No hernia is present.   Musculoskeletal:         General: No swelling or tenderness.   Skin:     Coloration: Skin is pale. Skin is not jaundiced.   Neurological:      Mental Status: She is alert and oriented to person, place,  and time.      Cranial Nerves: No cranial nerve deficit.      Sensory: No sensory deficit.      Motor: No weakness.      Coordination: Coordination normal.   Psychiatric:         Mood and Affect: Mood normal.         Fluids  No intake or output data in the 24 hours ending 12/04/21 0707    Laboratory  Recent Results (from the past 48 hour(s))   Fluid Cell Count    Collection Time: 12/03/21  2:00 PM   Result Value Ref Range    Fluid Type Ascites     Color-Body Fluid Yellow     Character-Body Fluid Clear     Total RBC Count <2000 cells/uL    Total WBC 38 cells/uL    Polys 5 %    Lymphs 27 %    Mononuclear Cells - Fluid 44 %    Mesothelial Cells - CSF 17 %    Fluid Histiocyte 7 %    Comments see below    FLUID ALBUMIN    Collection Time: 12/03/21  2:00 PM   Result Value Ref Range    Albumin 0.5 g/dL   FLUID TOTAL PROTEIN    Collection Time: 12/03/21  5:12 PM   Result Value Ref Range    Fluid Type Ascites     Total Protein Fluid 1.0 g/dL   CBC WITH DIFFERENTIAL    Collection Time: 12/04/21  2:59 AM   Result Value Ref Range    WBC 14.8 (H) 4.8 - 10.8 K/uL    RBC 1.93 (L) 4.20 - 5.40 M/uL    Hemoglobin 4.4 (LL) 12.0 - 16.0 g/dL    Hematocrit 15.6 (LL) 37.0 - 47.0 %    MCV 80.8 (L) 81.4 - 97.8 fL    MCH 22.8 (L) 27.0 - 33.0 pg    MCHC 28.2 (L) 33.6 - 35.0 g/dL    RDW 45.2 35.9 - 50.0 fL    Platelet Count 182 164 - 446 K/uL    MPV 12.7 9.0 - 12.9 fL    Neutrophils-Polys 80.50 (H) 44.00 - 72.00 %    Lymphocytes 12.70 (L) 22.00 - 41.00 %    Monocytes 4.20 0.00 - 13.40 %    Eosinophils 0.80 0.00 - 6.90 %    Basophils 0.30 0.00 - 1.80 %    Immature Granulocytes 1.50 (H) 0.00 - 0.90 %    Nucleated RBC 0.20 /100 WBC    Neutrophils (Absolute) 11.93 (H) 2.00 - 7.15 K/uL    Lymphs (Absolute) 1.89 1.00 - 4.80 K/uL    Monos (Absolute) 0.63 0.00 - 0.85 K/uL    Eos (Absolute) 0.12 0.00 - 0.51 K/uL    Baso (Absolute) 0.04 0.00 - 0.12 K/uL    Immature Granulocytes (abs) 0.22 (H) 0.00 - 0.11 K/uL    NRBC (Absolute) 0.03 K/uL    Hypochromia  3+ (A)     Anisocytosis 3+ (A)     Microcytosis 3+ (A)    COMP METABOLIC PANEL    Collection Time: 12/04/21  2:59 AM   Result Value Ref Range    Sodium 142 135 - 145 mmol/L    Potassium 4.2 3.6 - 5.5 mmol/L    Chloride 107 96 - 112 mmol/L    Co2 16 (L) 20 - 33 mmol/L    Anion Gap 19.0 (H) 7.0 - 16.0    Glucose 222 (H) 65 - 99 mg/dL    Bun 46 (H) 8 - 22 mg/dL    Creatinine 1.36 0.50 - 1.40 mg/dL    Calcium 8.0 (L) 8.5 - 10.5 mg/dL    AST(SGOT) 31 12 - 45 U/L    ALT(SGPT) 17 2 - 50 U/L    Alkaline Phosphatase 61 30 - 99 U/L    Total Bilirubin 0.7 0.1 - 1.5 mg/dL    Albumin 2.5 (L) 3.2 - 4.9 g/dL    Total Protein 4.9 (L) 6.0 - 8.2 g/dL    Globulin 2.4 1.9 - 3.5 g/dL    A-G Ratio 1.0 g/dL   LIPASE    Collection Time: 12/04/21  2:59 AM   Result Value Ref Range    Lipase 28 11 - 82 U/L   PROTHROMBIN TIME    Collection Time: 12/04/21  2:59 AM   Result Value Ref Range    PT 23.6 (H) 12.0 - 14.6 sec    INR 2.18 (H) 0.87 - 1.13   APTT    Collection Time: 12/04/21  2:59 AM   Result Value Ref Range    APTT 36.2 (H) 24.7 - 36.0 sec   COD (ADULT)    Collection Time: 12/04/21  2:59 AM   Result Value Ref Range    ABO Grouping Only O     Rh Grouping Only POS     Antibody Screen-Cod NEG     Component R       R99                 Red Cells, LR       [id number]   issued       12/04/21   04:56      Product Type R99     Dispense Status issued     Unit Number (Barcoded) [id number]     Product Code (Barcoded) G2648H88     Blood Type (Barcoded) 5100    ESTIMATED GFR    Collection Time: 12/04/21  2:59 AM   Result Value Ref Range    GFR If  48 (A) >60 mL/min/1.73 m 2    GFR If Non African American 40 (A) >60 mL/min/1.73 m 2   PERIPHERAL SMEAR REVIEW    Collection Time: 12/04/21  2:59 AM   Result Value Ref Range    Peripheral Smear Review see below    PLATELET ESTIMATE    Collection Time: 12/04/21  2:59 AM   Result Value Ref Range    Plt Estimation Normal    MORPHOLOGY    Collection Time: 12/04/21  2:59 AM   Result Value  Ref Range    RBC Morphology Present     Poikilocytosis 2+     Ovalocytes 2+     Tear Drop Cells 1+    DIFFERENTIAL COMMENT    Collection Time: 21  2:59 AM   Result Value Ref Range    Comments-Diff see below    FIBRINOGEN    Collection Time: 21  2:59 AM   Result Value Ref Range    Fibrinogen 139 (L) 215 - 460 mg/dL   EKG (NOW)    Collection Time: 21  3:28 AM   Result Value Ref Range    Report       [facility] Emergency Dept.    Test Date:  2021  Pt Name:    PADMAJA FLETCHER            Department: ER  MRN:        [id number]                      Room:        13  Gender:     Female                       Technician: [id number]  :        1960                   Requested By:PERCY DEE  Order #:    [id number]                    Reading MD:    Measurements  Intervals                                Axis  Rate:       115                          P:          74  GA:         120                          QRS:        75  QRSD:       88                           T:          95  QT:         356  QTc:        493    Interpretive Statements  SINUS TACHYCARDIA  LOW VOLTAGE IN FRONTAL LEADS  NONSPECIFIC T ABNORMALITIES, LATERAL LEADS  BORDERLINE PROLONGED QT INTERVAL  Compared to ECG 2021 13:51:06  T-wave abnormality now present  Sinus rhythm no longer present         Imaging  DX-CHEST-PORTABLE (1 VIEW)   Final Result         1.  No acute cardiopulmonary disease.      DX-CHEST-PORTABLE (1 VIEW)   Final Result         1.  No acute cardiopulmonary disease.          Assessment/Plan  * GIB (gastrointestinal bleeding)- (present on admission)  Assessment & Plan  Esophageal or Gastric Varices:  PPI infusion/bolus for at least 48-72 hrs pending EGD findings then will switch to ppi twice daily. Octreotide infusion until clinical stablization.  Corrected coagulopathy and have transfusion goal for a hg>7 unless clinical signs of shock.  Ceftriaxone/cipro and continue for 5-7 days for SBP  prophylaxis even if no varices  Rule out portal vein thrombosis if indicated.   If patient doesn't respond or varices are not amendable to banding will consult IR for possible emergent TIPS/BRTO procedure.   Lactulose to achieve 1500ml/>3 BM's daily to prevent hepatic encephalopathy.   Limit benzo's unless absolutely necessary  Monitor closely for signs to suggest infection     Ian's discriminatory factor is: low    GI consulted Dr Berman        Hemorrhagic shock (HCC)  Assessment & Plan  Continue to follow shock index and active resus  Cordis in place  Map goal > 60  Avoid to aggressive resus with increasing CVP and portal pressure and inducing further likely portal bleeding.     Alcohol abuse- (present on admission)  Assessment & Plan  Hx of describes is absences      Acute blood loss anemia- (present on admission)  Assessment & Plan  S/p 4 prbc, 2 ffp, 1 plat  Continue serial CBC Q6, serial teg, ionized calcium  Restrictive transfusion hg < 7    Ascites- (present on admission)  Assessment & Plan  Consider diagnostic para once stable, avoid large volume para with acute shock and unstability  On C3 therapy    Chronic hepatitis C virus infection (CMS-HCC)- (present on admission)  Assessment & Plan  Hx of    Cirrhosis (HCC)- (present on admission)  Assessment & Plan  Serial monitor meld and consider lactulose due large gut load of blood  Nutrition support       DM2 (diabetes mellitus, type 2) (HCC)- (present on admission)  Assessment & Plan  Insulin sliding scale coverage      Discussed patient condition and risk of morbidity and/or mortality with RN, RT, Pharmacy, Code status disscussed and Patient.      The patient remains critically ill from acute GI bleed and hemorrhagic shock with massive blood resus.  Critical care time = 55 minutes in directly providing and coordinating critical care and extensive data review.  No time overlap and excludes procedures.

## 2021-12-05 PROBLEM — N17.9 ACUTE KIDNEY INJURY (HCC): Status: ACTIVE | Noted: 2021-01-01

## 2021-12-05 NOTE — ASSESSMENT & PLAN NOTE
Due to ATN from hemorrhagic shock  IV fluid resuscitation - begin bicarbonate drip  Monitor renal function and urine output  Avoid nephrotoxins and renal dose medications

## 2021-12-05 NOTE — PROGRESS NOTES
0330: Dr. Peralta notified of continued low u/o as well as substantial increase in levophed gtt overnight. H/H stable. No overt signs of bleeding at this time.   0430: Dr. Peratla to bedside to evaluate. 1L NS bolus as well as vasopressin ordered.

## 2021-12-05 NOTE — PROGRESS NOTES
Gastroenterology Progress Note               Author:  Yoli BURNS/Willa Berman MD Date & Time Created: 12/5/2021 11:38 AM       Patient ID:  Name:             Demetrice Jaime  YOB: 1960  Age:                 61 y.o.  female  MRN:               [id number]      Referring Provider:  Danielle Urbano,         Medical Decision Making, by Problem:  Active Hospital Problems    Diagnosis     GIB (gastrointestinal bleeding) [K92.2]     Hemorrhagic shock (HCC) [R57.8]     Acute hypoxemic respiratory failure (HCC) [J96.01]     Alcohol abuse [F10.10]     Acute blood loss anemia [D62]     Ascites [R18.8]     Chronic hepatitis C virus infection (CMS-HCC) [B18.2]     Cirrhosis (HCC) [K74.60]     DM2 (diabetes mellitus, type 2) (HCC) [E11.9]            Assessment/Recommendations:  The patient is a very pleasant 61-year-old woman with a history of EtOH/HCV cirrhosis (complicated by EV, rectal varices, portal hypertensive gastropathy, ascites, and HE), HCV (status post treatment with SVR), GERD, PUD, HTN, diabetes, MI, fibromyalgia, obesity, FLORINDA, and prior CVA who presented with acute onset hematemesis/coffee-ground emesis and melena with fatigue/weakness.    UGI bleed with posthemorrhagic anemia and hemorrhagic shock: EGD 12/4/21 revealed 3 columns of medium to large esophageal varices; EVL x 5.   -N.p.o.  -2 large-bore IVs  -Continue supportive care and IVF  -Trend hemoglobin every 8 hours  -Continue Protonix drip 8 mg/hour x 24 hours then switch to IV PPI BID   -Continue octreotide drip 50 mcg/hour x 3 days then stop  -Continue ceftriaxone 1 g IV daily  -Avoid NSAIDs  -It is okay to continue ASA 81 mg daily  -Transfuse for goal hemoglobin > 7  -Transfuse for goal platelet count > 50  -Low threshold for IR consult for TIPS  -Would keep a [place] tube at bedside    Decompensated EtOH/HCV cirrhosis: History of EV status post prior EV bleed and recurrent need for EV BL. Has previously been on and NSBB  for prevention of bleeding. She also has a history of ascites requiring paracentesis and diuretics as well as HE on lactulose and rifaximin. MELD is 18 today. Per her outpatient GI notes, she was previously determined not to be a transplant candidate due to social issues and prior polysubstance abuse. Would hold current outpatient medications until acute UGI bleeding is controlled.  Once acute UGI bleeding controlled:  -Start rifaximin 550 mg p.o. twice daily  -Start lactulose 30 mL 3 times daily and titrate to 3-4 soft bowel movements  -Daily MELD labs (CMP, INR)  -Hold spironolactone/Lasix  -Abdominal US with Dopplers to rule out PVT/HPT and other underlying pathology  -Low threshold for transfer to higher level of care      KAREN [email or web address].      Thank you for inviting me to participate in the care of this patient. Please do not hesitate to call GI consultants with additional questions/concerns or changes in the patient's clinical status at [phone].    ________________________________________________________________________________________________________________________________________________________      Presenting Chief Complaint:  Hematemesis  Melena  Abdominal pain  Fatigue/weakness      History of Present Illness:    This is a very pleasant 61 y.o. female with the past medical history as listed below. She notes acute onset hematemesis and coffee-ground emesis starting yesterday prior to presentation to the ER. She states that yesterday she had significant weakness/fatigue and kept falling multiple times throughout the day. She presented to the ER at 2:30 AM where she was noted to have a heart rate of 126 but was normotensive. During her time in the ER, her SBP did drop as low as 70s and most recently she has hypertensive. Her hemoglobin was 4.4. Her platelet count is 182 and her INR is 2.18. She has been stressed and having a hard time recently as her mother passed away a few days ago. She  denies EtOH use. She is very focused on contacting her sister-in-law and brother but no contact information is available and she does not have her phone. She is also very worried about being able to make her mother's  on Thursday.    A right IJ Cordis has been placed. She is an octreotide 50 mcg/hour and Protonix 80 mg/hour. She has received 4 units PRBCs, 2 units FFP, and 1 unit platelets. No repeat hemoglobin since transfusion. Currently 1 unit PRBCs is running.    Of note, she was last seen on 2021 where she had reported running out of her diuretics which were represcribed (patient states today she has not been taking them recently). She also had slightly worsening HE related symptoms despite lactulose and was started on rifaximin. She has previously had EV bleeding and has been on and an NSBB.  She was previously determined not to be a transplant candidate given social issues and prior polysubstance abuse (per prior GI notes).    INTERVAL HISTORY:    21: Intubated and Sedated. Remains hypotensive on pressors. Hgb: 8.6 (9.3 on 21). No melenic stool.  mls in 24 hours.    Review of Systems:  Review of Systems   Unable to perform ROS: Intubated             Past Medical History:  Past Medical History:   Diagnosis Date    Anemia     Anesthesia     pt's mother has hard time waking up = states it took a week or two    Arrhythmia     pt unsure what rhythm     Arthritis     generalized    Breath shortness     with exertion    Cataract     removed bilat    Cirrhosis (HCC)     Congestive heart failure (HCC)     Dental disorder     dentures upper    Diabetes     insulin    Esophageal varices (HCC)     Fall     Fibromyalgia     GERD (gastroesophageal reflux disease)     Hepatitis C     states received treatment     Hiatus hernia syndrome     High cholesterol     Hypertension 2020    Indigestion     Myocardial infarct (HCC) ?    Obesity     Other specified disorder of intestines      "constipation/diarrhea    Pain 11/03/2020    \"my legs always hurt\"; 5/10    Psychiatric problem     depression and anxiety    Restless leg syndrome     Sleep apnea     has had sleep study, no cpap    Snoring     Stroke (HCC) 2011    no residual problems     Thrombocytopenia (HCC)     Unspecified hemorrhagic conditions     reports nose bleeds    Urinary bladder disorder     frequency/urgency    Urinary incontinence      Active Hospital Problems    Diagnosis     GIB (gastrointestinal bleeding) [K92.2]     Hemorrhagic shock (HCC) [R57.8]     Acute hypoxemic respiratory failure (HCC) [J96.01]     Alcohol abuse [F10.10]     Acute blood loss anemia [D62]     Ascites [R18.8]     Chronic hepatitis C virus infection (CMS-HCC) [B18.2]     Cirrhosis (HCC) [K74.60]     DM2 (diabetes mellitus, type 2) (HCC) [E11.9]          Past Surgical History:  Past Surgical History:   Procedure Laterality Date    PB UPPER GI ENDOSCOPY,DIAGNOSIS N/A 11/1/2021    Procedure: GASTROSCOPY - WITH BANDING;  Surgeon: Anthony Dent M.D.;  Location: SURGERY [facility];  Service: Gastroenterology    PB UPPER GI ENDOSCOPY,DIAGNOSIS N/A 9/28/2021    Procedure: GASTROSCOPY;  Surgeon: Anthony Dent M.D.;  Location: SURGERY SAME DAY [facility];  Service: Gastroenterology    PB UPPER GI ENDOSCOPY,LIGAT VARIX N/A 9/28/2021    Procedure: GASTROSCOPY, WITH BANDING;  Surgeon: Anthony Dent M.D.;  Location: SURGERY SAME DAY [facility];  Service: Gastroenterology    PB COLONOSCOPY,DIAGNOSTIC N/A 2/15/2021    Procedure: COLONOSCOPY;  Surgeon: Anthony Dent M.D.;  Location: [facility];  Service: Gastroenterology    PB UPPER GI ENDOSCOPY,DIAGNOSIS N/A 2/15/2021    Procedure: GASTROSCOPY - W/BANDING.;  Surgeon: Anthony Dent M.D.;  Location: [facility];  Service: Gastroenterology    CATH REMOVAL  11/4/2020    Procedure: REMOVAL, CATHETER- INFECTED PORT A CATH;  Surgeon: Leonidas Mcmanus M.D.;  Location: [facility] [place];  Service: General    GASTROSCOPY N/A 2/12/2018    Procedure: GASTROSCOPY;  Surgeon: Willard Bustamante M.D.;  Location: SURGERY [facility];  Service: Gastroenterology    GASTROSCOPY WITH BANDING N/A 2/8/2018    Procedure: GASTROSCOPY WITH BANDING;  Surgeon: Davonte Mauricio M.D.;  Location: SURGERY SAME DAY [facility];  Service: Gastroenterology    GASTROSCOPY N/A 8/21/2017    Procedure: GASTROSCOPY WITH BANDING  ;  Surgeon: Anthony Dent M.D.;  Location: SURGERY SAME DAY [facility];  Service:     CATH PLACEMENT  12/22/2014    Performed by Helga Santiago M.D. at SURGERY SAME DAY [facility]S    CATH REMOVAL  11/14/2014    Performed by Helga Santiago M.D. at SURGERY [facility]    GASTROSCOPY WITH BANDING  7/8/2014    Performed by Davonte Mauricio M.D. at ENDOSCOPY [facility] ORS    GASTROSCOPY WITH CLIPPING  7/6/2014    Performed by Joshua Vigil M.D. at ENDOSCOPY [facility] ORS    ANTERIOR AND POSTERIOR REPAIR  12/6/2013    Performed by Isaías Lambert M.D. at SURGERY SAME DAY [facility] ORS    ENTEROCELE REPAIR  12/6/2013    Performed by Isaías Lambert M.D. at SURGERY SAME DAY [facility] ORS    BLADDER SLING FEMALE  12/6/2013    Performed by Isaías Lambert M.D. at SURGERY SAME DAY [facility]    GYN SURGERY  2010    hysterectomy    OTHER  2010    port placement    CATARACT EXTRACTION WITH IOL Bilateral     GYN SURGERY      tubal ligation    OTHER      oral surgery    TONSILLECTOMY           Prior Endoscopic Procedures:  EGD 11/1/2021: 3 columns medium sized EV with red garth signs and fibrosis from prior banding (EVBL x3), diffuse portal hypertensive gastropathy, no gastric varices      Hospital Medications:  Current Facility-Administered Medications   Medication Dose Frequency Provider Last Rate Last Admin    lactated ringers infusion   Continuous Robyn Peralta M.D. 150 mL/hr at 12/05/21 0448 Rate Change at 12/05/21 0448    vasopressin (VASOSTRICT) 20 Units in   mL Infusion  0.03 Units/min Continuous Robyn Peralta M.D. 9 mL/hr at 12/05/21 0540 0.03 Units/min at 12/05/21 0540    propofol (DIPRIVAN) injection  0-80 mcg/kg/min Continuous Cooper Torres M.D. 9.3 mL/hr at 12/05/21 1000 20 mcg/kg/min at 12/05/21 1000    octreotide (SANDOSTATIN) 1,250 mcg in  mL Infusion  50 mcg/hr Continuous Danielle KAREN Urbano D.O. 10 mL/hr at 12/05/21 0451 50 mcg/hr at 12/05/21 0451    cefTRIAXone (Rocephin) 1 g in  mL IVPB  1 g Q24HRS Cooper Torres M.D.   Stopped at 12/05/21 0610    insulin regular (HumuLIN R,NovoLIN R) injection  2-9 Units Q6HRS Kenji Delacruz M.D.   2 Units at 12/04/21 1724    And    dextrose 50% (D50W) injection 50 mL  50 mL Q15 MIN PRN Kenji Delacruz M.D.        ondansetron (ZOFRAN) syringe/vial injection 4 mg  4 mg Q4HRS PRN Kenji Delacruz M.D.        norepinephrine (Levophed) 8 mg in 250 mL NS infusion (premix)  0-30 mcg/min Continuous Cooper Torres M.D. 56.3 mL/hr at 12/05/21 0950 30 mcg/min at 12/05/21 0950    pantoprazole (PROTONIX) injection 40 mg  40 mg BID Cooper Torres M.D.   40 mg at 12/05/21 0541    Respiratory Therapy Consult   Continuous RT Cooper Torres M.D. MD Alert...ICU Electrolyte Replacement per Pharmacy   PHARMACY TO DOSE Cooper Torres M.D.        lidocaine (XYLOCAINE) 1 % injection 2 mL  2 mL Q30 MIN PRN Cooper Torres M.D.        ipratropium-albuterol (DUONEB) nebulizer solution  3 mL Q2HRS PRN (RT) Cooper Torres M.D.        thiamine (B-1) 500 mg in dextrose 5% 100 mL IVPB  500 mg DAILY Cooper Torres M.D. 200 mL/hr at 12/05/21 0541 500 mg at 12/05/21 0541    Followed by    [START ON 12/7/2021] thiamine (Vitamin B-1) tablet 100 mg  100 mg DAILY Cooper Torres M.D.        HYDROmorphone (DILAUDID) 0.2 mg/mL in 50mL NS (Continuous Infusion)   Continuous Cooper Torres M.D.   Dose not Required at 12/04/21 1330    HYDROmorphone  "(Dilaudid) injection 0.5-1 mg  0.5-1 mg Q HOUR PRN Cooper Torres M.D.   1 mg at 12/05/21 0903    Or    HYDROmorphone (DILAUDID) injection 1.25-2 mg  1.25-2 mg Q HOUR PRN Cooper Torres M.D.       Last reviewed on 12/4/2021  3:13 PM by Jayde Dunn R.N.        Current Outpatient Medications:  Medications Prior to Admission   Medication Sig Dispense Refill Last Dose    furosemide (LASIX) 40 MG Tab Take 1 Tablet by mouth every morning. 180 Tablet 0 \"few days\"    spironolactone (ALDACTONE) 50 MG Tab Take 1 Tablet by mouth every day. 180 Tablet 0 \"few days\"    lisinopril (PRINIVIL) 20 MG Tab Take 1 Tablet by mouth every morning. 180 Tablet 0 \"few days\"    omeprazole (PRILOSEC) 20 MG delayed-release capsule Take 1 Capsule by mouth every morning. 180 Capsule 0 \"few days\"    gabapentin (NEURONTIN) 800 MG tablet Take 3 Tablets by mouth every day. 360 Tablet 0 \"few days\"    atorvastatin (LIPITOR) 40 MG Tab Take 1 Tablet by mouth every morning. 180 Tablet 0 \"few days\"    aspirin 81 MG EC tablet Take 1 Tablet by mouth 2 times a day. 180 Tablet 0 \"few days\"    insulin glargine (LANTUS) 100 UNIT/ML Solution Inject 20-30 Units under the skin 2 times a day. 30 units in am 20 units at hs (Patient taking differently: Inject 30 Units under the skin every evening. 30 units in am 20 units at hs) 30 mL 3 \"few days\"    traZODone (DESYREL) 100 MG Tab Take 1 Tablet by mouth at bedtime as needed for Sleep. 180 Tablet 0 \"few days\"    dicyclomine (BENTYL) 10 MG Cap Take 10 mg by mouth 2 (two) times a day.   \"few days\"    lactulose 10 GM/15ML Solution Take 30 mL by mouth 2 (two) times a day.   \"few days\"    OXcarbazepine (TRILEPTAL) 300 MG Tab Take 300-600 mg by mouth 2 (two) times a day. 1 tab = 300 mg every morning  2 tabs = 600 mg every evening   \"few days\"         Medication Allergies:  Allergies   Allergen Reactions    Codeine Rash     sorethroat    Penicillins Hives and Itching     Hives, itching   Other reaction(s): " Pruritus         Family Medical History:  Family History   Problem Relation Age of Onset    Diabetes Mother     Hypertension Mother          Social History:  Social History     Socioeconomic History    Marital status:      Spouse name: Not on file    Number of children: Not on file    Years of education: Not on file    Highest education level: Not on file   Occupational History    Not on file   Tobacco Use    Smoking status: Current Every Day Smoker     Packs/day: 0.25     Years: 45.00     Pack years: 11.25     Types: Cigarettes    Smokeless tobacco: Never Used   Vaping Use    Vaping Use: Never used   Substance and Sexual Activity    Alcohol use: Yes     Comment: quit 3 week in August     Drug use: Not Currently     Comment: 02/2021- CBD gummies for sleep ,  Hx IV meth use, states she used 12/18/17 for first time in yrs     Sexual activity: Not on file   Other Topics Concern    Not on file   Social History Narrative    Not on file     Social Determinants of Health     Financial Resource Strain:     Difficulty of Paying Living Expenses: Not on file   Food Insecurity: Food Insecurity Present    Worried About Running Out of Food in the Last Year: Sometimes true    Ran Out of Food in the Last Year: Sometimes true   Transportation Needs: No Transportation Needs    Lack of Transportation (Medical): No    Lack of Transportation (Non-Medical): No   Physical Activity:     Days of Exercise per Week: Not on file    Minutes of Exercise per Session: Not on file   Stress:     Feeling of Stress : Not on file   Social Connections:     Frequency of Communication with Friends and Family: Not on file    Frequency of Social Gatherings with Friends and Family: Not on file    Attends Hoahaoism Services: Not on file    Active Member of Clubs or Organizations: Not on file    Attends Club or Organization Meetings: Not on file    Marital Status: Not on file   Intimate Partner Violence:     Fear of Current or Ex-Partner: Not on file     "Emotionally Abused: Not on file    Physically Abused: Not on file    Sexually Abused: Not on file   Housing Stability:     Unable to Pay for Housing in the Last Year: Not on file    Number of Places Lived in the Last Year: Not on file    Unstable Housing in the Last Year: Not on file         Vital signs:  Weight/BMI: Body mass index is 26.79 kg/m².  BP (!) 97/30   Pulse (!) 116   Temp 36.1 °C (97 °F)   Resp 17   Ht 1.702 m (5' 7\")   Wt 77.6 kg (171 lb 1.2 oz)   SpO2 100%   Vitals:    12/05/21 0615 12/05/21 0630 12/05/21 0800 12/05/21 1013   BP: (!) 96/37 (!) 110/33 (!) 97/30    Pulse: (!) 111 (!) 117 (!) 110 (!) 116   Resp: (!) 25 15 (!) 25 17   Temp:       TempSrc:   Bladder    SpO2: 100% 100% 100% 100%   Weight:       Height:         Oxygen Therapy:  Pulse Oximetry: 100 %, FiO2%: 30 %, O2 Delivery Device: Ventilator    Intake/Output Summary (Last 24 hours) at 12/5/2021 1138  Last data filed at 12/5/2021 0600  Gross per 24 hour   Intake 6240.82 ml   Output 2490 ml   Net 3750.82 ml         Physical Exam:  Physical Exam  Vitals reviewed.   Constitutional:       Appearance: She is ill-appearing.      Interventions: She is sedated and intubated.   HENT:      Head: Normocephalic and atraumatic.      Nose: Nose normal.   Eyes:      General: No scleral icterus.  Cardiovascular:      Rate and Rhythm: Regular rhythm. Tachycardia present.      Heart sounds: Normal heart sounds. No murmur heard.  No gallop.    Pulmonary:      Effort: Pulmonary effort is normal. No respiratory distress. She is intubated.      Breath sounds: Normal breath sounds. No wheezing, rhonchi or rales.   Abdominal:      General: Abdomen is flat. Bowel sounds are normal. There is no distension.      Palpations: Abdomen is soft. There is no mass.      Tenderness: There is no guarding or rebound.   Musculoskeletal:         General: Normal range of motion.      Cervical back: Normal range of motion.   Skin:     General: Skin is warm and dry.      " Coloration: Skin is not jaundiced.   Neurological:      Mental Status: She is unresponsive.             Labs:  Recent Labs     12/04/21 0259 12/04/21 0731 12/04/21 1722 12/05/21 0255 12/05/21  0625   SODIUM 142   < > 144 142 142   POTASSIUM 4.2   < > 4.9 5.2 5.1   CHLORIDE 107   < > 117* 114* 115*   CO2 16*   < > 15* 15* 13*   BUN 46*   < > 44* 50* 49*   CREATININE 1.36   < > 1.08 1.59* 1.67*   MAGNESIUM 2.2  --   --  1.9  --    PHOSPHORUS 4.2  --   --  5.0*  --    CALCIUM 8.0*   < > 7.1* 7.3* 7.0*    < > = values in this interval not displayed.     Recent Labs     12/04/21 0259 12/04/21 0731 12/04/21 1722 12/05/21 0255 12/05/21  0625   ALTSGPT 17  --   --   --   --    ASTSGOT 31  --   --   --   --    ALKPHOSPHAT 61  --   --   --   --    TBILIRUBIN 0.7  --   --   --   --    LIPASE 28  --   --   --   --    GLUCOSE 222*   < > 217* 123* 119*    < > = values in this interval not displayed.     Recent Labs     12/04/21 0259 12/04/21 1130 12/04/21  2340 12/05/21 0255 12/05/21  0625   WBC 14.8*   < > 21.5* 25.7* 26.2*   NEUTSPOLYS 80.50*  --   --  85.10*  --    LYMPHOCYTES 12.70*  --   --  9.60*  --    MONOCYTES 4.20  --   --  2.60  --    EOSINOPHILS 0.80  --   --  0.90  --    BASOPHILS 0.30  --   --  1.80  --    ASTSGOT 31  --   --   --   --    ALTSGPT 17  --   --   --   --    ALKPHOSPHAT 61  --   --   --   --    TBILIRUBIN 0.7  --   --   --   --     < > = values in this interval not displayed.     Recent Labs     12/04/21 0259 12/04/21 1130 12/04/21  2340 12/05/21  0255 12/05/21  0625   RBC 1.93*   < > 3.31* 3.36* 3.03*   HEMOGLOBIN 4.4*   < > 9.2* 9.3* 8.6*   HEMATOCRIT 15.6*   < > 27.5* 27.9* 25.7*   PLATELETCT 182   < > 142* 138* 120*   PROTHROMBTM 23.6*  --   --   --   --    APTT 36.2*  --   --   --   --    INR 2.18*  --   --   --   --     < > = values in this interval not displayed.     Recent Results (from the past 24 hour(s))   POCT arterial blood gas device results    Collection Time: 12/04/21 12:32  PM   Result Value Ref Range    Ph 7.314 (L) 7.400 - 7.500    Pco2 27.8 26.0 - 37.0 mmHg    Po2 193 (H) 64 - 87 mmHg    Tco2 15 (L) 20 - 33 mmol/L    S02 100 (H) 93 - 99 %    Hco3 14.1 (L) 17.0 - 25.0 mmol/L    BE -11 (L) -4 - 3 mmol/L    Body Temp 36.2 C degrees    O2 Therapy 40 %    iPF Ratio 483     Ph Temp Faisal 7.326 (L) 7.400 - 7.500    Pco2 Temp Co 26.8 26.0 - 37.0 mmHg    Po2 Temp Cor 188 (H) 64 - 87 mmHg    Specimen Arterial     DelSys Vent     End Tidal Carbon Dioxide 26 mmhg    Tidal Volume 350 mL    Peep End Expiratory Pressure 8 cmh20    Set Rate 24     Mode APV-CMV    CBC WITHOUT DIFFERENTIAL    Collection Time: 12/04/21  2:00 PM   Result Value Ref Range    WBC 15.6 (H) 4.8 - 10.8 K/uL    RBC 3.12 (L) 4.20 - 5.40 M/uL    Hemoglobin 9.0 (L) 12.0 - 16.0 g/dL    Hematocrit 26.8 (L) 37.0 - 47.0 %    MCV 85.9 81.4 - 97.8 fL    MCH 28.8 27.0 - 33.0 pg    MCHC 33.6 33.6 - 35.0 g/dL    RDW 51.0 (H) 35.9 - 50.0 fL    Platelet Count 92 (L) 164 - 446 K/uL    MPV 11.4 9.0 - 12.9 fL   PLATELET MAPPING WITH BASIC TEG    Collection Time: 12/04/21  2:00 PM   Result Value Ref Range    Reaction Time Initial-R 5.2 4.6 - 9.1 min    React Time Initial Hep 5.2 4.3 - 8.3 min    Clot Kinetics-K 1.8 0.8 - 2.1 min    Clot Angle-Angle 70.2 63.0 - 78.0 degrees    Maximum Clot Strength-MA 51.1 (L) 52.0 - 69.0 mm    TEG Functional Fibrinogen(MA) 13.1 (L) 15.0 - 32.0 mm    Lysis 30 minutes-LY30 0.0 0.0 - 2.6 %    % Inhibition ADP 76.0 (H) 0.0 - 17.0 %    % Inhibition AA 34.4 (H) 0.0 - 11.0 %    TEG Algorithm Link Algorithm    POCT glucose device results    Collection Time: 12/04/21  5:21 PM   Result Value Ref Range    Glucose - Accu-Ck 193 (H) 65 - 99 mg/dL   CBC WITHOUT DIFFERENTIAL    Collection Time: 12/04/21  5:22 PM   Result Value Ref Range    WBC 16.2 (H) 4.8 - 10.8 K/uL    RBC 3.33 (L) 4.20 - 5.40 M/uL    Hemoglobin 9.6 (L) 12.0 - 16.0 g/dL    Hematocrit 28.1 (L) 37.0 - 47.0 %    MCV 84.4 81.4 - 97.8 fL    MCH 28.8 27.0 - 33.0  pg    MCHC 34.2 33.6 - 35.0 g/dL    RDW 50.6 (H) 35.9 - 50.0 fL    Platelet Count 106 (L) 164 - 446 K/uL    MPV 11.3 9.0 - 12.9 fL   Basic Metabolic Panel    Collection Time: 12/04/21  5:22 PM   Result Value Ref Range    Sodium 144 135 - 145 mmol/L    Potassium 4.9 3.6 - 5.5 mmol/L    Chloride 117 (H) 96 - 112 mmol/L    Co2 15 (L) 20 - 33 mmol/L    Glucose 217 (H) 65 - 99 mg/dL    Bun 44 (H) 8 - 22 mg/dL    Creatinine 1.08 0.50 - 1.40 mg/dL    Calcium 7.1 (L) 8.5 - 10.5 mg/dL    Anion Gap 12.0 7.0 - 16.0   IONIZED CALCIUM    Collection Time: 12/04/21  5:22 PM   Result Value Ref Range    Ionized Calcium 1.0 (L) 1.1 - 1.3 mmol/L   ESTIMATED GFR    Collection Time: 12/04/21  5:22 PM   Result Value Ref Range    GFR If African American >60 >60 mL/min/1.73 m 2    GFR If Non African American 52 (A) >60 mL/min/1.73 m 2   CBC WITHOUT DIFFERENTIAL    Collection Time: 12/04/21 11:40 PM   Result Value Ref Range    WBC 21.5 (H) 4.8 - 10.8 K/uL    RBC 3.31 (L) 4.20 - 5.40 M/uL    Hemoglobin 9.2 (L) 12.0 - 16.0 g/dL    Hematocrit 27.5 (L) 37.0 - 47.0 %    MCV 83.1 81.4 - 97.8 fL    MCH 27.8 27.0 - 33.0 pg    MCHC 33.5 (L) 33.6 - 35.0 g/dL    RDW 50.4 (H) 35.9 - 50.0 fL    Platelet Count 142 (L) 164 - 446 K/uL    MPV 11.7 9.0 - 12.9 fL   POCT glucose device results    Collection Time: 12/05/21 12:34 AM   Result Value Ref Range    Glucose - Accu-Ck 111 (H) 65 - 99 mg/dL   POCT arterial blood gas device results    Collection Time: 12/05/21  1:39 AM   Result Value Ref Range    Ph 7.120 (LL) 7.400 - 7.500    Pco2 56.5 (HH) 26.0 - 37.0 mmHg    Po2 34 (LL) 64 - 87 mmHg    Tco2 20 20 - 33 mmol/L    S02 47 (L) 93 - 99 %    Hco3 18.4 17.0 - 25.0 mmol/L    BE -11 (L) -4 - 3 mmol/L    Body Temp 37.0 C degrees    O2 Therapy 30 %    iPF Ratio 113     Ph Temp Faisal 7.120 (LL) 7.400 - 7.500    Pco2 Temp Co 56.5 (HH) 26.0 - 37.0 mmHg    Po2 Temp Cor 34 (LL) 64 - 87 mmHg    Specimen Arterial     DelSys Vent     End Tidal Carbon Dioxide 31 mmhg     Tidal Volume 350 mL    Peep End Expiratory Pressure 8 cmh20    Set Rate 18     Mode APV-CMV    PLATELET MAPPING WITH BASIC TEG    Collection Time: 12/05/21  2:55 AM   Result Value Ref Range    Reaction Time Initial-R 4.6 4.6 - 9.1 min    React Time Initial Hep 4.6 4.3 - 8.3 min    Clot Kinetics-K 1.4 0.8 - 2.1 min    Clot Angle-Angle 73.1 63.0 - 78.0 degrees    Maximum Clot Strength-MA 55.7 52.0 - 69.0 mm    TEG Functional Fibrinogen(MA) 16.4 15.0 - 32.0 mm    Lysis 30 minutes-LY30 0.0 0.0 - 2.6 %    % Inhibition ADP 66.2 (H) 0.0 - 17.0 %    % Inhibition AA 29.2 (H) 0.0 - 11.0 %    TEG Algorithm Link Algorithm    CBC with Differential    Collection Time: 12/05/21  2:55 AM   Result Value Ref Range    WBC 25.7 (H) 4.8 - 10.8 K/uL    RBC 3.36 (L) 4.20 - 5.40 M/uL    Hemoglobin 9.3 (L) 12.0 - 16.0 g/dL    Hematocrit 27.9 (L) 37.0 - 47.0 %    MCV 83.0 81.4 - 97.8 fL    MCH 27.7 27.0 - 33.0 pg    MCHC 33.3 (L) 33.6 - 35.0 g/dL    RDW 51.4 (H) 35.9 - 50.0 fL    Platelet Count 138 (L) 164 - 446 K/uL    MPV 11.2 9.0 - 12.9 fL    Neutrophils-Polys 85.10 (H) 44.00 - 72.00 %    Lymphocytes 9.60 (L) 22.00 - 41.00 %    Monocytes 2.60 0.00 - 13.40 %    Eosinophils 0.90 0.00 - 6.90 %    Basophils 1.80 0.00 - 1.80 %    Nucleated RBC 1.80 /100 WBC    Neutrophils (Absolute) 21.87 (H) 2.00 - 7.15 K/uL    Lymphs (Absolute) 2.47 1.00 - 4.80 K/uL    Monos (Absolute) 0.67 0.00 - 0.85 K/uL    Eos (Absolute) 0.23 0.00 - 0.51 K/uL    Baso (Absolute) 0.46 (H) 0.00 - 0.12 K/uL    NRBC (Absolute) 0.45 K/uL    Anisocytosis 1+     Macrocytosis 1+    Basic Metabolic Panel (BMP)    Collection Time: 12/05/21  2:55 AM   Result Value Ref Range    Sodium 142 135 - 145 mmol/L    Potassium 5.2 3.6 - 5.5 mmol/L    Chloride 114 (H) 96 - 112 mmol/L    Co2 15 (L) 20 - 33 mmol/L    Glucose 123 (H) 65 - 99 mg/dL    Bun 50 (H) 8 - 22 mg/dL    Creatinine 1.59 (H) 0.50 - 1.40 mg/dL    Calcium 7.3 (L) 8.5 - 10.5 mg/dL    Anion Gap 13.0 7.0 - 16.0   Magnesium     Collection Time: 12/05/21  2:55 AM   Result Value Ref Range    Magnesium 1.9 1.5 - 2.5 mg/dL   Phosphorus    Collection Time: 12/05/21  2:55 AM   Result Value Ref Range    Phosphorus 5.0 (H) 2.5 - 4.5 mg/dL   IONIZED CALCIUM    Collection Time: 12/05/21  2:55 AM   Result Value Ref Range    Ionized Calcium 1.0 (L) 1.1 - 1.3 mmol/L   HEMOGLOBIN A1C    Collection Time: 12/05/21  2:55 AM   Result Value Ref Range    Glycohemoglobin 5.1 4.0 - 5.6 %    Est Avg Glucose 100 mg/dL   ESTIMATED GFR    Collection Time: 12/05/21  2:55 AM   Result Value Ref Range    GFR If  40 (A) >60 mL/min/1.73 m 2    GFR If Non  33 (A) >60 mL/min/1.73 m 2   DIFFERENTIAL MANUAL    Collection Time: 12/05/21  2:55 AM   Result Value Ref Range    Manual Diff Status PERFORMED    PERIPHERAL SMEAR REVIEW    Collection Time: 12/05/21  2:55 AM   Result Value Ref Range    Peripheral Smear Review see below    PLATELET ESTIMATE    Collection Time: 12/05/21  2:55 AM   Result Value Ref Range    Plt Estimation Decreased    MORPHOLOGY    Collection Time: 12/05/21  2:55 AM   Result Value Ref Range    RBC Morphology Present     Poikilocytosis 1+     Ovalocytes 1+     Echinocytes 1+    POCT arterial blood gas device results    Collection Time: 12/05/21  5:02 AM   Result Value Ref Range    Ph 7.294 (LL) 7.400 - 7.500    Pco2 31.1 26.0 - 37.0 mmHg    Po2 163 (H) 64 - 87 mmHg    Tco2 16 (L) 20 - 33 mmol/L    S02 99 93 - 99 %    Hco3 15.1 (L) 17.0 - 25.0 mmol/L    BE -10 (L) -4 - 3 mmol/L    Body Temp 36.3 C degrees    O2 Therapy 40 %    iPF Ratio 408     Ph Temp Faisal 7.304 (L) 7.400 - 7.500    Pco2 Temp Co 30.1 26.0 - 37.0 mmHg    Po2 Temp Cor 159 (H) 64 - 87 mmHg    Specimen Arterial     DelSys Vent     End Tidal Carbon Dioxide 28 mmhg    Tidal Volume 350 mL    Peep End Expiratory Pressure 8 cmh20    Set Rate 24     Mode APV-CMV    POCT glucose device results    Collection Time: 12/05/21  5:59 AM   Result Value Ref Range    Glucose -  Accu-Ck 99 65 - 99 mg/dL   CBC WITHOUT DIFFERENTIAL    Collection Time: 12/05/21  6:25 AM   Result Value Ref Range    WBC 26.2 (H) 4.8 - 10.8 K/uL    RBC 3.03 (L) 4.20 - 5.40 M/uL    Hemoglobin 8.6 (L) 12.0 - 16.0 g/dL    Hematocrit 25.7 (L) 37.0 - 47.0 %    MCV 84.8 81.4 - 97.8 fL    MCH 28.4 27.0 - 33.0 pg    MCHC 33.5 (L) 33.6 - 35.0 g/dL    RDW 53.1 (H) 35.9 - 50.0 fL    Platelet Count 120 (L) 164 - 446 K/uL    MPV 10.9 9.0 - 12.9 fL   Basic Metabolic Panel    Collection Time: 12/05/21  6:25 AM   Result Value Ref Range    Sodium 142 135 - 145 mmol/L    Potassium 5.1 3.6 - 5.5 mmol/L    Chloride 115 (H) 96 - 112 mmol/L    Co2 13 (L) 20 - 33 mmol/L    Glucose 119 (H) 65 - 99 mg/dL    Bun 49 (H) 8 - 22 mg/dL    Creatinine 1.67 (H) 0.50 - 1.40 mg/dL    Calcium 7.0 (L) 8.5 - 10.5 mg/dL    Anion Gap 14.0 7.0 - 16.0   ESTIMATED GFR    Collection Time: 12/05/21  6:25 AM   Result Value Ref Range    GFR If  38 (A) >60 mL/min/1.73 m 2    GFR If Non  31 (A) >60 mL/min/1.73 m 2         Radiology Review:  DX-CHEST-PORTABLE (1 VIEW)   Final Result         1. Interval removal of gastric drainage tube. No pulmonary infiltrates or consolidations are noted.      DX-CHEST-PORTABLE (1 VIEW)   Final Result      1.  Endotracheal tube tip projects in satisfactory position above the jose m.      2.  NG tube courses to the stomach.      3.  Clear chest.      DX-CHEST-PORTABLE (1 VIEW)   Final Result         1.  No acute cardiopulmonary disease.      DX-CHEST-PORTABLE (1 VIEW)   Final Result         1.  No acute cardiopulmonary disease.            MDM (Data Review):   -Records reviewed and summarized in current documentation  -I personally reviewed and interpreted the laboratory results  -I personally reviewed the radiology images    GI WILL SIGN OFF PLEASE CALL IF ANY QUESTIONS OR CONCERNS    Core Quality Measures   Reviewed items:  Labs, Medications and Radiology reports reviewed

## 2021-12-05 NOTE — PROCEDURES
Date of service:  12/5/2021    Title:  Central venous catheter placement - internal jugular vein    Indication: Hemorrhagic shock    Narrative:    A time out was performed identifying the correct patient, correct procedure and correct location prior to this procedure.    The left neck was prepped with chlorhexidine and draped in the usual sterile fashion.  1% Xylocaine solution was used for topical anesthesia.  A triple lumen central venous catheter was placed into the left internal jugular vein under ultrasound guidance using the technique described by Giorgi without difficulty or apparent complication.  The line was sutured into place and a sterile dressing was placed over the line.  All 3 ports flush and return venous blood easily.  The patient tolerated the procedure quite nicely.  No complications are apparent.  A STAT CXR is ordered to confirm placement.      Cooper Torres MD  Pulmonary and Critical Care Medicine

## 2021-12-05 NOTE — PROGRESS NOTES
Gastroenterology Initial Consult Note               Author:  Yoli BURNS Date & Time Created: 12/5/2021 11:26 AM       Patient ID:  Name:             Demetrice Jaime  YOB: 1960  Age:                 61 y.o.  female  MRN:               [id number]      Referring Provider:  Danielle Urbano DO        Medical Decision Making, by Problem:  Active Hospital Problems    Diagnosis    • GIB (gastrointestinal bleeding) [K92.2]    • Hemorrhagic shock (HCC) [R57.8]    • Acute hypoxemic respiratory failure (HCC) [J96.01]    • Alcohol abuse [F10.10]    • Acute blood loss anemia [D62]    • Ascites [R18.8]    • Chronic hepatitis C virus infection (CMS-HCC) [B18.2]    • Cirrhosis (HCC) [K74.60]    • DM2 (diabetes mellitus, type 2) (HCC) [E11.9]            Assessment/Recommendations:  The patient is a very pleasant 61-year-old woman with a history of EtOH/HCV cirrhosis (complicated by EV, rectal varices, portal hypertensive gastropathy, ascites, and HE), HCV (status post treatment with SVR), GERD, PUD, HTN, diabetes, MI, fibromyalgia, obesity, FLORINDA, and prior CVA who presented with acute onset hematemesis/coffee-ground emesis and melena with fatigue/weakness.    UGI bleed with posthemorrhagic anemia and hemorrhagic shock: EGD 12/4/21 revealed 3 columns of medium-large esophageal varices s/p EVL x 5.   N.p.o.  -Diagnostic EGD with possible intervention this morning  -2 large-bore IVs  -Continue supportive care and IVF  -Trend hemoglobin every 8 hours  -Continue Protonix drip 8 mg/hour  -Continue octreotide drip 50 mcg/hour  -Continue ceftriaxone 1 g IV daily  -Avoid NSAIDs  -It is okay to continue ASA 81 mg daily  -Transfuse for goal hemoglobin > 7  -Transfuse for goal platelet count > 50  -Given massive bleeding, would correct coagulopathy  -Consider administration of vitamin K following EGD  -Low threshold for IR consult for TIPS  -Would keep a [place] tube at bedside    Decompensated EtOH/HCV cirrhosis:  History of EV status post prior EV bleed and recurrent need for EV BL. Has previously been on and NSBB for prevention of bleeding. She also has a history of ascites requiring paracentesis and diuretics as well as HE on lactulose and rifaximin. MELD is 18 today. Per her outpatient GI notes, she was previously determined not to be a transplant candidate due to social issues and prior polysubstance abuse. Would hold current outpatient medications until acute UGI bleeding is controlled.  Once acute UGI bleeding controlled:  -Start rifaximin 550 mg p.o. twice daily  -Start lactulose 30 mL 3 times daily and titrate to 3-4 soft bowel movements  -Daily MELD labs (CMP, INR)  -Hold spironolactone/Lasix  -Abdominal US with Dopplers to rule out PVT/HPT and other underlying pathology  -Ammonia level  -Low threshold for transfer to higher level of care      VICTOR HUGO Christopher.      Thank you for inviting me to participate in the care of this patient. Please do not hesitate to call GI consultants with additional questions/concerns or changes in the patient's clinical status at [phone].    ________________________________________________________________________________________________________________________________________________________      Presenting Chief Complaint:  Hematemesis  Melena  Abdominal pain  Fatigue/weakness      History of Present Illness:    This is a very pleasant 61 y.o. female with the past medical history as listed below. She notes acute onset hematemesis and coffee-ground emesis starting yesterday prior to presentation to the ER. She states that yesterday she had significant weakness/fatigue and kept falling multiple times throughout the day. She presented to the ER at 2:30 AM where she was noted to have a heart rate of 126 but was normotensive. During her time in the ER, her SBP did drop as low as 70s and most recently she has hypertensive. Her hemoglobin was 4.4. Her platelet count is 182  and her INR is 2.18. She has been stressed and having a hard time recently as her mother passed away a few days ago. She denies EtOH use. She is very focused on contacting her sister-in-law and brother but no contact information is available and she does not have her phone. She is also very worried about being able to make her mother's  on Thursday.    A right IJ Cordis has been placed. She is an octreotide 50 mcg/hour and Protonix 80 mg/hour. She has received 4 units PRBCs, 2 units FFP, and 1 unit platelets. No repeat hemoglobin since transfusion. Currently 1 unit PRBCs is running.    Of note, she was last seen on 2021 where she had reported running out of her diuretics which were represcribed (patient states today she has not been taking them recently). She also had slightly worsening HE related symptoms despite lactulose and was started on rifaximin. She has previously had EV bleeding and has been on and an NSBB.  She was previously determined not to be a transplant candidate given social issues and prior polysubstance abuse (per prior GI notes).    INTERVAL HISTORY:    21: Intubated and sedated. No melenic stool since EGD yesterday. Remains hypotensive despite pressors. Getting an arterial line and central line placed. Hgb: 8.6 (9.3 on 21). BUN/crt: elevated but stable. UOP : 450 mls.     Review of Systems:  Review of Systems   Constitutional: Positive for malaise/fatigue. Negative for fever.   Respiratory: Positive for shortness of breath.    Cardiovascular: Negative for chest pain and leg swelling.   Gastrointestinal: Positive for abdominal pain, blood in stool, melena, nausea and vomiting.   Neurological: Positive for dizziness and weakness. Negative for loss of consciousness.             Past Medical History:  Past Medical History:   Diagnosis Date   • Anemia    • Anesthesia     pt's mother has hard time waking up = states it took a week or two   • Arrhythmia     pt unsure what rhythm   "  • Arthritis     generalized   • Breath shortness     with exertion   • Cataract     removed bilat   • Cirrhosis (HCC)    • Congestive heart failure (HCC)    • Dental disorder     dentures upper   • Diabetes     insulin   • Esophageal varices (HCC)    • Fall    • Fibromyalgia    • GERD (gastroesophageal reflux disease)    • Hepatitis C     states received treatment    • Hiatus hernia syndrome    • High cholesterol    • Hypertension 11/03/2020   • Indigestion    • Myocardial infarct (HCC) ?   • Obesity    • Other specified disorder of intestines     constipation/diarrhea   • Pain 11/03/2020    \"my legs always hurt\"; 5/10   • Psychiatric problem     depression and anxiety   • Restless leg syndrome    • Sleep apnea     has had sleep study, no cpap   • Snoring    • Stroke (HCC) 2011    no residual problems    • Thrombocytopenia (HCC)    • Unspecified hemorrhagic conditions     reports nose bleeds   • Urinary bladder disorder     frequency/urgency   • Urinary incontinence      Active Hospital Problems    Diagnosis    • GIB (gastrointestinal bleeding) [K92.2]    • Hemorrhagic shock (HCC) [R57.8]    • Acute hypoxemic respiratory failure (HCC) [J96.01]    • Alcohol abuse [F10.10]    • Acute blood loss anemia [D62]    • Ascites [R18.8]    • Chronic hepatitis C virus infection (CMS-HCC) [B18.2]    • Cirrhosis (HCC) [K74.60]    • DM2 (diabetes mellitus, type 2) (HCC) [E11.9]          Past Surgical History:  Past Surgical History:   Procedure Laterality Date   • PB UPPER GI ENDOSCOPY,DIAGNOSIS N/A 11/1/2021    Procedure: GASTROSCOPY - WITH BANDING;  Surgeon: Anthony Dent M.D.;  Location: SURGERY [facility];  Service: Gastroenterology   • PB UPPER GI ENDOSCOPY,DIAGNOSIS N/A 9/28/2021    Procedure: GASTROSCOPY;  Surgeon: Anthony Dent M.D.;  Location: SURGERY SAME DAY [facility];  Service: Gastroenterology   • PB UPPER GI ENDOSCOPY,LIGAT VARIX N/A 9/28/2021    Procedure: GASTROSCOPY, WITH BANDING;  Surgeon: " Anthony Dent M.D.;  Location: SURGERY SAME DAY [facility];  Service: Gastroenterology   • PB COLONOSCOPY,DIAGNOSTIC N/A 2/15/2021    Procedure: COLONOSCOPY;  Surgeon: Anthony Dent M.D.;  Location: SURGERY [facility];  Service: Gastroenterology   • PB UPPER GI ENDOSCOPY,DIAGNOSIS N/A 2/15/2021    Procedure: GASTROSCOPY - W/BANDING.;  Surgeon: Anthony Dent M.D.;  Location: SURGERY [facility];  Service: Gastroenterology   • CATH REMOVAL  11/4/2020    Procedure: REMOVAL, CATHETER- INFECTED PORT A CATH;  Surgeon: Leonidas Mcmanus M.D.;  Location: SURGERY [facility];  Service: General   • GASTROSCOPY N/A 2/12/2018    Procedure: GASTROSCOPY;  Surgeon: Willard Bustamante M.D.;  Location: SURGERY [facility];  Service: Gastroenterology   • GASTROSCOPY WITH BANDING N/A 2/8/2018    Procedure: GASTROSCOPY WITH BANDING;  Surgeon: Davonte Mauricio M.D.;  Location: SURGERY SAME DAY [facility];  Service: Gastroenterology   • GASTROSCOPY N/A 8/21/2017    Procedure: GASTROSCOPY WITH BANDING  ;  Surgeon: Anthony Dent M.D.;  Location: SURGERY SAME DAY [facility];  Service:    • CATH PLACEMENT  12/22/2014    Performed by Helga Santiago M.D. at SURGERY SAME DAY [facility] ORS   • CATH REMOVAL  11/14/2014    Performed by Helga Santiago M.D. at SURGERY [facility] ORS   • GASTROSCOPY WITH BANDING  7/8/2014    Performed by Davonte Mauricio M.D. at ENDOSCOPY [facility] ORS   • GASTROSCOPY WITH CLIPPING  7/6/2014    Performed by Joshua Vigil M.D. at ENDOSCOPY [facility] ORS   • ANTERIOR AND POSTERIOR REPAIR  12/6/2013    Performed by Isaías Lambert M.D. at SURGERY SAME DAY [facility] ORS   • ENTEROCELE REPAIR  12/6/2013    Performed by Isaías Lambert M.D. at SURGERY SAME DAY [facility] ORS   • BLADDER SLING FEMALE  12/6/2013    Performed by Isaías Lambert M.D. at SURGERY SAME DAY [facility] ORS   • GYN SURGERY  2010    hysterectomy   • OTHER  2010    port placement   • CATARACT EXTRACTION  WITH IOL Bilateral    • GYN SURGERY      tubal ligation   • OTHER      oral surgery   • TONSILLECTOMY           Prior Endoscopic Procedures:  EGD 11/1/2021: 3 columns medium sized EV with red garth signs and fibrosis from prior banding (EVBL x3), diffuse portal hypertensive gastropathy, no gastric varices      Hospital Medications:  Current Facility-Administered Medications   Medication Dose Frequency Provider Last Rate Last Admin   • lactated ringers infusion   Continuous Robyn Peralta M.D. 150 mL/hr at 12/05/21 0448 Rate Change at 12/05/21 0448   • vasopressin (VASOSTRICT) 20 Units in  mL Infusion  0.03 Units/min Continuous Robyn Peralta M.D. 9 mL/hr at 12/05/21 0540 0.03 Units/min at 12/05/21 0540   • propofol (DIPRIVAN) injection  0-80 mcg/kg/min Continuous Cooper Torres M.D. 9.3 mL/hr at 12/05/21 1000 20 mcg/kg/min at 12/05/21 1000   • octreotide (SANDOSTATIN) 1,250 mcg in  mL Infusion  50 mcg/hr Continuous Danielle KAREN Urbano D.O. 10 mL/hr at 12/05/21 0451 50 mcg/hr at 12/05/21 0451   • cefTRIAXone (Rocephin) 1 g in  mL IVPB  1 g Q24HRS Cooper Torres M.D.   Stopped at 12/05/21 0610   • insulin regular (HumuLIN R,NovoLIN R) injection  2-9 Units Q6HRS Kenji Delacruz M.D.   2 Units at 12/04/21 1724    And   • dextrose 50% (D50W) injection 50 mL  50 mL Q15 MIN PRN Kenji Delacruz M.D.       • ondansetron (ZOFRAN) syringe/vial injection 4 mg  4 mg Q4HRS PRN Kenji Delacruz M.D.       • norepinephrine (Levophed) 8 mg in 250 mL NS infusion (premix)  0-30 mcg/min Continuous Cooper Torres M.D. 56.3 mL/hr at 12/05/21 0950 30 mcg/min at 12/05/21 0950   • pantoprazole (PROTONIX) injection 40 mg  40 mg BID Cooper Torres M.D.   40 mg at 12/05/21 0541   • Respiratory Therapy Consult   Continuous RT Cooper Torres M.D.       • MD Alert...ICU Electrolyte Replacement per Pharmacy   PHARMACY TO DOSE Cooper Torres M.D.       • lidocaine (XYLOCAINE) 1  "% injection 2 mL  2 mL Q30 MIN PRN Cooper Torres M.D.       • ipratropium-albuterol (DUONEB) nebulizer solution  3 mL Q2HRS PRN (RT) Cooper Torres M.D.       • thiamine (B-1) 500 mg in dextrose 5% 100 mL IVPB  500 mg DAILY Cooper Torres M.D. 200 mL/hr at 12/05/21 0541 500 mg at 12/05/21 0541    Followed by   • [START ON 12/7/2021] thiamine (Vitamin B-1) tablet 100 mg  100 mg DAILY Cooper Torres M.D.       • HYDROmorphone (DILAUDID) 0.2 mg/mL in 50mL NS (Continuous Infusion)   Continuous Cooper Torres M.D.   Dose not Required at 12/04/21 1330   • HYDROmorphone (Dilaudid) injection 0.5-1 mg  0.5-1 mg Q HOUR PRN Cooper Torres M.D.   1 mg at 12/05/21 0903    Or   • HYDROmorphone (DILAUDID) injection 1.25-2 mg  1.25-2 mg Q HOUR PRN Cooper Torres M.D.       Last reviewed on 12/4/2021  3:13 PM by Jayde Dunn R.N.        Current Outpatient Medications:  Medications Prior to Admission   Medication Sig Dispense Refill Last Dose   • furosemide (LASIX) 40 MG Tab Take 1 Tablet by mouth every morning. 180 Tablet 0 \"few days\"   • spironolactone (ALDACTONE) 50 MG Tab Take 1 Tablet by mouth every day. 180 Tablet 0 \"few days\"   • lisinopril (PRINIVIL) 20 MG Tab Take 1 Tablet by mouth every morning. 180 Tablet 0 \"few days\"   • omeprazole (PRILOSEC) 20 MG delayed-release capsule Take 1 Capsule by mouth every morning. 180 Capsule 0 \"few days\"   • gabapentin (NEURONTIN) 800 MG tablet Take 3 Tablets by mouth every day. 360 Tablet 0 \"few days\"   • atorvastatin (LIPITOR) 40 MG Tab Take 1 Tablet by mouth every morning. 180 Tablet 0 \"few days\"   • aspirin 81 MG EC tablet Take 1 Tablet by mouth 2 times a day. 180 Tablet 0 \"few days\"   • insulin glargine (LANTUS) 100 UNIT/ML Solution Inject 20-30 Units under the skin 2 times a day. 30 units in am 20 units at hs (Patient taking differently: Inject 30 Units under the skin every evening. 30 units in am 20 units at hs) 30 mL 3 " "\"few days\"   • traZODone (DESYREL) 100 MG Tab Take 1 Tablet by mouth at bedtime as needed for Sleep. 180 Tablet 0 \"few days\"   • dicyclomine (BENTYL) 10 MG Cap Take 10 mg by mouth 2 (two) times a day.   \"few days\"   • lactulose 10 GM/15ML Solution Take 30 mL by mouth 2 (two) times a day.   \"few days\"   • OXcarbazepine (TRILEPTAL) 300 MG Tab Take 300-600 mg by mouth 2 (two) times a day. 1 tab = 300 mg every morning  2 tabs = 600 mg every evening   \"few days\"         Medication Allergies:  Allergies   Allergen Reactions   • Codeine Rash     sorethroat   • Penicillins Hives and Itching     Hives, itching   Other reaction(s): Pruritus         Family Medical History:  Family History   Problem Relation Age of Onset   • Diabetes Mother    • Hypertension Mother          Social History:  Social History     Socioeconomic History   • Marital status:      Spouse name: Not on file   • Number of children: Not on file   • Years of education: Not on file   • Highest education level: Not on file   Occupational History   • Not on file   Tobacco Use   • Smoking status: Current Every Day Smoker     Packs/day: 0.25     Years: 45.00     Pack years: 11.25     Types: Cigarettes   • Smokeless tobacco: Never Used   Vaping Use   • Vaping Use: Never used   Substance and Sexual Activity   • Alcohol use: Yes     Comment: quit 3 week in August    • Drug use: Not Currently     Comment: 02/2021- CBD gummies for sleep ,  Hx IV meth use, states she used 12/18/17 for first time in yrs    • Sexual activity: Not on file   Other Topics Concern   • Not on file   Social History Narrative   • Not on file     Social Determinants of Health     Financial Resource Strain:    • Difficulty of Paying Living Expenses: Not on file   Food Insecurity: Food Insecurity Present   • Worried About Running Out of Food in the Last Year: Sometimes true   • Ran Out of Food in the Last Year: Sometimes true   Transportation Needs: No Transportation Needs   • Lack of " "Transportation (Medical): No   • Lack of Transportation (Non-Medical): No   Physical Activity:    • Days of Exercise per Week: Not on file   • Minutes of Exercise per Session: Not on file   Stress:    • Feeling of Stress : Not on file   Social Connections:    • Frequency of Communication with Friends and Family: Not on file   • Frequency of Social Gatherings with Friends and Family: Not on file   • Attends Worship Services: Not on file   • Active Member of Clubs or Organizations: Not on file   • Attends Club or Organization Meetings: Not on file   • Marital Status: Not on file   Intimate Partner Violence:    • Fear of Current or Ex-Partner: Not on file   • Emotionally Abused: Not on file   • Physically Abused: Not on file   • Sexually Abused: Not on file   Housing Stability:    • Unable to Pay for Housing in the Last Year: Not on file   • Number of Places Lived in the Last Year: Not on file   • Unstable Housing in the Last Year: Not on file         Vital signs:  Weight/BMI: Body mass index is 26.79 kg/m².  BP (!) 97/30   Pulse (!) 116   Temp 36.1 °C (97 °F)   Resp 17   Ht 1.702 m (5' 7\")   Wt 77.6 kg (171 lb 1.2 oz)   SpO2 100%   Vitals:    12/05/21 0615 12/05/21 0630 12/05/21 0800 12/05/21 1013   BP: (!) 96/37 (!) 110/33 (!) 97/30    Pulse: (!) 111 (!) 117 (!) 110 (!) 116   Resp: (!) 25 15 (!) 25 17   Temp:       TempSrc:   Bladder    SpO2: 100% 100% 100% 100%   Weight:       Height:         Oxygen Therapy:  Pulse Oximetry: 100 %, FiO2%: 30 %, O2 Delivery Device: Ventilator    Intake/Output Summary (Last 24 hours) at 12/5/2021 1126  Last data filed at 12/5/2021 0600  Gross per 24 hour   Intake 6240.82 ml   Output 2490 ml   Net 3750.82 ml         Physical Exam:  Physical Exam  Vitals reviewed.   Constitutional:       General: She is in acute distress.      Appearance: She is ill-appearing.   HENT:      Head: Normocephalic and atraumatic.      Nose: Nose normal.   Eyes:      General: No scleral icterus.     " Extraocular Movements: Extraocular movements intact.      Conjunctiva/sclera: Conjunctivae normal.   Cardiovascular:      Rate and Rhythm: Regular rhythm. Tachycardia present.      Heart sounds: Normal heart sounds. No murmur heard.  No gallop.    Pulmonary:      Effort: Pulmonary effort is normal. No respiratory distress.      Breath sounds: Normal breath sounds. No wheezing, rhonchi or rales.   Abdominal:      General: Abdomen is flat. Bowel sounds are normal. There is no distension.      Palpations: Abdomen is soft. There is no mass.      Tenderness: There is abdominal tenderness (Mild diffuse TTP). There is no guarding or rebound.   Musculoskeletal:         General: Normal range of motion.      Cervical back: Normal range of motion.   Skin:     General: Skin is warm and dry.      Coloration: Skin is not jaundiced.   Neurological:      General: No focal deficit present.      Mental Status: She is alert and oriented to person, place, and time.   Psychiatric:         Mood and Affect: Mood is depressed.         Behavior: Behavior normal.         Judgment: Judgment normal.             Labs:  Recent Labs     12/04/21 0259 12/04/21 0731 12/04/21 1722 12/05/21 0255 12/05/21  0625   SODIUM 142   < > 144 142 142   POTASSIUM 4.2   < > 4.9 5.2 5.1   CHLORIDE 107   < > 117* 114* 115*   CO2 16*   < > 15* 15* 13*   BUN 46*   < > 44* 50* 49*   CREATININE 1.36   < > 1.08 1.59* 1.67*   MAGNESIUM 2.2  --   --  1.9  --    PHOSPHORUS 4.2  --   --  5.0*  --    CALCIUM 8.0*   < > 7.1* 7.3* 7.0*    < > = values in this interval not displayed.     Recent Labs     12/04/21 0259 12/04/21 0731 12/04/21 1722 12/05/21 0255 12/05/21  0625   ALTSGPT 17  --   --   --   --    ASTSGOT 31  --   --   --   --    ALKPHOSPHAT 61  --   --   --   --    TBILIRUBIN 0.7  --   --   --   --    LIPASE 28  --   --   --   --    GLUCOSE 222*   < > 217* 123* 119*    < > = values in this interval not displayed.     Recent Labs     12/04/21 0259  12/04/21  1130 12/04/21  2340 12/05/21  0255 12/05/21  0625   WBC 14.8*   < > 21.5* 25.7* 26.2*   NEUTSPOLYS 80.50*  --   --  85.10*  --    LYMPHOCYTES 12.70*  --   --  9.60*  --    MONOCYTES 4.20  --   --  2.60  --    EOSINOPHILS 0.80  --   --  0.90  --    BASOPHILS 0.30  --   --  1.80  --    ASTSGOT 31  --   --   --   --    ALTSGPT 17  --   --   --   --    ALKPHOSPHAT 61  --   --   --   --    TBILIRUBIN 0.7  --   --   --   --     < > = values in this interval not displayed.     Recent Labs     12/04/21 0259 12/04/21 1130 12/04/21  2340 12/05/21  0255 12/05/21  0625   RBC 1.93*   < > 3.31* 3.36* 3.03*   HEMOGLOBIN 4.4*   < > 9.2* 9.3* 8.6*   HEMATOCRIT 15.6*   < > 27.5* 27.9* 25.7*   PLATELETCT 182   < > 142* 138* 120*   PROTHROMBTM 23.6*  --   --   --   --    APTT 36.2*  --   --   --   --    INR 2.18*  --   --   --   --     < > = values in this interval not displayed.     Recent Results (from the past 24 hour(s))   CBC WITHOUT DIFFERENTIAL    Collection Time: 12/04/21 11:30 AM   Result Value Ref Range    WBC 14.6 (H) 4.8 - 10.8 K/uL    RBC 1.96 (L) 4.20 - 5.40 M/uL    Hemoglobin 5.4 (LL) 12.0 - 16.0 g/dL    Hematocrit 16.8 (LL) 37.0 - 47.0 %    MCV 85.7 81.4 - 97.8 fL    MCH 27.6 27.0 - 33.0 pg    MCHC 32.1 (L) 33.6 - 35.0 g/dL    RDW 51.6 (H) 35.9 - 50.0 fL    Platelet Count 145 (L) 164 - 446 K/uL    MPV 11.3 9.0 - 12.9 fL   POCT glucose device results    Collection Time: 12/04/21 11:34 AM   Result Value Ref Range    Glucose - Accu-Ck 167 (H) 65 - 99 mg/dL   POCT arterial blood gas device results    Collection Time: 12/04/21 12:32 PM   Result Value Ref Range    Ph 7.314 (L) 7.400 - 7.500    Pco2 27.8 26.0 - 37.0 mmHg    Po2 193 (H) 64 - 87 mmHg    Tco2 15 (L) 20 - 33 mmol/L    S02 100 (H) 93 - 99 %    Hco3 14.1 (L) 17.0 - 25.0 mmol/L    BE -11 (L) -4 - 3 mmol/L    Body Temp 36.2 C degrees    O2 Therapy 40 %    iPF Ratio 483     Ph Temp Faisal 7.326 (L) 7.400 - 7.500    Pco2 Temp Co 26.8 26.0 - 37.0 mmHg    Po2  Temp Cor 188 (H) 64 - 87 mmHg    Specimen Arterial     DelSys Vent     End Tidal Carbon Dioxide 26 mmhg    Tidal Volume 350 mL    Peep End Expiratory Pressure 8 cmh20    Set Rate 24     Mode APV-CMV    CBC WITHOUT DIFFERENTIAL    Collection Time: 12/04/21  2:00 PM   Result Value Ref Range    WBC 15.6 (H) 4.8 - 10.8 K/uL    RBC 3.12 (L) 4.20 - 5.40 M/uL    Hemoglobin 9.0 (L) 12.0 - 16.0 g/dL    Hematocrit 26.8 (L) 37.0 - 47.0 %    MCV 85.9 81.4 - 97.8 fL    MCH 28.8 27.0 - 33.0 pg    MCHC 33.6 33.6 - 35.0 g/dL    RDW 51.0 (H) 35.9 - 50.0 fL    Platelet Count 92 (L) 164 - 446 K/uL    MPV 11.4 9.0 - 12.9 fL   PLATELET MAPPING WITH BASIC TEG    Collection Time: 12/04/21  2:00 PM   Result Value Ref Range    Reaction Time Initial-R 5.2 4.6 - 9.1 min    React Time Initial Hep 5.2 4.3 - 8.3 min    Clot Kinetics-K 1.8 0.8 - 2.1 min    Clot Angle-Angle 70.2 63.0 - 78.0 degrees    Maximum Clot Strength-MA 51.1 (L) 52.0 - 69.0 mm    TEG Functional Fibrinogen(MA) 13.1 (L) 15.0 - 32.0 mm    Lysis 30 minutes-LY30 0.0 0.0 - 2.6 %    % Inhibition ADP 76.0 (H) 0.0 - 17.0 %    % Inhibition AA 34.4 (H) 0.0 - 11.0 %    TEG Algorithm Link Algorithm    POCT glucose device results    Collection Time: 12/04/21  5:21 PM   Result Value Ref Range    Glucose - Accu-Ck 193 (H) 65 - 99 mg/dL   CBC WITHOUT DIFFERENTIAL    Collection Time: 12/04/21  5:22 PM   Result Value Ref Range    WBC 16.2 (H) 4.8 - 10.8 K/uL    RBC 3.33 (L) 4.20 - 5.40 M/uL    Hemoglobin 9.6 (L) 12.0 - 16.0 g/dL    Hematocrit 28.1 (L) 37.0 - 47.0 %    MCV 84.4 81.4 - 97.8 fL    MCH 28.8 27.0 - 33.0 pg    MCHC 34.2 33.6 - 35.0 g/dL    RDW 50.6 (H) 35.9 - 50.0 fL    Platelet Count 106 (L) 164 - 446 K/uL    MPV 11.3 9.0 - 12.9 fL   Basic Metabolic Panel    Collection Time: 12/04/21  5:22 PM   Result Value Ref Range    Sodium 144 135 - 145 mmol/L    Potassium 4.9 3.6 - 5.5 mmol/L    Chloride 117 (H) 96 - 112 mmol/L    Co2 15 (L) 20 - 33 mmol/L    Glucose 217 (H) 65 - 99 mg/dL     Bun 44 (H) 8 - 22 mg/dL    Creatinine 1.08 0.50 - 1.40 mg/dL    Calcium 7.1 (L) 8.5 - 10.5 mg/dL    Anion Gap 12.0 7.0 - 16.0   IONIZED CALCIUM    Collection Time: 12/04/21  5:22 PM   Result Value Ref Range    Ionized Calcium 1.0 (L) 1.1 - 1.3 mmol/L   ESTIMATED GFR    Collection Time: 12/04/21  5:22 PM   Result Value Ref Range    GFR If African American >60 >60 mL/min/1.73 m 2    GFR If Non African American 52 (A) >60 mL/min/1.73 m 2   CBC WITHOUT DIFFERENTIAL    Collection Time: 12/04/21 11:40 PM   Result Value Ref Range    WBC 21.5 (H) 4.8 - 10.8 K/uL    RBC 3.31 (L) 4.20 - 5.40 M/uL    Hemoglobin 9.2 (L) 12.0 - 16.0 g/dL    Hematocrit 27.5 (L) 37.0 - 47.0 %    MCV 83.1 81.4 - 97.8 fL    MCH 27.8 27.0 - 33.0 pg    MCHC 33.5 (L) 33.6 - 35.0 g/dL    RDW 50.4 (H) 35.9 - 50.0 fL    Platelet Count 142 (L) 164 - 446 K/uL    MPV 11.7 9.0 - 12.9 fL   POCT glucose device results    Collection Time: 12/05/21 12:34 AM   Result Value Ref Range    Glucose - Accu-Ck 111 (H) 65 - 99 mg/dL   POCT arterial blood gas device results    Collection Time: 12/05/21  1:39 AM   Result Value Ref Range    Ph 7.120 (LL) 7.400 - 7.500    Pco2 56.5 (HH) 26.0 - 37.0 mmHg    Po2 34 (LL) 64 - 87 mmHg    Tco2 20 20 - 33 mmol/L    S02 47 (L) 93 - 99 %    Hco3 18.4 17.0 - 25.0 mmol/L    BE -11 (L) -4 - 3 mmol/L    Body Temp 37.0 C degrees    O2 Therapy 30 %    iPF Ratio 113     Ph Temp Faisal 7.120 (LL) 7.400 - 7.500    Pco2 Temp Co 56.5 (HH) 26.0 - 37.0 mmHg    Po2 Temp Cor 34 (LL) 64 - 87 mmHg    Specimen Arterial     DelSys Vent     End Tidal Carbon Dioxide 31 mmhg    Tidal Volume 350 mL    Peep End Expiratory Pressure 8 cmh20    Set Rate 18     Mode APV-CMV    PLATELET MAPPING WITH BASIC TEG    Collection Time: 12/05/21  2:55 AM   Result Value Ref Range    Reaction Time Initial-R 4.6 4.6 - 9.1 min    React Time Initial Hep 4.6 4.3 - 8.3 min    Clot Kinetics-K 1.4 0.8 - 2.1 min    Clot Angle-Angle 73.1 63.0 - 78.0 degrees    Maximum Clot  Strength-MA 55.7 52.0 - 69.0 mm    TEG Functional Fibrinogen(MA) 16.4 15.0 - 32.0 mm    Lysis 30 minutes-LY30 0.0 0.0 - 2.6 %    % Inhibition ADP 66.2 (H) 0.0 - 17.0 %    % Inhibition AA 29.2 (H) 0.0 - 11.0 %    TEG Algorithm Link Algorithm    CBC with Differential    Collection Time: 12/05/21  2:55 AM   Result Value Ref Range    WBC 25.7 (H) 4.8 - 10.8 K/uL    RBC 3.36 (L) 4.20 - 5.40 M/uL    Hemoglobin 9.3 (L) 12.0 - 16.0 g/dL    Hematocrit 27.9 (L) 37.0 - 47.0 %    MCV 83.0 81.4 - 97.8 fL    MCH 27.7 27.0 - 33.0 pg    MCHC 33.3 (L) 33.6 - 35.0 g/dL    RDW 51.4 (H) 35.9 - 50.0 fL    Platelet Count 138 (L) 164 - 446 K/uL    MPV 11.2 9.0 - 12.9 fL    Neutrophils-Polys 85.10 (H) 44.00 - 72.00 %    Lymphocytes 9.60 (L) 22.00 - 41.00 %    Monocytes 2.60 0.00 - 13.40 %    Eosinophils 0.90 0.00 - 6.90 %    Basophils 1.80 0.00 - 1.80 %    Nucleated RBC 1.80 /100 WBC    Neutrophils (Absolute) 21.87 (H) 2.00 - 7.15 K/uL    Lymphs (Absolute) 2.47 1.00 - 4.80 K/uL    Monos (Absolute) 0.67 0.00 - 0.85 K/uL    Eos (Absolute) 0.23 0.00 - 0.51 K/uL    Baso (Absolute) 0.46 (H) 0.00 - 0.12 K/uL    NRBC (Absolute) 0.45 K/uL    Anisocytosis 1+     Macrocytosis 1+    Basic Metabolic Panel (BMP)    Collection Time: 12/05/21  2:55 AM   Result Value Ref Range    Sodium 142 135 - 145 mmol/L    Potassium 5.2 3.6 - 5.5 mmol/L    Chloride 114 (H) 96 - 112 mmol/L    Co2 15 (L) 20 - 33 mmol/L    Glucose 123 (H) 65 - 99 mg/dL    Bun 50 (H) 8 - 22 mg/dL    Creatinine 1.59 (H) 0.50 - 1.40 mg/dL    Calcium 7.3 (L) 8.5 - 10.5 mg/dL    Anion Gap 13.0 7.0 - 16.0   Magnesium    Collection Time: 12/05/21  2:55 AM   Result Value Ref Range    Magnesium 1.9 1.5 - 2.5 mg/dL   Phosphorus    Collection Time: 12/05/21  2:55 AM   Result Value Ref Range    Phosphorus 5.0 (H) 2.5 - 4.5 mg/dL   IONIZED CALCIUM    Collection Time: 12/05/21  2:55 AM   Result Value Ref Range    Ionized Calcium 1.0 (L) 1.1 - 1.3 mmol/L   HEMOGLOBIN A1C    Collection Time: 12/05/21   2:55 AM   Result Value Ref Range    Glycohemoglobin 5.1 4.0 - 5.6 %    Est Avg Glucose 100 mg/dL   ESTIMATED GFR    Collection Time: 12/05/21  2:55 AM   Result Value Ref Range    GFR If  40 (A) >60 mL/min/1.73 m 2    GFR If Non  33 (A) >60 mL/min/1.73 m 2   DIFFERENTIAL MANUAL    Collection Time: 12/05/21  2:55 AM   Result Value Ref Range    Manual Diff Status PERFORMED    PERIPHERAL SMEAR REVIEW    Collection Time: 12/05/21  2:55 AM   Result Value Ref Range    Peripheral Smear Review see below    PLATELET ESTIMATE    Collection Time: 12/05/21  2:55 AM   Result Value Ref Range    Plt Estimation Decreased    MORPHOLOGY    Collection Time: 12/05/21  2:55 AM   Result Value Ref Range    RBC Morphology Present     Poikilocytosis 1+     Ovalocytes 1+     Echinocytes 1+    POCT arterial blood gas device results    Collection Time: 12/05/21  5:02 AM   Result Value Ref Range    Ph 7.294 (LL) 7.400 - 7.500    Pco2 31.1 26.0 - 37.0 mmHg    Po2 163 (H) 64 - 87 mmHg    Tco2 16 (L) 20 - 33 mmol/L    S02 99 93 - 99 %    Hco3 15.1 (L) 17.0 - 25.0 mmol/L    BE -10 (L) -4 - 3 mmol/L    Body Temp 36.3 C degrees    O2 Therapy 40 %    iPF Ratio 408     Ph Temp Faisal 7.304 (L) 7.400 - 7.500    Pco2 Temp Co 30.1 26.0 - 37.0 mmHg    Po2 Temp Cor 159 (H) 64 - 87 mmHg    Specimen Arterial     DelSys Vent     End Tidal Carbon Dioxide 28 mmhg    Tidal Volume 350 mL    Peep End Expiratory Pressure 8 cmh20    Set Rate 24     Mode APV-CMV    POCT glucose device results    Collection Time: 12/05/21  5:59 AM   Result Value Ref Range    Glucose - Accu-Ck 99 65 - 99 mg/dL   CBC WITHOUT DIFFERENTIAL    Collection Time: 12/05/21  6:25 AM   Result Value Ref Range    WBC 26.2 (H) 4.8 - 10.8 K/uL    RBC 3.03 (L) 4.20 - 5.40 M/uL    Hemoglobin 8.6 (L) 12.0 - 16.0 g/dL    Hematocrit 25.7 (L) 37.0 - 47.0 %    MCV 84.8 81.4 - 97.8 fL    MCH 28.4 27.0 - 33.0 pg    MCHC 33.5 (L) 33.6 - 35.0 g/dL    RDW 53.1 (H) 35.9 - 50.0 fL     Platelet Count 120 (L) 164 - 446 K/uL    MPV 10.9 9.0 - 12.9 fL   Basic Metabolic Panel    Collection Time: 12/05/21  6:25 AM   Result Value Ref Range    Sodium 142 135 - 145 mmol/L    Potassium 5.1 3.6 - 5.5 mmol/L    Chloride 115 (H) 96 - 112 mmol/L    Co2 13 (L) 20 - 33 mmol/L    Glucose 119 (H) 65 - 99 mg/dL    Bun 49 (H) 8 - 22 mg/dL    Creatinine 1.67 (H) 0.50 - 1.40 mg/dL    Calcium 7.0 (L) 8.5 - 10.5 mg/dL    Anion Gap 14.0 7.0 - 16.0   ESTIMATED GFR    Collection Time: 12/05/21  6:25 AM   Result Value Ref Range    GFR If  38 (A) >60 mL/min/1.73 m 2    GFR If Non  31 (A) >60 mL/min/1.73 m 2         Radiology Review:  DX-CHEST-PORTABLE (1 VIEW)   Final Result         1. Interval removal of gastric drainage tube. No pulmonary infiltrates or consolidations are noted.      DX-CHEST-PORTABLE (1 VIEW)   Final Result      1.  Endotracheal tube tip projects in satisfactory position above the jose m.      2.  NG tube courses to the stomach.      3.  Clear chest.      DX-CHEST-PORTABLE (1 VIEW)   Final Result         1.  No acute cardiopulmonary disease.      DX-CHEST-PORTABLE (1 VIEW)   Final Result         1.  No acute cardiopulmonary disease.            MDM (Data Review):   -Records reviewed and summarized in current documentation  -I personally reviewed and interpreted the laboratory results  -I personally reviewed the radiology images        Core Quality Measures   Reviewed items:  Labs, Medications and Radiology reports reviewed

## 2021-12-05 NOTE — PROCEDURES
Date of service:  12/5/2021    Title:  Arterial catheter placement - axillary artery    Indication: Hemorrhagic shock    Narrative:    A time out was performed identifying the correct patient, correct procedure and correct location for this procedure prior to performing this procedure.    The left arm was prepped with chlorhexidine and draped in the usual sterile fashion.  A 20 gauge arterial catheter was placed into the left axillary artery under ultrasound guidance using the technique described by Giorgi without difficulty or apparent complication.  The line was sutured into place and a sterile dressing was placed over the line.  An outstanding arterial waveform is present on the monitor.  The patient tolerated the procedure quite nicely.  No complications are apparent.      Cooper Torres MD  Pulmonary and Critical Care Medicine

## 2021-12-05 NOTE — PROGRESS NOTES
Critical Care Progress Note    Date of admission  12/4/2021    Chief Complaint  61 y.o. female admitted 12/4/2021 with an acute upper gastrointestinal hemorrhage.  She has a history of alcohol abuse, hepatitis C, cirrhosis, prior esophageal variceal banding, primary hypertension, DM type II, paroxysmal atrial fibrillation and drug abuse.    Hospital Course      12/4 -    admitted to the ICU.  12/5 -    vent day 2.  Titrating vasopressors.  Continue fluid resuscitation.      Interval Problem Update  Reviewed last 24 hour events:      ST  NE 30  Vaso 0.03  98.6  +5187 mL since admit      Review of Systems  Review of Systems   Unable to perform ROS: Acuity of condition        Vital Signs for last 24 hours   Temp:  [36.1 °C (97 °F)-36.3 °C (97.3 °F)] 36.1 °C (97 °F)  Pulse:  [] 115  Resp:  [0-25] 12  BP: ()/(21-77) 82/28  SpO2:  [87 %-100 %] 100 %    Hemodynamic parameters for last 24 hours       Respiratory Information for the last 24 hours  Vent Mode: ASV  Rate (breaths/min): 24  Vt Target (mL): 350  PEEP/CPAP: 8  P Support: 5  MAP: 16  Length of Weaning Trial (Hours): 1  Control VTE (exp VT): 460    Physical Exam   Physical Exam  Constitutional:       Appearance: She is not diaphoretic.      Comments: On ventilator   HENT:      Head: Normocephalic.      Nose: Nose normal.      Mouth/Throat:      Pharynx: Oropharynx is clear.   Eyes:      Pupils: Pupils are equal, round, and reactive to light.   Cardiovascular:      Comments: Sinus tachycardia  Pulmonary:      Breath sounds: No wheezing or rales.   Abdominal:      General: There is no distension.      Tenderness: There is no abdominal tenderness.   Musculoskeletal:      Cervical back: Normal range of motion.      Right lower leg: No edema.      Left lower leg: No edema.   Neurological:      Comments: Sedated         Medications  Current Facility-Administered Medications   Medication Dose Route Frequency Provider Last Rate Last Admin   • lactated ringers  infusion   Intravenous Continuous Robyn Peralta M.D. 150 mL/hr at 12/05/21 0448 Rate Change at 12/05/21 0448   • vasopressin (VASOSTRICT) 20 Units in  mL Infusion  0.03 Units/min Intravenous Continuous Robyn Peralta M.D. 9 mL/hr at 12/05/21 0540 0.03 Units/min at 12/05/21 0540   • propofol (DIPRIVAN) injection  0-80 mcg/kg/min Intravenous Continuous Cooper Torres M.D. 9.3 mL/hr at 12/05/21 1000 20 mcg/kg/min at 12/05/21 1000   • lactated ringers infusion  1,000 mL Intravenous Once Cooper Torres M.D.       • octreotide (SANDOSTATIN) 1,250 mcg in  mL Infusion  50 mcg/hr Intravenous Continuous Danielle KAREN Urbano D.O. 10 mL/hr at 12/05/21 0451 50 mcg/hr at 12/05/21 0451   • cefTRIAXone (Rocephin) 1 g in  mL IVPB  1 g Intravenous Q24HRS Cooper Torres M.D.   Stopped at 12/05/21 0610   • insulin regular (HumuLIN R,NovoLIN R) injection  2-9 Units Subcutaneous Q6HRS Kenji Delacruz M.D.   2 Units at 12/04/21 1724    And   • dextrose 50% (D50W) injection 50 mL  50 mL Intravenous Q15 MIN PRN Kenji Delacruz M.D.       • ondansetron (ZOFRAN) syringe/vial injection 4 mg  4 mg Intravenous Q4HRS PRN Kenji Delacruz M.D.       • norepinephrine (Levophed) 8 mg in 250 mL NS infusion (premix)  0-30 mcg/min Intravenous Continuous Cooper Torres M.D. 56.3 mL/hr at 12/05/21 0950 30 mcg/min at 12/05/21 0950   • pantoprazole (PROTONIX) injection 40 mg  40 mg Intravenous BID Cooper Torres M.D.   40 mg at 12/05/21 0541   • Respiratory Therapy Consult   Nebulization Continuous RT Cooper Torres M.D.       • MD Alert...ICU Electrolyte Replacement per Pharmacy   Other PHARMACY TO DOSE Cooper Torres M.D.       • lidocaine (XYLOCAINE) 1 % injection 2 mL  2 mL Tracheal Tube Q30 MIN PRN Cooper Torres M.D.       • ipratropium-albuterol (DUONEB) nebulizer solution  3 mL Nebulization Q2HRS PRN (RT) Cooper Torres M.D.       • thiamine (B-1) 500 mg  in dextrose 5% 100 mL IVPB  500 mg Intravenous DAILY Cooper Torres M.D. 200 mL/hr at 12/05/21 0541 500 mg at 12/05/21 0541    Followed by   • [START ON 12/7/2021] thiamine (Vitamin B-1) tablet 100 mg  100 mg Enteral Tube DAILY Cooper Torres M.D.       • HYDROmorphone (DILAUDID) 0.2 mg/mL in 50mL NS (Continuous Infusion)   Intravenous Continuous Cooper Torres M.D.   Dose not Required at 12/04/21 1330   • HYDROmorphone (Dilaudid) injection 0.5-1 mg  0.5-1 mg Intravenous Q HOUR PRN Cooper Torres M.D.   1 mg at 12/05/21 0903    Or   • HYDROmorphone (DILAUDID) injection 1.25-2 mg  1.25-2 mg Intravenous Q HOUR PRN Cooper Torres M.D.           Fluids    Intake/Output Summary (Last 24 hours) at 12/5/2021 1207  Last data filed at 12/5/2021 0600  Gross per 24 hour   Intake 3301.27 ml   Output 90 ml   Net 3211.27 ml       Laboratory  Recent Labs     12/04/21  1232 12/05/21  0139 12/05/21  0502   ISTATAPH 7.314* 7.120* 7.294*   ISTATAPCO2 27.8 56.5* 31.1   ISTATAPO2 193* 34* 163*   ISTATATCO2 15* 20 16*   [id number] 100* 47* 99   ISTATARTHCO3 14.1* 18.4 15.1*   ISTATARTBE -11* -11* -10*   ISTATTEMP 36.2 C 37.0 C 36.3 C   ISTATFIO2 40 30 40   ISTATSPEC Arterial Arterial Arterial   ISTATAPHTC 7.326* 7.120* 7.304*   [id number] 188* 34* 159*         Recent Labs     12/04/21  0259 12/04/21  0731 12/04/21  1722 12/05/21  0255 12/05/21  0625   SODIUM 142   < > 144 142 142   POTASSIUM 4.2   < > 4.9 5.2 5.1   CHLORIDE 107   < > 117* 114* 115*   CO2 16*   < > 15* 15* 13*   BUN 46*   < > 44* 50* 49*   CREATININE 1.36   < > 1.08 1.59* 1.67*   MAGNESIUM 2.2  --   --  1.9  --    PHOSPHORUS 4.2  --   --  5.0*  --    CALCIUM 8.0*   < > 7.1* 7.3* 7.0*    < > = values in this interval not displayed.     Recent Labs     12/04/21  0259 12/04/21  0731 12/04/21  1722 12/05/21  0255 12/05/21  0625   ALTSGPT 17  --   --   --   --    ASTSGOT 31  --   --   --   --    ALKPHOSPHAT 61  --   --   --   --     TBILIRUBIN 0.7  --   --   --   --    LIPASE 28  --   --   --   --    GLUCOSE 222*   < > 217* 123* 119*    < > = values in this interval not displayed.     Recent Labs     12/04/21 0259 12/04/21 1130 12/04/21  2340 12/05/21  0255 12/05/21  0625   WBC 14.8*   < > 21.5* 25.7* 26.2*   NEUTSPOLYS 80.50*  --   --  85.10*  --    LYMPHOCYTES 12.70*  --   --  9.60*  --    MONOCYTES 4.20  --   --  2.60  --    EOSINOPHILS 0.80  --   --  0.90  --    BASOPHILS 0.30  --   --  1.80  --    ASTSGOT 31  --   --   --   --    ALTSGPT 17  --   --   --   --    ALKPHOSPHAT 61  --   --   --   --    TBILIRUBIN 0.7  --   --   --   --     < > = values in this interval not displayed.     Recent Labs     12/04/21 0259 12/04/21 1130 12/04/21 2340 12/05/21  0255 12/05/21  0625   RBC 1.93*   < > 3.31* 3.36* 3.03*   HEMOGLOBIN 4.4*   < > 9.2* 9.3* 8.6*   HEMATOCRIT 15.6*   < > 27.5* 27.9* 25.7*   PLATELETCT 182   < > 142* 138* 120*   PROTHROMBTM 23.6*  --   --   --   --    APTT 36.2*  --   --   --   --    INR 2.18*  --   --   --   --     < > = values in this interval not displayed.       Imaging  X-Ray:  I have personally reviewed the images and compared with prior images. and My impression is: The lungs are clear    Assessment/Plan  * GIB (gastrointestinal bleeding)- (present on admission)  Assessment & Plan  Due to bleeding esophageal varices  EGD with esophageal variceal band ligation on 12/4  Continue octreotide infusion  Continue pantoprazole twice daily  Prophylactic ceftriaxone  Trend hemoglobin and transfuse PRBCs to keep hemoglobin greater than 7  Trend thromboelastogram with platelet mapping and transfuse additional blood products based upon interpretation    Acute hypoxemic respiratory failure (HCC)  Assessment & Plan  Intubated 12/4  All of the appropriate ventilator bundles are in place  SAT/SBT as appropriate  Appropriate mobility    Hemorrhagic shock (HCC)  Assessment & Plan  Due to upper gastrointestinal hemorrhage  Fluid  resuscitation  I am titrating norepinephrine to keep mean arterial pressure greater than 65    Acute kidney injury (HCC)  Assessment & Plan  I suspect due to ATN from hemorrhagic shock  IV fluid resuscitation  Monitor renal function and urine output  Avoid nephrotoxins and renal dose medications    Alcohol abuse- (present on admission)  Assessment & Plan  High-dose IV thiamine  Observe for evidence of withdrawal    Acute blood loss anemia- (present on admission)  Assessment & Plan  Gastrointestinal source of blood loss  Trend hemoglobin and transfuse PRBCs to keep hemoglobin greater than 7    Chronic hepatitis C virus infection (CMS-HCC)- (present on admission)  Assessment & Plan  History of  Hepatitis C RNA negative in June 2020    Cirrhosis (HCC)- (present on admission)  Assessment & Plan  Due to hepatitis C and alcohol abuse    DM2 (diabetes mellitus, type 2) (HCC)- (present on admission)  Assessment & Plan  Glycohemoglobin 5.1  Sliding scale insulin       VTE:  Contraindicated  Ulcer: PPI  Lines: Central Line  Ongoing indication addressed, Arterial Line  Ongoing indication addressed and Berry Catheter  Ongoing indication addressed    I have performed a physical exam and reviewed and updated ROS and Plan today (12/5/2021). In review of yesterday's note (12/4/2021), there are no changes except as documented above.     I have assessed and reassessed her respiratory status with ventilator adjustments, ventilator waveforms, airway mechanics, blood pressure, hemodynamics, cardiovascular status with titration of norepinephrine, serial determinations of hemoglobin and platelet count and her neurologic status.  She is at increased risk for worsening respiratory, cardiovascular and CNS system dysfunction.    Discussed patient condition and risk of morbidity and/or mortality with RN, RT, Pharmacy, Charge nurse / hot rounds and QA team     The patient remains critically ill.  Critical care time = 125 minutes in directly  providing and coordinating critical care and extensive data review.  No time overlap and excludes procedures.    Cooper Torres MD  Pulmonary and Critical Care Medicine

## 2021-12-05 NOTE — CARE PLAN
The patient is Watcher - Medium risk of patient condition declining or worsening    Shift Goals  Clinical Goals: RASS -1/+1, trend CBC  Patient Goals: AKSHAT  Family Goals: AKSHAT    Problem: Skin Integrity  Goal: Skin integrity is maintained or improved  Outcome: Progressing     Problem: Safety - Medical Restraint  Goal: Remains free of injury from restraints (Restraint for Interference with Medical Device)  Outcome: Progressing     Problem: Pain - Standard  Goal: Alleviation of pain or a reduction in pain to the patient’s comfort goal  Outcome: Progressing

## 2021-12-05 NOTE — PROGRESS NOTES
Dr. Jaeger notified of low urine output this evening. Orders received for 500mL LR bolus followed by continuous LR at 100mL/hr.

## 2021-12-06 PROBLEM — K72.00 SHOCK LIVER: Status: ACTIVE | Noted: 2021-01-01

## 2021-12-06 NOTE — PROGRESS NOTES
Dr. Peralta notified of [facility] PAC's and possible pafib. EKG done. Troponin and BNP added on to AM labs.

## 2021-12-06 NOTE — PROGRESS NOTES
Pulmonary and Critical Care Medicine Progress Note    I had the pleasure of talking with this lady's daughter, Yulissa Jaime at [phone].  I reviewed this lady's current critically ill condition and discussed goals of care.  After our discussion, Yulissa tells me that her mother would not want to continue treatment given her multiple severe acute medical problems, poor prognosis and poor quality of life.  At this time will make her DNR status.  Yulissa will travel to [place] to visit this lady.  She plans on transitioning to comfort measures after she has had an opportunity to see her mother.  She is entirely appropriate.    Based upon this discussion, I changed her CODE STATUS to DNR.  I will stop her lactulose.  I will no longer trend her hemoglobin and will not transfuse blood products.  I will continue to support her otherwise.    Cooper Torres MD  Pulmonary and Critical Care Medicine

## 2021-12-06 NOTE — PROGRESS NOTES
Critical Care Progress Note    Date of admission  12/4/2021    Chief Complaint  61 y.o. female admitted 12/4/2021 with an acute upper gastrointestinal hemorrhage.  She has a history of alcohol abuse, hepatitis C, cirrhosis, prior esophageal variceal banding, primary hypertension, DM type II, paroxysmal atrial fibrillation and drug abuse.    Hospital Course      12/4 -    admitted to the ICU.  12/5 -    vent day 2.  Titrating vasopressors.  Continue fluid resuscitation.  12/6 -    vent day 3.  Now DNR.  Titrating vasopressors.  Begin bicarbonate drip.  Worsening renal function.  Shock liver.      Interval Problem Update  Reviewed last 24 hour events:      ST  +7484 mL in the last 24  +12,671 mL since admit  Norepinephrine titrating  Vasopressin 0.03      Review of Systems  Review of Systems   Unable to perform ROS: Acuity of condition        Vital Signs for last 24 hours   Temp:  [36.8 °C (98.2 °F)] 36.8 °C (98.2 °F)  Pulse:  [] 114  Resp:  [0-21] 20  BP: ()/(15-55) 78/25  SpO2:  [99 %-100 %] 100 %    Hemodynamic parameters for last 24 hours       Respiratory Information for the last 24 hours  Vent Mode: ASV  Rate (breaths/min): 24  PEEP/CPAP: 8  P Support: 5  MAP: 13  Length of Weaning Trial (Hours): 5 mins  Control VTE (exp VT): 421    Physical Exam   Physical Exam  Constitutional:       Appearance: She is not diaphoretic.      Comments: On ventilator   HENT:      Head: Normocephalic.      Nose: Nose normal.      Mouth/Throat:      Pharynx: Oropharynx is clear.   Eyes:      General: Scleral icterus present.      Pupils: Pupils are equal, round, and reactive to light.   Cardiovascular:      Comments: Sinus tachycardia  Pulmonary:      Breath sounds: No wheezing or rales.   Abdominal:      General: There is distension.      Tenderness: There is no abdominal tenderness.   Musculoskeletal:      Cervical back: Normal range of motion.      Comments: No clubbing or cyanosis   Neurological:      Comments:  PERRL.  She does not arouse or follow.         Medications  Current Facility-Administered Medications   Medication Dose Route Frequency Provider Last Rate Last Admin   • sodium bicarbonate 150 mEq in D5W infusion (premix)   Intravenous Continuous Cooper Torres M.D. 100 mL/hr at 12/06/21 0945 New Bag at 12/06/21 0945   • vasopressin (VASOSTRICT) 20 Units in  mL Infusion  0.03 Units/min Intravenous Continuous Robyn Peralta M.D. 9 mL/hr at 12/06/21 0334 0.03 Units/min at 12/06/21 0334   • propofol (DIPRIVAN) injection  0-80 mcg/kg/min Intravenous Continuous Cooper Torres M.D.   Stopped at 12/06/21 0200   • octreotide (SANDOSTATIN) 1,250 mcg in  mL Infusion  50 mcg/hr Intravenous Continuous Danielle SHANIQUA WrightOArun 10 mL/hr at 12/06/21 0540 50 mcg/hr at 12/06/21 0540   • cefTRIAXone (Rocephin) 1 g in  mL IVPB  1 g Intravenous Q24HRS Cooper Torres M.D.   Stopped at 12/06/21 0610   • insulin regular (HumuLIN R,NovoLIN R) injection  2-9 Units Subcutaneous Q6HRS Kenji Delacruz M.D.   2 Units at 12/04/21 1724    And   • dextrose 50% (D50W) injection 50 mL  50 mL Intravenous Q15 MIN PRN Kenji Delacruz M.D.   50 mL at 12/06/21 0545   • ondansetron (ZOFRAN) syringe/vial injection 4 mg  4 mg Intravenous Q4HRS PRN Kenji Delacruz M.D.       • norepinephrine (Levophed) 8 mg in 250 mL NS infusion (premix)  0-30 mcg/min Intravenous Continuous Cooper Torres M.D. 24.4 mL/hr at 12/06/21 1137 13 mcg/min at 12/06/21 1137   • pantoprazole (PROTONIX) injection 40 mg  40 mg Intravenous BID Cooper Torres M.D.   40 mg at 12/06/21 0540   • Respiratory Therapy Consult   Nebulization Continuous RT Cooper Torres M.D.       • MD Alert...ICU Electrolyte Replacement per Pharmacy   Other PHARMACY TO DOSE Cooper Torres M.D.       • lidocaine (XYLOCAINE) 1 % injection 2 mL  2 mL Tracheal Tube Q30 MIN PRN Cooper Torres M.D.       • ipratropium-albuterol  (DUONEB) nebulizer solution  3 mL Nebulization Q2HRS PRN (RT) Cooper Torres M.D.       • [START ON 12/7/2021] thiamine (Vitamin B-1) tablet 100 mg  100 mg Enteral Tube DAILY Cooper Torres M.D.       • HYDROmorphone (DILAUDID) 0.2 mg/mL in 50mL NS (Continuous Infusion)   Intravenous Continuous Cooper Torres M.D.   Stopped at 12/06/21 0349   • HYDROmorphone (Dilaudid) injection 0.5-1 mg  0.5-1 mg Intravenous Q HOUR PRN Cooper Torres M.D.   1 mg at 12/05/21 0903    Or   • HYDROmorphone (DILAUDID) injection 1.25-2 mg  1.25-2 mg Intravenous Q HOUR PRN Cooper Torres M.D.           Fluids    Intake/Output Summary (Last 24 hours) at 12/6/2021 1403  Last data filed at 12/6/2021 1000  Gross per 24 hour   Intake 5586.68 ml   Output 77 ml   Net 5509.68 ml       Laboratory  Recent Labs     12/05/21  0139 12/05/21  0502 12/06/21  0119   ISTATAPH 7.120* 7.294* 7.285*   ISTATAPCO2 56.5* 31.1 23.7*   ISTATAPO2 34* 163* 101*   ISTATATCO2 20 16* 12*   SOMDHGC0MFD 47* 99 97   ISTATARTHCO3 18.4 15.1* 11.2*   ISTATARTBE -11* -10* -14*   ISTATTEMP 37.0 C 36.3 C 36.8 C   ISTATFIO2 30 40 30   ISTATSPEC Arterial Arterial Arterial   ISTATAPHTC 7.120* 7.304* 7.288*   ELKTAZKH0YC 34* 159* 100*         Recent Labs     12/04/21  0259 12/04/21  0731 12/05/21  0255 12/05/21  0625 12/06/21  0450   SODIUM 142   < > 142 142 141   POTASSIUM 4.2   < > 5.2 5.1 5.6*   CHLORIDE 107   < > 114* 115* 113*   CO2 16*   < > 15* 13* 10*   BUN 46*   < > 50* 49* 56*   CREATININE 1.36   < > 1.59* 1.67* 2.58*   MAGNESIUM 2.2  --  1.9  --  2.1   PHOSPHORUS 4.2  --  5.0*  --  6.6*   CALCIUM 8.0*   < > 7.3* 7.0* 7.4*    < > = values in this interval not displayed.     Recent Labs     12/04/21  0259 12/04/21  0731 12/05/21  0255 12/05/21  0625 12/06/21  0450   ALTSGPT 17  --   --   --  2327*   ASTSGOT 31  --   --   --  4749*   ALKPHOSPHAT 61  --   --   --  143*   TBILIRUBIN 0.7  --   --   --  3.3*   LIPASE 28  --   --   --    --    GLUCOSE 222*   < > 123* 119* 62*    < > = values in this interval not displayed.     Recent Labs     12/04/21  0259 12/04/21  1130 12/05/21  0255 12/05/21  0625 12/06/21  0000 12/06/21  0450 12/06/21  1149   WBC 14.8*   < > 25.7*   < > 14.3* 14.9* 15.7*   NEUTSPOLYS 80.50*  --  85.10*  --   --  75.20*  --    LYMPHOCYTES 12.70*  --  9.60*  --   --  9.00*  --    MONOCYTES 4.20  --  2.60  --   --  12.20  --    EOSINOPHILS 0.80  --  0.90  --   --  2.00  --    BASOPHILS 0.30  --  1.80  --   --  0.70  --    ASTSGOT 31  --   --   --   --  4749*  --    ALTSGPT 17  --   --   --   --  2327*  --    ALKPHOSPHAT 61  --   --   --   --  143*  --    TBILIRUBIN 0.7  --   --   --   --  3.3*  --     < > = values in this interval not displayed.     Recent Labs     12/04/21  0259 12/04/21  1130 12/06/21  0000 12/06/21  0450 12/06/21  1149   RBC 1.93*   < > 2.56* 2.51* 2.28*   HEMOGLOBIN 4.4*   < > 7.2* 7.0* 6.3*   HEMATOCRIT 15.6*   < > 21.8* 22.2* 19.9*   PLATELETCT 182   < > 68* 74* 62*   PROTHROMBTM 23.6*  --   --   --   --    APTT 36.2*  --   --   --   --    INR 2.18*  --   --   --   --     < > = values in this interval not displayed.       Imaging  X-Ray:  I have personally reviewed the images and compared with prior images. and My impression is: The lungs remain clear    Assessment/Plan  * GIB (gastrointestinal bleeding)- (present on admission)  Assessment & Plan  Due to bleeding esophageal varices  EGD with esophageal variceal band ligation on 12/4  Continue octreotide infusion  Continue pantoprazole twice daily  Prophylactic ceftriaxone  Trend hemoglobin and transfuse PRBCs to keep hemoglobin greater than 7  Trend thromboelastogram with platelet mapping and transfuse additional blood products based upon interpretation    Acute hypoxemic respiratory failure (HCC)  Assessment & Plan  Intubated 12/4  All of the appropriate ventilator bundles are in place  She is not a candidate for SBT  Mobility level 1    Hemorrhagic shock  (HCC)  Assessment & Plan  Due to upper gastrointestinal hemorrhage  Fluid resuscitation  I am titrating norepinephrine to keep mean arterial pressure greater than 65    Shock liver  Assessment & Plan  Ischemic hepatopathy and shock liver due to hemorrhagic shock  Monitor liver enzymes and synthetic function  Avoid hepatotoxins    Acute kidney injury (HCC)  Assessment & Plan  Due to ATN from hemorrhagic shock  IV fluid resuscitation - begin bicarbonate drip  Monitor renal function and urine output  Avoid nephrotoxins and renal dose medications    Alcohol abuse- (present on admission)  Assessment & Plan  High-dose IV thiamine  Observe for evidence of withdrawal    Acute blood loss anemia- (present on admission)  Assessment & Plan  Gastrointestinal source of blood loss  Trend hemoglobin and transfuse PRBCs to keep hemoglobin greater than 7    Hepatic encephalopathy (HCC)- (present on admission)  Assessment & Plan  Begin lactulose    Chronic hepatitis C virus infection (CMS-HCC)- (present on admission)  Assessment & Plan  History of  Hepatitis C RNA negative in June 2020    Cirrhosis (HCC)- (present on admission)  Assessment & Plan  Due to hepatitis C and alcohol abuse    DM2 (diabetes mellitus, type 2) (HCC)- (present on admission)  Assessment & Plan  Glycohemoglobin 5.1  Sliding scale insulin       VTE:  Contraindicated  Ulcer: PPI  Lines: Central Line  Ongoing indication addressed, Arterial Line  Ongoing indication addressed and Berry Catheter  Ongoing indication addressed    I have performed a physical exam and reviewed and updated ROS and Plan today (12/6/2021). In review of yesterday's note (12/5/2021), there are no changes except as documented above.     I have assessed and reassessed her respiratory status with ventilator adjustments, airway mechanics, ventilator waveforms, hemodynamics, blood pressure, cardiovascular status with titration of norepinephrine and her neurologic status.  She is at increased risk  for worsening respiratory, cardiovascular, hepatic, renal and CNS system dysfunction.    Discussed patient condition and risk of morbidity and/or mortality with RN, RT, Pharmacy, Charge nurse / hot rounds and QA team     The patient remains critically ill.  Critical care time = 96 minutes in directly providing and coordinating critical care and extensive data review.  No time overlap and excludes procedures.    Cooper Torres MD  Pulmonary and Critical Care Medicine

## 2021-12-06 NOTE — PROGRESS NOTES
Ignacio from Lab called with critical result of ammonia of 97 at 0734. Critical lab result read back to Ignacio.   Dr. Torres notified of critical lab result at 0736.  Critical lab result read back by Dr. Torres.

## 2021-12-06 NOTE — PROGRESS NOTES
Dr. Lezama notified of the following critical labs:  AST: 4749  ALT: 2327  Also updated on Hgb 7.0 and worsening renal function.

## 2021-12-06 NOTE — PROGRESS NOTES
Notified Dr Torres of pt's hgb at 6.2 on 1200 draw. Since plan is to proceed with comfort care only once daughter/granddaughter arrive and we are not escalating care, decision made to not transfuse at this time.

## 2021-12-06 NOTE — PROGRESS NOTES
Notified MD of low urinary output 10mL/hr and low temperature. New orders for LR bolus, yelena yarbrough applied.

## 2021-12-06 NOTE — ASSESSMENT & PLAN NOTE
Ischemic hepatopathy and shock liver due to hemorrhagic shock  Monitor liver enzymes and synthetic function  Avoid hepatotoxins

## 2021-12-06 NOTE — PROGRESS NOTES
Dr. Jaeger notified of most recent H/H: 7.1/21.4. Orders to change frequency of CBC to Q6. Also updated on continued low urine output. Orders for 500mL LR bolus received.

## 2021-12-06 NOTE — DISCHARGE PLANNING
Care Transition Team Discharge Planning    Anticipated Discharge Disposition: TBD    Action: Lsw received VM from pt's neighbor, Tyesha Khan, @ [phone]. She states she is calling regarding pt.    Barriers to Discharge: TBD    Plan: Lsw will continue to follow, and assist w/ d/c planning.

## 2021-12-06 NOTE — PROGRESS NOTES
Dr. Peralta notified of low urine output. Bedside eval done by Dr. Peralta. No new orders. Will continue to monitor at this time.

## 2021-12-06 NOTE — PROGRESS NOTES
Friend ariella had called for an update, this RN contacted daughter Devorah and granddaughter Quin, immediate family asked for no updates to be given to anyone except them. RN told them she would direct calls to updates for Kaylyn to them

## 2021-12-06 NOTE — CARE PLAN
The patient is Unstable - High likelihood or risk of patient condition declining or worsening    Shift Goals  Clinical Goals: Improved hemodynamics, trend H/H  Patient Goals: AKSHAT  Family Goals: AKSHAT    Problem: Safety - Medical Restraint  Goal: Remains free of injury from restraints (Restraint for Interference with Medical Device)  Outcome: Progressing     Problem: Pain - Standard  Goal: Alleviation of pain or a reduction in pain to the patient’s comfort goal  Outcome: Progressing

## 2021-12-07 NOTE — CARE PLAN
Problem: Ventilation  Goal: Ability to achieve and maintain unassisted ventilation or tolerate decreased levels of ventilator support  Description: Document on Vent flowsheet    1.  Support and monitor invasive and noninvasive mechanical ventilation  2.  Monitor ventilator weaning response  3.  Perform ventilator associated pneumonia prevention interventions  4.  Manage ventilation therapy by monitoring diagnostic test results  Outcome: Progressing      Ventilator Daily Summary    Vent Day # 3    Ventilator settings changed this shift: none    Weaning trials: none    Respiratory Procedures: none    Plan: Continue current ventilator settings and wean mechanical ventilation as tolerated per physician orders.

## 2021-12-07 NOTE — PROGRESS NOTES
Assumed care of pt. Family at bedside anf educated on plan of care for the night/comfort care. Family verbalizes understanding. Pt appears comfortable at this time. No resp distress noted.

## 2021-12-07 NOTE — DISCHARGE SUMMARY
Death Summary    Cause of Death  Acute gastrointestinal hemorrhage due to esophageal varices due to cirrhosis due to undetermined etiology.    Comorbid Conditions at the Time of Death  Principal Problem:    GIB (gastrointestinal bleeding) POA: Yes  Active Problems:    Hemorrhagic shock (HCC) POA: Unknown    Acute hypoxemic respiratory failure (HCC) POA: Unknown    DM2 (diabetes mellitus, type 2) (HCC) POA: Yes    Cirrhosis (HCC) POA: Yes    Chronic hepatitis C virus infection (CMS-HCC) POA: Yes      Overview: ICD-10 transition    Ascites POA: Yes    Hepatic encephalopathy (HCC) POA: Yes    Acute blood loss anemia POA: Yes    Alcohol abuse POA: Yes    Acute kidney injury (HCC) POA: Unknown    Shock liver POA: Unknown  Resolved Problems:    * No resolved hospital problems. *      History of Presenting Illness and Hospital Course  This is a 61 y.o. female admitted 2021 with hemorrhagic shock due to an acute gastrointestinal hemorrhage.  She was transfused appropriately.  EGD revealed esophageal varices and she underwent band ligation.  She developed acute kidney injury and shock liver with associated shock.  Discussions were held with her family.  Given her abysmal prognosis, the family appropriate decided to transition to comfort measures and she  peacefully.    Death Date: 21   Death Time: 035         Pronounced By (RN1): MAMI Santiago  Pronounced By (RN2): MAMI Peter

## 2021-12-07 NOTE — CARE PLAN
The patient is Unstable - High likelihood or risk of patient condition declining or worsening    Shift Goals  Clinical Goals: don't drop hgb any father, lactate stops increasing, increase UOP  Patient Goals: AKSHAT  Family Goals: AKSHAT    Progress made toward(s) clinical / shift goals:  Family has decided to make the patient comfort care, waiting on one more family member to arrive before initiating    Patient is not progressing towards the following goals: viable extubation      Problem: Knowledge Deficit - Standard  Goal: Patient and family/care givers will demonstrate understanding of plan of care, disease process/condition, diagnostic tests and medications  Outcome: Not Progressing     Problem: Skin Integrity  Goal: Skin integrity is maintained or improved  Outcome: Not Progressing     Problem: Fall Risk  Goal: Patient will remain free from falls  Outcome: Not Progressing     Problem: Safety - Medical Restraint  Goal: Remains free of injury from restraints (Restraint for Interference with Medical Device)  Outcome: Not Progressing  Goal: Free from restraint(s) (Restraint for Interference with Medical Device)  Outcome: Not Progressing     Problem: Pain - Standard  Goal: Alleviation of pain or a reduction in pain to the patient’s comfort goal  Outcome: Not Progressing

## 2021-12-09 NOTE — DOCUMENTATION QUERY
[facility]                                                                       Query Response Note      PATIENT:               PADMAJA FLETCHER  ACCT #:                  [id number]  MRN:                     [id number]  :                      1960  ADMIT DATE:       2021 1:54 AM  DISCH DATE:        2021 3:56 AM  RESPONDING  PROVIDER #:        [id number]           QUERY TEXT:    A past medical history of Congestive Heart Failure is documented in the Medical Record. The lab results, diagnostic findings, and treatments do not appear to support this diagnosis. Please clarify the diagnosis of Congestive heart failure.     Note: If an appropriate response is not listed below, please respond with a new note.    The patient's Clinical Indicators include:  Per Critical Care Consultation  -has a past medical history of Congestive heart failure (HCC)    Results Review:   Troponin T 28  NT-proBNP 894  CXR 21:  No acute cardiopulmonary disease.  Echo 2020:   Estimated EF 70%  Normal diastolic function  Moderate concentric left ventricular hypertrophy.  Normal right ventricular size and systolic function.  Mild mitral regurgitation.  Estimated RVSP is 30 mmHg.    Treatment:   CXR, NT-pro-BNP, Troponin T, I&O, IV LR & NS bolus, sodium bicarbonate infusion, Levophed, & vasopressin    Risk Factors:   Hx alcohol abuse, chronic hepatitis C, cirrhosis, DM2, paroxysmal atrial fibrillation, & HTN    Thank You,  Brittani Mccain RN BSN  Clinical   Connect via [employer]  Options provided:   -- Congestive heart failure has been ruled out and amended documentation provided in the medical record   -- Chronic systolic heart failure is present, please document additional clinical indicators   -- Chronic diastolic heart failure is present, please document additional clinical indicators   -- Chronic systolic and  diastolic heart failure is present, please document additional clinical indicators   -- Unable to determine      Query created by: Brittani Mccain on 12/9/2021 1:35 PM    RESPONSE TEXT:    Congestive heart failure has been ruled out and amended documentation provided in the medical record          Electronically signed by:  VIV HANKS MD 12/9/2021 2:05 PM

## 2022-02-11 RX ORDER — LISINOPRIL 20 MG/1
TABLET ORAL
Qty: 100 TABLET | Refills: 1 | OUTPATIENT
Start: 2022-02-11
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
## 2022-11-10 NOTE — ASSESSMENT & PLAN NOTE
60 lbs in one year   Some intentional and some it is not intentional. She has had very recent images CT chest /abdomen 2/2021, recent MRI abdomen negative for any malignancy.  Follow up on paracentesis .   She tells me that she did have mammogram last year. She tells me that she follows with GI and she is up to date with EGD/colonoscopy  Continue monitoring and healthy lifestyle changes with PCP  
Continue neurontin  
Continue trazodone  
History of  As identified on EGD 02/2021  PPI  
Last A1C 02/21 6.0  Insulin sliding scale  No insulin at this time  Pt had lost lot of weight   
Multiple circular excoriations over hands and feet, circular measuring 0.5cm in diameter. Present over palmar aspect of hands and plantar aspect of feet.     Hold steroid. Related to syphilis, secondary per ID   Started on doxycycline   8/28-improving.  Continue antibiotics  
Not clear etiology  From antibiotic ?? Vs gastroparesis vs SBO ??  Continue supportive measurement  Monitor overnight. Repeat lab work   
Secondary to hepatitis C  Compliant with home diuretic regimen  Does not appear acutely decompensated  Abdominal ultrasound confirmed ascites.   Await labs - patient sent in as direct admit  Paracentesis done 8/27 successfully   AFP normal  She will need follow-up with GI as outpatient  
Stage two  Pt allergic to penicillin  No neurological involvement   Started on doxycycline   GC/chlamydia pending.  If GC positive she will need ceftriaxone before she is being discharged    
Tobacco cessation counseling and education provided for 4 minutes. Nicotine replacement options provided including patch, and further medical treatments including Wellbutrin and Chantix. As well as over the counter options of lozenges and gum.    
successfully treated  Follow up with GI   
normal...

## 2025-01-09 NOTE — DISCHARGE PLANNING
Anticipated Discharge Disposition: home no needs    Action: called [facility] pharmacy back and provided diagnosis code for a fib and prescription went through.  Per pharmacy employee, patient's Eliquis is covered @ 100% and they will have it ready for the patient by 3:30 today.  Updated patient that med is covered and provided her with a taxi voucher, #[id number], to get home to [facility] [place].  CNA @  to escort patient down stairs.    Barriers to Discharge: none    Plan: RN CM available for any other needs.     Refill request received via fax from [facility].     Last office visit 7/9/2024        Next office visit scheduled Visit date not found    Requested Prescriptions      No prescriptions requested or ordered in this encounter

## (undated) DEVICE — SYRINGE SAFETY 10 ML 18 GA X 1 1/2 BLUNT LL (100/BX 4BX/CA)

## (undated) DEVICE — LACTATED RINGERS INJ 1000 ML - (14EA/CA 60CA/PF)

## (undated) DEVICE — SPONGE GAUZESTER. 2X2 4-PL - (2/PK 50PK/BX 30BX/CS)

## (undated) DEVICE — MASK ANESTHESIA ADULT  - (100/CA)

## (undated) DEVICE — SPONGE GAUZE NON-STERILE 4X4 - (2000/CA 10PK/CA)

## (undated) DEVICE — CONTAINER, SPECIMEN, STERILE

## (undated) DEVICE — TUBE CONNECTING SUCTION - CLEAR PLASTIC STERILE 72 IN (50EA/CA)

## (undated) DEVICE — FILM CASSETTE ENDO

## (undated) DEVICE — DEVICE HEMOSTATIC CLIPPING RESOLUTION 360 DEGREES (20EA/BX)

## (undated) DEVICE — WATER IRRIGATION STERILE 1000ML (12EA/CA)

## (undated) DEVICE — KIT  I.V. START (100EA/CA)

## (undated) DEVICE — SENSOR SPO2 NEO LNCS ADHESIVE (20/BX) SEE USER NOTES

## (undated) DEVICE — HEAD HOLDER JUNIOR/ADULT

## (undated) DEVICE — ELECTRODE 850 FOAM ADHESIVE - HYDROGEL RADIOTRNSPRNT (50/PK)

## (undated) DEVICE — TOWEL STOP TIMEOUT SAFETY FLAG (40EA/CA)

## (undated) DEVICE — SYRINGE SAFETY 3 ML 18 GA X 1 1/2 BLUNT LL (100/BX 8BX/CA)

## (undated) DEVICE — GOWN SURGEONS X-LARGE - DISP. (30/CA)

## (undated) DEVICE — TUBING CLEARLINK DUO-VENT - C-FLO (48EA/CA)

## (undated) DEVICE — KIT CUSTOM PROCEDURE SINGLE FOR ENDO  (15/CA)

## (undated) DEVICE — BITEBLOCK ENDOSCOPIC PEDI. - (25/BX)

## (undated) DEVICE — CANISTER SUCTION 3000ML MECHANICAL FILTER AUTO SHUTOFF MEDI-VAC NONSTERILE LF DISP  (40EA/CA)

## (undated) DEVICE — CATHETER IV SAFETY 20 GA X 1-1/4 (50/BX)

## (undated) DEVICE — CANISTER SUCTION RIGID RED 1500CC (40EA/CA)

## (undated) DEVICE — KIT ANESTHESIA W/CIRCUIT & 3/LT BAG W/FILTER (20EA/CA)

## (undated) DEVICE — KIT PROCEDURE DOUBLE ENDO ONLY (5/CA)

## (undated) DEVICE — NEPTUNE 4 PORT MANIFOLD - (20/PK)

## (undated) DEVICE — SET EXTENSION WITH 2 PORTS (48EA/CA) ***PART #2C8610 IS A SUBSTITUTE*****

## (undated) DEVICE — DRAPE LAPAROTOMY T SHEET - (12EA/CA)

## (undated) DEVICE — TUBE SUCTION YANKAUER  1/4 X 6FT (20EA/CA)"

## (undated) DEVICE — CHLORAPREP 26 ML APPLICATOR - ORANGE TINT(25/CA)

## (undated) DEVICE — GOWN SURGEONS LARGE - (32/CA)

## (undated) DEVICE — SPEEDBAND SUPERVIEW SUPER 7 (4/BX)

## (undated) DEVICE — TUBE E-T HI-LO CUFF 6.5MM (10EA/BX)

## (undated) DEVICE — GLOVE BIOGEL PI INDICATOR SZ 6.5 SURGICAL PF LF - (50/BX 4BX/CA)

## (undated) DEVICE — TRAP POLYP E-TRAP (25EA/BX)

## (undated) DEVICE — SUCTION INSTRUMENT YANKAUER BULBOUS TIP W/O VENT (50EA/CA)

## (undated) DEVICE — SYRINGE DISP. 60 CC LL - (30/BX, 12BX/CA)**WHEN THESE ARE GONE ORDER #500206**

## (undated) DEVICE — TUBING O2 7FT TIP SMTH BORE - (50/CA)

## (undated) DEVICE — CANNULA W/ SUPPLY TUBING O2 - (50/CA)

## (undated) DEVICE — SYRINGE 12 CC LUER TIP - (80/BX) OBSOLETE ITEM

## (undated) DEVICE — SUTURE GENERAL

## (undated) DEVICE — SUTURE 3-0 ETHILON PS-1 (36PK/BX)

## (undated) DEVICE — MANIFOLD NEPTUNE 1 PORT (20/PK)

## (undated) DEVICE — BLOCK BITE ENDOSCOPIC 2809 - (100/BX) INTERMEDIATE

## (undated) DEVICE — SENSOR SPO2 ADULT LNCS ADTX (20/BX) ORDER ITEM #19593

## (undated) DEVICE — PROTECTOR ULNA NERVE - (36PR/CA)

## (undated) DEVICE — GOWN WARMING STANDARD FLEX - (30/CA)

## (undated) DEVICE — NEEDLE NON SAFETY 25 GA X 1 1/2 IN HYPO (100EA/BX)

## (undated) DEVICE — SET LEADWIRE 5 LEAD BEDSIDE DISPOSABLE ECG (1SET OF 5/EA)

## (undated) DEVICE — SYRINGE SAFETY 5 ML 18 GA X 1-1/2 BLUNT LL (100/BX 4BX/CA)

## (undated) DEVICE — GLOVE SZ 6.5 BIOGEL PI MICRO - PF LF (50PR/BX)

## (undated) DEVICE — ELECTRODE DUAL RETURN W/ CORD - (50/PK)

## (undated) DEVICE — MASK PANORAMIC OXYGEN PRO2 (30EA/CA)

## (undated) DEVICE — GLOVE, LITE (PAIR)

## (undated) DEVICE — DRESSING TRANSPARENT FILM TEGADERM 2.375 X 2.75"  (100EA/BX)"

## (undated) DEVICE — SOD. CHL 10CC SYRINGE PREFILL - W/10 CC (30/BX)

## (undated) DEVICE — SUTURE 4-0 MONOCRYL PLUS PS-2 - 27 INCH (36/BX)

## (undated) DEVICE — FORCEP RADIAL JAW 4 STANDARD CAPACITY W/NEEDLE 240CM (40EA/BX)

## (undated) DEVICE — BITE BLOCK ADULT 60FR (100EA/CA)

## (undated) DEVICE — MASK WITH FACE SHIELD (25/BX 4BX/CA)

## (undated) DEVICE — SYRINGE 10 ML CONTROL LL (25EA/BX 4BX/CA)

## (undated) DEVICE — DETERGENT RENUZYME PLUS 10 OZ PACKET (50/BX)

## (undated) DEVICE — TUBE E-T HI-LO CUFF 7.0MM (10EA/PK)

## (undated) DEVICE — CANNULA O2 COMFORT SOFT EAR ADULT 7 FT TUBING (50/CA)

## (undated) DEVICE — Device

## (undated) DEVICE — GLOVE BIOGEL SZ 8 SURGICAL PF LTX - (50PR/BX 4BX/CA)

## (undated) DEVICE — PACK MINOR BASIN - (2EA/CA)